# Patient Record
Sex: MALE | Race: BLACK OR AFRICAN AMERICAN | NOT HISPANIC OR LATINO | Employment: FULL TIME | URBAN - METROPOLITAN AREA
[De-identification: names, ages, dates, MRNs, and addresses within clinical notes are randomized per-mention and may not be internally consistent; named-entity substitution may affect disease eponyms.]

---

## 2017-01-23 ENCOUNTER — PRE VISIT (OUTPATIENT)
Dept: UROLOGY | Facility: CLINIC | Age: 67
End: 2017-01-23

## 2017-01-23 NOTE — TELEPHONE ENCOUNTER
Patient is coming in to see  for 6 month PSA, PSA was done on 11/9/2016 very thing is in [facility] ready for appointment no need for a call

## 2017-01-31 ENCOUNTER — OFFICE VISIT (OUTPATIENT)
Dept: UROLOGY | Facility: CLINIC | Age: 67
End: 2017-01-31

## 2017-01-31 VITALS
HEIGHT: 64 IN | HEART RATE: 69 BPM | SYSTOLIC BLOOD PRESSURE: 150 MMHG | DIASTOLIC BLOOD PRESSURE: 78 MMHG | BODY MASS INDEX: 32.68 KG/M2 | WEIGHT: 191.4 LBS

## 2017-01-31 DIAGNOSIS — R97.20 ELEVATED PROSTATE SPECIFIC ANTIGEN (PSA): ICD-10-CM

## 2017-01-31 DIAGNOSIS — N52.9 ERECTILE DYSFUNCTION, UNSPECIFIED ERECTILE DYSFUNCTION TYPE: Primary | ICD-10-CM

## 2017-01-31 DIAGNOSIS — N40.0 BENIGN NON-NODULAR PROSTATIC HYPERPLASIA, PRESENCE OF LOWER URINARY TRACT SYMPTOMS UNSPECIFIED: ICD-10-CM

## 2017-01-31 RX ORDER — BISACODYL 5 MG/1
TABLET, DELAYED RELEASE ORAL
COMMUNITY
Start: 2015-01-08 | End: 2017-07-31

## 2017-01-31 RX ORDER — AZITHROMYCIN 250 MG/1
TABLET, FILM COATED ORAL
Refills: 0 | COMMUNITY
Start: 2016-11-11 | End: 2017-07-31

## 2017-01-31 RX ORDER — ALBUTEROL SULFATE 90 UG/1
AEROSOL, METERED RESPIRATORY (INHALATION)
Refills: 2 | COMMUNITY
Start: 2016-11-07 | End: 2017-07-31

## 2017-01-31 RX ORDER — SILDENAFIL CITRATE 20 MG/1
TABLET ORAL
Qty: 90 TABLET | Refills: 0 | Status: SHIPPED | OUTPATIENT
Start: 2017-01-31 | End: 2017-07-31

## 2017-01-31 RX ORDER — TAMSULOSIN HYDROCHLORIDE 0.4 MG/1
0.4 CAPSULE ORAL DAILY
Qty: 90 CAPSULE | Refills: 3 | Status: SHIPPED | OUTPATIENT
Start: 2017-01-31 | End: 2020-06-09

## 2017-01-31 RX ORDER — PREDNISOLONE ACETATE 10 MG/ML
1 SUSPENSION/ DROPS OPHTHALMIC
COMMUNITY
Start: 2015-02-12 | End: 2017-07-31

## 2017-01-31 RX ORDER — CODEINE PHOSPHATE AND GUAIFENESIN 10; 100 MG/5ML; MG/5ML
LIQUID ORAL
Refills: 0 | COMMUNITY
Start: 2016-11-11 | End: 2017-07-31

## 2017-01-31 RX ORDER — MOMETASONE FUROATE AND FORMOTEROL FUMARATE DIHYDRATE 100; 5 UG/1; UG/1
AEROSOL RESPIRATORY (INHALATION)
Refills: 3 | COMMUNITY
Start: 2016-11-07 | End: 2017-07-31

## 2017-01-31 RX ORDER — OFLOXACIN 3 MG/ML
1 SOLUTION/ DROPS OPHTHALMIC
COMMUNITY
Start: 2015-02-12 | End: 2017-07-31

## 2017-01-31 ASSESSMENT — PAIN SCALES - GENERAL: PAINLEVEL: NO PAIN (0)

## 2017-01-31 NOTE — PROGRESS NOTES
REASON FOR VISIT: Urinary symptoms, erectile dysfunction, elevated PSA.    HISTORY OF PRESENT ILLNESS: Mr. Viveros is a 66-year-old gentleman who we have been following for multiple urinary symptoms, erectile dysfunction, and elevated PSAs of 4.18 in 11/2015 and 4.13 in 03/2016. He is experiencing increased urinary frequency of once every 1-2 hours, nocturia 1-2 times/night, increased urgency during the daytime, and urinary retention. These symptoms are largely unchanged since we last saw him in 03/16. His erectile dysfunction is also unchanged since his last visit. Although he was prescribed sildenafil 20 mg, he took it only once after experiencing headaches. Additionally, he found the drug to be too expensive. The patient received a TRUS-guided biopsy on 02/15/2010 for an abnormal digital rectal exam, which was negative for cancer in all 12 cores.    PHYSICAL EXAM: Today the patient's blood pressure is 150/78.  Pulse is 69. He is in no acute distress.    DATA:  - PSA: 3.08 on 02/28/2006, 2.48 on 02/12/2007, 2.84 on 11/17/2008, 2.63 on 11/06/2009, 3.31 on 01/24/2011, 3.24 on 11/14/2011, 4.18 on 11/19/2015, 4.31 on 03/08/2016, 6.91 on 11/09/2016  - Post-void residual volume (01/31/2016): 25 mL  - AUA urinary symptom score (01/31/2016): 15/35; bothersome index: 4    ASSESSMENT AND PLAN: Over half of today's 25-minute visit was spent counseling Mr. Viveros regarding his urinary symptoms, erectile dysfunction, and elevated PSA. We discussed with the patient that his urinary symptoms as well as his elevated PSA are likely caused by some degree of BPH, which is supported by the PVR volume. However, the rate of PSA elevation (2.6 in the past year) is concerning enough to warrant further work-up for prostate cancer. We suggested to the patient that he begin Flomax 0.4 mg PO daily for his urinary symptoms, and that he receive imaging in the form of a prostate MRI for initial work-up of prostate cancer. Regarding his  erectile dysfunction, we suggested that Mr. Viveros try the sildenafil again. We advised him that 1-5 may be taken at a time before sexual activity, and that he should experiment with the dosage to achieve erection without the headaches. We discussed with him that he should shop around, as prices for sildenafil vary dramatically among different pharmacies. We also cautioned him to take Flomax for at least two weeks before starting sildenafil to avoid potentially dangerous hypotension. Mr. Viveros was in agreement with the plan. He will be scheduled for a prostate MRI.        Scribe Disclosure:  I, Ulises Jacbos, am serving as a scribe; to document services personally performed by Dr. Montaño based on data collection and the provider's statements to me.    Attending addendum: I have seen and examined the patient and agree with the note above.  The medical student served as scribe for my history, physical exam and medical decision making.  In addition: Over half of today's 25-minute visit was spent counseling the patient regarding his urinary symptoms, ED and elevated PSA.  The patient will get an MRI of the prostate as a next step and make further decisions based on what that shows.

## 2017-01-31 NOTE — MR AVS SNAPSHOT
After Visit Summary   1/31/2017    Vishnu Viveros    MRN: [id number]           Patient Information     Date Of Birth          1950        Visit Information        Provider Department      1/31/2017 9:30 AM Leandro Montaño MD [facility] for Prostate and Urologic Cancers        Today's Diagnoses     Erectile dysfunction, unspecified erectile dysfunction type    -  1     Benign non-nodular prostatic hyperplasia, presence of lower urinary tract symptoms unspecified         Elevated prostate specific antigen (PSA)           Care Instructions    Please follow up with  on 2/28 MRI before          Follow-ups after your visit        Your next 10 appointments already scheduled     Feb 09, 2017 11:00 AM   Colonoscopy with Mari Winslow MD   [facility] ([facility])    [address]  [facility] [id number] [phone]            Feb 25, 2017 11:30 AM   (Arrive by 11:15 AM)   MR PROSTATE with [id number]   [facility] MRI ([facility])    [address] [address] [phone]           Take your medicines as usual, unless your doctor tells you not to. Bring a list of your current medicines to your exam (including vitamins, minerals and over-the-counter drugs). Also bring the results of similar scans you may have had.  Please remove any body piercings and hair extensions before you arrive.  Follow your doctor s orders. If you do not, we may have to postpone your exam.  You will not have contrast for this exam. You do not need to do anything special to prepare.  The MRI machine uses a strong magnet. Please wear clothes without metal (snaps, zippers). A sweatsuit works well, or we may give you a hospital gown.   **IMPORTANT** THE INSTRUCTIONS BELOW ARE ONLY FOR THOSE PATIENTS WHO HAVE BEEN TOLD THEY WILL RECEIVE SEDATION OR GENERAL  ANESTHESIA DURING THEIR MRI PROCEDURE:  IF YOU WILL RECEIVE SEDATION (take medicine to help you relax during your exam):   You must get the medicine from your doctor before you arrive. Bring the medicine to the exam. Do not take it at home.   Arrive one hour early. Bring someone who can take you home after the test. Your medicine will make you sleepy. After the exam, you may not drive, take a bus or take a taxi by yourself.   No eating 8 hours before your exam. You may have clear liquids up until 4 hours before your exam. (Clear liquids include water, clear tea, black coffee and fruit juice without pulp.)  IF YOU WILL RECEIVE ANESTHESIA (be asleep for your exam):   Arrive 1 1/2 hours early. Bring someone who can take you home after the test. You may not drive, take a bus or take a taxi by yourself.   No eating 8 hours before your exam. You may have clear liquids up until 4 hours before your exam. (Clear liquids include water, clear tea, black coffee and fruit juice without pulp.)   You will spend four to five hours in the recovery room.  Please call the Imaging Department at your exam site with any questions.            Feb 28, 2017 11:30 AM   (Arrive by 11:15 AM)   Return Visit with Leandro Montaño MD   [facility] for Prostate and Urologic Cancers ([email or web address])    [address] [place] [address] [phone]              Future tests that were ordered for you today     Open Future Orders        Priority Expected Expires Ordered    MR Prostate Routine  3/17/2017 1/31/2017            Who to contact     Please call your clinic at [phone] to:    Ask questions about your health    Make or cancel appointments    Discuss your medicines    Learn about your test results    Speak to your doctor   If you have compliments or concerns about an experience at your clinic, or if you wish to file a complaint, please contact [facility]  "Physicians Patient Relations at [phone] or email us at [email or web address]         Additional Information About Your Visit        Bottlenose[employer] Information     CHOOMOGO is an electronic gateway that provides easy, online access to your medical records. With CHOOMOGO, you can request a clinic appointment, read your test results, renew a prescription or communicate with your care team.     To sign up for CHOOMOGO visit the website at [email or web address]   You will be asked to enter the access code listed below, as well as some personal information. Please follow the directions to create your username and password.     Your access code is: [email or web address]  Expires: 2017  6:30 AM     Your access code will  in 90 days. If you need help or a new code, please contact your [facility] or call [phone] for assistance.        Care EveryWhere ID     This is your Care EveryWhere ID. This could be used by other organizations to access your [place] medical records  [id number]        Your Vitals Were     Pulse Height BMI (Body Mass Index)             69 1.626 m (5' 4\") 32.84 kg/m2          Blood Pressure from Last 3 Encounters:   17 150/78   16 162/83   16 142/82    Weight from Last 3 Encounters:   17 86.818 kg (191 lb 6.4 oz)   16 88.724 kg (195 lb 9.6 oz)   16 89.994 kg (198 lb 6.4 oz)              We Performed the Following     POST-VOID RESIDUAL BLADDER SCAN          Today's Medication Changes          These changes are accurate as of: 17 11:02 AM.  If you have any questions, ask your nurse or doctor.               Start taking these medicines.        Dose/Directions    sildenafil 20 MG tablet   Commonly known as:  REVATIO/VIAGRA   Used for:  Erectile dysfunction, unspecified erectile dysfunction type   Started by:  Leandro Montaño MD        Take 1-5 tabs as needed for sexual activity.  Never use with " nitroglycerin, terazosin or doxazosin.   Quantity:  90 tablet   Refills:  0       tamsulosin 0.4 MG capsule   Commonly known as:  FLOMAX   Used for:  Benign non-nodular prostatic hyperplasia, presence of lower urinary tract symptoms unspecified   Started by:  Laendro Montaño MD        Dose:  0.4 mg   Take 1 capsule (0.4 mg) by mouth daily   Quantity:  90 capsule   Refills:  3            Where to get your medicines      These medications were sent to [id number] - [place], [place] - [address]  [address], [place] [address]     Phone:  [phone]    - tamsulosin 0.4 MG capsule      Some of these will need a paper prescription and others can be bought over the counter.  Ask your nurse if you have questions.     Bring a paper prescription for each of these medications    - sildenafil 20 MG tablet             Primary Care Provider Office Phone # Fax #    Murali Logan [phone]        [facility] [address] [place] [address] [address]  [facility] [address]        Thank you!     Thank you for choosing [facility]OLOGY AND [facility] FOR PROSTATE AND UROLOGIC CANCERS  for your care. Our goal is always to provide you with excellent care. Hearing back from our patients is one way we can continue to improve our services. Please take a few minutes to complete the written survey that you may receive in the mail after your visit with us. Thank you!             Your Updated Medication List - Protect others around you: Learn how to safely use, store and throw away your medicines at www.disposemymeds.org.          This list is accurate as of: 1/31/17 11:02 AM.  Always use your most recent med list.                   Brand Name Dispense Instructions for use    ALEVE PO      Take 1 tablet by mouth as needed for moderate pain       aspirin 81 MG tablet      Take 1 tablet by mouth daily.       atorvastatin 20 MG tablet    LIPITOR    90 tablet    Take 1  tablet (20 mg) by mouth daily       azithromycin 250 MG tablet    ZITHROMAX     TK 2 TS PO ON DAY 1 THEN 1 T D FOR 4 DAYS       BENADRYL PO      Take 1 tablet by mouth daily as needed for allergies       DHA-EPA-VITAMIN E PO      Take 1 capsule by mouth       DULERA 100-5 MCG/ACT oral inhaler   Generic drug:  mometasone-formoterol      INHALE 2 PFS INTO THE LUNGS BID       FISH OIL PO      Take 1 capsule by mouth as needed       gabapentin 300 MG capsule    NEURONTIN    180 capsule    One capsule twice daily       GAVILYTE-G 236 G suspension   Generic drug:  polyethylene glycol          Glucosamine Sulfate 750 MG Tabs     60 tablet    Take 1 tablet by mouth 2 times daily       lisinopril 5 MG tablet    PRINIVIL/ZESTRIL    90 tablet    Take 1 tablet (5 mg) by mouth daily       loratadine 10 MG capsule    CLARITIN    90 capsule    Take 10 mg by mouth as needed       metFORMIN 500 MG 24 hr tablet    GLUCOPHAGE-XR    90 tablet    Take 1 tablet (500 mg) by mouth daily (with dinner)       montelukast 10 MG tablet    SINGULAIR    30 tablet    Take 1 tablet (10 mg) by mouth At Bedtime       MUCINEX 600 MG 12 hr tablet   Generic drug:  guaiFENesin          MULTIVITAMINS PO      Take 1 tablet by mouth daily.       ofloxacin 0.3 % ophthalmic solution    OCUFLOX     1 drop       order for DME     1 Device    Equipment being ordered: back brace support       prednisoLONE acetate 1 % ophthalmic susp    PRED FORTE     1 drop       sildenafil 20 MG tablet    REVATIO/VIAGRA    90 tablet    Take 1-5 tabs as needed for sexual activity.  Never use with nitroglycerin, terazosin or doxazosin.       STIMULANT LAXATIVE 5 MG EC tablet   Generic drug:  bisacodyl          tamsulosin 0.4 MG capsule    FLOMAX    90 capsule    Take 1 capsule (0.4 mg) by mouth daily       TYLENOL PO      Take 500 mg by mouth once       vardenafil 10 MG tablet    LEVITRA    4 tablet    Take 1 tablet 60 min prior to planned activity.       VENTOLIN  (90  BASE) MCG/ACT Inhaler   Generic drug:  albuterol      INHALE 2 PFS INTO THE LUNGS QID       VIRTUSSIN A/C 100-10 MG/5ML Soln solution   Generic drug:  guaiFENesin-codeine      TK 5-10 MLS PO Q 4 H PRF COUGH       VITAMIN B12 PO      Take 1 tablet by mouth. Pt states he takes this 2 or 3 times a week..       VITAMIN C PO      Take 500 mg by mouth once       vitamin D 1000 UNITS capsule      Take 1 capsule by mouth daily.

## 2017-01-31 NOTE — LETTER
1/31/2017       RE: Vishnu Viveros  [address]  [place] [address]     Dear Colleague,    Thank you for referring your patient, Vishnu Viveros, to the [facility] UROLOGY AND INST FOR PROSTATE AND UROLOGIC CANCERS at [facility]. Please see a copy of my visit note below.    REASON FOR VISIT: Urinary symptoms, erectile dysfunction, elevated PSA.    HISTORY OF PRESENT ILLNESS: Mr. Viveros is a 66-year-old gentleman who we have been following for multiple urinary symptoms, erectile dysfunction, and elevated PSAs of 4.18 in 11/2015 and 4.13 in 03/2016. He is experiencing increased urinary frequency of once every 1-2 hours, nocturia 1-2 times/night, increased urgency during the daytime, and urinary retention. These symptoms are largely unchanged since we last saw him in 03/16. His erectile dysfunction is also unchanged since his last visit. Although he was prescribed sildenafil 20 mg, he took it only once after experiencing headaches. Additionally, he found the drug to be too expensive. The patient received a TRUS-guided biopsy on 02/15/2010 for an abnormal digital rectal exam, which was negative for cancer in all 12 cores.    PHYSICAL EXAM: Today the patient's blood pressure is 150/78.  Pulse is 69. He is in no acute distress.    DATA:  - PSA: 3.08 on 02/28/2006, 2.48 on 02/12/2007, 2.84 on 11/17/2008, 2.63 on 11/06/2009, 3.31 on 01/24/2011, 3.24 on 11/14/2011, 4.18 on 11/19/2015, 4.31 on 03/08/2016, 6.91 on 11/09/2016  - Post-void residual volume (01/31/2016): 25 mL  - AUA urinary symptom score (01/31/2016): 15/35; bothersome index: 4    ASSESSMENT AND PLAN: Over half of today's 25-minute visit was spent counseling Mr. Viveros regarding his urinary symptoms, erectile dysfunction, and elevated PSA. We discussed with the patient that his urinary symptoms as well as his elevated PSA are likely caused by some degree of BPH, which is supported by the PVR volume. However,  the rate of PSA elevation (2.6 in the past year) is concerning enough to warrant further work-up for prostate cancer. We suggested to the patient that he begin Flomax 0.4 mg PO daily for his urinary symptoms, and that he receive imaging in the form of a prostate MRI for initial work-up of prostate cancer. Regarding his erectile dysfunction, we suggested that Mr. Viveros try the sildenafil again. We advised him that 1-5 may be taken at a time before sexual activity, and that he should experiment with the dosage to achieve erection without the headaches. We discussed with him that he should shop around, as prices for sildenafil vary dramatically among different pharmacies. We also cautioned him to take Flomax for at least two weeks before starting sildenafil to avoid potentially dangerous hypotension. Mr. Viveros was in agreement with the plan. He will be scheduled for a prostate MRI.      Scribe Disclosure:  I, Ulises Jacobs, am serving as a scribe; to document services personally performed by Dr. Montaño based on data collection and the provider's statements to me.    Attending addendum: I have seen and examined the patient and agree with the note above.  The medical student served as scribe for my history, physical exam and medical decision making.  In addition: Over half of today's 25-minute visit was spent counseling the patient regarding his urinary symptoms, ED and elevated PSA.  The patient will get an MRI of the prostate as a next step and make further decisions based on what that shows.    Again, thank you for allowing me to participate in the care of your patient.      Sincerely,    Leandro Montaño MD

## 2017-02-02 ENCOUNTER — TELEPHONE (OUTPATIENT)
Dept: GASTROENTEROLOGY | Facility: OUTPATIENT CENTER | Age: 67
End: 2017-02-02

## 2017-02-06 ENCOUNTER — TELEPHONE (OUTPATIENT)
Dept: GASTROENTEROLOGY | Facility: OUTPATIENT CENTER | Age: 67
End: 2017-02-06

## 2017-02-06 DIAGNOSIS — Z12.11 ENCOUNTER FOR SCREENING COLONOSCOPY: Primary | ICD-10-CM

## 2017-02-06 NOTE — TELEPHONE ENCOUNTER
Patient taking any blood thinners ? 81 mg aspirin    Heart disease ? denies    Lung disease ? denies      Sleep apnea ? denies    Diabetic ? Type 2    Kidney disease ? denies    Dialysis ? n/a  Electronic implanted medical devices ? denies    Are you taking any narcotic pain medication ? No What is your daily dosage ?    PTSD ? n/a    Prep instructions reviewed with patient ? Instructions,  policy, conscious sedation plan reviewed. Advised pt. To have someone stay with him post exam    Pharmacy : [facility]    Indication for procedure : screening colonoscopy    Referring provider : Dr. Murali Logan

## 2017-02-09 ENCOUNTER — DOCUMENTATION ONLY (OUTPATIENT)
Dept: GASTROENTEROLOGY | Facility: OUTPATIENT CENTER | Age: 67
End: 2017-02-09

## 2017-02-09 ENCOUNTER — TRANSFERRED RECORDS (OUTPATIENT)
Dept: HEALTH INFORMATION MANAGEMENT | Facility: CLINIC | Age: 67
End: 2017-02-09

## 2017-02-13 LAB — COPATH REPORT: NORMAL

## 2017-02-21 ENCOUNTER — PRE VISIT (OUTPATIENT)
Dept: UROLOGY | Facility: CLINIC | Age: 67
End: 2017-02-21

## 2017-03-16 ENCOUNTER — TELEPHONE (OUTPATIENT)
Dept: UROLOGY | Facility: CLINIC | Age: 67
End: 2017-03-16

## 2017-03-16 NOTE — TELEPHONE ENCOUNTER
----- Message from Mayela Ambrocio sent at 3/16/2017  9:18 AM CDT -----  Regarding: RE: Appt for Prostate MRI and follow up with Sabra to review  Attempted to contact 3 times, leaving multiple voicemails with our office phone number, and were unable to speak to the pt. Please let us know if you need anything else. - Mayela, Clinic Coordinator  ----- Message -----     From: Abigail Pichardo LPN     Sent: 2/28/2017  12:15 PM       To: Clinic Coordinators-[facility]  Subject: Appt for Prostate MRI and follow up with Arya Mazariegos!    This patient needs to get an appointment for a prostate MRI and a follow up appointment with Dr. Montaño to review.  (patient left without being scheduled).  Thank you!!    -Abigail Pichardo LPN

## 2017-03-20 ENCOUNTER — TELEPHONE (OUTPATIENT)
Dept: UROLOGY | Facility: CLINIC | Age: 67
End: 2017-03-20

## 2017-03-20 DIAGNOSIS — R97.20 ELEVATED PSA: Primary | ICD-10-CM

## 2017-03-20 NOTE — TELEPHONE ENCOUNTER
----- Message from Rema Ponce sent at 3/20/2017  1:32 PM CDT -----  Regarding: Scheduled  Patient called back and I got him scheduled for his MRI and results a week later.

## 2017-03-27 ENCOUNTER — PRE VISIT (OUTPATIENT)
Dept: UROLOGY | Facility: CLINIC | Age: 67
End: 2017-03-27

## 2017-03-27 DIAGNOSIS — J30.2 SEASONAL ALLERGIES: ICD-10-CM

## 2017-03-27 NOTE — TELEPHONE ENCOUNTER
Patient is coming in to see  for a MRI result follow up, no need for a call very thing is in epic ready for appointment.

## 2017-03-28 RX ORDER — LORATADINE 10 MG/1
10 TABLET ORAL DAILY PRN
Qty: 90 TABLET | Refills: 0 | Status: SHIPPED | OUTPATIENT
Start: 2017-03-28 | End: 2017-12-28

## 2017-03-28 NOTE — TELEPHONE ENCOUNTER
loratadine (CLARITIN) 10 MG       Last Written Prescription Date:  11/19/15  Last Fill Quantity: 90,   # refills: 3  Last Office Visit : 11/28/16  Future Office visit:  NONE

## 2017-04-04 ENCOUNTER — OFFICE VISIT (OUTPATIENT)
Dept: UROLOGY | Facility: CLINIC | Age: 67
End: 2017-04-04

## 2017-04-04 VITALS
DIASTOLIC BLOOD PRESSURE: 87 MMHG | HEART RATE: 62 BPM | BODY MASS INDEX: 33.58 KG/M2 | HEIGHT: 64 IN | SYSTOLIC BLOOD PRESSURE: 154 MMHG | WEIGHT: 196.7 LBS

## 2017-04-04 DIAGNOSIS — N52.8 OTHER MALE ERECTILE DYSFUNCTION: Primary | ICD-10-CM

## 2017-04-04 DIAGNOSIS — R97.20 ELEVATED PROSTATE SPECIFIC ANTIGEN (PSA): ICD-10-CM

## 2017-04-04 RX ORDER — SILDENAFIL CITRATE 20 MG/1
TABLET ORAL
Qty: 90 TABLET | Refills: 11 | Status: SHIPPED | OUTPATIENT
Start: 2017-04-04 | End: 2017-11-27

## 2017-04-04 ASSESSMENT — PAIN SCALES - GENERAL: PAINLEVEL: NO PAIN (0)

## 2017-04-04 NOTE — MR AVS SNAPSHOT
After Visit Summary   2017    Vishnu Viveros    MRN: [id number]           Patient Information     Date Of Birth          1950        Visit Information        Provider Department      2017 12:30 PM Leandro Montaño MD [facility] and [facility] for Prostate and Urologic Cancers        Today's Diagnoses     Other male erectile dysfunction    -  1    Elevated prostate specific antigen (PSA)          Care Instructions    Please follow up in one year with  with a PSA before.          Follow-ups after your visit        Follow-up notes from your care team     Return in about 1 year (around 2018).      Who to contact     Please call your clinic at [phone] to:    Ask questions about your health    Make or cancel appointments    Discuss your medicines    Learn about your test results    Speak to your doctor   If you have compliments or concerns about an experience at your clinic, or if you wish to file a complaint, please contact [facility] Patient Relations at [phone] or email us at [email or web address]         Additional Information About Your Visit        MyChart Information     Handipoints is an electronic gateway that provides easy, online access to your medical records. With Handipoints, you can request a clinic appointment, read your test results, renew a prescription or communicate with your care team.     To sign up for Handipoints visit the website at [email or web address]   You will be asked to enter the access code listed below, as well as some personal information. Please follow the directions to create your username and password.     Your access code is: [email or web address]  Expires: 2017  7:12 AM     Your access code will  in 90 days. If you need help or a new code, please contact your [facility] or call [phone] for assistance.        Care EveryWhere ID     This is your Care  "EveryWhere ID. This could be used by other organizations to access your [place] medical records  [id number]        Your Vitals Were     Pulse Height BMI (Body Mass Index)             62 1.626 m (5' 4\") 33.76 kg/m2          Blood Pressure from Last 3 Encounters:   04/04/17 154/87   02/28/17 153/85   01/31/17 150/78    Weight from Last 3 Encounters:   04/04/17 89.2 kg (196 lb 11.2 oz)   02/28/17 88.5 kg (195 lb)   01/31/17 86.8 kg (191 lb 6.4 oz)                 Today's Medication Changes          These changes are accurate as of: 4/4/17 11:59 PM.  If you have any questions, ask your nurse or doctor.               These medicines have changed or have updated prescriptions.        Dose/Directions    * sildenafil 20 MG tablet   Commonly known as:  REVATIO/VIAGRA   This may have changed:  Another medication with the same name was added. Make sure you understand how and when to take each.   Used for:  Erectile dysfunction, unspecified erectile dysfunction type   Changed by:  Leandro Montaño MD        Take 1-5 tabs as needed for sexual activity.  Never use with nitroglycerin, terazosin or doxazosin.   Quantity:  90 tablet   Refills:  0       * sildenafil 20 MG tablet   Commonly known as:  REVATIO/VIAGRA   This may have changed:  You were already taking a medication with the same name, and this prescription was added. Make sure you understand how and when to take each.   Used for:  Other male erectile dysfunction   Changed by:  Leandro Montaño MD        Take 1-5 tablet (20 mg) by mouth as needed for sexual activity.  Never use with nitroglycerin, terazosin or doxazosin.   Quantity:  90 tablet   Refills:  11       * Notice:  This list has 2 medication(s) that are the same as other medications prescribed for you. Read the directions carefully, and ask your doctor or other care provider to review them with you.         Where to get your medicines      Some of these will need a paper prescription and " others can be bought over the counter.  Ask your nurse if you have questions.     Bring a paper prescription for each of these medications     sildenafil 20 MG tablet                Primary Care Provider Office Phone # Fax #    Murali Logan [phone]        [facility] [address]  [facility] [address]        Thank you!     Thank you for choosing [facility] UROLOGY AND [facility] FOR PROSTATE AND UROLOGIC CANCERS  for your care. Our goal is always to provide you with excellent care. Hearing back from our patients is one way we can continue to improve our services. Please take a few minutes to complete the written survey that you may receive in the mail after your visit with us. Thank you!             Your Updated Medication List - Protect others around you: Learn how to safely use, store and throw away your medicines at www.disposemymeds.org.          This list is accurate as of: 4/4/17 11:59 PM.  Always use your most recent med list.                   Brand Name Dispense Instructions for use    ALEVE PO      Take 1 tablet by mouth as needed for moderate pain       aspirin 81 MG tablet      Take 1 tablet by mouth daily.       atorvastatin 20 MG tablet    LIPITOR    90 tablet    Take 1 tablet (20 mg) by mouth daily       azithromycin 250 MG tablet    ZITHROMAX     TK 2 TS PO ON DAY 1 THEN 1 T D FOR 4 DAYS       BENADRYL PO      Take 1 tablet by mouth daily as needed for allergies       DHA-EPA-VITAMIN E PO      Take 1 capsule by mouth       DULERA 100-5 MCG/ACT oral inhaler   Generic drug:  mometasone-formoterol      INHALE 2 PFS INTO THE LUNGS BID       FISH OIL PO      Take 1 capsule by mouth as needed       gabapentin 300 MG capsule    NEURONTIN    180 capsule    One capsule twice daily       GAVILYTE-G 236 G suspension   Generic drug:  polyethylene glycol          Glucosamine Sulfate 750 MG Tabs     60 tablet    Take 1 tablet by mouth 2 times daily       lisinopril 5 MG tablet     PRINIVIL/ZESTRIL    90 tablet    Take 1 tablet (5 mg) by mouth daily       loratadine 10 MG tablet    CLARITIN    90 tablet    Take 1 tablet (10 mg) by mouth daily as needed LAST MD APPT. 11/28/16       metFORMIN 500 MG 24 hr tablet    GLUCOPHAGE-XR    90 tablet    Take 1 tablet (500 mg) by mouth daily (with dinner)       montelukast 10 MG tablet    SINGULAIR    30 tablet    Take 1 tablet (10 mg) by mouth At Bedtime       MUCINEX 600 MG 12 hr tablet   Generic drug:  guaiFENesin          MULTIVITAMINS PO      Take 1 tablet by mouth daily.       ofloxacin 0.3 % ophthalmic solution    OCUFLOX     1 drop       OMEGA-3 FATTY ACIDS-VITAMIN E PO      Take 1 capsule by mouth       order for DME     1 Device    Equipment being ordered: back brace support       prednisoLONE acetate 1 % ophthalmic susp    PRED FORTE     1 drop       * sildenafil 20 MG tablet    REVATIO/VIAGRA    90 tablet    Take 1-5 tabs as needed for sexual activity.  Never use with nitroglycerin, terazosin or doxazosin.       * sildenafil 20 MG tablet    REVATIO/VIAGRA    90 tablet    Take 1-5 tablet (20 mg) by mouth as needed for sexual activity.  Never use with nitroglycerin, terazosin or doxazosin.       STIMULANT LAXATIVE 5 MG EC tablet   Generic drug:  bisacodyl          tamsulosin 0.4 MG capsule    FLOMAX    90 capsule    Take 1 capsule (0.4 mg) by mouth daily       TYLENOL PO      Take 500 mg by mouth once       vardenafil 10 MG tablet    LEVITRA    4 tablet    Take 1 tablet 60 min prior to planned activity.       VENTOLIN  (90 BASE) MCG/ACT Inhaler   Generic drug:  albuterol      INHALE 2 PFS INTO THE LUNGS QID       VIRTUSSIN A/C 100-10 MG/5ML Soln solution   Generic drug:  guaiFENesin-codeine      TK 5-10 MLS PO Q 4 H PRF COUGH       VITAMIN B12 PO      Take 1 tablet by mouth. Pt states he takes this 2 or 3 times a week..       VITAMIN C PO      Take 500 mg by mouth once       vitamin D 1000 UNITS capsule      Take 1 capsule by mouth daily.        * Notice:  This list has 2 medication(s) that are the same as other medications prescribed for you. Read the directions carefully, and ask your doctor or other care provider to review them with you.

## 2017-04-04 NOTE — PROGRESS NOTES
REASON FOR VISIT TODAY:  Elevated PSA and erectile dysfunction and lower urinary tract symptoms.        HISTORY OF PRESENT ILLNESS:  Mr. Viveros is a 66-year-old gentleman followed in our clinic for history of some lower urinary tract symptoms.  The patient is currently managing his urination with Flomax.  He also has some erectile dysfunction and was given a prescription for sildenafil in January but has yet to fill the prescription and inquires today again about using Cialis.  The patient also has a history of elevated PSAs including values in the low to mid 4's.  He recently underwent an MRI of the prostate and comes in today to discuss these further.      PHYSICAL EXAMINATION:  On exam today, the patient's blood pressure is 154/87.  Pulse is 62.  He is in no acute distress.      IMAGING:  The patient has an MRI from 03/27/2017 showing a prostate volume of 51 cc and only PI-RADS 2 lesions.      ASSESSMENT AND PLAN:  Over half of today's 15-minute visit was spent counseling the patient regarding his elevated PSA, his lower urinary tract symptoms and his erectile dysfunction.  I suggested to Mr. Viveros that we are pleased to see there are no highly suspicious lesions on his MRI, and while this is no guarantee, it greatly diminishes the likelihood that we are missing clinically significant disease.  We will simply plan on repeating a PSA in 1 year's time with regard to this.  In terms of his erectile dysfunction, the patient again is requesting medications for this.  The patient notes he thinks he has lost his sildenafil prescription from January and so we have rewritten this for him today.  I have encouraged him to take 1-5 tablets daily as needed for sexual activity.  We did discuss that sildenafil if it works is likely to be much, much cheaper than the Cialis and we would recommend him trying this first.  Mr. Viveros is in agreement with the plan and we will otherwise see him back in a year with a PSA.

## 2017-04-04 NOTE — LETTER
4/4/2017       RE: Vishnu Viveros  [address]  [place] [address]     Dear Colleague,    Thank you for referring your patient, Vishnu Viveros, to the [facility] UROLOGY AND INST FOR PROSTATE AND UROLOGIC CANCERS at [facility]. Please see a copy of my visit note below.    REASON FOR VISIT TODAY:  Elevated PSA and erectile dysfunction and lower urinary tract symptoms.        HISTORY OF PRESENT ILLNESS:  Mr. Viveros is a 66-year-old gentleman followed in our clinic for history of some lower urinary tract symptoms.  The patient is currently managing his urination with Flomax.  He also has some erectile dysfunction and was given a prescription for sildenafil in January but has yet to fill the prescription and inquires today again about using Cialis.  The patient also has a history of elevated PSAs including values in the low to mid 4's.  He recently underwent an MRI of the prostate and comes in today to discuss these further.      PHYSICAL EXAMINATION:  On exam today, the patient's blood pressure is 154/87.  Pulse is 62.  He is in no acute distress.      IMAGING:  The patient has an MRI from 03/27/2017 showing a prostate volume of 51 cc and only PI-RADS 2 lesions.      ASSESSMENT AND PLAN:  Over half of today's 15-minute visit was spent counseling the patient regarding his elevated PSA, his lower urinary tract symptoms and his erectile dysfunction.  I suggested to Mr. Viveros that we are pleased to see there are no highly suspicious lesions on his MRI, and while this is no guarantee, it greatly diminishes the likelihood that we are missing clinically significant disease.  We will simply plan on repeating a PSA in 1 year's time with regard to this.  In terms of his erectile dysfunction, the patient again is requesting medications for this.  The patient notes he thinks he has lost his sildenafil prescription from January and so we have rewritten this for him today.  I  have encouraged him to take 1-5 tablets daily as needed for sexual activity.  We did discuss that sildenafil if it works is likely to be much, much cheaper than the Cialis and we would recommend him trying this first.  Mr. Viveros is in agreement with the plan and we will otherwise see him back in a year with a PSA.     Leandro Montaño MD

## 2017-07-31 ENCOUNTER — PRE VISIT (OUTPATIENT)
Dept: ANESTHESIOLOGY | Facility: CLINIC | Age: 67
End: 2017-07-31

## 2017-07-31 ENCOUNTER — DOCUMENTATION ONLY (OUTPATIENT)
Dept: VASCULAR SURGERY | Facility: CLINIC | Age: 67
End: 2017-07-31

## 2017-07-31 ENCOUNTER — OFFICE VISIT (OUTPATIENT)
Dept: FAMILY MEDICINE | Facility: CLINIC | Age: 67
End: 2017-07-31

## 2017-07-31 VITALS
WEIGHT: 189.3 LBS | DIASTOLIC BLOOD PRESSURE: 79 MMHG | BODY MASS INDEX: 32.49 KG/M2 | HEART RATE: 57 BPM | RESPIRATION RATE: 16 BRPM | SYSTOLIC BLOOD PRESSURE: 138 MMHG

## 2017-07-31 DIAGNOSIS — E11.9 TYPE 2 DIABETES MELLITUS TREATED WITHOUT INSULIN (H): ICD-10-CM

## 2017-07-31 DIAGNOSIS — R97.20 ELEVATED PROSTATE SPECIFIC ANTIGEN (PSA): ICD-10-CM

## 2017-07-31 DIAGNOSIS — M48.061 LUMBAR STENOSIS: ICD-10-CM

## 2017-07-31 DIAGNOSIS — R29.898 LEFT LEG WEAKNESS: Primary | ICD-10-CM

## 2017-07-31 DIAGNOSIS — Z13.6 SCREENING FOR AAA (ABDOMINAL AORTIC ANEURYSM): ICD-10-CM

## 2017-07-31 DIAGNOSIS — I10 BENIGN ESSENTIAL HYPERTENSION: ICD-10-CM

## 2017-07-31 DIAGNOSIS — Z23 ENCOUNTER FOR IMMUNIZATION: ICD-10-CM

## 2017-07-31 LAB
ALBUMIN SERPL-MCNC: 3.9 G/DL (ref 3.4–5)
ALP SERPL-CCNC: 55 U/L (ref 40–150)
ALT SERPL W P-5'-P-CCNC: 29 U/L (ref 0–70)
ANION GAP SERPL CALCULATED.3IONS-SCNC: 7 MMOL/L (ref 3–14)
AST SERPL W P-5'-P-CCNC: 22 U/L (ref 0–45)
BILIRUB SERPL-MCNC: 0.6 MG/DL (ref 0.2–1.3)
BUN SERPL-MCNC: 16 MG/DL (ref 7–30)
CALCIUM SERPL-MCNC: 8.8 MG/DL (ref 8.5–10.1)
CHLORIDE SERPL-SCNC: 106 MMOL/L (ref 94–109)
CHOLEST SERPL-MCNC: 123 MG/DL
CO2 SERPL-SCNC: 28 MMOL/L (ref 20–32)
CREAT SERPL-MCNC: 0.79 MG/DL (ref 0.66–1.25)
DEPRECATED CALCIDIOL+CALCIFEROL SERPL-MC: 34 UG/L (ref 20–75)
GFR SERPL CREATININE-BSD FRML MDRD: NORMAL ML/MIN/1.7M2
GLUCOSE SERPL-MCNC: 98 MG/DL (ref 70–99)
HBA1C MFR BLD: 6.3 % (ref 4.3–6)
HDLC SERPL-MCNC: 60 MG/DL
LDLC SERPL CALC-MCNC: 52 MG/DL
NONHDLC SERPL-MCNC: 63 MG/DL
POTASSIUM SERPL-SCNC: 4.2 MMOL/L (ref 3.4–5.3)
PROT SERPL-MCNC: 7.1 G/DL (ref 6.8–8.8)
PSA SERPL-MCNC: 4.1 UG/L (ref 0–4)
SODIUM SERPL-SCNC: 140 MMOL/L (ref 133–144)
TRIGL SERPL-MCNC: 54 MG/DL
TSH SERPL DL<=0.005 MIU/L-ACNC: 1.67 MU/L (ref 0.4–4)

## 2017-07-31 ASSESSMENT — PAIN SCALES - GENERAL: PAINLEVEL: SEVERE PAIN (6)

## 2017-07-31 NOTE — PATIENT INSTRUCTIONS
Primary Care Center Phone Number [phone]  Primary Care Center Medication Refill Request Information:  * Please contact your pharmacy regarding ANY request for medication refills.  ** [facility] Prescription Fax = [phone]  * Please allow 3 business days for routine medication refills.  * Please allow 5 business days for controlled substance medication refills.     Primary [facility] Test Result notification information:  *You will be notified with in 7-10 days of your appointment day regarding the results of your test.  If you are on MyChart you will be notified as soon as the provider has reviewed the results and signed off on them.        MRI Screening Tool    Does the patient have any metals in their body? no  Is the patient claustrophobic? no  Does the patient need sedation? no  Is the patient able to transport themself to a table? yes

## 2017-07-31 NOTE — PROGRESS NOTES
Vishnu Landeros is a 67-year-old gentleman  here for follow up of chronic health conditions.  Left hip occasionally will give out on him occasionally and notes his balance may be off.  He had past history of significant low back pain with radicular pain down the back of his left leg. He has noted loss of muscle strength to the point he has challenges walking up the stairs. He also has knee pain on the  Left, leg discrepancy with left leg shorter than right over his life. He feels the left leg is numb and worries about neuropathy.  He has spinal stenosis and benefited from intervention last year in pain clinic. He works on exercise at home and fitness club three times per week and does not want to do Physical therapy.    Short of breath in going up stairs occasionally as he feels his legs are weak especially the left leg associated with numbness he attributes to neuropathy. He does have a prescription for gabapentin 300 mg but is only taking it once daily.  He has urinary frequency and sometimes urinary dribbling.  He has noted Erectile dysfunction. He saw urology and will be having follow-up PSA.   He completed colonoscopy and we're reviewing the results today which indicated 3 polyps were removed but no signs of dysplasia or malignancy. I provided him a copy of his report including pathology and discuss next colonoscopy will be due in February 2020.    Review of systems; Occasionally he will notice a little bit of dyspnea on exertion, but he thinks it is probably due to aging as it has not worsened from baseline. He has seasonal allergies. He has  been taking Claritin. His chronic back pain and is requesting back brace. He is neuropathy and back pain limits his walking by about 200 feet without resting and he would like a handicap parking tag. He had previous ultrasound which reviewed no abdominal aortic aneurysm.   Lipids have been excellent.   HCM:  He would like to have an influenza vaccine and Pneumococcal  vaccine     SH: .He works as a  and likes his job. Mostly sedentary job. He feels it is very hard for him to take time off work to attend to his health.   6 point ROS of systems including Constitutional,  Respiratory, Cardiovascular, Gastroenterology, Genitourinary, Integumentary, Muscularskeletal, Psychiatric were all negative except for pertinent positives noted in my HPI.      Patient Active Problem List   Diagnosis     Erectile dysfunction     Seasonal allergies     Abdominal bloating     Vitamin D deficiency     Pulmonary nodule     Red eyes     Prostate nodule     Right sided abdominal pain     Low back pain radiating to right leg     Chronic low back pain     Degenerative disc disease, lumbar     Lumbar stenosis     Back pain with radiation     Blood in stool     Anemia     Positive FIT (fecal immunochemical test)     DJD (degenerative joint disease) of knee     HL (hearing loss)     Vision impairment     Tear film insufficiency     Recurrent pterygium     Senile nuclear sclerosis     Type 2 diabetes mellitus treated without insulin (H)     Diabetic polyneuropathy associated with diabetes mellitus due to underlying condition (H)       Past Medical History:   Diagnosis Date     Pulmonary nodule        Past Surgical History:   Procedure Laterality Date     APPENDECTOMY OPEN CHILD       BIOPSY PROSTATE TRANSRECTAL       EXCISE PTERYGIUM Right 1989     EXCISE PTERYGIUM Right 2/12/2015     INJECT EPIDURAL LUMBAR / SACRAL SINGLE N/A 7/21/2016    Procedure: INJECT EPIDURAL LUMBAR / SACRAL SINGLE;  Surgeon: Judah Henriquez MD;  Location: [facility] OR     INJECT PARAVERTEBRAL FACET JOINT LUMBAR / SACRAL FIRST Right 8/18/2016    Procedure: INJECT PARAVERTEBRAL FACET JOINT LUMBAR / SACRAL FIRST;  Surgeon: Judah Henriquez MD;  Location: [facility]     INJECT PARAVERTEBRAL FACET JOINT LUMBAR / SACRAL FIRST Right 9/8/2016    Procedure: INJECT PARAVERTEBRAL FACET JOINT LUMBAR / SACRAL FIRST;  Surgeon: Florence  "Judah CURRAN MD;  Location: [facility] OR     left knee surgery         Current Outpatient Prescriptions   Medication     sildenafil (REVATIO/VIAGRA) 20 MG tablet     loratadine (CLARITIN) 10 MG tablet     tamsulosin (FLOMAX) 0.4 MG capsule     metFORMIN (GLUCOPHAGE-XR) 500 MG 24 hr tablet     atorvastatin (LIPITOR) 20 MG tablet     gabapentin (NEURONTIN) 300 MG capsule     lisinopril (PRINIVIL/ZESTRIL) 5 MG tablet     Ascorbic Acid (VITAMIN C PO)     vardenafil (LEVITRA) 10 MG tablet     Naproxen Sodium (ALEVE PO)     aspirin 81 MG tablet     Cyanocobalamin (VITAMIN B12 PO)     Multiple Vitamin (MULTIVITAMINS PO)     Cholecalciferol (VITAMIN D) 1000 UNIT capsule     Omega-3 Fatty Acids (FISH OIL PO)     No current facility-administered medications for this visit.      Facility-Administered Medications Ordered in Other Visits   Medication     iohexol (OMNIPAQUE) 300 mg/mL injection 10 mL       Allergies   Allergen Reactions     Seasonal Allergies      Sinus congestion, sinus \"drainage\", sneezing     Nkda [No Known Drug Allergies]          Social History     Social History     Marital status:      Spouse name: N/A     Number of children: N/A     Years of education: N/A     Occupational History     Not on file.     Social History Main Topics     Smoking status: Former Smoker     Types: Cigarettes     Quit date: 1981     Smokeless tobacco: Never Used     Alcohol use 1.5 oz/week     3 drink(s) per week      Comment: wine socially once per week or shots of wiskey on the weekend     Drug use: No     Sexual activity: Yes     Partners: Female     Other Topics Concern     Not on file     Social History Narrative     works as [employer].        His immediate family; his wife has MS and is an RN at [employer], his two children are in good health.               Family History   Problem Relation Age of Onset     CEREBROVASCULAR DISEASE Brother           Neurologic Disorder Brother      Brain " hemorrhage     Breast Cancer Sister           Glaucoma No family hx of      Macular Degeneration No family hx of        /79  Pulse 57  Resp 16  Wt 85.9 kg (189 lb 4.8 oz)  BMI 32.49 kg/m2  PHYSICAL EXAMINATION:  Appears well    Constitutional: Oriented to person, place, and time. Vital signs are noted.  Appears well-developed and well-nourished, obese. Non-toxic appearance.  No distress. Ad  HEENT:  Pterygium of right eye. His pupils are equal.   Head: Normocephalic and atraumatic.   Mouth/Throat: Oropharynx is clear and moist. No oropharyngeal exudate.   Neck: Normal range of motion. Neck supple. No JVD present. No tracheal deviation present. No thyromegaly present.   Cardiovascular: Regular rhythm, normal heart sounds and intact distal pulses. No murmur present. Exam reveals no gallop and no friction rub.   Pulmonary/Chest: Effort normal and breath sounds normal. No respiratory distress.   Abdominal:Obese Soft. Bowel sounds are normal. No distension and no mass. No tenderness.   Lymphadenopathy:   No cervical adenopathy.   Neurological: Alert and oriented to person, place, and time. Normal strength. No cranial nerve deficit or sensory deficit. DTR s absent patellar bilaterally, absent Achilles.  MUSCULOSKELETAL:  Left knee signs of degenerative joint disease with slight swelling.  He has full extension and flexion but pain throughout the knee joint.  There are no signs of laxity on Lachman, but he does have a positive Shayla. Back decreased range of motion with limitation with lateral bending  causing pain down back of bilateral legs. He has less muscle mass on the right. Gait no foot drop but antalgic gait. He has loss of sensation on left lateral leg and bilateral feet.  Skin: Skin is warm and dry. No rash noted. No erythema. No pallor.   Psychiatric: Normal mood, affect and behavior is normal.     Results for orders placed or performed in visit on 17   MRI Prostate    Narrative    MRI  PROSTATE:  3/27/2017 7:31 PM    CLINICAL HISTORY: Elevated prostate specific antigen (PSA) rising  prostate-specific antigen measuring 6.91 on 11/9/2016    Comparison: None.    TECHNIQUE:     The following sequences were obtained: High-resolution axial  T2-weighted, coronal T2-weighted, 3D volumetric T2-weighted, axial  pre-contrast T1, axial diffusion-weighted, axial apparent diffusion  coefficient and axial dynamic contrast-enhanced T1. Postcontrast  images were evaluated on a separate workstation to evaluate dynamic  contrast enhancement.  Contrast dose: 10mL Gadavist    The images are interpreted according to PI-RADS v.2    FINDINGS:  Prostate gland size: 4.3 x 4.3 x 5.3 cm  Volume: 51 cc     Peripheral zone: Diffusely heterogeneous without a focal abnormality.       PI-RADS:  Peripheral zone T2: 2  Diffusion-weighted image: 1    Contrast-enhanced images: Negative      Overall score: 2    Transitional zone: There are BPH type changes without suspicious  lesion.    PI-RADS:  Transition zone T2: 2  Diffusion-weighted image: 1  Contrast-enhanced images: Negative      Overall score: 2    Remainder of the pelvis:  Urinary bladder wall appears mildly  thickened, likely secondary to decompressed state. Seminal vesicles  are symmetric. No lymphadenopathy. Visualized bowel loops appear  normal. No suspicious bone or soft tissue lesion. Degenerative changes  in the lumbosacral spine, better depicted on 6/16/2016 MRI of the  spine.      Impression    IMPRESSION:   1. Based on the most suspicious abnormality, this exam is  characterized as PIRADS 2 - Low probability.  Clinically significant  cancer is unlikely to be present.   2. No evidence of extraprostatic malignancy. No suspicious adenopathy  or evidence of pelvic metastases.    The images are interpreted according to PI-RADS v2.  [email or web address]    I have personally reviewed the examination  and initial interpretation  and I agree with the findings.    NELIDA Mares was seen today for  several health concerns.   He has spinal stenosis  Currently he has left leg numbness with occasional giving out and antalgic gait. He would be interested in pain evaluation to try to determine if intervention possible and wants conservative managment  He has left leg  Numbness. I discussed EMG Leg. He has  length discrepancy left shorter than right, less muscle mass, degenerative knees all impacting his daily activity. He would like to continue conservative therapy through exercise 3 times weekly. He would like an appointment with the pain clinic to determine if intervention is required. We'll order updated MRI as last was in 2016.   I provided him with prescription for back brace he will  at home health company.  I completed disability parking good for 2 years due to his limitation in walking of less than 200 feet without resting due to spinal stenosis  radicular symptoms and neuropathy.  Colonoscopy reviewed with need for next in February 2020.  Type 2 diabetes assessing A1c and lipids today.  Vishnu was seen today for hypertension.    Diagnoses and all orders for this visit:    Left leg weakness  -     MRI Lumbar spine w/o & w contrast; Future  -     EMG; Future  -     [facility]  -     order for DME; Back brace    Lumbar stenosis  -     MRI Lumbar spine w/o & w contrast; Future  -     EMG; Future  -     Vitamin D Deficiency; Future  -     [facility]  -     order for DME; Back brace    Type 2 diabetes mellitus treated without insulin (H)  -     Hemoglobin A1c; Future  -     Comprehensive metabolic panel; Future  -     Lipid panel reflex to direct LDL; Future    Benign essential hypertension  -     Comprehensive metabolic panel; Future  -     TSH with free T4 reflex; Future    Screening for AAA (abdominal aortic  aneurysm)  -     [facility] NOTIFICATION  EXAM:    Abdominal ultrasound 5/4/2012     HISTORY:    Right upper quadrant pain     COMPARISON:    CT 11/14/2011 of the chest     FINDINGS:         The liver measures 13.3 cm and the parenchyma is unremarkable. This  may be technical but can be seen with hepatic fibrosis. The spleen is  normal in size measuring 8.3 cm.     The gallbladder wall is normal measuring 2.7 mm. The common bile duct  is normal measuring 3.6 mm. No gallstones are seen. Negative  sonographic Coffey's sign.     The visualized aorta and IVC are unremarkable.     The right kidney measures 10.6 x 4.8 x 4.3 cm. There is a simple cyst  in the interpolar region of the lateral right kidney measuring 3.5 cm  in greatest dimension. The left kidney measures 11.2 cm and has a  normal appearance. No hydronephrosis.     IMPRESSION:     Unremarkable abdominal ultrasound.  I have personally reviewed the image and initial interpretation, and I  agree with findings.       Already completed but need to turn off reminder    Elevated prostate specific antigen (PSA)  -     PSA tumor marker    Encounter for immunization  -     TDAP VACCINE (BOOSTRIX)      All questions were addressed and voiced understanding and agreement with the above.    Murali Logan MD

## 2017-07-31 NOTE — MR AVS SNAPSHOT
After Visit Summary   7/31/2017    Vishnu Viveros    MRN: [id number]           Patient Information     Date Of Birth          1950        Visit Information        Provider Department      7/31/2017 8:55 AM Murali Logan MD [facility]        Today's Diagnoses     Left leg weakness    -  1    Lumbar stenosis        Type 2 diabetes mellitus treated without insulin (H)        Benign essential hypertension        Screening for AAA (abdominal aortic aneurysm)        Elevated prostate specific antigen (PSA)        Encounter for immunization          Care Instructions    Primary Care Center Phone Number [phone]  Primary Care Center Medication Refill Request Information:  * Please contact your pharmacy regarding ANY request for medication refills.  ** [facility] Prescription Fax = [phone]  * Please allow 3 business days for routine medication refills.  * Please allow 5 business days for controlled substance medication refills.     Primary Care Center Test Result notification information:  *You will be notified with in 7-10 days of your appointment day regarding the results of your test.  If you are on MyChart you will be notified as soon as the provider has reviewed the results and signed off on them.        MRI Screening Tool    Does the patient have any metals in their body? no  Is the patient claustrophobic? no  Does the patient need sedation? no  Is the patient able to transport themself to a table? yes              Follow-ups after your visit        Additional Services     [facility]       Your provider has referred you to: [facility]. Please call [phone] to make an appointment.     Clinic is located: [facility] [address] [place] [address] [id number] 4th [place] [address]      Please complete the following questions:    Procedure/Referral: Referral Only -   Comprehensive Evaluation and Management    What is your diagnosis for the patient's pain? Left leg radicular pain from spinal stenosis    What are your specific questions for the pain specialist? Intervention?    Are there any red flags that may impact the assessment or management of the patient? None    REGARDING OPIOID MEDICATIONS:  We will always address appropriateness of opioid pain medications. We do not prescribe on the patient's first visit and generally will not take on a prescribing role for stable chronic pain. When we do take on prescribing of opioids for chronic pain, it is in collaboration with the referring physician for an determined period of time (usually weeks to months), with an expectation that the primary physician or provider will assume the prescribing role if medications are effective at stable doses with demonstrated compliance.  Therefore, please do not assume that your prescribing responsibilities end on the day of pain clinic consultation.  Is this agreeable to you? YES      Please be aware that coverage of these services is subject to the terms and limitations of your health insurance plan.  Call member services at your health plan with any benefit or coverage questions.      Please bring the following with you to your appointment or have sent to the [facility]:    (1) Any X-Rays, CTs or MRIs which have been performed that are not in Epic.  Contact the facility where they were done to arrange for  prior to your scheduled appointment.  Any new CT, MRI or other procedures ordered by your specialist must be performed at a [facility] facility or coordinated by your clinic's referral office.    (2) List of current medications   (3) This referral request   (4) Any documents/labs given to you for this referral                  Follow-up notes from your care team     Write patient with results Return in about 4 months (around 11/30/2017).      Your next 10 appointments already scheduled      Aug 08, 2017 12:10 PM CDT   (Arrive by 11:55 AM)   New Patient Visit with Tiffany Dahl MD   [facility] ([facility])    [address]  4th [address]  [facility] [address] [phone]            Aug 08, 2017  6:15 PM CDT   (Arrive by 6:00 PM)   MR LUMBAR SPINE W/O & W CONTRAST with [id number]   [facility] ([facility])    [address]  1st [address]  [facility] [address] [phone]           Take your medicines as usual, unless your doctor tells you not to. Bring a list of your current medicines to your exam (including vitamins, minerals and over-the-counter drugs).  You will be given intravenous contrast for this exam. To prepare:   The day before your exam, drink extra fluids at least six 8-ounce glasses (unless your doctor tells you to restrict your fluids).   Have a blood test (creatinine test) within 30 days of your exam. Go to your clinic or Diagnostic Imaging Department for this test.  The MRI machine uses a strong magnet. Please wear clothes without metal (snaps, zippers). A sweatsuit works well, or we may give you a hospital gown.  Please remove any body piercings and hair extensions before you arrive. You will also remove watches, jewelry, hairpins, wallets, dentures, partial dental plates and hearing aids. You may wear contact lenses, and you may be able to wear your rings. We have a safe place to keep your personal items, but it is safer to leave them at home.   **IMPORTANT** THE INSTRUCTIONS BELOW ARE ONLY FOR THOSE PATIENTS WHO HAVE BEEN TOLD THEY WILL RECEIVE SEDATION OR GENERAL ANESTHESIA DURING THEIR MRI PROCEDURE:  IF YOU WILL RECEIVE SEDATION (take medicine to help you relax during your exam):   You must get the medicine from your doctor before you arrive. Bring the medicine to the exam. Do not take it at home.   Arrive one hour early. Bring someone who can take you home  after the test. Your medicine will make you sleepy. After the exam, you may not drive, take a bus or take a taxi by yourself.   No eating 8 hours before your exam. You may have clear liquids up until 4 hours before your exam. (Clear liquids include water, clear tea, black coffee and fruit juice without pulp.)  IF YOU WILL RECEIVE ANESTHESIA (be asleep for your exam):   Arrive 1 1/2 hours early. Bring someone who can take you home after the test. You may not drive, take a bus or take a taxi by yourself.   No eating 8 hours before your exam. You may have clear liquids up until 4 hours before your exam. (Clear liquids include water, clear tea, black coffee and fruit juice without pulp.)  Please call the Imaging Department at your exam site with any questions.            Aug 31, 2017  3:15 PM CDT   (Arrive by 3:00 PM)   EMG with Bran Sibley MD   [facility] ([facility])    [address] [place] [address]  [address] [facility] [id number] [phone]           Do not use lotions or creams on the area to be tested. If you are on blood thinners (Warfarin, Coumadin, Lovenox, etc), please contact your primary care physician to check if it is safe to stop them 3 days prior to testing. If you have anxiety, please check with your referring physician to obtain anti-anxiety medication for the procedure.            Nov 27, 2017  8:25 AM CST   (Arrive by 8:10 AM)   Return Visit with Murali Logan MD   [facility] ([facility])    [address] [place] [address]  [address] [facility] [address] [phone]              Future tests that were ordered for you today     Open Future Orders        Priority Expected Expires Ordered    TSH with free T4 reflex Routine 7/31/2017 7/31/2018 7/31/2017    Vitamin D Deficiency Routine 7/31/2017 7/31/2018 7/31/2017    MRI Lumbar spine w/o & w contrast Routine  7/31/2018 7/31/2017    EMG Routine  7/31/2018 7/31/2017     Hemoglobin A1c Routine 2017    Comprehensive metabolic panel Routine 2017    Lipid panel reflex to direct LDL Routine 2017            Who to contact     Please call your clinic at [phone] to:    Ask questions about your health    Make or cancel appointments    Discuss your medicines    Learn about your test results    Speak to your doctor   If you have compliments or concerns about an experience at your clinic, or if you wish to file a complaint, please contact [facility] Patient Relations at [phone] or email us at [email or web address]         Additional Information About Your Visit        [employer] Information     PLUQ is an electronic gateway that provides easy, online access to your medical records. With PLUQ, you can request a clinic appointment, read your test results, renew a prescription or communicate with your care team.     To sign up for PLUQ visit the website at [email or web address]   You will be asked to enter the access code listed below, as well as some personal information. Please follow the directions to create your username and password.     Your access code is: [email or web address]  Expires: 2017  7:12 AM     Your access code will  in 90 days. If you need help or a new code, please contact your [facility] or call [phone] for assistance.        Care EveryWhere ID     This is your Care EveryWhere ID. This could be used by other organizations to access your [facility] medical records  [id number]        Your Vitals Were     Pulse Respirations BMI (Body Mass Index)             57 16 32.49 kg/m2          Blood Pressure from Last 3 Encounters:   17 138/79   17 154/87   17 153/85    Weight from Last 3 Encounters:   17 85.9 kg (189 lb 4.8 oz)   17 89.2 kg (196 lb 11.2 oz)   17 88.5 kg (195 lb)               We Performed the Following     [facility] NOTIFICATION     [facility]     PSA tumor marker     TDAP VACCINE (BOOSTRIX)          Today's Medication Changes          These changes are accurate as of: 7/31/17 10:05 AM.  If you have any questions, ask your nurse or doctor.               Start taking these medicines.        Dose/Directions    order for DME   Used for:  Left leg weakness, Lumbar stenosis   Started by:  Murali Logan MD        Back brace   Quantity:  1 Device   Refills:  0         These medicines have changed or have updated prescriptions.        Dose/Directions    sildenafil 20 MG tablet   Commonly known as:  REVATIO/VIAGRA   This may have changed:  Another medication with the same name was removed. Continue taking this medication, and follow the directions you see here.   Used for:  Other male erectile dysfunction   Changed by:  Leandro Montaño MD        Take 1-5 tablet (20 mg) by mouth as needed for sexual activity.  Never use with nitroglycerin, terazosin or doxazosin.   Quantity:  90 tablet   Refills:  11         Stop taking these medicines if you haven't already. Please contact your care team if you have questions.     azithromycin 250 MG tablet   Commonly known as:  ZITHROMAX   Stopped by:  Murali Logan MD           BENADRYL PO   Stopped by:  Murali Logan MD           DHA-EPA-VITAMIN E PO   Stopped by:  Murali Logan MD           DULERA 100-5 MCG/ACT oral inhaler   Generic drug:  mometasone-formoterol   Stopped by:  Murali Logan MD           montelukast 10 MG tablet   Commonly known as:  SINGULAIR   Stopped by:  Murali Logan MD           MUCINEX 600 MG 12 hr tablet   Generic drug:  guaiFENesin   Stopped by:  Murali Logan MD           VIRTUSSIN A/C 100-10 MG/5ML Soln solution   Generic drug:  guaiFENesin-codeine   Stopped by:  Murali Logan MD                Where to get your  medicines      Some of these will need a paper prescription and others can be bought over the counter.  Ask your nurse if you have questions.     Bring a paper prescription for each of these medications     order for DME                Primary Care Provider Office Phone # Fax #    Murali Logan [phone]       [address] [address]        Equal Access to Services     [facility] [place] JOELLEN : Hadii aad ku hadasho Soomaali, waaxda luqadaha, qaybta kaalmada adeegyada, waxay idiin hayaan adeeg kharash la'aan ah. So [facility] [phone].    ATENCIÓN: Si habla español, tiene a little disposición servicios gratuitos de asistencia lingüística. Llame al [phone].    We comply with applicable federal civil rights laws and [place] laws. We do not discriminate on the basis of race, color, national origin, age, disability sex, sexual orientation or gender identity.            Thank you!     Thank you for choosing [facility]  for your care. Our goal is always to provide you with excellent care. Hearing back from our patients is one way we can continue to improve our services. Please take a few minutes to complete the written survey that you may receive in the mail after your visit with us. Thank you!             Your Updated Medication List - Protect others around you: Learn how to safely use, store and throw away your medicines at www.disposemymeds.org.          This list is accurate as of: 7/31/17 10:05 AM.  Always use your most recent med list.                   Brand Name Dispense Instructions for use Diagnosis    ALEVE PO      Take 1 tablet by mouth as needed for moderate pain        aspirin 81 MG tablet      Take 1 tablet by mouth daily.        atorvastatin 20 MG tablet    LIPITOR    90 tablet    Take 1 tablet (20 mg) by mouth daily    Type 2 diabetes mellitus treated without insulin (H)       FISH OIL PO      Take 1 capsule by mouth as needed         gabapentin 300 MG capsule    NEURONTIN    180 capsule    One capsule twice daily    Lumbar radiculitis, Diabetic polyneuropathy associated with diabetes mellitus due to underlying condition (H)       lisinopril 5 MG tablet    PRINIVIL/ZESTRIL    90 tablet    Take 1 tablet (5 mg) by mouth daily    Type 2 diabetes mellitus treated without insulin (H), Benign essential hypertension       loratadine 10 MG tablet    CLARITIN    90 tablet    Take 1 tablet (10 mg) by mouth daily as needed LAST MD APPT. 11/28/16    Seasonal allergies       metFORMIN 500 MG 24 hr tablet    GLUCOPHAGE-XR    90 tablet    Take 1 tablet (500 mg) by mouth daily (with dinner)    Type 2 diabetes mellitus treated without insulin (H)       MULTIVITAMINS PO      Take 1 tablet by mouth daily.        order for DME     1 Device    Back brace    Left leg weakness, Lumbar stenosis       sildenafil 20 MG tablet    REVATIO/VIAGRA    90 tablet    Take 1-5 tablet (20 mg) by mouth as needed for sexual activity.  Never use with nitroglycerin, terazosin or doxazosin.    Other male erectile dysfunction       tamsulosin 0.4 MG capsule    FLOMAX    90 capsule    Take 1 capsule (0.4 mg) by mouth daily    Benign non-nodular prostatic hyperplasia, presence of lower urinary tract symptoms unspecified       vardenafil 10 MG tablet    LEVITRA    4 tablet    Take 1 tablet 60 min prior to planned activity.    Erectile dysfunction due to arterial insufficiency       VITAMIN B12 PO      Take 1 tablet by mouth. Pt states he takes this 2 or 3 times a week..        VITAMIN C PO      Take 500 mg by mouth once        vitamin D 1000 UNITS capsule      Take 1 capsule by mouth daily.

## 2017-07-31 NOTE — TELEPHONE ENCOUNTER
1.  Date/reason for appt: 8/8/17 - Lumbar Stenosis and Leg Pain  2.  Referring provider: Dr. Logan  3.  Call to patient (Yes / No - short description): no, pt is referred  4.  Previous care at / records requested from:   - [facility] -- records are in epic, imaging in pacs   - [facility] -- records are in [facility], previous pt of Dr. Henriquez

## 2017-07-31 NOTE — NURSING NOTE
Chief Complaint   Patient presents with     Hypertension     pt is here to follow up on blood pressure       Abigail Heredia CMA at 8:45 AM on 7/31/2017

## 2017-08-07 ENCOUNTER — TELEPHONE (OUTPATIENT)
Dept: ANESTHESIOLOGY | Facility: CLINIC | Age: 67
End: 2017-08-07

## 2017-08-08 ENCOUNTER — OFFICE VISIT (OUTPATIENT)
Dept: ANESTHESIOLOGY | Facility: CLINIC | Age: 67
End: 2017-08-08

## 2017-08-08 VITALS
HEART RATE: 71 BPM | SYSTOLIC BLOOD PRESSURE: 144 MMHG | BODY MASS INDEX: 32.44 KG/M2 | HEIGHT: 64 IN | OXYGEN SATURATION: 99 % | DIASTOLIC BLOOD PRESSURE: 91 MMHG | WEIGHT: 190 LBS

## 2017-08-08 DIAGNOSIS — E08.42 DIABETIC POLYNEUROPATHY ASSOCIATED WITH DIABETES MELLITUS DUE TO UNDERLYING CONDITION (H): ICD-10-CM

## 2017-08-08 DIAGNOSIS — M54.16 LUMBAR RADICULITIS: ICD-10-CM

## 2017-08-08 DIAGNOSIS — M54.17 LUMBOSACRAL RADICULOPATHY AT L5: ICD-10-CM

## 2017-08-08 DIAGNOSIS — M47.816 FACET ARTHRITIS OF LUMBAR REGION: Primary | ICD-10-CM

## 2017-08-08 RX ORDER — GABAPENTIN 300 MG/1
300 CAPSULE ORAL 3 TIMES DAILY
Qty: 90 CAPSULE | Refills: 3 | Status: SHIPPED | OUTPATIENT
Start: 2017-08-08 | End: 2017-10-20

## 2017-08-08 ASSESSMENT — ENCOUNTER SYMPTOMS
WEIGHT LOSS: 0
SORE THROAT: 1
DIZZINESS: 1
DIAPHORESIS: 0
BLOOD IN STOOL: 0
PALPITATIONS: 0
NERVOUS/ANXIOUS: 1
BLURRED VISION: 0
FEVER: 0
NAUSEA: 0
BACK PAIN: 1
DYSURIA: 0
STRIDOR: 0
COUGH: 0
HEADACHES: 0
PHOTOPHOBIA: 0
BRUISES/BLEEDS EASILY: 0
FREQUENCY: 0
ORTHOPNEA: 0
MYALGIAS: 1
CONSTIPATION: 0
HEARTBURN: 0

## 2017-08-08 ASSESSMENT — PAIN SCALES - GENERAL: PAINLEVEL: SEVERE PAIN (7)

## 2017-08-08 NOTE — LETTER
8/8/2017       RE: Vishnu Viveros  [address] [facility] [address]  [place] [address]     Dear Colleague,    Thank you for referring your patient, Vishnu Viveros, to the [facility] COM[facility]ENS[facility]NAGEMENT at [facility]. Please see a copy of my visit note below.    [facility] for Comprehensive Chronic Pain Management : Consultation Note    Patient: Vishnu Viveros Age: 67 year old   MRN: [id number] Referred by:  Doreen     Date of Visit: August 8, 2017    Reason for consultation:    Vishnu Viveros is a 67 year old male who is seen in consultation today at the request of his provider,Dr. Logan for a comprehensive evaluation and management of pain.  Primary Care Provider is Murali Logan.  Opiate pain medications are being prescribed by - none.    Chief complaints:  Back pain radiating to left lower extremity    History of Present illness:   Vishnu Viveros is a 67 year old male with pain history as described below:     Location: back   Laterality: left   Quality: sharp   Radiation: left lower extremity along the L5 distribution  Duration: one year   Severity: 5/10  Aggravating factors include: movement   Relieving factors include: sit down  Any bowel or bladder incontinence: none  Other associated symptoms:tingline     The patient has a medical history significant for chronic low back pain with recent new developments of radicular pain in the LLE ( L5) and subjective weakness or instability. He has underlying spinal stenosis, degenerative changes and facet arthropathy on MRI. A lumbar TF ARYA helped with partial relief about a third of this pain. This was done by Dr. Henriquez. He had done Pain PT and HEP,  Gabapentin. Most recent MRI show Severe spinal canal stenosis at L4-L5. Moderate spinal canal narrowing at L2-3 and L3-4.The pain has been progressively worsened, limiting the patient s activities, affecting mood and sleep  quality, and causing a decrease in the patient s quality of life.The patient denies any side effects associated with medications, including hypersomnolence, cognitive changes, pruritis, or constipation.       Trials of therapies  including     PT: Yes:   TENs Unit: No  Manual Medicine/Chiropractic: No  Surgeries:No  Acupuncture: No  Other Integrative therapies: No  Behavioral interventions: No  Previous medication treatments included: gabapentin  Previous pain interventions: lumbar MBB and right L5 TF ARYA    [place] Prescription Monitoring Program:   Reviewed. No concerns    Review of Systems:  Review of Systems   Constitutional: Negative for diaphoresis, fever and weight loss.   HENT: Positive for sore throat. Negative for congestion, ear pain and tinnitus.    Eyes: Negative for blurred vision and photophobia.   Respiratory: Negative for cough and stridor.    Cardiovascular: Negative for chest pain, palpitations and orthopnea.   Gastrointestinal: Negative for blood in stool, constipation, heartburn and nausea.   Genitourinary: Negative for dysuria and frequency.   Musculoskeletal: Positive for back pain, joint pain and myalgias.   Skin: Negative for itching and rash.   Neurological: Positive for dizziness. Negative for headaches.   Endo/Heme/Allergies: Negative for environmental allergies. Does not bruise/bleed easily.   Psychiatric/Behavioral: The patient is nervous/anxious.        Past Medical History:  Past Medical History:   Diagnosis Date     Pulmonary nodule        Past Surgical History:  Past Surgical History:   Procedure Laterality Date     APPENDECTOMY OPEN CHILD       BIOPSY PROSTATE TRANSRECTAL       EXCISE PTERYGIUM Right 1989     EXCISE PTERYGIUM Right 2/12/2015     INJECT EPIDURAL LUMBAR / SACRAL SINGLE N/A 7/21/2016    Procedure: INJECT EPIDURAL LUMBAR / SACRAL SINGLE;  Surgeon: Judah Henriquez MD;  Location: [facility] OR     INJECT PARAVERTEBRAL FACET JOINT LUMBAR / SACRAL FIRST Right 8/18/2016     Procedure: INJECT PARAVERTEBRAL FACET JOINT LUMBAR / SACRAL FIRST;  Surgeon: Judah Henriquez MD;  [facility]: [facility] OR     INJECT PARAVERTEBRAL FACET JOINT LUMBAR / SACRAL FIRST Right 9/8/2016    Procedure: INJECT PARAVERTEBRAL FACET JOINT LUMBAR / SACRAL FIRST;  Surgeon: Judah Henriquez MD;  Location: [facility] OR     left knee surgery         Medications:  Current Outpatient Prescriptions   Medication Sig Dispense Refill     order for DME Back brace 1 Device 0     sildenafil (REVATIO/VIAGRA) 20 MG tablet Take 1-5 tablet (20 mg) by mouth as needed for sexual activity.  Never use with nitroglycerin, terazosin or doxazosin. 90 tablet 11     loratadine (CLARITIN) 10 MG tablet Take 1 tablet (10 mg) by mouth daily as needed LAST MD APPT. 11/28/16 90 tablet 0     tamsulosin (FLOMAX) 0.4 MG capsule Take 1 capsule (0.4 mg) by mouth daily 90 capsule 3     metFORMIN (GLUCOPHAGE-XR) 500 MG 24 hr tablet Take 1 tablet (500 mg) by mouth daily (with dinner) 90 tablet 3     atorvastatin (LIPITOR) 20 MG tablet Take 1 tablet (20 mg) by mouth daily 90 tablet 3     gabapentin (NEURONTIN) 300 MG capsule One capsule twice daily 180 capsule 3     lisinopril (PRINIVIL/ZESTRIL) 5 MG tablet Take 1 tablet (5 mg) by mouth daily 90 tablet 3     Ascorbic Acid (VITAMIN C PO) Take 500 mg by mouth once       vardenafil (LEVITRA) 10 MG tablet Take 1 tablet 60 min prior to planned activity. 4 tablet 11     Naproxen Sodium (ALEVE PO) Take 1 tablet by mouth as needed for moderate pain       aspirin 81 MG tablet Take 1 tablet by mouth daily.       Cyanocobalamin (VITAMIN B12 PO) Take 1 tablet by mouth. Pt states he takes this 2 or 3 times a week..        Multiple Vitamin (MULTIVITAMINS PO) Take 1 tablet by mouth daily.       Cholecalciferol (VITAMIN D) 1000 UNIT capsule Take 1 capsule by mouth daily.       Omega-3 Fatty Acids (FISH OIL PO) Take 1 capsule by mouth as needed       Allergies:  Allergies   Allergen Reactions     Seasonal Allergies      Sinus  "congestion, sinus \"drainage\", sneezing     Nkda [No Known Drug Allergies]      Social History:  Social History     Social History     Marital status:      Spouse name: N/A     Number of children: N/A     Years of education: N/A     Occupational History     Not on file.     Social History Main Topics     Smoking status: Former Smoker     Types: Cigarettes     Quit date: 1981     Smokeless tobacco: Never Used     Alcohol use 1.5 oz/week     3 drink(s) per week      Comment: wine socially once per week or shots of wiskey on the weekend     Drug use: No     Sexual activity: Yes     Partners: Female     Other Topics Concern     Not on file     Social History Narrative     works as [employer].        His immediate family; his wife has MS and is an RN at [employer], his two children are in good health.             Social History     Social History Narrative     works as [employer].        His immediate family; his wife has MS and is an RN at [employer], his two children are in good health.             Family history:  Family History   Problem Relation Age of Onset     CEREBROVASCULAR DISEASE Brother           Neurologic Disorder Brother      Brain hemorrhage     Breast Cancer Sister           Glaucoma No family hx of      Macular Degeneration No family hx of      Physical Exam:  Vitals:    17 1226   BP: (!) 144/91   Pulse: 71   SpO2: 99%   Weight: 86.2 kg (190 lb)   Height: 1.626 m (5' 4\")       General: Awake in no apparent distress. This patient is unaccompanied in the office .  Eyes: Sclerae are anicteric. PERRLA, EOMI   Neck: supple, no masses.   Lungs: unlabored.   Heart: regular rate and rhythm   Abdomen: soft non tender.  Extremities: Pulses are well palpable, no peripheral edema.   Musculoskeletal: All muscle groups are normal in bulk and tone. The patient changes position without pain behavior. The patient walks with a normal gait. " Posture is normal. Muscle strength was rated at 5/5 in all groups in the extremities. Examination of the joints reveals preserved range of motion.  The range of motion of the lumbar spine is normal  in all directions. The spinous processes in the cervical, thoracic, and lumbar spine are midline, with marked  tenderness over the paraspinous muscles and facet joints in the right lumbar region. Facet loading did generate pain bilaterally. Straight leg raise in the supine position was  negative. There was no tenderness to palpation noted over the sacroiliac joints on the right side on the left side bilaterally. Maxi s test was negative .   Neurologic exam:Strength 5/5 for bilateral grasp, finger abduction, wrist extension, elbow flexion, elbow extension, shoulder abduction.  Strength 5/5 for bilateral dorsiflexion, plantarflexion, great toe extension, knee extension, hip flexion. Sensation to light touch intact diminished on left L5 dematome distally  Psychiatric; Normal affect.   Skin: Warm and Dry.     LABORATORY VALUES:   Recent Labs   Lab Test  07/31/17   1031  11/28/16   1019   NA  140  140   POTASSIUM  4.2  4.5   CHLORIDE  106  106   CO2  28  28   ANIONGAP  7  5   GLC  98  108*   BUN  16  12   CR  0.79  0.82   FRANCIA  8.8  8.8       CBC RESULTS:   Recent Labs   Lab Test  11/28/16   1019   WBC  4.4   RBC  4.73   HGB  13.7   HCT  42.3   MCV  89   MCH  29.0   MCHC  32.4   RDW  13.2   PLT  253     Most Recent 3 INR's:No lab results found.    ASSESSMENT/PLAN:                           ASSESSMENT:  Facet arthritis of lumbar region (H)  Lumbosacral radiculopathy at left L5    PLAN:  1. Medications.     Increase gabapentin 0-300-600 gradually.     2. Interventional procedures:    None at this time.     We discussed with the patient about Left L4/L5 and L5-S1  transforaminal ARYA injections, the patient agreed to pursue. We will review MRI images prior to these injections.    3. Labs and imaging: Will f/u lumbar MRI ordered  by Dr. Logan.     4. Rehab: None indicated. The patient is encouraged to stay active and to perform exercise as tolerated.      5. Psychology: No current needs.    6. Integrated medicine: We discussed acupuncture therapy, but the patient is not interested.    7. Disposition:  The patient is advised to call clinic for follow up appointment in 3 months or earlier clinically indicated. We will see the patient for above mentioned procedure.      Assessment will be ongoing with changes in treatment as indicated.  Benefits/risks/alternatives to treatment have been reviewed and the patient has been instructed to contact this office if they have any questions or concerns.  This plan of care has been discussed with the patient and the patient is in agreement.     TOTAL TIME: I spent 60 minutes including greater than 50% face-to-face time counseling him about his diagnosis and treatment options.     Again, thank you for allowing me to participate in the care of your patient.      Sincerely,    Tiffany Dahl MD

## 2017-08-08 NOTE — PROGRESS NOTES
[facility] for Comprehensive Chronic Pain Management : Consultation Note    Patient: Vishnu Viveros Age: 67 year old   MRN: [id number] Referred by:  Doreen     Date of Visit: August 8, 2017    Reason for consultation:    Vishnu Viveros is a 67 year old male who is seen in consultation today at the request of his provider,Dr. Logan for a comprehensive evaluation and management of pain.  Primary Care Provider is Murali Logan.  Opiate pain medications are being prescribed by - none.    Chief complaints:  Back pain radiating to left lower extremity    History of Present illness:     Vishnu Viveros is a 67 year old male with pain history as described below:     Location: back   Laterality: left   Quality: sharp   Radiation: left lower extremity along the L5 distribution  Duration: one year   Severity: 5/10  Aggravating factors include: movement   Relieving factors include: sit down  Any bowel or bladder incontinence: none  Other associated symptoms:tingline     The patient has a medical history significant for chronic low back pain with recent new developments of radicular pain in the LLE ( L5) and subjective weakness or instability. He has underlying spinal stenosis, degenerative changes and facet arthropathy on MRI. A lumbar TF ARYA helped with partial relief about a third of this pain. This was done by Dr. Henriquez. He had done Pain PT and HEP,  Gabapentin. Most recent MRI show Severe spinal canal stenosis at L4-L5. Moderate spinal canal narrowing at L2-3 and L3-4.The pain has been progressively worsened, limiting the patient s activities, affecting mood and sleep quality, and causing a decrease in the patient s quality of life.The patient denies any side effects associated with medications, including hypersomnolence, cognitive changes, pruritis, or constipation.       Trials of therapies  including     PT: Yes:   TENs Unit: No  Manual Medicine/Chiropractic:  No  Surgeries:No  Acupuncture: No  Other Integrative therapies: No  Behavioral interventions: No  Previous medication treatments included: gabapentin  Previous pain interventions: lumbar MBB and right L5 TF ARYA    [place] Prescription Monitoring Program:   Reviewed. No concerns    Review of Systems:  Review of Systems   Constitutional: Negative for diaphoresis, fever and weight loss.   HENT: Positive for sore throat. Negative for congestion, ear pain and tinnitus.    Eyes: Negative for blurred vision and photophobia.   Respiratory: Negative for cough and stridor.    Cardiovascular: Negative for chest pain, palpitations and orthopnea.   Gastrointestinal: Negative for blood in stool, constipation, heartburn and nausea.   Genitourinary: Negative for dysuria and frequency.   Musculoskeletal: Positive for back pain, joint pain and myalgias.   Skin: Negative for itching and rash.   Neurological: Positive for dizziness. Negative for headaches.   Endo/Heme/Allergies: Negative for environmental allergies. Does not bruise/bleed easily.   Psychiatric/Behavioral: The patient is nervous/anxious.        Past Medical History:  Past Medical History:   Diagnosis Date     Pulmonary nodule        Past Surgical History:  Past Surgical History:   Procedure Laterality Date     APPENDECTOMY OPEN CHILD       BIOPSY PROSTATE TRANSRECTAL       EXCISE PTERYGIUM Right 1989     EXCISE PTERYGIUM Right 2/12/2015     INJECT EPIDURAL LUMBAR / SACRAL SINGLE N/A 7/21/2016    Procedure: INJECT EPIDURAL LUMBAR / SACRAL SINGLE;  Surgeon: Judah Henriquez MD;  Location:  OR     INJECT PARAVERTEBRAL FACET JOINT LUMBAR / SACRAL FIRST Right 8/18/2016    Procedure: INJECT PARAVERTEBRAL FACET JOINT LUMBAR / SACRAL FIRST;  Surgeon: Judah Henriquez MD;  Location:  OR     INJECT PARAVERTEBRAL FACET JOINT LUMBAR / SACRAL FIRST Right 9/8/2016    Procedure: INJECT PARAVERTEBRAL FACET JOINT LUMBAR / SACRAL FIRST;  Surgeon: Judah Henriquez MD;  Location:   "OR     left knee surgery         Medications:  Current Outpatient Prescriptions   Medication Sig Dispense Refill     order for DME Back brace 1 Device 0     sildenafil (REVATIO/VIAGRA) 20 MG tablet Take 1-5 tablet (20 mg) by mouth as needed for sexual activity.  Never use with nitroglycerin, terazosin or doxazosin. 90 tablet 11     loratadine (CLARITIN) 10 MG tablet Take 1 tablet (10 mg) by mouth daily as needed LAST MD APPT. 11/28/16 90 tablet 0     tamsulosin (FLOMAX) 0.4 MG capsule Take 1 capsule (0.4 mg) by mouth daily 90 capsule 3     metFORMIN (GLUCOPHAGE-XR) 500 MG 24 hr tablet Take 1 tablet (500 mg) by mouth daily (with dinner) 90 tablet 3     atorvastatin (LIPITOR) 20 MG tablet Take 1 tablet (20 mg) by mouth daily 90 tablet 3     gabapentin (NEURONTIN) 300 MG capsule One capsule twice daily 180 capsule 3     lisinopril (PRINIVIL/ZESTRIL) 5 MG tablet Take 1 tablet (5 mg) by mouth daily 90 tablet 3     Ascorbic Acid (VITAMIN C PO) Take 500 mg by mouth once       vardenafil (LEVITRA) 10 MG tablet Take 1 tablet 60 min prior to planned activity. 4 tablet 11     Naproxen Sodium (ALEVE PO) Take 1 tablet by mouth as needed for moderate pain       aspirin 81 MG tablet Take 1 tablet by mouth daily.       Cyanocobalamin (VITAMIN B12 PO) Take 1 tablet by mouth. Pt states he takes this 2 or 3 times a week..        Multiple Vitamin (MULTIVITAMINS PO) Take 1 tablet by mouth daily.       Cholecalciferol (VITAMIN D) 1000 UNIT capsule Take 1 capsule by mouth daily.       Omega-3 Fatty Acids (FISH OIL PO) Take 1 capsule by mouth as needed         Allergies:       Allergies   Allergen Reactions     Seasonal Allergies      Sinus congestion, sinus \"drainage\", sneezing     Nkda [No Known Drug Allergies]        Social History:    Social History     Social History     Marital status:      Spouse name: N/A     Number of children: N/A     Years of education: N/A     Occupational History     Not on file.     Social History Main " "Topics     Smoking status: Former Smoker     Types: Cigarettes     Quit date: 1981     Smokeless tobacco: Never Used     Alcohol use 1.5 oz/week     3 drink(s) per week      Comment: wine socially once per week or shots of wiskey on the weekend     Drug use: No     Sexual activity: Yes     Partners: Female     Other Topics Concern     Not on file     Social History Narrative     works as [employer].        His immediate family; his wife has MS and is an RN at [employer], his two children are in good health.             Social History     Social History Narrative     works as [employer].        His immediate family; his wife has MS and is an RN at [employer], his two children are in good health.                 Family history:  Family History   Problem Relation Age of Onset     CEREBROVASCULAR DISEASE Brother           Neurologic Disorder Brother      Brain hemorrhage     Breast Cancer Sister           Glaucoma No family hx of      Macular Degeneration No family hx of          Physical Exam:  Vitals:    17 1226   BP: (!) 144/91   Pulse: 71   SpO2: 99%   Weight: 86.2 kg (190 lb)   Height: 1.626 m (5' 4\")       General: Awake in no apparent distress. This patient is unaccompanied in the office .  Eyes: Sclerae are anicteric. PERRLA, EOMI   Neck: supple, no masses.   Lungs: unlabored.   Heart: regular rate and rhythm   Abdomen: soft non tender.  Extremities: Pulses are well palpable, no peripheral edema.   Musculoskeletal: All muscle groups are normal in bulk and tone. The patient changes position without pain behavior. The patient walks with a normal gait. Posture is normal. Muscle strength was rated at 5/5 in all groups in the extremities. Examination of the joints reveals preserved range of motion.  The range of motion of the lumbar spine is normal  in all directions. The spinous processes in the cervical, thoracic, and lumbar spine are midline, " with marked  tenderness over the paraspinous muscles and facet joints in the right lumbar region. Facet loading did generate pain bilaterally. Straight leg raise in the supine position was  negative. There was no tenderness to palpation noted over the sacroiliac joints on the right side on the left side bilaterally. Maxi s test was negative .   Neurologic exam:Strength 5/5 for bilateral grasp, finger abduction, wrist extension, elbow flexion, elbow extension, shoulder abduction.  Strength 5/5 for bilateral dorsiflexion, plantarflexion, great toe extension, knee extension, hip flexion. Sensation to light touch intact diminished on left L5 dematome distally  Psychiatric; Normal affect.   Skin: Warm and Dry.       LABORATORY VALUES:   Recent Labs   Lab Test  07/31/17   1031  11/28/16   1019   NA  140  140   POTASSIUM  4.2  4.5   CHLORIDE  106  106   CO2  28  28   ANIONGAP  7  5   GLC  98  108*   BUN  16  12   CR  0.79  0.82   FRANCIA  8.8  8.8       CBC RESULTS:   Recent Labs   Lab Test  11/28/16   1019   WBC  4.4   RBC  4.73   HGB  13.7   HCT  42.3   MCV  89   MCH  29.0   MCHC  32.4   RDW  13.2   PLT  253       Most Recent 3 INR's:No lab results found.          ASSESSMENT/PLAN:                             ASSESSMENT:       Facet arthritis of lumbar region (H)  Lumbosacral radiculopathy at left L5      PLAN:    1. Medications.     Increase gabapentin 0-300-600 gradually.     2. Interventional procedures:    None at this time.     We discussed with the patient about Left L4/L5 and L5-S1  transforaminal ARYA injections, the patient agreed to pursue. We will review MRI images prior to these injections.    3. Labs and imaging: Will f/u lumbar MRI ordered by Dr. Logan.     4. Rehab: None indicated. The patient is encouraged to stay active and to perform exercise as tolerated.      5. Psychology: No current needs.    6. Integrated medicine: We discussed acupuncture therapy, but the patient is not interested.    7.  Disposition:  The patient is advised to call clinic for follow up appointment in 3 months or earlier clinically indicated. We will see the patient for above mentioned procedure.      Assessment will be ongoing with changes in treatment as indicated.  Benefits/risks/alternatives to treatment have been reviewed and the patient has been instructed to contact this office if they have any questions or concerns.  This plan of care has been discussed with the patient and the patient is in agreement.     Tiffany Dahl MD, PHD      TOTAL TIME: I spent 60 minutes including greater than 50% face-to-face time counseling him about his diagnosis and treatment options.

## 2017-08-08 NOTE — PATIENT INSTRUCTIONS
1. Your doctor has ordered a physical therapy referral today for Tens unit. If the physical therapy department does not contact you 2 business days call [phone] to schedule your appointment.     2. Acupuncture Referral.  -Please call your insurance provider to find out about acupuncture coverage, being that not all policies cover acupuncture services.    [facility]: [facility]    [place] [phone]   [email or web address]     N: [facility] -    [place] [phone]   [email or web address]     N: [place] [facility] [phone]: [place] [phone]; [place] [phone]     [facility]- Inez Nudd [phone]    ([facility])- [facility] Britany sol [phone]    Imtiaz Walton   [address] [place] [address] [facility] [phone]    3. Increase gabapentin dose to 600 mg at night. IF tolerate after a week start taking 300 mg in the evening.     4. Dr. wilson will review your MRI and then decide your plan of care.             Follow up:      To speak with a nurse, schedule/reschedule/cancel a clinic appointment, or request a medication refill call: [phone]     You can also reach us by [employer]: [email or web address]    For refills, please call on Monday, 1 week before your medication runs out. The doctors are not always in clinic, so this gives us time to get your prescriptions ready.  Please let us know the name of the medication you are requesting a refill of.

## 2017-08-08 NOTE — MR AVS SNAPSHOT
After Visit Summary   8/8/2017    Vishnu Viveros    MRN: [id number]           Patient Information     Date Of Birth          1950        Visit Information        Provider Department      8/8/2017 12:10 PM Tiffany Dahl MD [facility]        Today's Diagnoses     Facet arthritis of lumbar region (H)    -  1    Lumbosacral radiculopathy at L5        Lumbar radiculitis        Diabetic polyneuropathy associated with diabetes mellitus due to underlying condition (H)          Care Instructions    1. Your doctor has ordered a physical therapy referral today for Tens unit. If the physical therapy department does not contact you 2 business days call [phone] to schedule your appointment.     2. Acupuncture Referral.  -Please call your insurance provider to find out about acupuncture coverage, being that not all policies cover acupuncture services.    N: [facility] [phone]   [email or web address]     FHN: [facility]) -    [place]  [phone]   [email or web address]     FHN: [place] [facility] [phone]: [place] [phone]; [place] [phone]     [place] [facility]- Inez Nudd [phone]    ([facility])- [facility] Britany sol [place] [phone]    Imtiaz Walton   [address] [place] [address] Acupuncture Clinic [phone]    3. Increase gabapentin dose to 600 mg at night. IF tolerate after a week start taking 300 mg in the evening.     4. Dr. dahl will review your MRI and then decide your plan of care.             Follow up:      To speak with a nurse, schedule/reschedule/cancel a clinic appointment, or request a medication refill call: [phone]     You can also reach us by logtrust: [email or web address]    For refills,  please call on Monday, 1 week before your medication runs out. The doctors are not always in clinic, so this gives us time to get your prescriptions ready.  Please let us know the name of the medication you are requesting a refill of.                                   Follow-ups after your visit        Additional Services     ACUPUNCTURE REFERRAL       Your provider has referred you to:   Acupuncture Referral.  -Please call your insurance provider to find out about acupuncture coverage, being that not all policies cover acupuncture services.    N: [facility]    [place] [phone]   [email or web address]     FHN: [facility] -    [place] [phone]   [email or web address]     FHN: [place] [facility] [phone]: [place] [phone]; [place] [phone]     [place] [facility]- Inez Nudd [phone]    ([facility])- [facility]OM Britany sol [phone]    Imtiaz Walton   [address] [facility] [phone]            PHYSICAL THERAPY REFERRAL       *This therapy referral will be filtered to a centralized scheduling office at [facility] and the patient will receive a call to schedule an appointment at a [facility] location most convenient for them. *     [facility] provides Physical Therapy evaluation and treatment and many specialty services across the [facility] system.  If requesting a specialty program, please choose from the list below.    If you have not heard from the scheduling office within 2 business days, please call [phone] for all locations, with the exception of [place], please call [phone].  Treatment: Evaluation & Treatment  Special Instructions/Modalities: Ten Unit  Special Programs: Tens unit evaluation and treatment    Please  "be aware that coverage of these services is subject to the terms and limitations of your health insurance plan.  Call member services at your health plan with any benefit or coverage questions.      **Note to Provider:  If you are referring outside of [place] for the therapy appointment, please list the name of the location in the \"special instructions\" above, print the referral and give to the patient to schedule the appointment.                  Your next 10 appointments already scheduled     Aug 08, 2017  6:15 PM CDT   (Arrive by 6:00 PM)   MR LUMBAR SPINE W/O & W CONTRAST with [id number]   [facility] ([facility] [place])    [address] [facility]  1st Floor  [facility] [address] [phone]           Take your medicines as usual, unless your doctor tells you not to. Bring a list of your current medicines to your exam (including vitamins, minerals and over-the-counter drugs).  You will be given intravenous contrast for this exam. To prepare:   The day before your exam, drink extra fluids at least six 8-ounce glasses (unless your doctor tells you to restrict your fluids).   Have a blood test (creatinine test) within 30 days of your exam. Go to your clinic or Diagnostic Imaging Department for this test.  The MRI machine uses a strong magnet. Please wear clothes without metal (snaps, zippers). A sweatsuit works well, or we may give you a hospital gown.  Please remove any body piercings and hair extensions before you arrive. You will also remove watches, jewelry, hairpins, wallets, dentures, partial dental plates and hearing aids. You may wear contact lenses, and you may be able to wear your rings. We have a safe place to keep your personal items, but it is safer to leave them at home.   **IMPORTANT** THE INSTRUCTIONS BELOW ARE ONLY FOR THOSE PATIENTS WHO HAVE BEEN TOLD THEY WILL RECEIVE SEDATION OR GENERAL ANESTHESIA DURING THEIR MRI PROCEDURE:  IF YOU WILL RECEIVE SEDATION " (take medicine to help you relax during your exam):   You must get the medicine from your doctor before you arrive. Bring the medicine to the exam. Do not take it at home.   Arrive one hour early. Bring someone who can take you home after the test. Your medicine will make you sleepy. After the exam, you may not drive, take a bus or take a taxi by yourself.   No eating 8 hours before your exam. You may have clear liquids up until 4 hours before your exam. (Clear liquids include water, clear tea, black coffee and fruit juice without pulp.)  IF YOU WILL RECEIVE ANESTHESIA (be asleep for your exam):   Arrive 1 1/2 hours early. Bring someone who can take you home after the test. You may not drive, take a bus or take a taxi by yourself.   No eating 8 hours before your exam. You may have clear liquids up until 4 hours before your exam. (Clear liquids include water, clear tea, black coffee and fruit juice without pulp.)  Please call the Imaging Department at your exam site with any questions.            Aug 31, 2017  3:15 PM CDT   (Arrive by 3:00 PM)   EMG with Bran Sibley MD   [facility] EMG ([facility])    [address] [place] [address] [phone]           Do not use lotions or creams on the area to be tested. If you are on blood thinners (Warfarin, Coumadin, Lovenox, etc), please contact your primary care physician to check if it is safe to stop them 3 days prior to testing. If you have anxiety, please check with your referring physician to obtain anti-anxiety medication for the procedure.            Nov 27, 2017  8:25 AM CST   (Arrive by 8:10 AM)   Return Visit with Murali Logan MD   [facility] ([facility])    [address] [place] [address] [phone]              Who to contact     Please call your clinic at [phone] to:    Ask questions about your  "health    Make or cancel appointments    Discuss your medicines    Learn about your test results    Speak to your doctor   If you have compliments or concerns about an experience at your clinic, or if you wish to file a complaint, please contact [facility] Patient Relations at [phone] or email us at [email or web address]         Additional Information About Your Visit        Larkyhart Information     Sclobyt is an electronic gateway that provides easy, online access to your medical records. With Lifeblob, you can request a clinic appointment, read your test results, renew a prescription or communicate with your care team.     To sign up for Lifeblob visit the website at [email or web address]   You will be asked to enter the access code listed below, as well as some personal information. Please follow the directions to create your username and password.     Your access code is: [email or web address]  Expires: 2017  7:12 AM     Your access code will  in 90 days. If you need help or a new code, please contact your [facility] Clinic or call [phone] for assistance.        Care EveryWhere ID     This is your Care EveryWhere ID. This could be used by other organizations to access your [place] medical records  [id number]        Your Vitals Were     Pulse Height Pulse Oximetry BMI (Body Mass Index)          71 1.626 m (5' 4\") 99% 32.61 kg/m2         Blood Pressure from Last 3 Encounters:   17 (!) 144/91   17 138/79   17 154/87    Weight from Last 3 Encounters:   17 86.2 kg (190 lb)   17 85.9 kg (189 lb 4.8 oz)   17 89.2 kg (196 lb 11.2 oz)              We Performed the Following     ACUPUNCTURE REFERRAL     PHYSICAL THERAPY REFERRAL          Today's Medication Changes          These changes are accurate as of: 17  1:15 PM.  If you have any questions, ask your nurse or doctor.               These medicines " have changed or have updated prescriptions.        Dose/Directions    gabapentin 300 MG capsule   Commonly known as:  NEURONTIN   This may have changed:    - how much to take  - how to take this  - when to take this   Used for:  Lumbar radiculitis, Diabetic polyneuropathy associated with diabetes mellitus due to underlying condition (H)   Changed by:  Tiffany Dahl MD        Dose:  300 mg   Take 1 capsule (300 mg) by mouth 3 times daily One capsule twice daily   Quantity:  90 capsule   Refills:  3            Where to get your medicines      These medications were sent to [id number] - [place], [place] - [address] AT [facility] [address]  [address], [place] [address]     Phone:  [phone]     gabapentin 300 MG capsule                Primary Care Provider Office Phone # Fax #    Murali Logan [phone]       [address] [address]        Equal Access to Services     [facility]an[facility] Developmental CenterLAURIE : Hadii dolores ku hadasho Soomaali, waaxda luqadaha, qaybta kaalmada adeegyada, waxay trevinin teri amaya . So [facility] [phone].    ATENCIÓN: Si habla español, tiene a little disposición servicios gratuitos de asistencia lingüística. [facility]uther[facility] [phone].    We comply with applicable federal civil rights laws and [place] laws. We do not discriminate on the basis of race, color, national origin, age, disability sex, sexual orientation or gender identity.            Thank you!     Thank you for choosing [facility]  for your care. Our goal is always to provide you with excellent care. Hearing back from our patients is one way we can continue to improve our services. Please take a few minutes to complete the written survey that you may receive in the mail after your visit with us. Thank you!             Your Updated Medication List - Protect others around you: Learn how to safely use, store and  throw away your medicines at www.disposemymeds.org.          This list is accurate as of: 8/8/17  1:15 PM.  Always use your most recent med list.                   Brand Name Dispense Instructions for use Diagnosis    ALEVE PO      Take 1 tablet by mouth as needed for moderate pain        aspirin 81 MG tablet      Take 1 tablet by mouth daily.        atorvastatin 20 MG tablet    LIPITOR    90 tablet    Take 1 tablet (20 mg) by mouth daily    Type 2 diabetes mellitus treated without insulin (H)       FISH OIL PO      Take 1 capsule by mouth as needed        gabapentin 300 MG capsule    NEURONTIN    90 capsule    Take 1 capsule (300 mg) by mouth 3 times daily One capsule twice daily    Lumbar radiculitis, Diabetic polyneuropathy associated with diabetes mellitus due to underlying condition (H)       lisinopril 5 MG tablet    PRINIVIL/ZESTRIL    90 tablet    Take 1 tablet (5 mg) by mouth daily    Type 2 diabetes mellitus treated without insulin (H), Benign essential hypertension       loratadine 10 MG tablet    CLARITIN    90 tablet    Take 1 tablet (10 mg) by mouth daily as needed LAST MD APPT. 11/28/16    Seasonal allergies       metFORMIN 500 MG 24 hr tablet    GLUCOPHAGE-XR    90 tablet    Take 1 tablet (500 mg) by mouth daily (with dinner)    Type 2 diabetes mellitus treated without insulin (H)       MULTIVITAMINS PO      Take 1 tablet by mouth daily.        order for DME     1 Device    Back brace    Left leg weakness, Lumbar stenosis       sildenafil 20 MG tablet    REVATIO/VIAGRA    90 tablet    Take 1-5 tablet (20 mg) by mouth as needed for sexual activity.  Never use with nitroglycerin, terazosin or doxazosin.    Other male erectile dysfunction       tamsulosin 0.4 MG capsule    FLOMAX    90 capsule    Take 1 capsule (0.4 mg) by mouth daily    Benign non-nodular prostatic hyperplasia, presence of lower urinary tract symptoms unspecified       vardenafil 10 MG tablet    LEVITRA    4 tablet    Take 1 tablet 60  min prior to planned activity.    Erectile dysfunction due to arterial insufficiency       VITAMIN B12 PO      Take 1 tablet by mouth. Pt states he takes this 2 or 3 times a week..        VITAMIN C PO      Take 500 mg by mouth once        vitamin D 1000 UNITS capsule      Take 1 capsule by mouth daily.

## 2017-08-16 ENCOUNTER — TELEPHONE (OUTPATIENT)
Dept: ANESTHESIOLOGY | Facility: CLINIC | Age: 67
End: 2017-08-16

## 2017-08-25 DIAGNOSIS — G89.29 CHRONIC LOW BACK PAIN: Primary | ICD-10-CM

## 2017-08-25 DIAGNOSIS — M54.50 CHRONIC LOW BACK PAIN: Primary | ICD-10-CM

## 2017-08-25 NOTE — TELEPHONE ENCOUNTER
Left a message with patient and wife stating that he can go ahead and schedule an interlaminar ARYA with Dr. Dahl. Dr. Dahl reviewed his MRI and saw that this injection would be beneficial to the patient. Celina's number of [phone] was given to the patient and his wife over the answering machine so they can call to schedule the appointment.

## 2017-08-28 ENCOUNTER — THERAPY VISIT (OUTPATIENT)
Dept: PHYSICAL THERAPY | Facility: CLINIC | Age: 67
End: 2017-08-28
Payer: COMMERCIAL

## 2017-08-28 DIAGNOSIS — G89.29 CHRONIC LOW BACK PAIN WITH LEFT-SIDED SCIATICA, UNSPECIFIED BACK PAIN LATERALITY: Primary | ICD-10-CM

## 2017-08-28 DIAGNOSIS — M54.42 CHRONIC LOW BACK PAIN WITH LEFT-SIDED SCIATICA, UNSPECIFIED BACK PAIN LATERALITY: Primary | ICD-10-CM

## 2017-08-28 PROCEDURE — 97161 PT EVAL LOW COMPLEX 20 MIN: CPT | Mod: GP | Performed by: PHYSICAL THERAPIST

## 2017-08-28 PROCEDURE — 97530 THERAPEUTIC ACTIVITIES: CPT | Mod: GP | Performed by: PHYSICAL THERAPIST

## 2017-08-28 NOTE — MR AVS SNAPSHOT
After Visit Summary   8/28/2017    Vishnu Viveros    MRN: [id number]           Patient Information     Date Of Birth          1950        Visit Information        Provider Department      8/28/2017 5:10 PM Juan Anderson, PT [facility] Physical Therapy TOM        Today's Diagnoses     Chronic low back pain with left-sided sciatica, unspecified back pain laterality    -  1       Follow-ups after your visit        Your next 10 appointments already scheduled     Aug 31, 2017  3:15 PM CDT   (Arrive by 3:00 PM)   EMG with Bran Sibley MD   [facility] EMG ([facility])    [address] [facility]  [address] [facility] [address] [phone]           Do not use lotions or creams on the area to be tested. If you are on blood thinners (Warfarin, Coumadin, Lovenox, etc), please contact your primary care physician to check if it is safe to stop them 3 days prior to testing. If you have anxiety, please check with your referring physician to obtain anti-anxiety medication for the procedure.            Sep 05, 2017  5:20 PM CDT   TOM Spine with Jovany Foster, PT   [facility] ( [facility])    [address] [place] [address] [phone]            Nov 27, 2017  8:30 AM CST   (Arrive by 8:15 AM)   Return Visit with Murali Logan MD   [facility] ([facility])    [address] [place] [address]  [address] [facility] [address] [phone]              Who to contact     If you have questions or need follow up information about today's clinic visit or your schedule please contact [facility] PHYSICAL THERAPY [facility] directly at [phone].  Normal or non-critical lab and imaging results will be communicated to you by MyChart, letter or phone within 4 business days after the clinic has received the results. If you do not hear from us within 7 days,  "please contact the clinic through [employer] or phone. If you have a critical or abnormal lab result, we will notify you by phone as soon as possible.  Submit refill requests through [employer] or call your pharmacy and they will forward the refill request to us. Please allow 3 business days for your refill to be completed.          Additional Information About Your Visit        Newco LS15harDinos Rule Information     [employer] lets you send messages to your doctor, view your test results, renew your prescriptions, schedule appointments and more. To sign up, go to [email or web address] . Click on \"Log in\" on the left side of the screen, which will take you to the Welcome page. Then click on \"Sign up Now\" on the right side of the page.     You will be asked to enter the access code listed below, as well as some personal information. Please follow the directions to create your username and password.     Your access code is: [email or web address]  Expires: 2017  7:12 AM     Your access code will  in 90 days. If you need help or a new code, please call your [place] clinic or [phone].        Care EveryWhere ID     This is your Care EveryWhere ID. This could be used by other organizations to access your [place] medical records  [id number]         Blood Pressure from Last 3 Encounters:   17 (!) 144/91   17 138/79   17 154/87    Weight from Last 3 Encounters:   17 86.2 kg (190 lb)   17 85.9 kg (189 lb 4.8 oz)   17 89.2 kg (196 lb 11.2 oz)              We Performed the Following     HC PT EVAL, LOW COMPLEXITY     TOM INITIAL EVAL REPORT     THERAPEUTIC ACTIVITIES        Primary Care Provider Office Phone # Fax #    Murali Logan [phone]       [address] [facility] [address]        Equal Access to Services     AMBROCIO CUENCA : Sil Finn, waaxda luqadaha, qaybta kaalmada conradyabobby, carl amaya . So wa " [phone].    ATENCIÓN: Si pia camargo, tiene a little disposición servicios gratuitos de asistencia lingüística. Ochoa tomas [phone].    We comply with applicable federal civil rights laws and [place] laws. We do not discriminate on the basis of race, color, national origin, age, disability sex, sexual orientation or gender identity.            Thank you!     Thank you for choosing [facility] PHYSICAL THERAPY [facility]  for your care. Our goal is always to provide you with excellent care. Hearing back from our patients is one way we can continue to improve our services. Please take a few minutes to complete the written survey that you may receive in the mail after your visit with us. Thank you!             Your Updated Medication List - Protect others around you: Learn how to safely use, store and throw away your medicines at www.disposemymeds.org.          This list is accurate as of: 8/28/17  9:51 PM.  Always use your most recent med list.                   Brand Name Dispense Instructions for use Diagnosis    ALEVE PO      Take 1 tablet by mouth as needed for moderate pain        aspirin 81 MG tablet      Take 1 tablet by mouth daily.        atorvastatin 20 MG tablet    LIPITOR    90 tablet    Take 1 tablet (20 mg) by mouth daily    Type 2 diabetes mellitus treated without insulin (H)       FISH OIL PO      Take 1 capsule by mouth as needed        gabapentin 300 MG capsule    NEURONTIN    90 capsule    Take 1 capsule (300 mg) by mouth 3 times daily One capsule twice daily    Lumbar radiculitis, Diabetic polyneuropathy associated with diabetes mellitus due to underlying condition (H)       lisinopril 5 MG tablet    PRINIVIL/ZESTRIL    90 tablet    Take 1 tablet (5 mg) by mouth daily    Type 2 diabetes mellitus treated without insulin (H), Benign essential hypertension       loratadine 10 MG tablet    CLARITIN    90 tablet    Take 1 tablet (10 mg) by mouth daily as needed LAST MD APPT. 11/28/16    Seasonal allergies        metFORMIN 500 MG 24 hr tablet    GLUCOPHAGE-XR    90 tablet    Take 1 tablet (500 mg) by mouth daily (with dinner)    Type 2 diabetes mellitus treated without insulin (H)       MULTIVITAMINS PO      Take 1 tablet by mouth daily.        order for DME     1 Device    Back brace    Left leg weakness, Lumbar stenosis       sildenafil 20 MG tablet    REVATIO/VIAGRA    90 tablet    Take 1-5 tablet (20 mg) by mouth as needed for sexual activity.  Never use with nitroglycerin, terazosin or doxazosin.    Other male erectile dysfunction       tamsulosin 0.4 MG capsule    FLOMAX    90 capsule    Take 1 capsule (0.4 mg) by mouth daily    Benign non-nodular prostatic hyperplasia, presence of lower urinary tract symptoms unspecified       vardenafil 10 MG tablet    LEVITRA    4 tablet    Take 1 tablet 60 min prior to planned activity.    Erectile dysfunction due to arterial insufficiency       VITAMIN B12 PO      Take 1 tablet by mouth. Pt states he takes this 2 or 3 times a week..        VITAMIN C PO      Take 500 mg by mouth once        vitamin D 1000 UNITS capsule      Take 1 capsule by mouth daily.

## 2017-08-28 NOTE — PROGRESS NOTES
[facility] Initial Evaluation    Subjective:    Patient is a 67 year old male presenting with rehab back hpi. The history is provided by the patient.   Vishnu Viveros is a 67 year old male with a lumbar condition.  Condition occurred with:  Insidious onset.    This is a chronic condition  MD referral on 8/8/17.    Patient reports pain:  Lower lumbar spine.  Radiates to:  Foot left.  Pain is described as shooting and sharp and is intermittent and reported as 7/10.  Associated symptoms:  Loss of strength and loss of balance.   Symptoms are exacerbated by bending and sitting and relieved by NSAID's.  Since onset symptoms are gradually worsening.  Special tests:  MRI (From Radiologist impression: stenosis of the spinal canal and foraminal stensos in the lumbar spine).  Previous treatment: Spinal injections.  There was significant improvement following previous treatment.  General health as reported by patient is good.  Pertinent medical history includes:  High blood pressure and diabetes.  Medical allergies: no.  Surgical history: appendectomy.  Current medications:  High blood pressure medication.  Current occupation is /  .  Patient is working in normal job without restrictions.  Primary job tasks include:  Prolonged sitting.    Barriers include:  None as reported by the patient.    Red flags:  Progressive neurological deficits and foot drop (Very rarely feels his toe catch).                        Objective:          Flexibility/Screens:   Negative screens: Hip or SI Joint     Lower Extremity:  Decreased left lower extremity flexibility:Hip IR's; Hip ER's; Piriformis and Hamstrings    Decreased right lower extremity flexibility:  Hip IR's; Hip ER's; Piriformis and Hamstrings               Lumbar/SI Evaluation  ROM:    AROM Lumbar:   Flexion:            WNL  Ext:                    75% limited   Side Bend:        Left:  50% limited    Right:  50% limited  Rotation:            Left:  50% limited    Right:  50% limited  Side Glide:        Left:     Right:           Lumbar Myotomes:    T12-L3 (Hip Flex):  Left: 4+    Right: 4+  L2-4 (Quads):  Left:  5    Right:  5  L4 (Ankle DF):  Left:  4+    Right:  4+  L5 (Great Toe Ext): Left: 4+    Right: 4   S1 (Toe Raise):  Left: 5    Right: 5      Lumbar Dermtomes:      L1 Left:  Normal-light touch     L1 Right:  Normal-light touch  L2 Left:  Normal-light touch     L2 Right:  Normal-light touch  L3 Left:  Normal-light touch     L3 Right:  Normal-light touch  L4 Left:  Hypo-light touch       L4 Right:  Normal-light touch  L5 Left:  Hypo-light touch     L5 Right:  Normal-light touch  S1 Left:  Hypo-light touch     S1 Right:  Normal-light touch  Neural Tension/Mobility:    Left side:  SLR; SLR w/DF and Slump positive.     Right side:   SLR w/DF; Slump or SLR  negative.   Lumbar Palpation:    Tenderness present at Left:    Erector Spinae  Tenderness present at Right: Erector Spinae      Spinal Segmental Conclusions:     Level: Hypo noted at T12, L1, L2, L3, L4, L5 and S1                                                     Carly Lumbar Evaluation        Test Movements:  FIS: During: increases  After: worse  Mechanical Response: no effectPretest Movements: Pain and numbness in the left foot  Repeat FIS: During: increases  After: worse  Mechanical Response: no effect  EIS: During: no effect  After: no effect  Mechanical Response: no effect  Repeat EIS: During: no effect  After: no effect  Mechanical Response: no effect                                                     ROS    Assessment/Plan:      Patient is a 67 year old male with lumbar complaints.    Patient has the following significant findings with corresponding treatment plan.                Diagnosis 1:  Lumbago  with Radiculopathy -  hot/cold therapy, US, electric stimulation, mechanical traction, manual therapy, self management, education, directional preference exercise and home  program  Decreased ROM/flexibility - manual therapy, therapeutic exercise, therapeutic activity and home program  Decreased joint mobility - manual therapy, therapeutic exercise, therapeutic activity and home program  Decreased strength - therapeutic exercise, therapeutic activities and home program  Impaired muscle performance - electric stimulation, neuro re-education and home program  Decreased function - therapeutic activities and home program    Therapy Evaluation Codes:   1) History comprised of:   Personal factors that impact the plan of care:      Age, Language and Profession.    Comorbidity factors that impact the plan of care are:      Diabetes, High blood pressure and Numbness/tingling.     Medications impacting care: High blood pressure.  2) Examination of Body Systems comprised of:   Body structures and functions that impact the plan of care:      Lumbar spine.   Activity limitations that impact the plan of care are:      Bending, Cooking, Squatting/kneeling and Working.  3) Clinical presentation characteristics are:   Stable/Uncomplicated.  4) Decision-Making    Low complexity using standardized patient assessment instrument and/or measureable assessment of functional outcome.  Cumulative Therapy Evaluation is: Low complexity.    Previous and current functional limitations:  (See Goal Flow Sheet for this information)    Short term and Long term goals: (See Goal Flow Sheet for this information)     Communication ability:  Patient appears to be able to clearly communicate and understand verbal and written communication and follow directions correctly.  Treatment Explanation - The following has been discussed with the patient:   RX ordered/plan of care  Anticipated outcomes  Possible risks and side effects  This patient would benefit from PT intervention to resume normal activities.   Rehab potential is fair.    Frequency:  1 X week, once daily  Duration:  for 6 weeks  Discharge Plan:  Achieve all  LTG.  Independent in home treatment program.  Reach maximal therapeutic benefit.    Please refer to the daily flowsheet for treatment today, total treatment time and time spent performing 1:1 timed codes.

## 2017-08-31 ENCOUNTER — OFFICE VISIT (OUTPATIENT)
Dept: NEUROLOGY | Facility: CLINIC | Age: 67
End: 2017-08-31

## 2017-08-31 DIAGNOSIS — R29.898 LEFT LEG WEAKNESS: ICD-10-CM

## 2017-08-31 DIAGNOSIS — M48.061 LUMBAR STENOSIS: ICD-10-CM

## 2017-08-31 DIAGNOSIS — M54.17 LUMBOSACRAL RADICULOPATHY AT L5: Primary | ICD-10-CM

## 2017-08-31 NOTE — PROGRESS NOTES
[facility]  Electrodiagnostic Laboratory    Nerve Conduction & EMG Report          Patient: Vishnu Viveros  YOB: 1950  Patient ID: [id number]  Age: 67 Years 2 Months  Gender: Male      Referring Physician: Murali Logan MD    History & Examination:    67 year old man with paresthesias of the left leg, especially at the left lateral calf, and subjective weakness. Query left lumbosacral radiculopathy.    Techniques: Motor and sensory conduction studies were done with surface recording electrodes. Temperature was monitored and recorded throughout the study. Lower extremities were maintained at a temperature of 31degrees Centigrade or higher. EMG was done with a concentric needle electrode.     Results:    Left sural and superficial peroneal antidromic sensory NCSs were normal. Left deep peroneal and tibial motor NCSs were normal. Left tibial F-wave latencies were normal. EMG of left tibialis posterior showed increased insertional activity, and mildly reduced recruitment of large, long duration, stable MUPs. EMG of left tibialis anterior, extensor hallucis longus, medial gastrocnemius, vastus lateralis, biceps femoris (short head), gluteus medius, and L5 paraspinals was normal.     Interpretation:    Mild denervation changes isolated to the left tibialis posterior muscle. Differential diagnosis includes left L5 radiculopathy vs tibial neuropathy. Clinical correlation is recommended.     EMG Physician:    Bran Sibley MD           Sensory NCS      Nerve / Sites Rec. Site Onset Peak NP Amp Ref. PP Amp Dist Trey Ref. Temp     ms ms  V  V  V cm m/s m/s  C   L SURAL - Lat Mall 60      Calf Ankle 2.14 3.28 9.2 5.0 7.8 14 65.6 38.0 31.5   L SUP PERONEAL      Lat Leg Ortiz 2.08 2.97 10.6  14.3 12.5 60.0 38.0 31       Motor NCS      Nerve / Sites Rec. Site Lat Ref. Amp Ref. Rel Amp Dist Trey Ref. Dur. Area Temp.     ms ms mV mV % cm m/s m/s ms %  C   L DEEP PERONEAL - EDB 60      Ankle  EDB 3.18 6.00 3.0 2.0 100 8   3.70 100 31.1      FibHead EDB 9.32  2.6  85.1 30 48.8 38.0 4.22 94.5 31      Pop Fos EDB 11.25  2.4  79.4 9 46.7 38.0 4.17 85.4 31   L TIBIAL - AH      Ankle AH 3.13 6.00 11.6 4.0 100 8   5.47 100 31      Pop Fos AH 11.82  9.8  84.4 37 42.5 38.0 5.73 83.9 31.1       F  Wave      Nerve Min F Lat Max F Lat Mean FLat Temp.    ms ms ms  C   L TIBIAL 51.09 54.06 52.08 31.2       EMG Summary Table     Spontaneous MUAP Recruitment    IA Fib Fasc H.F. Amp Dur. PPP Pattern   L. TIB ANTERIOR Normal None None None N N None Normal   L. GASTROCN (MED) Normal None None None N N None Normal   L. VAST LATERALIS Normal None None None N N None Normal   L. BIC FEM (S HEAD) Normal None None None N N None Normal   L. GLUTEUS MED Normal None None None N N None Normal   L. L5 PSP Normal None None None N N None Normal   L. TIB POSTERIOR Increased None None None 1+ 1+ None Mild Reduced   L. EXT WILSON LONG Normal None None None N N None Normal

## 2017-08-31 NOTE — LETTER
Patient:  Vishnu Viveros  :   1950  MRN:     [id number]        Mr. Vishnu Viveros  [address]  [place] [address]        2017    Dear Mr. Viveros,    Thank you for choosing the [facility] for your healthcare needs.  We appreciate the opportunity to serve you.    The following are your recent test results.     Dear Vishnu Viveros   Your EMG was abnormal.   Mild denervation changes isolated to the left tibialis posterior muscle. Differential diagnosis includes left L5 radiculopathy vs tibial neuropathy.   I suspect this is related to your back pain Left L5 radicular symptoms ( nerve impingement in the back L5 region causing symptoms in your leg).   You are seeing Dr Dahl in pain clinic for an injection.     Results for orders placed or performed in visit on 17   EMG    Impression    [facility]  Electrodiagnostic Laboratory    Nerve Conduction & EMG Report    Patient: Vishnu Viveros  YOB: 1950  Patient ID: [id number]  Age: 67 Years 2 Months  Gender: Male      Referring Physician: Murali oLgan MD    History & Examination:    67 year old man with paresthesias of the left leg, especially at the left lateral calf, and subjective weakness. Query left lumbosacral radiculopathy.    Techniques: Motor and sensory conduction studies were done with surface recording electrodes. Temperature was monitored and recorded throughout the study. Lower extremities were maintained at a temperature of 31degrees Centigrade or higher. EMG was done with a concentric needle electrode.     Results:    Left sural and superficial peroneal antidromic sensory NCSs were normal. Left deep peroneal and tibial motor NCSs were normal. Left tibial F-wave latencies were normal. EMG of left tibialis posterior showed increased insertional activity, and mildly reduced recruitment of large, long duration, stable MUPs. EMG of left  tibialis anterior, extensor hallucis longus, medial gastrocnemius, vastus lateralis, biceps femoris (short head), gluteus medius, and L5 paraspinals was normal.     Interpretation:    Mild denervation changes isolated to the left tibialis posterior muscle. Differential diagnosis includes left L5 radiculopathy vs tibial neuropathy. Clinical correlation is recommended.     EMG Physician:    Bran Sibley MD        Please contact your provider if you have any questions or concerns.  We look forward to serving your needs in the future.      Best wishes,     Murali Logan MD

## 2017-08-31 NOTE — MR AVS SNAPSHOT
After Visit Summary   8/31/2017    Vishnu Viveros    MRN: [id number]           Patient Information     Date Of Birth          1950        Visit Information        Provider Department      8/31/2017 3:15 PM Bran Sibley MD [facility] EMG        Today's Diagnoses     Lumbosacral radiculopathy at L5    -  1    Left leg weakness        Lumbar stenosis           Follow-ups after your visit        Your next 10 appointments already scheduled     Sep 05, 2017  5:20 PM CDT   TOM Spine with Jovany Foster, PT   [facility] ( [facility])    [address] [place] [address]  [address]  [facility] [id number] [phone]            Sep 07, 2017   Procedure with Tiffany Dahl MD   [facility] ([facility])    [address] [place] [address] Se  5th Floor  [facility] [address] [phone]           Located in the Clinics and Surgery Center at [address] [place] [address].   parking is very convenient and highly recommended.  is a $6 flat rate fee.  Both  and self parkers should enter the main arrival plaza from [facility]; parking attendants will direct you based on your parking preference.            Nov 27, 2017  8:30 AM CST   (Arrive by 8:15 AM)   Return Visit with Murali Logan MD   [facility] ([facility])    [address] [place] [address]  4th Floor  [facility] [address] [phone]              Future tests that were ordered for you today     Open Future Orders        Priority Expected Expires Ordered    Needle EMG Each Extremity w/Paraspinal Area Complete (93306) Routine  8/31/2018 8/31/2017            Who to contact     Please call your clinic at [phone] to:    Ask questions about your health    Make or cancel appointments    Discuss your medicines    Learn about your test results    Speak to your doctor   If you have  compliments or concerns about an experience at your clinic, or if you wish to file a complaint, please contact [facility] Patient Relations at [phone] or email us at [email or web address]         Additional Information About Your Visit        [facility] Information     Our[employer]inyl is an electronic gateway that provides easy, online access to your medical records. With OurVinyl, you can request a clinic appointment, read your test results, renew a prescription or communicate with your care team.     To sign up for Our[employer] visit the website at [email or web address]   You will be asked to enter the access code listed below, as well as some personal information. Please follow the directions to create your username and password.     Your access code is: [email or web address]  Expires: 2017  7:12 AM     Your access code will  in 90 days. If you need help or a new code, please contact your [facility] or call [phone] for assistance.        Care EveryWhere ID     This is your Care EveryWhere ID. This could be used by other organizations to access your [facility] medical records  [id number]         Blood Pressure from Last 3 Encounters:   17 (!) 144/91   17 138/79   17 154/87    Weight from Last 3 Encounters:   17 86.2 kg (190 lb)   17 85.9 kg (189 lb 4.8 oz)   17 89.2 kg (196 lb 11.2 oz)              We Performed the Following     EMG     NCS Motor with or without F-Wave, 3-4 nerves (63490)        Primary Care Provider Office Phone # Fax #    Murali Logan [phone]       [address] [facility] [facility] [address]        Equal Access to Services     AMBROCIO CUENCA : Sil Finn, waraymond andrea, qatimta kaalmacarl galindo. So [facility] [phone].    ATENCIÓN: Si habla español, tiene a little disposición servicios gratuitos de asistencia  lingüística. Ochoa al [phone].    We comply with applicable federal civil rights laws and [place] laws. We do not discriminate on the basis of race, color, national origin, age, disability sex, sexual orientation or gender identity.            Thank you!     Thank you for choosing [facility]  for your care. Our goal is always to provide you with excellent care. Hearing back from our patients is one way we can continue to improve our services. Please take a few minutes to complete the written survey that you may receive in the mail after your visit with us. Thank you!             Your Updated Medication List - Protect others around you: Learn how to safely use, store and throw away your medicines at www.disposemymeds.org.          This list is accurate as of: 8/31/17  3:59 PM.  Always use your most recent med list.                   Brand Name Dispense Instructions for use Diagnosis    ALEVE PO      Take 1 tablet by mouth as needed for moderate pain        aspirin 81 MG tablet      Take 1 tablet by mouth daily.        atorvastatin 20 MG tablet    LIPITOR    90 tablet    Take 1 tablet (20 mg) by mouth daily    Type 2 diabetes mellitus treated without insulin (H)       FISH OIL PO      Take 1 capsule by mouth as needed        gabapentin 300 MG capsule    NEURONTIN    90 capsule    Take 1 capsule (300 mg) by mouth 3 times daily One capsule twice daily    Lumbar radiculitis, Diabetic polyneuropathy associated with diabetes mellitus due to underlying condition (H)       lisinopril 5 MG tablet    PRINIVIL/ZESTRIL    90 tablet    Take 1 tablet (5 mg) by mouth daily    Type 2 diabetes mellitus treated without insulin (H), Benign essential hypertension       loratadine 10 MG tablet    CLARITIN    90 tablet    Take 1 tablet (10 mg) by mouth daily as needed LAST MD APPT. 11/28/16    Seasonal allergies       metFORMIN 500 MG 24 hr tablet    GLUCOPHAGE-XR    90 tablet    Take 1 tablet (500 mg) by mouth daily (with  dinner)    Type 2 diabetes mellitus treated without insulin (H)       MULTIVITAMINS PO      Take 1 tablet by mouth daily.        order for DME     1 Device    Back brace    Left leg weakness, Lumbar stenosis       sildenafil 20 MG tablet    REVATIO/VIAGRA    90 tablet    Take 1-5 tablet (20 mg) by mouth as needed for sexual activity.  Never use with nitroglycerin, terazosin or doxazosin.    Other male erectile dysfunction       tamsulosin 0.4 MG capsule    FLOMAX    90 capsule    Take 1 capsule (0.4 mg) by mouth daily    Benign non-nodular prostatic hyperplasia, presence of lower urinary tract symptoms unspecified       vardenafil 10 MG tablet    LEVITRA    4 tablet    Take 1 tablet 60 min prior to planned activity.    Erectile dysfunction due to arterial insufficiency       VITAMIN B12 PO      Take 1 tablet by mouth. Pt states he takes this 2 or 3 times a week..        VITAMIN C PO      Take 500 mg by mouth once        vitamin D 1000 UNITS capsule      Take 1 capsule by mouth daily.

## 2017-08-31 NOTE — LETTER
8/31/2017     RE: Vishnu Viveros  [address]  [place] [address]     Dear Colleague,    Thank you for referring your patient, Vishnu Viveros, to the [facility] EMG at [facility]. Please see a copy of my visit note below.        [facility]  Electrodiagnostic Laboratory    Nerve Conduction & EMG Report          Patient: Vishnu Viveros  YOB: 1950  Patient ID: [id number]  Age: 67 Years 2 Months  Gender: Male      Referring Physician: Murali Logan MD    History & Examination:    67 year old man with paresthesias of the left leg, especially at the left lateral calf, and subjective weakness. Query left lumbosacral radiculopathy.    Techniques: Motor and sensory conduction studies were done with surface recording electrodes. Temperature was monitored and recorded throughout the study. Lower extremities were maintained at a temperature of 31degrees Centigrade or higher. EMG was done with a concentric needle electrode.     Results:    Left sural and superficial peroneal antidromic sensory NCSs were normal. Left deep peroneal and tibial motor NCSs were normal. Left tibial F-wave latencies were normal. EMG of left tibialis posterior showed increased insertional activity, and mildly reduced recruitment of large, long duration, stable MUPs. EMG of left tibialis anterior, extensor hallucis longus, medial gastrocnemius, vastus lateralis, biceps femoris (short head), gluteus medius, and L5 paraspinals was normal.     Interpretation:    Mild denervation changes isolated to the left tibialis posterior muscle. Differential diagnosis includes left L5 radiculopathy vs tibial neuropathy. Clinical correlation is recommended.     EMG Physician:    Bran Sibley MD           Sensory NCS      Nerve / Sites Rec. Site Onset Peak NP Amp Ref. PP Amp Dist Trey Ref. Temp     ms ms  V  V  V cm m/s m/s  C   L SURAL - Lat Mall 60      Calf Ankle 2.14 3.28 9.2  5.0 7.8 14 65.6 38.0 31.5   L SUP PERONEAL      Lat Leg Ortiz 2.08 2.97 10.6  14.3 12.5 60.0 38.0 31       Motor NCS      Nerve / Sites Rec. Site Lat Ref. Amp Ref. Rel Amp Dist Trey Ref. Dur. Area Temp.     ms ms mV mV % cm m/s m/s ms %  C   L DEEP PERONEAL - EDB 60      Ankle EDB 3.18 6.00 3.0 2.0 100 8   3.70 100 31.1      FibHead EDB 9.32  2.6  85.1 30 48.8 38.0 4.22 94.5 31      Pop Fos EDB 11.25  2.4  79.4 9 46.7 38.0 4.17 85.4 31   L TIBIAL - AH      Ankle AH 3.13 6.00 11.6 4.0 100 8   5.47 100 31      Pop Fos AH 11.82  9.8  84.4 37 42.5 38.0 5.73 83.9 31.1       F  Wave      Nerve Min F Lat Max F Lat Mean FLat Temp.    ms ms ms  C   L TIBIAL 51.09 54.06 52.08 31.2       EMG Summary Table     Spontaneous MUAP Recruitment    IA Fib Fasc H.F. Amp Dur. PPP Pattern   L. TIB ANTERIOR Normal None None None N N None Normal   L. GASTROCN (MED) Normal None None None N N None Normal   L. VAST LATERALIS Normal None None None N N None Normal   L. BIC FEM (S HEAD) Normal None None None N N None Normal   L. GLUTEUS MED Normal None None None N N None Normal   L. L5 PSP Normal None None None N N None Normal   L. TIB POSTERIOR Increased None None None 1+ 1+ None Mild Reduced   L. EXT WILSON LONG Normal None None None N N None Normal                  Again, thank you for allowing me to participate in the care of your patient.      Sincerely,    Bran Sibley MD

## 2017-09-06 ENCOUNTER — THERAPY VISIT (OUTPATIENT)
Dept: PHYSICAL THERAPY | Facility: CLINIC | Age: 67
End: 2017-09-06
Payer: COMMERCIAL

## 2017-09-06 DIAGNOSIS — M54.9 BACK PAIN WITH RADIATION: Primary | ICD-10-CM

## 2017-09-06 DIAGNOSIS — M51.369 DEGENERATIVE DISC DISEASE, LUMBAR: ICD-10-CM

## 2017-09-06 DIAGNOSIS — M48.061 LUMBAR STENOSIS: ICD-10-CM

## 2017-09-06 PROCEDURE — 97530 THERAPEUTIC ACTIVITIES: CPT | Mod: GP | Performed by: PHYSICAL THERAPIST

## 2017-09-06 PROCEDURE — 97110 THERAPEUTIC EXERCISES: CPT | Mod: GP | Performed by: PHYSICAL THERAPIST

## 2017-09-06 PROCEDURE — 97140 MANUAL THERAPY 1/> REGIONS: CPT | Mod: GP | Performed by: PHYSICAL THERAPIST

## 2017-09-07 DIAGNOSIS — G89.29 CHRONIC LOW BACK PAIN: ICD-10-CM

## 2017-09-07 DIAGNOSIS — M54.16 LUMBAR RADICULITIS: ICD-10-CM

## 2017-09-07 DIAGNOSIS — M54.50 CHRONIC LOW BACK PAIN: ICD-10-CM

## 2017-09-07 DIAGNOSIS — M47.816 FACET ARTHRITIS OF LUMBAR REGION: Primary | ICD-10-CM

## 2017-09-13 ENCOUNTER — THERAPY VISIT (OUTPATIENT)
Dept: PHYSICAL THERAPY | Facility: CLINIC | Age: 67
End: 2017-09-13
Payer: COMMERCIAL

## 2017-09-13 DIAGNOSIS — M51.369 DEGENERATIVE DISC DISEASE, LUMBAR: ICD-10-CM

## 2017-09-13 DIAGNOSIS — M54.9 BACK PAIN WITH RADIATION: ICD-10-CM

## 2017-09-13 DIAGNOSIS — M48.061 LUMBAR STENOSIS: ICD-10-CM

## 2017-09-13 PROCEDURE — 97140 MANUAL THERAPY 1/> REGIONS: CPT | Mod: GP | Performed by: PHYSICAL THERAPIST

## 2017-09-13 PROCEDURE — 97110 THERAPEUTIC EXERCISES: CPT | Mod: GP | Performed by: PHYSICAL THERAPIST

## 2017-09-13 PROCEDURE — 97530 THERAPEUTIC ACTIVITIES: CPT | Mod: GP | Performed by: PHYSICAL THERAPIST

## 2017-09-13 NOTE — PROGRESS NOTES
Subjective:    HPI                    Objective:    System    Physical Exam    General     ROS    Assessment/Plan:      PROGRESS  REPORT    Progress reporting period is from 8-29-17 to 9-13-17.  Patient has attended 3 times including today     SUBJECTIVE  Little change in symptoms. Slept wrong way last night, he wonders, as increased pain in back today.    Sometimes exercises hurt.  Same weakness and rare catching of toe, slightly less ER of LE when walks.      Current pain level is no change  .      Initial Pain level: 7/10.   Changes in function:  None  Adverse reaction to treatment or activity: Did not respond well to trial of gentle exercise.  Manual hip mobilization and very gentle manual pelvic traction both feel very good today.  Supine 70/70 (hips and knees) position pain relieving as well.     OBJECTIVE  Changes noted in objective findings:    Demonstrates limitation in L hip joint (more than R) in capsular pattern suggesting OA in hip.  Gait reveals limitation in hip extension and rotation on L increasing motion in L low back.   Symptoms in LE and back precluded most attempts of even very light motion exercise for low back        ASSESSMENT/PLAN  Updated problem list and treatment plan: Diagnosis 1:  Low back pain with radicular symptoms    Pain -  hot/cold therapy, manual therapy, self management, education, home program and suggest trial of pelvic traction  Decreased ROM/flexibility - manual therapy, therapeutic exercise and home program  Decreased joint mobility - manual therapy, therapeutic exercise and home program  Decreased strength - therapeutic exercise and therapeutic activities    Decreased function - therapeutic activities and home program  STG/LTGs have been met or progress has been made towards goals:  None  Assessment of Progress: The patient's condition is unchanged.  Self Management Plans:  Patient has been instructed in a home treatment program.  Poor tolerance at this time to  exercise        Recommendations:  Following up with MD next week to discuss MRI and plans.  May respond to continued treatment for hip mobilization/manual therapy and possibly pelvic traction, with initiation of exercise as tolerated.  At some point may benefit from ortho eval for L hip.    Please refer to the daily flowsheet for treatment today, total treatment time and time spent performing 1:1 timed codes.

## 2017-09-13 NOTE — MR AVS SNAPSHOT
After Visit Summary   9/13/2017    Vishnu Viveros    MRN: [id number]           Patient Information     Date Of Birth          1950        Visit Information        Provider Department      9/13/2017 7:00 AM Mary Ann Champagne PT [facility]        Today's Diagnoses     Back pain with radiation        Degenerative disc disease, lumbar        Lumbar stenosis           Follow-ups after your visit        Your next 10 appointments already scheduled     Sep 18, 2017  7:40 AM CDT   (Arrive by 7:25 AM)   Return Visit with Tiffany Dahl MD   [facility] ([facility])    [address] [id number] [phone]            Sep 20, 2017  7:00 AM CDT   TOM Spine with Mary Ann Champagne PT   [facility] ( [facility])    [address] [place] [id number] [phone]            Nov 27, 2017  8:30 AM CST   (Arrive by 8:15 AM)   Return Visit with Murali Logan MD   [facility] ([facility])    [address] [id number] [phone]              Who to contact     If you have questions or need follow up information about today's clinic visit or your schedule please contact [facility] directly at [phone].  Normal or non-critical lab and imaging results will be communicated to you by MyChart, letter or phone within 4 business days after the clinic has received the results. If you do not hear from us within 7 days, please contact the clinic through MyChart or phone. If you have a critical or abnormal lab result, we will notify you by phone as soon as possible.  Submit refill requests through [employer] or call your pharmacy and they will forward the refill request to us. Please allow 3 business  "days for your refill to be completed.          Additional Information About Your Visit        MyChart Information     [employer] lets you send messages to your doctor, view your test results, renew your prescriptions, schedule appointments and more. To sign up, go to [email or web address] . Click on \"Log in\" on the left side of the screen, which will take you to the Welcome page. Then click on \"Sign up Now\" on the right side of the page.     You will be asked to enter the access code listed below, as well as some personal information. Please follow the directions to create your username and password.     Your access code is: [email or web address]  Expires: 2017  8:23 AM     Your access code will  in 90 days. If you need help or a new code, please call your [place] clinic or [phone].        Care EveryWhere ID     This is your Care EveryWhere ID. This could be used by other organizations to access your [place] medical records  [id number]         Blood Pressure from Last 3 Encounters:   17 (!) 144/91   17 138/79   17 154/87    Weight from Last 3 Encounters:   17 86.2 kg (190 lb)   17 85.9 kg (189 lb 4.8 oz)   17 89.2 kg (196 lb 11.2 oz)              We Performed the Following     Manual Ther Tech, 1+Regions, EA 15 min     Therapeutic Activities     Therapeutic Exercises        Primary Care Provider Office Phone # Fax #    Murali Logan [phone]        [facility] [address]        Equal Access to Services     [facility] : Hadii dolores Finn, moiz andrea, qacarl restrepo. So [facility] [phone].    ATENCIÓN: Si habla español, tiene a little disposición servicios gratuitos de asistencia lingüística. Ochoa al [phone].    We comply with applicable federal civil rights laws and [place] laws. We do not discriminate on the basis of race, color, national origin, age, " disability sex, sexual orientation or gender identity.            Thank you!     Thank you for choosing [facility] [place]  for your care. Our goal is always to provide you with excellent care. Hearing back from our patients is one way we can continue to improve our services. Please take a few minutes to complete the written survey that you may receive in the mail after your visit with us. Thank you!             Your Updated Medication List - Protect others around you: Learn how to safely use, store and throw away your medicines at www.disposemymeds.org.          This list is accurate as of: 9/13/17  8:23 AM.  Always use your most recent med list.                   Brand Name Dispense Instructions for use Diagnosis    ALEVE PO      Take 1 tablet by mouth as needed for moderate pain        aspirin 81 MG tablet      Take 1 tablet by mouth daily.        atorvastatin 20 MG tablet    LIPITOR    90 tablet    Take 1 tablet (20 mg) by mouth daily    Type 2 diabetes mellitus treated without insulin (H)       FISH OIL PO      Take 1 capsule by mouth as needed        gabapentin 300 MG capsule    NEURONTIN    90 capsule    Take 1 capsule (300 mg) by mouth 3 times daily One capsule twice daily    Lumbar radiculitis, Diabetic polyneuropathy associated with diabetes mellitus due to underlying condition (H)       lisinopril 5 MG tablet    PRINIVIL/ZESTRIL    90 tablet    Take 1 tablet (5 mg) by mouth daily    Type 2 diabetes mellitus treated without insulin (H), Benign essential hypertension       loratadine 10 MG tablet    CLARITIN    90 tablet    Take 1 tablet (10 mg) by mouth daily as needed LAST MD APPT. 11/28/16    Seasonal allergies       metFORMIN 500 MG 24 hr tablet    GLUCOPHAGE-XR    90 tablet    Take 1 tablet (500 mg) by mouth daily (with dinner)    Type 2 diabetes mellitus treated without insulin (H)       MULTIVITAMINS PO      Take 1 tablet by mouth daily.        order for DME     1 Device     Back brace    Left leg weakness, Lumbar stenosis       sildenafil 20 MG tablet    REVATIO/VIAGRA    90 tablet    Take 1-5 tablet (20 mg) by mouth as needed for sexual activity.  Never use with nitroglycerin, terazosin or doxazosin.    Other male erectile dysfunction       tamsulosin 0.4 MG capsule    FLOMAX    90 capsule    Take 1 capsule (0.4 mg) by mouth daily    Benign non-nodular prostatic hyperplasia, presence of lower urinary tract symptoms unspecified       vardenafil 10 MG tablet    LEVITRA    4 tablet    Take 1 tablet 60 min prior to planned activity.    Erectile dysfunction due to arterial insufficiency       VITAMIN B12 PO      Take 1 tablet by mouth. Pt states he takes this 2 or 3 times a week..        VITAMIN C PO      Take 500 mg by mouth once        vitamin D 1000 UNITS capsule      Take 1 capsule by mouth daily.

## 2017-09-18 ENCOUNTER — OFFICE VISIT (OUTPATIENT)
Dept: ANESTHESIOLOGY | Facility: CLINIC | Age: 67
End: 2017-09-18

## 2017-09-18 VITALS
WEIGHT: 190 LBS | HEIGHT: 64 IN | DIASTOLIC BLOOD PRESSURE: 80 MMHG | BODY MASS INDEX: 32.44 KG/M2 | HEART RATE: 71 BPM | SYSTOLIC BLOOD PRESSURE: 124 MMHG | OXYGEN SATURATION: 96 %

## 2017-09-18 DIAGNOSIS — M54.16 LUMBAR RADICULOPATHY: Primary | ICD-10-CM

## 2017-09-18 RX ORDER — CYCLOBENZAPRINE HCL 10 MG
5 TABLET ORAL 3 TIMES DAILY PRN
Qty: 42 TABLET | Refills: 1 | Status: SHIPPED | OUTPATIENT
Start: 2017-09-18 | End: 2018-10-31

## 2017-09-18 ASSESSMENT — PAIN SCALES - GENERAL: PAINLEVEL: SEVERE PAIN (7)

## 2017-09-18 NOTE — PROGRESS NOTES
[facility] : Progress Note    Date of visit: 9/18/2017    Interval history:  Vishnu Viveros is a 67 year old male  is known to me for lumbar radiculopathy. The patient has a medical history significant for chronic low back pain with recent new developments of radicular pain in the LLE ( L5) and subjective weakness or instability. He has underlying spinal stenosis, degenerative changes and facet arthropathy on MRI. A lumbar TF ARYA helped with partial relief about a third of this pain. This was done by Dr. Henriquez. He had done Pain PT and HEP,  Gabapentin. Most recent MRI show Severe spinal canal stenosis at L4-L5. Moderate spinal canal narrowing at L2-3 and L3-4. Today she came for routine f/u visit .       Recommendations/plan at the last visit included:     1. Medications.      Increase gabapentin 0-300-600 gradually.      2. Interventional procedures:     None at this time.      We discussed with the patient about Left L4/L5 and L5-S1  transforaminal ARYA injections, the patient agreed to pursue. We will review MRI images prior to these injections.     3. Labs and imaging: Will f/u lumbar MRI ordered by Dr. Logan.      4. Rehab: None indicated. The patient is encouraged to stay active and to perform exercise as tolerated.       5. Psychology: No current needs.     6. Integrated medicine: We discussed acupuncture therapy, but the patient is not interested.     7. Disposition:  The patient is advised to call clinic for follow up appointment in 3 months or earlier clinically indicated. We will see the patient for above mentioned procedure.      Since the last visit, Vishnu Viveros reports:  - Back pain and groin: pain continues but the patient feels it is slightly better.  - medication response: The patient had been taking gabapentin. The patient states that the medications are helpful.         At this point, the patient's participation with our  multidisciplinary pain management includes:   Medication: gabapentin, Robaxin   Intervention- recommended injection.   PT - The patient will start pool therapy soon.   Integrated medicine: discussed about acupuncture therapy.       [place] Prescription Monitoring Program:   Reviewed. No concerns    Review of Systems:  The 14 system ROS was reviewed and was negative except what is documented above and as follows.  Any bowel or bladder problems: none  Mood: okay    Physical Exam:  There were no vitals filed for this visit.    General: Awake in no apparent distress. This patient is unaccompanied in the office .  Eyes: Sclerae are anicteric. PERRLA, EOMI   Neck: supple, no masses.   Lungs: unlabored.   Heart: regular rate and rhythm   Abdomen: soft non tender.  Extremities: Pulses are well palpable, no peripheral edema.   Musculoskeletal: All muscle groups are normal in bulk and tone. The patient changes position without pain behavior. The patient walks with a normal gait. Posture is normal. Muscle strength was rated at 5/5 in all groups in the extremities. Examination of the joints reveals preserved range of motion.  The range of motion of the lumbar spine is normal  in all directions. The spinous processes in the cervical, thoracic, and lumbar spine are midline, with marked  tenderness over the paraspinous muscles and facet joints in the right lumbar region. Facet loading did generate pain bilaterally. Straight leg raise in the supine position was  negative. There was no tenderness to palpation noted over the sacroiliac joints on the right side on the left side bilaterally. Maxi s test was negative .   Neurologic exam:Strength 5/5 for bilateral grasp, finger abduction, wrist extension, elbow flexion, elbow extension, shoulder abduction.  Strength 5/5 for bilateral dorsiflexion, plantarflexion, great toe extension, knee extension, hip flexion. Sensation to light touch diminished on left L5 dematome  distally  Psychiatric; Normal affect.   Skin: Warm and Dry.       Medications:  Current Outpatient Prescriptions   Medication Sig Dispense Refill     gabapentin (NEURONTIN) 300 MG capsule Take 1 capsule (300 mg) by mouth 3 times daily One capsule twice daily 90 capsule 3     order for DME Back brace 1 Device 0     sildenafil (REVATIO/VIAGRA) 20 MG tablet Take 1-5 tablet (20 mg) by mouth as needed for sexual activity.  Never use with nitroglycerin, terazosin or doxazosin. 90 tablet 11     loratadine (CLARITIN) 10 MG tablet Take 1 tablet (10 mg) by mouth daily as needed LAST MD APPT. 11/28/16 90 tablet 0     tamsulosin (FLOMAX) 0.4 MG capsule Take 1 capsule (0.4 mg) by mouth daily 90 capsule 3     metFORMIN (GLUCOPHAGE-XR) 500 MG 24 hr tablet Take 1 tablet (500 mg) by mouth daily (with dinner) 90 tablet 3     atorvastatin (LIPITOR) 20 MG tablet Take 1 tablet (20 mg) by mouth daily 90 tablet 3     lisinopril (PRINIVIL/ZESTRIL) 5 MG tablet Take 1 tablet (5 mg) by mouth daily 90 tablet 3     Ascorbic Acid (VITAMIN C PO) Take 500 mg by mouth once       vardenafil (LEVITRA) 10 MG tablet Take 1 tablet 60 min prior to planned activity. 4 tablet 11     Naproxen Sodium (ALEVE PO) Take 1 tablet by mouth as needed for moderate pain       aspirin 81 MG tablet Take 1 tablet by mouth daily.       Cyanocobalamin (VITAMIN B12 PO) Take 1 tablet by mouth. Pt states he takes this 2 or 3 times a week..        Multiple Vitamin (MULTIVITAMINS PO) Take 1 tablet by mouth daily.       Cholecalciferol (VITAMIN D) 1000 UNIT capsule Take 1 capsule by mouth daily.       Omega-3 Fatty Acids (FISH OIL PO) Take 1 capsule by mouth as needed           LABORATORY VALUES:   Recent Labs   Lab Test  07/31/17   1031  11/28/16   1019   NA  140  140   POTASSIUM  4.2  4.5   CHLORIDE  106  106   CO2  28  28   ANIONGAP  7  5   GLC  98  108*   BUN  16  12   CR  0.79  0.82   FRANCIA  8.8  8.8       CBC RESULTS:   Recent Labs   Lab Test  11/28/16   1019   WBC  4.4    RBC  4.73   HGB  13.7   HCT  42.3   MCV  89   MCH  29.0   MCHC  32.4   RDW  13.2   PLT  253       Most Recent 3 INR's:No lab results found.    Diagnostic tests:    No images are attached to the encounter.      ASSESSMENT:    - Lumbar radiculopathy      PLAN:       1. Medications.      - Increase gabapentin 0-300-600 gradually. If tolerates well, take 600 mg bid. GFR >90  - Flexeril 5 mg tid prn        2. Interventional procedures:     None at this time.      We discussed with the patient to consider Left L4/L5 and L5-S1  transforaminal ARYA injections.      3. Labs and imaging: None needed      4. Rehab: None indicated. The patient is encouraged to stay active and to perform exercise as tolerated.       5. Psychology: No current needs.     6. Integrated medicine: We discussed acupuncture therapy, but the patient is not interested.     7. Disposition:  The patient is advised to call clinic for follow up appointment in 3 months or earlier clinically indicated. We will see the patient for above mentioned procedure.      Assessment will be ongoing with changes in treatment as indicated.  Benefits/risks/alternatives to treatment have been reviewed and the patient has been instructed to contact this office if they have any questions or concerns.  This plan of care has been discussed with the patient and the patient is in agreement.     Tiffany Dahl MD, PHD

## 2017-09-18 NOTE — LETTER
9/18/2017       RE: Vishnu Viveros  [address]  [place] [address]     Dear Colleague,    Thank you for referring your patient, Vishnu Viveros, to the [facility] at [facility]. Please see a copy of my visit note below.    [facility] : Progress Note    Date of visit: 9/18/2017    Interval history:  Vishnu Viveros is a 67 year old male  is known to me for lumbar radiculopathy. The patient has a medical history significant for chronic low back pain with recent new developments of radicular pain in the LLE ( L5) and subjective weakness or instability. He has underlying spinal stenosis, degenerative changes and facet arthropathy on MRI. A lumbar TF ARYA helped with partial relief about a third of this pain. This was done by Dr. Henriquez. He had done Pain PT and HEP,  Gabapentin. Most recent MRI show Severe spinal canal stenosis at L4-L5. Moderate spinal canal narrowing at L2-3 and L3-4. Today she came for routine f/u visit .       Recommendations/plan at the last visit included:     1. Medications.      Increase gabapentin 0-300-600 gradually.      2. Interventional procedures:     None at this time.      We discussed with the patient about Left L4/L5 and L5-S1  transforaminal ARYA injections, the patient agreed to pursue. We will review MRI images prior to these injections.     3. Labs and imaging: Will f/u lumbar MRI ordered by Dr. Logan.      4. Rehab: None indicated. The patient is encouraged to stay active and to perform exercise as tolerated.       5. Psychology: No current needs.     6. Integrated medicine: We discussed acupuncture therapy, but the patient is not interested.     7. Disposition:  The patient is advised to call clinic for follow up appointment in 3 months or earlier clinically indicated. We will see the patient for above mentioned  procedure.      Since the last visit, Vishnu Viveros reports:  - Back pain and groin: pain continues but the patient feels it is slightly better.  - medication response: The patient had been taking gabapentin. The patient states that the medications are helpful.         At this point, the patient's participation with our multidisciplinary pain management includes:   Medication: gabapentin, Robaxin   Intervention- recommended injection.   PT - The patient will start pool therapy soon.   Integrated medicine: discussed about acupuncture therapy.       [place] Prescription Monitoring Program:   Reviewed. No concerns    Review of Systems:  The 14 system ROS was reviewed and was negative except what is documented above and as follows.  Any bowel or bladder problems: none  Mood: okay    Physical Exam:  There were no vitals filed for this visit.    General: Awake in no apparent distress. This patient is unaccompanied in the office .  Eyes: Sclerae are anicteric. PERRLA, EOMI   Neck: supple, no masses.   Lungs: unlabored.   Heart: regular rate and rhythm   Abdomen: soft non tender.  Extremities: Pulses are well palpable, no peripheral edema.   Musculoskeletal: All muscle groups are normal in bulk and tone. The patient changes position without pain behavior. The patient walks with a normal gait. Posture is normal. Muscle strength was rated at 5/5 in all groups in the extremities. Examination of the joints reveals preserved range of motion.  The range of motion of the lumbar spine is normal  in all directions. The spinous processes in the cervical, thoracic, and lumbar spine are midline, with marked  tenderness over the paraspinous muscles and facet joints in the right lumbar region. Facet loading did generate pain bilaterally. Straight leg raise in the supine position was  negative. There was no tenderness to palpation noted over the sacroiliac joints on the right side on the left side bilaterally. Maxi s test was  negative .   Neurologic exam:Strength 5/5 for bilateral grasp, finger abduction, wrist extension, elbow flexion, elbow extension, shoulder abduction.  Strength 5/5 for bilateral dorsiflexion, plantarflexion, great toe extension, knee extension, hip flexion. Sensation to light touch diminished on left L5 dematome distally  Psychiatric; Normal affect.   Skin: Warm and Dry.       Medications:  Current Outpatient Prescriptions   Medication Sig Dispense Refill     gabapentin (NEURONTIN) 300 MG capsule Take 1 capsule (300 mg) by mouth 3 times daily One capsule twice daily 90 capsule 3     order for DME Back brace 1 Device 0     sildenafil (REVATIO/VIAGRA) 20 MG tablet Take 1-5 tablet (20 mg) by mouth as needed for sexual activity.  Never use with nitroglycerin, terazosin or doxazosin. 90 tablet 11     loratadine (CLARITIN) 10 MG tablet Take 1 tablet (10 mg) by mouth daily as needed LAST MD APPT. 11/28/16 90 tablet 0     tamsulosin (FLOMAX) 0.4 MG capsule Take 1 capsule (0.4 mg) by mouth daily 90 capsule 3     metFORMIN (GLUCOPHAGE-XR) 500 MG 24 hr tablet Take 1 tablet (500 mg) by mouth daily (with dinner) 90 tablet 3     atorvastatin (LIPITOR) 20 MG tablet Take 1 tablet (20 mg) by mouth daily 90 tablet 3     lisinopril (PRINIVIL/ZESTRIL) 5 MG tablet Take 1 tablet (5 mg) by mouth daily 90 tablet 3     Ascorbic Acid (VITAMIN C PO) Take 500 mg by mouth once       vardenafil (LEVITRA) 10 MG tablet Take 1 tablet 60 min prior to planned activity. 4 tablet 11     Naproxen Sodium (ALEVE PO) Take 1 tablet by mouth as needed for moderate pain       aspirin 81 MG tablet Take 1 tablet by mouth daily.       Cyanocobalamin (VITAMIN B12 PO) Take 1 tablet by mouth. Pt states he takes this 2 or 3 times a week..        Multiple Vitamin (MULTIVITAMINS PO) Take 1 tablet by mouth daily.       Cholecalciferol (VITAMIN D) 1000 UNIT capsule Take 1 capsule by mouth daily.       Omega-3 Fatty Acids (FISH OIL PO) Take 1 capsule by mouth as needed            LABORATORY VALUES:   Recent Labs   Lab Test  07/31/17   1031  11/28/16   1019   NA  140  140   POTASSIUM  4.2  4.5   CHLORIDE  106  106   CO2  28  28   ANIONGAP  7  5   GLC  98  108*   BUN  16  12   CR  0.79  0.82   FRANCIA  8.8  8.8       CBC RESULTS:   Recent Labs   Lab Test  11/28/16   1019   WBC  4.4   RBC  4.73   HGB  13.7   HCT  42.3   MCV  89   MCH  29.0   MCHC  32.4   RDW  13.2   PLT  253       Most Recent 3 INR's:No lab results found.    Diagnostic tests:    No images are attached to the encounter.      ASSESSMENT:    - Lumbar radiculopathy      PLAN:       1. Medications.      - Increase gabapentin 0-300-600 gradually. If tolerates well, take 600 mg bid. GFR >90  - Flexeril 5 mg tid prn        2. Interventional procedures:     None at this time.      We discussed with the patient to consider Left L4/L5 and L5-S1  transforaminal ARYA injections .      3. Labs and imaging: None needed      4. Rehab: None indicated. The patient is encouraged to stay active and to perform exercise as tolerated.       5. Psychology: No current needs.     6. Integrated medicine: We discussed acupuncture therapy, but the patient is not interested.     7. Disposition:  The patient is advised to call clinic for follow up appointment in 3 months or earlier clinically indicated. We will see the patient for above mentioned procedure.      Assessment will be ongoing with changes in treatment as indicated.  Benefits/risks/alternatives to treatment have been reviewed and the patient has been instructed to contact this office if they have any questions or concerns.  This plan of care has been discussed with the patient and the patient is in agreement.     Again, thank you for allowing me to participate in the care of your patient.      Sincerely,    Tiffany Dahl MD

## 2017-09-18 NOTE — PATIENT INSTRUCTIONS
1. Start taking flexeril 5mg three times daily as needed.     2. Increase gabapentin to 600mg twice daily.  Please follow the schedule below:    0-300-600 for 1 week; then increase,  0-600-600; This is goal.    3. Please call  clinic if you are interested in injections in the future.    Follow up: As needed      To speak with a nurse, schedule/reschedule/cancel a clinic appointment, or request a medication refill call: [phone]     You can also reach us by Winchannel: [email or web address]    For refills, please call on Monday, 1 week before your medication runs out. The doctors are not always in clinic, so this gives us time to get your prescriptions ready.  Please let us know the name of the medication you are requesting a refill of.

## 2017-09-18 NOTE — MR AVS SNAPSHOT
After Visit Summary   9/18/2017    Vishnu Viveros    MRN: [id number]           Patient Information     Date Of Birth          1950        Visit Information        Provider Department      9/18/2017 7:40 AM Tiffany Dahl MD [facility]        Today's Diagnoses     Lumbar radiculopathy    -  1      Care Instructions    1. Start taking flexeril 5mg three times daily as needed.     2. Increase gabapentin to 600mg twice daily.  Please follow the schedule below:    0-300-600 for 1 week; then increase,  0-600-600; This is goal.    3. Please call  clinic if you are interested in injections in the future.    Follow up: As needed      To speak with a nurse, schedule/reschedule/cancel a clinic appointment, or request a medication refill call: [phone]     You can also reach us by Linchpin: [email or web address]    For refills, please call on Monday, 1 week before your medication runs out. The doctors are not always in clinic, so this gives us time to get your prescriptions ready.  Please let us know the name of the medication you are requesting a refill of.                                                              Follow-ups after your visit        Your next 10 appointments already scheduled     Sep 20, 2017  7:00 AM CDT   TOM Spine with Mary Ann Champagne PT   [facility]    [address] [place] [id number] [phone]            Nov 27, 2017  8:30 AM CST   (Arrive by 8:15 AM)   Return Visit with Murali Logan MD   [facility] ([facility])    [address]  4th Floor  [facility] [address] [phone]              Who to contact     Please call your clinic at [phone] to:    Ask questions about your health    Make or cancel appointments    Discuss your medicines    Learn about your test  "results    Speak to your doctor   If you have compliments or concerns about an experience at your clinic, or if you wish to file a complaint, please contact [facility] Patient Relations at [phone] or email us at [email or web address]         Additional Information About Your Visit        Flare Codehart Information     Trooval is an electronic gateway that provides easy, online access to your medical records. With Trooval, you can request a clinic appointment, read your test results, renew a prescription or communicate with your care team.     To sign up for Trooval visit the website at [email or web address]   You will be asked to enter the access code listed below, as well as some personal information. Please follow the directions to create your username and password.     Your access code is: [email or web address]  Expires: 2017  8:23 AM     Your access code will  in 90 days. If you need help or a new code, please contact your [facility] Clinic or call [phone] for assistance.        Care EveryWhere ID     This is your Care EveryWhere ID. This could be used by other organizations to access your [place] medical records  [id number]        Your Vitals Were     Pulse Height Pulse Oximetry BMI (Body Mass Index)          71 1.626 m (5' 4\") 96% 32.61 kg/m2         Blood Pressure from Last 3 Encounters:   17 124/80   17 (!) 144/91   17 138/79    Weight from Last 3 Encounters:   17 86.2 kg (190 lb)   17 86.2 kg (190 lb)   17 85.9 kg (189 lb 4.8 oz)              Today, you had the following     No orders found for display         Today's Medication Changes          These changes are accurate as of: 17  8:37 AM.  If you have any questions, ask your nurse or doctor.               Start taking these medicines.        Dose/Directions    cyclobenzaprine 10 MG tablet   Commonly known as:  FLEXERIL   Used for:  Lumbar " radiculopathy   Started by:  Tiffany Dahl MD        Dose:  5 mg   Take 0.5 tablets (5 mg) by mouth 3 times daily as needed for muscle spasms   Quantity:  42 tablet   Refills:  1            Where to get your medicines      These medications were sent to [id number] - [place], [place] - [address] AT [facility] [address]  [address], [place] [address]     Phone:  [phone]     cyclobenzaprine 10 MG tablet                Primary Care Provider Office Phone # Fax #    Murali Logan [phone]       [address] [facility] [address]        Equal Access to Services     [facility] : Hadii dolores lizama hadasho Sonadineali, waaxda luqadaha, qaybta kaalmada adeegyada, carl amaya . So [facility] [phone].    ATENCIÓN: Si habla español, tiene a little disposición servicios gratuitos de asistencia lingüística. [facility] [phone].    We comply with applicable federal civil rights laws and [place] laws. We do not discriminate on the basis of race, color, national origin, age, disability sex, sexual orientation or gender identity.            Thank you!     Thank you for choosing [facility]  for your care. Our goal is always to provide you with excellent care. Hearing back from our patients is one way we can continue to improve our services. Please take a few minutes to complete the written survey that you may receive in the mail after your visit with us. Thank you!             Your Updated Medication List - Protect others around you: Learn how to safely use, store and throw away your medicines at www.disposemymeds.org.          This list is accurate as of: 9/18/17  8:37 AM.  Always use your most recent med list.                   Brand Name Dispense Instructions for use Diagnosis    ALEVE PO      Take 1 tablet by mouth as needed for moderate pain        aspirin 81 MG tablet      Take 1 tablet by mouth  daily.        atorvastatin 20 MG tablet    LIPITOR    90 tablet    Take 1 tablet (20 mg) by mouth daily    Type 2 diabetes mellitus treated without insulin (H)       cyclobenzaprine 10 MG tablet    FLEXERIL    42 tablet    Take 0.5 tablets (5 mg) by mouth 3 times daily as needed for muscle spasms    Lumbar radiculopathy       FISH OIL PO      Take 1 capsule by mouth as needed        gabapentin 300 MG capsule    NEURONTIN    90 capsule    Take 1 capsule (300 mg) by mouth 3 times daily One capsule twice daily    Lumbar radiculitis, Diabetic polyneuropathy associated with diabetes mellitus due to underlying condition (H)       lisinopril 5 MG tablet    PRINIVIL/ZESTRIL    90 tablet    Take 1 tablet (5 mg) by mouth daily    Type 2 diabetes mellitus treated without insulin (H), Benign essential hypertension       loratadine 10 MG tablet    CLARITIN    90 tablet    Take 1 tablet (10 mg) by mouth daily as needed LAST MD APPT. 11/28/16    Seasonal allergies       metFORMIN 500 MG 24 hr tablet    GLUCOPHAGE-XR    90 tablet    Take 1 tablet (500 mg) by mouth daily (with dinner)    Type 2 diabetes mellitus treated without insulin (H)       MULTIVITAMINS PO      Take 1 tablet by mouth daily.        order for DME     1 Device    Back brace    Left leg weakness, Lumbar stenosis       sildenafil 20 MG tablet    REVATIO    90 tablet    Take 1-5 tablet (20 mg) by mouth as needed for sexual activity.  Never use with nitroglycerin, terazosin or doxazosin.    Other male erectile dysfunction       tamsulosin 0.4 MG capsule    FLOMAX    90 capsule    Take 1 capsule (0.4 mg) by mouth daily    Benign non-nodular prostatic hyperplasia, presence of lower urinary tract symptoms unspecified       vardenafil 10 MG tablet    LEVITRA    4 tablet    Take 1 tablet 60 min prior to planned activity.    Erectile dysfunction due to arterial insufficiency       VITAMIN B12 PO      Take 1 tablet by mouth. Pt states he takes this 2 or 3 times a week..         VITAMIN C PO      Take 500 mg by mouth once        vitamin D 1000 UNITS capsule      Take 1 capsule by mouth daily.

## 2017-09-20 ENCOUNTER — THERAPY VISIT (OUTPATIENT)
Dept: PHYSICAL THERAPY | Facility: CLINIC | Age: 67
End: 2017-09-20
Payer: COMMERCIAL

## 2017-09-20 DIAGNOSIS — M51.369 DEGENERATIVE DISC DISEASE, LUMBAR: ICD-10-CM

## 2017-09-20 DIAGNOSIS — M48.061 LUMBAR STENOSIS: ICD-10-CM

## 2017-09-20 DIAGNOSIS — M54.9 BACK PAIN WITH RADIATION: ICD-10-CM

## 2017-09-20 PROCEDURE — 97035 APP MDLTY 1+ULTRASOUND EA 15: CPT | Mod: GP | Performed by: PHYSICAL THERAPIST

## 2017-09-20 PROCEDURE — 97110 THERAPEUTIC EXERCISES: CPT | Mod: GP | Performed by: PHYSICAL THERAPIST

## 2017-09-20 PROCEDURE — 97012 MECHANICAL TRACTION THERAPY: CPT | Mod: GP | Performed by: PHYSICAL THERAPIST

## 2017-09-27 ENCOUNTER — THERAPY VISIT (OUTPATIENT)
Dept: PHYSICAL THERAPY | Facility: CLINIC | Age: 67
End: 2017-09-27
Payer: COMMERCIAL

## 2017-09-27 DIAGNOSIS — M51.369 DEGENERATIVE DISC DISEASE, LUMBAR: ICD-10-CM

## 2017-09-27 DIAGNOSIS — M54.9 BACK PAIN WITH RADIATION: ICD-10-CM

## 2017-09-27 DIAGNOSIS — M48.061 LUMBAR STENOSIS: ICD-10-CM

## 2017-09-27 PROCEDURE — 97140 MANUAL THERAPY 1/> REGIONS: CPT | Mod: GP | Performed by: PHYSICAL THERAPIST

## 2017-09-27 PROCEDURE — 97110 THERAPEUTIC EXERCISES: CPT | Mod: GP | Performed by: PHYSICAL THERAPIST

## 2017-09-27 PROCEDURE — 97035 APP MDLTY 1+ULTRASOUND EA 15: CPT | Mod: GP | Performed by: PHYSICAL THERAPIST

## 2017-10-11 ENCOUNTER — THERAPY VISIT (OUTPATIENT)
Dept: PHYSICAL THERAPY | Facility: CLINIC | Age: 67
End: 2017-10-11
Payer: COMMERCIAL

## 2017-10-11 DIAGNOSIS — M48.061 LUMBAR STENOSIS: ICD-10-CM

## 2017-10-11 DIAGNOSIS — M54.9 BACK PAIN WITH RADIATION: ICD-10-CM

## 2017-10-11 DIAGNOSIS — M51.369 DEGENERATIVE DISC DISEASE, LUMBAR: ICD-10-CM

## 2017-10-11 PROCEDURE — 97110 THERAPEUTIC EXERCISES: CPT | Mod: GP | Performed by: PHYSICAL THERAPIST

## 2017-10-11 PROCEDURE — 97140 MANUAL THERAPY 1/> REGIONS: CPT | Mod: GP | Performed by: PHYSICAL THERAPIST

## 2017-10-14 DIAGNOSIS — E08.42 DIABETIC POLYNEUROPATHY ASSOCIATED WITH DIABETES MELLITUS DUE TO UNDERLYING CONDITION (H): ICD-10-CM

## 2017-10-14 DIAGNOSIS — M54.16 LUMBAR RADICULITIS: ICD-10-CM

## 2017-10-18 ENCOUNTER — THERAPY VISIT (OUTPATIENT)
Dept: PHYSICAL THERAPY | Facility: CLINIC | Age: 67
End: 2017-10-18
Payer: COMMERCIAL

## 2017-10-18 DIAGNOSIS — M54.9 BACK PAIN WITH RADIATION: ICD-10-CM

## 2017-10-18 DIAGNOSIS — M51.369 DEGENERATIVE DISC DISEASE, LUMBAR: ICD-10-CM

## 2017-10-18 DIAGNOSIS — M48.061 LUMBAR STENOSIS: ICD-10-CM

## 2017-10-18 PROCEDURE — 97140 MANUAL THERAPY 1/> REGIONS: CPT | Mod: GP | Performed by: PHYSICAL THERAPIST

## 2017-10-18 PROCEDURE — 97012 MECHANICAL TRACTION THERAPY: CPT | Mod: GP | Performed by: PHYSICAL THERAPIST

## 2017-10-18 PROCEDURE — 97110 THERAPEUTIC EXERCISES: CPT | Mod: GP | Performed by: PHYSICAL THERAPIST

## 2017-10-19 ENCOUNTER — CARE COORDINATION (OUTPATIENT)
Dept: ANESTHESIOLOGY | Facility: CLINIC | Age: 67
End: 2017-10-19

## 2017-10-20 DIAGNOSIS — M54.16 LUMBAR RADICULITIS: ICD-10-CM

## 2017-10-20 DIAGNOSIS — E08.42 DIABETIC POLYNEUROPATHY ASSOCIATED WITH DIABETES MELLITUS DUE TO UNDERLYING CONDITION (H): ICD-10-CM

## 2017-10-20 RX ORDER — GABAPENTIN 300 MG/1
CAPSULE ORAL
Qty: 270 CAPSULE | Refills: 3 | Status: CANCELLED | OUTPATIENT
Start: 2017-10-20

## 2017-10-20 RX ORDER — GABAPENTIN 300 MG/1
300 CAPSULE ORAL 3 TIMES DAILY
Qty: 270 CAPSULE | Refills: 0 | Status: SHIPPED | OUTPATIENT
Start: 2017-10-20 | End: 2017-12-22

## 2017-10-24 NOTE — PROGRESS NOTES
Call back to pt regarding questions.  Pt asking to schedule procedure previously recommended by Dr. Dahl.  [facility] verified procedure orders are in pt's chart.  Pt given phone number to call to schedule procedure.  Pt advised to call back clinic if needed.     Anitha Singh RN, BSN

## 2017-11-08 ENCOUNTER — THERAPY VISIT (OUTPATIENT)
Dept: PHYSICAL THERAPY | Facility: CLINIC | Age: 67
End: 2017-11-08
Payer: COMMERCIAL

## 2017-11-08 DIAGNOSIS — M51.369 DEGENERATIVE DISC DISEASE, LUMBAR: ICD-10-CM

## 2017-11-08 DIAGNOSIS — M48.061 LUMBAR STENOSIS: ICD-10-CM

## 2017-11-08 DIAGNOSIS — M54.9 BACK PAIN WITH RADIATION: ICD-10-CM

## 2017-11-08 PROCEDURE — 97035 APP MDLTY 1+ULTRASOUND EA 15: CPT | Mod: GP | Performed by: PHYSICAL THERAPIST

## 2017-11-22 ENCOUNTER — THERAPY VISIT (OUTPATIENT)
Dept: PHYSICAL THERAPY | Facility: CLINIC | Age: 67
End: 2017-11-22
Payer: COMMERCIAL

## 2017-11-22 DIAGNOSIS — M48.061 LUMBAR STENOSIS: ICD-10-CM

## 2017-11-22 DIAGNOSIS — M54.9 BACK PAIN WITH RADIATION: ICD-10-CM

## 2017-11-22 DIAGNOSIS — M51.369 DEGENERATIVE DISC DISEASE, LUMBAR: ICD-10-CM

## 2017-11-22 PROCEDURE — 97140 MANUAL THERAPY 1/> REGIONS: CPT | Mod: GP | Performed by: PHYSICAL THERAPIST

## 2017-11-22 PROCEDURE — 97110 THERAPEUTIC EXERCISES: CPT | Mod: GP | Performed by: PHYSICAL THERAPIST

## 2017-11-22 PROCEDURE — 97035 APP MDLTY 1+ULTRASOUND EA 15: CPT | Mod: GP | Performed by: PHYSICAL THERAPIST

## 2017-11-22 NOTE — MR AVS SNAPSHOT
"              After Visit Summary   11/22/2017    Vishnu Viveros    MRN: [id number]           Patient Information     Date Of Birth          1950        Visit Information        Provider Department      11/22/2017 7:00 AM Mary Ann Champagne PT [facility] [place]        Today's Diagnoses     Back pain with radiation        Degenerative disc disease, lumbar        Lumbar stenosis           Follow-ups after your visit        Your next 10 appointments already scheduled     Nov 27, 2017  8:30 AM CST   (Arrive by 8:15 AM)   Return Visit with Murali Logan MD   [facility] ([facility])    [address] [place] [address]  4th [facility] [address] [phone]              Who to contact     If you have questions or need follow up information about today's clinic visit or your schedule please contact [facility] directly at [phone].  Normal or non-critical lab and imaging results will be communicated to you by MyChart, letter or phone within 4 business days after the clinic has received the results. If you do not hear from us within 7 days, please contact the clinic through [employer] or phone. If you have a critical or abnormal lab result, we will notify you by phone as soon as possible.  Submit refill requests through Soompi or call your pharmacy and they will forward the refill request to us. Please allow 3 business days for your refill to be completed.          Additional Information About Your Visit        MyChart Information     Soompi lets you send messages to your doctor, view your test results, renew your prescriptions, schedule appointments and more. To sign up, go to [email or web address] . Click on \"Log in\" on the left side of the screen, which will take you to the Welcome page. Then click on \"Sign up Now\" on the right side of the page.     You will be asked to enter the access code " listed below, as well as some personal information. Please follow the directions to create your username and password.     Your access code is: [email or web address]  Expires: 2017  7:23 AM     Your access code will  in 90 days. If you need help or a new code, please call your [place] clinic or [phone].        Care EveryWhere ID     This is your Care EveryWhere ID. This could be used by other organizations to access your [place] medical records  [id number]         Blood Pressure from Last 3 Encounters:   17 124/80   17 (!) 144/91   17 138/79    Weight from Last 3 Encounters:   17 86.2 kg (190 lb)   17 86.2 kg (190 lb)   17 85.9 kg (189 lb 4.8 oz)              We Performed the Following     Manual Ther Tech, 1+Regions, EA 15 min     Therapeutic Exercises     Ultrasound Therapy        Primary Care Provider Office Phone # Fax #    Murali Logan [phone]       [address] [facility] [address]        Equal Access to Services     [facility]: Hadii aad ku paulo Sosola, waaxda lujonasadaha, qaybta kaalmada adeearlyada, carl amaya . So [facility] [phone].    ATENCIÓN: Si habla español, tiene a little disposición servicios gratuitos de asistencia lingüística. Llame al [phone].    We comply with applicable federal civil rights laws and [place] laws. We do not discriminate on the basis of race, color, national origin, age, disability, sex, sexual orientation, or gender identity.            Thank you!     Thank you for choosing [facility]  for your care. Our goal is always to provide you with excellent care. Hearing back from our patients is one way we can continue to improve our services. Please take a few minutes to complete the written survey that you may receive in the mail after your visit with us. Thank you!             Your Updated Medication List - Protect others around you:  Learn how to safely use, store and throw away your medicines at www.disposemymeds.org.          This list is accurate as of: 11/22/17 12:42 PM.  Always use your most recent med list.                   Brand Name Dispense Instructions for use Diagnosis    ALEVE PO      Take 1 tablet by mouth as needed for moderate pain        aspirin 81 MG tablet      Take 1 tablet by mouth daily.        atorvastatin 20 MG tablet    LIPITOR    90 tablet    Take 1 tablet (20 mg) by mouth daily    Type 2 diabetes mellitus treated without insulin (H)       cyclobenzaprine 10 MG tablet    FLEXERIL    42 tablet    Take 0.5 tablets (5 mg) by mouth 3 times daily as needed for muscle spasms    Lumbar radiculopathy       FISH OIL PO      Take 1 capsule by mouth as needed        gabapentin 300 MG capsule    NEURONTIN    270 capsule    Take 1 capsule (300 mg) by mouth 3 times daily One capsule twice daily    Lumbar radiculitis, Diabetic polyneuropathy associated with diabetes mellitus due to underlying condition (H)       lisinopril 5 MG tablet    PRINIVIL/ZESTRIL    90 tablet    Take 1 tablet (5 mg) by mouth daily    Type 2 diabetes mellitus treated without insulin (H), Benign essential hypertension       loratadine 10 MG tablet    CLARITIN    90 tablet    Take 1 tablet (10 mg) by mouth daily as needed LAST MD APPT. 11/28/16    Seasonal allergies       metFORMIN 500 MG 24 hr tablet    GLUCOPHAGE-XR    90 tablet    Take 1 tablet (500 mg) by mouth daily (with dinner)    Type 2 diabetes mellitus treated without insulin (H)       MULTIVITAMINS PO      Take 1 tablet by mouth daily.        order for DME     1 Device    Back brace    Left leg weakness, Lumbar stenosis       sildenafil 20 MG tablet    REVATIO    90 tablet    Take 1-5 tablet (20 mg) by mouth as needed for sexual activity.  Never use with nitroglycerin, terazosin or doxazosin.    Other male erectile dysfunction       tamsulosin 0.4 MG capsule    FLOMAX    90 capsule    Take 1 capsule  (0.4 mg) by mouth daily    Benign non-nodular prostatic hyperplasia, presence of lower urinary tract symptoms unspecified       vardenafil 10 MG tablet    LEVITRA    4 tablet    Take 1 tablet 60 min prior to planned activity.    Erectile dysfunction due to arterial insufficiency       VITAMIN B12 PO      Take 1 tablet by mouth. Pt states he takes this 2 or 3 times a week..        VITAMIN C PO      Take 500 mg by mouth once        vitamin D 1000 UNITS capsule      Take 1 capsule by mouth daily.

## 2017-11-22 NOTE — PROGRESS NOTES
Subjective:    HPI                    Objective:    System    Physical Exam    General 8-29-17    ROS    Assessment/Plan:      PROGRESS  REPORT    Progress reporting period is from 8-29-17 to 11-22-17.   Patient has attended 8 times.  SUBJECTIVE  Subjective changes noted by patient:  Reports that he has a little less back pain, is sleeping an extra hour because of this.  Continues to report difficulty first few steps after getting up, needs to shake his L LE/hip around before he gets walking.  Has pain and tightness anterior/lateral/posterior pelvis at level of hip.  Back starts hurting after a few minutes of walking, still some L LE symptoms with walking (numbness, pain)  Reports that he finds himself having difficulty walking straight at times, and feels something in his head until he sits down  Changes in function:  None  Adverse reaction to treatment or activity: Standing, walking    OBJECTIVE  Changes noted in objective findings:    Continues to demonstrate painful marked limitation in L hip joint in capsular pattern, R less limited and less painful, firm end feel bilaterally.  Both knees lack extension with pain at end range, also firm end feel.  Gait:  L LE remains ER, lacks hip extension bilaterally with normal hip/pelvis mechanics affected causing excessive lumbar motion.  Lumbar spine:  Motion limited especially extension, side bend.  Some relief with supine low back flexion.  Neuro exam not performed today        ASSESSMENT/PLAN    Continues to demonstrate signs/symptoms of back pain, stenosis, DJD.  Feel that his hip OA may be a large factor aggravating this.  Significant hip OA and limitation in motion affects gait with excessive lumbar motion/stress.  He has some symptom relief with hip treatment (manual joint mobilizing).  He may benefit from continuing with this, but he may require other intervention given the extent of his joint limitation, perhaps IRMA.  His reported difficulty walking in a straight  "line may possibly be related to limited hip motion affecting gait.  His report of feeling strange in the head when this is occurring may point to another issue.  Updated problem list and treatment plan: Diagnosis 1:  Chronic low back pain with sciatica    Pain -  hot/cold therapy, US, manual therapy, self management, education and home program  Decreased ROM/flexibility - manual therapy, therapeutic exercise and home program  Decreased joint mobility - manual therapy, therapeutic exercise and home program  Decreased strength - therapeutic exercise and therapeutic activities  Decreased function - therapeutic activities  STG/LTGs have been met or progress has been made towards goals:  Not significant progress functionally although pain is somewhat less, sleeping is better.  Assessment of Progress: Not significant  Self Management Plans:  Patient has been instructed in a home treatment program.  Patient  has been instructed in self management of symptoms.    Recommendations:  Patient is to follow up with primary provider.  Recommendations at this time:  Suggest referral to orthopedist for evaluation of hips (particularly) and low back.  Suggest consideration of any other reason that he would feel symptoms of \"feeling strange in the head\" when walking and also the feeling of not being able to walk straight.  As noted, this may be related to limited hip motion, but unsure about the sensation he has in his head.      Please refer to the daily flowsheet for treatment today, total treatment time and time spent performing 1:1 timed codes.                "

## 2017-11-27 ENCOUNTER — OFFICE VISIT (OUTPATIENT)
Dept: FAMILY MEDICINE | Facility: CLINIC | Age: 67
End: 2017-11-27

## 2017-11-27 VITALS
OXYGEN SATURATION: 97 % | DIASTOLIC BLOOD PRESSURE: 76 MMHG | HEART RATE: 71 BPM | RESPIRATION RATE: 20 BRPM | WEIGHT: 190 LBS | BODY MASS INDEX: 32.61 KG/M2 | SYSTOLIC BLOOD PRESSURE: 133 MMHG

## 2017-11-27 DIAGNOSIS — Z00.00 ROUTINE GENERAL MEDICAL EXAMINATION AT A HEALTH CARE FACILITY: Primary | ICD-10-CM

## 2017-11-27 DIAGNOSIS — Z23 NEED FOR PROPHYLACTIC VACCINATION AND INOCULATION AGAINST INFLUENZA: ICD-10-CM

## 2017-11-27 DIAGNOSIS — M25.562 CHRONIC PAIN OF BOTH KNEES: ICD-10-CM

## 2017-11-27 DIAGNOSIS — E08.42 DIABETIC POLYNEUROPATHY ASSOCIATED WITH DIABETES MELLITUS DUE TO UNDERLYING CONDITION (H): ICD-10-CM

## 2017-11-27 DIAGNOSIS — H54.7 VISION IMPAIRMENT: ICD-10-CM

## 2017-11-27 DIAGNOSIS — G89.29 CHRONIC PAIN OF BOTH KNEES: ICD-10-CM

## 2017-11-27 DIAGNOSIS — M25.552 HIP PAIN, LEFT: ICD-10-CM

## 2017-11-27 DIAGNOSIS — D12.6 TUBULOVILLOUS ADENOMA OF COLON: ICD-10-CM

## 2017-11-27 DIAGNOSIS — Z13.5 SCREENING FOR DIABETIC RETINOPATHY: ICD-10-CM

## 2017-11-27 DIAGNOSIS — E11.9 TYPE 2 DIABETES MELLITUS TREATED WITHOUT INSULIN (H): ICD-10-CM

## 2017-11-27 DIAGNOSIS — M48.062 SPINAL STENOSIS OF LUMBAR REGION WITH NEUROGENIC CLAUDICATION: ICD-10-CM

## 2017-11-27 DIAGNOSIS — M25.572 PAIN IN JOINT INVOLVING ANKLE AND FOOT, LEFT: ICD-10-CM

## 2017-11-27 DIAGNOSIS — R19.5 POSITIVE FIT (FECAL IMMUNOCHEMICAL TEST): ICD-10-CM

## 2017-11-27 DIAGNOSIS — F43.23 ADJUSTMENT DISORDER WITH MIXED ANXIETY AND DEPRESSED MOOD: ICD-10-CM

## 2017-11-27 DIAGNOSIS — M25.561 CHRONIC PAIN OF BOTH KNEES: ICD-10-CM

## 2017-11-27 LAB
ALBUMIN SERPL-MCNC: 3.7 G/DL (ref 3.4–5)
ALP SERPL-CCNC: 51 U/L (ref 40–150)
ALT SERPL W P-5'-P-CCNC: 25 U/L (ref 0–70)
ANION GAP SERPL CALCULATED.3IONS-SCNC: 5 MMOL/L (ref 3–14)
AST SERPL W P-5'-P-CCNC: 16 U/L (ref 0–45)
BILIRUB SERPL-MCNC: 0.3 MG/DL (ref 0.2–1.3)
BUN SERPL-MCNC: 15 MG/DL (ref 7–30)
CALCIUM SERPL-MCNC: 9 MG/DL (ref 8.5–10.1)
CHLORIDE SERPL-SCNC: 107 MMOL/L (ref 94–109)
CHOLEST SERPL-MCNC: 156 MG/DL
CO2 SERPL-SCNC: 28 MMOL/L (ref 20–32)
CREAT SERPL-MCNC: 0.76 MG/DL (ref 0.66–1.25)
CREAT UR-MCNC: 79 MG/DL
GFR SERPL CREATININE-BSD FRML MDRD: >90 ML/MIN/1.7M2
GLUCOSE SERPL-MCNC: 102 MG/DL (ref 70–99)
HBA1C MFR BLD: 6.2 % (ref 4.3–6)
HDLC SERPL-MCNC: 70 MG/DL
LDLC SERPL CALC-MCNC: 72 MG/DL
MICROALBUMIN UR-MCNC: 9 MG/L
MICROALBUMIN/CREAT UR: 11.85 MG/G CR (ref 0–17)
NONHDLC SERPL-MCNC: 86 MG/DL
POTASSIUM SERPL-SCNC: 4.3 MMOL/L (ref 3.4–5.3)
PROT SERPL-MCNC: 7.2 G/DL (ref 6.8–8.8)
SODIUM SERPL-SCNC: 139 MMOL/L (ref 133–144)
TRIGL SERPL-MCNC: 69 MG/DL

## 2017-11-27 RX ORDER — DULOXETIN HYDROCHLORIDE 20 MG/1
20 CAPSULE, DELAYED RELEASE ORAL DAILY
Qty: 30 CAPSULE | Refills: 1 | Status: SHIPPED | OUTPATIENT
Start: 2017-11-27 | End: 2018-01-04

## 2017-11-27 RX ORDER — DULOXETIN HYDROCHLORIDE 20 MG/1
20 CAPSULE, DELAYED RELEASE ORAL 2 TIMES DAILY
Qty: 30 CAPSULE | Refills: 1 | Status: SHIPPED | OUTPATIENT
Start: 2017-11-27 | End: 2017-11-27

## 2017-11-27 ASSESSMENT — ANXIETY QUESTIONNAIRES
5. BEING SO RESTLESS THAT IT IS HARD TO SIT STILL: NOT AT ALL
GAD7 TOTAL SCORE: 0
IF YOU CHECKED OFF ANY PROBLEMS ON THIS QUESTIONNAIRE, HOW DIFFICULT HAVE THESE PROBLEMS MADE IT FOR YOU TO DO YOUR WORK, TAKE CARE OF THINGS AT HOME, OR GET ALONG WITH OTHER PEOPLE: NOT DIFFICULT AT ALL
7. FEELING AFRAID AS IF SOMETHING AWFUL MIGHT HAPPEN: NOT AT ALL
6. BECOMING EASILY ANNOYED OR IRRITABLE: NOT AT ALL
3. WORRYING TOO MUCH ABOUT DIFFERENT THINGS: NOT AT ALL
1. FEELING NERVOUS, ANXIOUS, OR ON EDGE: NOT AT ALL
2. NOT BEING ABLE TO STOP OR CONTROL WORRYING: NOT AT ALL

## 2017-11-27 ASSESSMENT — PATIENT HEALTH QUESTIONNAIRE - PHQ9
5. POOR APPETITE OR OVEREATING: NOT AT ALL
SUM OF ALL RESPONSES TO PHQ QUESTIONS 1-9: 5

## 2017-11-27 NOTE — PATIENT INSTRUCTIONS
[facility]: [phone]     [facility] Medication Refill Request Information:  * Please contact your pharmacy regarding ANY request for medication refills.  ** [facility] Prescription Fax = [phone]  * Please allow 3 business days for routine medication refills.  * Please allow 5 business days for controlled substance medication refills.     [facility] Test Result notification information:  *You will be notified with in 7-10 days of your appointment day regarding the results of your test.  If you are on MyChart you will be notified as soon as the provider has reviewed the results and signed off on them.

## 2017-11-27 NOTE — PROGRESS NOTES
Vishnu Landeros is a 67-year-old gentleman  here for general physical and  follow up of chronic health conditions, annual health review and assessment of new issues.   Left leg occasionally will feel like his hip goes out of place. He also has pain bilateral knee pain and significant pain in his left ankle. He had an MRI of his back in August which shows multilevel spinal stenosis and impingement and has been following with the pain clinic for recommendations.He has been following with therapy for his back  He had past history of significant low back pain with radicular pain down the back of his right leg and now the left leg feels numb on lower lateral aspect for several months. He has noted loss of muscle strength to the point he has challenges walking up the stairs. He also has knee pain on the  Left, leg discrepancy with left leg shorter than right over his life. He feels the left leg is numb and worries about neuropathy.  He has spinal stenosis  And has been trying to avoid injection. He wants to work on exercise at home and has been following with PT three times per week.  He is also concerned about memory loss and feels he has some anxiety at times related to trying to remember details in the fast pace of  work.  He notes chronic hearing loss worse in crowds.  Occasionally he will notice a little bit of dyspnea on exertion and going up stairs also a challenge with his leg and joint issues, but he thinks it is probably due to aging as it has not worsened from baseline.    Lipids have been excellent.    He would like me to complete DMV forms for handicapped parking I completed in July for 3 years but he did not turn in as he is hoping I will reconsider for 6 year tag, he brought in forms and I am updating with today's date and authorizing 6 years due to his lower leg symptoms, osteoarthritis, neuropathy, spinal stenosis contributing to ataxia.  HCM:  He would like to have an influenza vaccine. He completed  his colonoscopy 2/17 with polyps resected ( see path below)needs another in 3 years due 2/2020.     SH: .He works as a  and likes his job. Mostly sedentary job. He feels it is very hard for him to take time off work to attend to his health. I reviewed FMLA I could sign if he wishes. He also is working an additional job.     10 point ROS of systems including Constitutional, Eyes, Respiratory, Cardiovascular, Gastroenterology, Genitourinary, Integumentary, Muscularskeletal, Psychiatric were all negative except for pertinent positives noted in my HPI.      Patient Active Problem List   Diagnosis     Erectile dysfunction     Seasonal allergies     Abdominal bloating     Vitamin D deficiency     Pulmonary nodule     Red eyes     Prostate nodule     Right sided abdominal pain     Low back pain radiating to right leg     Chronic low back pain     Degenerative disc disease, lumbar     Lumbar stenosis     Back pain with radiation     Blood in stool     Anemia     Positive FIT (fecal immunochemical test)     DJD (degenerative joint disease) of knee     HL (hearing loss)     Vision impairment     Tear film insufficiency     Recurrent pterygium     Senile nuclear sclerosis     Type 2 diabetes mellitus treated without insulin (H)     Diabetic polyneuropathy associated with diabetes mellitus due to underlying condition (H)       Past Medical History:   Diagnosis Date     Pulmonary nodule        Past Surgical History:   Procedure Laterality Date     APPENDECTOMY OPEN CHILD       BIOPSY PROSTATE TRANSRECTAL       EXCISE PTERYGIUM Right 1989     EXCISE PTERYGIUM Right 2/12/2015     INJECT EPIDURAL LUMBAR / SACRAL SINGLE N/A 7/21/2016    Procedure: INJECT EPIDURAL LUMBAR / SACRAL SINGLE;  Surgeon: Judah Henriquez MD;  Location: [facility] OR     INJECT PARAVERTEBRAL FACET JOINT LUMBAR / SACRAL FIRST Right 8/18/2016    Procedure: INJECT PARAVERTEBRAL FACET JOINT LUMBAR / SACRAL FIRST;  Surgeon: Judah Henriquez MD;   "Location: [facility] OR     INJECT PARAVERTEBRAL FACET JOINT LUMBAR / SACRAL FIRST Right 9/8/2016    Procedure: INJECT PARAVERTEBRAL FACET JOINT LUMBAR / SACRAL FIRST;  Surgeon: Judah Henriquez MD;  Location: [facility] OR     left knee surgery         Current Outpatient Prescriptions   Medication     gabapentin (NEURONTIN) 300 MG capsule     cyclobenzaprine (FLEXERIL) 10 MG tablet     order for DME     loratadine (CLARITIN) 10 MG tablet     tamsulosin (FLOMAX) 0.4 MG capsule     metFORMIN (GLUCOPHAGE-XR) 500 MG 24 hr tablet     atorvastatin (LIPITOR) 20 MG tablet     lisinopril (PRINIVIL/ZESTRIL) 5 MG tablet     Ascorbic Acid (VITAMIN C PO)     Naproxen Sodium (ALEVE PO)     aspirin 81 MG tablet     Multiple Vitamin (MULTIVITAMINS PO)     Cholecalciferol (VITAMIN D) 1000 UNIT capsule     Omega-3 Fatty Acids (FISH OIL PO)     Cyanocobalamin (VITAMIN B12 PO)     No current facility-administered medications for this visit.      Facility-Administered Medications Ordered in Other Visits   Medication     iohexol (OMNIPAQUE) 300 mg/mL injection 10 mL       Allergies   Allergen Reactions     Seasonal Allergies      Sinus congestion, sinus \"drainage\", sneezing     Nkda [No Known Drug Allergies]          Social History     Social History     Marital status:      Spouse name: N/A     Number of children: N/A     Years of education: N/A     Occupational History     Not on file.     Social History Main Topics     Smoking status: Former Smoker     Types: Cigarettes     Quit date: 11/14/1981     Smokeless tobacco: Never Used     Alcohol use 1.5 oz/week     3 drink(s) per week      Comment: wine socially once per week or shots of wiskey on the weekend     Drug use: No     Sexual activity: Yes     Partners: Female     Other Topics Concern     Not on file     Social History Narrative     works as [employer].        His immediate family; his wife has MS and is an RN at [employer], his two children are in good health. " "              Family History   Problem Relation Age of Onset     CEREBROVASCULAR DISEASE Brother           Neurologic Disorder Brother      Brain hemorrhage     Breast Cancer Sister           Glaucoma No family hx of      Macular Degeneration No family hx of        /76 (BP Location: Right arm, Patient Position: Sitting, Cuff Size: Adult Large)  Pulse 71  Resp 20  Wt 86.2 kg (190 lb)  SpO2 97%  BMI 32.61 kg/m2  PHYSICAL EXAMINATION:  Appears well cooperative memory intact able to remember 3 words, date, President of [place]  Thought for 10 seconds\"Trump\".  Constitutional: Oriented to person, place, and time. Vital signs are noted.  Appears well-developed and well-nourished, obese. Non-toxic appearance.  No distress.  HEENT:  Pterygium of right eye. His pupils are equal.  Left TM normal, Right previous procedure of TM  Head: Normocephalic and atraumatic.   Mouth/Throat: Oropharynx is clear and moist. No oropharyngeal exudate.   Neck: Normal range of motion. Neck supple. No JVD present. No tracheal deviation present. No thyromegaly present.   Cardiovascular: Regular rhythm, normal heart sounds and intact distal pulses. No murmur present. Exam reveals no gallop and no friction rub.   Pulmonary/Chest: Effort normal and breath sounds normal. No respiratory distress.   Abdominal:Obese Soft. Bowel sounds are normal. No distension and no mass. No tenderness.   Lymphadenopathy:   No cervical adenopathy.   Neurological: Alert and oriented to person, place, and time. Normal strength. No cranial nerve deficit or sensory deficit. DTR s absent patellar bilaterally, slight left achilles present today  MUSCULOSKELETAL:  Left knee signs of degenerative joint disease with slight swelling.  He has full extension and flexion but pain throughout the knee joint.  There are no signs of laxity on Lachman, but he does have a positive Shayla. Back decreased range of motion with limitation with lateral bending  " "causing pain down back of bilateral legs. He has less muscle mass on the right thigh and calf. Gait no foot drop. He has loss of sensation on left lateral leg he has slight decreased range of motion of left hip compared with the right hip. He has slight swelling of the left ankle all osteoarthritis changes.   Skin: Skin is warm and dry. No rash noted. No erythema. No pallor. He has very thick toenails that required trimming. Left foot decreased sensation right foot able to feel monofilament testing.   Psychiatric: Slightly anxious mood, affect and behavior is normal. Cooperative smiling.  PHQ-9 SCORE 11/27/2017   Total Score 5     NIELS-7 SCORE 11/27/2017   Total Score 0     Copath Report 02/09/2017  9:45 AM 88   Patient Name: BONI MUJICA   MR#: [id number]   Specimen #: [id number]   Collected: 2/9/2017   Received: 2/10/2017   Reported: 2/13/2017 11:40   Ordering Phy(s): MICHAEL PATEL   Additional Phy(s): [facility]: [facility]     For improved result formatting, select 'View Enhanced Report Format'   under Linked Documents section.     SPECIMEN(S):   A: Colon polyp, descending   B: Sigmoid colon polyp   C: Rectal polyp     FINAL DIAGNOSIS:   A. COLON POLYP, DESCENDING, POLYPECTOMY:   - Tubular adenoma   - Negative for high-grade dysplasia and malignancy     B. SIGMOID COLON POLYP, POLYPECTOMY:   - Tubulovillous adenoma   - Negative for high-grade dysplasia and malignancy     C. RECTAL POLYP, POLYPECTOMY:   - Tubular adenoma   - Negative for high-grade dysplasia and malignancy     I have personally reviewed all specimens and or slides, including the   listed special stains, and used them with my medical judgement to   determine the final diagnosis.     Electronically signed out by:     Imtiaz Sampson M.D     CLINICAL HISTORY:     Screening colonoscopy, occult blood in stool     GROSS:   A: The specimen is received in formalin with proper patient's   identification, labeled \"ascending colon polyp\" and " "consists of one tan   soft tissue fragment measuring 0.3 x 0.3 x 0.2 cm. Entirely submitted in   one cassette.     B: The specimen is received in formalin with proper patient's   identification, labeled \"sigmoid colon polyp\" and consists of one brown   tan pedunculated polyp measuring 1.0 x 0.8 x 0.6 cm.  The tissue   fragment is inked black and bisected. Entirely submitted in one   cassette.     C. The specimen is received in formalin with proper patient's   identification, labeled \"rectal polyp\" and consists of two tan soft   tissue fragments measuring 0.3 cm and 0.3 cm in maximum dimension.   Entirely submitted in one cassette. (Dictated by: Gemma Hicks 2/10/2017   09:29 AM)     MICROSCOPIC:     Microscopic exam was performed     CPT Codes:   A: 87533-AX6   B: 22040-QB8   C: 72964-EM3     TESTING LAB LOCATION:   [facility], [address]   [id number] [phone]     COLLECTION SITE:   Client: [facility]   Location: [facility] (B)            Results for orders placed or performed in visit on 08/31/17   EMG    Impression    [facility]  Electrodiagnostic Laboratory    Nerve Conduction & EMG Report    Patient: Vishnu Viveros  YOB: 1950  Patient ID: [id number]  Age: 67 Years 2 Months  Gender: Male      Referring Physician: Murali Logan MD    History & Examination:    67 year old man with paresthesias of the left leg, especially at the left lateral calf, and subjective weakness. Query left lumbosacral radiculopathy.    Techniques: Motor and sensory conduction studies were done with surface recording electrodes. Temperature was monitored and recorded throughout the study. Lower extremities were maintained at a temperature of 31degrees Centigrade or higher. EMG was done with a concentric needle electrode.     Results:    Left sural and superficial peroneal antidromic " sensory NCSs were normal. Left deep peroneal and tibial motor NCSs were normal. Left tibial F-wave latencies were normal. EMG of left tibialis posterior showed increased insertional activity, and mildly reduced recruitment of large, long duration, stable MUPs. EMG of left tibialis anterior, extensor hallucis longus, medial gastrocnemius, vastus lateralis, biceps femoris (short head), gluteus medius, and L5 paraspinals was normal.     Interpretation:    Mild denervation changes isolated to the left tibialis posterior muscle. Differential diagnosis includes left L5 radiculopathy vs tibial neuropathy. Clinical correlation is recommended.     EMG Physician:    Bran Sibley MD      Results for BONI MUJICA (MRN [id number]) as of 11/28/2017 09:33   Ref. Range 11/27/2017 11:07 11/27/2017 11:17   Sodium Latest Ref Range: 133 - 144 mmol/L 139    Potassium Latest Ref Range: 3.4 - 5.3 mmol/L 4.3    Chloride Latest Ref Range: 94 - 109 mmol/L 107    Carbon Dioxide Latest Ref Range: 20 - 32 mmol/L 28    Urea Nitrogen Latest Ref Range: 7 - 30 mg/dL 15    Creatinine Latest Ref Range: 0.66 - 1.25 mg/dL 0.76    GFR Estimate Latest Ref Range: >60 mL/min/1.7m2 >90    GFR Estimate If Black Latest Ref Range: >60 mL/min/1.7m2 >90    Calcium Latest Ref Range: 8.5 - 10.1 mg/dL 9.0    Anion Gap Latest Ref Range: 3 - 14 mmol/L 5    Albumin Latest Ref Range: 3.4 - 5.0 g/dL 3.7    Protein Total Latest Ref Range: 6.8 - 8.8 g/dL 7.2    Bilirubin Total Latest Ref Range: 0.2 - 1.3 mg/dL 0.3    Alkaline Phosphatase Latest Ref Range: 40 - 150 U/L 51    ALT Latest Ref Range: 0 - 70 U/L 25    AST Latest Ref Range: 0 - 45 U/L 16    Hemoglobin A1C Latest Ref Range: 4.3 - 6.0 % 6.2 (H)    Albumin Urine mg/g Cr Latest Ref Range: 0 - 17 mg/g Cr  11.85   Albumin Urine mg/L Latest Units: mg/L  9   Cholesterol Latest Ref Range: <200 mg/dL 156    Creatinine Urine Latest Units: mg/dL  79   HDL Cholesterol Latest Ref Range: >39 mg/dL 70    LDL  Cholesterol Calculated Latest Ref Range: <100 mg/dL 72    Non HDL Cholesterol Latest Ref Range: <130 mg/dL 86    Triglycerides Latest Ref Range: <150 mg/dL 69    Glucose Latest Ref Range: 70 - 99 mg/dL 102 (H)      Vishnu was seen today for physical and several health concerns.   He has spinal stenosis  Currently stable and wants conservative managment  He has left leg  Numbness and EMG reviewed indicating this may be from his back spinal stenosis.  He has Leg length discrepancy left shorter than right, less muscle mass on the right, degenerative knees all impacting his daily activity. He would like to continue conservative therapy through exercise 3 times weekly and PT.   He has ataxia and difficulty with walking at times. I added Cymbalta 20 mg as it may assist with his mood and neuropathy.  I completed DMV forms for handicapped parking for 6 year tag, he brought in forms and I am updating with today's date and authorizing 6 years due to his lower leg symptoms, osteoarthritis, neuropathy, spinal stenosis contributing to ataxia.  Memory slight short term memory challenges and difficulty remembering names in particular may be related to anxiety and attention, distraction with multitasking. We reviewed strategies for memory connecting with visual cues, encouraged word games, memory games when not working.  Diabetes and lipids under great control, labs reviewed after he left.  Vishnu was seen today for physical and pain.    Diagnoses and all orders for this visit:    Type 2 diabetes mellitus treated without insulin (H)  -     Albumin Random Urine Quantitative with Creat Ratio; Future  -     Comprehensive metabolic panel; Future  -     Hemoglobin A1c; Future  -     Lipid panel reflex to direct LDL Fasting; Future    Screening for diabetic retinopathy  -     OPHTHALMOLOGY ADULT REFERRAL    Need for prophylactic vaccination and inoculation against influenza  -     FLU VACCINE, INCREASED ANTIGEN, PRESV FREE, AGE 65+  [71148]    Vision impairment  -     OPHTHALMOLOGY ADULT REFERRAL    Diabetic polyneuropathy associated with diabetes mellitus due to underlying condition (H)  -     Albumin Random Urine Quantitative with Creat Ratio; Future  -     OPHTHALMOLOGY ADULT REFERRAL  -     PODIATRY/FOOT & ANKLE SURGERY REFERRAL  -     DULoxetine (CYMBALTA) 20 MG EC capsule; Take 1 capsule (20 mg) by mouth daily    Hip pain, left  -      XR Hip Left 2-3 Views; Future    Chronic pain of both knees  -     XR Knee Left 3 Views; Future  -      XR Knee Right 3 Views; Future    Spinal stenosis of lumbar region with neurogenic claudication    Adjustment disorder with mixed anxiety and depressed mood  -     Discontinue: DULoxetine (CYMBALTA) 20 MG EC capsule; Take 1 capsule (20 mg) by mouth 2 times daily  -     DULoxetine (CYMBALTA) 20 MG EC capsule; Take 1 capsule (20 mg) by mouth daily    Pain in joint involving ankle and foot, left  -     XR Ankle Left G/E 3 Views; Future    Follow-up early January to assess effectiveness of Cymbalta.  Over 60 minutes spent of which 45 counseling and coordination of cares, review of records.  All questions were addressed and voiced understanding and agreement with the above.    Murali Logan MD

## 2017-11-27 NOTE — NURSING NOTE
High dose flu shot given without problems, patient tolerated procedure well.Giuliana Lackey LPN 10:44 AM on 11/27/2017

## 2017-11-27 NOTE — MR AVS SNAPSHOT
After Visit Summary   11/27/2017    Vishnu Viveros    MRN: [id number]           Patient Information     Date Of Birth          1950        Visit Information        Provider Department      11/27/2017 8:30 AM Murali Logan MD [facility]        Today's Diagnoses     Type 2 diabetes mellitus treated without insulin (H)    -  1    Screening for diabetic retinopathy        Need for prophylactic vaccination and inoculation against influenza        Vision impairment        Diabetic polyneuropathy associated with diabetes mellitus due to underlying condition (H)        Hip pain, left        Chronic pain of both knees        Spinal stenosis of lumbar region with neurogenic claudication        Adjustment disorder with mixed anxiety and depressed mood        Pain in joint involving ankle and foot, left          Care Instructions    Primary Care Center: [phone]     Primary Care Center Medication Refill Request Information:  * Please contact your pharmacy regarding ANY request for medication refills.  ** [facility] Prescription Fax = [phone]  * Please allow 3 business days for routine medication refills.  * Please allow 5 business days for controlled substance medication refills.     Primary Care Center Test Result notification information:  *You will be notified with in 7-10 days of your appointment day regarding the results of your test.  If you are on MyChart you will be notified as soon as the provider has reviewed the results and signed off on them.          Follow-ups after your visit        Additional Services     OPHTHALMOLOGY ADULT REFERRAL       Your provider has referred you to: [facility]: [facility] [phone]   [email or web address]    Please be aware that coverage of these services is subject to the terms and limitations of your health insurance plan.  Call member services at your health plan with any benefit or coverage  questions.      Please bring the following with you to your appointment:    (1) Any X-Rays, CTs or MRIs which have been performed.  Contact the facility where they were done to arrange for  prior to your scheduled appointment.    (2) List of current medications  (3) This referral request   (4) Any documents/labs given to you for this referral            PODIATRY/FOOT & ANKLE SURGERY REFERRAL       Your provider has referred you to: [facility]: [facility]: [address] [place] [address] Patient Scheduling Line: [phone] option 1 (scheduling and directions)    Please be aware that coverage of these services is subject to the terms and limitations of your health insurance plan.  Call member services at your health plan with any benefit or coverage questions.      Please bring the following to your appointment:  >>   Any x-rays, CTs or MRIs which have been performed.  Contact the facility where they were done to arrange for  prior to your scheduled appointment.    >>   List of current medications   >>   This referral request   >>   Any documents/labs given to you for this referral                  Follow-up notes from your care team     Write patient with results Return in about 5 weeks (around 1/1/2018).      Your next 10 appointments already scheduled     Nov 27, 2017 10:30 AM CST   LAB with  LAB   [facility] ([facility])    [address] [place] [address] [phone]           Please do not eat 10-12 hours before your appointment if you are coming in fasting for labs on lipids, cholesterol, or glucose (sugar). This does not apply to pregnant women. Water, hot tea and black coffee (with nothing added) are okay. Do not drink other fluids, diet soda or chew gum.            Nov 27, 2017 10:45 AM CST   (Arrive by 10:30 AM)   XR HIP LEFT G/E 2 VIEWS with UCXR1   [facility] ([facility])    [address] [address] [phone]           Please bring a list of your current medicines to your exam. (Include vitamins, minerals and over-thecounter medicines.) Leave your valuables at home.  Tell your doctor if there is a chance you may be pregnant.  You do not need to do anything special for this exam.            Nov 27, 2017 10:50 AM CST   XR KNEE LEFT 3 VIEWS with UCXR1   [facility] ([facility])    [address] [place] [address]   [phone]           Please bring a list of your current medicines to your exam. (Include vitamins, minerals and over-thecounter medicines.) Leave your valuables at home.  Tell your doctor if there is a chance you may be pregnant.  You do not need to do anything special for this exam.            Nov 27, 2017 10:55 AM CST   (Arrive by 10:40 AM)   XR KNEE RIGHT 3 VIEWS with UCXR1   [facility] ([facility])    [address] [place] [address]   [phone]           Please bring a list of your current medicines to your exam. (Include vitamins, minerals and over-thecounter medicines.) Leave your valuables at home.  Tell your doctor if there is a chance you may be pregnant.  You do not need to do anything special for this exam.            Jan 04, 2018  8:30 AM CST   (Arrive by 8:15 AM)   Return Visit with Murali Logan MD   [facility] ([facility])    [address] [place] [address]   [phone]              Future tests that were ordered for you today     Open Future Orders        Priority Expected Expires Ordered    XR Knee Left 3 Views Routine 11/27/2017 11/27/2018 11/27/2017    XR Knee Right 3 Views Routine 11/27/2017 11/27/2018 11/27/2017    XR Hip Left 2-3 Views Routine 11/27/2017 11/27/2018 11/27/2017    Lipid panel reflex to  direct LDL Fasting Routine 2017    Comprehensive metabolic panel Routine 2017    Hemoglobin A1c Routine 2017    Albumin Random Urine Quantitative with Creat Ratio Routine 2017            Who to contact     Please call your clinic at [phone] to:    Ask questions about your health    Make or cancel appointments    Discuss your medicines    Learn about your test results    Speak to your doctor   If you have compliments or concerns about an experience at your clinic, or if you wish to file a complaint, please contact [facility] Patient Relations at [phone] or email us at [email or web address]         Additional Information About Your Visit        JoustharJibe Information     [employer] is an electronic gateway that provides easy, online access to your medical records. With [employer] you can request a clinic appointment, read your test results, renew a prescription or communicate with your care team.     To sign up for [employer] visit the website at [email or web address]   You will be asked to enter the access code listed below, as well as some personal information. Please follow the directions to create your username and password.     Your access code is: [email or web address]  Expires: 2017  7:23 AM     Your access code will  in 90 days. If you need help or a new code, please contact your [facility] or call [phone] for assistance.        Care EveryWhere ID     This is your Care EveryWhere ID. This could be used by other organizations to access your [place] medical records  [id number]        Your Vitals Were     Pulse Respirations Pulse Oximetry BMI (Body Mass Index)          71 20 97% 32.61 kg/m2         Blood Pressure from Last 3 Encounters:   17 133/76   17 124/80   17 (!) 144/91    Weight from Last 3  Encounters:   11/27/17 86.2 kg (190 lb)   09/18/17 86.2 kg (190 lb)   08/08/17 86.2 kg (190 lb)              We Performed the Following     FLU VACCINE, INCREASED ANTIGEN, PRESV FREE, AGE 65+ [65013]     OPHTHALMOLOGY ADULT REFERRAL     PODIATRY/FOOT & ANKLE SURGERY REFERRAL          Today's Medication Changes          These changes are accurate as of: 11/27/17 10:25 AM.  If you have any questions, ask your nurse or doctor.               Start taking these medicines.        Dose/Directions    DULoxetine 20 MG EC capsule   Commonly known as:  CYMBALTA   Used for:  Diabetic polyneuropathy associated with diabetes mellitus due to underlying condition (H), Adjustment disorder with mixed anxiety and depressed mood   Started by:  Murali Logan MD        Dose:  20 mg   Take 1 capsule (20 mg) by mouth daily   Quantity:  30 capsule   Refills:  1         These medicines have changed or have updated prescriptions.        Dose/Directions    gabapentin 300 MG capsule   Commonly known as:  NEURONTIN   This may have changed:    - when to take this  - additional instructions   Used for:  Lumbar radiculitis, Diabetic polyneuropathy associated with diabetes mellitus due to underlying condition (H)        Dose:  300 mg   Take 1 capsule (300 mg) by mouth 3 times daily One capsule twice daily   Quantity:  270 capsule   Refills:  0         Stop taking these medicines if you haven't already. Please contact your care team if you have questions.     sildenafil 20 MG tablet   Commonly known as:  REVATIO   Stopped by:  Murali Logan MD           vardenafil 10 MG tablet   Commonly known as:  LEVITRA   Stopped by:  Murali Logan MD                Where to get your medicines      These medications were sent to [id number], [address], [facility] [address]     Phone:  [phone]     DULoxetine 20 MG EC capsule                Primary  Care Provider Office Phone # Fax #    Murali Logan [phone] [address] [facility] [address]        Equal Access to Services     NETTIEHUGO JOELLEN : Sil dolores lizama brady Finn, wapauda luqadaha, qaybta kaalmada cecil, carl martinezmodesta shayy. So [facility] [phone].    ATENCIÓN: Si habla español, tiene a little disposición servicios gratuitos de asistencia lingüística. Llame al [phone].    We comply with applicable federal civil rights laws and [place] laws. We do not discriminate on the basis of race, color, national origin, age, disability, sex, sexual orientation, or gender identity.            Thank you!     Thank you for choosing [facility]  for your care. Our goal is always to provide you with excellent care. Hearing back from our patients is one way we can continue to improve our services. Please take a few minutes to complete the written survey that you may receive in the mail after your visit with us. Thank you!             Your Updated Medication List - Protect others around you: Learn how to safely use, store and throw away your medicines at www.disposemymeds.org.          This list is accurate as of: 11/27/17 10:25 AM.  Always use your most recent med list.                   Brand Name Dispense Instructions for use Diagnosis    ALEVE PO      Take 1 tablet by mouth as needed for moderate pain        aspirin 81 MG tablet      Take 1 tablet by mouth daily.        atorvastatin 20 MG tablet    LIPITOR    90 tablet    Take 1 tablet (20 mg) by mouth daily    Type 2 diabetes mellitus treated without insulin (H)       cyclobenzaprine 10 MG tablet    FLEXERIL    42 tablet    Take 0.5 tablets (5 mg) by mouth 3 times daily as needed for muscle spasms    Lumbar radiculopathy       DULoxetine 20 MG EC capsule    CYMBALTA    30 capsule    Take 1 capsule (20 mg) by mouth daily    Diabetic polyneuropathy associated with diabetes mellitus due to  underlying condition (H), Adjustment disorder with mixed anxiety and depressed mood       FISH OIL PO      Take 1 capsule by mouth as needed        gabapentin 300 MG capsule    NEURONTIN    270 capsule    Take 1 capsule (300 mg) by mouth 3 times daily One capsule twice daily    Lumbar radiculitis, Diabetic polyneuropathy associated with diabetes mellitus due to underlying condition (H)       lisinopril 5 MG tablet    PRINIVIL/ZESTRIL    90 tablet    Take 1 tablet (5 mg) by mouth daily    Type 2 diabetes mellitus treated without insulin (H), Benign essential hypertension       loratadine 10 MG tablet    CLARITIN    90 tablet    Take 1 tablet (10 mg) by mouth daily as needed LAST MD APPT. 11/28/16    Seasonal allergies       metFORMIN 500 MG 24 hr tablet    GLUCOPHAGE-XR    90 tablet    Take 1 tablet (500 mg) by mouth daily (with dinner)    Type 2 diabetes mellitus treated without insulin (H)       MULTIVITAMINS PO      Take 1 tablet by mouth daily.        order for DME     1 Device    Back brace    Left leg weakness, Lumbar stenosis       tamsulosin 0.4 MG capsule    FLOMAX    90 capsule    Take 1 capsule (0.4 mg) by mouth daily    Benign non-nodular prostatic hyperplasia, presence of lower urinary tract symptoms unspecified       VITAMIN B12 PO      Take 1 tablet by mouth. Pt states he takes this 2 or 3 times a week..        VITAMIN C PO      Take 500 mg by mouth once        vitamin D 1000 UNITS capsule      Take 1 capsule by mouth daily.

## 2017-11-27 NOTE — NURSING NOTE
"Injectable Influenza Immunization Documentation    1.  Has the patient received the information for the injectable influenza vaccine? YES     2. Is the patient 6 months of age or older? YES     3. Does the patient have any of the following contraindications?         Severe allergy to eggs?  No     Severe allergic reaction to previous influenza vaccines?  No   Severe allergy to latex?  No       History of Guillain-Francisco syndrome?  No     Currently have a temperature greater than 100.4F?  No        4.  Severely egg allergic patients should have flu vaccine eligibility assessed by an MD, RN, or pharmacist, and those who received flu vaccine should be observed for 15 min by an MD, RN, Pharmacist, Medical Technician, or member of clinic staff.\": NO    5. Latex-allergic patients should be given latex-free influenza vaccine No. Please reference the Vaccine latex table to determine if your clinic s product is latex-containing.       Vaccination given by Giuliana Lackey LPN 9:53 AM on 11/27/2017       "

## 2017-11-28 PROBLEM — M25.552 HIP PAIN, LEFT: Status: ACTIVE | Noted: 2017-11-28

## 2017-11-28 PROBLEM — D12.6 TUBULOVILLOUS ADENOMA OF COLON: Status: ACTIVE | Noted: 2017-02-10

## 2017-11-28 PROBLEM — D12.6 TUBULOVILLOUS ADENOMA OF COLON: Status: ACTIVE | Noted: 2017-11-28

## 2017-11-28 PROBLEM — R19.5 POSITIVE FIT (FECAL IMMUNOCHEMICAL TEST): Status: ACTIVE | Noted: 2017-02-10

## 2017-11-28 ASSESSMENT — ANXIETY QUESTIONNAIRES: GAD7 TOTAL SCORE: 0

## 2017-12-08 DIAGNOSIS — I10 BENIGN ESSENTIAL HYPERTENSION: ICD-10-CM

## 2017-12-08 DIAGNOSIS — E11.9 TYPE 2 DIABETES MELLITUS TREATED WITHOUT INSULIN (H): ICD-10-CM

## 2017-12-11 RX ORDER — ATORVASTATIN CALCIUM 20 MG/1
20 TABLET, FILM COATED ORAL DAILY
Qty: 90 TABLET | Refills: 3 | Status: SHIPPED | OUTPATIENT
Start: 2017-12-11 | End: 2018-10-31

## 2017-12-11 RX ORDER — LISINOPRIL 5 MG/1
5 TABLET ORAL DAILY
Qty: 90 TABLET | Refills: 3 | Status: SHIPPED | OUTPATIENT
Start: 2017-12-11 | End: 2018-10-31 | Stop reason: DRUGHIGH

## 2017-12-11 RX ORDER — METFORMIN HCL 500 MG
500 TABLET, EXTENDED RELEASE 24 HR ORAL
Qty: 90 TABLET | Refills: 3 | Status: SHIPPED | OUTPATIENT
Start: 2017-12-11 | End: 2018-10-31

## 2017-12-11 NOTE — TELEPHONE ENCOUNTER
Last appointment in [facility] was 11/27/17.     Lab Results   Component Value Date    CHOL 156 11/27/2017     Lab Results   Component Value Date    HDL 70 11/27/2017     Lab Results   Component Value Date    LDL 72 11/27/2017     Lab Results   Component Value Date    TRIG 69 11/27/2017     Lab Results   Component Value Date    CHOLHDLRATIO 3.0 10/30/2014     Lab Results   Component Value Date    CR 0.76 11/27/2017     Urine albumin done 11/27/17 and was WNL.    Lab Results   Component Value Date    A1C 6.2 11/27/2017    A1C 6.3 07/31/2017    A1C 6.6 11/28/2016    A1C 6.2 11/19/2015    A1C 6.1 10/30/2014     Potassium   Date Value Ref Range Status   11/27/2017 4.3 3.4 - 5.3 mmol/L Final     BP Readings from Last 3 Encounters:   11/27/17 133/76   09/18/17 124/80   08/08/17 (!) 144/91

## 2017-12-17 DIAGNOSIS — E08.42 DIABETIC POLYNEUROPATHY ASSOCIATED WITH DIABETES MELLITUS DUE TO UNDERLYING CONDITION (H): ICD-10-CM

## 2017-12-17 DIAGNOSIS — M54.16 LUMBAR RADICULITIS: ICD-10-CM

## 2017-12-22 ENCOUNTER — RADIANT APPOINTMENT (OUTPATIENT)
Dept: GENERAL RADIOLOGY | Facility: CLINIC | Age: 67
End: 2017-12-22
Attending: FAMILY MEDICINE
Payer: COMMERCIAL

## 2017-12-22 ENCOUNTER — PRE VISIT (OUTPATIENT)
Dept: ORTHOPEDICS | Facility: CLINIC | Age: 67
End: 2017-12-22

## 2017-12-22 ENCOUNTER — OFFICE VISIT (OUTPATIENT)
Dept: ORTHOPEDICS | Facility: CLINIC | Age: 67
End: 2017-12-22
Payer: COMMERCIAL

## 2017-12-22 VITALS — HEIGHT: 64 IN | BODY MASS INDEX: 32.32 KG/M2 | WEIGHT: 189.3 LBS

## 2017-12-22 DIAGNOSIS — M54.42 CHRONIC BILATERAL LOW BACK PAIN WITH BILATERAL SCIATICA: Primary | ICD-10-CM

## 2017-12-22 DIAGNOSIS — E11.9 TYPE 2 DIABETES MELLITUS TREATED WITHOUT INSULIN (H): ICD-10-CM

## 2017-12-22 DIAGNOSIS — M21.41 ACQUIRED PES PLANUS OF BOTH FEET: ICD-10-CM

## 2017-12-22 DIAGNOSIS — M54.41 CHRONIC BILATERAL LOW BACK PAIN WITH BILATERAL SCIATICA: Primary | ICD-10-CM

## 2017-12-22 DIAGNOSIS — M54.16 LUMBAR RADICULITIS: ICD-10-CM

## 2017-12-22 DIAGNOSIS — M25.572 PAIN IN JOINT INVOLVING ANKLE AND FOOT, LEFT: ICD-10-CM

## 2017-12-22 DIAGNOSIS — M21.42 ACQUIRED PES PLANUS OF BOTH FEET: ICD-10-CM

## 2017-12-22 DIAGNOSIS — E08.42 DIABETIC POLYNEUROPATHY ASSOCIATED WITH DIABETES MELLITUS DUE TO UNDERLYING CONDITION (H): ICD-10-CM

## 2017-12-22 DIAGNOSIS — M89.9 OSTEOCHONDRAL LESION OF TALAR DOME: ICD-10-CM

## 2017-12-22 DIAGNOSIS — M94.9 OSTEOCHONDRAL LESION OF TALAR DOME: ICD-10-CM

## 2017-12-22 DIAGNOSIS — G89.29 CHRONIC BILATERAL LOW BACK PAIN WITH BILATERAL SCIATICA: Primary | ICD-10-CM

## 2017-12-22 RX ORDER — GABAPENTIN 300 MG/1
CAPSULE ORAL
Qty: 270 CAPSULE | Refills: 3 | OUTPATIENT
Start: 2017-12-22

## 2017-12-22 RX ORDER — GABAPENTIN 300 MG/1
300 CAPSULE ORAL 3 TIMES DAILY
Qty: 270 CAPSULE | Refills: 0 | Status: SHIPPED | OUTPATIENT
Start: 2017-12-22 | End: 2018-01-04

## 2017-12-22 NOTE — MR AVS SNAPSHOT
After Visit Summary   12/22/2017    Vishnu Viveros    MRN: [id number]           Patient Information     Date Of Birth          1950        Visit Information        Provider Department      12/22/2017 2:40 PM Shaan Baltazar DPM [facility]        Today's Diagnoses     Chronic bilateral low back pain with bilateral sciatica    -  1    Diabetic polyneuropathy associated with diabetes mellitus due to underlying condition (H)        Type 2 diabetes mellitus treated without insulin (H)        Acquired pes planus of both feet        Osteochondral lesion of talar dome           Follow-ups after your visit        Additional Services     ORTHOTICS REFERRAL (Foot and Ankle)       Please be aware that coverage of these services is subject to the terms and limitations of your health insurance plan.  Call member services at your health plan with any benefit or coverage questions.      Please bring the following to your appointment:    >>   Any x-rays, CTs or MRIs which have been performed.  Contact the facility where they were done to arrange for  prior to your scheduled appointment.  Any new CT, MRI or other procedures ordered by your specialist must be performed at a [place] facility or coordinated by your clinic's referral office.    >>   List of current medications   >>   This referral request   >>   Any documents/labs given to you for this referral    ==This Referral PRINTS in the [place] [facility] (ORTHOTICS & PROSTHETICS) Central scheduling office ==     The [place] [facility] Central Scheduling staff will contact patient to arrange appointments. Central Scheduling Phone #:  [place]  [phone]     Diabetic shoes with multidensity inserts x 3    FOOT AND ANKLE ORTHOTIC PRESCRIPTION:  Full Length Foot Orthotic:  Foam: Bilateral                  Your next 10 appointments already scheduled     Jan 04, 2018  8:30 AM CST   (Arrive by 8:15 AM)   Return Visit  "with Murali Logan MD   [facility] ([facility])    [address] [facility]  [address] [facility] [address] [phone]              Who to contact     Please call your clinic at [phone] to:    Ask questions about your health    Make or cancel appointments    Discuss your medicines    Learn about your test results    Speak to your doctor   If you have compliments or concerns about an experience at your clinic, or if you wish to file a complaint, please contact [facility] Patient Relations at [phone] or email us at [email or web address]         Additional Information About Your Visit        [employer] Information     [employer] is an electronic gateway that provides easy, online access to your medical records. With [employer], you can request a clinic appointment, read your test results, renew a prescription or communicate with your care team.     To sign up for [employer] visit the website at [email or web address]   You will be asked to enter the access code listed below, as well as some personal information. Please follow the directions to create your username and password.     Your access code is: [email or web address]  Expires: 3/21/2018  6:30 AM     Your access code will  in 90 days. If you need help or a new code, please contact your [facility] or call [phone] for assistance.        Care EveryWhere ID     This is your Care EveryWhere ID. This could be used by other organizations to access your [place] medical records  [id number]        Your Vitals Were     Height BMI (Body Mass Index)                1.626 m (5' 4\") 32.49 kg/m2           Blood Pressure from Last 3 Encounters:   17 133/76   17 124/80   17 (!) 144/91    Weight from Last 3 Encounters:   17 85.9 kg (189 lb 4.8 oz)   17 86.2 kg (190 lb)   17 86.2 kg (190 lb)              We Performed " the Following     ORTHOTICS REFERRAL (Foot and Ankle)          Today's Medication Changes          These changes are accurate as of: 12/22/17  3:43 PM.  If you have any questions, ask your nurse or doctor.               These medicines have changed or have updated prescriptions.        Dose/Directions    gabapentin 300 MG capsule   Commonly known as:  NEURONTIN   This may have changed:  additional instructions   Used for:  Lumbar radiculitis, Diabetic polyneuropathy associated with diabetes mellitus due to underlying condition (H)   Changed by:  Tiffany Dahl MD        Dose:  300 mg   Take 1 capsule (300 mg) by mouth 3 times daily One capsule in afternoon, two capsules at bedtime.   Quantity:  270 capsule   Refills:  0            Where to get your medicines      These medications were sent to [facility] "[facility] [address] - [facility], [place] [address] AT [address], [facility] [address]     Phone:  [phone]     gabapentin 300 MG capsule                Primary Care Provider Office Phone # Fax #    Murali Logan [phone]       [address] [facility] [address]        Equal Access to Services     [facility]: Hadii dolores lizama hadasho Soomaali, waaxda luqadaha, qaybta kaalmada adeegyabobby, carl amaya . So [facility] [phone].    ATENCIÓN: Si habla español, tiene a little disposición servicios gratmoisésos de asistencia lingüística. LlFort [facility] [phone].    We comply with applicable federal civil rights laws and [place] laws. We do not discriminate on the basis of race, color, national origin, age, disability, sex, sexual orientation, or gender identity.            Thank you!     Thank you for choosing [facility]  for your care. Our goal is always to provide you with excellent care. Hearing back from our patients is one way we can continue to improve our services. Please take a few minutes to complete the  written survey that you may receive in the mail after your visit with us. Thank you!             Your Updated Medication List - Protect others around you: Learn how to safely use, store and throw away your medicines at www.disposemymeds.org.          This list is accurate as of: 12/22/17  3:43 PM.  Always use your most recent med list.                   Brand Name Dispense Instructions for use Diagnosis    ALEVE PO      Take 1 tablet by mouth as needed for moderate pain        aspirin 81 MG tablet      Take 1 tablet by mouth daily.        atorvastatin 20 MG tablet    LIPITOR    90 tablet    Take 1 tablet (20 mg) by mouth daily    Type 2 diabetes mellitus treated without insulin (H)       cyclobenzaprine 10 MG tablet    FLEXERIL    42 tablet    Take 0.5 tablets (5 mg) by mouth 3 times daily as needed for muscle spasms    Lumbar radiculopathy       DULoxetine 20 MG EC capsule    CYMBALTA    30 capsule    Take 1 capsule (20 mg) by mouth daily    Diabetic polyneuropathy associated with diabetes mellitus due to underlying condition (H), Adjustment disorder with mixed anxiety and depressed mood       FISH OIL PO      Take 1 capsule by mouth as needed        gabapentin 300 MG capsule    NEURONTIN    270 capsule    Take 1 capsule (300 mg) by mouth 3 times daily One capsule in afternoon, two capsules at bedtime.    Lumbar radiculitis, Diabetic polyneuropathy associated with diabetes mellitus due to underlying condition (H)       lisinopril 5 MG tablet    PRINIVIL/ZESTRIL    90 tablet    Take 1 tablet (5 mg) by mouth daily    Type 2 diabetes mellitus treated without insulin (H), Benign essential hypertension       loratadine 10 MG tablet    CLARITIN    90 tablet    Take 1 tablet (10 mg) by mouth daily as needed LAST MD APPT. 11/28/16    Seasonal allergies       metFORMIN 500 MG 24 hr tablet    GLUCOPHAGE-XR    90 tablet    Take 1 tablet (500 mg) by mouth daily (with dinner)    Type 2 diabetes mellitus treated without insulin  (H)       MULTIVITAMINS PO      Take 1 tablet by mouth daily.        order for DME     1 Device    Back brace    Left leg weakness, Lumbar stenosis       tamsulosin 0.4 MG capsule    FLOMAX    90 capsule    Take 1 capsule (0.4 mg) by mouth daily    Benign non-nodular prostatic hyperplasia, presence of lower urinary tract symptoms unspecified       VITAMIN B12 PO      Take 1 tablet by mouth. Pt states he takes this 2 or 3 times a week..        VITAMIN C PO      Take 500 mg by mouth once        vitamin D 1000 UNITS capsule      Take 1 capsule by mouth daily.

## 2017-12-22 NOTE — NURSING NOTE
"Chief Complaint   Patient presents with     Eval/Assessment     Left Leg Weakness, Numbness and Pain       Pain Assessment  Patient Currently in Pain: Yes  0-10 Pain Scale: 5  Primary Pain Location: Leg  Pain Orientation: Left  Pain Descriptors: Numbness               HEIGHT: 5' 4\", WEIGHT: 189 lbs 4.8 oz, BMI: Body mass index is 32.49 kg/(m^2).      Allergies   Allergen Reactions     Seasonal Allergies      Sinus congestion, sinus \"drainage\", sneezing     Nkda [No Known Drug Allergies]        Current Outpatient Prescriptions   Medication Sig Dispense Refill     atorvastatin (LIPITOR) 20 MG tablet Take 1 tablet (20 mg) by mouth daily 90 tablet 3     lisinopril (PRINIVIL/ZESTRIL) 5 MG tablet Take 1 tablet (5 mg) by mouth daily 90 tablet 3     metFORMIN (GLUCOPHAGE-XR) 500 MG 24 hr tablet Take 1 tablet (500 mg) by mouth daily (with dinner) 90 tablet 3     cyclobenzaprine (FLEXERIL) 10 MG tablet Take 0.5 tablets (5 mg) by mouth 3 times daily as needed for muscle spasms 42 tablet 1     loratadine (CLARITIN) 10 MG tablet Take 1 tablet (10 mg) by mouth daily as needed LAST MD APPT. 11/28/16 90 tablet 0     tamsulosin (FLOMAX) 0.4 MG capsule Take 1 capsule (0.4 mg) by mouth daily 90 capsule 3     Ascorbic Acid (VITAMIN C PO) Take 500 mg by mouth once       aspirin 81 MG tablet Take 1 tablet by mouth daily.       Cyanocobalamin (VITAMIN B12 PO) Take 1 tablet by mouth. Pt states he takes this 2 or 3 times a week..        Multiple Vitamin (MULTIVITAMINS PO) Take 1 tablet by mouth daily.       Cholecalciferol (VITAMIN D) 1000 UNIT capsule Take 1 capsule by mouth daily.       Omega-3 Fatty Acids (FISH OIL PO) Take 1 capsule by mouth as needed       gabapentin (NEURONTIN) 300 MG capsule Take 1 capsule (300 mg) by mouth 3 times daily One capsule in afternoon, two capsules at bedtime. (Patient not taking: Reported on 12/22/2017) 270 capsule 0     DULoxetine (CYMBALTA) 20 MG EC capsule Take 1 capsule (20 mg) by mouth daily (Patient " not taking: Reported on 12/22/2017) 30 capsule 1     [DISCONTINUED] gabapentin (NEURONTIN) 300 MG capsule Take 1 capsule (300 mg) by mouth 3 times daily One capsule twice daily (Patient taking differently: Take 300 mg by mouth At Bedtime One capsule daily.Giuliana Lackey LPN 8:52 AM on 11/27/2017) 270 capsule 0     order for DME Back brace (Patient not taking: Reported on 12/22/2017) 1 Device 0     Naproxen Sodium (ALEVE PO) Take 1 tablet by mouth as needed for moderate pain         Arielle Rodrigues  12/22/2017

## 2017-12-22 NOTE — TELEPHONE ENCOUNTER
Voice message left for pt.  Calling to verify dosage of gabapentin.  Pt notified that refill for gabapentin sent to pharmacy reflective of 300mg in afternoon, 600mg at bedtime.  Pt advised to call with questions if needed.     Anitha Singh RN, BSN

## 2017-12-22 NOTE — LETTER
12/22/2017       RE: Vishnu Viveros  [address]  [place] [address]     Dear Colleague,    Thank you for referring your patient, Vishnu Viveros, to the [facility] at [facility]. Please see a copy of my visit note below.    Date of Service: 12/22/2017    Chief Complaint:   Chief Complaint   Patient presents with     Eval/Assessment     Left Leg and Foot Weakness, Numbness and Pain        HPI: Vishnu is a 67 year old male who presents today for further evaluation of bilateral neuropathy in his legs and feet. He relates to me that he has been taking Cymbalta for about a year now with no relief to his neuropathy. He also has very significant lumbar stenosis. He is currently seeing Dr. Dahl in pain management for this. He has recommended that Vishnu undergo injections into his back to Relieve this.  He relates to me today that the pain starts in his lower back and extends down into his toes. He relates that his feet go numb globally. He also has cramping in his legs BL.  He relates that he also can sometimes have pain that shoots from his feet and goes all the way up to his lower back. He relates that this happens often. He relates that he tries to control his blood sugar, and his last A1c's in the low 6s. He relates that he sometimes does get pain in the medial ankle, however this is not enough to stop her from performing his activities of daily living. He relates that this is very intermittent.    Review of Systems: No n/v/d/f/c/ns/sob/cp    PMH:   Past Medical History:   Diagnosis Date     Pulmonary nodule        PSxH:   Past Surgical History:   Procedure Laterality Date     APPENDECTOMY OPEN CHILD       BIOPSY PROSTATE TRANSRECTAL       EXCISE PTERYGIUM Right 1989     EXCISE PTERYGIUM Right 2/12/2015     INJECT EPIDURAL LUMBAR / SACRAL SINGLE N/A 7/21/2016    Procedure: INJECT EPIDURAL LUMBAR / SACRAL SINGLE;  Surgeon: Judah Henriquez MD;   "Location: [facility] OR     INJECT PARAVERTEBRAL FACET JOINT LUMBAR / SACRAL FIRST Right 2016    Procedure: INJECT PARAVERTEBRAL FACET JOINT LUMBAR / SACRAL FIRST;  Surgeon: Judah Henriquez MD;  Location: [facility] OR     INJECT PARAVERTEBRAL FACET JOINT LUMBAR / SACRAL FIRST Right 2016    Procedure: INJECT PARAVERTEBRAL FACET JOINT LUMBAR / SACRAL FIRST;  Surgeon: Judah Henriquez MD;  Location: [facility] OR     left knee surgery         Allergies: Seasonal allergies and Nkda [no known drug allergies]    SH:   Social History     Social History     Marital status:      Spouse name: N/A     Number of children: N/A     Years of education: N/A     Occupational History     Not on file.     Social History Main Topics     Smoking status: Former Smoker     Types: Cigarettes     Quit date: 1981     Smokeless tobacco: Never Used     Alcohol use 1.5 oz/week     3 drink(s) per week      Comment: wine socially once per week or shots of wiskey on the weekend     Drug use: No     Sexual activity: Yes     Partners: Female     Other Topics Concern     Not on file     Social History Narrative     works as [employer].        His immediate family; his wife has MS and is an RN at [employer], his two children are in good health.               FH:   Family History   Problem Relation Age of Onset     CEREBROVASCULAR DISEASE Brother           Neurologic Disorder Brother      Brain hemorrhage     Breast Cancer Sister           Glaucoma No family hx of      Macular Degeneration No family hx of        Objective:   5' 4\" 189 lbs 4.8 oz    PT and DP pulses are 2/4 bilaterally. CRT is 3 seconds. Positive pedal hair.   Gross sensation is severely diminished bilaterally. Tinel's sign positive with percussion of the left tibial nerve, that radiates to the knee. +SLE BL. Pain feels like electric and burning in both feet and legs, all the way to his back.   Equinus is noted bilaterally. Pes planus BL.    Nails normal " bilaterally. No open lesions are noted.     left ankle xrays indicated in 3 weightbearing views.    No fractures. normal alignment. no soft tissue abnormality. Possible osteochondral defect in the medial talar dome.       Assessment: Severe lumbar stenosis likely causing neurologic claudication and neuro symptoms in BL legs.  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 Not inhibiting.   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     Plan:  - Pt seen and evaluated  - I think his symptoms are originating from the lower back. He is seeing Dr. Dahl, who recommended injections. I spoke to him about this and he would like to speak to Dr. Dahl more about this.   - Rx for diabetic shoes.   - No need for treatment now for the OCD.   - See again PRN.            Again, thank you for allowing me to participate in the care of your patient.      Sincerely,    Shaan Baltazar DPM

## 2017-12-22 NOTE — PROGRESS NOTES
Date of Service: 12/22/2017    Chief Complaint:   Chief Complaint   Patient presents with     Eval/Assessment     Left Leg and Foot Weakness, Numbness and Pain        HPI: Vishnu is a 67 year old male who presents today for further evaluation of bilateral neuropathy in his legs and feet. He relates to me that he has been taking Cymbalta for about a year now with no relief to his neuropathy. He also has very significant lumbar stenosis. He is currently seeing Dr. Dahl in pain management for this. He has recommended that Vishnu undergo injections into his back to Relieve this.  He relates to me today that the pain starts in his lower back and extends down into his toes. He relates that his feet go numb globally. He also has cramping in his legs BL.  He relates that he also can sometimes have pain that shoots from his feet and goes all the way up to his lower back. He relates that this happens often. He relates that he tries to control his blood sugar, and his last A1c's in the low 6s. He relates that he sometimes does get pain in the medial ankle, however this is not enough to stop her from performing his activities of daily living. He relates that this is very intermittent.    Review of Systems: No n/v/d/f/c/ns/sob/cp    PMH:   Past Medical History:   Diagnosis Date     Pulmonary nodule        PSxH:   Past Surgical History:   Procedure Laterality Date     APPENDECTOMY OPEN CHILD       BIOPSY PROSTATE TRANSRECTAL       EXCISE PTERYGIUM Right 1989     EXCISE PTERYGIUM Right 2/12/2015     INJECT EPIDURAL LUMBAR / SACRAL SINGLE N/A 7/21/2016    Procedure: INJECT EPIDURAL LUMBAR / SACRAL SINGLE;  Surgeon: Judah Henriquez MD;  [facility]     INJECT PARAVERTEBRAL FACET JOINT LUMBAR / SACRAL FIRST Right 8/18/2016    Procedure: INJECT PARAVERTEBRAL FACET JOINT LUMBAR / SACRAL FIRST;  Surgeon: Judah Henriquez MD;  [facility]: [facility]     INJECT PARAVERTEBRAL FACET JOINT LUMBAR / SACRAL FIRST Right 9/8/2016     "Procedure: INJECT PARAVERTEBRAL FACET JOINT LUMBAR / SACRAL FIRST;  Surgeon: Judah Henriquez MD;  Location: [facility] OR     left knee surgery         Allergies: Seasonal allergies and Nkda [no known drug allergies]    SH:   Social History     Social History     Marital status:      Spouse name: N/A     Number of children: N/A     Years of education: N/A     Occupational History     Not on file.     Social History Main Topics     Smoking status: Former Smoker     Types: Cigarettes     Quit date: 1981     Smokeless tobacco: Never Used     Alcohol use 1.5 oz/week     3 drink(s) per week      Comment: wine socially once per week or shots of wiskey on the weekend     Drug use: No     Sexual activity: Yes     Partners: Female     Other Topics Concern     Not on file     Social History Narrative     works as [employer].        His immediate family; his wife has MS and is an RN at [employer], his two children are in good health.               FH:   Family History   Problem Relation Age of Onset     CEREBROVASCULAR DISEASE Brother           Neurologic Disorder Brother      Brain hemorrhage     Breast Cancer Sister           Glaucoma No family hx of      Macular Degeneration No family hx of        Objective:   5' 4\" 189 lbs 4.8 oz    PT and DP pulses are 2/4 bilaterally. CRT is 3 seconds. Positive pedal hair.   Gross sensation is severely diminished bilaterally. Tinel's sign positive with percussion of the left tibial nerve, that radiates to the knee. +SLE BL. Pain feels like electric and burning in both feet and legs, all the way to his back.   Equinus is noted bilaterally. Pes planus BL.    Nails normal bilaterally. No open lesions are noted.     left ankle xrays indicated in 3 weightbearing views.    No fractures. normal alignment. no soft tissue abnormality. Possible osteochondral defect in the medial talar dome.       Assessment: Severe lumbar stenosis likely causing neurologic " claudication and neuro symptoms in BL legs.  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 Not inhibiting.   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     Plan:  - Pt seen and evaluated  - I think his symptoms are originating from the lower back. He is seeing Dr. Dahl, who recommended injections. I spoke to him about this and he would like to speak to Dr. Dahl more about this.   - Rx for diabetic shoes.   - No need for treatment now for the OCD.   - See again PRN.

## 2017-12-27 ENCOUNTER — TELEPHONE (OUTPATIENT)
Dept: FAMILY MEDICINE | Facility: CLINIC | Age: 67
End: 2017-12-27

## 2017-12-27 DIAGNOSIS — M94.9 OSTEOCHONDRAL LESION OF TALAR DOME: Primary | ICD-10-CM

## 2017-12-27 DIAGNOSIS — M89.9 OSTEOCHONDRAL LESION OF TALAR DOME: Primary | ICD-10-CM

## 2017-12-27 NOTE — TELEPHONE ENCOUNTER
EXAM: XR ANKLE LT G/E 3 VW  12/22/2017 3:10 PM       HISTORY: left ankle pain; Pain in joint involving ankle and foot, left     COMPARISON: None     FINDINGS: Standing AP and oblique views of the left ankle.     No acute osseous abnormality. No substantial soft tissue swelling.     Lucency in the medial talar dome compatible with osteochondral lesion.  Ankle mortise maintained.          IMPRESSION:   1. No acute osseous abnormality.  2. Lucency in the medial talar dome compatible with osteochondral  Lesion.    Please call him to discuss pain may be from tear of the cartilage of the talus, referral to orthopedics placed for further evaluation and recommendations. No fracture of the bones.  Best wishes,  Murali Logan MD

## 2017-12-27 NOTE — TELEPHONE ENCOUNTER
LM for patient to return call to discuss.     December 29, 2017 3:42 PM  Left additional message. Provided results of x-ray, advised of referral to Ortho, and provided phone number to schedule an appointment. Advised to call back with questions.   Melissa Jean RN, Care Coordinator

## 2017-12-28 DIAGNOSIS — J30.2 SEASONAL ALLERGIES: ICD-10-CM

## 2018-01-02 RX ORDER — LORATADINE 10 MG/1
10 TABLET ORAL DAILY PRN
Qty: 90 TABLET | Refills: 1 | Status: SHIPPED | OUTPATIENT
Start: 2018-01-02 | End: 2018-10-31

## 2018-01-04 ENCOUNTER — OFFICE VISIT (OUTPATIENT)
Dept: FAMILY MEDICINE | Facility: CLINIC | Age: 68
End: 2018-01-04
Payer: COMMERCIAL

## 2018-01-04 VITALS
BODY MASS INDEX: 32.48 KG/M2 | SYSTOLIC BLOOD PRESSURE: 155 MMHG | WEIGHT: 189.2 LBS | DIASTOLIC BLOOD PRESSURE: 74 MMHG | HEART RATE: 68 BPM | TEMPERATURE: 97.9 F

## 2018-01-04 DIAGNOSIS — R05.9 COUGH: Primary | ICD-10-CM

## 2018-01-04 DIAGNOSIS — E08.42 DIABETIC POLYNEUROPATHY ASSOCIATED WITH DIABETES MELLITUS DUE TO UNDERLYING CONDITION (H): ICD-10-CM

## 2018-01-04 DIAGNOSIS — F43.23 ADJUSTMENT DISORDER WITH MIXED ANXIETY AND DEPRESSED MOOD: ICD-10-CM

## 2018-01-04 DIAGNOSIS — R59.9 ENLARGED LYMPH NODES: ICD-10-CM

## 2018-01-04 DIAGNOSIS — K13.70 ORAL MUCOSAL LESION: ICD-10-CM

## 2018-01-04 DIAGNOSIS — R07.0 THROAT PAIN: ICD-10-CM

## 2018-01-04 DIAGNOSIS — M54.16 LUMBAR RADICULITIS: ICD-10-CM

## 2018-01-04 DIAGNOSIS — R59.0 ENLARGED LYMPH NODE IN NECK: ICD-10-CM

## 2018-01-04 LAB
INTERNAL QC OK POCT: YES
S PYO AG THROAT QL IA.RAPID: NORMAL

## 2018-01-04 RX ORDER — CODEINE PHOSPHATE AND GUAIFENESIN 10; 100 MG/5ML; MG/5ML
LIQUID ORAL
Qty: 473 ML | Refills: 0 | Status: SHIPPED | OUTPATIENT
Start: 2018-01-04 | End: 2019-01-02

## 2018-01-04 RX ORDER — GABAPENTIN 300 MG/1
CAPSULE ORAL
Qty: 270 CAPSULE | Refills: 3 | Status: SHIPPED | OUTPATIENT
Start: 2018-01-04 | End: 2018-10-31

## 2018-01-04 NOTE — LETTER
Vishnu Viveros  [address]  [place] [address]  : 1950  MRN:  [id number]      2018      Please excuse from work  through  due to illness.    Best wishes,  Murali Logan MD

## 2018-01-04 NOTE — NURSING NOTE
Chief Complaint   Patient presents with     Cough     Patient here for productive cough x1.     Pharyngitis     Patient here for sore throat with body aches and fever.       Jenaro Recinos CMA at 8:47 AM on 1/4/2018.

## 2018-01-04 NOTE — PATIENT INSTRUCTIONS
[facility]: [phone]     [facility] Medication Refill Request Information:  * Please contact your pharmacy regarding ANY request for medication refills.  ** [facility] Prescription Fax = [phone]  * Please allow 3 business days for routine medication refills.  * Please allow 5 business days for controlled substance medication refills.     [facility] Test Result notification information:  *You will be notified with in 7-10 days of your appointment day regarding the results of your test.  If you are on MyChart you will be notified as soon as the provider has reviewed the results and signed off on them.    [phone]

## 2018-01-04 NOTE — LETTER
Patient:  Vishnu Viveros  :   1950  MRN:     [id number]        Mr. Vishnu Viveros  [address]  [place] [address]        2018    Dear Mr. Viveros,    Thank you for choosing the [facility] for your healthcare needs.  We appreciate the opportunity to serve you.    The following are your recent test results.     Dear Vishnu Viveros    Your strep rapid test and culture were negative. Please contact me if there is no improvement of the swelling in your neck with antibiotics.       Results for orders placed or performed in visit on 18   Rapid strep screen POCT   Result Value Ref Range    Rapid Strep A Screen neg neg    Internal QC OK Yes    Beta strep group A culture   Result Value Ref Range    Specimen Description Throat     Special Requests Specimen collected in eSwab transport (white cap)     Culture Micro No Beta Streptococcus isolated                Please contact your provider if you have any questions or concerns.  We look forward to serving your needs in the future.      Best wishes,   Murali Logan MD/CE

## 2018-01-04 NOTE — PROGRESS NOTES
Vishnu Viveros is a 67-year-old gentleman  here for illness symptoms onset Wednesday 12/27 with high fever and cough through the weekend started feeling better on Monday 1/1/18. Still has cough and sweating sent home from work on Monday. Has nasal congestion and mouth breathing and dry mouth. Throat pain. Itching in ears. He has noted a lesion in his mouth left buccal mucosa not previously present and  Lymph node under left side of his jaw which is very painful that enlarged quickly with his illness. He has tenderness when moving his neck because of the large lump under the left side of his jaw. He feels his mouth is very dry as he said to mouth breathe due to nasal congestion. Coughing in chest productive of mucous used left over cough medication which was very helpful and started Mucinex Monday pm.   Co-workers are sick.  Drinks glass of wine red every night at dinner, no smoking.  He had a recent x-ray of foot but followed up with podiatry with out any further treatment required.    Lipids  and diabetes have been excellent we reviewed today as his wife was present and wanted to know his test results.   He previously asked me  to complete DMV forms for handicapped parking I completed in July for 3 years but he did not turn and last visit I updated the forms reconsider for 6 year tag , I discussed I completed  the date  he dropped off forms for me to complete with the new date but this was not sent to the Department of motor vehicle so he is requesting that I redo the DMV forms today. I will authorize 6 years due to his lower leg symptoms, osteoarthritis, neuropathy, spinal stenosis contributing to ataxia.  HCM:  He completed influenza vaccine.   SH: .He works as a  and likes his job. Mostly sedentary job. He feels it is very hard for him to take time off work to attend to his health. I reviewed FMLA I could sign if he wishes. He also is working an additional job. I provided him with a  letter for work absences with current illness.       ROS of systems  Constitutional fatigue significant weight change recent night sweats and fever with illness, Eyes no eye drainage, Respiratory see history of present illness, Cardiovascular no current chest pain, Gastroenterology no diarrhea, Musculoskeletal chronic back pain and knee pain ankle pain following with pain clinic and orthopedics, Psychiatric  feels depressed but did not start Cymbalta as he was afraid of the side effects however gabapentin is assisting with neuropathy  pertinent positives.      Patient Active Problem List   Diagnosis     Erectile dysfunction     Seasonal allergies     Abdominal bloating     Vitamin D deficiency     Pulmonary nodule     Red eyes     Prostate nodule     Right sided abdominal pain     Low back pain radiating to right leg     Chronic low back pain     Degenerative disc disease, lumbar     Lumbar stenosis     Back pain with radiation     Blood in stool     Anemia     Positive FIT (fecal immunochemical test)     DJD (degenerative joint disease) of knee     HL (hearing loss)     Vision impairment     Tear film insufficiency     Recurrent pterygium     Senile nuclear sclerosis     Type 2 diabetes mellitus treated without insulin (H)     Diabetic polyneuropathy associated with diabetes mellitus due to underlying condition (H)     Tubulovillous adenoma of colon     Hip pain, left       Past Medical History:   Diagnosis Date     Pulmonary nodule        Past Surgical History:   Procedure Laterality Date     APPENDECTOMY OPEN CHILD       BIOPSY PROSTATE TRANSRECTAL       EXCISE PTERYGIUM Right 1989     EXCISE PTERYGIUM Right 2/12/2015     INJECT EPIDURAL LUMBAR / SACRAL SINGLE N/A 7/21/2016    Procedure: INJECT EPIDURAL LUMBAR / SACRAL SINGLE;  Surgeon: Judah Henriquez MD;  Location: [facility]     INJECT PARAVERTEBRAL FACET JOINT LUMBAR / SACRAL FIRST Right 8/18/2016    Procedure: INJECT PARAVERTEBRAL FACET JOINT LUMBAR / SACRAL  "FIRST;  Surgeon: Judah Henriquez MD;  Location: [facility]     INJECT PARAVERTEBRAL FACET JOINT LUMBAR / SACRAL FIRST Right 9/8/2016    Procedure: INJECT PARAVERTEBRAL FACET JOINT LUMBAR / SACRAL FIRST;  Surgeon: Judah Henriquez MD;  Location: [facility] OR     left knee surgery         Current Outpatient Prescriptions   Medication     loratadine (CLARITIN) 10 MG tablet     gabapentin (NEURONTIN) 300 MG capsule     atorvastatin (LIPITOR) 20 MG tablet     lisinopril (PRINIVIL/ZESTRIL) 5 MG tablet     metFORMIN (GLUCOPHAGE-XR) 500 MG 24 hr tablet     cyclobenzaprine (FLEXERIL) 10 MG tablet     order for DME     tamsulosin (FLOMAX) 0.4 MG capsule     Ascorbic Acid (VITAMIN C PO)     Naproxen Sodium (ALEVE PO)     aspirin 81 MG tablet     Cyanocobalamin (VITAMIN B12 PO)     Multiple Vitamin (MULTIVITAMINS PO)     Cholecalciferol (VITAMIN D) 1000 UNIT capsule     Omega-3 Fatty Acids (FISH OIL PO)     DULoxetine (CYMBALTA) 20 MG EC capsule     No current facility-administered medications for this visit.      Facility-Administered Medications Ordered in Other Visits   Medication     iohexol (OMNIPAQUE) 300 mg/mL injection 10 mL       Allergies   Allergen Reactions     Seasonal Allergies      Sinus congestion, sinus \"drainage\", sneezing     Nkda [No Known Drug Allergies]          Social History     Social History     Marital status:      Spouse name: N/A     Number of children: N/A     Years of education: N/A     Occupational History     Not on file.     Social History Main Topics     Smoking status: Former Smoker     Types: Cigarettes     Quit date: 11/14/1981     Smokeless tobacco: Never Used     Alcohol use 1.5 oz/week     3 drink(s) per week      Comment: wine socially once per week or shots of wiskey on the weekend     Drug use: No     Sexual activity: Yes     Partners: Female     Other Topics Concern     Not on file     Social History Narrative     works as [employer].        His immediate " family; his wife has MS and is an RN at [employer], his two children are in good health.               Family History   Problem Relation Age of Onset     CEREBROVASCULAR DISEASE Brother           Neurologic Disorder Brother      Brain hemorrhage     Breast Cancer Sister           Glaucoma No family hx of      Macular Degeneration No family hx of        /74  Pulse 68  Temp 97.9  F (36.6  C) (Oral)  Wt 85.8 kg (189 lb 3.2 oz)  BMI 32.48 kg/m2  PHYSICAL EXAMINATION:    Constitutional: Oriented to person, place, and time slightly forgetful related to short term memory instructions with the visit today. Vital signs are noted.  Appears ill but not toxic, with productive sounding cough,  obese. No significant  distress.  HEENT:  Pterygium of right eye. His pupils are equal.  Left TM   slightly erythematous, Right previous procedure of TM  Oral mucosa is moist he has a slightly raised buccal mucosal lesion on the left same color as the buccal mucosa. Posterior pharynx is erythematous without exudate. He has a large approximately 6 cm tender mass under the left jaw in region of the submandibular glands slightly enlarged left tonsillar lymph node and slightly enlarged submental lymph nodes on the left. Parotid gland appears normal nontender.  Neck: Normal range of motion but tender on the left due to mass. Neck supple. No tracheal deviation present. No thyromegaly present.   Cardiovascular: Regular rhythm, normal heart sounds. No murmur present. Exam reveals no gallop and no friction rub.   Pulmonary/Chest: Effort normal and breath sounds normal. No respiratory distress.  Productive sounding cough   Abdominal:Obese Soft. Bowel sounds are normal. No distension and no mass. No tenderness.   Lymphadenopathy:    cervical adenopathy as noted under ENT exam this is new for him.   Neurological: Alert and oriented to person, place, and time. Normal strength.Slightly forgetful related to short term memory instructions  with the visit today as he does not feel well.   Skin: Skin is warm and dry. No rash noted. No erythema. No pallor. Psychiatric: Slightly anxious mood, affect and behavior is normal. Cooperative  Component Value Flag Ref Range Units Status Collected Lab   Rapid Strep A Screen neg  neg  Final 01/04/2018  9:56 AM Unknown   Internal QC OK Yes    Final 01/04/2018         Vishnu was seen today an acute illness with  left-sided submandibular adenopathy, left otitis pharyngitis and cough productive of mucus. He had influenza vaccine this year his symptoms are not consistent with influenza at this time. He has a new buccal mucosal lesion but does not have smoking is a risk factor however he drinks red wine nightly because a previous doctor told her it was told him it was good for his health. We do not recall this buccal mucosal lesion and lymphadenopathy to be present at last visit which was in November. I will treat this as an acute illness with Augmentin 875 twice daily for 14 days with a follow-up with ear nose and throat for further evaluation. I'm holding off on doing a CT scan of his neck at this time as I feel that this is an acute illness but discussed if he is swelling increases he should contact me or the the ear nose and throat specialist for further recommendations, most likely would require CT scan.   I completed DMV forms for handicapped parking for 6 year tag, he brought in forms and I am updating with today's date and authorizing 6 years due to his lower leg symptoms, osteoarthritis, neuropathy, spinal stenosis contributing to ataxia.  Memory slight short term memory challenges and difficulty remembering names in particular may be related to anxiety and attention, distraction with multitasking. We reviewed strategies for memory connecting with visual cues, encouraged word games, memory games when not working.  Diabetes and lipids under great control, labs reviewed with with patient and his wife today. He  declines my chart access.    Diagnoses and all orders for this visit:    Cough  -     VIRTUSSIN A/C 100-10 MG/5ML SOLN solution; 5-10 MLS PO Q 4 H PRN COUGH    Throat pain  -     Rapid strep screen POCT    Enlarged lymph node in neck  -     amoxicillin-clavulanate (AUGMENTIN) 875-125 MG per tablet; Take 1 tablet by mouth 2 times daily for 14 days  -     OTOLARYNGOLOGY REFERRAL  -     Beta strep group A culture; Future  -     Beta strep group A culture    Oral mucosal lesion  -     OTOLARYNGOLOGY REFERRAL    Enlarged lymph nodes  -     amoxicillin-clavulanate (AUGMENTIN) 875-125 MG per tablet; Take 1 tablet by mouth 2 times daily for 14 days  -     OTOLARYNGOLOGY REFERRAL  -     Beta strep group A culture; Future  -     Beta strep group A culture    Lumbar radiculitis  -     gabapentin (NEURONTIN) 300 MG capsule; One capsule in afternoon, two capsules at bedtime.    Diabetic polyneuropathy associated with diabetes mellitus due to underlying condition (H)  -     gabapentin (NEURONTIN) 300 MG capsule; One capsule in afternoon, two capsules at bedtime.    Adjustment disorder with mixed anxiety and depressed mood  He did not start Cymbalta, I removed from his medication list    Follow-up in one month.    All questions were addressed and voiced understanding and agreement with the above.    Murali Logan MD

## 2018-01-06 LAB
BACTERIA SPEC CULT: NORMAL
Lab: NORMAL
SPECIMEN SOURCE: NORMAL

## 2018-02-12 DIAGNOSIS — H53.10 SUBJECTIVE VISUAL DISTURBANCE: Primary | ICD-10-CM

## 2018-02-14 ENCOUNTER — OFFICE VISIT (OUTPATIENT)
Dept: OPHTHALMOLOGY | Facility: CLINIC | Age: 68
End: 2018-02-14
Attending: OPHTHALMOLOGY
Payer: COMMERCIAL

## 2018-02-14 DIAGNOSIS — H53.10 SUBJECTIVE VISUAL DISTURBANCE: ICD-10-CM

## 2018-02-14 DIAGNOSIS — E11.9 TYPE 2 DIABETES MELLITUS WITHOUT COMPLICATION, WITHOUT LONG-TERM CURRENT USE OF INSULIN (H): Primary | ICD-10-CM

## 2018-02-14 PROCEDURE — G0463 HOSPITAL OUTPT CLINIC VISIT: HCPCS | Mod: 25,ZF | Performed by: TECHNICIAN/TECHNOLOGIST

## 2018-02-14 PROCEDURE — 92015 DETERMINE REFRACTIVE STATE: CPT | Mod: GY,ZF | Performed by: TECHNICIAN/TECHNOLOGIST

## 2018-02-14 PROCEDURE — 92134 CPTRZ OPH DX IMG PST SGM RTA: CPT | Mod: ZF | Performed by: OPHTHALMOLOGY

## 2018-02-14 PROCEDURE — 92250 FUNDUS PHOTOGRAPHY W/I&R: CPT | Mod: 59,ZF | Performed by: OPHTHALMOLOGY

## 2018-02-14 PROCEDURE — 40000269 ZZH STATISTIC NO CHARGE FACILITY FEE: Mod: ZF | Performed by: TECHNICIAN/TECHNOLOGIST

## 2018-02-14 ASSESSMENT — REFRACTION_MANIFEST
OD_SPHERE: +1.00
OS_ADD: +2.00
OD_ADD: +2.00
OD_ADD: +2.00
OD_CYLINDER: +0.50
OS_ADD: +2.00
OS_SPHERE: +0.75
OS_AXIS: 060
OS_AXIS: 060
OD_AXIS: 145
OS_CYLINDER: +0.50
OD_CYLINDER: +0.50
OD_AXIS: 145
OD_SPHERE: +1.00
OS_SPHERE: +0.75
OS_CYLINDER: +0.50

## 2018-02-14 ASSESSMENT — EXTERNAL EXAM - RIGHT EYE
OD_EXAM: NORMAL
OD_EXAM: NORMAL

## 2018-02-14 ASSESSMENT — CUP TO DISC RATIO
OS_RATIO: 0.4
OS_RATIO: 0.4
OD_RATIO: 0.4
OD_RATIO: 0.4

## 2018-02-14 ASSESSMENT — CONF VISUAL FIELD
OD_NORMAL: 1
OD_NORMAL: 1
OS_NORMAL: 1
METHOD: COUNTING FINGERS
METHOD: COUNTING FINGERS
OS_NORMAL: 1

## 2018-02-14 ASSESSMENT — TONOMETRY
OD_IOP_MMHG: 14
OS_IOP_MMHG: 14
IOP_METHOD: ICARE
OS_IOP_MMHG: 14
IOP_METHOD: ICARE
OD_IOP_MMHG: 14

## 2018-02-14 ASSESSMENT — VISUAL ACUITY
OD_PH_SC: 20/25
OD_PH_SC: 20/25
OS_SC: 20/40
OS_PH_SC: 20/25+2
METHOD: SNELLEN - LINEAR
METHOD: SNELLEN - LINEAR
OS_SC: 20/40
OD_SC: 20/50
OS_PH_SC: 20/25+2
OD_SC: 20/50

## 2018-02-14 ASSESSMENT — EXTERNAL EXAM - LEFT EYE
OS_EXAM: NORMAL
OS_EXAM: NORMAL

## 2018-02-14 ASSESSMENT — SLIT LAMP EXAM - LIDS
COMMENTS: NORMAL

## 2018-02-14 NOTE — PROGRESS NOTES
Vishnu Viveros is a 67 year old male who presents to clinic with the following diagnoses:    1.  Pigmented lesion RLL puncta - likely seborrheic keratosis versus nevus   - recommend plastics followup for possible biopsy- recommended by Dr. Whaley in past   - previously noted in 2015   - appears stable compared to photos from 2/8/2006      2. Diabetes mellitus   - mild diabetic retinopathy on exam   - last A1c 6.2 on 11/27/17   - recommended good blood glucose and blood pressure control   - to see Dr. Mckeon today      3 Pigment mottling both eyes   - to see Dr. Mckeon today      4. Recurrent pterygium OD - s/p excision with conjunctival autograft OD   -last seen by Dr. Whaley in 4/2015             - s/p excision with conjunctival autograft                  5. Pterygium OS    - last seen by Dr. Whaley in 5/2015   - not encroaching on visual axis   - observe       6. Cataract OU - defer for now   - best corrected visual acuity 20/20 with new MRx   - consider cataract extraction/IOL    - new MRx dispensed today       7. Dry eyes - decreased tear production   - previously seen by Dr. Whaley in 4/2015    - continue aggressive lubricatin            Complete documentation of historical and exam elements from today's encounter can be found in the full encounter summary report (not reduplicated in this progress note).  I personally obtained the chief complaint(s) and history of present illness.  I confirmed and edited as necessary the review of systems, past medical/surgical history, family history, social history, and examination findings as documented by others; and I examined the patient myself.  I personally reviewed the relevant tests, images, and reports as documented above.  I formulated and edited as necessary the assessment and plan and discussed the findings and management plan with the patient and family   Charles Parr MD

## 2018-02-14 NOTE — PROGRESS NOTES
I have confirmed the patient's and reviewed Past Medical History, Past Surgical History, Social History, Family History, Problem List, Medication List and agree with Tech note.    CC: Diabetes mellitus exam     HPI: type II for several years, taking oral meds no insulin     Assessment/plan:   1.  Diabetes mellitus no nonproliferative diabetic retinopathy     Blood glucose control   Last HbA1C is 6.2   OCT and FAF show     2.  Maculopathy both eyes    Optical Coherence Tomography Scan and fundus autofluorescence show rep irregularity that can be old Central serous retinopathy or even a pattern dystrophy.  patient denies episodes of blurry vision () so will monitor this in time.    RTC one year     Joan Butts MD PhD.  Professor & Chair

## 2018-02-14 NOTE — NURSING NOTE
Vishnu Viveros is a 67 year old, referred for DM screening.     Diagnosed with DM 2 years ago.   -Taking metformin per patient.   Patient states he does not have glasses for distance but do wear cheaters and especially in the evening.   Denies flashes and floaters.  Patient states he was told that he had cataracts and should probably consider cataract surgery.     Lab Results       Component                Value               Date                       A1C                      6.2                 11/27/2017                 A1C                      6.3                 07/31/2017                 A1C                      6.6                 11/28/2016                 A1C                      6.2                 11/19/2015                 A1C                      6.1                 10/30/2014            PAOLA Garcia 2/14/2018 8:19 AM

## 2018-02-14 NOTE — MR AVS SNAPSHOT
After Visit Summary   2018    Vishnu Viveros    MRN: [id number]           Patient Information     Date Of Birth          1950        Visit Information        Provider Department      2018 9:00 AM [facility]keon [facility]        Today's Diagnoses     Type 2 diabetes mellitus without complication, without long-term current use of insulin (H)    -  1       Follow-ups after your visit        Follow-up notes from your care team     Return in about 6 months (around 2018) for Diabetic exam, maculopathy , Exam & OCT OU, FAF OU.      Your next 10 appointments already scheduled     2018 12:30 PM CDT   NEW PLASTICS with Levy Blue MD   [facility] ([facility])    [address]  [address] [address] [phone]              Who to contact     Please call your clinic at [phone] to:    Ask questions about your health    Make or cancel appointments    Discuss your medicines    Learn about your test results    Speak to your doctor            Additional Information About Your Visit        My[employer] Information     [employer] is an electronic gateway that provides easy, online access to your medical records. With [employer], you can request a clinic appointment, read your test results, renew a prescription or communicate with your care team.     To sign up for [employer] visit the website at [email or web address]   You will be asked to enter the access code listed below, as well as some personal information. Please follow the directions to create your username and password.     Your access code is: [email or web address]  Expires: 3/21/2018  6:30 AM     Your access code will  in 90 days. If you need help or a new code, please contact your [facility] or call [phone] for assistance.        Care EveryWhere ID     This is your Care EveryWhere ID. This could be used by other organizations  to access your [place] medical records  [id number]         Blood Pressure from Last 3 Encounters:   01/04/18 155/74   11/27/17 133/76   09/18/17 124/80    Weight from Last 3 Encounters:   01/04/18 85.8 kg (189 lb 3.2 oz)   12/22/17 85.9 kg (189 lb 4.8 oz)   11/27/17 86.2 kg (190 lb)              We Performed the Following     Fundus Autofluorescence Image (FAF) OU (both eyes)     OCT Retina Spectralis OU (both eyes)        Primary Care Provider Office Phone # Fax #    Murali Logan [phone] [address] [facility] [address]        Equal Access to Services     AMBROCIO CUENCA : Hadii dolores millero Sosola, waaxda luqadaha, qaybta kaalmada adeegyada, carl lucas. So [facility] [phone].    ATENCIÓN: Si habla español, tiene a little disposición servicios gratuitos de asistencia lingüística. [facility] [place] [phone].    We comply with applicable federal civil rights laws and [place] laws. We do not discriminate on the basis of race, color, national origin, age, disability, sex, sexual orientation, or gender identity.            Thank you!     Thank you for choosing [facility]  for your care. Our goal is always to provide you with excellent care. Hearing back from our patients is one way we can continue to improve our services. Please take a few minutes to complete the written survey that you may receive in the mail after your visit with us. Thank you!             Your Updated Medication List - Protect others around you: Learn how to safely use, store and throw away your medicines at www.disposemymeds.org.          This list is accurate as of 2/14/18 10:02 AM.  Always use your most recent med list.                   Brand Name Dispense Instructions for use Diagnosis    ALEVE PO      Take 1 tablet by mouth as needed for moderate pain        aspirin 81 MG tablet      Take 1 tablet by mouth daily.        atorvastatin 20 MG tablet    LIPITOR    90 tablet    Take 1  tablet (20 mg) by mouth daily    Type 2 diabetes mellitus treated without insulin (H)       cyclobenzaprine 10 MG tablet    FLEXERIL    42 tablet    Take 0.5 tablets (5 mg) by mouth 3 times daily as needed for muscle spasms    Lumbar radiculopathy       FISH OIL PO      Take 1 capsule by mouth as needed        gabapentin 300 MG capsule    NEURONTIN    270 capsule    One capsule in afternoon, two capsules at bedtime.    Lumbar radiculitis, Diabetic polyneuropathy associated with diabetes mellitus due to underlying condition (H)       lisinopril 5 MG tablet    PRINIVIL/ZESTRIL    90 tablet    Take 1 tablet (5 mg) by mouth daily    Type 2 diabetes mellitus treated without insulin (H), Benign essential hypertension       loratadine 10 MG tablet    CLARITIN    90 tablet    Take 1 tablet (10 mg) by mouth daily as needed    Seasonal allergies       metFORMIN 500 MG 24 hr tablet    GLUCOPHAGE-XR    90 tablet    Take 1 tablet (500 mg) by mouth daily (with dinner)    Type 2 diabetes mellitus treated without insulin (H)       MULTIVITAMINS PO      Take 1 tablet by mouth daily.        order for DME     1 Device    Back brace    Left leg weakness, Lumbar stenosis       tamsulosin 0.4 MG capsule    FLOMAX    90 capsule    Take 1 capsule (0.4 mg) by mouth daily    Benign non-nodular prostatic hyperplasia, presence of lower urinary tract symptoms unspecified       VIRTUSSIN A/C 100-10 MG/5ML Soln solution   Generic drug:  guaiFENesin-codeine     473 mL    5-10 MLS PO Q 4 H PRN COUGH    Cough       VITAMIN B12 PO      Take 1 tablet by mouth. Pt states he takes this 2 or 3 times a week..        VITAMIN C PO      Take 500 mg by mouth once        vitamin D 1000 UNITS capsule      Take 1 capsule by mouth daily.

## 2018-02-14 NOTE — NURSING NOTE
Chief Complaints and History of Present Illnesses   Patient presents with     Follow Up For     initial visit with Dr. Parr for subjective visual disturbance     HPI    Symptoms:              Comments:  Vishnu Viveros is a 67 year old, referred for DM screening.     Diagnosed with DM 2 years ago.   -Taking metformin per patient.   Patient states he does not have glasses for distance but do wear cheaters and especially in the evening.   Denies flashes and floaters.  Patient states he was told that he had cataracts and should probably consider cataract surgery.     Lab Results       Component                Value               Date                       A1C                      6.2                 11/27/2017                 A1C                      6.3                 07/31/2017                 A1C                      6.6                 11/28/2016                 A1C                      6.2                 11/19/2015                 A1C                      6.1                 10/30/2014            Yumi Fraga CO 2/14/2018 8:19 AM

## 2018-02-14 NOTE — MR AVS SNAPSHOT
After Visit Summary   2018    Vishnu Viveros    MRN: [id number]           Patient Information     Date Of Birth          1950        Visit Information        Provider Department      2018 8:00 AM Charles Parr MD [facility]        Today's Diagnoses     Subjective visual disturbance - Both Eyes           Follow-ups after your visit        Your next 10 appointments already scheduled     2018 12:30 PM CDT   NEW PLASTICS with Levy Blue MD   [facility] ([facility])    [address]  [address] [id number] [phone]              Who to contact     Please call your clinic at [phone] to:    Ask questions about your health    Make or cancel appointments    Discuss your medicines    Learn about your test results    Speak to your doctor            Additional Information About Your Visit        MyChart Information     [employer] is an electronic gateway that provides easy, online access to your medical records. With [email or web address], you can request a clinic appointment, read your test results, renew a prescription or communicate with your care team.     To sign up for [employer] visit the website at [email or web address]   You will be asked to enter the access code listed below, as well as some personal information. Please follow the directions to create your username and password.     Your access code is: [email or web address]  Expires: 3/21/2018  6:30 AM     Your access code will  in 90 days. If you need help or a new code, please contact your [facility] or call [phone] for assistance.        Care EveryWhere ID     This is your Care EveryWhere ID. This could be used by other organizations to access your [place] medical records  [id number]         Blood Pressure from Last 3 Encounters:   18 155/74   17 133/76   17 124/80    Weight from Last 3 Encounters:   18  85.8 kg (189 lb 3.2 oz)   12/22/17 85.9 kg (189 lb 4.8 oz)   11/27/17 86.2 kg (190 lb)              We Performed the Following     IOP Measurement     Refraction        Primary Care Provider Office Phone # Fax #    Murali Logan [phone]       [address] [facility] [address]        Equal Access to Services     HUGO CUENCA : Hadii aad ku hadasho Soomaali, waaxda luqadaha, qaybta kaalmada adeegyada, waxay idiin hayaan adeeg khmarcio lapazn ah. So [facility] [phone].    ATENCIÓN: Si pia camargo, tiene a little disposición servicios gratuitos de asistencia lingüística. Llame al [phone].    We comply with applicable federal civil rights laws and [place] laws. We do not discriminate on the basis of race, color, national origin, age, disability, sex, sexual orientation, or gender identity.            Thank you!     Thank you for choosing EYE CLINIC  for your care. Our goal is always to provide you with excellent care. Hearing back from our patients is one way we can continue to improve our services. Please take a few minutes to complete the written survey that you may receive in the mail after your visit with us. Thank you!             Your Updated Medication List - Protect others around you: Learn how to safely use, store and throw away your medicines at www.disposemymeds.org.          This list is accurate as of 2/14/18  5:10 PM.  Always use your most recent med list.                   Brand Name Dispense Instructions for use Diagnosis    ALEVE PO      Take 1 tablet by mouth as needed for moderate pain        aspirin 81 MG tablet      Take 1 tablet by mouth daily.        atorvastatin 20 MG tablet    LIPITOR    90 tablet    Take 1 tablet (20 mg) by mouth daily    Type 2 diabetes mellitus treated without insulin (H)       cyclobenzaprine 10 MG tablet    FLEXERIL    42 tablet    Take 0.5 tablets (5 mg) by mouth 3 times daily as needed for muscle spasms    Lumbar radiculopathy       FISH  OIL PO      Take 1 capsule by mouth as needed        gabapentin 300 MG capsule    NEURONTIN    270 capsule    One capsule in afternoon, two capsules at bedtime.    Lumbar radiculitis, Diabetic polyneuropathy associated with diabetes mellitus due to underlying condition (H)       lisinopril 5 MG tablet    PRINIVIL/ZESTRIL    90 tablet    Take 1 tablet (5 mg) by mouth daily    Type 2 diabetes mellitus treated without insulin (H), Benign essential hypertension       loratadine 10 MG tablet    CLARITIN    90 tablet    Take 1 tablet (10 mg) by mouth daily as needed    Seasonal allergies       metFORMIN 500 MG 24 hr tablet    GLUCOPHAGE-XR    90 tablet    Take 1 tablet (500 mg) by mouth daily (with dinner)    Type 2 diabetes mellitus treated without insulin (H)       MULTIVITAMINS PO      Take 1 tablet by mouth daily.        order for DME     1 Device    Back brace    Left leg weakness, Lumbar stenosis       tamsulosin 0.4 MG capsule    FLOMAX    90 capsule    Take 1 capsule (0.4 mg) by mouth daily    Benign non-nodular prostatic hyperplasia, presence of lower urinary tract symptoms unspecified       VIRTUSSIN A/C 100-10 MG/5ML Soln solution   Generic drug:  guaiFENesin-codeine     473 mL    5-10 MLS PO Q 4 H PRN COUGH    Cough       VITAMIN B12 PO      Take 1 tablet by mouth. Pt states he takes this 2 or 3 times a week..        VITAMIN C PO      Take 500 mg by mouth once        vitamin D 1000 UNITS capsule      Take 1 capsule by mouth daily.

## 2018-05-02 ENCOUNTER — TELEPHONE (OUTPATIENT)
Dept: INTERNAL MEDICINE | Facility: CLINIC | Age: 68
End: 2018-05-02

## 2018-05-02 ENCOUNTER — OFFICE VISIT (OUTPATIENT)
Dept: FAMILY MEDICINE | Facility: CLINIC | Age: 68
End: 2018-05-02
Payer: COMMERCIAL

## 2018-05-02 VITALS — DIASTOLIC BLOOD PRESSURE: 80 MMHG | RESPIRATION RATE: 16 BRPM | SYSTOLIC BLOOD PRESSURE: 167 MMHG | HEART RATE: 69 BPM

## 2018-05-02 DIAGNOSIS — R07.89 ATYPICAL CHEST PAIN: Primary | ICD-10-CM

## 2018-05-02 DIAGNOSIS — R07.89 CHEST WALL PAIN: ICD-10-CM

## 2018-05-02 RX ORDER — NAPROXEN 500 MG/1
500 TABLET ORAL 2 TIMES DAILY PRN
Qty: 30 TABLET | Refills: 1 | Status: SHIPPED | OUTPATIENT
Start: 2018-05-02 | End: 2018-10-31

## 2018-05-02 ASSESSMENT — PAIN SCALES - GENERAL: PAINLEVEL: SEVERE PAIN (7)

## 2018-05-02 NOTE — TELEPHONE ENCOUNTER
[facility] Call Center    Phone Message    May a detailed message be left on voicemail: yes    Reason for Call: Symptoms or Concerns     If patient has red-flag symptoms, warm transfer to triage line    Current symptom or concern: Pain on back or right shoulder, and started migrating to chest    Symptoms have been present for:  2 day(s)    Has patient previously been seen for this? No    By n/a: n/a    Date: n/a    Are there any new or worsening symptoms? Yes: *      Action Taken: Message routed to:  [facility] ([facility] SHARONA

## 2018-05-02 NOTE — PROGRESS NOTES
[facility]  Primary Care Center   Santiago Brantley MD  05/02/2018      Chief Complaint:   Shoulder Pain       History of Present Illness:   Vishnu Viveros is a 67 year old male with a history of type 2 diabetes,  who presents for evaluation of shoulder pain. 4/29 he began having right shoulder pain. Normally shoulder pain is resolved by ibuprofen, however this current pain is not relieved by ibuprofen. The pain has radiated to the front of his shoulder. Movement of his shoulder causes pain and now remains while at rest. Breathing is not painful and he denies shortness of breath. He usually has seasonal allergies that cause him to cough. There was no trauma preceding this. When he was working out including arm exercises the pain remained but did not get worse until later in the day. He has some dizziness associated with shoulder pain. He does not know of anything that makes pain worse nor can he identify anything that improves it. Patient denies nausea or vomiting. He is most concerned about the pain being present when he sits down.     Movement increases pain in the subscapular region of his back.     Pain in right chest wall is made worse by bending down and standing up.     Review of Systems:   Pertinent items are noted in HPI, remainder of complete ROS is negative.      Active Medications:     Current Outpatient Prescriptions:      Ascorbic Acid (VITAMIN C PO), Take 500 mg by mouth once, Disp: , Rfl:      aspirin 81 MG tablet, Take 1 tablet by mouth daily., Disp: , Rfl:      atorvastatin (LIPITOR) 20 MG tablet, Take 1 tablet (20 mg) by mouth daily, Disp: 90 tablet, Rfl: 3     Cholecalciferol (VITAMIN D) 1000 UNIT capsule, Take 1 capsule by mouth daily., Disp: , Rfl:      Cyanocobalamin (VITAMIN B12 PO), Take 1 tablet by mouth. Pt states he takes this 2 or 3 times a week.. , Disp: , Rfl:      cyclobenzaprine (FLEXERIL) 10 MG tablet, Take 0.5 tablets (5 mg) by mouth 3 times daily as needed for muscle spasms,  Disp: 42 tablet, Rfl: 1     gabapentin (NEURONTIN) 300 MG capsule, One capsule in afternoon, two capsules at bedtime., Disp: 270 capsule, Rfl: 3     lisinopril (PRINIVIL/ZESTRIL) 5 MG tablet, Take 1 tablet (5 mg) by mouth daily, Disp: 90 tablet, Rfl: 3     loratadine (CLARITIN) 10 MG tablet, Take 1 tablet (10 mg) by mouth daily as needed, Disp: 90 tablet, Rfl: 1     metFORMIN (GLUCOPHAGE-XR) 500 MG 24 hr tablet, Take 1 tablet (500 mg) by mouth daily (with dinner), Disp: 90 tablet, Rfl: 3     Multiple Vitamin (MULTIVITAMINS PO), Take 1 tablet by mouth daily., Disp: , Rfl:      Naproxen Sodium (ALEVE PO), Take 1 tablet by mouth as needed for moderate pain, Disp: , Rfl:      Omega-3 Fatty Acids (FISH OIL PO), Take 1 capsule by mouth as needed, Disp: , Rfl:      tamsulosin (FLOMAX) 0.4 MG capsule, Take 1 capsule (0.4 mg) by mouth daily, Disp: 90 capsule, Rfl: 3     VIRTUSSIN A/C 100-10 MG/5ML SOLN solution, 5-10 MLS PO Q 4 H PRN COUGH, Disp: 473 mL, Rfl: 0  No current facility-administered medications for this visit.     Facility-Administered Medications Ordered in Other Visits:      iohexol (OMNIPAQUE) 300 mg/mL injection 10 mL, 10 mL, EPIDURAL, Once, Jamil Rawls,       Allergies:   Seasonal allergies and Nkda [no known drug allergies]      Past Medical History:  Diagnosis     Erectile dysfunction     Seasonal allergies     Abdominal bloating     Vitamin D deficiency     Pulmonary nodule     Red eyes     Prostate nodule     Right sided abdominal pain     Low back pain radiating to right leg     Chronic low back pain     Degenerative disc disease, lumbar     Lumbar stenosis     Back pain with radiation     Blood in stool     Anemia     Positive FIT (fecal immunochemical test)     DJD (degenerative joint disease) of knee     HL (hearing loss)     Vision impairment     Tear film insufficiency     Recurrent pterygium     Senile nuclear sclerosis     Type 2 diabetes mellitus treated without insulin (H)     Diabetic  polyneuropathy associated with diabetes mellitus due to underlying condition (H)     Tubulovillous adenoma of colon     Hip pain, left      Past Surgical History:  Past Surgical History:   Procedure Laterality Date     APPENDECTOMY OPEN CHILD       BIOPSY PROSTATE TRANSRECTAL       EXCISE PTERYGIUM Right 1989     EXCISE PTERYGIUM Right 2/12/2015     INJECT EPIDURAL LUMBAR / SACRAL SINGLE N/A 7/21/2016    Procedure: INJECT EPIDURAL LUMBAR / SACRAL SINGLE;  Surgeon: Judah Henriquez MD;  Location: [facility] OR     INJECT PARAVERTEBRAL FACET JOINT LUMBAR / SACRAL FIRST Right 8/18/2016    Procedure: INJECT PARAVERTEBRAL FACET JOINT LUMBAR / SACRAL FIRST;  Surgeon: Judah Henriquez MD;  Location: [facility] OR     INJECT PARAVERTEBRAL FACET JOINT LUMBAR / SACRAL FIRST Right 9/8/2016    Procedure: INJECT PARAVERTEBRAL FACET JOINT LUMBAR / SACRAL FIRST;  Surgeon: Judah Henriquez MD;  Location: [facility] OR     left knee surgery       Family History:   Brother: brain hemorrhage  Sister: breast cancer    Social History:     Former smoker, quit 1981    Physical Exam:   /80  Pulse 69  Resp 16   Constitutional: Alert. In no distress.  Head: Normocephalic. No masses, lesions, tenderness or abnormalities.  ENT: No neck nodes or sinus tenderness.  Cardiovascular: RRR. No murmurs, clicks, gallops, or rub.  Respiratory: Clear to auscultation bilaterally, no wheezes or crackles.  Gastrointestinal: Abdomen soft. Non-tender. BS normal. No masses or organomegaly.  Musculoskeletal: Extremities normal. No gross deformities noted. Normal muscle tone.  Right shoulder: Pain with  Neck full ROM no pain  Shoulder ROM exacerbates right upper back pain. Rotation of trunk exacerbates right interior chest pain. No rashes noted.     Spine flexion causes increased pain in his shoulder and right chest  Skin: No suspicious lesions. No rashes.  Neurologic: Gait normal. Reflexes normal and symmetric. Sensation grossly WNL.  Psychiatric: Mentation appears  normal. Normal affect.   Hematologic/Lymphatic/Immunologic: Normal cervical lymph nodes.      Assessment and Plan:  Atypical chest pain  - EKG Performed in Clinic w/ Provider Reading Fee    Chest wall pain/Right shoulder area pain  Having pain in clinic. EKG normal during clinic. Did several exercises at they gym without worsening pain or other symptoms such as nausea or shortness of breath. He takes the stairs with no anginal symptoms. Diabetes puts him at risk for cardiac risk. Will try naprosyn and call in if pain is worse. Likely that his chest wall pain has muscular etiology. If pain worsens or does not improve follow up with sports medicine. Discussed symptoms of angina and if those present he can call me.  - naproxen (NAPROSYN) 500 MG tablet  Dispense: 30 tablet; Refill: 1    Follow-up: Follow up if symptoms worsen        Scribe Disclosure:   I, Florin Kahn, am serving as a scribe to document services personally performed by Santiago Brantley MD at this visit, based upon the provider's statements to me. All documentation has been reviewed by the aforementioned provider prior to being entered into the official medical record.     Portions of this medical record were completed by a scribe. UPON MY REVIEW AND AUTHENTICATION BY ELECTRONIC SIGNATURE, this confirms (a) I performed the applicable clinical services, and (b) the record is accurate.   Santiago Brantley MD

## 2018-05-02 NOTE — MR AVS SNAPSHOT
After Visit Summary   5/2/2018    Vishnu Viveros    MRN: [id number]           Patient Information     Date Of Birth          1950        Visit Information        Provider Department      5/2/2018 3:35 PM Santiago Brantley MD [facility]        Today's Diagnoses     Atypical chest pain    -  1    Chest wall pain          Care Instructions    Primary Care Center Medication Refill Request Information:  * Please contact your pharmacy regarding ANY request for medication refills.  ** [facility] Prescription Fax = [phone]  * Please allow 3 business days for routine medication refills.  * Please allow 5 business days for controlled substance medication refills.     Primary Care Center Test Result notification information:  *You will be notified with in 7-10 days of your appointment day regarding the results of your test.  If you are on MyChart you will be notified as soon as the provider has reviewed the results and signed off on them.    Primary Care Center [phone]       Sports Medicine Walk-In Clinic  Monday - Sunday   7:00 AM to 7:00 PM  Check-In on 4th Floor          Follow-ups after your visit        Your next 10 appointments already scheduled     Jun 27, 2018 12:30 PM CDT   NEW PLASTICS with Levy Blue MD   [facility] General ([facility])    [address] [address] [phone]              Who to contact     Please call your clinic at [phone] to:    Ask questions about your health    Make or cancel appointments    Discuss your medicines    Learn about your test results    Speak to your doctor            Additional Information About Your Visit        MyChart Information     [employer] is an electronic gateway that provides easy, online access to your medical records. With [employer], you can request a clinic appointment, read your test results, renew a prescription or communicate with your care team.     To sign up  for MyChart visit the website at [email or web address]   You will be asked to enter the access code listed below, as well as some personal information. Please follow the directions to create your username and password.     Your access code is: [email or web address]  Expires: 2018  4:43 PM     Your access code will  in 90 days. If you need help or a new code, please contact your [facility] or call [phone] for assistance.        Care EveryWhere ID     This is your Care EveryWhere ID. This could be used by other organizations to access your [place] medical records  [id number]        Your Vitals Were     Pulse Respirations                69 16           Blood Pressure from Last 3 Encounters:   18 167/80   18 155/74   17 133/76    Weight from Last 3 Encounters:   18 85.8 kg (189 lb 3.2 oz)   17 85.9 kg (189 lb 4.8 oz)   17 86.2 kg (190 lb)              We Performed the Following     EKG Performed in Clinic w/ Provider Reading Fee          Today's Medication Changes          These changes are accurate as of 18  4:43 PM.  If you have any questions, ask your nurse or doctor.               Start taking these medicines.        Dose/Directions    naproxen 500 MG tablet   Commonly known as:  NAPROSYN   Used for:  Chest wall pain   Started by:  Santiago Brantley MD        Dose:  500 mg   Take 1 tablet (500 mg) by mouth 2 times daily as needed for moderate pain Take with food   Quantity:  30 tablet   Refills:  1            Where to get your medicines      These medications were sent to [id number], [address] AT [facility] [address]  [address] [facility] [address], [address] [facility] [address]     Phone:  [phone]     naproxen 500 MG tablet                Primary Care Provider Office Phone # Fax #    Murali Logan [phone]       [address] [facility] [address]        Equal Access  to Services     AMBROCIO CUENCA : Sil Fnin, wapaubobby naikashlynha, qavicky karubenscarl galindo. So [facility] [phone].    ATENCIÓN: Si habla mary jo, tiene a little disposición servicios gratuitos de asistencia lingüística. Llame al [phone].    We comply with applicable federal civil rights laws and [place] laws. We do not discriminate on the basis of race, color, national origin, age, disability, sex, sexual orientation, or gender identity.            Thank you!     Thank you for choosing [facility] PRIMARY CARE CLINIC  for your care. Our goal is always to provide you with excellent care. Hearing back from our patients is one way we can continue to improve our services. Please take a few minutes to complete the written survey that you may receive in the mail after your visit with us. Thank you!             Your Updated Medication List - Protect others around you: Learn how to safely use, store and throw away your medicines at www.disposemymeds.org.          This list is accurate as of 5/2/18  4:43 PM.  Always use your most recent med list.                   Brand Name Dispense Instructions for use Diagnosis    ALEVE PO      Take 1 tablet by mouth as needed for moderate pain        aspirin 81 MG tablet      Take 1 tablet by mouth daily.        atorvastatin 20 MG tablet    LIPITOR    90 tablet    Take 1 tablet (20 mg) by mouth daily    Type 2 diabetes mellitus treated without insulin (H)       cyclobenzaprine 10 MG tablet    FLEXERIL    42 tablet    Take 0.5 tablets (5 mg) by mouth 3 times daily as needed for muscle spasms    Lumbar radiculopathy       FISH OIL PO      Take 1 capsule by mouth as needed        gabapentin 300 MG capsule    NEURONTIN    270 capsule    One capsule in afternoon, two capsules at bedtime.    Lumbar radiculitis, Diabetic polyneuropathy associated with diabetes mellitus due to underlying condition (H)       lisinopril 5 MG tablet     PRINIVIL/ZESTRIL    90 tablet    Take 1 tablet (5 mg) by mouth daily    Type 2 diabetes mellitus treated without insulin (H), Benign essential hypertension       loratadine 10 MG tablet    CLARITIN    90 tablet    Take 1 tablet (10 mg) by mouth daily as needed    Seasonal allergies       metFORMIN 500 MG 24 hr tablet    GLUCOPHAGE-XR    90 tablet    Take 1 tablet (500 mg) by mouth daily (with dinner)    Type 2 diabetes mellitus treated without insulin (H)       MULTIVITAMINS PO      Take 1 tablet by mouth daily.        naproxen 500 MG tablet    NAPROSYN    30 tablet    Take 1 tablet (500 mg) by mouth 2 times daily as needed for moderate pain Take with food    Chest wall pain       order for DME     1 Device    Back brace    Left leg weakness, Lumbar stenosis       tamsulosin 0.4 MG capsule    FLOMAX    90 capsule    Take 1 capsule (0.4 mg) by mouth daily    Benign non-nodular prostatic hyperplasia, presence of lower urinary tract symptoms unspecified       VIRTUSSIN A/C 100-10 MG/5ML Soln solution   Generic drug:  guaiFENesin-codeine     473 mL    5-10 MLS PO Q 4 H PRN COUGH    Cough       VITAMIN B12 PO      Take 1 tablet by mouth. Pt states he takes this 2 or 3 times a week..        VITAMIN C PO      Take 500 mg by mouth once        vitamin D 1000 units capsule      Take 1 capsule by mouth daily.

## 2018-05-02 NOTE — NURSING NOTE
Chief Complaint   Patient presents with     Shoulder Pain     pt is here to discuss shoulder pain       Abigail Heredia CMA at 3:26 PM on 5/2/2018

## 2018-05-02 NOTE — PATIENT INSTRUCTIONS
Primary Care Center Medication Refill Request Information:  * Please contact your pharmacy regarding ANY request for medication refills.  ** [facility] Prescription Fax = [phone]  * Please allow 3 business days for routine medication refills.  * Please allow 5 business days for controlled substance medication refills.     [facility] Test Result notification information:  *You will be notified with in 7-10 days of your appointment day regarding the results of your test.  If you are on MyChart you will be notified as soon as the provider has reviewed the results and signed off on them.    [facility] [phone]       Sports Medicine Walk-In Clinic  Monday - Sunday   7:00 AM to 7:00 PM  Check-In on 4th Floor

## 2018-05-03 LAB — INTERPRETATION ECG - MUSE: NORMAL

## 2018-05-31 ENCOUNTER — TELEPHONE (OUTPATIENT)
Dept: FAMILY MEDICINE | Facility: CLINIC | Age: 68
End: 2018-05-31

## 2018-05-31 NOTE — TELEPHONE ENCOUNTER
Spoke to patient, scheduled him for 6/1/18 @ 7:00 am with Aura Rubio and will will look for cancellations to try and get him into clinic today.    Alyce Garnett    Primary Care

## 2018-05-31 NOTE — TELEPHONE ENCOUNTER
"[facility]: Nurse Triage Note  SITUATION/BACKGROUND                                                      Vishnu Viveros is a 67 year old male with diabetes, who calls with 3 days of bad cough, somewhat helped by Nyquil and Dayquil and Mucinex.   Left chest pain and sore throat, sweating. He feels like his cough is causing the left sided chest pain.The pain goes front to back.  No leg swelling or pain, chest pain does not awaken him at night.   Does not feel heart race nor palpitations.  Skin is warm, not clammy.   It worsens with cough, deep inspiration does not worsen his chest pain. He feels ill, and feverish. He'd like to be seen today if possible. We discussed going to Urgent Care if unable to find an appt, or if symptoms worsen, ER.( shortness of breath, clamminess, persisting pain, dizziness, continous CP)  He would prefer to see Dr Brantley( saw patient 5/2 for atypical right shoulder pain and did EKG in office, which was wnl)         MEDICATIONS:   Taking medication(s) as prescribed? Yes  Taking over the counter medication(s?) No  Any barriers to taking medication(s) as prescribed?  No  Medication(s) improving/managing symptoms?  No    Allergies:   Allergies   Allergen Reactions     Seasonal Allergies      Sinus congestion, sinus \"drainage\", sneezing     Nkda [No Known Drug Allergies]        ASSESSMENT      Patient with cough, fever  X 3 days and secondary c/o of left chest soreness he attributes to cough.He would like to avoid Urgent care, can drive to appt. If available    RECOMMENDATION/PLAN                                                      RECOMMENDED DISPOSITION:  See in 4 hours, another person to drive -   Will comply with recommendation: Yes    If further questions/concerns or if symptoms do not improve, worsen or new symptoms develop, call your PCP or [phone] to talk with the Resident on call, as soon as possible.    Guideline used: pp 118 chest pain  Telephone Triage " Protocols for Nurses, Fifth Edition, Shaina Baptiste RN

## 2018-06-01 ENCOUNTER — OFFICE VISIT (OUTPATIENT)
Dept: FAMILY MEDICINE | Facility: CLINIC | Age: 68
End: 2018-06-01
Payer: COMMERCIAL

## 2018-06-01 VITALS
SYSTOLIC BLOOD PRESSURE: 137 MMHG | BODY MASS INDEX: 31.69 KG/M2 | OXYGEN SATURATION: 97 % | HEART RATE: 67 BPM | WEIGHT: 184.6 LBS | RESPIRATION RATE: 20 BRPM | DIASTOLIC BLOOD PRESSURE: 75 MMHG

## 2018-06-01 DIAGNOSIS — R05.9 COUGH: Primary | ICD-10-CM

## 2018-06-01 DIAGNOSIS — E11.9 DM TYPE 2, GOAL HBA1C 7%-8% (H): ICD-10-CM

## 2018-06-01 LAB — HBA1C MFR BLD: 6.4 % (ref 0–5.6)

## 2018-06-01 RX ORDER — AZITHROMYCIN 250 MG/1
TABLET, FILM COATED ORAL
Qty: 6 TABLET | Refills: 0 | Status: SHIPPED | OUTPATIENT
Start: 2018-06-01 | End: 2018-10-31

## 2018-06-01 ASSESSMENT — PAIN SCALES - GENERAL: PAINLEVEL: NO PAIN (0)

## 2018-06-01 NOTE — PATIENT INSTRUCTIONS
[facility]: [phone]     [facility] Medication Refill Request Information:  * Please contact your pharmacy regarding ANY request for medication refills.  ** [facility] Prescription Fax = [phone]  * Please allow 3 business days for routine medication refills.  * Please allow 5 business days for controlled substance medication refills.     [facility] Test Result notification information:  *You will be notified with in 7-10 days of your appointment day regarding the results of your test.  If you are on MyChart you will be notified as soon as the provider has reviewed the results and signed off on them.

## 2018-06-01 NOTE — NURSING NOTE
Chief Complaint   Patient presents with     Cough     Has cough with yellow mucous production,eye are watering (for about one week)   Giuliana Lackey LPN 12:35 PM on 6/1/2018    Health Maintenance:  A1c Giuliana Lackey LPN 12:36 PM on 6/1/2018

## 2018-06-01 NOTE — LETTER
Vishnu Viveros  [address]  [place] [address]  : 1950  MRN:  [id number]      2018          Please excuse him from work Tuesday May 29- for medical reasons.    Santiago Brantley MD

## 2018-06-01 NOTE — MR AVS SNAPSHOT
After Visit Summary   6/1/2018    Vishnu Viveros    MRN: [id number]           Patient Information     Date Of Birth          1950        Visit Information        Provider Department      6/1/2018 12:35 PM Santiago Brantley MD [facility]        Today's Diagnoses     Cough    -  1    DM type 2, goal HbA1c 7%-8% (H)          Care Instructions    [facility] Center: [phone]     [facility] Medication Refill Request Information:  * Please contact your pharmacy regarding ANY request for medication refills.  ** [facility] Prescription Fax = [phone]  * Please allow 3 business days for routine medication refills.  * Please allow 5 business days for controlled substance medication refills.     [facility] Test Result notification information:  *You will be notified with in 7-10 days of your appointment day regarding the results of your test.  If you are on MyChart you will be notified as soon as the provider has reviewed the results and signed off on them.          Follow-ups after your visit        Your next 10 appointments already scheduled     Jun 01, 2018  1:15 PM CDT   LAB with  LAB    Health Lab ([facility])    [address] [address] [phone]           Please do not eat 10-12 hours before your appointment if you are coming in fasting for labs on lipids, cholesterol, or glucose (sugar). This does not apply to pregnant women. Water, hot tea and black coffee (with nothing added) are okay. Do not drink other fluids, diet soda or chew gum.            Jun 27, 2018 12:30 PM CDT   NEW PLASTICS with Levy Blue MD   [facility] ([facility])    [address] [address] [phone]              Future tests that were ordered for you today     Open Future Orders        Priority Expected Expires Ordered    HEMOGLOBIN A1C -(Today) Routine  2018            Who to contact     Please call your clinic at [phone] to:    Ask questions about your health    Make or cancel appointments    Discuss your medicines    Learn about your test results    Speak to your doctor            Additional Information About Your Visit        MyChart Information     [employer] is an electronic gateway that provides easy, online access to your medical records. With [employer], you can request a clinic appointment, read your test results, renew a prescription or communicate with your care team.     To sign up for [employer] visit the website at [email or web address]   You will be asked to enter the access code listed below, as well as some personal information. Please follow the directions to create your username and password.     Your access code is: [email or web address]  Expires: 2018  4:43 PM     Your access code will  in 90 days. If you need help or a new code, please contact your [facility] or call [phone] for assistance.        Care EveryWhere ID     This is your Care EveryWhere ID. This could be used by other organizations to access your [facility] medical records  [id number]        Your Vitals Were     Pulse Respirations Pulse Oximetry BMI (Body Mass Index)          67 20 97% 31.69 kg/m2         Blood Pressure from Last 3 Encounters:   18 137/75   18 167/80   18 155/74    Weight from Last 3 Encounters:   18 83.7 kg (184 lb 9.6 oz)   18 85.8 kg (189 lb 3.2 oz)   17 85.9 kg (189 lb 4.8 oz)                 Today's Medication Changes          These changes are accurate as of 18 12:55 PM.  If you have any questions, ask your nurse or doctor.               Start taking these medicines.        Dose/Directions    azithromycin 250 MG tablet   Commonly known as:  ZITHROMAX   Used for:  Cough   Started by:  Santiago Brantley MD        Two tablets first day, then one tablet daily  for four days.   Quantity:  6 tablet   Refills:  0            Where to get your medicines      These medications were sent to [id number] - [place], [place] - [address] AT [facility] [address]  [address], [place] [address]     Phone:  [phone]     azithromycin 250 MG tablet                Primary Care Provider Office Phone # Fax #    Murali Logan [phone]       [address] [facility] [address]        Equal Access to Services     [facility]: Hadii aad ku hadasho Soomaali, waaxda luqadaha, qaybta kaalmada adeegyada, waxdaphne amaya . So [facility] [phone].    ATENCIÓN: Si habla español, tiene a little disposición servicios gratuitos de asistencia lingüística. [facility] [phone].    We comply with applicable federal civil rights laws and [place] laws. We do not discriminate on the basis of race, color, national origin, age, disability, sex, sexual orientation, or gender identity.            Thank you!     Thank you for choosing [facility]  for your care. Our goal is always to provide you with excellent care. Hearing back from our patients is one way we can continue to improve our services. Please take a few minutes to complete the written survey that you may receive in the mail after your visit with us. Thank you!             Your Updated Medication List - Protect others around you: Learn how to safely use, store and throw away your medicines at www.disposemymeds.org.          This list is accurate as of 6/1/18 12:55 PM.  Always use your most recent med list.                   Brand Name Dispense Instructions for use Diagnosis    ALEVE PO      Take 1 tablet by mouth as needed for moderate pain        aspirin 81 MG tablet      Take 1 tablet by mouth daily.        atorvastatin 20 MG tablet    LIPITOR    90 tablet    Take 1 tablet (20 mg) by mouth daily    Type 2 diabetes mellitus treated without  insulin (H)       azithromycin 250 MG tablet    ZITHROMAX    6 tablet    Two tablets first day, then one tablet daily for four days.    Cough       cyclobenzaprine 10 MG tablet    FLEXERIL    42 tablet    Take 0.5 tablets (5 mg) by mouth 3 times daily as needed for muscle spasms    Lumbar radiculopathy       FISH OIL PO      Take 1 capsule by mouth as needed        gabapentin 300 MG capsule    NEURONTIN    270 capsule    One capsule in afternoon, two capsules at bedtime.    Lumbar radiculitis, Diabetic polyneuropathy associated with diabetes mellitus due to underlying condition (H)       lisinopril 5 MG tablet    PRINIVIL/ZESTRIL    90 tablet    Take 1 tablet (5 mg) by mouth daily    Type 2 diabetes mellitus treated without insulin (H), Benign essential hypertension       loratadine 10 MG tablet    CLARITIN    90 tablet    Take 1 tablet (10 mg) by mouth daily as needed    Seasonal allergies       metFORMIN 500 MG 24 hr tablet    GLUCOPHAGE-XR    90 tablet    Take 1 tablet (500 mg) by mouth daily (with dinner)    Type 2 diabetes mellitus treated without insulin (H)       MULTIVITAMINS PO      Take 1 tablet by mouth daily.        naproxen 500 MG tablet    NAPROSYN    30 tablet    Take 1 tablet (500 mg) by mouth 2 times daily as needed for moderate pain Take with food    Chest wall pain       order for DME     1 Device    Back brace    Left leg weakness, Lumbar stenosis       tamsulosin 0.4 MG capsule    FLOMAX    90 capsule    Take 1 capsule (0.4 mg) by mouth daily    Benign non-nodular prostatic hyperplasia, presence of lower urinary tract symptoms unspecified       VIRTUSSIN A/C 100-10 MG/5ML Soln solution   Generic drug:  guaiFENesin-codeine     473 mL    5-10 MLS PO Q 4 H PRN COUGH    Cough       VITAMIN B12 PO      Take 1 tablet by mouth. Pt states he takes this 2 or 3 times a week..        VITAMIN C PO      Take 500 mg by mouth once        vitamin D 1000 units capsule      Take 1 capsule by mouth daily.

## 2018-06-01 NOTE — PROGRESS NOTES
"[facility] [place]   Santiago Brantley MD  06/01/2018      Chief Complaint:   Cough       History of Present Illness:   Vishnu Viveros is a 67 year old male with a history of type II diabetes with associated polyneuropathy, chronic low back pain, and a pulmonary nodule who presents for evaluation of a cough.    Cough:  Vishnu reports that this past Monday he began noticing symptoms including coughing and wheezing. He feels a pressure on his chest as though a \"rock is sitting on it\". He notices that he has significant mucus build up and is often yellow when coughed up. He notes that he does have a sore throat, runny nose, and sometimes a dry nose. He states that when the air conditioning is on that his symptoms worsen. He also notices a rumbling sensation when he tries to sleep, specifically on his left side. Vishnu reports no signs of a fever, ear pain, shortness of breath, vomiting or diarrhea. He states that nobody else around him has gotten sick recently and confirms that he has no history of emphysema or asthma. He reports that he had pneumonia a long time ago and, in addition, quit smoking around 39 years ago. Notably, Vishnu was sent home from work for 4 days due to his symptoms.    DM: parameters to monitor reviewed, due for hgba1c     Review of Systems:   Pertinent items are noted in HPI, remainder of complete ROS is negative.      Active Medications:      Ascorbic Acid (VITAMIN C PO), Take 500 mg by mouth once, Disp: , Rfl:      aspirin 81 MG tablet, Take 1 tablet by mouth daily., Disp: , Rfl:      atorvastatin (LIPITOR) 20 MG tablet, Take 1 tablet (20 mg) by mouth daily, Disp: 90 tablet, Rfl: 3     Cholecalciferol (VITAMIN D) 1000 UNIT capsule, Take 1 capsule by mouth daily., Disp: , Rfl:      Cyanocobalamin (VITAMIN B12 PO), Take 1 tablet by mouth. Pt states he takes this 2 or 3 times a week.. , Disp: , Rfl:      cyclobenzaprine (FLEXERIL) 10 MG tablet, Take 0.5 tablets (5 mg) by " mouth 3 times daily as needed for muscle spasms, Disp: 42 tablet, Rfl: 1     gabapentin (NEURONTIN) 300 MG capsule, One capsule in afternoon, two capsules at bedtime., Disp: 270 capsule, Rfl: 3     lisinopril (PRINIVIL/ZESTRIL) 5 MG tablet, Take 1 tablet (5 mg) by mouth daily, Disp: 90 tablet, Rfl: 3     loratadine (CLARITIN) 10 MG tablet, Take 1 tablet (10 mg) by mouth daily as needed, Disp: 90 tablet, Rfl: 1     metFORMIN (GLUCOPHAGE-XR) 500 MG 24 hr tablet, Take 1 tablet (500 mg) by mouth daily (with dinner), Disp: 90 tablet, Rfl: 3     Multiple Vitamin (MULTIVITAMINS PO), Take 1 tablet by mouth daily., Disp: , Rfl:      naproxen (NAPROSYN) 500 MG tablet, Take 1 tablet (500 mg) by mouth 2 times daily as needed for moderate pain Take with food, Disp: 30 tablet, Rfl: 1     Naproxen Sodium (ALEVE PO), Take 1 tablet by mouth as needed for moderate pain, Disp: , Rfl:      Omega-3 Fatty Acids (FISH OIL PO), Take 1 capsule by mouth as needed, Disp: , Rfl:      tamsulosin (FLOMAX) 0.4 MG capsule, Take 1 capsule (0.4 mg) by mouth daily, Disp: 90 capsule, Rfl: 3     VIRTUSSIN A/C 100-10 MG/5ML SOLN solution, 5-10 MLS PO Q 4 H PRN COUGH, Disp: 473 mL, Rfl: 0     Allergies:   Seasonal allergies     Past Medical History:  Diabetes  Pulmonary nodule  Erectile dysfunction  Abdominal bloating  Vitamin D deficiency   Chronic low back pain  Prostate nodule  Degenerative disc disease, lumbar  Anemia  Positive FIT  Senile nuclear sclerosis  Hearing loss  Vision impairment  Recurrent pterygium   Tubulovillous adenoma of colon  Diabetic polyneuropathy      Past Surgical History:  Appendectomy open child  Biopsy prostate transrectal   Excise pterygium x2  Inject epidural lumbar/scaral single  Inject paravertebral facet joint lumbar/sacral first x2  Left knee surgery     Family History:   Cerebrovascular disease - Brother  Neurologic disorder - Brother  Breast cancer,  - Sister       Social History:   The patient was alone.  Smoking  Status: Former smoker; cigarettes   Smokeless Tobacco: Never   Alcohol Use: 1.5 oz/week       Physical Exam:   /75 (BP Location: Right arm, Patient Position: Sitting, Cuff Size: Adult Regular)  Pulse 67  Resp 20  Wt 83.7 kg (184 lb 9.6 oz)  SpO2 97%  BMI 31.69 kg/m2     Constitutional: Alert. In no distress.  Head: Normocephalic. No masses, lesions, tenderness or abnormalities.  ENT: No neck nodes or sinus tenderness.  Respiratory: Diffusely coarse breath sounds, no wheezes or crackles     Assessment and Plan:  Cough  Note given for missed work. Likely a viral based illness but at his age with diabetes diagnosis we will cover with antibiotics as a precaution. Of note, his shots are up to date for pneumonia vaccine, pertussis and flu.   - azithromycin (ZITHROMAX) 250 MG tablet  Dispense: 6 tablet; Refill: 0    DM type 2, goal HbA1c 7%-8% (H)  His Hemoglobin A1c is due and we will check this today.   - HEMOGLOBIN A1C -(Today)    Follow-up: PRN         Scribe Disclosure:   I, Usha Jacobson, am serving as a scribe to document services personally performed by Santiago Brantley MD at this visit, based upon the provider's statements to me. All documentation has been reviewed by the aforementioned provider prior to being entered into the official medical record.     Portions of this medical record were completed by a scribe. UPON MY REVIEW AND AUTHENTICATION BY ELECTRONIC SIGNATURE, this confirms (a) I performed the applicable clinical services, and (b) the record is accurate.   Santiago Brantley MD

## 2018-10-12 ENCOUNTER — TELEPHONE (OUTPATIENT)
Dept: UROLOGY | Facility: CLINIC | Age: 68
End: 2018-10-12

## 2018-10-12 NOTE — TELEPHONE ENCOUNTER
Health Call Center    Phone Message    May a detailed message be left on voicemail: yes    Reason for Call: Other: Pt said he's had some changes in his condition, but would not tell me what his symtoms are. Pt did not want to schedule appt or be added to the waitlist beucase Dr. Montaño is out until the end of January. He is requesting to speak to a nurse.      Action Taken: Message routed to:  [facility] Urology

## 2018-10-12 NOTE — TELEPHONE ENCOUNTER
[facility] Call Center    Phone Message    May a detailed message be left on voicemail: yes    Reason for Call: Other: Pt returned a call at 6:56PM. He said he was OK for a call back on Monday. Please call him back.     Action Taken: Message routed to:  [facility] Urology

## 2018-10-15 NOTE — TELEPHONE ENCOUNTER
Called patient back several times this morning and left message to discuss making an appointment Little Pearce LPN Staff Nurse

## 2018-10-17 NOTE — TELEPHONE ENCOUNTER
Health Call Center    Phone Message    May a detailed message be left on voicemail: no    Reason for Call: Other: PT recieved a call back from a nurse, but was unavailable at the time. He is returning the call. Please give him a call back to speak with him about the changes in his condition.     Action Taken: Message routed to:  [facility] Urology

## 2018-10-17 NOTE — TELEPHONE ENCOUNTER
Contacted patient who stated he is having increasing LUTS and thinks his prostate is enlarging due to these symptoms. Made appointment with Dr. Montaño for next month.

## 2018-10-24 ENCOUNTER — TELEPHONE (OUTPATIENT)
Dept: INTERNAL MEDICINE | Facility: CLINIC | Age: 68
End: 2018-10-24

## 2018-10-24 NOTE — TELEPHONE ENCOUNTER
Message left with patients home voice mail to call clinic back.  John Payne LPN at 2:15 PM on 10/24/2018

## 2018-10-24 NOTE — TELEPHONE ENCOUNTER
Patient called clinic regarding appointment needed with Dr. Logan sooner than 1 month from now.  Patient was referred to pain clinic last year regarding back pain.  He requesting to be seen there again.  He says the physicians he wants to see there are booked through the end of the year.  He is on the wait list.  Advised patient to discuss with Dr. Logan at his next visit on 10/31/18. John Payne LPN at 2:36 PM on 10/24/2018

## 2018-10-24 NOTE — TELEPHONE ENCOUNTER
[facility] Call Center    Phone Message    May a detailed message be left on voicemail: yes    Reason for Call: Other: Vishnu is unable to get an appointment with a MD in the [facility].  Currently, Dr. Dahl and Dr. Lopez are booked through the end of the year.  Vishnu is on the wait list.  He has declined to see NP Audelia Castillo.  He is requesting that Dr. Logan write a referral to an outside Pain Clinic.     Action Taken: Message routed to:  [facility] nilam minor

## 2018-10-31 ENCOUNTER — OFFICE VISIT (OUTPATIENT)
Dept: FAMILY MEDICINE | Facility: CLINIC | Age: 68
End: 2018-10-31
Payer: COMMERCIAL

## 2018-10-31 VITALS
BODY MASS INDEX: 32.18 KG/M2 | HEART RATE: 73 BPM | HEIGHT: 64 IN | RESPIRATION RATE: 16 BRPM | OXYGEN SATURATION: 97 % | SYSTOLIC BLOOD PRESSURE: 175 MMHG | TEMPERATURE: 98.8 F | WEIGHT: 188.5 LBS | DIASTOLIC BLOOD PRESSURE: 93 MMHG

## 2018-10-31 DIAGNOSIS — E78.9 DISEASE OF LIPID METABOLISM: ICD-10-CM

## 2018-10-31 DIAGNOSIS — M54.16 LUMBAR RADICULITIS: ICD-10-CM

## 2018-10-31 DIAGNOSIS — Z23 NEED FOR PROPHYLACTIC VACCINATION AND INOCULATION AGAINST INFLUENZA: ICD-10-CM

## 2018-10-31 DIAGNOSIS — M51.369 DEGENERATIVE DISC DISEASE, LUMBAR: ICD-10-CM

## 2018-10-31 DIAGNOSIS — N40.1 BENIGN PROSTATIC HYPERPLASIA WITH LOWER URINARY TRACT SYMPTOMS, SYMPTOM DETAILS UNSPECIFIED: ICD-10-CM

## 2018-10-31 DIAGNOSIS — M25.551 HIP PAIN, RIGHT: ICD-10-CM

## 2018-10-31 DIAGNOSIS — H54.7 VISION IMPAIRMENT: ICD-10-CM

## 2018-10-31 DIAGNOSIS — J30.2 SEASONAL ALLERGIES: ICD-10-CM

## 2018-10-31 DIAGNOSIS — E08.42 DIABETIC POLYNEUROPATHY ASSOCIATED WITH DIABETES MELLITUS DUE TO UNDERLYING CONDITION (H): ICD-10-CM

## 2018-10-31 DIAGNOSIS — E11.9 TYPE 2 DIABETES MELLITUS TREATED WITHOUT INSULIN (H): Primary | ICD-10-CM

## 2018-10-31 DIAGNOSIS — M17.0 PRIMARY OSTEOARTHRITIS OF BOTH KNEES: ICD-10-CM

## 2018-10-31 DIAGNOSIS — I10 ESSENTIAL HYPERTENSION: ICD-10-CM

## 2018-10-31 RX ORDER — LOSARTAN POTASSIUM 50 MG/1
50 TABLET ORAL DAILY
Qty: 90 TABLET | Refills: 3 | Status: SHIPPED | OUTPATIENT
Start: 2018-10-31 | End: 2018-11-26 | Stop reason: DRUGHIGH

## 2018-10-31 RX ORDER — ACETAMINOPHEN 500 MG
500-1000 TABLET ORAL EVERY 6 HOURS PRN
Qty: 120 TABLET | Refills: 1 | Status: SHIPPED | OUTPATIENT
Start: 2018-10-31 | End: 2021-06-07

## 2018-10-31 RX ORDER — GABAPENTIN 300 MG/1
CAPSULE ORAL
Qty: 180 CAPSULE | Refills: 3 | Status: SHIPPED | OUTPATIENT
Start: 2018-10-31 | End: 2019-12-11

## 2018-10-31 RX ORDER — ATORVASTATIN CALCIUM 20 MG/1
20 TABLET, FILM COATED ORAL DAILY
Qty: 90 TABLET | Refills: 3 | Status: SHIPPED | OUTPATIENT
Start: 2018-10-31 | End: 2019-11-07

## 2018-10-31 RX ORDER — LORATADINE 10 MG/1
10 TABLET ORAL DAILY PRN
Qty: 90 TABLET | Refills: 3 | Status: SHIPPED | OUTPATIENT
Start: 2018-10-31 | End: 2020-04-22

## 2018-10-31 RX ORDER — LISINOPRIL 10 MG/1
10 TABLET ORAL DAILY
Qty: 90 TABLET | Refills: 3 | Status: SHIPPED | OUTPATIENT
Start: 2018-10-31 | End: 2018-10-31

## 2018-10-31 RX ORDER — METFORMIN HCL 500 MG
500 TABLET, EXTENDED RELEASE 24 HR ORAL
Qty: 90 TABLET | Refills: 3 | Status: SHIPPED | OUTPATIENT
Start: 2018-10-31 | End: 2019-11-29

## 2018-10-31 ASSESSMENT — PAIN SCALES - GENERAL: PAINLEVEL: SEVERE PAIN (7)

## 2018-10-31 NOTE — NURSING NOTE
Vishnu Viveros      1.  Has the patient received the information for the influenza vaccine? YES    2.  Does the patient have any of the following contraindications?     Allergy to eggs? No     Allergic reaction to previous influenza vaccines? No     Any other problems to previous influenza vaccines? No     Paralyzed by Guillain-[place] syndrome? No     Currently pregnant? NO     Current moderate or severe illness? No     Allergy to contact lens solution? No    3.  The vaccine has been administered in the usual fashion and the patient was instructed to wait 20 minutes before leaving the building in the event of an allergic reaction: YES    Recorded by Jayy Cates

## 2018-10-31 NOTE — NURSING NOTE
Chief Complaint   Patient presents with     Physical     here for yearly physical, also wants to talk about pain     Refill Request     needs med refilled       Jayy Cates, EMT 3:55 PM on 10/31/2018.

## 2018-10-31 NOTE — PATIENT INSTRUCTIONS
[facility] Medication Refill Request Information:  * Please contact your pharmacy regarding ANY request for medication refills.  ** [facility] Prescription Fax = [phone]  * Please allow 3 business days for routine medication refills.  * Please allow 5 business days for controlled substance medication refills.     [facility] Test Result notification information:  *You will be notified with in 7-10 days of your appointment day regarding the results of your test.  If you are on MyChart you will be notified as soon as the provider has reviewed the results and signed off on them.    Primary Care Center: [phone]   Stop Naprosyn  Stop lisinopril   Start medications as listed on your medication list   Ask Urologist about flomax

## 2018-10-31 NOTE — PROGRESS NOTES
[facility]  Primary Care Center   Murali Logan MD  10/31/2018      Chief Complaint:    Refill Request   Diabetes  Hypertension has been off medications for 2 months     History of Present Illness:   Vishnu Viveros is a 68 year old male with a history of type II diabetes, polyneuropathy, chronic low back pain, and a pulmonary nodule who presents for evaluation of diabetes and hypertension.     He has right hip and bilateral knee pain. He has an appointment next month for pain management. 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 He is sometimes taking naproxen for pain after work. The pain clinic prescribed him gabapentin in the past, he has not taken this recently.     He ran out of some medications, when he returned to the pharmacy for refills the pharmacist told him to call the clinic. The clinic was not contacted regarding his medications. He has not been on lisinopril nor atorvastatin  for two months. He has an cough over the past few years caused by irritation/tickling of his throat. He thinks it's possible the cough started when he began taking lisinopril therefore will switch to ARB.    He takes metformin for diabetes management, running low on supply but  is still taking this, sometimes forgets. He does occasionally skip metformin because he thought it was making him get up in the night. He is not as active as he used to be, he does not know why. He does not think he is depressed unless he is in the beginning stage and thinks it is possible the lack of energy is due to old age. He has not been checking his blood sugar because he is afraid of seeing the result, he knows people with consistently high fasting blood glucose and does not want to experience this himself. His wife does a good job of reminding him to manage his health with medications and to be active.  He has not had mychart but his wide is encouraging to activate.    He has noticed decreased urine stream, urinary  frequency.. He was taking Flomax which he found helpful. This is another medication he ran out of but he will be seeing Dr. Montaño in urology 11/27 and wants to discuss with him.    Healthcare/Maintenance topics discussed:  1.has upcoming appointment with opthalmology. He is on biotene.   2. Discussed Shingrix  3. He has hearing impairment and may need audiology appt but does not want to schedule at this time.    Review of Systems:   Pertinent items are noted in HPI, remainder of complete ROS is negative.      Active Medications:     Current Outpatient Prescriptions:      acetaminophen (TYLENOL) 500 MG tablet, Take 1-2 tablets (500-1,000 mg) by mouth every 6 hours as needed for pain (arthritis), Disp: 120 tablet, Rfl: 1     Ascorbic Acid (VITAMIN C PO), Take 500 mg by mouth once, Disp: , Rfl:      aspirin 81 MG tablet, Take 1 tablet by mouth daily., Disp: , Rfl:      atorvastatin (LIPITOR) 20 MG tablet, Take 1 tablet (20 mg) by mouth daily, Disp: 90 tablet, Rfl: 3     Cholecalciferol (VITAMIN D) 1000 UNIT capsule, Take 1 capsule by mouth daily., Disp: , Rfl:      Cyanocobalamin (VITAMIN B12 PO), Take 1 tablet by mouth. Pt states he takes this 2 or 3 times a week.. , Disp: , Rfl:      gabapentin (NEURONTIN) 300 MG capsule, One capsule at bedtime for one week the increase to twice daily, Disp: 180 capsule, Rfl: 3     loratadine (CLARITIN) 10 MG tablet, Take 1 tablet (10 mg) by mouth daily as needed for allergies, Disp: 90 tablet, Rfl: 3     losartan (COZAAR) 50 MG tablet, Take 1 tablet (50 mg) by mouth daily, Disp: 90 tablet, Rfl: 3     metFORMIN (GLUCOPHAGE-XR) 500 MG 24 hr tablet, Take 1 tablet (500 mg) by mouth daily (with dinner), Disp: 90 tablet, Rfl: 3     Multiple Vitamin (MULTIVITAMINS PO), Take 1 tablet by mouth daily., Disp: , Rfl:      Omega-3 Fatty Acids (FISH OIL PO), Take 1 capsule by mouth as needed, Disp: , Rfl:      order for DME, Back brace, Disp: 1 Device, Rfl: 0     tamsulosin (FLOMAX) 0.4 MG  "capsule, Take 1 capsule (0.4 mg) by mouth daily, Disp: 90 capsule, Rfl: 3     VIRTUSSIN A/C 100-10 MG/5ML SOLN solution, 5-10 MLS PO Q 4 H PRN COUGH, Disp: 473 mL, Rfl: 0     [DISCONTINUED] atorvastatin (LIPITOR) 20 MG tablet, Take 1 tablet (20 mg) by mouth daily, Disp: 90 tablet, Rfl: 3     [DISCONTINUED] gabapentin (NEURONTIN) 300 MG capsule, One capsule in afternoon, two capsules at bedtime., Disp: 270 capsule, Rfl: 3     [DISCONTINUED] metFORMIN (GLUCOPHAGE-XR) 500 MG 24 hr tablet, Take 1 tablet (500 mg) by mouth daily (with dinner), Disp: 90 tablet, Rfl: 3  No current facility-administered medications for this visit.     Facility-Administered Medications Ordered in Other Visits:      iohexol (OMNIPAQUE) 300 mg/mL injection 10 mL, 10 mL, EPIDURAL, Once, Jamil Rawls,       Allergies:   Seasonal allergies and Nkda [no known drug allergies]      Past Medical History:  Type 2 diabetes mellitus   Pulmonary nodule  Erectile dysfunction  Vitamin D deficiency  Prostate nodule  Degenerative disk disease lumbar  Low back pain with sciatica  Positive FIT  Degenerative joint disease of knee  Hearing loss  Vision impairment  Recurrent pterygium  Senile nuclear sclerosis  Diabetic polyneuropathy     Past Surgical History:  Appendectomy open child  Biopsy prostate transrectal   Excise pterygium x2  Inject epidural lumbar/scaral single  Inject paravertebral facet joint lumbar/sacral first x2  Left knee surgery     Family History:   Brother: cerebrovascular disease, brain hemorrhage  Sister: breast cancer      Social History:     Former smoker quit 1981    Physical Exam:   BP (!) 175/93 (BP Location: Right arm, Patient Position: Sitting, Cuff Size: Adult Regular)  Pulse 73  Temp 98.8  F (37.1  C) (Oral)  Resp 16  Ht 1.63 m (5' 4.17\")  Wt 85.5 kg (188 lb 8 oz)  SpO2 97%  BMI 32.18 kg/m2   BMI= Body mass index is 32.18 kg/(m^2).    Constitutional: Oriented to person, place, and time slightly forgetful related " to short term memory instructions with the visit today and taking medications. Vital signs are noted. Obese. No significant  distress.  HEENT:  Pterygium of right eye. His pupils are equal.  Left TM non erythematous, Right previous procedure of TM. Posterior pharynx non erythematous.  Neck: Normal range of motion.  Neck supple. No tracheal deviation present. No thyromegaly present.   Cardiovascular: Regular rhythm, normal heart sounds. No murmur present. Exam reveals no gallop and no friction rub. Dorsalis pedis and posterior tibialis pulses pulses intact.   Pulmonary/Chest: Effort normal and breath sounds normal. No respiratory distress.    Abdominal:Obese Soft. Bowel sounds are normal. No distension and no mass. No tenderness.    Neurological: Alert and oriented to person, place, and time. Normal strength. Slightly forgetful related to his medications.  Skin: Skin is warm and dry. No rash noted. No erythema. No pallor. Psychiatric: Slightly anxious mood, affect and behavior is normal. Cooperative  Foot exam:  Left foot three toes with onychomycosis. Right foot onychomycosis of great and 4th toes. Bilaterally, Monofilament intact, no skin rashes, no ulcers, no signs of paronychia.    Assessment and Plan:  Vishnu Viveros is a 68 year old male with a history of type II diabetes, polyneuropathy, chronic low back pain, and a pulmonary nodule who presents for evaluation of diabetes and hypertension.     Type 2 diabetes mellitus treated without insulin (H)  Diabetes has been well controlled historically with previous A1c of 6.4%. We will recheck A1c today and continue his medications. He has not been consistently taking his metformin, we discussed the importance of being consistent for good diabetes control.   - metFORMIN (GLUCOPHAGE-XR) 500 MG 24 hr tablet  Dispense: 90 tablet; Refill: 3  - Comprehensive metabolic panel  - Hemoglobin A1c  - Albumin Random Urine Quantitative with Creat Ratio    Essential  hypertension  We discussed that not taking his antihypertensives and statin is bad for his health. Since he had difficulty with the pharmacy I told him to make sure they call the clinic or do so himself if he is out of medications. His previous blood pressures in clinic were high when he was still taking the 5 mg dose of lisinopril. He also developed a dry cough while on lisinopril and we will try changing to losartan. Since his blood pressure was high on 5mg lisinopril we will try a higher equivalent dose of losartan.   - losartan (COZAAR) 50 MG tablet  Dispense: 90 tablet; Refill: 3    Need for prophylactic vaccination and inoculation against influenza  Vaccination given in clinic, we also discussed the shingrix vaccine and it was recommended he check with insurance for coverage.   - FLU VACCINE, INCREASED ANTIGEN, PRESV FREE, AGE 65+ [79064]    Degenerative disc disease, lumbar  He is still taking a D3 supplement, we will check his vitamin D levels with labs.  - Vitamin D Deficiency  - acetaminophen (TYLENOL) 500 MG tablet  Dispense: 120 tablet; Refill: 1    Primary osteoarthritis of both knees  Recommended tylenol for arthritic pains instead of naproxen and ibuprofen.  - acetaminophen (TYLENOL) 500 MG tablet  Dispense: 120 tablet; Refill: 1    Vision impairment  Patient follows with opthalmology, 2/14 exam found no diabetic retinopathy. Some irregularity that was suspect for old Central serous retinopathy or even a pattern dystrophy per chart review which opthalmology will continue to monitor at his annual follow ups.    Disease of lipid metabolism  Patient has been off statin for two months. We will recheck his fasting labs in one month back on statin and renew his statin prescription.  - atorvastatin (LIPITOR) 20 MG tablet  Dispense: 90 tablet; Refill: 3  - Lipid panel reflex to direct LDL Fasting    Benign prostatic hyperplasia with lower urinary tract symptoms, symptom details unspecified  He does not have a  recent PSA screening done. We will have him do this with labs which urology can use at his 11/27 appointment. Flomax was helpful for urinary symptoms, he will discuss this further with urology.   - PSA tumor marker    Lumbar radiculitis, hip pain   We will also restart his gabapentin at bed time for his back and hip pain, he has spinal stenosis at L4/L5. He was planning on getting injections with the pain clinic and has seen Dr. Dahl in the past.   - gabapentin (NEURONTIN) 300 MG capsule  Dispense: 180 capsule; Refill: 3    Seasonal allergies  - loratadine (CLARITIN) 10 MG tablet  Dispense: 90 tablet; Refill: 3    Hearing loss  We will discuss his hearing impairment and audiology referral at his routine physical 11/26.      Follow-up: Return in about 4 weeks (around 11/26/2018).for his complete physical.       All questions were addressed and voiced understanding and agreement with the above.     Scribe Disclosure:   I, Florin Kahn, am serving as a scribe to document services personally performed by Murali Logan MD at this visit, based upon the provider's statements to me. All documentation has been reviewed by the aforementioned provider prior to being entered into the official medical record.     Portions of this medical record were completed by a scribe. UPON MY REVIEW AND AUTHENTICATION BY ELECTRONIC SIGNATURE, this confirms (a) I performed the applicable clinical services, and (b) the record is accurate.   Murali Logan MD

## 2018-10-31 NOTE — MR AVS SNAPSHOT
After Visit Summary   10/31/2018    Vishnu Viveros    MRN: [id number]           Patient Information     Date Of Birth          1950        Visit Information        Provider Department      10/31/2018 4:00 PM Murali Logan MD [facility]        Today's Diagnoses     Type 2 diabetes mellitus treated without insulin (H)    -  1    Essential hypertension        Need for prophylactic vaccination and inoculation against influenza        Degenerative disc disease, lumbar        Primary osteoarthritis of both knees        Hip pain, right        Vision impairment        Disease of lipid metabolism        Benign prostatic hyperplasia with lower urinary tract symptoms, symptom details unspecified        Lumbar radiculitis        Diabetic polyneuropathy associated with diabetes mellitus due to underlying condition (H)        Seasonal allergies          Care Instructions    Primary Care Center Medication Refill Request Information:  * Please contact your pharmacy regarding ANY request for medication refills.  ** [facility] Prescription Fax = [phone]  * Please allow 3 business days for routine medication refills.  * Please allow 5 business days for controlled substance medication refills.     Primary Care Center Test Result notification information:  *You will be notified with in 7-10 days of your appointment day regarding the results of your test.  If you are on MyChart you will be notified as soon as the provider has reviewed the results and signed off on them.    Primary Care Center: [phone]   Stop Naprosyn  Stop lisinopril   Start medications as listed on your medication list   Ask Urologist about flomax          Follow-ups after your visit        Follow-up notes from your care team     Write patient with results Return in about 4 weeks (around 11/26/2018).      Your next 10 appointments already scheduled     Nov 26, 2018  2:00 PM CST   LAB with [facility]  [facility] [place])    [address] [place] [address]  [address] [facility] [id number] [phone]           Please do not eat 10-12 hours before your appointment if you are coming in fasting for labs on lipids, cholesterol, or glucose (sugar). This does not apply to pregnant women. Water, hot tea and black coffee (with nothing added) are okay. Do not drink other fluids, diet soda or chew gum.            Nov 26, 2018  2:45 PM CST   (Arrive by 2:30 PM)   PHYSICAL with Murali Logan MD   [facility] ([facility])    [address] [place] [address]  [address] [facility] [id number] [phone]            Nov 27, 2018  9:00 AM CST   (Arrive by 8:45 AM)   Return Visit with Leandro Montaño MD   [facility] ([facility])    [address] [place] [id number] [phone]            Nov 28, 2018  2:00 PM CST   NEW PLASTICS with Levy Blue MD   [facility] ([facility])    [address]  [address] [address] [phone]              Future tests that were ordered for you today     Open Future Orders        Priority Expected Expires Ordered    Lipid panel reflex to direct LDL Fasting Routine 11/1/2018 10/31/2019 10/31/2018    Comprehensive metabolic panel Routine 11/1/2018 10/31/2019 10/31/2018    PSA tumor marker Routine 11/1/2018 10/31/2019 10/31/2018    Hemoglobin A1c Routine 11/1/2018 10/31/2019 10/31/2018    Albumin Random Urine Quantitative with Creat Ratio Routine 11/1/2018 10/31/2019 10/31/2018    Vitamin D Deficiency Routine 11/1/2018 10/31/2019 10/31/2018            Who to contact     Please call your clinic at [phone] to:    Ask questions about your health    Make or cancel appointments    Discuss your medicines    Learn about your test results    Speak to your doctor            Additional  "Information About Your Visit        [employer] Information     [employer] is an electronic gateway that provides easy, online access to your medical records. With [employer], you can request a clinic appointment, read your test results, renew a prescription or communicate with your care team.     To sign up for [employer] visit the website at [email or web address]   You will be asked to enter the access code listed below, as well as some personal information. Please follow the directions to create your username and password.     Your access code is: [email or web address]  Expires: 2019  6:30 AM     Your access code will  in 90 days. If you need help or a new code, please contact your [facility] or call [phone] for assistance.        Care EveryWhere ID     This is your Care EveryWhere ID. This could be used by other organizations to access your [facility] medical records  [id number]        Your Vitals Were     Pulse Temperature Respirations Height Pulse Oximetry BMI (Body Mass Index)    73 98.8  F (37.1  C) (Oral) 16 1.63 m (5' 4.17\") 97% 32.18 kg/m2       Blood Pressure from Last 3 Encounters:   10/31/18 (!) 175/93   18 137/75   18 167/80    Weight from Last 3 Encounters:   10/31/18 85.5 kg (188 lb 8 oz)   18 83.7 kg (184 lb 9.6 oz)   18 85.8 kg (189 lb 3.2 oz)              We Performed the Following     FLU VACCINE, INCREASED ANTIGEN, PRESV FREE, AGE 65+ [80554]          Today's Medication Changes          These changes are accurate as of 10/31/18  4:50 PM.  If you have any questions, ask your nurse or doctor.               Start taking these medicines.        Dose/Directions    acetaminophen 500 MG tablet   Commonly known as:  TYLENOL   Used for:  Degenerative disc disease, lumbar, Primary osteoarthritis of both knees   Started by:  Murali Logan MD        Dose:  500-1000 mg   Take 1-2 tablets (500-1,000 mg) by mouth every 6 hours as needed for pain " (arthritis)   Quantity:  120 tablet   Refills:  1       losartan 50 MG tablet   Commonly known as:  COZAAR   Used for:  Essential hypertension   Started by:  Murali Logan MD        Dose:  50 mg   Take 1 tablet (50 mg) by mouth daily   Quantity:  90 tablet   Refills:  3         These medicines have changed or have updated prescriptions.        Dose/Directions    gabapentin 300 MG capsule   Commonly known as:  NEURONTIN   This may have changed:  additional instructions   Used for:  Lumbar radiculitis, Diabetic polyneuropathy associated with diabetes mellitus due to underlying condition (H)   Changed by:  Murali Logan MD        One capsule at bedtime for one week the increase to twice daily   Quantity:  180 capsule   Refills:  3       loratadine 10 MG tablet   Commonly known as:  CLARITIN   This may have changed:  reasons to take this   Used for:  Seasonal allergies   Changed by:  Murali Logan MD        Dose:  10 mg   Take 1 tablet (10 mg) by mouth daily as needed for allergies   Quantity:  90 tablet   Refills:  3         Stop taking these medicines if you haven't already. Please contact your care team if you have questions.     azithromycin 250 MG tablet   Commonly known as:  ZITHROMAX   Stopped by:  Murali Logan MD           lisinopril 5 MG tablet   Commonly known as:  PRINIVIL/ZESTRIL   Stopped by:  Murali Logan MD                Where to get your medicines      These medications were sent to [facility] [address] [place] [address] AT [place] [address], [facility] [address]     Phone:  [phone]     acetaminophen 500 MG tablet    atorvastatin 20 MG tablet    gabapentin 300 MG capsule    loratadine 10 MG tablet    losartan 50 MG tablet    metFORMIN 500 MG 24 hr tablet                Primary Care Provider Office Phone # Fax #    Murali Logan [phone]       [address] [place] [address]  [address]        Equal Access to Services     [facility]: Hadii dolores lizama brady Finn, wapauda luqcathi, qatimta yonirubenscarl galindo. So [facility] [phone].    ATENCIÓN: Si habla español, tiene a little disposición servicios gratuitos de asistencia lingüística. Llame al [phone].    We comply with applicable federal civil rights laws and [place] laws. We do not discriminate on the basis of race, color, national origin, age, disability, sex, sexual orientation, or gender identity.            Thank you!     Thank you for choosing [facility]  for your care. Our goal is always to provide you with excellent care. Hearing back from our patients is one way we can continue to improve our services. Please take a few minutes to complete the written survey that you may receive in the mail after your visit with us. Thank you!             Your Updated Medication List - Protect others around you: Learn how to safely use, store and throw away your medicines at www.disposemymeds.org.          This list is accurate as of 10/31/18  4:50 PM.  Always use your most recent med list.                   Brand Name Dispense Instructions for use Diagnosis    acetaminophen 500 MG tablet    TYLENOL    120 tablet    Take 1-2 tablets (500-1,000 mg) by mouth every 6 hours as needed for pain (arthritis)    Degenerative disc disease, lumbar, Primary osteoarthritis of both knees       aspirin 81 MG tablet      Take 1 tablet by mouth daily.        atorvastatin 20 MG tablet    LIPITOR    90 tablet    Take 1 tablet (20 mg) by mouth daily    Type 2 diabetes mellitus treated without insulin (H)       FISH OIL PO      Take 1 capsule by mouth as needed        gabapentin 300 MG capsule    NEURONTIN    180 capsule    One capsule at bedtime for one week the increase to twice daily    Lumbar radiculitis, Diabetic polyneuropathy associated with diabetes mellitus due to underlying condition (H)        loratadine 10 MG tablet    CLARITIN    90 tablet    Take 1 tablet (10 mg) by mouth daily as needed for allergies    Seasonal allergies       losartan 50 MG tablet    COZAAR    90 tablet    Take 1 tablet (50 mg) by mouth daily    Essential hypertension       metFORMIN 500 MG 24 hr tablet    GLUCOPHAGE-XR    90 tablet    Take 1 tablet (500 mg) by mouth daily (with dinner)    Type 2 diabetes mellitus treated without insulin (H)       MULTIVITAMINS PO      Take 1 tablet by mouth daily.        order for DME     1 Device    Back brace    Left leg weakness, Lumbar stenosis       tamsulosin 0.4 MG capsule    FLOMAX    90 capsule    Take 1 capsule (0.4 mg) by mouth daily    Benign non-nodular prostatic hyperplasia, presence of lower urinary tract symptoms unspecified       VIRTUSSIN A/C 100-10 MG/5ML Soln solution   Generic drug:  guaiFENesin-codeine     473 mL    5-10 MLS PO Q 4 H PRN COUGH    Cough       VITAMIN B12 PO      Take 1 tablet by mouth. Pt states he takes this 2 or 3 times a week..        VITAMIN C PO      Take 500 mg by mouth once        vitamin D 1000 units capsule      Take 1 capsule by mouth daily.

## 2018-11-19 DIAGNOSIS — E78.9 DISEASE OF LIPID METABOLISM: ICD-10-CM

## 2018-11-19 DIAGNOSIS — N40.1 BENIGN PROSTATIC HYPERPLASIA WITH LOWER URINARY TRACT SYMPTOMS, SYMPTOM DETAILS UNSPECIFIED: ICD-10-CM

## 2018-11-19 DIAGNOSIS — E11.9 TYPE 2 DIABETES MELLITUS TREATED WITHOUT INSULIN (H): ICD-10-CM

## 2018-11-19 DIAGNOSIS — M51.369 DEGENERATIVE DISC DISEASE, LUMBAR: ICD-10-CM

## 2018-11-19 LAB
ALBUMIN SERPL-MCNC: 3.9 G/DL (ref 3.4–5)
ALP SERPL-CCNC: 60 U/L (ref 40–150)
ALT SERPL W P-5'-P-CCNC: 32 U/L (ref 0–70)
ANION GAP SERPL CALCULATED.3IONS-SCNC: 4 MMOL/L (ref 3–14)
AST SERPL W P-5'-P-CCNC: 23 U/L (ref 0–45)
BILIRUB SERPL-MCNC: 0.4 MG/DL (ref 0.2–1.3)
BUN SERPL-MCNC: 14 MG/DL (ref 7–30)
CALCIUM SERPL-MCNC: 9.1 MG/DL (ref 8.5–10.1)
CHLORIDE SERPL-SCNC: 107 MMOL/L (ref 94–109)
CHOLEST SERPL-MCNC: 112 MG/DL
CO2 SERPL-SCNC: 29 MMOL/L (ref 20–32)
CREAT SERPL-MCNC: 0.89 MG/DL (ref 0.66–1.25)
CREAT UR-MCNC: 129 MG/DL
GFR SERPL CREATININE-BSD FRML MDRD: 84 ML/MIN/1.7M2
GLUCOSE SERPL-MCNC: 107 MG/DL (ref 70–99)
HBA1C MFR BLD: 6.5 % (ref 0–5.6)
HDLC SERPL-MCNC: 55 MG/DL
LDLC SERPL CALC-MCNC: 43 MG/DL
MICROALBUMIN UR-MCNC: 24 MG/L
MICROALBUMIN/CREAT UR: 18.22 MG/G CR (ref 0–17)
NONHDLC SERPL-MCNC: 57 MG/DL
POTASSIUM SERPL-SCNC: 4.3 MMOL/L (ref 3.4–5.3)
PROT SERPL-MCNC: 7.6 G/DL (ref 6.8–8.8)
PSA SERPL-MCNC: 4.67 UG/L (ref 0–4)
SODIUM SERPL-SCNC: 139 MMOL/L (ref 133–144)
TRIGL SERPL-MCNC: 68 MG/DL

## 2018-11-20 LAB — DEPRECATED CALCIDIOL+CALCIFEROL SERPL-MC: 46 UG/L (ref 20–75)

## 2018-11-23 ENCOUNTER — PRE VISIT (OUTPATIENT)
Dept: UROLOGY | Facility: CLINIC | Age: 68
End: 2018-11-23

## 2018-11-26 ENCOUNTER — OFFICE VISIT (OUTPATIENT)
Dept: FAMILY MEDICINE | Facility: CLINIC | Age: 68
End: 2018-11-26
Payer: COMMERCIAL

## 2018-11-26 VITALS
DIASTOLIC BLOOD PRESSURE: 87 MMHG | TEMPERATURE: 97.5 F | WEIGHT: 192.6 LBS | SYSTOLIC BLOOD PRESSURE: 156 MMHG | HEART RATE: 65 BPM | BODY MASS INDEX: 32.88 KG/M2 | OXYGEN SATURATION: 100 % | HEIGHT: 64 IN

## 2018-11-26 DIAGNOSIS — Z13.89 SCREENING FOR DIABETIC PERIPHERAL NEUROPATHY: ICD-10-CM

## 2018-11-26 DIAGNOSIS — E08.42 DIABETIC POLYNEUROPATHY ASSOCIATED WITH DIABETES MELLITUS DUE TO UNDERLYING CONDITION (H): ICD-10-CM

## 2018-11-26 DIAGNOSIS — E11.9 TYPE 2 DIABETES MELLITUS TREATED WITHOUT INSULIN (H): Primary | ICD-10-CM

## 2018-11-26 DIAGNOSIS — R35.0 URINARY FREQUENCY: ICD-10-CM

## 2018-11-26 DIAGNOSIS — R97.20 ELEVATED PROSTATE SPECIFIC ANTIGEN (PSA): ICD-10-CM

## 2018-11-26 DIAGNOSIS — I10 BENIGN ESSENTIAL HYPERTENSION: ICD-10-CM

## 2018-11-26 DIAGNOSIS — M54.16 LUMBAR RADICULOPATHY: ICD-10-CM

## 2018-11-26 RX ORDER — LOSARTAN POTASSIUM 100 MG/1
100 TABLET ORAL DAILY
Qty: 90 TABLET | Refills: 3 | Status: SHIPPED | OUTPATIENT
Start: 2018-11-26 | End: 2019-12-11

## 2018-11-26 ASSESSMENT — PAIN SCALES - GENERAL: PAINLEVEL: SEVERE PAIN (7)

## 2018-11-26 NOTE — MR AVS SNAPSHOT
After Visit Summary   11/26/2018    Vishnu Viveros    MRN: [id number]           Patient Information     Date Of Birth          1950        Visit Information        Provider Department      11/26/2018 2:45 PM Murali Logan MD [facility]        Today's Diagnoses     Type 2 diabetes mellitus treated without insulin (H)    -  1    Screening for diabetic peripheral neuropathy        Lumbar radiculopathy        Diabetic polyneuropathy associated with diabetes mellitus due to underlying condition (H)        Benign essential hypertension        Elevated prostate specific antigen (PSA)        Urinary frequency          Care Instructions    Primary Care Center Medication Refill Request Information:  * Please contact your pharmacy regarding ANY request for medication refills.  ** [facility] Prescription Fax = [phone]  * Please allow 3 business days for routine medication refills.  * Please allow 5 business days for controlled substance medication refills.     Primary Care Center Test Result notification information:  *You will be notified with in 7-10 days of your appointment day regarding the results of your test.  If you are on MyChart you will be notified as soon as the provider has reviewed the results and signed off on them.    Primary Care Center: [phone]       Pain Management, 4th Floor [facility] [phone]          Follow-ups after your visit        Additional Services     [facility]FERR[facility]       Your provider has referred you to: [facility], located on the 4th Floor of the [facility] Please call [phone] to make an appointment.     Clinic is located at:   [facility]   [address] [place], [place] [id number]      Please complete the following questions:    Procedure/Referral: Referral Only -  Comprehensive Evaluation and Management    What is your diagnosis for the  patient's pain? Radicular pain left L5    What are your specific questions for the pain specialist? Non surgical intervention    Are there any red flags that may impact the assessment or management of the patient? None    REGARDING OPIOID MEDICATIONS:  The discussion of opioids management, appropriateness of therapy, and dosing will be discussed in patients being seen for evaluation.  The pain management clinics are not long-term prescribing clinics, with transition of prescribing of medications ultimately going back to the referring provider/PCP.  If prescribing is taken over at the pain clinic, it is in actively involved patients whom are appropriate for opioids, urine drug screening is completed, and long-term prescribing plan has been determined.  Therefore, we will not be automatically taking over prescribing at the patient's first visit.  Is this agreeable to you? agrees.    Please be aware that coverage of these services is subject to the terms and limitations of your health insurance plan.  Call member services at your health plan with any benefit or coverage questions.      Please bring the following with you to your appointment or have sent to the [facility]:    (1) Any X-Rays, CTs or MRIs which have been performed that are not in Epic.  Contact the facility where they were done to arrange for  prior to your scheduled appointment.  Any new CT, MRI or other procedures ordered by your specialist must be performed at a [facility] facility or coordinated by your clinic's referral office.    (2) List of current medications   (3) This referral request   (4) Any documents/labs given to you for this referral                  Follow-up notes from your care team     Write patient with results Return in about 3 months (around 2/26/2019).      Your next 10 appointments already scheduled     Nov 27, 2018  9:00 AM CST   (Arrive by 8:45 AM)   Return Visit with Leandro Montaño MD   [facility] Urology and  Inst for Prostate and Urologic Cancers ([facility])    [address]  [address] [facility] [address] [phone]            Nov 28, 2018  2:00 PM CST   NEW PLASTICS with Levy Blue MD   [facility] Peds Eye General ([facility])    [address] [address]   [phone]            Jan 03, 2019  2:30 PM CST   (Arrive by 2:15 PM)   Return Visit with Tiffany Dahl MD   [facility] for Comprehensive Pain Management ([facility])    [address] [place] [id number] [phone]            Mar 04, 2019  2:45 PM CST   (Arrive by 2:30 PM)   Return Visit with Murali Logan MD   [facility] ([facility])    [address] [place] [id number] [phone]              Who to contact     Please call your clinic at [phone] to:    Ask questions about your health    Make or cancel appointments    Discuss your medicines    Learn about your test results    Speak to your doctor            Additional Information About Your Visit        MyVR Information     MyVR gives you secure access to your electronic health record. If you see a primary care provider, you can also send messages to your care team and make appointments. If you have questions, please call your primary care clinic.  If you do not have a primary care provider, please call [phone] and they will assist you.      MyVR is an electronic gateway that provides easy, online access to your medical records. With MyVR, you can request a clinic appointment, read your test results, renew a prescription or communicate with your care team.     To access your existing account, please contact your [facility] or call [phone] for assistance.        Care EveryWhere ID     This is your Care EveryWhere ID. This  "could be used by other organizations to access your [facility] medical records  [id number]        Your Vitals Were     Pulse Temperature Height Pulse Oximetry BMI (Body Mass Index)       65 97.5  F (36.4  C) (Oral) 1.626 m (5' 4\") 100% 33.06 kg/m2        Blood Pressure from Last 3 Encounters:   11/26/18 156/87   10/31/18 (!) 175/93   06/01/18 137/75    Weight from Last 3 Encounters:   11/26/18 87.4 kg (192 lb 9.6 oz)   10/31/18 85.5 kg (188 lb 8 oz)   06/01/18 83.7 kg (184 lb 9.6 oz)              We Performed the Following     FOOT EXAM - NO CHARGE     [facility] REF[facility]          Today's Medication Changes          These changes are accurate as of 11/26/18  4:08 PM.  If you have any questions, ask your nurse or doctor.               These medicines have changed or have updated prescriptions.        Dose/Directions    losartan 100 MG tablet   Commonly known as:  COZAAR   This may have changed:    - medication strength  - how much to take   Used for:  Benign essential hypertension   Changed by:  Murali Logan MD        Dose:  100 mg   Take 1 tablet (100 mg) by mouth daily   Quantity:  90 tablet   Refills:  3            Where to get your medicines      These medications were sent to [facility], [address] AT [facility] 10  [address] [facility] [address], [facility] [address]     Phone:  [phone]     losartan 100 MG tablet                Primary Care Provider Office Phone # Fax #    Murali Logan [phone]       [address] [address]        Equal Access to Services     [facility] JOELLEN AH: Sil Finn, wapauda luqadaha, qaybta kaalmada cecil, carl lucas. So [facility] [phone].    ATENCIÓN: Si habla español, tiene a little disposición servicios gratuitos de asistencia lingüística. Llame al [phone].    We comply with applicable federal civil rights " laws and [place] laws. We do not discriminate on the basis of race, color, national origin, age, disability, sex, sexual orientation, or gender identity.            Thank you!     Thank you for choosing [facility] PRIMARY CARE CLINIC  for your care. Our goal is always to provide you with excellent care. Hearing back from our patients is one way we can continue to improve our services. Please take a few minutes to complete the written survey that you may receive in the mail after your visit with us. Thank you!             Your Updated Medication List - Protect others around you: Learn how to safely use, store and throw away your medicines at www.disposemymeds.org.          This list is accurate as of 11/26/18  4:08 PM.  Always use your most recent med list.                   Brand Name Dispense Instructions for use Diagnosis    acetaminophen 500 MG tablet    TYLENOL    120 tablet    Take 1-2 tablets (500-1,000 mg) by mouth every 6 hours as needed for pain (arthritis)    Degenerative disc disease, lumbar, Primary osteoarthritis of both knees       aspirin 81 MG tablet    ASA     Take 1 tablet by mouth daily.        atorvastatin 20 MG tablet    LIPITOR    90 tablet    Take 1 tablet (20 mg) by mouth daily    Type 2 diabetes mellitus treated without insulin (H)       FISH OIL PO      Take 1 capsule by mouth as needed        gabapentin 300 MG capsule    NEURONTIN    180 capsule    One capsule at bedtime for one week the increase to twice daily    Lumbar radiculitis, Diabetic polyneuropathy associated with diabetes mellitus due to underlying condition (H)       loratadine 10 MG tablet    CLARITIN    90 tablet    Take 1 tablet (10 mg) by mouth daily as needed for allergies    Seasonal allergies       losartan 100 MG tablet    COZAAR    90 tablet    Take 1 tablet (100 mg) by mouth daily    Benign essential hypertension       metFORMIN 500 MG 24 hr tablet    GLUCOPHAGE-XR    90 tablet    Take 1 tablet (500 mg) by mouth daily  (with dinner)    Type 2 diabetes mellitus treated without insulin (H)       MULTIVITAMINS PO      Take 1 tablet by mouth daily.        order for DME     1 Device    Back brace    Left leg weakness, Lumbar stenosis       tamsulosin 0.4 MG capsule    FLOMAX    90 capsule    Take 1 capsule (0.4 mg) by mouth daily    Benign non-nodular prostatic hyperplasia, presence of lower urinary tract symptoms unspecified       VIRTUSSIN A/C 100-10 MG/5ML Soln solution   Generic drug:  guaiFENesin-codeine     473 mL    5-10 MLS PO Q 4 H PRN COUGH    Cough       VITAMIN B12 PO      Take 1 tablet by mouth. Pt states he takes this 2 or 3 times a week..        VITAMIN C PO      Take 500 mg by mouth once        vitamin D 1000 units capsule      Take 1 capsule by mouth daily.

## 2018-11-26 NOTE — PATIENT INSTRUCTIONS
Primary Care Center Medication Refill Request Information:  * Please contact your pharmacy regarding ANY request for medication refills.  ** [facility] Prescription Fax = [phone]  * Please allow 3 business days for routine medication refills.  * Please allow 5 business days for controlled substance medication refills.     Primary Care Center Test Result notification information:  *You will be notified with in 7-10 days of your appointment day regarding the results of your test.  If you are on MyChart you will be notified as soon as the provider has reviewed the results and signed off on them.    Primary [facility]: [phone]       Pain Management, 4th Floor [facility] [phone]

## 2018-11-26 NOTE — NURSING NOTE
Chief Complaint   Patient presents with     Physical     here for physical     Back Pain     tingling down left, pain in back, feels like pinching       Jayy Cates, EMT 2:58 PM on 11/26/2018.

## 2018-11-26 NOTE — PROGRESS NOTES
[facility]  Primary Care Center   Murali Logan MD  11/26/2018      Chief Complaint:   Physical and Back Pain       History of Present Illness:   Vishnu Viveros is a 68 year old male with a history of type II diabetes,essential hypertension, polyneuropathy, chronic low back pain, and a pulmonary nodule who presents for a follow up on chronic health conditions. His main issue is back pain with left L5 radicular symptoms into left foot with weakness confirmed with EMG. He saw the pain clinic a year ago. Pain travels from the lumbar down through the left leg. EMG showed nerve impingement at L5. He had a steroid injection three years ago which helped him. He is interested in non surgical intervention for pain. He is taking Gabapentin recently incresed which is helping him get about one additional hour of sleep. He stopped going to the gym and is in the habit of watching TV when he gets home, he recognizes he needs to be more active. He has shortness of breath when he first gets up but it improves once he starts moving as he is decomditioned.     He continues to take Metformin for diabetes management. A1c was up slightly from 6.4 to 6.5%. His urine albumin was slightly high at 18.22. He is tolerating Losartan 50 mg well after discontinuing lisinopril ( cough), there has been no cough with losartan. His blood pressure is still high today, 156/87. He takes  a glass of wine with dinner but is open to stopping. He does eat a lot of meat in his diet. He was concerned about stomach problems since he is taking so many new medications but is not having issues so far. His daughter has many stomach concerns and always is talking about the gut biome. He also eats foods native to [place], including a flat bread, sauces, and a lot of yogurt.    PSA was elevated in 11/19 labs. He will be seeing urology 11/27.    Review of Systems:   Pertinent items are noted in HPI, remainder of complete ROS is negative.      Active Medications:      Current Outpatient Prescriptions:      acetaminophen (TYLENOL) 500 MG tablet, Take 1-2 tablets (500-1,000 mg) by mouth every 6 hours as needed for pain (arthritis), Disp: 120 tablet, Rfl: 1     Ascorbic Acid (VITAMIN C PO), Take 500 mg by mouth once, Disp: , Rfl:      aspirin 81 MG tablet, Take 1 tablet by mouth daily., Disp: , Rfl:      atorvastatin (LIPITOR) 20 MG tablet, Take 1 tablet (20 mg) by mouth daily, Disp: 90 tablet, Rfl: 3     Cholecalciferol (VITAMIN D) 1000 UNIT capsule, Take 1 capsule by mouth daily., Disp: , Rfl:      Cyanocobalamin (VITAMIN B12 PO), Take 1 tablet by mouth. Pt states he takes this 2 or 3 times a week.. , Disp: , Rfl:      gabapentin (NEURONTIN) 300 MG capsule, One capsule at bedtime for one week the increase to twice daily, Disp: 180 capsule, Rfl: 3     loratadine (CLARITIN) 10 MG tablet, Take 1 tablet (10 mg) by mouth daily as needed for allergies, Disp: 90 tablet, Rfl: 3     losartan (COZAAR) 100 MG tablet, Take 1 tablet (100 mg) by mouth daily, Disp: 90 tablet, Rfl: 3     metFORMIN (GLUCOPHAGE-XR) 500 MG 24 hr tablet, Take 1 tablet (500 mg) by mouth daily (with dinner), Disp: 90 tablet, Rfl: 3     Multiple Vitamin (MULTIVITAMINS PO), Take 1 tablet by mouth daily., Disp: , Rfl:      Omega-3 Fatty Acids (FISH OIL PO), Take 1 capsule by mouth as needed, Disp: , Rfl:      order for DME, Back brace, Disp: 1 Device, Rfl: 0     tamsulosin (FLOMAX) 0.4 MG capsule, Take 1 capsule (0.4 mg) by mouth daily, Disp: 90 capsule, Rfl: 3     VIRTUSSIN A/C 100-10 MG/5ML SOLN solution, 5-10 MLS PO Q 4 H PRN COUGH, Disp: 473 mL, Rfl: 0     [DISCONTINUED] losartan (COZAAR) 50 MG tablet, Take 1 tablet (50 mg) by mouth daily, Disp: 90 tablet, Rfl: 3  No current facility-administered medications for this visit.     Facility-Administered Medications Ordered in Other Visits:      iohexol (OMNIPAQUE) 300 mg/mL injection 10 mL, 10 mL, EPIDURAL, Once, Jamil Rawls, DO      Allergies:   Seasonal  "allergies and Nkda [no known drug allergies]      Past Medical History:  Type 2 diabetes mellitus   Pulmonary nodule  Erectile dysfunction  Vitamin D deficiency  Pulmonary nodule  Prostate nodule  Degenerative disk disease of lumbar with radiculopathy   Anemia  Positive FIT  Degenerative joint disease of knee  Hearing loss  Hearing loss  Vision impairment  Recurrent pterygium  Polyneuropathy  Tubulovillous adenoma of colon     Past Surgical History:  Open appendectomy  Prostate biopsy  Excise pterygium x2  Inject lumbar/sacral x3  Left knee surgery    Family History:   Half brother: cerebrovascular disease,    Brother: brain hemorrhage, alive and doing well now  Sister: breast cancer, 40s  Mother: GI issue,  from unknown cause in 70s  Father:  in [place] in his 80s, he did not recover from an eye surgery, went blind, was bed ridden with a tremor  Son: healthy    Social History:     Former smoker, quit in   Glass of wine in the evening before bed    Physical Exam:   /87 (BP Location: Right arm, Patient Position: Sitting, Cuff Size: Adult Regular)  Pulse 65  Temp 97.5  F (36.4  C) (Oral)  Ht 1.626 m (5' 4\")  Wt 87.4 kg (192 lb 9.6 oz)  SpO2 100%  BMI 33.06 kg/m2   BMI= Body mass index is 33.06 kg/(m^2).    Constitutional: Oriented to person, place, and time slightly forgetful related to short term memory instructions with the visit today and taking medications. Vital signs are noted. Obese. No significant  distress.  HEENT:  Pterygium of right eye. His pupils are equal.  Left TM non erythematous, Right previous procedure of TM. Posterior pharynx non erythematous.  Neck: Normal range of motion.  Neck supple. No tracheal deviation present. No thyromegaly present.   Cardiovascular: Regular rhythm, normal heart sounds. No murmur present. Exam reveals no gallop and no friction rub. Dorsalis pedis and posterior tibialis pulses pulses intact.   Pulmonary/Chest: Effort normal and breath " sounds normal. No respiratory distress.    Abdominal:Obese Soft. Bowel sounds are normal. No distension and no mass. No tenderness.    Neurological: Alert and oriented to person, place, and time. Normal strength. Slightly forgetful related to his medications. Weakness in left leg   Skin: Skin is warm and dry. No rash noted. No erythema. No pallor. Psychiatric: Slightly anxious mood, affect and behavior is normal. Cooperative  Psychological : Mood stable interactive motivated for change   PHQ-9 SCORE 11/27/2017   PHQ-9 Total Score 5     Foot exam:  Left foot three toes with onychomycosis. Right foot onychomycosis of great and 4th toes. Bilaterally, Monofilament intact, no skin rashes, no ulcers, no signs of paronychia. Dorsalis pedis and posterior tibialis pulses intact. Sensation to vibration intact    BP Readings from Last 5 Encounters:   11/26/18 156/87   10/31/18 (!) 175/93   06/01/18 137/75   05/02/18 167/80   01/04/18 155/74       Labs 11/19  Component      Latest Ref Rng & Units 11/19/2018   Sodium      133 - 144 mmol/L 139   Potassium      3.4 - 5.3 mmol/L 4.3   Chloride      94 - 109 mmol/L 107   Carbon Dioxide      20 - 32 mmol/L 29   Anion Gap      3 - 14 mmol/L 4   Glucose      70 - 99 mg/dL 107 (H)   Urea Nitrogen      7 - 30 mg/dL 14   Creatinine      0.66 - 1.25 mg/dL 0.89   GFR Estimate      >60 mL/min/1.7m2 84   GFR Estimate If Black      >60 mL/min/1.7m2 >90   Calcium      8.5 - 10.1 mg/dL 9.1   Bilirubin Total      0.2 - 1.3 mg/dL 0.4   Albumin      3.4 - 5.0 g/dL 3.9   Protein Total      6.8 - 8.8 g/dL 7.6   Alkaline Phosphatase      40 - 150 U/L 60   ALT      0 - 70 U/L 32   AST      0 - 45 U/L 23   Cholesterol      <200 mg/dL 112   Triglycerides      <150 mg/dL 68   HDL Cholesterol      >39 mg/dL 55   LDL Cholesterol Calculated      <100 mg/dL 43   Non HDL Cholesterol      <130 mg/dL 57   Creatinine Urine      mg/dL 129   Albumin Urine mg/L      mg/L 24   Albumin Urine mg/g Cr      0 - 17 mg/g  Cr 18.22 (H)   PSA      0 - 4 ug/L 4.67 (H)   Hemoglobin A1C      0 - 5.6 % 6.5 (H)   Vitamin D Deficiency screening      20 - 75 ug/L 46     Assessment and Plan:  Vishnu Viveros is a 68 year old male with a history of type II diabetes, polyneuropathy, chronic low back pain, and a pulmonary nodule who presents for annual primary care visit and follow-up of additional health concerns    Type 2 diabetes mellitus treated without insulin (H)  He is on Metformin for treatment and reports taking it consistently. A1c only slightly increased from previous, from 6.4% to 6.5%. We will continue current treatment. Foot exam today, findings are above.  Encouraged decreased portion size, increased activity  - FOOT EXAM - NO CHARGE, declines podiatry    Lumbar radiculopathy Left L5  We will have him see the pain clinic again. Spine injections have been helpful in the past, he can discuss this with the pain clinic. He does not want to do surgery because it would take him out of work.   - [facility] REFERRAL    Diabetic polyneuropathy associated with diabetes mellitus due to underlying condition (H)  We will have him continue Gabapentin, it is getting him enough relief to add an hour of sleep at night.    Benign essential hypertension  He is tolerating Losartan better than Lisinopril. His urine screen showed protein in urine, this is likely caused by his poorly controlled blood pressure. We will double his dose of Losartan to 100 mg with a follow up in three months.   - losartan (COZAAR) 100 MG tablet  Dispense: 90 tablet; Refill: 3    Elevated prostate specific antigen (PSA) and urinary frequency  He has an appointment tomorrow (11/27) with Urology and will discuss these symptoms and lab findings with them at that time.     Follow-up: Return in about 3 months (around 2/26/2019).       All questions were addressed and voiced understanding and agreement with the above.     Scribe Disclosure:   Florin CABRERA  Femi, am serving as a scribe to document services personally performed by Murali Logan MD at this visit, based upon the provider's statements to me. All documentation has been reviewed by the aforementioned provider prior to being entered into the official medical record.     Portions of this medical record were completed by a scribe. UPON MY REVIEW AND AUTHENTICATION BY ELECTRONIC SIGNATURE, this confirms (a) I performed the applicable clinical services, and (b) the record is accurate.   Murali Logan MD

## 2018-11-27 ENCOUNTER — OFFICE VISIT (OUTPATIENT)
Dept: UROLOGY | Facility: CLINIC | Age: 68
End: 2018-11-27
Payer: COMMERCIAL

## 2018-11-27 VITALS
DIASTOLIC BLOOD PRESSURE: 75 MMHG | HEIGHT: 64 IN | WEIGHT: 192 LBS | SYSTOLIC BLOOD PRESSURE: 166 MMHG | HEART RATE: 65 BPM | BODY MASS INDEX: 32.78 KG/M2

## 2018-11-27 DIAGNOSIS — N40.0 BENIGN PROSTATIC HYPERPLASIA, UNSPECIFIED WHETHER LOWER URINARY TRACT SYMPTOMS PRESENT: Primary | ICD-10-CM

## 2018-11-27 DIAGNOSIS — R97.20 ELEVATED PROSTATE SPECIFIC ANTIGEN (PSA): ICD-10-CM

## 2018-11-27 DIAGNOSIS — N52.8 OTHER MALE ERECTILE DYSFUNCTION: ICD-10-CM

## 2018-11-27 RX ORDER — TAMSULOSIN HYDROCHLORIDE 0.4 MG/1
0.4 CAPSULE ORAL DAILY
Qty: 90 CAPSULE | Refills: 3 | Status: SHIPPED | OUTPATIENT
Start: 2018-11-27 | End: 2018-12-17

## 2018-11-27 RX ORDER — SILDENAFIL CITRATE 20 MG/1
TABLET ORAL
Qty: 90 TABLET | Refills: 11 | Status: SHIPPED | OUTPATIENT
Start: 2018-11-27 | End: 2022-05-03

## 2018-11-27 RX ORDER — LISINOPRIL 5 MG/1
TABLET ORAL EVERY EVENING
Refills: 3 | COMMUNITY
Start: 2018-08-31 | End: 2018-12-17

## 2018-11-27 ASSESSMENT — PAIN SCALES - GENERAL: PAINLEVEL: NO PAIN (0)

## 2018-11-27 NOTE — LETTER
11/27/2018       RE: Vishnu Viveros  [address] [facility]  [place] [address]     Dear Colleague,    Thank you for referring your patient, Vishnu Viveros, to the [facility] UROLOGY AND INST FOR PROSTATE AND UROLOGIC CANCERS at [facility]. Please see a copy of my visit note below.    Service Date: 11/27/2018      CLINIC NOTE      REASON FOR VISIT:  Elevated PSA and lower urinary tract symptoms and erectile dysfunction.      HISTORY OF PRESENT ILLNESS:  Mr. Viveros is a 68-year-old gentleman followed in our clinic for history of elevated PSAs and some erectile dysfunction in the past.  The patient had been managing his urination with Flomax but ran out about a year ago and has not refilled the prescriptions to date.  The patient has erectile dysfunction and had tried sildenafil once or twice in the distant past but felt that it gave him a headache, though it was unclear about how effective the medication had been.  The patient comes in today reporting he continues to have significant erectile dysfunction and wants to know if there is something else that can be done.  The patient relates some urinary symptoms at this time including some difficulty getting the urination started      With regard to the patient's elevated PSA, he has had PSAs that have been as high as 6.9 in the past, but in the last couple of years, they have ranged in the 4-5 range more consistently.  He did have an MRI of the prostate on 03/27/2017 that showed a prostate volume of 51 cc and PI-RADS scores of 2 only.  In addition, the patient does have a history of a previous negative prostate biopsy back in 2010, which was negative for malignancy.      PHYSICAL EXAMINATION:     VITAL SIGNS:  On exam today, the patient's blood pressure is 166/75, pulse is 65.   GENERAL:  He is in no acute distress.   RECTAL:  Digital rectal exam reveals a prostate which is smooth with some mild asymmetry with the right side  more prominent than the left, but no discrete nodularity noted.      The patient's AUA symptom score today is 9/35, bothersome index of 3.      The patient's PSA from 2018 is 4.67 which compares to a value of 4.1 on 2017 and 6.91 on 2016.      ASSESSMENT AND PLAN:  Over half of today's 25-minute visit was spent counseling the patient regarding his lower urinary tract symptoms, elevated PSA and erectile dysfunction.  I suggested to Mr. Viveros that we are pleased to see his PSA remains stable, and given his history of negative biopsy, his MRI showing only PI-RADS 2 lesions and largely stable PSA, we will continue to observe things.  In terms of his urination, I do suggest the patient go back on his Flomax 0.4 mg p.o. daily which has helped him in the past.  In addition, we discussed options for his erectile dysfunction including oral agents such as sildenafil and Cialis versus a vacuum erection device or penile injection therapy.  The patient is willing to try sildenafil again, though he does note it gave him headaches in the past.  We will give him 20 mg tablets and I have suggested he start by taking 3 of them at a time to make sure he tolerates them okay and then titrate up to taking up to five 20 mg tablets for a total dose of 100 mg maximum as needed for sexual activity.  He was counseled that he should be on a steady dose of the Flomax before adding sildenafil and that he should not take the 2 medications within 4 hours of each other.  The patient was counseled that for the sildenafil he should take it 30-60 minutes before intended sexual activity, to take it on an empty stomach and require stimulation to work.  Otherwise, we will see the patient back in 1 year's time with another PSA.         JAI PAUL MD             D: 2018   T: 2018   MT: everette      Name:     BONI VIVEROS   MRN:      [id number]        Account:      [id number]   :      1950            Service Date: 11/27/2018      Document: [id number]

## 2018-11-27 NOTE — PROGRESS NOTES
Service Date: 11/27/2018      CLINIC NOTE      REASON FOR VISIT:  Elevated PSA and lower urinary tract symptoms and erectile dysfunction.      HISTORY OF PRESENT ILLNESS:  Mr. Viveros is a 68-year-old gentleman followed in our clinic for history of elevated PSAs and some erectile dysfunction in the past.  The patient had been managing his urination with Flomax but ran out about a year ago and has not refilled the prescriptions to date.  The patient has erectile dysfunction and had tried sildenafil once or twice in the distant past but felt that it gave him a headache, though it was unclear about how effective the medication had been.  The patient comes in today reporting he continues to have significant erectile dysfunction and wants to know if there is something else that can be done.  The patient relates some urinary symptoms at this time including some difficulty getting the urination started      With regard to the patient's elevated PSA, he has had PSAs that have been as high as 6.9 in the past, but in the last couple of years, they have ranged in the 4-5 range more consistently.  He did have an MRI of the prostate on 03/27/2017 that showed a prostate volume of 51 cc and PI-RADS scores of 2 only.  In addition, the patient does have a history of a previous negative prostate biopsy back in 2010, which was negative for malignancy.      PHYSICAL EXAMINATION:     VITAL SIGNS:  On exam today, the patient's blood pressure is 166/75, pulse is 65.   GENERAL:  He is in no acute distress.   RECTAL:  Digital rectal exam reveals a prostate which is smooth with some mild asymmetry with the right side more prominent than the left, but no discrete nodularity noted.      The patient's AUA symptom score today is 9/35, bothersome index of 3.      The patient's PSA from 11/19/2018 is 4.67 which compares to a value of 4.1 on 07/31/2017 and 6.91 on 11/09/2016.      ASSESSMENT AND PLAN:  Over half of today's 25-minute visit was spent  counseling the patient regarding his lower urinary tract symptoms, elevated PSA and erectile dysfunction.  I suggested to Mr. Viveros that we are pleased to see his PSA remains stable, and given his history of negative biopsy, his MRI showing only PI-RADS 2 lesions and largely stable PSA, we will continue to observe things.  In terms of his urination, I do suggest the patient go back on his Flomax 0.4 mg p.o. daily which has helped him in the past.  In addition, we discussed options for his erectile dysfunction including oral agents such as sildenafil and Cialis versus a vacuum erection device or penile injection therapy.  The patient is willing to try sildenafil again, though he does note it gave him headaches in the past.  We will give him 20 mg tablets and I have suggested he start by taking 3 of them at a time to make sure he tolerates them okay and then titrate up to taking up to five 20 mg tablets for a total dose of 100 mg maximum as needed for sexual activity.  He was counseled that he should be on a steady dose of the Flomax before adding sildenafil and that he should not take the 2 medications within 4 hours of each other.  The patient was counseled that for the sildenafil he should take it 30-60 minutes before intended sexual activity, to take it on an empty stomach and require stimulation to work.  Otherwise, we will see the patient back in 1 year's time with another PSA.         JAI PAUL MD             D: 2018   T: 2018   MT: everette      Name:     BONI VIVEROS   MRN:      [id number]        Account:      [id number]   :      1950           Service Date: 2018      Document: [id number]

## 2018-11-27 NOTE — PATIENT INSTRUCTIONS
Restart taking Flomax (Tamsulosin).    Prescription for Sildenafil given.    Follow up with Dr. Montaño in one year with PSA prior to appointment.    It was a pleasure meeting with you today.  Thank you for allowing me and my team the privilege of caring for you today.  YOU are the reason we are here, and I truly hope we provided you with the excellent service you deserve.  Please let us know if there is anything else we can do for you so that we can be sure you are leaving completely satisfied with your care experience.      JOHN Tijerina

## 2018-11-27 NOTE — MR AVS SNAPSHOT
After Visit Summary   11/27/2018    Vishnu Viveros    MRN: [id number]           Patient Information     Date Of Birth          1950        Visit Information        Provider Department      11/27/2018 9:00 AM Leandro Montaño MD [facility] and [facility] for Prostate and Urologic Cancers        Today's Diagnoses     Benign prostatic hyperplasia, unspecified whether lower urinary tract symptoms present    -  1    Other male erectile dysfunction        Elevated prostate specific antigen (PSA)          Care Instructions    Restart taking Flomax (Tamsulosin).    Prescription for Sildenafil given.    Follow up with Dr. Montaño in one year with PSA prior to appointment.    It was a pleasure meeting with you today.  Thank you for allowing me and my team the privilege of caring for you today.  YOU are the reason we are here, and I truly hope we provided you with the excellent service you deserve.  Please let us know if there is anything else we can do for you so that we can be sure you are leaving completely satisfied with your care experience.      Sarah Wilson HANNAH          Follow-ups after your visit        Your next 10 appointments already scheduled     Nov 28, 2018  2:00 PM CST   NEW PLASTICS with Levy Blue MD   [facility] ([facility])    [address]  [address] [id number] [phone]            Jan 03, 2019  2:30 PM CST   (Arrive by 2:15 PM)   Return Visit with Tiffany Dahl MD   [facility] ([facility] [place] [facility])    [address] [facility] [id number] [phone]            Mar 04, 2019  2:45 PM CST   (Arrive by 2:30 PM)   Return Visit with Murali Logan MD   [facility] ([facility])    [address]  [address] [facility] [id number] [phone]              Future tests that  "were ordered for you today     Open Future Orders        Priority Expected Expires Ordered    PSA tumor marker Routine 11/27/2019 11/27/2019 11/27/2018            Who to contact     Please call your clinic at [phone] to:    Ask questions about your health    Make or cancel appointments    Discuss your medicines    Learn about your test results    Speak to your doctor            Additional Information About Your Visit        Boujuhar[employer]thena Information     [employer] gives you secure access to your electronic health record. If you see a primary care provider, you can also send messages to your care team and make appointments. If you have questions, please call your primary care clinic.  If you do not have a primary care provider, please call [phone] and they will assist you.      [employer] is an electronic gateway that provides easy, online access to your medical records. With [employer], you can request a clinic appointment, read your test results, renew a prescription or communicate with your care team.     To access your existing account, please contact your [facility] or call [phone] for assistance.        Care EveryWhere ID     This is your Care EveryWhere ID. This could be used by other organizations to access your [facility] medical records  [id number]        Your Vitals Were     Pulse Height BMI (Body Mass Index)             65 1.626 m (5' 4\") 32.96 kg/m2          Blood Pressure from Last 3 Encounters:   11/27/18 166/75   11/26/18 156/87   10/31/18 (!) 175/93    Weight from Last 3 Encounters:   11/27/18 87.1 kg (192 lb)   11/26/18 87.4 kg (192 lb 9.6 oz)   10/31/18 85.5 kg (188 lb 8 oz)                 Today's Medication Changes          These changes are accurate as of 11/27/18  9:40 AM.  If you have any questions, ask your nurse or doctor.               Start taking these medicines.        Dose/Directions    sildenafil 20 MG tablet   Commonly known as:  REVATIO   Used for:  " Other male erectile dysfunction   Started by:  Leandro Montaño MD        Take 1-5 tablets (20 mg) by mouth as needed for sexual activity.  Never use with nitroglycerin, terazosin or doxazosin.   Quantity:  90 tablet   Refills:  11         These medicines have changed or have updated prescriptions.        Dose/Directions    * tamsulosin 0.4 MG capsule   Commonly known as:  FLOMAX   This may have changed:  Another medication with the same name was added. Make sure you understand how and when to take each.   Used for:  Benign non-nodular prostatic hyperplasia, presence of lower urinary tract symptoms unspecified   Changed by:  Leandro Montaño MD        Dose:  0.4 mg   Take 1 capsule (0.4 mg) by mouth daily   Quantity:  90 capsule   Refills:  3       * tamsulosin 0.4 MG capsule   Commonly known as:  FLOMAX   This may have changed:  You were already taking a medication with the same name, and this prescription was added. Make sure you understand how and when to take each.   Used for:  Benign prostatic hyperplasia, unspecified whether lower urinary tract symptoms present   Changed by:  Leandro Montaño MD        Dose:  0.4 mg   Take 1 capsule (0.4 mg) by mouth daily   Quantity:  90 capsule   Refills:  3       * Notice:  This list has 2 medication(s) that are the same as other medications prescribed for you. Read the directions carefully, and ask your doctor or other care provider to review them with you.         Where to get your medicines      These medications were sent to [facility] [id number] [place] [address] AT [place] [address], [facility] [address]     Phone:  [phone]     tamsulosin 0.4 MG capsule         Some of these will need a paper prescription and others can be bought over the counter.  Ask your nurse if you have questions.     Bring a paper prescription for each of these medications     sildenafil 20 MG tablet                 Primary Care Provider Office Phone # Fax #    Murali Logan [phone] [address] [address]        Equal Access to Services     AMBROCIO CUENCA : Sil dolores lizama paulo Sonadineali, waaxda luqadaha, qaybta kaalmada aderoxana, carl santillanion lucas. So [facility] [phone].    ATENCIÓN: Si habla español, tiene a little disposición servicios gratuitos de asistencia lingüística. Llame al [phone].    We comply with applicable federal civil rights laws and [place] laws. We do not discriminate on the basis of race, color, national origin, age, disability, sex, sexual orientation, or gender identity.            Thank you!     Thank you for choosing [facility] UROLOGY AND [facility] FOR PROSTATE AND UROLOGIC CANCERS  for your care. Our goal is always to provide you with excellent care. Hearing back from our patients is one way we can continue to improve our services. Please take a few minutes to complete the written survey that you may receive in the mail after your visit with us. Thank you!             Your Updated Medication List - Protect others around you: Learn how to safely use, store and throw away your medicines at www.disposemymeds.org.          This list is accurate as of 11/27/18  9:40 AM.  Always use your most recent med list.                   Brand Name Dispense Instructions for use Diagnosis    acetaminophen 500 MG tablet    TYLENOL    120 tablet    Take 1-2 tablets (500-1,000 mg) by mouth every 6 hours as needed for pain (arthritis)    Degenerative disc disease, lumbar, Primary osteoarthritis of both knees       aspirin 81 MG tablet    ASA     Take 1 tablet by mouth daily.        atorvastatin 20 MG tablet    LIPITOR    90 tablet    Take 1 tablet (20 mg) by mouth daily    Type 2 diabetes mellitus treated without insulin (H)       FISH OIL PO      Take 1 capsule by mouth as needed        gabapentin 300 MG capsule    NEURONTIN    180 capsule     One capsule at bedtime for one week the increase to twice daily    Lumbar radiculitis, Diabetic polyneuropathy associated with diabetes mellitus due to underlying condition (H)       lisinopril 5 MG tablet    PRINIVIL/ZESTRIL          loratadine 10 MG tablet    CLARITIN    90 tablet    Take 1 tablet (10 mg) by mouth daily as needed for allergies    Seasonal allergies       losartan 100 MG tablet    COZAAR    90 tablet    Take 1 tablet (100 mg) by mouth daily    Benign essential hypertension       metFORMIN 500 MG 24 hr tablet    GLUCOPHAGE-XR    90 tablet    Take 1 tablet (500 mg) by mouth daily (with dinner)    Type 2 diabetes mellitus treated without insulin (H)       MULTIVITAMINS PO      Take 1 tablet by mouth daily.        order for DME     1 Device    Back brace    Left leg weakness, Lumbar stenosis       sildenafil 20 MG tablet    REVATIO    90 tablet    Take 1-5 tablets (20 mg) by mouth as needed for sexual activity.  Never use with nitroglycerin, terazosin or doxazosin.    Other male erectile dysfunction       * tamsulosin 0.4 MG capsule    FLOMAX    90 capsule    Take 1 capsule (0.4 mg) by mouth daily    Benign non-nodular prostatic hyperplasia, presence of lower urinary tract symptoms unspecified       * tamsulosin 0.4 MG capsule    FLOMAX    90 capsule    Take 1 capsule (0.4 mg) by mouth daily    Benign prostatic hyperplasia, unspecified whether lower urinary tract symptoms present       VIRTUSSIN A/C 100-10 MG/5ML Soln solution   Generic drug:  guaiFENesin-codeine     473 mL    5-10 MLS PO Q 4 H PRN COUGH    Cough       VITAMIN B12 PO      Take 1 tablet by mouth. Pt states he takes this 2 or 3 times a week..        VITAMIN C PO      Take 500 mg by mouth once        vitamin D 1000 units capsule      Take 1 capsule by mouth daily.        * Notice:  This list has 2 medication(s) that are the same as other medications prescribed for you. Read the directions carefully, and ask your doctor or other care  provider to review them with you.

## 2018-11-27 NOTE — NURSING NOTE
Chief Complaint   Patient presents with     RECHECK     Follow up- elevated PSA     Sarah Wilson, A

## 2018-11-28 ENCOUNTER — OFFICE VISIT (OUTPATIENT)
Dept: OPHTHALMOLOGY | Facility: CLINIC | Age: 68
End: 2018-11-28
Attending: OPHTHALMOLOGY
Payer: COMMERCIAL

## 2018-11-28 DIAGNOSIS — H02.109 ECTROPION DUE TO LAXITY OF EYELID, UNSPECIFIED LATERALITY: Primary | ICD-10-CM

## 2018-11-28 DIAGNOSIS — H02.9 LESION OF LOWER EYELID: ICD-10-CM

## 2018-11-28 DIAGNOSIS — H04.563 ACQUIRED STENOSIS OF LACRIMAL PUNCTUM OF BOTH SIDES: ICD-10-CM

## 2018-11-28 ASSESSMENT — REFRACTION_WEARINGRX
OS_SPHERE: +0.75
OD_SPHERE: +1.00
OD_ADD: +2.00
OS_CYLINDER: +0.50
OD_AXIS: 145
OS_ADD: +2.00
OS_AXIS: 060
OD_CYLINDER: +0.50

## 2018-11-28 ASSESSMENT — REFRACTION_MANIFEST
OD_ADD: +2.50
OS_CYLINDER: +0.50
OS_ADD: +2.50
OD_SPHERE: +1.00
OS_AXIS: 060
OD_CYLINDER: +0.50
OD_AXIS: 145
OS_SPHERE: +0.75

## 2018-11-28 ASSESSMENT — VISUAL ACUITY
OD_CC: 20/30
OS_CC: 20/20

## 2018-11-28 ASSESSMENT — EXTERNAL EXAM - LEFT EYE: OS_EXAM: NORMAL

## 2018-11-28 ASSESSMENT — CONF VISUAL FIELD
OS_NORMAL: 1
METHOD: COUNTING FINGERS
OD_NORMAL: 1

## 2018-11-28 ASSESSMENT — EXTERNAL EXAM - RIGHT EYE: OD_EXAM: NORMAL

## 2018-11-28 NOTE — LETTER
2018         RE:  :  MRN: Vishnu Viveros  1950  [id number]     Dear Dr. Charles Maurer*,    Thank you for asking me to see your patient, Vishnu Viveros, for an oculoplastic   consultation.  My assessment and plan are below.  For further details, please see my attached clinic note.           Again, thank you for allowing me to participate in the care of your patient.      Sincerely,    Levy Blue MD  Department of Ophthalmology and Visual Neurosciences  [facility]    CC: Murali Logan MD  [address]  [facility] [address]  VIA In Basket     Charles Parr MD  [address]  [facility] [address]  VIA In Basket     Rick Whaley MD  [address]  [facility] [address]  VIA In Basket     Derrek Sinclair MD  [address]  [facility] [address]  VIA In Basket     Leandro Montaño MD  [address]  [facility] [address]  VIA In Basket     Bran Sibley MD  [id number]  [facility] [address]  VIA In Basket     Joan Mckeon MD  [address]  [facility] [address]  VIA In Basket     Vishnu Viveros  VIA MyChart

## 2018-11-28 NOTE — LETTER
2018         RE:  :  MRN: Vishnu Viveros  1950  [id number]     Dear Dr. Parr,    It was a pleasure to see your patient, Vishnu Viveros, for an oculoplastic  Consultation this afternoon.  My assessment and plan are below.  For further details, please see my attached clinic note.          Assessment & Plan     Vishnu Viveros is a 68 year old male with the following diagnoses:   1. 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 Lesion of lower eyelid - Right Eye         PLAN:  Bilateral lower lid lateral tarsal strip and punctoplasty with MiniMonoka stent  Right lower lid punctal lesion excision            Again, thank you for allowing me to participate in the care of your patient.      Sincerely,    Levy Blue MD  Department of Ophthalmology and Visual Neurosciences  [facility]    CC: Murali Logan MD  [address]  [place] [address]  VIA In Basket     Charles Parr MD  [address]  [place] [address]  VIA In Basket     Rick Whaley MD  [address]  [place] [address]  VIA In Basket     Derrek Sinclair MD  [address]  [place] [address]  VIA In Basket     Leandro Montaño MD  [address]  [place] [address]  VIA In Basket     Bran Sibley MD  [id number]  [facility] [address]  VIA In Basket     Joan Mckeon MD  [address]  [place] [address]  VIA In Basket     Vishnu Viveros  VIA MyChart

## 2018-11-28 NOTE — MR AVS SNAPSHOT
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 The sagging lower eyelid leaves the eye exposed and dry. If ectropion is not treated, the condition can lead to chronic tearing, eye irritation, redness, pain, a gritty feeling, crusting of the eyelid, mucous discharge, and breakdown of the cornea due to excessive exposure.     What Causes Ectropion?     Generally the condition is the result of tissue relaxation associated with aging, although it may also occur as a result of facial nerve paralysis (due to Bell s palsy,stroke or other nurologic conditions), trauma, scarring, previous surgeries or skin cancer.     What Are The Symptoms?     The wet, inner, conjunctival surface is exposed and visible. Normally, the upper and lower eyelids close tightly, protecting the eye from damage and preventing tear evaporation. If the edge of one eyelid turns outward, the two eyelids cannot meet properly and tears are not spread evenly over the eye. Symptoms may include excessive tearing, chronic irritation, redness, pain, a gritty feeling, crusting of the eyelid and mucous discharge.     Can Ectropion Be Repaired?     Yes, ectropion can be repaired surgically. Most patients experience immediate resolution of the problem once surgery is completed with little, if any, post-operative discomfort. After your eyelid heals, your eye will feel comfortable and be protected from corneal  scarring, infection, and loss of vision.     Who Should Perform The Surgery?     When choosing a surgeon to perform ectropion repair, look for a cosmetic and reconstructive surgeon who specializes in the eyelids, orbit, and tear drain system. Dr. Blue s  membership in the American Society of Ophthalmic Plastic and Reconstructive Surgery (ASOPRS) indicates that he is not only a board certified ophthalmologist who knows the anatomy and structure of the eyelids and orbit, but also has had extensive training in ophthalmic plastic reconstructive and cosmetic surgery.              Follow-ups after your visit        Your next 10 appointments already scheduled     Jan 03, 2019  2:30 PM CST   (Arrive by 2:15 PM)   Return Visit with Tiffany Dahl MD   [facility] ([facility])    [address] [place] [id number] [phone]            Mar 04, 2019  2:45 PM CST   (Arrive by 2:30 PM)   Return Visit with Murali Logan MD   [facility] ([facility])    [address] [place] [id number] [phone]              Future tests that were ordered for you today     Open Future Orders        Priority Expected Expires Ordered    PSA tumor marker Routine 11/27/2019 11/27/2019 11/27/2018            Who to contact     Please call your clinic at [phone] to:    Ask questions about your health    Make or cancel appointments    Discuss your medicines    Learn about your test results    Speak to your doctor            Additional Information About Your Visit        [employer] Information     [employer] gives you secure access to your electronic health record. If you see a primary care provider, you can also send messages to your care team and make appointments. If you have questions, please call your primary care clinic.  If you do not have a primary care provider, please call  [phone] and they will assist you.      [employer] is an electronic gateway that provides easy, online access to your medical records. With [employer], you can request a clinic appointment, read your test results, renew a prescription or communicate with your care team.     To access your existing account, please contact your [facility] or call [phone] for assistance.        Care EveryWhere ID     This is your Care EveryWhere ID. This could be used by other organizations to access your [facility] medical records  [id number]         Blood Pressure from Last 3 Encounters:   11/27/18 166/75   11/26/18 156/87   10/31/18 (!) 175/93    Weight from Last 3 Encounters:   11/27/18 87.1 kg (192 lb)   11/26/18 87.4 kg (192 lb 9.6 oz)   10/31/18 85.5 kg (188 lb 8 oz)              We Performed the Following     Em-Operative Worksheet (Plastics)        Primary Care Provider Office Phone # Fax #    Murali Logan [phone]       [address] [address]        Equal Access to Services     [facility]: Hadii aad ku hadasho Soomaali, waaxda luqadaha, qaybta kaalmada adeegyada, carl cordova hayscarlettn conrad amaya . So [facility] [phone].    ATENCIÓN: Si habla español, tiene a little disposición servicios gratuitos de asistencia lingüística. Llame al [phone].    We comply with applicable federal civil rights laws and [place] laws. We do not discriminate on the basis of race, color, national origin, age, disability, sex, sexual orientation, or gender identity.            Thank you!     Thank you for choosing [facility] EYE GENERAL  for your care. Our goal is always to provide you with excellent care. Hearing back from our patients is one way we can continue to improve our services. Please take a few minutes to complete the written survey that you may receive in the mail after your visit with us. Thank you!             Your Updated Medication List -  Protect others around you: Learn how to safely use, store and throw away your medicines at www.disposemymeds.org.          This list is accurate as of 11/28/18  2:13 PM.  Always use your most recent med list.                   Brand Name Dispense Instructions for use Diagnosis    acetaminophen 500 MG tablet    TYLENOL    120 tablet    Take 1-2 tablets (500-1,000 mg) by mouth every 6 hours as needed for pain (arthritis)    Degenerative disc disease, lumbar, Primary osteoarthritis of both knees       aspirin 81 MG tablet    ASA     Take 1 tablet by mouth daily.        atorvastatin 20 MG tablet    LIPITOR    90 tablet    Take 1 tablet (20 mg) by mouth daily    Type 2 diabetes mellitus treated without insulin (H)       FISH OIL PO      Take 1 capsule by mouth as needed        gabapentin 300 MG capsule    NEURONTIN    180 capsule    One capsule at bedtime for one week the increase to twice daily    Lumbar radiculitis, Diabetic polyneuropathy associated with diabetes mellitus due to underlying condition (H)       lisinopril 5 MG tablet    PRINIVIL/ZESTRIL          loratadine 10 MG tablet    CLARITIN    90 tablet    Take 1 tablet (10 mg) by mouth daily as needed for allergies    Seasonal allergies       losartan 100 MG tablet    COZAAR    90 tablet    Take 1 tablet (100 mg) by mouth daily    Benign essential hypertension       metFORMIN 500 MG 24 hr tablet    GLUCOPHAGE-XR    90 tablet    Take 1 tablet (500 mg) by mouth daily (with dinner)    Type 2 diabetes mellitus treated without insulin (H)       MULTIVITAMINS PO      Take 1 tablet by mouth daily.        order for DME     1 Device    Back brace    Left leg weakness, Lumbar stenosis       sildenafil 20 MG tablet    REVATIO    90 tablet    Take 1-5 tablets (20 mg) by mouth as needed for sexual activity.  Never use with nitroglycerin, terazosin or doxazosin.    Other male erectile dysfunction       * tamsulosin 0.4 MG capsule    FLOMAX    90 capsule    Take 1 capsule (0.4  mg) by mouth daily    Benign non-nodular prostatic hyperplasia, presence of lower urinary tract symptoms unspecified       * tamsulosin 0.4 MG capsule    FLOMAX    90 capsule    Take 1 capsule (0.4 mg) by mouth daily    Benign prostatic hyperplasia, unspecified whether lower urinary tract symptoms present       VIRTUSSIN A/C 100-10 MG/5ML Soln solution   Generic drug:  guaiFENesin-codeine     473 mL    5-10 MLS PO Q 4 H PRN COUGH    Cough       VITAMIN B12 PO      Take 1 tablet by mouth. Pt states he takes this 2 or 3 times a week..        VITAMIN C PO      Take 500 mg by mouth once        vitamin D 1000 units capsule      Take 1 capsule by mouth daily.        * Notice:  This list has 2 medication(s) that are the same as other medications prescribed for you. Read the directions carefully, and ask your doctor or other care provider to review them with you.

## 2018-11-28 NOTE — PROGRESS NOTES
Chief Complaints and History of Present Illnesses   Patient presents with     Lesion on right lower punctum     Patient continues to complain about excessive watering of his right eye. He feels this makes the vision in his right eye blurry.     DM2     Last A1c 11-26-18 was 6.5. Still taking metformin.              Assessment & Plan     Vishnu Viveros is a 68 year old male with the following diagnoses:   1. 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 Lesion of lower eyelid - Right Eye         PLAN:  Bilateral lower lid lateral tarsal strip and punctoplasty with MiniMonoka stent  Right lower lid punctal lesion excision             Attending Physician Attestation:  I have seen and examined this patient.  I have confirmed and edited as necessary the chief complaint(s), history of present illness, review of systems, relevant history, and examination findings as documented by others.  I have personally reviewed the relevant tests, images, and reports as documented above.  I have confirmed and edited as necessary the assessment and plan and agree with this note.    - Levy Blue MD 2:07 PM 11/28/2018    Today with Vishnu Viveros, I reviewed the indications, risks, benefits, and alternatives of the proposed surgical procedure including, but not limited to, failure obtain the desired result  and need for additional surgery, bleeding, infection, loss of vision, loss of the eye, and the remote possibility of permanent damage to any organ system or death with the use of anesthesia.  I provided multiple opportunities for the questions, answered all questions to the best of my ability, and confirmed that my answers and my discussion were understood.     - Levy Blue MD 2:08 PM 11/28/2018

## 2018-11-28 NOTE — PATIENT INSTRUCTIONS
ECTROPION     Ectropion means that the lower eyelid is  rolled out  away from the eye, or is sagging away from the eye. The sagging lower eyelid leaves the eye exposed and dry. If ectropion is not treated, the condition can lead to chronic tearing, eye irritation, redness, pain, a gritty feeling, crusting of the eyelid, mucous discharge, and breakdown of the cornea due to excessive exposure.     What Causes Ectropion?     Generally the condition is the result of tissue relaxation associated with aging, although it may also occur as a result of facial nerve paralysis (due to Bell s palsy,stroke or other nurologic conditions), trauma, scarring, previous surgeries or skin cancer.     What Are The Symptoms?     The wet, inner, conjunctival surface is exposed and visible. Normally, the upper and lower eyelids close tightly, protecting the eye from damage and preventing tear evaporation. If the edge of one eyelid turns outward, the two eyelids cannot meet properly and tears are not spread evenly over the eye. Symptoms may include excessive tearing, chronic irritation, redness, pain, a gritty feeling, crusting of the eyelid and mucous discharge.     Can Ectropion Be Repaired?     Yes, ectropion can be repaired surgically. Most patients experience immediate resolution of the problem once surgery is completed with little, if any, post-operative discomfort. After your eyelid heals, your eye will feel comfortable and be protected from corneal scarring, infection, and loss of vision.     Who Should Perform The Surgery?     When choosing a surgeon to perform ectropion repair, look for a cosmetic and reconstructive surgeon who specializes in the eyelids, orbit, and tear drain system. Dr. Blue s  membership in the American Society of Ophthalmic Plastic and Reconstructive Surgery (ASOPRS) indicates that he is not only a board certified ophthalmologist who knows the anatomy and structure of the eyelids and orbit, but also has  had extensive training in ophthalmic plastic reconstructive and cosmetic surgery.

## 2018-12-04 ENCOUNTER — TELEPHONE (OUTPATIENT)
Dept: OPHTHALMOLOGY | Facility: CLINIC | Age: 68
End: 2018-12-04

## 2018-12-04 NOTE — TELEPHONE ENCOUNTER
Spoke with patient to schedule surgery with Dr. Levy Blue.    Surgery was scheduled on 12/19 at [facility]  Patient will have H&P at [facility], PAC   Post-Op visit was scheduled on 01/07  Patient is aware a / is needed day of surgery.   Surgery packet was mailed, patient has my direct contact information for any further questions.

## 2018-12-14 ENCOUNTER — PRE VISIT (OUTPATIENT)
Dept: SURGERY | Facility: CLINIC | Age: 68
End: 2018-12-14

## 2018-12-14 NOTE — TELEPHONE ENCOUNTER
RECORDS RECEIVED FROM:   DATE RECEIVED:    NOTES STATUS DETAILS   OFFICE NOTE from Cardiologist Internal    OFFICE NOTE from Pulmonoligist N/A    MEDICATION LIST Internal    DIAGNOSTIC PROCEDURES     ECHO N/A    STRESS TEST Internal    PULMONARY FUNCTION TEST N/A    CORONARY ANGIOGRAM Internal    EKG Internal    IMAGING (DISC & REPORT)      CHEST XRAY Internal

## 2018-12-17 ENCOUNTER — ANESTHESIA EVENT (OUTPATIENT)
Dept: SURGERY | Facility: AMBULATORY SURGERY CENTER | Age: 68
End: 2018-12-17

## 2018-12-17 ENCOUNTER — OFFICE VISIT (OUTPATIENT)
Dept: SURGERY | Facility: CLINIC | Age: 68
End: 2018-12-17
Payer: COMMERCIAL

## 2018-12-17 VITALS
TEMPERATURE: 97.6 F | HEART RATE: 66 BPM | BODY MASS INDEX: 32.47 KG/M2 | SYSTOLIC BLOOD PRESSURE: 141 MMHG | DIASTOLIC BLOOD PRESSURE: 70 MMHG | OXYGEN SATURATION: 99 % | HEIGHT: 64 IN | WEIGHT: 190.2 LBS

## 2018-12-17 DIAGNOSIS — Z01.818 PREOP EXAMINATION: Primary | ICD-10-CM

## 2018-12-17 PROBLEM — R19.5 POSITIVE FIT (FECAL IMMUNOCHEMICAL TEST): Status: RESOLVED | Noted: 2017-02-10 | Resolved: 2018-12-17

## 2018-12-17 PROBLEM — M25.552 HIP PAIN, LEFT: Status: RESOLVED | Noted: 2017-11-28 | Resolved: 2018-12-17

## 2018-12-17 ASSESSMENT — LIFESTYLE VARIABLES: TOBACCO_USE: 1

## 2018-12-17 ASSESSMENT — ENCOUNTER SYMPTOMS: ORTHOPNEA: 0

## 2018-12-17 ASSESSMENT — MIFFLIN-ST. JEOR: SCORE: 1538.74

## 2018-12-17 NOTE — H&P
Pre-Operative H & P     Date of Encounter: 12/17/2018  Primary Care Physician:  Murali Logan    CC: Ectropion due to laxity of eyelid, acquired stenosis of lacrimal punctum, bilaterally.      HPI:  Vishnu Viveros is a 68 year old male who presents for pre-operative H & P in preparation for Bilateral Lower Lid Ectropion Repair (Bilateral Eye); Bilateral Punctoplasty with Mini Monoka Stent (Bilateral Eye) on 12/19/18 with Dr. Levy Blue at [facility].     The patient was evaluated by Dr. Blue on 11/28/18 in regards to excessive tearing of his right eye.  He was diagnosed with ectropion due to laxity of eyelid, acquired stenosis of lacrimal punctum of both sides, and a right lower eyelid lesion.  Arrangements are now being made for the above procedures.    History is obtained from the patient and the electronic medical record.    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, unspecified laterality      History of tobacco use      HLD (hyperlipidemia)      HTN (hypertension)      Obesity (BMI 30.0-34.9)      Pulmonary nodule      Past Surgical History:  Past Surgical History:   Procedure Laterality Date     APPENDECTOMY OPEN CHILD       ARTHROSCOPY KNEE Left      BIOPSY PROSTATE TRANSRECTAL       EXCISE PTERYGIUM Right 1989     EXCISE PTERYGIUM Right 2/12/2015     INJECT EPIDURAL LUMBAR / SACRAL SINGLE N/A 7/21/2016    Procedure: INJECT EPIDURAL LUMBAR / SACRAL SINGLE;  Surgeon: Judah Henriquez MD;  Location: [facility] OR     INJECT PARAVERTEBRAL FACET JOINT LUMBAR / SACRAL FIRST Right 8/18/2016    Procedure: INJECT PARAVERTEBRAL FACET JOINT LUMBAR / SACRAL FIRST;  Surgeon: Judah Henriquez MD;  Location: [facility] OR     INJECT PARAVERTEBRAL FACET JOINT LUMBAR / SACRAL FIRST Right 9/8/2016    Procedure: INJECT PARAVERTEBRAL FACET  "JOINT LUMBAR / SACRAL FIRST;  Surgeon: Judah Henriquez MD;  Location: [facility] OR     Prior to admission medications  Current Outpatient Medications   Medication Sig Dispense Refill     acetaminophen (TYLENOL) 500 MG tablet Take 1-2 tablets (500-1,000 mg) by mouth every 6 hours as needed for pain (arthritis) 120 tablet 1     Ascorbic Acid (VITAMIN C PO) Take 500 mg by mouth once       aspirin 81 MG tablet Take 1 tablet by mouth every morning        atorvastatin (LIPITOR) 20 MG tablet Take 1 tablet (20 mg) by mouth daily (Patient taking differently: Take 20 mg by mouth every evening ) 90 tablet 3     Cholecalciferol (VITAMIN D) 1000 UNIT capsule Take 1 capsule by mouth daily.       Cyanocobalamin (VITAMIN B12 PO) Take 1 tablet by mouth. Pt states he takes this 2 or 3 times a week..        gabapentin (NEURONTIN) 300 MG capsule One capsule at bedtime for one week the increase to twice daily 180 capsule 3     loratadine (CLARITIN) 10 MG tablet Take 1 tablet (10 mg) by mouth daily as needed for allergies 90 tablet 3     losartan (COZAAR) 100 MG tablet Take 1 tablet (100 mg) by mouth daily (Patient taking differently: Take 100 mg by mouth every evening ) 90 tablet 3     metFORMIN (GLUCOPHAGE-XR) 500 MG 24 hr tablet Take 1 tablet (500 mg) by mouth daily (with dinner) 90 tablet 3     Multiple Vitamin (MULTIVITAMINS PO) Take 1 tablet by mouth daily.       Omega-3 Fatty Acids (FISH OIL PO) Take 1 capsule by mouth as needed       sildenafil (REVATIO) 20 MG tablet Take 1-5 tablets (20 mg) by mouth as needed for sexual activity.  Never use with nitroglycerin, terazosin or doxazosin. 90 tablet 11     tamsulosin (FLOMAX) 0.4 MG capsule Take 1 capsule (0.4 mg) by mouth daily 90 capsule 3     VIRTUSSIN A/C 100-10 MG/5ML SOLN solution 5-10 MLS PO Q 4 H PRN COUGH 473 mL 0     order for DME Back brace 1 Device 0     Allergies  Allergies   Allergen Reactions     Seasonal Allergies      Sinus congestion, sinus \"drainage\", sneezing     Nkda " [No Known Drug Allergies]      Social History  Social History     Socioeconomic History     Marital status:      Spouse name: Not on file     Number of children: Not on file     Years of education: Not on file     Highest education level: Not on file   Social Needs     Financial resource strain: Not on file     Food insecurity - worry: Not on file     Food insecurity - inability: Not on file     Transportation needs - medical: Not on file     Transportation needs - non-medical: Not on file   Occupational History     Not on file   Tobacco Use     Smoking status: Former Smoker     Years: 5.00     Types: Cigarettes     Last attempt to quit: 1981     Years since quittin.1     Smokeless tobacco: Never Used   Substance and Sexual Activity     Alcohol use: Yes     Alcohol/week: 1.5 oz     Types: 3 Standard drinks or equivalent per week     Frequency: 4 or more times a week     Drinks per session: 1 or 2     Binge frequency: Never     Drug use: No     Sexual activity: Not on file   Other Topics Concern     Not on file   Social History Narrative     works as [employer].        His immediate family; his wife has MS and is an RN at [employer], his two children are in good health.         Family History  Family History   Problem Relation Age of Onset     Cerebrovascular Disease Brother          stroke      Breast Cancer Sister      Kidney Disease Mother         Kidney stones     Other - See Comments Father          in [place] parkinsons or post surgical eye surgery     Cerebral aneurysm Brother      Hypothyroidism Sister      Glaucoma No family hx of      Macular Degeneration No family hx of      Hx of Blood transfusions/reactions: Denies.    Hx of abnormal bleeding or anti-platelet use: ASA.      Menstrual history: No LMP for male patient.    Steroid use in the last year: Denies.    Personal or FH of difficulty with anesthesia: Denies.    Review of Systems  Functional status:  "Independent in ADL's.  4 METS.     The complete review of systems is negative other than noted in the HPI or here.   Constitutional: Denies recent changes in weight, sleeping patterns, or fevers/chills.  Eyes: No recent vision changes.  EENT: Denies recent changes in hearing, mouth pain, or difficulty swallowing.  Cardiovascular: Denies chest pain, GUNN or orthopnea, or palpitations.  Respiratory: Denies shortness of breath or significant cough.    GI: Denies nausea/vomiting or diarrhea.  Reports a tendency for constipation.    : Denies dysuria.    Musculoskeletal: Denies joint pain or swelling, with the exception of chronic low back pain.    Skin: Denies rashes or wounds.    Hematologic: Denies easy bruising or bleeding.    Neurologic: Denies migraines, seizures, dizziness.  Reports numbness/tingling in the lower extremities related to chronic back issues.    Psychiatric: Denies changes in mood or affect.      /70   Pulse 66   Temp 97.6  F (36.4  C) (Oral)   Ht 1.626 m (5' 4\")   Wt 86.3 kg (190 lb 3.2 oz)   SpO2 99%   BMI 32.65 kg/m      190 lbs 3.2 oz  5' 4\"   Body mass index is 32.65 kg/m .    Physical Exam  Constitutional: Patient awake, seated upright in a chair, in no apparent distress.  Appears stated age.  Eyes: Pupils equal, round and reactive to light.  Extra ocular muscles intact. Sclera clear.  Conjunctiva normal.  HENT: Head normocephalic.  Oral pharynx intact with moist mucous membranes.  Dentition intact.  No thyromegaly appreciated.   Respiratory: Lung sounds clear to auscultation bilaterally.  No rales, rhonchi, or wheezing noted.    Cardiovascular: S1, S2, regular rate and rhythm.  No murmurs, rubs, or gallops noted. Radial and pedal pulses palpable, bilaterally.  No edema noted.   GI: Bowel sounds present.  Abdomen rounded, soft, non-tender to light palpation.  No hepatosplenomegaly or masses palpated.   Genitourinary: Exam deferred.  Lymph/Hematologic: No cervical or supraclavicular " lymphadenopathy noted.  No excessive bruising noted.    Skin: Color appropriate for race, warm, dry.  No rashes or wounds at anticipated surgical site.   Musculoskeletal: Full extension of the neck.  No redness, warmth, or swelling of the joints noted. Gross motor strength is normal.    Neurologic: Alert, oriented to name, place and time. Cranial nerves II-XII are grossly intact. Gait is normal.      Neuropsychiatric: Calm, cooperative. Normal affect.       Labs:  18: Na 139; K 4.3; Cl 107; Glu 107; BUN 14. Cr 0.89; Ca 9.1; Bili total 0.4; Albumin 3.9; Alk phos 60; ALT 32; AST 23; HgbA1c 6.5      Imagin13 NM Stress:  IMPRESSION  Impression:  1. Normal myocardial SPECT study with a summed stress score of one.  2. No evidence of ischemia or scar.  3. Normal left ventricular cavity size, wall motion, and ejection fraction.      18 EKG: Sinus rhythm      Lab results and EKG were personally reviewed by this provider.        Assessment and Plan  Vishnu Viveros is a 68 year old male scheduled to undergo Bilateral Lower Lid Ectropion Repair (Bilateral Eye); Bilateral Punctoplasty with Mini Monoka Stent (Bilateral Eye) on 18 with Dr. Levy Blue.    He has the following specific operative considerations:   1.  HTN: Patient instructed to continue Cozaar as prescribed (he takes it in the evening.)  2.  Diabetes: Patient instructed to continue Glucophage as prescribed (he takes it in the evening.).  Recommend close monitoring of blood glucose levels throughout the perioperative period.  3.  Obesity: Recommend careful positioning to prevent airway/ventilatory compromise, or tissue injury.    4.  HIGH risk of obstructive sleep apnea: Recommend careful positioning to prevent airway compromise and close monitoring of the patient's respiratory status.      Revised Cardiac Risk Index: 0.4% risk of major adverse cardiac event.  CAROLE risk: HIGH.  PONV risk score= 2.  (If > 2, anti-emetic intervention is  recommended.)  Anesthesia considerations: Refer to PAC assessment in the anesthesia records.    Irene Kuhn NP  Preoperative Assessment Center  [facility]  Phone: [phone]  Fax: [phone]

## 2018-12-17 NOTE — ANESTHESIA PREPROCEDURE EVALUATION
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 eyelid, unspecified laterality      History of tobacco use      HLD (hyperlipidemia)      HTN (hypertension)      Pulmonary nodule       Past Surgical History:   Procedure Laterality Date     APPENDECTOMY OPEN CHILD       BIOPSY PROSTATE TRANSRECTAL       EXCISE PTERYGIUM Right      EXCISE PTERYGIUM Right 2015     INJECT EPIDURAL LUMBAR / SACRAL SINGLE N/A 2016    Procedure: INJECT EPIDURAL LUMBAR / SACRAL SINGLE;  Surgeon: Judah Henriquez MD;  Location: [facility]     INJECT PARAVERTEBRAL FACET JOINT LUMBAR / SACRAL FIRST Right 2016    Procedure: INJECT PARAVERTEBRAL FACET JOINT LUMBAR / SACRAL FIRST;  Surgeon: Judah Henriquez MD;  Location: [facility]     INJECT PARAVERTEBRAL FACET JOINT LUMBAR / SACRAL FIRST Right 2016    Procedure: INJECT PARAVERTEBRAL FACET JOINT LUMBAR / SACRAL FIRST;  Surgeon: Judah Henriquez MD;  Location: [facility] OR     left knee surgery            Anesthesia Evaluation     . Pt has had prior anesthetic. Type: General and MAC    No history of anesthetic complications          ROS/MED HX    ENT/Pulmonary:     (+)CAROLE risk factors snores loudly, hypertension, allergic rhinitis, tobacco use, Past use , . .   (-) recent URI   Neurologic:     (+)neuropathy - Numbness/tingling in both lower extremities related to back issues.  ,     Cardiovascular:     (+) Dyslipidemia, hypertension----. Taking blood thinners Pt has received instructions: Instructions Given to patient: Patient instructed to hold ASA in preparation for surgery.  . . . :. .      (-) GUNN, orthopnea/PND, syncope and irregular heartbeat/palpitations   METS/Exercise Tolerance: Comment: Activity is limited by chronic low back pain.   4 - Raking leaves, gardening   Hematologic:  - neg hematologic  ROS       Musculoskeletal:   (+) , , other musculoskeletal- Chronic low back pain.        GI/Hepatic:  - neg GI/hepatic ROS       Renal/Genitourinary:  - ROS Renal section negative       Endo:     (+) type II DM Last HgA1c: 6.5 date: 11/19/18 Not using insulin - not using insulin pump not previously admitted for DM/DKA Obesity, .   (-) Type I DM   Psychiatric:  - neg psychiatric ROS       Infectious Disease:  - neg infectious disease ROS       Malignancy:      - no malignancy   Other:    - neg other ROS                     PHYSICAL EXAM:   Mental Status/Neuro: A/A/O   Airway: Facies: Feasible  Mallampati: II  Mouth/Opening: Full  TM distance: > 6 cm  Neck ROM: Full   Respiratory: Auscultation: CTAB     Resp. Rate: Normal     Resp. Effort: Normal      CV: Rhythm: Regular  Rate: Age appropriate  Heart: Normal Sounds   Comments:                    Lab Results   Component Value Date    WBC 4.4 11/28/2016    HGB 13.7 11/28/2016    HCT 42.3 11/28/2016     11/28/2016    CRP <5.0 11/06/2009    SED 16 08/19/2010     11/19/2018    POTASSIUM 4.3 11/19/2018    CHLORIDE 107 11/19/2018    CO2 29 11/19/2018    BUN 14 11/19/2018    CR 0.89 11/19/2018     (H) 11/19/2018    FRANCIA 9.1 11/19/2018    PHOS 3.8 08/19/2010    ALBUMIN 3.9 11/19/2018    PROTTOTAL 7.6 11/19/2018    ALT 32 11/19/2018    AST 23 11/19/2018    ALKPHOS 60 11/19/2018    BILITOTAL 0.4 11/19/2018    TSH 1.67 07/31/2017       Preop Vitals  BP Readings from Last 3 Encounters:   11/27/18 166/75   11/26/18 156/87   10/31/18 (!) 175/93    Pulse Readings from Last 3 Encounters:   11/27/18 65   11/26/18 65   10/31/18 73      Resp Readings from Last 3 Encounters:   10/31/18 16   06/01/18 20   05/02/18 16    SpO2 Readings from  "Last 3 Encounters:   11/26/18 100%   10/31/18 97%   06/01/18 97%      Temp Readings from Last 1 Encounters:   11/26/18 97.5  F (36.4  C) (Oral)    Ht Readings from Last 1 Encounters:   11/27/18 1.626 m (5' 4\")      Wt Readings from Last 1 Encounters:   11/27/18 87.1 kg (192 lb)    Estimated body mass index is 32.96 kg/m  as calculated from the following:    Height as of 11/27/18: 1.626 m (5' 4\").    Weight as of 11/27/18: 87.1 kg (192 lb).     LDA:            Assessment:   ASA SCORE: 2    NPO Status: > 6 hours since completed Solid Foods   Documentation: H&P complete; Preop Testing complete; Consents complete   Proceeding: Proceed without further delay  Tobacco Use:  NO Active use of Tobacco/UNKNOWN Tobacco use status     Plan:   Anes. Type:  MAC   Pre-Induction: Opioid IV   Induction:  IV (Standard)   Airway: Native Airway   Access/Monitoring: PIV   Maintenance: Propofol; IV   Emergence: Procedure Site   Logistics: Same Day Surgery     Postop Pain/Sedation Strategy:  Standard-Options: Opioids PRN     PONV Management:  Adult Risk Factors:, Non-Smoker, Postop Opioids  Prevention: Propofol Infusion     CONSENT: Direct conversation   Plan and risks discussed with: Patient          Comments for Plan/Consent:  As per patient last dosed of ASA was 2 months ago                  PAC Discussion and Assessment    ASA Classification: 2  Case is suitable for: ASC  Anesthetic techniques and relevant risks discussed: MAC with GA as backup  Invasive monitoring and risk discussed:   Types:   Possibility and Risk of blood transfusion discussed:   NPO instructions given:   Additional anesthetic preparation and risks discussed:   Needs early admission to pre-op area:   Other:     PAC Resident/NP Anesthesia Assessment:  Vishnu Viveros is a 68 year old male scheduled to undergo Bilateral Lower Lid Ectropion Repair (Bilateral Eye); Bilateral Punctoplasty with Mini Monoka Stent (Bilateral Eye) on 12/19/18 with Dr. Levy Blue.    He " has the following specific operative considerations:   1.  HTN: Patient instructed to continue Cozaar as prescribed (he takes it in the evening.)  2.  Diabetes: Patient instructed to continue Glucophage as prescribed (he takes it in the evening.).  Recommend close monitoring of blood glucose levels throughout the perioperative period.  3.  Obesity: Recommend careful positioning to prevent airway/ventilatory compromise, or tissue injury.    4.  HIGH risk of obstructive sleep apnea: Recommend careful positioning to prevent airway compromise and close monitoring of the patient's respiratory status.      Revised Cardiac Risk Index: 0.4% risk of major adverse cardiac event.  CAROLE risk: HIGH.  PONV risk score= 2.  (If > 2, anti-emetic intervention is recommended.)  Anesthesia considerations: Refer to PAC assessment in the anesthesia records.      Reviewed and Signed by PAC Mid-Level Provider/Resident  Mid-Level Provider/Resident: Irene Kuhn CNP  Date: 12/17/18  Time:     Attending Anesthesiologist Anesthesia Assessment:        Anesthesiologist:   Date:   Time:   Pass/Fail:   Disposition:     PAC Pharmacist Assessment:        Pharmacist:   Date:   Time:        Irene Kuhn NP

## 2018-12-18 NOTE — PATIENT INSTRUCTIONS
Preparing for Your Surgery      Name:  Vishnu Viveros   MRN:  [id number]   :  1950   Today's Date:  2018     Arriving for surgery:  Surgery date:  18  Arrival time:  09:50 am  Please come to:     [facility]  [address] [place] [id number]     Parking is available in front of the [facility] building from 5:30AM to 8:00PM.  -  Proceed to the 5th floor to check into the Ambulatory Surgery Center.              >> There will be patient concierges on the 1st and 5th floor, for assistance or an escort, if you would like.              >> Please call [phone] with any questions.    What can I eat or drink?  -  You may have solid food or milk products until 8 hours prior to your surgery.  -  You may have water, apple juice or 7up/Sprite until 2 hours prior to your surgery.    Which medicines can I take?  Stop Aspirin, vitamins and supplements one week prior to surgery.  Hold Ibuprofen and Naproxen for 24 hours prior to surgery.       -  Please take these medications the day of surgery:  Tylenol if needed, take scheduled medications normally taken in the morning    How do I prepare myself?  -  Take two showers: one the night before surgery; and one the morning of surgery.         Use Scrubcare or Hibiclens to wash from neck down.  You may use your own     shampoo and conditioner. No other hair products.   -  Do NOT use lotion, powder, deodorant, or antiperspirant the day of your surgery.  -  Do NOT wear any jewelry.    - Do not bring your own medications to the hospital, except for inhalers and eye   drops.  -  Bring your ID and insurance card.    Questions or Concerns:  -If you have questions or concerns regarding the day of surgery, please call [phone].     -If you are scheduled at the Ambulatory Surgery Center please call [phone].    -For questions after surgery please call your surgeons office.

## 2018-12-19 ENCOUNTER — ANESTHESIA (OUTPATIENT)
Dept: SURGERY | Facility: AMBULATORY SURGERY CENTER | Age: 68
End: 2018-12-19

## 2018-12-19 ENCOUNTER — HOSPITAL ENCOUNTER (OUTPATIENT)
Facility: AMBULATORY SURGERY CENTER | Age: 68
End: 2018-12-19
Attending: OPHTHALMOLOGY
Payer: COMMERCIAL

## 2018-12-19 VITALS
RESPIRATION RATE: 16 BRPM | OXYGEN SATURATION: 97 % | HEIGHT: 64 IN | BODY MASS INDEX: 32.44 KG/M2 | HEART RATE: 68 BPM | TEMPERATURE: 97.6 F | DIASTOLIC BLOOD PRESSURE: 99 MMHG | WEIGHT: 190 LBS | SYSTOLIC BLOOD PRESSURE: 169 MMHG

## 2018-12-19 DIAGNOSIS — Z98.890 POSTOPERATIVE EYE STATE: Primary | ICD-10-CM

## 2018-12-19 LAB — GLUCOSE BLDC GLUCOMTR-MCNC: 93 MG/DL (ref 70–99)

## 2018-12-19 DEVICE — EYE IMP KIT LACRIMAL INTUBATION MINI MONOKA S1.1500: Type: IMPLANTABLE DEVICE | Site: EYE | Status: FUNCTIONAL

## 2018-12-19 RX ORDER — SODIUM CHLORIDE, SODIUM LACTATE, POTASSIUM CHLORIDE, CALCIUM CHLORIDE 600; 310; 30; 20 MG/100ML; MG/100ML; MG/100ML; MG/100ML
INJECTION, SOLUTION INTRAVENOUS CONTINUOUS
Status: DISCONTINUED | OUTPATIENT
Start: 2018-12-19 | End: 2018-12-20 | Stop reason: HOSPADM

## 2018-12-19 RX ORDER — OXYCODONE HYDROCHLORIDE 5 MG/1
5 TABLET ORAL EVERY 4 HOURS PRN
Status: DISCONTINUED | OUTPATIENT
Start: 2018-12-19 | End: 2018-12-20 | Stop reason: HOSPADM

## 2018-12-19 RX ORDER — PROPOFOL 10 MG/ML
INJECTION, EMULSION INTRAVENOUS PRN
Status: DISCONTINUED | OUTPATIENT
Start: 2018-12-19 | End: 2018-12-19

## 2018-12-19 RX ORDER — ERYTHROMYCIN 5 MG/G
OINTMENT OPHTHALMIC
Qty: 3.5 G | Refills: 0 | Status: SHIPPED | OUTPATIENT
Start: 2018-12-19 | End: 2019-06-12

## 2018-12-19 RX ORDER — LIDOCAINE 40 MG/G
CREAM TOPICAL
Status: DISCONTINUED | OUTPATIENT
Start: 2018-12-19 | End: 2018-12-19 | Stop reason: HOSPADM

## 2018-12-19 RX ORDER — LIDOCAINE HYDROCHLORIDE 20 MG/ML
INJECTION, SOLUTION INFILTRATION; PERINEURAL PRN
Status: DISCONTINUED | OUTPATIENT
Start: 2018-12-19 | End: 2018-12-19

## 2018-12-19 RX ORDER — LIDOCAINE HYDROCHLORIDE AND EPINEPHRINE 10; 10 MG/ML; UG/ML
INJECTION, SOLUTION INFILTRATION; PERINEURAL PRN
Status: DISCONTINUED | OUTPATIENT
Start: 2018-12-19 | End: 2018-12-19 | Stop reason: HOSPADM

## 2018-12-19 RX ORDER — ONDANSETRON 4 MG/1
4 TABLET, ORALLY DISINTEGRATING ORAL EVERY 30 MIN PRN
Status: DISCONTINUED | OUTPATIENT
Start: 2018-12-19 | End: 2018-12-20 | Stop reason: HOSPADM

## 2018-12-19 RX ORDER — SODIUM CHLORIDE, SODIUM LACTATE, POTASSIUM CHLORIDE, CALCIUM CHLORIDE 600; 310; 30; 20 MG/100ML; MG/100ML; MG/100ML; MG/100ML
INJECTION, SOLUTION INTRAVENOUS CONTINUOUS
Status: DISCONTINUED | OUTPATIENT
Start: 2018-12-19 | End: 2018-12-19 | Stop reason: HOSPADM

## 2018-12-19 RX ORDER — NEOMYCIN SULFATE, POLYMYXIN B SULFATE AND DEXAMETHASONE 3.5; 10000; 1 MG/ML; [USP'U]/ML; MG/ML
1 SUSPENSION/ DROPS OPHTHALMIC 4 TIMES DAILY
Qty: 1 BOTTLE | Refills: 0 | Status: SHIPPED | OUTPATIENT
Start: 2018-12-19 | End: 2019-06-12

## 2018-12-19 RX ORDER — HYDROCODONE BITARTRATE AND ACETAMINOPHEN 5; 325 MG/1; MG/1
1 TABLET ORAL
Status: DISCONTINUED | OUTPATIENT
Start: 2018-12-19 | End: 2018-12-20 | Stop reason: HOSPADM

## 2018-12-19 RX ORDER — FENTANYL CITRATE 50 UG/ML
25-50 INJECTION, SOLUTION INTRAMUSCULAR; INTRAVENOUS
Status: DISCONTINUED | OUTPATIENT
Start: 2018-12-19 | End: 2018-12-20 | Stop reason: HOSPADM

## 2018-12-19 RX ORDER — ONDANSETRON 2 MG/ML
4 INJECTION INTRAMUSCULAR; INTRAVENOUS EVERY 30 MIN PRN
Status: DISCONTINUED | OUTPATIENT
Start: 2018-12-19 | End: 2018-12-20 | Stop reason: HOSPADM

## 2018-12-19 RX ORDER — MEPERIDINE HYDROCHLORIDE 25 MG/ML
12.5 INJECTION INTRAMUSCULAR; INTRAVENOUS; SUBCUTANEOUS
Status: DISCONTINUED | OUTPATIENT
Start: 2018-12-19 | End: 2018-12-20 | Stop reason: HOSPADM

## 2018-12-19 RX ORDER — ACETAMINOPHEN 325 MG/1
975 TABLET ORAL ONCE
Status: COMPLETED | OUTPATIENT
Start: 2018-12-19 | End: 2018-12-19

## 2018-12-19 RX ORDER — PROPOFOL 10 MG/ML
INJECTION, EMULSION INTRAVENOUS CONTINUOUS PRN
Status: DISCONTINUED | OUTPATIENT
Start: 2018-12-19 | End: 2018-12-19

## 2018-12-19 RX ORDER — HYDROMORPHONE HYDROCHLORIDE 1 MG/ML
.3-.5 INJECTION, SOLUTION INTRAMUSCULAR; INTRAVENOUS; SUBCUTANEOUS EVERY 10 MIN PRN
Status: DISCONTINUED | OUTPATIENT
Start: 2018-12-19 | End: 2018-12-20 | Stop reason: HOSPADM

## 2018-12-19 RX ORDER — HYDROCODONE BITARTRATE AND ACETAMINOPHEN 5; 325 MG/1; MG/1
1-2 TABLET ORAL EVERY 4 HOURS PRN
Qty: 10 TABLET | Refills: 0 | Status: SHIPPED | OUTPATIENT
Start: 2018-12-19 | End: 2019-06-12

## 2018-12-19 RX ORDER — NALOXONE HYDROCHLORIDE 0.4 MG/ML
.1-.4 INJECTION, SOLUTION INTRAMUSCULAR; INTRAVENOUS; SUBCUTANEOUS
Status: DISCONTINUED | OUTPATIENT
Start: 2018-12-19 | End: 2018-12-20 | Stop reason: HOSPADM

## 2018-12-19 RX ADMIN — OXYCODONE HYDROCHLORIDE 5 MG: 5 TABLET ORAL at 11:36

## 2018-12-19 RX ADMIN — PROPOFOL 40 MG: 10 INJECTION, EMULSION INTRAVENOUS at 10:52

## 2018-12-19 RX ADMIN — ACETAMINOPHEN 975 MG: 325 TABLET ORAL at 10:31

## 2018-12-19 RX ADMIN — LIDOCAINE HYDROCHLORIDE 60 MG: 20 INJECTION, SOLUTION INFILTRATION; PERINEURAL at 10:51

## 2018-12-19 RX ADMIN — SODIUM CHLORIDE, SODIUM LACTATE, POTASSIUM CHLORIDE, CALCIUM CHLORIDE: 600; 310; 30; 20 INJECTION, SOLUTION INTRAVENOUS at 10:35

## 2018-12-19 RX ADMIN — PROPOFOL 30 MG: 10 INJECTION, EMULSION INTRAVENOUS at 11:14

## 2018-12-19 RX ADMIN — PROPOFOL 20 MG: 10 INJECTION, EMULSION INTRAVENOUS at 10:54

## 2018-12-19 RX ADMIN — PROPOFOL 30 MG: 10 INJECTION, EMULSION INTRAVENOUS at 10:53

## 2018-12-19 RX ADMIN — PROPOFOL 100 MCG/KG/MIN: 10 INJECTION, EMULSION INTRAVENOUS at 10:54

## 2018-12-19 ASSESSMENT — MIFFLIN-ST. JEOR: SCORE: 1537.83

## 2018-12-19 NOTE — LETTER
"December 26, 2018      Boni Viveros  [address]  [place] [address]        Dear Boni,    Please see below for your test results. The biopsy showed a benign mole.     Resulted Orders   Surgical pathology exam   Result Value Ref Range    Copath Report       Patient Name: BONI VIVEROS  MR#: [id number]  Specimen #: [id number]  Collected: 12/19/2018  Received: 12/19/2018  Reported: 12/21/2018 14:41  Ordering Phy(s): JULIANNA REID    For improved result formatting, select 'View Enhanced Report Format' under   Linked Documents section.    SPECIMEN(S):  Right lower lid lesion    FINAL DIAGNOSIS:  Right lower lid lesion:  - Intradermal melanocytic nevus - (see description)    I have personally reviewed all specimens and/or slides, including the   listed special stains, and used them  with my medical judgement to determine or confirm the final diagnosis.    Electronically signed out by:    Juan Madden M.D., [facility]    CLINICAL HISTORY:   The patient is a 68-year-old male.  GROSS:  The specimen is received in formalin with proper patient identification,   labeled \"right lower lid lesion\" and  the specimen consists of a single, irregular skin shave measuring 0.4 x   0.2 cm.  The skin surface displays a  0.4 x 0.2 x 0.2 cm brown les ion.  The resection margin is inked black.  It   is bisected and entirely submitted  in cassette A1. (Dictated by: Carolina MASON [facility] 12/19/2018 12:02 PM)    MICROSCOPIC:  The specimen exhibits an intradermal proliferation of nests and cords of   melanocytes which mature with  descent, without a significant junctional component.  The lesion extends   to the deep margin.    CPT Codes:  A: 13923-MC8    TESTING LAB LOCATION:  [facility], [address]   [address] [phone]    COLLECTION SITE:  Client: [facility]  Location: [facility] (B)     "       If you have any questions, please call the clinic to make an appointment.    Sincerely,    Levy Blue MD    Oculoplastic Surgeon  Department of Ophthalmology & Visual Neurosciences  [facility]

## 2018-12-19 NOTE — DISCHARGE INSTRUCTIONS
Post-operative Instructions    Ophthalmic Plastic and Reconstructive Surgery  Levy Blue M.D.  Ernst Brandt M.D.    All instructions apply to the operated eye(s) or eyelid(s)      What to expect after surgery:    There will be some swelling, bruising, and likely a black eye (even into the lower eyelids and cheeks). Also expect crusting and discharge from the eye and/or incisions.     A small amount of surface bleeding is normal for the first 48 hours after surgery.    You may notice some bloody tears for the first few days after surgery. This is normal.    Your eye(s) and eyelid(s) may be painful and tender. This is normal after surgery. Use the pain medication as prescribed. If your pain does not improve despite the medication, contact the office.    Wound care and personal care:    If a patch or bandage has been placed, please leave this in place until seen in clinic. Prevent the bandage from getting wet.     Apply ice compresses 15 minutes on 15 minutes off while awake for the first 2 days after surgery, then switch to warm compresses 4 times a day until seen by your physician.     For warm packs you can place a cup of dry uncooked rice in a clean cotton sock. Place sock in microwave 30 seconds to one minute. Next place the warm sock into a plastic bag and wrap the bag with clean warm wet washcloth and place over operated eye.      You may shower or wash your hair the day after surgery. Do not bathe or go swimming for 1 week to prevent contamination of your wounds.    Do not apply make-up to the eyes or eyelids for 2 weeks after surgery.      Activity restrictions and driving:    Avoid heavy lifting, bending, exercise or strenuous activity for 1 week after surgery.    You may resume other activities and return to work as tolerated.    You may not resume driving until have you stopped using narcotic pain medications(such as Norco, Percocet, Tylenol #3).    Medications:    Restart all your regular home  medications and eye drops today. If you take Plavix or Aspirin on a regular basis, wait for 3 days after your surgery before restarting these in order to decrease the risk of bleeding complications.    Avoid aspirin and aspirin-like medications (Motrin, Aleve, Ibuprofen, Simi-Dillon etc) for 5 days to reduce the risk of bleeding. You may take Tylenol (acetaminophen) for pain.    In addition to your home medications, take the following post-operative medications as prescribed by your physician:    Apply antibiotic ointment (erythromycin) to all sutures three times a day, and into the operated eye(s) at night.     Instill eye drops (Maxitrol) four times a day until the bottle finished.     Take 1 to 2 pain pills (norco or tylenol 3 as prescribed) as needed for pain up to every 4 hours.    The pain pills may make you drowsy. You must not drive a car, operate heavy machinery or drink alcohol while taking them.    The pain pills may cause constipation and nausea. Take them with some food to prevent a stomach upset. If you continue to experience nausea, call your physician.      WARNING: All the prescription pain medications listed above contain Tylenol (acetaminophen). You must not take more than 4,000 mg of acetaminophen per 24-hour period. This is equivalent to 6 tablets of Darvocet, 8 tablets of Vicodin, or 12 tablets of Norco, Percocet or Tylenol #3. If you take other over-the-counter medications containing acetaminophen, you must take the amount of acetaminophen into account and reduce the number of prescribed pain pills accordingly.    Contact information and follow-up:    Return to the Eye Clinic for a follow-up appointment with your physician as  scheduled. If no appointment has been scheduled, call [phone] for an  appointment with Dr. Blue within 1 to 2 weeks from your date of surgery.      For severe pain, bleeding, or loss of vision, call the Eye Clinic at [phone].    After hours or on weekends  "and holidays, call [phone] and ask to speak with the ophthalmologist on call.    [facility]  Home Care Following Anesthesia  For 24 hours after surgery:  1. Get plenty of rest.  A responsible adult must stay with you for at least 24 hours after you leave the surgery center.  2. Do not drive or use heavy equipment.  If you have weakness or tingling, don't drive or use heavy equipment until this feeling goes away.   3. Do not drink alcohol.   4. Avoid strenuous or risky activities.  Ask for help when climbing stairs.  5. You may feel lightheaded.  IF so, sit for a few minutes before standing.  Have someone help you get up.   6. If you have nausea (feel sick to your stomach): Drink only clear liquids such as apple juice, ginger ale, broth or 7-Up.  Rest may also help.  Be sure to drink enough fluids.  Move to a regular diet as you feel able.   7. You may have a slight fever.  Call the doctor if your fever is over 100 F (37.7 C) (taken under the tongue) or lasts longer than 24 hours.  8. You may have a dry mouth, a sore throat, muscle aches or trouble sleeping. These should go away after 24 hours.  9. Do not make important or legal decisions.        Today you received a Marcaine or bupivacaine block to numb the nerves near your surgery site.  This is a block using local anesthetic or \"numbing\" medication injected around the nerves to anesthetize or \"numb\" the area supplied by those nerves.  This block is injected into the muscle layer near your surgical site.  The medication may numb the location where you had surgery for 6-18 hours, but may last up to 24 hours.  If your surgical site is an arm or leg you should be careful with your affected limb, since it is possible to injure your limb without being aware of it due to the numbing.  Until full feeling returns, you should guard against bumping or hitting your limb, and avoid extreme hot or cold temperatures on the skin.  As the " block wears off, the feeling will return as a tingling or prickly sensation near your surgical site.  You will experience more discomfort from your incision as the feeling returns.  You may want to take a pain pill (a narcotic or Tylenol if this was prescribed by your surgeon) when you start to experience mild pain before the pain beccomes more severe.  If your pain medications do not control your pain you should notifiy your surgeon.    Tips for taking pain medications  To get the best pain relief possible, remember these points:    Take pain medications as directed, before pain becomes severe.    Pain medication can upset your stomach: taking it with food may help.    Constipation is a common side effect of pain medication. Drink plenty of  fluids.    Eat foods high in fiber. Take a stool softener if recommended by your doctor or pharmacist.    Do not drink alcohol, drive or operate machinery while taking pain medications.    Ask about other ways to control pain, such as with heat, ice or relaxation.    Tylenol/Acetaminophen Consumption  To help encourage the safe use of acetaminophen, the makers of TYLENOL  have lowered the maximum daily dose for single-ingredient Extra Strength TYLENOL  (acetaminophen) products sold in the [place] from 8 pills per day (4,000 mg) to 6 pills per day (3,000 mg). The dosing interval has also changed from 2 pills every 4-6 hours to 2 pills every 6 hours.    If you feel your pain relief is insufficient, you may take Tylenol/Acetaminophen in addition to your narcotic pain medication.     Be careful not to exceed 3,000 mg of Tylenol/Acetaminophen in a 24 hour period from all sources.    If you are taking extra strength Tylenol/acetaminophen (500 mg), the maximum dose is 6 tablets in 24 hours.    If you are taking regular strength acetaminophen (325 mg), the maximum dose is 9 tablets in 24 hours.    Call a doctor for any of the followin. Signs of infection (fever, growing tenderness at  the surgery site, a large amount of drainage or bleeding, severe pain, foul-smelling drainage, redness, swelling).  2. It has been over 8 to 10 hours since surgery and you are still not able to urinate (pass water).  3. Headache for over 24 hours.  4. Numbness, tingling or weakness the day after surgery (if you had spinal anesthesia).  Your doctor is:  Dr. Levy Blue, Ophthalmology: [phone]                    Or dial [phone] and ask for the resident on call for:  Ophthalmology  For emergency care, call the:  [facility]:  [phone] (TTY for hearing impaired: [phone])

## 2018-12-19 NOTE — OP NOTE
PREOPERATIVE DIAGNOSIS: Bilateral lower eyelid ectropion and punctal stenosis  POSTOPERATIVE DIAGNOSIS: Bilateral lower eyelid ectropion and punctal stenosis  PROCEDURE:  Bilateral lower eyelid ectropion repair by tarsal strip procedure, snip punctoplasty and insertion of Mini Monoka lacrimal stent.  ANESTHESIA: Monitored with local infiltration of 1% lidocaine with epinephrine.   SURGEON: Levy Blue MD.  ASSISTANT: Johnathon Almanza MD  COMPLICATIONS: None.   ESTIMATED BLOOD LOSS: Less than 5 mL.   HISTORY: Vishnu Viveros  presented with tearing  due to lower lid ectropion and punctal stenosis. After the risks, benefits and alternatives to the proposed procedure were explained, informed consent was obtained.   DESCRIPTION OF PROCEDURE: Vishnu Viveros was brought to the operating room and placed supine on the operating table. IV sedation was given. The lower lids and lateral canthal areas were infiltrated with local anesthetic. The area was prepped and draped in the typical fashion. Attention was directed to the left side. An 8 mm lateral canthal incision was made with a 15 blade and dissection carried down to the orbicularis with high temperature cautery. Lateral canthotomy and inferior cantholysis was performed with the cautery. A lateral tarsal strip was fashioned with the high temperature cautery and the sandy scissors. The tarsal strip was secured to the lateral orbital rim periosteum with a double-armed 5-0 PDS suture in a horizontal mattress fashion. Lateral canthal angle was closed with a gray line to gray line suture of 6-0 Vicryl tied internally. Skin was closed with interrupted 6-0 plain gut sutures. The lower punctum was dilated with the punctal dilator.  Using the Pam punch the posterior wall of the punctum was enlarged.   A mini Monoka stent was trimmed and inserted into the canaliculus and seated in the punctum.  Attention was directed to the right side. The right lower lid lesion was elevated  with a 0.5 mm Castroviejo forceps. The lesion was excised with a Susan scissors.  Hemostasis was obtained with the high temperature loop wire cautery.  The lesion was placed in formalin and sent for pathologic evaluation.  The same ectropion and punctoplasty procedure was then performed. Antibiotic ointment was applied to the incisions. Vishnu Viveros left the operating room in stable condition.     JULIANNA REID MD

## 2018-12-19 NOTE — ANESTHESIA POSTPROCEDURE EVALUATION
Anesthesia POST Procedure Evaluation    Patient: Vishnu Viveros   MRN:     [id number] Gender:   male   Age:    68 year old :      1950        Preoperative Diagnosis: Tearing, Lesion   Procedure(s):  Bilateral Lower Lid Ectropion Repair with Right Lower Lid Lesion Excision  Bilateral Punctoplasty with Mini Monoka Stent   Postop Comments: No value filed.       Anesthesia Type:  MAC    Reportable Event: NO     PAIN: Uncomplicated   Sign Out status: Comfortable, Well controlled pain     PONV: No PONV   Sign Out status:  No Nausea or Vomiting     Neuro/Psych: Uneventful perioperative course   Sign Out Status: Preoperative baseline; Age appropriate mentation     Airway/Resp.: Uneventful perioperative course   Sign Out Status: Non labored breathing, age appropriate RR; Resp. Status within EXPECTED Parameters     CV: Uneventful perioperative course   Sign Out status: Appropriate BP and perfusion indices; Appropriate HR/Rhythm     Disposition:   Sign Out in:  PACU  Disposition:  Phase II; Home  Recovery Course: Uneventful  Follow-Up: Not required           Last Anesthesia Record Vitals:  CRNA VITALS  2018 1056 - 2018 1156      2018             Resp Rate (observed):  1  (Abnormal)     Resp Rate (set):  10          Last PACU/Preop Vitals:  Vitals:    18 1145 18 1206 18 1208   BP: 154/88 (!) 171/93 (!) 169/99   Pulse:      Resp: 14 16    Temp:  36.4  C (97.6  F)    SpO2: 98% 97%          Electronically Signed By: Seferino Guidry MD, MD, 2018, 12:49 PM

## 2018-12-19 NOTE — BRIEF OP NOTE
[facility] Brief Operative Note    Pre-operative diagnosis: Tearing, Lesion   Post-operative diagnosis Same   Procedure: Procedure(s):  Bilateral Lower Lid Ectropion Repair  Bilateral Punctoplasty with Mini [place]oka Stent   Surgeon: Levy Blue M.D.    Assistants(s): Johnathon Almanza M.D.; Devyn Madden M.D.   Estimated blood loss: Less than 10 mL   Specimens: Right lower eyelid lesion sent to pathology   Findings: As expected

## 2018-12-19 NOTE — BRIEF OP NOTE
[facility]    Brief Operative Note    Pre-operative diagnosis: Tearing, Lesion  Post-operative diagnosis * No post-op diagnosis entered *  Procedure: Procedure(s):  Bilateral Lower Lid Ectropion Repair with Right Lower Lid Lesion Excision  Bilateral Punctoplasty with Mini [place] Stent  Surgeon: Surgeon(s) and Role:     * Levy Blue MD - Primary  Anesthesia: Combined MAC with Local   Estimated blood loss: Minimal  Drains: None  Specimens:   ID Type Source Tests Collected by Time Destination   A : Right lower lid lesion Tissue Lower Eyelid SURGICAL PATHOLOGY EXAM Levy Blue MD 12/19/2018 11:00 AM      Findings:   None.  Complications: None.  Implants: None.    Levy Blue MD

## 2018-12-19 NOTE — ANESTHESIA CARE TRANSFER NOTE
Patient: Vishnu Viveros    Procedure(s):  Bilateral Lower Lid Ectropion Repair with Right Lower Lid Lesion Excision  Bilateral Punctoplasty with Mini Monoka Stent    Diagnosis: Tearing, Lesion  Diagnosis Additional Information: No value filed.    Anesthesia Type:   No value filed.     Note:  Airway :Room Air  Patient transferred to:Phase II  Comments: Report to RN    134/65, 16, 76, 98%Handoff Report: Identifed the Patient, Identified the Reponsible Provider, Reviewed the pertinent medical history, Discussed the surgical course, Reviewed Intra-OP anesthesia mangement and issues during anesthesia, Set expectations for post-procedure period and Allowed opportunity for questions and acknowledgement of understanding      Vitals: (Last set prior to Anesthesia Care Transfer)    CRNA VITALS  12/19/2018 1056 - 12/19/2018 1131      12/19/2018             Resp Rate (observed):  1  (Abnormal)     Resp Rate (set):  10                Electronically Signed By: DILAN Curry CRNA  December 19, 2018  11:31 AM
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
## 2018-12-20 ENCOUNTER — TELEPHONE (OUTPATIENT)
Dept: OPHTHALMOLOGY | Facility: CLINIC | Age: 68
End: 2018-12-20

## 2018-12-20 NOTE — TELEPHONE ENCOUNTER
POD1  A telephone call was made to Vishnu Viveros to make sure the post operative course has been uneventful and that all questions were answered. There was no answer and a telephone message was left.    Levy Blue

## 2018-12-21 LAB — COPATH REPORT: NORMAL

## 2019-01-02 ENCOUNTER — ANCILLARY PROCEDURE (OUTPATIENT)
Dept: GENERAL RADIOLOGY | Facility: CLINIC | Age: 69
End: 2019-01-02
Payer: COMMERCIAL

## 2019-01-02 ENCOUNTER — OFFICE VISIT (OUTPATIENT)
Dept: INTERNAL MEDICINE | Facility: CLINIC | Age: 69
End: 2019-01-02
Payer: COMMERCIAL

## 2019-01-02 VITALS
RESPIRATION RATE: 20 BRPM | OXYGEN SATURATION: 96 % | SYSTOLIC BLOOD PRESSURE: 164 MMHG | BODY MASS INDEX: 33.49 KG/M2 | WEIGHT: 195.1 LBS | DIASTOLIC BLOOD PRESSURE: 78 MMHG | TEMPERATURE: 99.4 F | HEART RATE: 86 BPM

## 2019-01-02 DIAGNOSIS — R05.9 COUGH: ICD-10-CM

## 2019-01-02 DIAGNOSIS — J98.01 ACUTE BRONCHOSPASM: ICD-10-CM

## 2019-01-02 DIAGNOSIS — R05.9 COUGH: Primary | ICD-10-CM

## 2019-01-02 DIAGNOSIS — R07.89 ATYPICAL CHEST PAIN: ICD-10-CM

## 2019-01-02 DIAGNOSIS — R91.8 PULMONARY NODULES: ICD-10-CM

## 2019-01-02 LAB
ALBUMIN SERPL-MCNC: 3.8 G/DL (ref 3.4–5)
ALP SERPL-CCNC: 70 U/L (ref 40–150)
ALT SERPL W P-5'-P-CCNC: 31 U/L (ref 0–70)
ANION GAP SERPL CALCULATED.3IONS-SCNC: 6 MMOL/L (ref 3–14)
AST SERPL W P-5'-P-CCNC: 16 U/L (ref 0–45)
BASOPHILS # BLD AUTO: 0 10E9/L (ref 0–0.2)
BASOPHILS NFR BLD AUTO: 0.7 %
BILIRUB SERPL-MCNC: 0.4 MG/DL (ref 0.2–1.3)
BUN SERPL-MCNC: 13 MG/DL (ref 7–30)
CALCIUM SERPL-MCNC: 8.6 MG/DL (ref 8.5–10.1)
CHLORIDE SERPL-SCNC: 105 MMOL/L (ref 94–109)
CO2 SERPL-SCNC: 27 MMOL/L (ref 20–32)
CREAT SERPL-MCNC: 0.73 MG/DL (ref 0.66–1.25)
DIFFERENTIAL METHOD BLD: ABNORMAL
EOSINOPHIL # BLD AUTO: 0.2 10E9/L (ref 0–0.7)
EOSINOPHIL NFR BLD AUTO: 4.2 %
ERYTHROCYTE [DISTWIDTH] IN BLOOD BY AUTOMATED COUNT: 12.6 % (ref 10–15)
GFR SERPL CREATININE-BSD FRML MDRD: >90 ML/MIN/{1.73_M2}
GLUCOSE SERPL-MCNC: 104 MG/DL (ref 70–99)
HCT VFR BLD AUTO: 40.5 % (ref 40–53)
HGB BLD-MCNC: 12.9 G/DL (ref 13.3–17.7)
IMM GRANULOCYTES # BLD: 0 10E9/L (ref 0–0.4)
IMM GRANULOCYTES NFR BLD: 0.3 %
INTERPRETATION ECG - MUSE: NORMAL
LYMPHOCYTES # BLD AUTO: 1.2 10E9/L (ref 0.8–5.3)
LYMPHOCYTES NFR BLD AUTO: 21.4 %
MCH RBC QN AUTO: 28.7 PG (ref 26.5–33)
MCHC RBC AUTO-ENTMCNC: 31.9 G/DL (ref 31.5–36.5)
MCV RBC AUTO: 90 FL (ref 78–100)
MONOCYTES # BLD AUTO: 0.7 10E9/L (ref 0–1.3)
MONOCYTES NFR BLD AUTO: 12.9 %
NEUTROPHILS # BLD AUTO: 3.5 10E9/L (ref 1.6–8.3)
NEUTROPHILS NFR BLD AUTO: 60.5 %
NRBC # BLD AUTO: 0 10*3/UL
NRBC BLD AUTO-RTO: 0 /100
PLATELET # BLD AUTO: 183 10E9/L (ref 150–450)
POTASSIUM SERPL-SCNC: 4.5 MMOL/L (ref 3.4–5.3)
PROT SERPL-MCNC: 7.8 G/DL (ref 6.8–8.8)
RADIOLOGIST FLAGS: NORMAL
RBC # BLD AUTO: 4.49 10E12/L (ref 4.4–5.9)
SODIUM SERPL-SCNC: 138 MMOL/L (ref 133–144)
WBC # BLD AUTO: 5.8 10E9/L (ref 4–11)

## 2019-01-02 RX ORDER — CODEINE PHOSPHATE AND GUAIFENESIN 10; 100 MG/5ML; MG/5ML
LIQUID ORAL
Qty: 473 ML | Refills: 0 | Status: SHIPPED | OUTPATIENT
Start: 2019-01-02 | End: 2019-06-12

## 2019-01-02 RX ORDER — PREDNISONE 20 MG/1
40 TABLET ORAL DAILY
Qty: 10 TABLET | Refills: 0 | Status: SHIPPED | OUTPATIENT
Start: 2019-01-02 | End: 2019-06-12

## 2019-01-02 RX ORDER — ALBUTEROL SULFATE 90 UG/1
2 AEROSOL, METERED RESPIRATORY (INHALATION) EVERY 6 HOURS
Qty: 1 INHALER | Refills: 1 | Status: SHIPPED | OUTPATIENT
Start: 2019-01-02 | End: 2020-06-01

## 2019-01-02 ASSESSMENT — PAIN SCALES - GENERAL: PAINLEVEL: SEVERE PAIN (7)

## 2019-01-02 NOTE — NURSING NOTE
Chief Complaint   Patient presents with     URI     Patient is here for URI; cough, fatigue, and wheezing       Joe Ramírez CMA ([facility]) at 12:05 PM on 1/2/2019

## 2019-01-02 NOTE — LETTER
[facility] PRIMARY CARE [facility]INIC  [address]  [address] [place] [address]          January 2, 2019    RE:  Vishnu Viveros                                                                                                                                                       [address]  [place] [address]            To whom it may concern:    Vishnu Viveros was seen in the clinic on 1/2/19 for acute illness. Please excuse him from work through the end of the week (until after 1/4/19).      Sincerely,        Zuleika Rubio, APRN, CNP

## 2019-01-02 NOTE — PROGRESS NOTES
[facility]  Primary Care Center   Zuleika Rubio, DILAN FALLON  01/03/2019      Chief Complaint:   URI    History of Present Illness:   Vishnu Viveros is a 68 year old male with a history of type 2 diabetes, hypertension, hyperlipidemia, and seasonal allergies who presents for evaluation of cough. Since Sunday (3 days ago), the patient has had worsening dry cough and wheezing with associated chest soreness during cough, nasal congestion, sore throat, left ear pain, chills, recorded fever of 99, and left ear pressure which is worse with cough. The patient has some difficultly breathing with cough though denies shortness of breath. The cough is nonproductive and denies hemoptysis. He has difficulty sleeping at night secondary to the coughing. He has tried benadryl as well as Mucinex for his congestion which have helped him in the past though are not effective with this episode. The patient denies asthma or COPD. He received the flu shot this season. Of note, he has had a recurrence of this for the past 3 years and has last year was treated successfully with antibiotics and virtussin AC per patient. He wonders why he gets this every year even though he has gotten his flu shot. His wife is a nurse at [employer] and has many sick contacts.     Review of Systems:   Pertinent items are noted in HPI, remainder of complete ROS is negative.      Active Medications:     Current Outpatient Medications:      acetaminophen (TYLENOL) 500 MG tablet, Take 1-2 tablets (500-1,000 mg) by mouth every 6 hours as needed for pain (arthritis), Disp: 120 tablet, Rfl: 1     albuterol (PROAIR HFA/PROVENTIL HFA/VENTOLIN HFA) 108 (90 Base) MCG/ACT inhaler, Inhale 2 puffs into the lungs every 6 hours, Disp: 1 Inhaler, Rfl: 1     Ascorbic Acid (VITAMIN C PO), Take 500 mg by mouth once, Disp: , Rfl:      aspirin 81 MG tablet, Take 1 tablet by mouth every morning , Disp: , Rfl:      atorvastatin (LIPITOR) 20 MG tablet, Take 1 tablet (20 mg) by mouth  daily (Patient taking differently: Take 20 mg by mouth every evening ), Disp: 90 tablet, Rfl: 3     Cholecalciferol (VITAMIN D) 1000 UNIT capsule, Take 1 capsule by mouth daily., Disp: , Rfl:      Cyanocobalamin (VITAMIN B12 PO), Take 1 tablet by mouth. Pt states he takes this 2 or 3 times a week.. , Disp: , Rfl:      gabapentin (NEURONTIN) 300 MG capsule, One capsule at bedtime for one week the increase to twice daily, Disp: 180 capsule, Rfl: 3     HYDROcodone-acetaminophen (NORCO) 5-325 MG tablet, Take 1-2 tablets by mouth every 4 hours as needed for moderate to severe pain, Disp: 10 tablet, Rfl: 0     loratadine (CLARITIN) 10 MG tablet, Take 1 tablet (10 mg) by mouth daily as needed for allergies, Disp: 90 tablet, Rfl: 3     losartan (COZAAR) 100 MG tablet, Take 1 tablet (100 mg) by mouth daily (Patient taking differently: Take 100 mg by mouth every evening ), Disp: 90 tablet, Rfl: 3     metFORMIN (GLUCOPHAGE-XR) 500 MG 24 hr tablet, Take 1 tablet (500 mg) by mouth daily (with dinner), Disp: 90 tablet, Rfl: 3     Multiple Vitamin (MULTIVITAMINS PO), Take 1 tablet by mouth daily., Disp: , Rfl:      Omega-3 Fatty Acids (FISH OIL PO), Take 1 capsule by mouth as needed, Disp: , Rfl:      order for DME, Back brace, Disp: 1 Device, Rfl: 0     predniSONE (DELTASONE) 20 MG tablet, Take 40 mg by mouth daily for 5 days., Disp: 10 tablet, Rfl: 0     sildenafil (REVATIO) 20 MG tablet, Take 1-5 tablets (20 mg) by mouth as needed for sexual activity.  Never use with nitroglycerin, terazosin or doxazosin., Disp: 90 tablet, Rfl: 11     tamsulosin (FLOMAX) 0.4 MG capsule, Take 1 capsule (0.4 mg) by mouth daily, Disp: 90 capsule, Rfl: 3     VIRTUSSIN A/C 100-10 MG/5ML solution, 5-10 MLS PO Q 4 H PRN COUGH, Disp: 473 mL, Rfl: 0  No current facility-administered medications for this visit.     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  Chronic low back pain  Diabetes  Ectropion due to laxity of eyelid, unspecified laterality  History of tobacco use  Hyperlipidemia  Hypertension  Obesity   Pulmonary nodule   Erectile dysfunction  Seasonal allergies  Vitamin D deficiency  Prostate nodule  Degenerative disc disease lumbar  Degenerative joint disease of knee  Hearing loss  Vision impairment  Tear film insufficiency  Recurrent pterygium  Senile nuclear sclerosis  Type 2 diabetes mellitus treated with insulin  Diabetic polyneuropathy associated with diabetes mellitus due to underlying condition  Tubulovillous adenoma of colon  Lumbar radiculopathy     Past Surgical History:  Appendectomy open child  Arthroscopy knee   Biopsy prostate transrectal  Excise pterygium x2  Inject epidural lumbar / sacral single  Inject paravertebral facet joint lumbar / sacral x2  Punctoplasty   Repair ectropion bilateral     Family History:    Cerebrovascular disease - brother  Breast cancer - sister  Kidney disease - mother  Cerebral aneurysm - brother  Hypothyroidism - sister  Father passed in [place] from parkinson or post eye surgery    Social History:   Smoking status: former smoker of 5 years, quit 1981  Smokeless tobacco: never used  Alcohol use: 3 standard drinks per week, drinks four or more times per week (1-2 per session), no binge  Drug use: no      Physical Exam:   /78   Pulse 86   Temp 99.4  F (37.4  C) (Oral)   Resp 20   Wt 88.5 kg (195 lb 1.6 oz)   SpO2 96%   BMI 33.49 kg/m     Constitutional: Alert, oriented, pleasant, no acute distress  Head: Normocephalic, atraumatic  Eyes: Extra-ocular movements intact, pupils equally round and reactive bilaterally, no scleral icterus  Ears: tympanic membranes mildly erythematous with positive light reflex  ENT: Oropharynx clear, moist mucus  membranes, good dentition  Neck: Supple, no lymphadenopathy, no thyromegaly  Cardiovascular: Regular rate and rhythm, no murmurs, rubs or gallops  Respiratory: +Inspiratory/expiratory wheezes. Good air movement bilaterally, no rales/rhonchi.  Psychiatric: normal mentation, affect and mood    ECG obtained in clinic:  Indication: chest soreness  Completed at 1227.  Read at 1234.   Rate 87 bpm. MS interval 160. QRS duration 86. QT/QTc 340/409. P-R-T axes 68 30 60.    Normal sinus rhythm. Normal ECG.    Assessment and Plan:  Vishnu Viveros is a 68 year old male with a history of type 2 diabetes, hypertension, hyperlipidemia, and seasonal allergies who presents for evaluation of cough and URI symptoms for 3 days.      Cough  Acute bronchospasm  Discussed symptoms consistent with viral presentation with sore throat, nasal congestion, ear pressure, and The patient has had difficulty sleeping at night secondary to his persistent cough and I provided him with Virtussin AC. He will do a prednisone burst and we will obtain a chest X-ray to r/o pneumonia. The patient understand that he is to contact us if his symptoms persist or worsen.   CXR is negative for PNA but does show pulm nodule in RUL--this has been seen on CT scan previously (last done in 2012). He does have a remote history of smoking (quit in 1981), but given the new small nodules found in 2012, will obtain repeat CT to assess for growth.  - VIRTUSSIN A/C 100-10 MG/5ML solution  Dispense: 473 mL; Refill: 0  - predniSONE (DELTASONE) 20 MG tablet  Dispense: 10 tablet; Refill: 0  - XR Chest 2 Views  - CBC with platelets differential  - Comprehensive metabolic panel    Atypical chest pain  The patient complained of chest discomfort and an EKG was obtained in the clinic to rule out cardiac etiology--this showed NSR with no acute changes. I suspect that his chest discomfort is secondary to his persistent cough/wheezing.  - EKG 12-LEAD CLINIC READ    Hypertension  The  patient recorded elevated pressure today in clinic both initially and on repeat. Per patient interview, he has been taking half of his Losartan dosage (50 mg, not 100 mg). We discussed his medication and taking the appropriate dose. He will monitor BPs at home; his wife is a nurse.     Follow-up: As needed if symptoms worsen or fail to improve     Scribe Disclosure:   I, Garrett Castanon, am serving as a scribe to document services personally performed by DILAN Villanueva CNP at this visit, based upon the provider's statements to me. All documentation has been reviewed by the aforementioned provider prior to being entered into the official medical record.     Portions of this medical record were completed by a scribe. UPON MY REVIEW AND AUTHENTICATION BY ELECTRONIC SIGNATURE, this confirms (a) I performed the applicable clinical services, and (b) the record is accurate.     DILAN Villanueva CNP

## 2019-01-03 ENCOUNTER — DOCUMENTATION ONLY (OUTPATIENT)
Dept: ANESTHESIOLOGY | Facility: CLINIC | Age: 69
End: 2019-01-03

## 2019-01-03 ENCOUNTER — TELEPHONE (OUTPATIENT)
Dept: INTERNAL MEDICINE | Facility: CLINIC | Age: 69
End: 2019-01-03

## 2019-01-03 ENCOUNTER — OFFICE VISIT (OUTPATIENT)
Dept: ANESTHESIOLOGY | Facility: CLINIC | Age: 69
End: 2019-01-03
Payer: COMMERCIAL

## 2019-01-03 VITALS
SYSTOLIC BLOOD PRESSURE: 161 MMHG | DIASTOLIC BLOOD PRESSURE: 84 MMHG | HEIGHT: 64 IN | BODY MASS INDEX: 33.29 KG/M2 | RESPIRATION RATE: 16 BRPM | WEIGHT: 195 LBS | HEART RATE: 73 BPM

## 2019-01-03 DIAGNOSIS — M47.816 LUMBAR FACET ARTHROPATHY: Primary | ICD-10-CM

## 2019-01-03 DIAGNOSIS — M54.16 LUMBAR RADICULOPATHY: Primary | ICD-10-CM

## 2019-01-03 ASSESSMENT — ANXIETY QUESTIONNAIRES
3. WORRYING TOO MUCH ABOUT DIFFERENT THINGS: SEVERAL DAYS
2. NOT BEING ABLE TO STOP OR CONTROL WORRYING: NEARLY EVERY DAY
GAD7 TOTAL SCORE: 13
GAD7 TOTAL SCORE: 13
7. FEELING AFRAID AS IF SOMETHING AWFUL MIGHT HAPPEN: NOT AT ALL
4. TROUBLE RELAXING: NEARLY EVERY DAY
GAD7 TOTAL SCORE: 13
6. BECOMING EASILY ANNOYED OR IRRITABLE: NOT AT ALL
5. BEING SO RESTLESS THAT IT IS HARD TO SIT STILL: NEARLY EVERY DAY
1. FEELING NERVOUS, ANXIOUS, OR ON EDGE: NEARLY EVERY DAY

## 2019-01-03 ASSESSMENT — PAIN SCALES - GENERAL: PAINLEVEL: SEVERE PAIN (7)

## 2019-01-03 ASSESSMENT — MIFFLIN-ST. JEOR: SCORE: 1560.51

## 2019-01-03 NOTE — PATIENT INSTRUCTIONS
1. Schedule procedure.     2. Continue taking gabapentin as prescribed.        Follow up: 6 months or sooner if needed         When calling to schedule your procedure appointment, also make your follow up clinic appointment 6 weeks after the procedure.    Please call [phone] to schedule, reschedule, or cancel your procedure appointment.   Phones are answered Monday - Friday from 7:30 - 4:00pm.  Leave a voicemail with your name, birth date, and phone number if no one is available to take your call.     Your procedure: Left lumbar transforaminal epidural steroid injections     On the day of the procedure  1. Arrive 1 hour earlier than your scheduled time, to the [facility]  Address: [address] [place] [id number]  2. Check in on the 5th floor for your procedure    If you must reschedule your procedure more than two times, you must follow up in clinic before rescheduling again.        Preparing for your procedure    CAUTION - FAILURE TO FOLLOW THESE PRE-PROCEDURE INSTRUCTIONS WILL RESULT IN YOUR PROCEDURE BEING RESCHEDULED.        You must have a  take you home after your procedure. Transportation by taxi or para-transit is okay as long as you have a responsible adult accompany you. You must provide your 's full name and contact number at time of check in.     Fasting Protocol You may have NOTHING SOLID TO EAT 8 HOURS prior to arrival at the procedure area.     You may have CLEAR LIQUIDS UP TO 2 HOURS prior to arrival.    Broth and candy are considered solid food and require an eight hour fast.     Clear liquids include water, clear fruit juice (no pulp), carbonated beverages, ice, black coffee, black tea, clear jello. No alcohol containing beverages.   Medications If you take any medications, DO NOT STOP. Take your medications as usual the day of your procedure with a sip of water AT LEAST 2 HOURS PRIOR TO ARRIVAL.    Antibiotics If you are currently taking antibiotics,  you must complete the entire dose 7 days prior to your scheduled procedure. You must be clear of any signs or symptoms of infection. If you begin antibiotics, please contact our clinic for instructions.     Fever, Chills, or Rash If you experience a fever of higher than 100 degrees, chills, rash, or open wounds during the one week before your procedure, please call the clinic to see if you may proceed with your procedure.      Medication Hold List  **Patients under Cardiology/Neurology care should consult their provider prior to the pain procedure to verify pre-procedure medication instructions. The information below contains general guidelines.**    Blood Thinners If you are taking daily ASPIRIN, PLAVIX, OR OTHER BLOOD THINNERS SUCH AS COUMADIN/WARFARIN, we will need your prescribing doctor to sign a release permitting you to stop these medications. Once approved by your prescribing doctor - STOP ALL BLOOD THINNERS BASED ON THE TIME TABLE BELOW PRIOR TO YOUR PROCEDURE. If you have been instructed to stop WARFARIN(COUMADIN), you must have an INR lab drawn the day before your procedure. . Your INR must be within normal limits before we can perform your injection. MEDICATIONS CAN BE RESTARTED AFTER YOUR PROCEDURE.    14 DAY HOLD  Ticlid (ticlopidine)    10 DAY HOLD  Effient (Prasugel)    3 DAY HOLD  Xarelto (rivaroxaban) 7 DAY HOLD  Anacin, Bufferin, Ecotrin, Excedrin, Aggrenox (Aspirin)  Brilinta (ticagrelor)  Coumadin (Warfarin)  Pradexa (Dabigatran)  Elmiron (Pentosan)  Plavix (Clopidogrel Bisulfate)  Pletal (Cilostazol)    24 HOUR HOLD  Lovenox (enoxaparin)  Agrylin (Anagrelide)        Non-steroidal Anti-inflammatories (NSAIDs) DO NOT TAKE any non-steroidal anti-inflammatory medications (NSAIDs) listed on the table below. MEDICATIONS CAN BE RESTARTED AFTER YOUR PROCEDURE. Celebrex is OK to take and does not need to be discontinued.     Medications to stop:  3 DAY HOLD  Advil, Motrin (Ibuprofen)  Arthrotec  (diciofenac sodium/misoprostol)  Clinoril (Sulindac)  Indocin (Indomethacin)  Lodine (Etodolac)  Toradol (Ketorolac)  Vicoprofen (Hydrocodone and Ibuprofen)  Voltaren (Diclotenac)    14 DAY HOLD  Daypro (Oxaprozin)  Feldene (Piroxicam)   7 DAY HOLD  Aleve (Naproxen sodium)  Darvon compound (contains aspirin)  Naprosyn (Naproxen)  Norgesic Forte (contains aspirin)  Mobic (Meloxicam)  Oruvall (Ketoprofen)  Percodan (contains aspirin)  Relafen (Nabumetone)  Salsalate  Trilisate  Vitamin E (more than 400 mg per day)  Any medication containing aspirin                To speak with a nurse, schedule/reschedule/cancel a clinic appointment, or request a medication refill call: [phone]     You can also reach us by [employer]: [email or web address]

## 2019-01-03 NOTE — NURSING NOTE
AVS reviewed with pt and given copy.  Pre-procedure instructions reviewed, including NPO orders,  needed, and medications to hold.  Pt verbalized understanding and declined having questions at this time.     Anitha Singh, RN, BSN

## 2019-01-03 NOTE — PROGRESS NOTES
"[facility] : Progress Note    Date of visit: 1/3/2019    Interval history:  Vishnu Viveros is a 68 year old male  is known to me for lumbar radiculopathy.  Patient has a medical history significant for chronic back pain, diabetic polyneuropathy, type 2 diabetes, erectile dysfunction, and hearing loss.  Patient reports radicular lower back pain along the left L5 nerve root distribution.  In the past he had done pain PT, home physical therapy and gabapentin.  His most recent MRI shows severe spinal canal stenosis at L4-L5, moderate spinal canal narrowing at L2-L3 and L3-L4.  In addition he has multilevel moderate neuroforaminal stenosis with associated nerve root impingement.  This changes are stable as of 8/8/2017.  He does have facet hypertrophy at the L3-L4, L4-L5 and L5-S1.  In the past he had done lumbar medial branch block by Dr. Henriquez with good pain relief.  However he did not proceed for radiofrequency ablation.  He states that he was afraid of the procedure.Patient has been taking gabapentin 200 mg 3 times daily.  He is essentially opioid naïve: Occasionally he takes Percocet 1-2 tablets twice daily as needed.    [place] Prescription Monitoring Program:   Reviewed. No concerns     Review of Systems:  The 14 system ROS was reviewed and was negative except what is documented above and as follows.  Any bowel or bladder problems: none  Mood: okay    Physical Exam:  Vitals:    01/03/19 1454   BP: 161/84   Pulse: 73   Resp: 16   Weight: 88.5 kg (195 lb)   Height: 1.626 m (5' 4\")       General: Awake in no apparent distress.   Eyes: Sclerae are anicteric. PERRLA, EOMI   Neck: supple, no masses.   Lungs: unlabored.   Heart: regular rate and rhythm   Abdomen: soft non tender.  Extremities: Pulses are well palpable, no peripheral edema.   Musculoskeletal: 5/5 muscle strength in all extremities.  Wrist is limited to 30 degrees.  Neurologic " exam:Sensation intact throughout all dermatomes bilateral upper extremities and lower extremities  Psychiatric; Normal affect.   Skin: Warm and Dry.    Medications:  Current Outpatient Medications   Medication Sig Dispense Refill     acetaminophen (TYLENOL) 500 MG tablet Take 1-2 tablets (500-1,000 mg) by mouth every 6 hours as needed for pain (arthritis) 120 tablet 1     albuterol (PROAIR HFA/PROVENTIL HFA/VENTOLIN HFA) 108 (90 Base) MCG/ACT inhaler Inhale 2 puffs into the lungs every 6 hours 1 Inhaler 1     Ascorbic Acid (VITAMIN C PO) Take 500 mg by mouth once       aspirin 81 MG tablet Take 1 tablet by mouth every morning        atorvastatin (LIPITOR) 20 MG tablet Take 1 tablet (20 mg) by mouth daily (Patient taking differently: Take 20 mg by mouth every evening ) 90 tablet 3     Cholecalciferol (VITAMIN D) 1000 UNIT capsule Take 1 capsule by mouth daily.       Cyanocobalamin (VITAMIN B12 PO) Take 1 tablet by mouth. Pt states he takes this 2 or 3 times a week..        gabapentin (NEURONTIN) 300 MG capsule One capsule at bedtime for one week the increase to twice daily 180 capsule 3     HYDROcodone-acetaminophen (NORCO) 5-325 MG tablet Take 1-2 tablets by mouth every 4 hours as needed for moderate to severe pain 10 tablet 0     loratadine (CLARITIN) 10 MG tablet Take 1 tablet (10 mg) by mouth daily as needed for allergies 90 tablet 3     losartan (COZAAR) 100 MG tablet Take 1 tablet (100 mg) by mouth daily (Patient taking differently: Take 100 mg by mouth every evening ) 90 tablet 3     metFORMIN (GLUCOPHAGE-XR) 500 MG 24 hr tablet Take 1 tablet (500 mg) by mouth daily (with dinner) 90 tablet 3     Multiple Vitamin (MULTIVITAMINS PO) Take 1 tablet by mouth daily.       Omega-3 Fatty Acids (FISH OIL PO) Take 1 capsule by mouth as needed       order for DME Back brace 1 Device 0     predniSONE (DELTASONE) 20 MG tablet Take 40 mg by mouth daily for 5 days. 10 tablet 0     sildenafil (REVATIO) 20 MG tablet Take 1-5  tablets (20 mg) by mouth as needed for sexual activity.  Never use with nitroglycerin, terazosin or doxazosin. 90 tablet 11     tamsulosin (FLOMAX) 0.4 MG capsule Take 1 capsule (0.4 mg) by mouth daily 90 capsule 3     VIRTUSSIN A/C 100-10 MG/5ML solution 5-10 MLS PO Q 4 H PRN COUGH 473 mL 0       Analgesic Medications:   Medications related to Pain Management (From now, onward)    None             LABORATORY VALUES:   Recent Labs   Lab Test 01/02/19  1318 11/19/18  0801    139   POTASSIUM 4.5 4.3   CHLORIDE 105 107   CO2 27 29   ANIONGAP 6 4   * 107*   BUN 13 14   CR 0.73 0.89   FRANCIA 8.6 9.1       CBC RESULTS:   Recent Labs   Lab Test 01/02/19  1318   WBC 5.8   RBC 4.49   HGB 12.9*   HCT 40.5   MCV 90   MCH 28.7   MCHC 31.9   RDW 12.6          Most Recent 3 INR's:No lab results found.        ASSESSMENT:     - Lumbar radiculopathy        PLAN:        1. Medications.      - Increase gabapentin 0-300-600 gradually. If tolerates well, take 600 mg bid. GFR >90  - Flexeril 5 mg tid prn  - Tylenol 1 gm tid daily as needed.         2. Interventional procedures:     We discussed with the patient to consider Left L4/L5 and L5-S1  transforaminal epidural steroid injections.      3. Labs and imaging: None needed      4. Rehab: None indicated. The patient is encouraged to stay active and to perform exercise as tolerated.      5. Psychology: No current needs.     6. Integrated medicine: We discussed acupuncture therapy, but the patient is not interested.     7. Disposition:  The patient is advised to call clinic for follow up appointment in 3 months or earlier clinically indicated. We will see the patient for above mentioned procedure.          Assessment will be ongoing with changes in treatment as indicated.  Benefits/risks/alternatives to treatment have been reviewed and the patient has been instructed to contact this office if they have any questions or concerns.  This plan of care has been discussed with  the patient and the patient is in agreement.     Tiffany Dahl MD, PHD

## 2019-01-03 NOTE — TELEPHONE ENCOUNTER
Noted critical value for x-ray from 1.2.19  No acute airspace disease. Nodule in the right upper lobe , consider CT for further evaluation. Zuleika Rubio has already ordered a CT scan.

## 2019-01-03 NOTE — LETTER
"1/3/2019       RE: Vishnu Viveros  [address]  [place] [address]     Dear Colleague,    Thank you for referring your patient, Vishnu Viveros, to the [facility] at [facility]. Please see a copy of my visit note below.    [facility] Comprehensive Chronic Pain Management : Progress Note    Date of visit: 1/3/2019    Interval history:  Vishnu Viveros is a 68 year old male  is known to me for lumbar radiculopathy.  Patient has a medical history significant for chronic back pain, diabetic polyneuropathy, type 2 diabetes, erectile dysfunction, and hearing loss.  Patient reports radicular lower back pain along the left L5 nerve root distribution.  In the past he had done pain PT, home physical therapy and gabapentin.  His most recent MRI shows severe spinal canal stenosis at L4-L5, moderate spinal canal narrowing at L2-L3 and L3-L4.  In addition he has multilevel moderate neuroforaminal stenosis with associated nerve root impingement.  This changes are stable as of 8/8/2017.  He does have facet hypertrophy at the L3-L4, L4-L5 and L5-S1.  In the past he had done lumbar medial branch block by Dr. Henriquez with good pain relief.  However he did not proceed for radiofrequency ablation.  He states that he was afraid of the procedure.Patient has been taking gabapentin 200 mg 3 times daily.  He is essentially opioid naïve: Occasionally he takes Percocet 1-2 tablets twice daily as needed.    [place] Prescription Monitoring Program:   Reviewed. No concerns     Physical Exam:  Vitals:    01/03/19 1454   BP: 161/84   Pulse: 73   Resp: 16   Weight: 88.5 kg (195 lb)   Height: 1.626 m (5' 4\")       General: Awake in no apparent distress.   Eyes: Sclerae are anicteric. PERRLA, EOMI   Neck: supple, no masses.   Lungs: unlabored.   Heart: regular rate and rhythm   Abdomen: soft non tender.  Extremities: Pulses are " well palpable, no peripheral edema.   Musculoskeletal: 5/5 muscle strength in all extremities.  Wrist is limited to 30 degrees.  Neurologic exam:Sensation intact throughout all dermatomes bilateral upper extremities and lower extremities  Psychiatric; Normal affect.   Skin: Warm and Dry.    Medications:  Current Outpatient Medications   Medication Sig Dispense Refill     acetaminophen (TYLENOL) 500 MG tablet Take 1-2 tablets (500-1,000 mg) by mouth every 6 hours as needed for pain (arthritis) 120 tablet 1     albuterol (PROAIR HFA/PROVENTIL HFA/VENTOLIN HFA) 108 (90 Base) MCG/ACT inhaler Inhale 2 puffs into the lungs every 6 hours 1 Inhaler 1     Ascorbic Acid (VITAMIN C PO) Take 500 mg by mouth once       aspirin 81 MG tablet Take 1 tablet by mouth every morning        atorvastatin (LIPITOR) 20 MG tablet Take 1 tablet (20 mg) by mouth daily (Patient taking differently: Take 20 mg by mouth every evening ) 90 tablet 3     Cholecalciferol (VITAMIN D) 1000 UNIT capsule Take 1 capsule by mouth daily.       Cyanocobalamin (VITAMIN B12 PO) Take 1 tablet by mouth. Pt states he takes this 2 or 3 times a week..        gabapentin (NEURONTIN) 300 MG capsule One capsule at bedtime for one week the increase to twice daily 180 capsule 3     HYDROcodone-acetaminophen (NORCO) 5-325 MG tablet Take 1-2 tablets by mouth every 4 hours as needed for moderate to severe pain 10 tablet 0     loratadine (CLARITIN) 10 MG tablet Take 1 tablet (10 mg) by mouth daily as needed for allergies 90 tablet 3     losartan (COZAAR) 100 MG tablet Take 1 tablet (100 mg) by mouth daily (Patient taking differently: Take 100 mg by mouth every evening ) 90 tablet 3     metFORMIN (GLUCOPHAGE-XR) 500 MG 24 hr tablet Take 1 tablet (500 mg) by mouth daily (with dinner) 90 tablet 3     Multiple Vitamin (MULTIVITAMINS PO) Take 1 tablet by mouth daily.       Omega-3 Fatty Acids (FISH OIL PO) Take 1 capsule by mouth as needed       order for DME Back brace 1 Device  0     predniSONE (DELTASONE) 20 MG tablet Take 40 mg by mouth daily for 5 days. 10 tablet 0     sildenafil (REVATIO) 20 MG tablet Take 1-5 tablets (20 mg) by mouth as needed for sexual activity.  Never use with nitroglycerin, terazosin or doxazosin. 90 tablet 11     tamsulosin (FLOMAX) 0.4 MG capsule Take 1 capsule (0.4 mg) by mouth daily 90 capsule 3     VIRTUSSIN A/C 100-10 MG/5ML solution 5-10 MLS PO Q 4 H PRN COUGH 473 mL 0       Analgesic Medications:   Medications related to Pain Management (From now, onward)    None             LABORATORY VALUES:   Recent Labs   Lab Test 01/02/19  1318 11/19/18  0801    139   POTASSIUM 4.5 4.3   CHLORIDE 105 107   CO2 27 29   ANIONGAP 6 4   * 107*   BUN 13 14   CR 0.73 0.89   FRANCIA 8.6 9.1       CBC RESULTS:   Recent Labs   Lab Test 01/02/19  1318   WBC 5.8   RBC 4.49   HGB 12.9*   HCT 40.5   MCV 90   MCH 28.7   MCHC 31.9   RDW 12.6        Most Recent 3 INR's:No lab results found.    ASSESSMENT:     - Lumbar radiculopathy    PLAN:    1. Medications.      - Increase gabapentin 0-300-600 gradually. If tolerates well, take 600 mg bid. GFR >90  - Flexeril 5 mg tid prn  - Tylenol 1 gm tid daily as needed.     2. Interventional procedures:     We discussed with the patient to consider Left L4/L5 and L5-S1  transforaminal epidural steroid injections.      3. Labs and imaging: None needed      4. Rehab: None indicated. The patient is encouraged to stay active and to perform exercise as tolerated.      5. Psychology: No current needs.     6. Integrated medicine: We discussed acupuncture therapy, but the patient is not interested.     7. Disposition:  The patient is advised to call clinic for follow up appointment in 3 months or earlier clinically indicated. We will see the patient for above mentioned procedure.    Assessment will be ongoing with changes in treatment as indicated.  Benefits/risks/alternatives to treatment have been reviewed and the patient has been  instructed to contact this office if they have any questions or concerns.  This plan of care has been discussed with the patient and the patient is in agreement.     Tiffany Dahl MD, PHD

## 2019-01-03 NOTE — PROGRESS NOTES
Spoke with: Vishnu     Date Scheduled:  3/15/19     Provider: Dr. Lopez     : Yes     F/U Appointment: N/A     Patient was educated on pre-op nurse call: Yes

## 2019-01-04 ENCOUNTER — ANCILLARY PROCEDURE (OUTPATIENT)
Dept: CT IMAGING | Facility: CLINIC | Age: 69
End: 2019-01-04
Payer: COMMERCIAL

## 2019-01-04 ENCOUNTER — OFFICE VISIT (OUTPATIENT)
Dept: INTERNAL MEDICINE | Facility: CLINIC | Age: 69
End: 2019-01-04
Payer: COMMERCIAL

## 2019-01-04 VITALS
TEMPERATURE: 98.3 F | DIASTOLIC BLOOD PRESSURE: 94 MMHG | HEART RATE: 80 BPM | OXYGEN SATURATION: 95 % | SYSTOLIC BLOOD PRESSURE: 173 MMHG

## 2019-01-04 DIAGNOSIS — R91.8 PULMONARY NODULES: ICD-10-CM

## 2019-01-04 DIAGNOSIS — J18.9 PNEUMONIA OF RIGHT LOWER LOBE DUE TO INFECTIOUS ORGANISM: Primary | ICD-10-CM

## 2019-01-04 LAB — RADIOLOGIST FLAGS: NORMAL

## 2019-01-04 RX ORDER — DOXYCYCLINE 100 MG/1
100 CAPSULE ORAL 2 TIMES DAILY
Qty: 14 CAPSULE | Refills: 0 | Status: SHIPPED | OUTPATIENT
Start: 2019-01-04 | End: 2019-06-12

## 2019-01-04 ASSESSMENT — PAIN SCALES - GENERAL: PAINLEVEL: SEVERE PAIN (7)

## 2019-01-04 ASSESSMENT — ANXIETY QUESTIONNAIRES: GAD7 TOTAL SCORE: 13

## 2019-01-04 NOTE — PATIENT INSTRUCTIONS
[facility] Medication Refill Request Information:  * Please contact your pharmacy regarding ANY request for medication refills.  ** [facility] Prescription Fax = [phone]  * Please allow 3 business days for routine medication refills.  * Please allow 5 business days for controlled substance medication refills.     [facility] Test Result notification information:  *You will be notified with in 7-10 days of your appointment day regarding the results of your test.  If you are on MyChart you will be notified as soon as the provider has reviewed the results and signed off on them.    [facility]: [phone]      Doxycycline from the pharmacy.     the Virtussin AC cough syrup. A paper copy was provided at your last visit--you have to drop this off at the pharmacy so they will fill the prescription.    If your cough continues after the antibiotics, we will need to do lung function tests.    Take your Losartan, 100 mg daily. If your blood pressure continues to be elevated above goal range (higher than 130/80, we will want to add on another blood pressure medicine.

## 2019-01-04 NOTE — PROGRESS NOTES
[facility]   Zuleika Rubio, DILAN CNP  01/04/2019      Chief Complaint:   Cough     History of Present Illness:   Vishnu Viveros is a 68 year old male with a history of pulmonary nodule, hypertension, and diabetes who presents for evaluation of a cough.    Cough  The patient was seen on January 2nd for cough and sore throat with nasal congestion and ear pressure. I prescribed him Prednisone burst and Virtussin AC, but he has not been taking the Virtussin AC because he did not know he had to bring the paper prescription in. CXR was ordered to r/o pneumonia. It was negative, but did show a pulmonary nodule, found previously in 2012. The patient presents today because his cough and symptoms persist, despite using his albuterol and Prednisone. In fact, he states that his cough is worse, bringing up yellow-brown mucous, and aggravated with laying flat. He also feels like he is wheezing more. Continues to measure low grade fever at home at 99 degrees. He is wondering what could be causing his symptoms, as he has gotten them like this at the same time the last three years. He is thinking, perhaps, bronchitis, or an underlying autoimmune disease; he is wondering if we can screen for markers for this. Notably, the patient has a remote history of smoking, and does not have any diagnosis of asthma or COPD.     Review of Systems:   Pertinent items are noted in HPI, remainder of complete ROS is negative.      Active Medications:       acetaminophen (TYLENOL) 500 MG tablet, Take 1-2 tablets (500-1,000 mg) by mouth every 6 hours as needed for pain (arthritis), Disp: 120 tablet, Rfl: 1     albuterol (PROAIR HFA/PROVENTIL HFA/VENTOLIN HFA) 108 (90 Base) MCG/ACT inhaler, Inhale 2 puffs into the lungs every 6 hours, Disp: 1 Inhaler, Rfl: 1     Ascorbic Acid (VITAMIN C PO), Take 500 mg by mouth once, Disp: , Rfl:      aspirin 81 MG tablet, Take 1 tablet by mouth every morning , Disp: , Rfl:      atorvastatin  (LIPITOR) 20 MG tablet, Take 1 tablet (20 mg) by mouth daily (Patient taking differently: Take 20 mg by mouth every evening ), Disp: 90 tablet, Rfl: 3     Cholecalciferol (VITAMIN D) 1000 UNIT capsule, Take 1 capsule by mouth daily., Disp: , Rfl:      Cyanocobalamin (VITAMIN B12 PO), Take 1 tablet by mouth. Pt states he takes this 2 or 3 times a week.. , Disp: , Rfl:      gabapentin (NEURONTIN) 300 MG capsule, One capsule at bedtime for one week the increase to twice daily, Disp: 180 capsule, Rfl: 3     HYDROcodone-acetaminophen (NORCO) 5-325 MG tablet, Take 1-2 tablets by mouth every 4 hours as needed for moderate to severe pain, Disp: 10 tablet, Rfl: 0     loratadine (CLARITIN) 10 MG tablet, Take 1 tablet (10 mg) by mouth daily as needed for allergies, Disp: 90 tablet, Rfl: 3     losartan (COZAAR) 100 MG tablet, Take 1 tablet (100 mg) by mouth daily (Patient taking differently: Take 100 mg by mouth every evening ), Disp: 90 tablet, Rfl: 3     metFORMIN (GLUCOPHAGE-XR) 500 MG 24 hr tablet, Take 1 tablet (500 mg) by mouth daily (with dinner), Disp: 90 tablet, Rfl: 3     Multiple Vitamin (MULTIVITAMINS PO), Take 1 tablet by mouth daily., Disp: , Rfl:      Omega-3 Fatty Acids (FISH OIL PO), Take 1 capsule by mouth as needed, Disp: , Rfl:      order for DME, Back brace, Disp: 1 Device, Rfl: 0     predniSONE (DELTASONE) 20 MG tablet, Take 40 mg by mouth daily for 5 days., Disp: 10 tablet, Rfl: 0     sildenafil (REVATIO) 20 MG tablet, Take 1-5 tablets (20 mg) by mouth as needed for sexual activity.  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  Chronic low back pain  Diabetes  Ectropion due to laxity of eyelid  History of tobacco use  Hyperlipidemia   Hypertension   Obesity  Pulmonary nodule  Erectile dysfunction   Vitamin D deficiency   Prostate nodule  Pulmonary nodule   Degenerative disc disease   Degenerative joint disease   Vision impairment   Tear film insufficiency   Recurrent pterygium   Tubulovillous adenoma of colon  Diabetic polyneuropathy associated with DM due to underlying condition  Lumbar radiculopathy      Past Surgical History:  Appendectomy    Arthroscopy knee  Biopsy prostate transrectal   Excise pterygium x2   Inject epidural lumbar/sacral single  Inject paravertebral facet joint lumbar/sacral first x2  Puntalplasty  Repair ectropion bilateral    Family History:   Brother - cerebrovascular disease (), cerebral aneurysm  Sister - Breast cancer, hypothyroidism   Mother - Kidney stones   Father - Parkinsonism       Social History:   The patient was alone.  Smoking Status: Former smoker 5 years   Smokeless Tobacco: Never   Alcohol Use: Yes        Physical Exam:   BP (!) 173/94   Pulse 80   Temp 98.3  F (36.8  C) (Oral)   SpO2 95%      Constitutional: Alert, oriented, pleasant, no acute distress  Head: Normocephalic, atraumatic  Eyes: Extra-ocular movements intact, pupils equally round and reactive bilaterally, no scleral icterus  Ears: tympanic membranes pearly gray with positive light reflex  ENT: Oropharynx clear, moist mucus membranes, good dentition  Neck: Supple, no lymphadenopathy, no thyromegaly  Cardiovascular: Regular rate and rhythm, no murmurs, rubs or gallops  Respiratory: Good air movement bilaterally, expiratory wheezes throughout, coarse rales in right lower lobe   Psychiatric: normal mentation, affect and mood       Assessment and Plan:  Pneumonia of right lower lobe due to infectious organism (H)  With the patient's continued symptoms, in the setting of his history of smoking and his pulmonary nodule, we  agreed to order a CT scan to screen his lungs again. He has taken the albuterol and Prednisone burst without significant improvement in his symptoms--in fact, he states that his cough has gotten worse, and his exam reveals continued wheezing. I encouraged him to continue with his Prednisone burst and albuterol, and I will prescribe him a short course of doxycycline as well given his worsening symptoms and coarse crackles. The patient did not fill his prescription of Virtussin AC, which I encouraged him to bring to the pharmacy to help with his cough. He is comfortable with this plan. We did discuss that he likely has any underlying asthma or COPD, given the annual timing of his symptoms and history of smoking. If symptoms persist, discussed would want to obtain PFTs.  - doxycycline hyclate (VIBRAMYCIN) 100 MG capsule  Dispense: 14 capsule; Refill: 0    Follow-up: if symptoms worsen or fail to improve        Scribe Disclosure:   I, Usha Jacobson, am serving as a scribe to document services personally performed by DILAN Villanueva CNP at this visit, based upon the provider's statements to me. All documentation has been reviewed by the aforementioned provider prior to being entered into the official medical record.     Portions of this medical record were completed by a scribe. UPON MY REVIEW AND AUTHENTICATION BY ELECTRONIC SIGNATURE, this confirms (a) I performed the applicable clinical services, and (b) the record is accurate.    DILAN Villanueva CNP

## 2019-01-04 NOTE — NURSING NOTE
Chief Complaint   Patient presents with     Cough     Pt is here to discuss a cough that hasnt gotten better.      Chyna Chavira LPN at 11:56 AM on 1/4/2019.

## 2019-01-07 ENCOUNTER — TELEPHONE (OUTPATIENT)
Dept: OPHTHALMOLOGY | Facility: CLINIC | Age: 69
End: 2019-01-07

## 2019-01-07 ENCOUNTER — OFFICE VISIT (OUTPATIENT)
Dept: OPHTHALMOLOGY | Facility: CLINIC | Age: 69
End: 2019-01-07
Payer: COMMERCIAL

## 2019-01-07 DIAGNOSIS — Z98.890 POSTOPERATIVE EYE STATE: ICD-10-CM

## 2019-01-07 DIAGNOSIS — H02.109 ECTROPION DUE TO LAXITY OF EYELID, UNSPECIFIED LATERALITY: Primary | ICD-10-CM

## 2019-01-07 ASSESSMENT — SLIT LAMP EXAM - LIDS
COMMENTS: INCISIONS CLEAN/DRY/INTACT
COMMENTS: INCISIONS CLEAN/DRY/INTACT

## 2019-01-07 ASSESSMENT — VISUAL ACUITY
METHOD: SNELLEN - LINEAR
OS_SC: 20/60
CORRECTION_TYPE: GLASSES
OS_SC+: +2
OD_SC: 20/60

## 2019-01-07 ASSESSMENT — TONOMETRY
IOP_METHOD: ICARE
OS_IOP_MMHG: 14
OD_IOP_MMHG: 14

## 2019-01-07 ASSESSMENT — EXTERNAL EXAM - LEFT EYE: OS_EXAM: PERIORBITAL EDEMA

## 2019-01-07 ASSESSMENT — EXTERNAL EXAM - RIGHT EYE: OD_EXAM: PERIORBITAL EDEMA

## 2019-01-07 NOTE — PROGRESS NOTES
Chief Complaints and History of Present Illnesses   Patient presents with     Post Op (Ophthalmology) Both Eyes     Bilateral Lower Lid Ectropion Repair with Right Lower Lid Lesion Excision, Bilateral Punctoplasty with Mini Monoka Stent                    Assessment & Plan     Vishnu Viveros is a 68 year old male with the following diagnoses:   1. 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 Postoperative eye state         Return to clinic in 6 weeks for Mini Monoka removal.            Attending Physician Attestation:  I have seen and examined this patient.  I have confirmed and edited as necessary the chief complaint(s), history of present illness, review of systems, relevant history, and examination findings as documented by others.  I have personally reviewed the relevant tests, images, and reports as documented above.  I have confirmed and edited as necessary the assessment and plan and agree with this note.    - Levy Blue MD 1:53 PM 1/7/2019

## 2019-01-07 NOTE — NURSING NOTE
Chief Complaints and History of Present Illnesses   Patient presents with     Post Op (Ophthalmology) Both Eyes     Bilateral Lower Lid Ectropion Repair with Right Lower Lid Lesion Excision, Bilateral Punctoplasty with Mini Monoka Stent           Chief Complaint(s) and History of Present Illness(es)     Post Op (Ophthalmology) Both Eyes     Laterality: both eyes    Onset: months ago    Frequency: constantly    Timing: throughout the day    Course: stable    Associated symptoms: Negative for eye pain    Treatments tried: eye drops    Pain scale: 0/10    Comments: Bilateral Lower Lid Ectropion Repair with Right Lower Lid Lesion Excision, Bilateral Punctoplasty with Mini Monoka Stent                    Comments     S/p Bilateral Lower Lid Ectropion Repair with Right Lower Lid Lesion Excision, Bilateral Punctoplasty with Mini Monoka Stent 12/19/18. A little pain when touching the lids. Occasionally tearing a little bit. Some irritation upper right lid, like a stitch still remains. Left eye is a little red. Finished with drops, still has some austen left    Nahomy Bonny COT 1:33 PM January 7, 2019

## 2019-01-09 ENCOUNTER — TELEPHONE (OUTPATIENT)
Dept: INTERNAL MEDICINE | Facility: CLINIC | Age: 69
End: 2019-01-09

## 2019-01-09 ENCOUNTER — TELEPHONE (OUTPATIENT)
Dept: PULMONOLOGY | Facility: CLINIC | Age: 69
End: 2019-01-09

## 2019-01-09 DIAGNOSIS — R91.8 PULMONARY NODULES: Primary | ICD-10-CM

## 2019-01-09 NOTE — TELEPHONE ENCOUNTER
ONCOLOGY INTAKE: Records Information      APPT INFORMATION:  Referring provider:  Zuleika Rubio APRN  Referring provider s clinic:  NA  Reason for visit/diagnosis:  Pulmonary nodules in right upper lobe [R91.8]    Were the records received with the referral (via [employer])? Complete    Has patient been seen for any external appt for this diagnosis (enter clinic/location)? Dx:Pulmonary nodules in right upper lobe [R91.8]: caller Ref by: Zuleika Rubio APRN CNP: Records in [facility] , armen Trejo in [facility]

## 2019-01-09 NOTE — TELEPHONE ENCOUNTER
Left message for patient, recommend scheduling appt in Pulmonary Nodule Clinic for possible PET/CT/biopsy of nodule in right upper lobe. Will f/u to ensure pt schedules appt asap.  DILAN Villanueva CNP

## 2019-01-09 NOTE — TELEPHONE ENCOUNTER
Appt was made for first available appt with pulmonary nodule clinic.  Appt is for 1/30/19 @ 5:45PM with Snow Luna.  Patient was also added to cancellation list if another appt opens up sooner.  Patient was reached by phone and was agreeable to time and date of appt., was notified that clinic may call back with a sooner appt if there is a cancellation.    Maya Oliva RN on 1/9/2019 at 1:18 PM

## 2019-01-10 ENCOUNTER — TELEPHONE (OUTPATIENT)
Dept: INTERNAL MEDICINE | Facility: CLINIC | Age: 69
End: 2019-01-10

## 2019-01-10 NOTE — TELEPHONE ENCOUNTER
I spoke to Vishnu barry regarding lab and imaging results. He has scheduled with the pulmonary nodule clinic. He reported he had difficulty accessing MyChart, so we reviewed lab results as well as imaging that shows an enlarged nodule, with recommendation of further imaging and possible biopsy.    Vishnu then stated that he is concerned about losartan and medication recalls. He has not received notice that his particular medication has been affected. He stated he does not believe this medication is working, however. His losartan dose was increased from 50mg to 100mg in November. He reports that his blood pressure remains elevated, however, and he does not believe the medication is working. He wonders if another medication could be prescribed. Most recent BP reading in clinic 173/94 on 1/04/19. Reading on 1/02/19 of 163/78. Patient did have cough, pain during those clinic visits.    I called Vishnu's pharmacy. Per staff, it is unlikely his losartan was recalled. The patient will receive a letter if he was affected by the recall, though the staff member stated the losartan manufacture brand that they carry has not been impacted by the recalls.     I will update PCP regarding patient's BP concerns. Patient understands that follow up in clinic for evaluation and further discussion is likely needed.    Michelle Walton RN

## 2019-01-11 NOTE — TELEPHONE ENCOUNTER
January 11, 2019  1:30 pm  Please contact him to schedule an appointment with any provider in [facility] to address hypertension and review options such as diuretic ( chlorthalidone) or calcium channel blocker and review side effects, discuss options after recheck of BP. Encourage lower sodium less than 3 grams  Daily/ Dash diet and provide resources ( Dash diet is in  after visit summary search dot phrases.)     See below:       Murali Logan                             Dietary Approaches to Stop Hypertension (The DASH Diet)   What is hypertension?   Hypertension is blood pressure that keeps being higher than normal. Blood pressure is the force of blood against artery walls as the heart pumps blood through the body. Blood pressure can be unhealthy if it is above 120/80. The higher your blood pressure, the greater the health risk.   High blood pressure can be controlled if you take these steps:   Maintain a healthy weight.   Are physically active.   Follow a healthy eating plan, which includes foods that do not have a lot of salt and sodium.   Do not drink a lot of alcohol.   Diet affects high blood pressure. Following the DASH diet and reducing the amount of sodium in your diet will help lower your blood pressure. It will also help prevent high blood pressure.   What is the DASH diet?   Dietary Approaches to Stop Hypertension (DASH) is a diet that is low in saturated fat, cholesterol, and total fat. It emphasizes fruits, vegetables, and low-fat dairy foods. The DASH diet also includes whole-grain products, fish, poultry, and nuts. It encourages fewer servings of red meat, sweets, and sugar-containing beverages. It is rich in magnesium, potassium, and calcium, as well as protein and fiber.   How do I get started on the DASH diet?   The DASH diet requires no special foods and has no hard-to-follow recipes. Start by seeing how DASH compares with your current eating habits.   The DASH eating plan illustrated below is  based on a diet of 2,000 calories a day. Your healthcare provider or a dietitian can help you determine how many calories a day you need. Most adults need somewhere between 1600 and 2800 calories a day. Serving sizes for different foods vary from 1/2 cup to 1 and 1/4 cups. Check product nutrition labels for serving sizes and the number of calories per serving.                      Number of        Examples of  Food Group      servings         serving size  ----------------------------------------------------------------    Grains and      7 to 8 per day   1 slice of bread  grain products                   1 cup ready-to-eat cold cereal                                   1/2 cup cooked rice, pasta,                                   or cereal    Vegetables      4 to 5 per day   1 cup raw leafy vegetable                                   1/2 cup cooked vegetable                                   6 ounces (oz) vegetable juice      Fruits          4 to 5 per day   1 medium fruit                                   1/4 cup dried fruit                                   1/2 cup fresh, frozen, or                                   canned fruit                                   6 oz fruit juice      Low-fat or      2 to 3 per day   8 oz milk  fat-free                         1 cup yogurt  dairy foods                      1 and 1/2 oz cheese    Lean meats,  poultry,        2 or fewer per   3 oz cooked lean meat,  or fish         day              skinless poultry, or fish    Nuts, seeds,    4 to 5 per week  1/3 cup or 1 and 1/2 oz nuts  and dry beans                    1 tablespoon or 1/2 oz seeds                                   1/2 cup cooked dry beans    Fats and oils   2 to 3 per day   1 teaspoon soft margarine                                   1 tablespoon low-fat mayonnaise                                   2 tablespoons light salad                                   dressing                                   1 teaspoon  vegetable oil    Sweets          5 per week       1 tablespoon sugar                                   1 tablespoon jelly or jam                                   1/2 oz jelly beans                                   8 oz lemonade  ----------------------------------------------------------------  Make changes gradually. Here are some suggestions that might help:   If you now eat 1 or 2 servings of vegetables a day, add a serving at lunch and another at dinner.   If you have not been eating fruit regularly, or have only juice at breakfast, add a serving to your meals or have it as a snack.   Drink milk or water with lunch or dinner instead of soda, sugar-sweetened tea, or alcohol. Choose low-fat (1%) or fat-free (nonfat) dairy products so that you are eating fewer calories and less saturated fat, total fat, and cholesterol.   Read food labels on margarines and salad dressings to choose products lowest in fat.   If you now eat large portions of meat, slowly cut back--by a half or a third at each meal. Limit meat to 6 ounces a day (two 3-ounce servings). Three to 4 ounces is about the size of a deck of cards.   Have 2 or more meatless meals each week. Increase servings of vegetables, rice, pasta, and beans in all meals. Try casseroles, pasta, and stir-klein dishes, which have less meat and more vegetables, grains, and beans.   Use fruits canned in their own juice. Fresh fruits require little or no preparation. Dried fruits are a good choice to carry with you or to have ready in the car.   Try these snacks ideas: unsalted pretzels or nuts mixed with raisins, melani crackers, low-fat and fat-free yogurt or frozen yogurt, popcorn with no salt or butter added, and raw vegetables.   Choose whole-grain foods to get more nutrients, including minerals and fiber. For example, choose whole-wheat bread, whole-grain cereals, or brown rice.   Use fresh, frozen, or no-salt-added canned vegetables.   Remember to also reduce the salt and  sodium in your diet. Try to have no more than 2000 milligrams (mg) of sodium per day, with a goal of further reducing the sodium to 1500 mg per day. Three important ways to reduce sodium are:   Eat food products with reduced-sodium or no salt added.   Use less salt when you prepare foods and do not add salt to your food at the table.   Read food labels. Aim for foods that contain less than 5% of the daily value of sodium.   The DASH eating plan is not designed for weight loss. But it contains many lower-calorie foods, such as fruits and vegetables. You can make it lower in calories by replacing high-calorie foods with more fruits and vegetables. Some ideas to increase fruits and vegetables and decrease calories include:   Eat a medium apple instead of 4 shortbread cookies. You'll save 80 calories.   Eat 1/4 cup of dried apricots instead of a 2-ounce bag of pork rinds. You'll save 230 calories.   Have a hamburger that's 3 ounces instead of 6 ounces. Add a 1/2 cup serving of carrots and a 1/2 cup serving of spinach. You'll save more than 200 calories.   Instead of 5 ounces of chicken, have a stir klein with 2 ounces of chicken and 1 and 1/2 cups of raw vegetables. Use just a small amount of vegetable oil. You'll save 50 calories.   Have a 1/2 cup serving of low-fat frozen yogurt instead of a 1-and-1/2-ounce chocolate bar. You'll save about 110 calories.   Use low-fat or fat-free condiments, such as fat-free salad dressings.   Eat smaller portions. Cut back gradually.   Use food labels to compare fat and calorie content in packaged foods. Items marked low fat or fat free may be lower in fat but not lower in calories than their regular versions.   Limit foods with lots of added sugar, such as pies, flavored yogurts, candy bars, ice cream, sherbet, regular soft drinks, and fruit drinks.   Drink water or club soda instead of cola or other soda drinks.     For more information, see the National Heart, Lung, and Blood  [place] Web site at: http://www.nhlbi.nih.gov/health/public/heart/hbp/dash/.

## 2019-01-11 NOTE — TELEPHONE ENCOUNTER
Patient was reached by phone and patient relayed he prefers to see Dr. Logan for follow up.  An appt was made for him to see Dr. Logan on 1/17/19 @ 10:45AM.    Maya Oliva RN on 1/11/2019 at 3:36 PM

## 2019-01-16 ENCOUNTER — DOCUMENTATION ONLY (OUTPATIENT)
Dept: CARE COORDINATION | Facility: CLINIC | Age: 69
End: 2019-01-16

## 2019-01-22 ENCOUNTER — TELEPHONE (OUTPATIENT)
Dept: OPHTHALMOLOGY | Facility: CLINIC | Age: 69
End: 2019-01-22

## 2019-01-22 ENCOUNTER — NURSE TRIAGE (OUTPATIENT)
Dept: INTERNAL MEDICINE | Facility: CLINIC | Age: 69
End: 2019-01-22

## 2019-01-22 NOTE — TELEPHONE ENCOUNTER
[facility] Call Center    Phone Message    May a detailed message be left on voicemail: yes    Reason for Call: Symptoms or Concerns     If patient has red-flag symptoms, warm transfer to triage line    Current symptom or concern: Dr Blue performed surgery on 1/7  BLL ectropion repair with punctoplasty with MiniMonoka Stent.  Pt says he is now suffering from red, itchy eyes (both) with oily tears and pain.  Pt requests a phone call to discuss symptoms and possible Dr visit.      Symptoms have been present for:  1 week(s)    Has patient previously been seen for this? No        Action Taken: Message routed to:  [facility] eye clinic

## 2019-01-22 NOTE — TELEPHONE ENCOUNTER
Able to speak to pt at 1135    Offered AM (1130 AM) with dr. horton and pt unable.    Reviewed only oculoplastics clinic day this week    Offered appt tomorrow in continuity and pt accepts    Jamil Foley RN 10:40 AM 01/22/19

## 2019-01-22 NOTE — TELEPHONE ENCOUNTER
Pt scheduled tomorrow with dr. Johnathon garcia  Unable to see Dr. horton this AM    See other encounter from today  Jamil Foley RN 10:47 AM 01/22/19

## 2019-01-22 NOTE — TELEPHONE ENCOUNTER
"[facility]: Nurse Triage Note  SITUATION/BACKGROUND                                                      Vishnu Viveros is a 68 year old diabetic male who calls post-operatively 12/19/2019 Dr Blue:  Bilateral Lower Lid Ectropion Repair with Right Lower Lid Lesion Excision, Bilateral Punctoplasty with Mini Monoka Stent              Symptoms: with bilateral redness, itchiness and \"oily\" drainage bilaterally LEFT> RIGHT but present bilaterally.  The symptoms have been going on x 1 week.  He is on no eye drops currently.  He is using warm compressess and says he is rubbing eyes  a lot. States \"feels like a stitch\"  Denies visual changes beyond tearing interfering with usual vision, wears glasses.   LVD 1/7/2019  Next appt 2/25/2019 with Dr Blue.  Last A1C 6.6 on 11/19/2018       MEDICATIONS:   Taking medication(s) as prescribed? N/A  Taking over the counter medication(s?) No  Any barriers to taking medication(s) as prescribed?  No  Medication(s) improving/managing symptoms?  No    Allergies:   Allergies   Allergen Reactions     Seasonal Allergies      Sinus congestion, sinus \"drainage\", sneezing     Nkda [No Known Drug Allergies]        ASSESSMENT       Patient with c/o 7 day bilateral redness, tearing and irritation bilaterally L>R, post op bilateral ectropion repair and mini Monaka stent  on 12/19//19 Dr Blue    RECOMMENDATION/PLAN                                                      RECOMMENDED DISPOSITION:  Phone Visit review for earlier appt.  Will comply with recommendation: Yes    If further questions/concerns or if symptoms do not improve, worsen or new symptoms develop, call your PCP or [phone] to talk with the Resident on call, as soon as possible.    Guideline used: vision pp 637  Telephone Triage Protocols for Nurses, Fifth Edition, Shaina Baptiste RN  "

## 2019-01-22 NOTE — TELEPHONE ENCOUNTER
Called 3 times-- seemed like pt on speaker phone and away from phone-- only could hear faint sounds    Dr. Brandt in clinic this AM and would be able to see    Jamil Foley RN 10:22 AM 01/22/19

## 2019-01-28 ENCOUNTER — OFFICE VISIT (OUTPATIENT)
Dept: OPHTHALMOLOGY | Facility: CLINIC | Age: 69
End: 2019-01-28
Payer: COMMERCIAL

## 2019-01-28 DIAGNOSIS — Z98.890 POSTOPERATIVE EYE STATE: ICD-10-CM

## 2019-01-28 DIAGNOSIS — H02.109 ECTROPION DUE TO LAXITY OF EYELID, UNSPECIFIED LATERALITY: Primary | ICD-10-CM

## 2019-01-28 ASSESSMENT — VISUAL ACUITY
OS_SC: 20/40
METHOD: SNELLEN - LINEAR
OD_SC: 20/50
OD_SC+: -2
OS_SC+: -2

## 2019-01-28 ASSESSMENT — REFRACTION_WEARINGRX
OS_CYLINDER: +0.50
OD_CYLINDER: +0.50
OD_ADD: +2.00
OS_AXIS: 060
OD_SPHERE: +1.00
OD_AXIS: 145
OS_SPHERE: +0.75
OS_ADD: +2.00

## 2019-01-28 ASSESSMENT — TONOMETRY
OS_IOP_MMHG: 13
IOP_METHOD: ICARE
OD_IOP_MMHG: 14

## 2019-01-28 NOTE — NURSING NOTE
Chief Complaints and History of Present Illnesses   Patient presents with     Post Op (Ophthalmology) Left Eye     Chief Complaint(s) and History of Present Illness(es)     Post Op (Ophthalmology) Left Eye     Laterality: both eyes              Comments     Bilateral Lower Lid Ectropion Repair with Right Lower Lid Lesion Excision, Bilateral Punctoplasty with Mini Monoka Stent   Pt. States that he has been having a lot of burning and tearing BE.  Redness BE.  Burning has improved somewhat today BE.  Kerry Calle COT 1:06 PM January 28, 2019

## 2019-01-28 NOTE — PROGRESS NOTES
Chief Complaints and History of Present Illnesses   Patient presents with     Post Op (Ophthalmology) Left Eye     HPI     Post Op (Ophthalmology) Left Eye     In both eyes.              Comments     Bilateral Lower Lid Ectropion Repair with Right Lower Lid Lesion Excision, Bilateral Punctoplasty with Mini Monoka Stent   Pt. States that he has been having a lot of burning and tearing BE.  Redness BE.  Burning has improved somewhat today BE.  Krery Calle COT 1:06 PM January 28, 2019             Last edited by Kerry Calle on 1/28/2019  1:06 PM. (History)        Notes some burning, itching and tearing for the past week but states better in the last day or two. Has not tried artificial tears.     Assessment & Plan     Vishnu Viveros is a 68 year old male with the following diagnoses:   1. 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 Campbell for cataract evaluation    Devyn Madden MD  Ophthalmology Resident, PGY-2    Attending Physician Attestation:  Complete documentation of historical and exam elements from today's encounter can be found in the full encounter summary report (not reduplicated in this progress note).  I personally obtained the chief complaint(s) and history of present illness.  I confirmed and edited as necessary the review of systems, past medical/surgical history, family history, social history, and examination findings as documented by others; and I examined the patient myself.  I personally reviewed the relevant tests, images, and reports as documented above.  I formulated and edited as necessary the assessment and plan and discussed the findings and  management plan with the patient and family. I personally reviewed the ophthalmic test(s) associated with this encounter, agree with the interpretation(s) as documented by the resident/fellow, and have edited the corresponding report(s) as necessary.   -Levy lBue MD

## 2019-02-25 ENCOUNTER — OFFICE VISIT (OUTPATIENT)
Dept: OPHTHALMOLOGY | Facility: CLINIC | Age: 69
End: 2019-02-25
Payer: COMMERCIAL

## 2019-02-25 DIAGNOSIS — Z98.890 POSTOPERATIVE EYE STATE: ICD-10-CM

## 2019-02-25 DIAGNOSIS — H02.052 TRICHIASIS OF RIGHT LOWER EYELID: Primary | ICD-10-CM

## 2019-02-25 RX ORDER — NEOMYCIN SULFATE, POLYMYXIN B SULFATE AND DEXAMETHASONE 3.5; 10000; 1 MG/ML; [USP'U]/ML; MG/ML
1 SUSPENSION/ DROPS OPHTHALMIC 2 TIMES DAILY
Qty: 4 ML | Refills: 0 | Status: SHIPPED | OUTPATIENT
Start: 2019-02-25 | End: 2019-06-12

## 2019-02-25 ASSESSMENT — SLIT LAMP EXAM - LIDS: COMMENTS: NORMAL

## 2019-02-25 ASSESSMENT — TONOMETRY
OD_IOP_MMHG: 14
IOP_METHOD: ICARE
OS_IOP_MMHG: 14

## 2019-02-25 ASSESSMENT — VISUAL ACUITY
METHOD: SNELLEN - LINEAR
OS_SC: 20/50
OD_SC: 20/50
OD_SC+: -2

## 2019-02-25 NOTE — PROGRESS NOTES
Chief Complaints and History of Present Illnesses   Patient presents with     Post Op (Ophthalmology) Left Eye     Chief Complaint(s) and History of Present Illness(es)     Post Op (Ophthalmology) Left Eye     In left eye.  Treatments tried include artificial tears.  Pain was noted   as 0/10.              Comments     S/p Bilateral lower eyelid ectropion repair by tarsal strip procedure,   snip punctoplasty and insertion of Mini Monoka lacrimal stent on   12/19/2018.    2 month postop. Pt states continues to feel like there is something in the   right eye more than the left, It is causing a lot of tearing. Pt wants to   rub his eyes but does not want to irritate it more. AT PRN each eye.       Assessment & Plan     Vishnu Viveros is a 68 year old male with the following diagnoses:   1. Trichiasis of right lower eyelid    2. Postoperative eye state         Try Maxitrol twice a day x 2 weeks  Return to clinic 1 month            Attending Physician Attestation:  I have seen and examined this patient.  I have confirmed and edited as necessary the chief complaint(s), history of present illness, review of systems, relevant history, and examination findings as documented by others.  I have personally reviewed the relevant tests, images, and reports as documented above.  I have confirmed and edited as necessary the assessment and plan and agree with this note.    - Levy Blue MD 1:42 PM 2/25/2019

## 2019-02-25 NOTE — NURSING NOTE
Chief Complaints and History of Present Illnesses   Patient presents with     Post Op (Ophthalmology) Left Eye     Chief Complaint(s) and History of Present Illness(es)     Post Op (Ophthalmology) Left Eye     Laterality: left eye    Treatments tried: artificial tears    Pain scale: 0/10              Comments     S/p Bilateral lower eyelid ectropion repair by tarsal strip procedure, snip punctoplasty and insertion of Mini Monoka lacrimal stent on 12/19/2018.    3 month postop. Pt states continues to feel like there is something in the right eye more than the left, It is causing a lot of tearing. Pt wants to rub his eyes but does not want to irritate it more. AT PRN each eye.    Donnie Wilcox COT 1:16 PM February 25, 2019

## 2019-03-05 ENCOUNTER — OFFICE VISIT (OUTPATIENT)
Dept: PULMONOLOGY | Facility: CLINIC | Age: 69
End: 2019-03-05
Attending: NURSE PRACTITIONER
Payer: COMMERCIAL

## 2019-03-05 VITALS
OXYGEN SATURATION: 98 % | BODY MASS INDEX: 32.49 KG/M2 | WEIGHT: 190.3 LBS | HEART RATE: 64 BPM | SYSTOLIC BLOOD PRESSURE: 143 MMHG | DIASTOLIC BLOOD PRESSURE: 81 MMHG | TEMPERATURE: 97.8 F | HEIGHT: 64 IN

## 2019-03-05 DIAGNOSIS — R91.8 PULMONARY NODULES: Primary | ICD-10-CM

## 2019-03-05 PROCEDURE — G0463 HOSPITAL OUTPT CLINIC VISIT: HCPCS | Mod: ZF

## 2019-03-05 ASSESSMENT — MIFFLIN-ST. JEOR: SCORE: 1539.2

## 2019-03-05 ASSESSMENT — PAIN SCALES - GENERAL: PAINLEVEL: NO PAIN (0)

## 2019-03-05 NOTE — NURSING NOTE
"Oncology Rooming Note    March 5, 2019 3:40 PM   Vishnu Viveros is a 68 year old male who presents for:    Chief Complaint   Patient presents with     Oncology Clinic Visit     New; Lung Nodules     Initial Vitals: /84   Pulse 64   Temp 97.8  F (36.6  C) (Oral)   Ht 1.626 m (5' 4\")   Wt 86.3 kg (190 lb 4.8 oz)   SpO2 98%   BMI 32.66 kg/m   Estimated body mass index is 32.66 kg/m  as calculated from the following:    Height as of this encounter: 1.626 m (5' 4\").    Weight as of this encounter: 86.3 kg (190 lb 4.8 oz). Body surface area is 1.97 meters squared.  No Pain (0) Comment: Data Unavailable   No LMP for male patient.  Allergies reviewed: Yes  Medications reviewed: Yes    Medications: Medication refills not needed today.  Pharm[facility] entered into [facility]: [facility] [address] [place] [address] [address] AT [facility]          Maya Mariee CMA              "

## 2019-03-05 NOTE — PROGRESS NOTES
LUNG NODULE & INTERVENTIONAL PULMONARY CLINIC  [facility]     Vishnu Viveros MRN# [id number]   Age: 68 year old YOB: 1950     Reason for Consultation: lung nodule(s)    Requesting Physician: Zuleika Rubio, APRN CNP  [address] [place] [address]       Assessment and Plan:    1. Established solitary pulmonary lung nodule(s). Given the characteristics on current/previous imaging and risk factors; I would classify this to be Intermediate (6-65%) risk for cancer. Although risk factors are low for primary lung cancer it has slowly grown from 8.5mm to 13.5mm in the past 9years. Plan PET-CT to risk stratify and PFT.         Billing: The patient was seen and examined by me and the findings, assessment, and plan as documented was explained to the patient/family who expressed understand.     Marco Lopez MD   of Medicine  Interventional Pulmonology  Department of Pulmonary, Allergy, Critical Care and Sleep Medicine   [facility]  Pager: [phone]          History:     Vishnu Viveros is a 68 year old male with sig h/o for DM, HTN, pulmonary nodule and hyperlipidemia who is here for evaluation/followup of lung nodule(s).    - No new resp sx or complaints. Denies dyspnea or cough.   - Had RUL nodule that has been followed since 2010.   - Personal hx of cancer: No. Up-to-date on c-scope.   - Family hx of cancer: No lung cancer.   - Exposure hx: Denies asbestos or radon exposure   - Tobacco hx: Past Smoker: 0.5ppd for 5years. Quit 40yrs ago.   - My interpretation of the images relevant for this visit includes: RUL nodule   - My interpretation of the PFT's relevant for this visit includes: Normal     Culprit Nodule(s):   1. Spiculated right upper lobe nodule on series 4 image 103  measures 13.4 x 10.2 mm, showing slight interval growth since 2010  when it measured 8.5 x 6.4,  measuring 9.7 x 7.7 in , measuring  11.7 x 9.6 in .    Other active medical problems include:   - Has DM, HTN and hyperlipidemia. Stable.             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 eyelid, unspecified laterality      History of tobacco use      HLD (hyperlipidemia)      HTN (hypertension)      Obesity (BMI 30.0-34.9)      Pulmonary nodule            Past Surgical History:      Past Surgical History:   Procedure Laterality Date     APPENDECTOMY OPEN CHILD       ARTHROSCOPY KNEE Left      BIOPSY PROSTATE TRANSRECTAL       EXCISE PTERYGIUM Right      EXCISE PTERYGIUM Right 2015     INJECT EPIDURAL LUMBAR / SACRAL SINGLE N/A 2016    Procedure: INJECT EPIDURAL LUMBAR / SACRAL SINGLE;  Surgeon: Judah Henriquez MD;  [facility]: [facility]     INJECT PARAVERTEBRAL FACET JOINT LUMBAR / SACRAL FIRST Right 2016    Procedure: INJECT PARAVERTEBRAL FACET JOINT LUMBAR / SACRAL FIRST;  Surgeon: Judah Henriquez MD;  Location: [facility]     INJECT PARAVERTEBRAL FACET JOINT LUMBAR / SACRAL FIRST Right 2016    Procedure: INJECT PARAVERTEBRAL FACET JOINT LUMBAR / SACRAL FIRST;  Surgeon: Judah Henriquez MD;  Location: [facility] OR     PUNCTALPLASTY Bilateral 2018    Procedure: Bilateral Punctoplasty with Mini Monoka Stent;  Surgeon: Levy Blue MD;  Location: [facility] OR     REPAIR ECTROPION BILATERAL Bilateral 2018    Procedure: Bilateral Lower Lid Ectropion Repair with Right Lower Lid Lesion Excision;  Surgeon: Levy Blue MD;  Location: [facility] OR          Social History:     Social History     Tobacco Use     Smoking status: Former Smoker     Years: 5.00     Types: Cigarettes     Last attempt to quit: 1981     Years since quittin.3     Smokeless tobacco: Never Used   Substance Use Topics     Alcohol use: Yes     Alcohol/week: 1.5  "oz     Types: 3 Standard drinks or equivalent per week     Frequency: 4 or more times a week     Drinks per session: 1 or 2     Binge frequency: Never          Family History:     Family History   Problem Relation Age of Onset     Cerebrovascular Disease Brother          stroke      Breast Cancer Sister      Kidney Disease Mother         Kidney stones     Other - See Comments Father          in carmita parkinsons or post surgical eye surgery     Cerebral aneurysm Brother      Hypothyroidism Sister      Glaucoma No family hx of      Macular Degeneration No family hx of            Allergies:      Allergies   Allergen Reactions     Seasonal Allergies      Sinus congestion, sinus \"drainage\", sneezing     Nkda [No Known Drug Allergies]           Medications:     Current Outpatient Medications   Medication Sig     acetaminophen (TYLENOL) 500 MG tablet Take 1-2 tablets (500-1,000 mg) by mouth every 6 hours as needed for pain (arthritis)     Ascorbic Acid (VITAMIN C PO) Take 500 mg by mouth once     atorvastatin (LIPITOR) 20 MG tablet Take 1 tablet (20 mg) by mouth daily (Patient taking differently: Take 20 mg by mouth every evening )     Cholecalciferol (VITAMIN D) 1000 UNIT capsule Take 1 capsule by mouth daily.     Cyanocobalamin (VITAMIN B12 PO) Take 1 tablet by mouth. Pt states he takes this 2 or 3 times a week..      gabapentin (NEURONTIN) 300 MG capsule One capsule at bedtime for one week the increase to twice daily     HYDROcodone-acetaminophen (NORCO) 5-325 MG tablet Take 1-2 tablets by mouth every 4 hours as needed for moderate to severe pain     losartan (COZAAR) 100 MG tablet Take 1 tablet (100 mg) by mouth daily (Patient taking differently: Take 100 mg by mouth every evening )     metFORMIN (GLUCOPHAGE-XR) 500 MG 24 hr tablet Take 1 tablet (500 mg) by mouth daily (with dinner)     Multiple Vitamin (MULTIVITAMINS PO) Take 1 tablet by mouth daily.     Omega-3 Fatty Acids (FISH OIL PO) Take 1 capsule by " mouth as needed     sildenafil (REVATIO) 20 MG tablet Take 1-5 tablets (20 mg) by mouth as needed for sexual activity.  Never use with nitroglycerin, terazosin or doxazosin.     tamsulosin (FLOMAX) 0.4 MG capsule Take 1 capsule (0.4 mg) by mouth daily     albuterol (PROAIR HFA/PROVENTIL HFA/VENTOLIN HFA) 108 (90 Base) MCG/ACT inhaler Inhale 2 puffs into the lungs every 6 hours (Patient not taking: Reported on 3/5/2019)     aspirin 81 MG tablet Take 1 tablet by mouth every morning      loratadine (CLARITIN) 10 MG tablet Take 1 tablet (10 mg) by mouth daily as needed for allergies (Patient not taking: Reported on 3/5/2019)     neomycin-polymyxin-dexamethasone (MAXITROL) 3.5-91229-0.1 SUSP ophthalmic susp Place 1 drop into both eyes 2 times daily for 10 days (Patient not taking: Reported on 3/5/2019)     order for DME Back brace (Patient not taking: Reported on 3/5/2019)     VIRTUSSIN A/C 100-10 MG/5ML solution 5-10 MLS PO Q 4 H PRN COUGH (Patient not taking: Reported on 3/5/2019)     No current facility-administered medications for this visit.      Facility-Administered Medications Ordered in Other Visits   Medication     iohexol (OMNIPAQUE) 300 mg/mL injection 10 mL          Review of Systems:     CONSTITUTIONAL: negative for fever, chills, change in weight  INTEGUMENTARY/SKIN: no rash or obvious new lesions  ENT/MOUTH: no sore throat, new sinus pain or nasal drainage  RESP: see interval history  CV: negative for chest pain, palpitations or peripheral edema  GI: no nausea, vomiting, change in stools  : no dysuria  MUSCULOSKELETAL: no myalgias, arthralgias  ENDOCRINE: blood sugars with adequate control  PSYCHIATRIC: mood stable  LYMPHATIC: no new lymphadenopathy  HEME: no bleeding or easy bruisability  NEURO: no numbness, weakness, headaches         Physical Exam:     Temp:  [97.8  F (36.6  C)] 97.8  F (36.6  C)  Pulse:  [64] 64  BP: (143-147)/(81-84) 143/81  SpO2:  [98 %] 98 %  Wt Readings from Last 4 Encounters:    03/05/19 86.3 kg (190 lb 4.8 oz)   01/03/19 88.5 kg (195 lb)   01/02/19 88.5 kg (195 lb 1.6 oz)   12/19/18 86.2 kg (190 lb)     Constitutional:   Awake, alert and in no apparent distress     Eyes:   Nonicteric, STEVEN     ENT:    Trachea is midline. No gross neck abnormalities      Neck:   Supple without supraclavicular or cervical lymphadenopathy     Lungs:   Good air flow.  No crackles. No rhonchi.  No wheezes.     Cardiovascular:   Normal S1 and S2.  RRR.  No murmur, gallop or rub.  Radial, DP and PT pulses normal and symmetric     Abdomen:   NABS, soft, nontender, nondistended.  No HSM.     Musculoskeletal:   No edema.      Neurologic:   Alert and conversant. Cranial nerves  intact.       Skin:   Warm, dry.  No rash on limited exam.           Current Laboratory Data:   All laboratory and imaging data reviewed.           Previous Cardiology Imaging   No results found for this or any previous visit (from the past 8760 hour(s)).

## 2019-03-06 DIAGNOSIS — R91.1 PULMONARY NODULE: Primary | ICD-10-CM

## 2019-03-09 ENCOUNTER — HOSPITAL ENCOUNTER (OUTPATIENT)
Dept: PET IMAGING | Facility: CLINIC | Age: 69
End: 2019-03-09
Attending: INTERNAL MEDICINE
Payer: COMMERCIAL

## 2019-03-09 ENCOUNTER — HOSPITAL ENCOUNTER (OUTPATIENT)
Dept: PET IMAGING | Facility: CLINIC | Age: 69
Discharge: HOME | End: 2019-03-09
Attending: INTERNAL MEDICINE | Admitting: INTERNAL MEDICINE
Payer: COMMERCIAL

## 2019-03-09 DIAGNOSIS — R91.8 PULMONARY NODULES: ICD-10-CM

## 2019-03-09 LAB
CREAT BLD-MCNC: 0.7 MG/DL (ref 0.66–1.25)
GFR SERPL CREATININE-BSD FRML MDRD: >90 ML/MIN/{1.73_M2}
GLUCOSE BLDC GLUCOMTR-MCNC: 104 MG/DL (ref 70–99)

## 2019-03-09 PROCEDURE — 25000128 H RX IP 250 OP 636: Performed by: INTERNAL MEDICINE

## 2019-03-09 PROCEDURE — A9552 F18 FDG: HCPCS | Performed by: INTERNAL MEDICINE

## 2019-03-09 PROCEDURE — 82962 GLUCOSE BLOOD TEST: CPT

## 2019-03-09 PROCEDURE — 71260 CT THORAX DX C+: CPT

## 2019-03-09 PROCEDURE — 70491 CT SOFT TISSUE NECK W/DYE: CPT

## 2019-03-09 PROCEDURE — 82565 ASSAY OF CREATININE: CPT

## 2019-03-09 PROCEDURE — 74177 CT ABD & PELVIS W/CONTRAST: CPT

## 2019-03-09 PROCEDURE — 34300033 ZZH RX 343: Performed by: INTERNAL MEDICINE

## 2019-03-09 RX ORDER — IOPAMIDOL 755 MG/ML
20-135 INJECTION, SOLUTION INTRAVASCULAR ONCE
Status: COMPLETED | OUTPATIENT
Start: 2019-03-09 | End: 2019-03-09

## 2019-03-09 RX ADMIN — IOPAMIDOL 116 ML: 755 INJECTION, SOLUTION INTRAVENOUS at 12:43

## 2019-03-09 RX ADMIN — FLUDEOXYGLUCOSE F-18 12.54 MCI.: 500 INJECTION, SOLUTION INTRAVENOUS at 12:43

## 2019-03-10 ENCOUNTER — MYC MEDICAL ADVICE (OUTPATIENT)
Dept: FAMILY MEDICINE | Facility: CLINIC | Age: 69
End: 2019-03-10

## 2019-03-11 LAB
RADIOLOGIST FLAGS: NORMAL
RADIOLOGIST FLAGS: NORMAL

## 2019-03-12 ENCOUNTER — TELEPHONE (OUTPATIENT)
Dept: SURGERY | Facility: CLINIC | Age: 69
End: 2019-03-12

## 2019-03-12 NOTE — TELEPHONE ENCOUNTER
Hi Dr. Logan,     Called patient to move appointment early as there is an opening on 4/8 but patient prefer to keep appointment schedule for 4/29.     Yumi    Routing Comment      Noted  Murali Logan MD

## 2019-03-15 DIAGNOSIS — R91.1 LUNG NODULE: Primary | ICD-10-CM

## 2019-03-18 ENCOUNTER — TELEPHONE (OUTPATIENT)
Dept: SURGERY | Facility: CLINIC | Age: 69
End: 2019-03-18

## 2019-03-18 NOTE — TELEPHONE ENCOUNTER
"I spoke to patients' wife, Annie.   She expressed extreme disappointment and anger over not hearing from Dr. Lopez, not knowing plan or what to tell her family, etc.    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   She is upset that she has only gotten information from me over the phone, \"is this how things operate there??\"           I told her I would send a message directly to Dr. Lopez now and ask him to call her himself to discuss findings and plan/recommendations.   I apologized for her bad experience and frustration over the lack of information.  "

## 2019-03-19 ENCOUNTER — TELEPHONE (OUTPATIENT)
Dept: FAMILY MEDICINE | Facility: CLINIC | Age: 69
End: 2019-03-19

## 2019-03-19 DIAGNOSIS — R91.8 PULMONARY NODULES: Primary | ICD-10-CM

## 2019-03-19 DIAGNOSIS — K22.9 NODULE OF ESOPHAGUS: ICD-10-CM

## 2019-03-19 NOTE — TELEPHONE ENCOUNTER
"Spoke with spouse Annie, who is requesting a referral for Dr Klaus Carrasco at [facility]. Reports Dr Lopez is recommending surgery from a thoracic surgeon, PFT's and ENT. Spouse states that insurance is requiring it to come from PCP, pt insurance is [employer]. Reviewed notes from Pulmonary who is recommending \" resected surgically; patient should be seen in Thoracic Surgery Clinic to discuss further. \" Annie was updated that Dr Logan is out of office until next week and this would have to be reviewed by a covering provider.   "

## 2019-03-19 NOTE — TELEPHONE ENCOUNTER
[facility] Call Center    Phone Message    May a detailed message be left on voicemail: yes    Reason for Call: Other: . Dr.william nichols pulmonologist - P:[phone]  lung nodule   [address] [place] [address]    Action Taken: Message routed to:  [facility]

## 2019-03-20 NOTE — TELEPHONE ENCOUNTER
External referrals to [place] for ENT and Pulmonary Medicine were approved by Dr. Marin and faxed to [facility].  Annie was called and notified that referrals were sent.    Maya Oliva RN on 3/20/2019 at 4:41 PM

## 2019-03-20 NOTE — TELEPHONE ENCOUNTER
[facility] Call Center    Phone Message    May a detailed message be left on voicemail: yes    Reason for Call: Other: Per call from [facility] has more questions to discuss about the referral and is requesting for a call back     Action Taken: Message routed to:  [facility] ([facility]): Primary Care

## 2019-03-21 ENCOUNTER — TELEPHONE (OUTPATIENT)
Dept: SURGERY | Facility: CLINIC | Age: 69
End: 2019-03-21

## 2019-03-21 NOTE — TELEPHONE ENCOUNTER
Per in basket from Leann DYER pt spoke with Dr. Lopez and advised that they are going to [place] for medical oncology treatment. No further follow up needed at this time.

## 2019-03-21 NOTE — TELEPHONE ENCOUNTER
ONCOLOGY INTAKE: Records Information      APPT INFORMATION: Please schedule with Dr. France or Dr. America Sprague in Thoracic Surgery w/ PFT's prior to consult at soonest opening per Leann S  Referring provider:  Dr. Marco Lopez  Referring provider s clinic:  [facility]  Reason for visit/diagnosis:  Enlarging RLL Lung Nodules    Were the records received with the referral (via [employer])? No    Has patient been seen for any external appt for this diagnosis (enter clinic/location)? All records have been received from pt's visit with Dr. Lopez. All recent workup has been done internally.

## 2019-03-22 ENCOUNTER — MEDICAL CORRESPONDENCE (OUTPATIENT)
Dept: HEALTH INFORMATION MANAGEMENT | Facility: CLINIC | Age: 69
End: 2019-03-22

## 2019-03-25 ENCOUNTER — PRE VISIT (OUTPATIENT)
Dept: OTOLARYNGOLOGY | Facility: CLINIC | Age: 69
End: 2019-03-25

## 2019-04-03 ENCOUNTER — MEDICAL CORRESPONDENCE (OUTPATIENT)
Dept: HEALTH INFORMATION MANAGEMENT | Facility: CLINIC | Age: 69
End: 2019-04-03

## 2019-04-05 ENCOUNTER — TELEPHONE (OUTPATIENT)
Dept: INTERNAL MEDICINE | Facility: CLINIC | Age: 69
End: 2019-04-05

## 2019-04-05 DIAGNOSIS — R91.8 PULMONARY NODULES: Primary | ICD-10-CM

## 2019-04-08 ENCOUNTER — TELEPHONE (OUTPATIENT)
Dept: FAMILY MEDICINE | Facility: CLINIC | Age: 69
End: 2019-04-08

## 2019-04-08 ENCOUNTER — MEDICAL CORRESPONDENCE (OUTPATIENT)
Dept: HEALTH INFORMATION MANAGEMENT | Facility: CLINIC | Age: 69
End: 2019-04-08

## 2019-04-08 NOTE — TELEPHONE ENCOUNTER
Voice message was left for Annie to call [employer] @ [phone], as they can answer patient's questions about [facility] referral.    Maya Oliva RN on 4/8/2019 at 10:55 AM

## 2019-04-08 NOTE — TELEPHONE ENCOUNTER
[facility] Call Center    Phone Message    May a detailed message be left on voicemail: yes    Reason for Call: Other: Per call from Annie PT had lung nodule surgery done on 4/3 at [place] and is requesting for a call back to discuss it      Action Taken: Message routed to:  [facility] Primary Care

## 2019-04-11 ENCOUNTER — MYC MEDICAL ADVICE (OUTPATIENT)
Dept: FAMILY MEDICINE | Facility: CLINIC | Age: 69
End: 2019-04-11

## 2019-04-11 ENCOUNTER — TELEPHONE (OUTPATIENT)
Dept: INTERNAL MEDICINE | Facility: CLINIC | Age: 69
End: 2019-04-11

## 2019-04-11 ENCOUNTER — TELEPHONE (OUTPATIENT)
Dept: FAMILY MEDICINE | Facility: CLINIC | Age: 69
End: 2019-04-11

## 2019-04-11 DIAGNOSIS — C34.91 ADENOCARCINOMA, LUNG, RIGHT (H): Primary | ICD-10-CM

## 2019-04-11 NOTE — TELEPHONE ENCOUNTER
Annie is calling Dr. Logan on behalf of her  Vishnu.  She reports she called yesterday but has not gotten a call back yet.    She reports they met with [employer] rep and Dr Logan and Vishnu was referred to [facility].  Dr. Logan spoke with [facility] and referral was approved.  She took Vishnu to [facility], he had lung nodule surgery and now [facility] is telling them Dr. Logan didn't give [employer] enough reason to go to [place] and [employer] denied payment. Patient and his wife are very upset.      Annie states she is off work today and tomorrow to deal with this.  She requests call back ASAP from Dr. Logan or her nurse. [phone]

## 2019-04-11 NOTE — TELEPHONE ENCOUNTER
[facility] Call Center    Phone Message    May a detailed message be left on voicemail: yes    Reason for Call: Other: Pt's wife Annie calling for RICO Trejo. She wanted her to know that she and nurse Jan had called [employer] together on 3/21/2019 and talked to a representative, Albina. She states Alb[employer]'s inquiry number is [phone]. She is requesting a call from Maya to further discuss this. She mentioned she had called [facility] again today after speaking with Dr. Mansfield. Please advise.    Action Taken: Message routed to:  [facility]

## 2019-04-11 NOTE — TELEPHONE ENCOUNTER
This is a late entry for 3/21/19.    Pt's wife Annie walked in to the clinic in very late afternoon around 5 pm, concerned about submitting the referral to the insurance for upcoming [facility] visit. She was frustrated that her messages were not responded in her time frame and the referral was not faxed to [employer] referral department. She called [employer] in front of me and handed the phone to me, so I spoke with a [employer] representative who informed me that we did not need to fax the referral, we need to submit the referral form to them. I explained to Annie that this was not a job for nurses and we had Karoline, the referral staff should do the job, I would absolutely talk to Karoline next morning. Annie verbalized understanding and was very appreciative for the explanation. Next morning I spoke with RICO Trejo and Karoline regarding this matter. Karoline submitted the referral forms for pulmonary and ENT to [employer] on 3/22/19.  
Yes

## 2019-04-11 NOTE — TELEPHONE ENCOUNTER
[phone] (home) I reviewed facts with Carmen and her  Vishnu.  3/19 Carmen (patient's wife) called [employer]. They told her urgent review should be requested by primary MD.   Carmen walked to primary care clinic after work and spoke to nurse Vince Flowers, who was not aware of patient (She is not my nurse but was covering that day) and Soon Mi assisted in explaining  referral process. Vince Flowers spoke to Carmen indicating she needed to call [employer] to ask for referral and clarify process which Carmen did immediately.  Vince Flowers spent time with Carmen while she was speaking to [employer] provider. Carmen indicated she was told by [employer] representative ( she will try to find the name of the representative) all that was required was a referral by primary provider.  Carmen indicates a referral was completed at that time and sent to [facility].  I reviewed I was first made aware of the referral today and reviewed the records. My partner Kristin Marin MD was covering for me during an absence from clinic. I am reviewing the process with clinic staff brought to my attention I as primary have not bee kept updated on patient status, referral to [facility].    I discussed today I first reviewed a letter dated March 28 informing them date of service March 22 through 12/31/2019 that  on 3/27/19 it was determined  they were out of network to [facility] and in network benefits denied because this service was not covered due to care was available within the patient's in network plan however they do have out of network benefits available for continued care at [facility]. I discussed usually surgical care has a global package for 3 months following surgical care but they need to check with [employer] to ensure coverage.  I will try to assist with an appeal based on Carmen was told all that was required was referral from Primary for them to seek care at [facility].   Carmen was instructed by me to write an appeal letter and provide me  with documentation details on who she spoke to through [employer].  The reference number for this letter is [id number] and the contact number is [phone], option 2 and appeal must be made within 180 days and should be completed no later than 30 days from receipt of your request.  Both Vishnu and Carmen were on the phone for the discussion. 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  They understand the appeal process for coverage is an insurance decision but I will try to support them as much as I am able. I encouraged Carmen to continue her personal appeal through written form and share detailed  Information with me so I may assist with the appeal process.    Murali Logan MD

## 2019-04-20 NOTE — TELEPHONE ENCOUNTER
April 20, 2019  On an encounter after this call (subsequent encounter not routed to me) Maya Oliva RN read the message to me indicating I did not need to work on an appeal as the family was working with their insurance company related to the services through [employer]. I read the subsequent note from 4/11 today as I was closing this encounter.  Murali Logan MD

## 2019-04-25 ENCOUNTER — OFFICE VISIT (OUTPATIENT)
Dept: FAMILY MEDICINE | Facility: CLINIC | Age: 69
End: 2019-04-25
Payer: COMMERCIAL

## 2019-04-25 VITALS
SYSTOLIC BLOOD PRESSURE: 171 MMHG | HEART RATE: 68 BPM | WEIGHT: 191.2 LBS | BODY MASS INDEX: 32.82 KG/M2 | DIASTOLIC BLOOD PRESSURE: 90 MMHG

## 2019-04-25 DIAGNOSIS — H11.003 PTERYGIUM EYE, BILATERAL: ICD-10-CM

## 2019-04-25 DIAGNOSIS — E08.42 DIABETIC POLYNEUROPATHY ASSOCIATED WITH DIABETES MELLITUS DUE TO UNDERLYING CONDITION (H): ICD-10-CM

## 2019-04-25 DIAGNOSIS — I10 BENIGN ESSENTIAL HYPERTENSION: ICD-10-CM

## 2019-04-25 DIAGNOSIS — H04.123 DRY EYES: ICD-10-CM

## 2019-04-25 DIAGNOSIS — C34.91 ADENOCARCINOMA OF LUNG, STAGE 1, RIGHT (H): Primary | ICD-10-CM

## 2019-04-25 RX ORDER — CHLORTHALIDONE 25 MG/1
25 TABLET ORAL DAILY
Qty: 90 TABLET | Refills: 3 | Status: SHIPPED | OUTPATIENT
Start: 2019-04-25 | End: 2019-12-11

## 2019-04-25 ASSESSMENT — PAIN SCALES - GENERAL: PAINLEVEL: EXTREME PAIN (8)

## 2019-04-25 NOTE — NURSING NOTE
Chief Complaint   Patient presents with     Pain     pt states he is still having pain from surgery        Daisy Lindsay CMA at 10:07 AM on 4/25/2019.

## 2019-04-25 NOTE — PROGRESS NOTES
"[facility]  Primary Care Center   Murali Logan MD  04/25/2019      Chief Complaint:   Pain and follow-up adenocarcinoma lung resection     History of Present Illness:   Vishnu Viveros is a 68 year old male with a history of type 2 diabetes mellitus, hyperlipidemia, hypertension, DJD, degenerative disc disease, and acquired stenosis of lacrimal punctum who presents for a follow-up of chronic illnesses and discussion after surgical resection at [facility] of adenocarcinoma stage 1 right pT1b pN0 invasive adnemocarcinoma acinar type 1.8 clear margins.    Lung Cancer   Vishnu was treated at the [facility] referral approved by DR Jhon maldonado for me during vacation for lung cancer in March 2019. He was diagnosed with an invasive adenocarcinoma 1.8 cm with negative resection margins and no lymph node involvement. Completely treated by surgical excision. He is now breathing and walking easier. Additionally, he notes that he had a lymph node biopsy for staging. He feels that when he drinks water it goes \"down a different hole\" and causes him to cough, due to the biopsy. He will be following with [facility] providers due to this concern. There were concerns his [employer] insurance would not cover the interventions as  [facility] was deemed an out of network facility, however [facility] and along with Vince Lanza RN at Primary care clinic they spoke with a [employer] representative who indicated all required was a referral from primary MD for coverage as in [facility]. They are appealing [employer] recent decision to not cover as in [facility] the [facility] interventions. I reviewed theses are insurance decisions and they should try to continue their appeal process as they were provided incorrect information by the [employer] representative.    Insurance  He is concerned about his insurance. He was told by his [employer] representative that his care at [facility] would be paid for if he got a PCP referral, but was charged. " "Furthermore, he has only Medicare part A ( hospital) and not part B.   Of note, he questions why when he schedule a physical but needed to have chronic health conditions addressed, we did not do the preventive exam. He is due for a medicare wellness exam and will schedule once his condition stabilizes as this visit was not scheduled as a medicare wellness visit.     Other concerns discussed:  1. Shingrix - He inquires about the shingles vaccination. I encouraged this vaccine.  2. Diabetes - He believes his diabetes is well-controlled on metformin. He checked his blood sugar last week and it was \"under control.\"   3. Eye Exam - He would like to schedule an eye appointment and is seen here at [facility]  4. Blood Pressure - His blood pressure is high today at 175/82. He attributes it to being in pain and upset about insurance.   5. Back Pain - He question if stem cell transplants are a possible treatment for his back pain, as he does not want surgery. I encouraged him to discuss back recommendations with his back provider and disregard TV infomercials.     Review of Systems:   Pertinent items are noted in HPI, remainder of complete ROS is negative.      Active Medications:       acetaminophen (TYLENOL) 500 MG tablet, Take 1-2 tablets (500-1,000 mg) by mouth every 6 hours as needed for pain (arthritis), Disp: 120 tablet, Rfl: 1     albuterol (PROAIR HFA/PROVENTIL HFA/VENTOLIN HFA) 108 (90 Base) MCG/ACT inhaler, Inhale 2 puffs into the lungs every 6 hours, Disp: 1 Inhaler, Rfl: 1     Ascorbic Acid (VITAMIN C PO), Take 500 mg by mouth once, Disp: , Rfl:      aspirin 81 MG tablet, Take 1 tablet by mouth every morning , Disp: , Rfl:      atorvastatin (LIPITOR) 20 MG tablet, Take 1 tablet (20 mg) by mouth daily (Patient taking differently: Take 20 mg by mouth every evening ), Disp: 90 tablet, Rfl: 3     chlorthalidone (HYGROTON) 25 MG tablet, Take 1 tablet (25 mg) by mouth daily, Disp: 90 tablet, Rfl: 3     Cholecalciferol " (VITAMIN D) 1000 UNIT capsule, Take 1 capsule by mouth daily., Disp: , Rfl:      Cyanocobalamin (VITAMIN B12 PO), Take 1 tablet by mouth. Pt states he takes this 2 or 3 times a week.. , Disp: , Rfl:      gabapentin (NEURONTIN) 300 MG capsule, One capsule at bedtime for one week the increase to twice daily, Disp: 180 capsule, Rfl: 3     HYDROcodone-acetaminophen (NORCO) 5-325 MG tablet, Take 1-2 tablets by mouth every 4 hours as needed for moderate to severe pain, Disp: 10 tablet, Rfl: 0     loratadine (CLARITIN) 10 MG tablet, Take 1 tablet (10 mg) by mouth daily as needed for allergies, Disp: 90 tablet, Rfl: 3     losartan (COZAAR) 100 MG tablet, Take 1 tablet (100 mg) by mouth daily (Patient taking differently: Take 100 mg by mouth every evening ), Disp: 90 tablet, Rfl: 3     metFORMIN (GLUCOPHAGE-XR) 500 MG 24 hr tablet, Take 1 tablet (500 mg) by mouth daily (with dinner), Disp: 90 tablet, Rfl: 3     Multiple Vitamin (MULTIVITAMINS PO), Take 1 tablet by mouth daily., Disp: , Rfl:      Omega-3 Fatty Acids (FISH OIL PO), Take 1 capsule by mouth as needed, Disp: , Rfl:      sildenafil (REVATIO) 20 MG tablet, Take 1-5 tablets (20 mg) by mouth as needed for sexual activity.  Never use with nitroglycerin, terazosin or doxazosin., Disp: 90 tablet, Rfl: 11     tamsulosin (FLOMAX) 0.4 MG capsule, Take 1 capsule (0.4 mg) by mouth daily, Disp: 90 capsule, Rfl: 3     VIRTUSSIN A/C 100-10 MG/5ML solution, 5-10 MLS PO Q 4 H PRN COUGH, Disp: 473 mL, Rfl: 0     Allergies:   Seasonal allergies; no known drug allergies     Past Medical History:  Acquired stenosis of lacrimal punctum, bilateral   Type 2 diabetes mellitus  Degenerative disc disease, lumbar  Ectropion due to laxity of eyelid   Hyperlipidemia   Hypertension   Obesity   Pulmonary nodule   Erectile dysfunction   Seasonal allergies  Vitamin D deficiency   Prostate nodule   Degenerative joint disease of knee   Hearing loss  Vision impairment   Senile nuclear sclerosis     Tubulovillous adenoma of colon   Polyneuropathy      Past Surgical History:  Appendectomy, child   Arthroscopy knee, left   Biopsy prostate transrectal  Excise right pterygium x2, , 2/2/15  Inject epidural lumbar/sacral single, 16  Inject paravertebral facet joint lumbar/sacral first x2, right, 16, 16  Mediastinoscopy, right, 19  Punctoplasty, bilateral, 18  Repair ectropion bilateral, 18    Family History:   Brother - cerebrovascular disease ()  Sister - breast cancer   Mother - kidney stones   Father - Parkinson's disease  Sister - hypothyroidism      Social History:   The patient was alone.   Smoking Status: Former smoker, quit   Smokeless Tobacco: Never used   Alcohol Use: Yes  Marital Status: .   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  Home: Lives with his wife.      Physical Exam:   BP (P) 175/82 (BP Location: Right arm, Patient Position: Sitting, Cuff Size: Adult Regular)   Pulse (P) 69   Wt 86.7 kg (191 lb 3.2 oz)   BMI 32.82 kg/m       BMI= Body mass index is 32.82 kg/m .    Constitutional: Oriented to person, place, and time slightly forgetful related to short term memory instructions with the visit today and taking medications. Vital signs are noted. Obese. No significant distress. Mild vocal hoarseness related to recent procedure.  HEENT:  His pupils are equal.  Neck: Normal range of motion.  Neck supple. No tracheal deviation present. No thyromegaly present, no adenopathy, healing linear scar from chest procedure  Cardiovascular: Regular rhythm, normal heart sounds. No murmur present. Exam reveals no gallop and no friction rub.   Chest: well healed surgical scars from laparascopic chest procedure  Pulmonary/Chest: Effort normal and breath sounds normal. No respiratory distress.    Neurological: Alert and oriented to person, place, and time. Normal strength. Slightly forgetful related to his medications.   Skin: Skin is warm and dry.  "No rash noted. No erythema. No pallor.   Psychiatric: Slightly anxious mood, affect and behavior is normal. Cooperative  Psychological : Mood stable interactive motivated for change    Recent Imaging   From [facility] note on 4/4/19  IMPRESSION:  Shallow inspiration with infiltrates and atelectasis  in the lower  lungs. Surgical staples right base. Tortuous aorta. Soft tissue emphysema right  chest wall. Since yesterday, the right chest tube and ETT have been removed. No  pneumothorax.  A.  Received fresh labeled \"station 4 left lymph node\" is a  0.8 x 0.5 x 0.3 cm lymph node.  All tissue submitted for  frozen and permanent sections.  Grossed by RONIT.  B.  Received fresh labeled \"station 7 lymph node\" is a 2 x  1.8 x 0.6 cm aggregate of fragmented lymphatic tissue.  All  tissue submitted for frozen and permanent sections.  Grossed by FLORINA IZAGUIRRE.  Received fresh labeled \"station 4 right lymph node\" is  a 2.5 x 1.7 x 0.6 cm aggregate of fragmented lymphatic  tissue.  All tissue submitted for frozen and permanent  sections.  Grossed by FLORINA GONZALEZ.  Received fresh labeled \"right upper lobe lung wedge\" is  a 7.8 x 5.5 x 2.7 cm lung wedge resection.  There is a 1.8  x 1.5 x 1.3 cm ill-defined, solid mass located 1.2 cm from  the stapled margin.  The mass umbilicates the visceral  pleura which is inked and submitted perpendicularly.  Representative tissue submitted for frozen and permanent  sections.  Grossed by JOHNSON JACKSON.  Received fresh labeled \"station 11 right lymph node\" is  a 1.6 x 1.5 x 0.4 cm lymph node.  All tissue submitted for  frozen and permanent sections.  Grossed by MELVA CARNEY  Received fresh labeled \"completion right upper  lobectomy of lung\" is a 155 gram completion lobectomy.  There is a 0.2 x 0.2 x 0.2 cm well-circumscribed, solid  nodule located centrally, 2.7 cm from the shaved bronchial  margin.  There are multiple hilar lymph nodes and a 0.9 x  0.7 x 0.4 cm pleural lymph node.  No masses are identified.   " Hemorrhage is present at the old staple line.  Representative tissue submitted for frozen and permanent  sections.  Grossed by PALOMA.      Participated in the Interpretation Scott Lara M.D. - Pathology Fellow     Report electronically signed by Yeni Ramírez M.D. [id number]  I verify that I have examined all relevant slides/materials  for the specimen(s) and rendered or confirmed the diagnosis.     Case Number [id number]     Frozen Intraoperative Report A.  Lymph node, left lower paratracheal (station 4L),  biopsy:  A single (1) lymph node is negative for tumor.  B.  Lymph node, subcarinal (station 7), biopsy:  A single  (1) lymph node is negative for tumor.  C.  Lymph node, right lower paratracheal (station 4R),  biopsy:  A single (1) lymph node is negative for tumor.  D.  Lung, right upper lobe, wedge resection:  Adenocarcinoma (1.8 cm).  Pleura not involved.  The  surgical margin is negative for tumor by 1.2 cm.  HOLD OVER  to evaluate on permanent sections.  E.  Lymph node, right interlobar (station 11R), biopsy:  A  single (1) lymph node is negative for tumor.  F.  Lung, right upper lobe, completion lobectomy:  Benign  lung parenchyma, negative for tumor.  The bronchial margin  is negative for tumor.  Multiple (7) lymph nodes are  negative for tumor.  Frozen section histologic interpretation of parts E-F  performed by: Jose Juan Parson M.D. [id number]  Frozen section histologic interpretation of parts A-D  performed by: Yeni Ramírez M.D. [id number]     Block Summary A Station 4 Left lymph node  A1 Station 4 left lymph node (A1)  B Station 7 lymph node  B1 Station 7 lymph node 1of2 (B1)  B2 Station 7 lymph node 2of2 (B1)  C Station 4 Right lymph node  C1 Station 4 right lymph node 1of2(C1)  C2 Station 4 right lymph node 2of2(C1)  D Right upper lobe lung wedge  D1 Right upper lobe mass 1  D2 Right upper lobe mass 1  D3 Right upper lobe mass 2  D4 Non-neoplastic lung  E Station 11 right lymph node  E1 Station 11 right lymph  node 1(E1)  F Completion right upper lobectomy of lung  F1 Peribronchial lymph node 1(F1)  F2 Staple line firm area  F3 Right upper lobe bronchial margin  F4 Right upper lobe lung nodule  F5 Right upper lobe lung pleural nodule  F6 Peribronchial lymph nodes 5(F2)     Disclaimer This test was developed and its performance characteristics  determined by [facility] in a manner consistent with CLIA  requirements. This test has not been cleared or approved by  the U.S. Food and Drug Administration.     Interpretation FINAL DIAGNOSIS  A.  Lymph node, left lower paratracheal (station 4L),  biopsy:  A single (1) lymph node is negative for tumor.  B.  Lymph node, subcarinal (station 7), biopsy:  A single  (1) lymph node is negative for tumor.  C.  Lymph node, right lower paratracheal (station 4R),  biopsy:  A single (1) lymph node is negative for tumor.  D.  Lung, right upper lobe, wedge resection:  Invasive  adenocarcinoma, 1.8 cm in greatest dimension.  The surgical  resection margin is negative for tumor by 1.2 cm.  Intrapulmonary peribronchial lymph nodes are not  identified.  See synoptic report.  Immunohistochemical stains for TTF-1 and p40 are performed  on paraffin section (block D3).  Tumor cells are positive  for TTF 1 and negative for p40, supports above diagnosis of  adenocarcinoma. VVG stain on block D2 does not reveal any  definite pleural invasion.  E.  Lymph node, right interlobar (station 11R), biopsy:  A  single (1) lymph node is negative for tumor.  F.  Lung, right upper lobe, completion lobectomy:  Benign  lung parenchyma with focal fibroelastotic scar. Negative  for residual carcinoma. The bronchial margin is negative  for tumor.  Multiple (7) lymph nodes are negative for  tumor. See synoptic report.   SYNOPTIC REPORT  Procedure: Wedge resection and completion lobectomy.  Specimen Laterality: Right.  Tumor Site: Upper lobe.  Total Tumor Size: Greatest dimension:  1.8 cm.  Additional dimensions:  1.5 x  1.3 cm.  Total Tumor Size Inclusive of Invasive Components: 1.8 x  1.5 x 1.3 cm  Size of Invasive Tumor: Greatest dimension:  1.8 cm.  Tumor Focality: Single tumor.  Histologic Type: Invasive adenocarcinoma, acinar  predominant.  Histologic Grade: G2 (moderately-differentiated).  Visceral Pleural Invasion: Not identified.  Lymphovascular Invasion: Not identified.  Direct Invasion of Adjacent Structures: No adjacent  structures present.  Margins: All margins are uninvolved by carcinoma.  Margins examined:  Resection margin and bronchial margin.  Distance of invasive carcinoma from closest margin:  >3 cm  after completion lobectomy.  Specify closest margin:  Bronchial resection margin.  Treatment Effect: No known presurgical therapy.  Regional Lymph Nodes       Number of Lymph Nodes Involved: 0.       Number of Lymph Nodes Examined: 11.  Pathologic Staging (AJCC, 8th edition)       TNM Descriptors: Not applicable.       Primary Tumor: pT1b.       Regional lymph nodes: pN0.       Distant Metastasis: Not applicable.  Additional Pathologic Findings: None identified.  The synoptic report incorporates information from all  relevant surgical material and includes all required data  elements of the current [facility] Cancer Protocol.     Specimen Collected on   Varies - Lymph Node 4/3/2019 8:27 AM     [facility] and printed on a letter to him as he indicated he was told he did not have lung cancer. I reviewed in detail diagnosis of adenocarcinoma of lung fully resected.  Assessment and Plan:  Vishnu Viveros is a 68 year old male with a history of type 2 diabetes mellitus, hyperlipidemia, hypertension, DJD, degenerative disc disease, and acquired stenosis of lacrimal punctum who presents for a follow-up of chronic illnesses and discussion after surgical resection at [facility] of adenocarcinoma stage 1 right pT1b pN0 invasive adnemocarcinoma acinar type 1.8 clear margins.    Adenocarcinoma of lung, stage 1,  right (H)  Vishnu will continue care regarding his adenocarcinoma with the [facility] as part of the surgical follow-up period.     Diabetic polyneuropathy associated with diabetes mellitus due to underlying condition (H)  He has not had his A1c checked since 11/19/18. It was 6.5 at that time. Recheck today.   - Hemoglobin A1c    Pterygium eye, bilateral and other eye conditions require EYE follow-up  - OPHTHALMOLOGY ADULT REFERRAL    Dry eyes  He would like to follow-up with an ophthalmologist for his chronic eye conditions.  - OPHTHALMOLOGY ADULT REFERRAL    Benign essential hypertension  I prescribed Hygroton, in addition to his Losartan, as his blood pressure is elevated and uncontrolled. Recheck labs today.  - chlorthalidone (HYGROTON) 25 MG tablet  Dispense: 90 tablet; Refill: 3  - Basic metabolic panel    Insurance   I advised him to create a written appeal against [employer] for his insurance concerns. Additionally, I explained that we could fill out short term disability and FMLA forms together and he will contact his human resource department, my card provided to fax forms.    Shingrix  I advised him to receive the vaccination at his local [facility]     Back Pain  I advised him to continue care and discuss treatment options with his doctor in Orthopedics.     Follow-up: Return in about 1 month (around 5/25/2019).      Scribe Disclosure:  I, Glory Stevens, am serving as a scribe to document services personally performed by Murali Logan MD at this visit, based upon the provider's statements to me. All documentation has been reviewed by the aforementioned provider prior to being entered into the official medical record.     Portions of this medical record were completed by a scribe. UPON MY REVIEW AND AUTHENTICATION BY ELECTRONIC SIGNATURE, this confirms (a) I performed the applicable clinical services, and (b) the record is accurate.   Murali Logan MD

## 2019-04-25 NOTE — LETTER
Vishnu Viveros  [address] [place] [address]  : 1950  MRN:  [id number]      2019   Here is your biopsy result from [facility]: This is a lung cancer that was resected and did not show lymph node involvement.      Frozen Intraoperative Report A.  Lymph node, left lower paratracheal (station 4L),  biopsy:  A single (1) lymph node is negative for tumor.  B.  Lymph node, subcarinal (station 7), biopsy:  A single  (1) lymph node is negative for tumor.  C.  Lymph node, right lower paratracheal (station 4R),  biopsy:  A single (1) lymph node is negative for tumor.  D.  Lung, right upper lobe, wedge resection:  Adenocarcinoma (1.8 cm).  Pleura not involved.  The  surgical margin is negative for tumor by 1.2 cm.  HOLD OVER  to evaluate on permanent sections.  E.  Lymph node, right interlobar (station 11R), biopsy:  A  single (1) lymph node is negative for tumor.  F.  Lung, right upper lobe, completion lobectomy:  Benign  lung parenchyma, negative for tumor.  The bronchial margin  is negative for tumor.  Multiple (7) lymph nodes are  negative for tumor.  Frozen section histologic interpretation of parts E-F  performed by: Jose Juan Parson M.D. [id number]  Frozen section histologic interpretation of parts A-D  performed by: Yeni Ramírez M.D. [id number]     Block Summary A Station 4 Left lymph node  A1 Station 4 left lymph node (A1)  B Station 7 lymph node  B1 Station 7 lymph node 1of2 (B1)  B2 Station 7 lymph node 2of2 (B1)  C Station 4 Right lymph node  C1 Station 4 right lymph node 1of2(C1)  C2 Station 4 right lymph node 2of2(C1)  D Right upper lobe lung wedge  D1 Right upper lobe mass 1  D2 Right upper lobe mass 1  D3 Right upper lobe mass 2  D4 Non-neoplastic lung  E Station 11 right lymph node  E1 Station 11 right lymph node 1(E1)  F Completion right upper lobectomy of lung  F1 Peribronchial lymph node 1(F1)  F2 Staple line firm area  F3 Right upper lobe bronchial margin  F4 Right upper  lobe lung nodule  F5 Right upper lobe lung pleural nodule  F6 Peribronchial lymph nodes 5(F2)     Disclaimer This test was developed and its performance characteristics  determined by [facility] in a manner consistent with CLIA  requirements. This test has not been cleared or approved by  the U.S. Food and Drug Administration.     Interpretation FINAL DIAGNOSIS  A.  Lymph node, left lower paratracheal (station 4L),  biopsy:  A single (1) lymph node is negative for tumor.  B.  Lymph node, subcarinal (station 7), biopsy:  A single  (1) lymph node is negative for tumor.  C.  Lymph node, right lower paratracheal (station 4R),  biopsy:  A single (1) lymph node is negative for tumor.  D.  Lung, right upper lobe, wedge resection:  Invasive  adenocarcinoma, 1.8 cm in greatest dimension.  The surgical  resection margin is negative for tumor by 1.2 cm.  Intrapulmonary peribronchial lymph nodes are not  identified.  See synoptic report.  Immunohistochemical stains for TTF-1 and p40 are performed  on paraffin section (block D3).  Tumor cells are positive  for TTF 1 and negative for p40, supports above diagnosis of  adenocarcinoma. VVG stain on block D2 does not reveal any  definite pleural invasion.  E.  Lymph node, right interlobar (station 11R), biopsy:  A  single (1) lymph node is negative for tumor.  F.  Lung, right upper lobe, completion lobectomy:  Benign  lung parenchyma with focal fibroelastotic scar. Negative  for residual carcinoma. The bronchial margin is negative  for tumor.  Multiple (7) lymph nodes are negative for  tumor. See synoptic report.   SYNOPTIC REPORT  Procedure: Wedge resection and completion lobectomy.  Specimen Laterality: Right.  Tumor Site: Upper lobe.  Total Tumor Size: Greatest dimension:  1.8 cm.  Additional dimensions:  1.5 x 1.3 cm.  Total Tumor Size Inclusive of Invasive Components: 1.8 x  1.5 x 1.3 cm  Size of Invasive Tumor: Greatest dimension:  1.8 cm.  Tumor Focality: Single  tumor.  Histologic Type: Invasive adenocarcinoma, acinar  predominant.  Histologic Grade: G2 (moderately-differentiated).  Visceral Pleural Invasion: Not identified.  Lymphovascular Invasion: Not identified.  Direct Invasion of Adjacent Structures: No adjacent  structures present.  Margins: All margins are uninvolved by carcinoma.  Margins examined:  Resection margin and bronchial margin.  Distance of invasive carcinoma from closest margin:  >3 cm  after completion lobectomy.  Specify closest margin:  Bronchial resection margin.  Treatment Effect: No known presurgical therapy.  Regional Lymph Nodes       Number of Lymph Nodes Involved: 0.       Number of Lymph Nodes Examined: 11.  Pathologic Staging (AJCC, 8th edition)       TNM Descriptors: Not applicable.       Primary Tumor: pT1b.       Regional lymph nodes: pN0.       Distant Metastasis: Not applicable.  Additional Pathologic Findings: None identified.  The synoptic report incorporates information from all  relevant surgical material and includes all required data  elements of the current CAP Cancer Protocol.     Specimen Collected on   Varies - Lymph Node 4/3/2019 8:27 AM       Best wishes  Murali Logan MD

## 2019-04-25 NOTE — PATIENT INSTRUCTIONS
[facility] Medication Refill Request Information:  * Please contact your pharmacy regarding ANY request for medication refills.  ** [facility] Prescription Fax = [phone]  * Please allow 3 business days for routine medication refills.  * Please allow 5 business days for controlled substance medication refills.     [facility] Test Result notification information:  *You will be notified with in 7-10 days of your appointment day regarding the results of your test.  If you are on MyChart you will be notified as soon as the provider has reviewed the results and signed off on them.    [facility]: [phone]

## 2019-05-01 ENCOUNTER — MYC MEDICAL ADVICE (OUTPATIENT)
Dept: FAMILY MEDICINE | Facility: CLINIC | Age: 69
End: 2019-05-01

## 2019-05-28 ENCOUNTER — PRE VISIT (OUTPATIENT)
Dept: OPHTHALMOLOGY | Facility: CLINIC | Age: 69
End: 2019-05-28

## 2019-05-28 NOTE — TELEPHONE ENCOUNTER
FUTURE VISIT INFORMATION      FUTURE VISIT INFORMATION:    Date: 6/10/19    Time: 2:30pm    Location: [facility]  REFERRAL INFORMATION:    Referring provider:  Murali Logan    Referring providers clinic:  [facility]    Reason for visit/diagnosis  Pterygium eye, bilateral, dry eyes    RECORDS REQUESTED FROM:       Clinic name Comments Records Status Imaging Status   [facility] OV/referral 4/25/19 EPIC

## 2019-06-05 ENCOUNTER — TELEPHONE (OUTPATIENT)
Dept: OPHTHALMOLOGY | Facility: CLINIC | Age: 69
End: 2019-06-05

## 2019-06-12 ENCOUNTER — OFFICE VISIT (OUTPATIENT)
Dept: FAMILY MEDICINE | Facility: CLINIC | Age: 69
End: 2019-06-12
Payer: COMMERCIAL

## 2019-06-12 VITALS
SYSTOLIC BLOOD PRESSURE: 112 MMHG | DIASTOLIC BLOOD PRESSURE: 70 MMHG | TEMPERATURE: 97.8 F | WEIGHT: 185.3 LBS | BODY MASS INDEX: 31.81 KG/M2 | RESPIRATION RATE: 16 BRPM | OXYGEN SATURATION: 97 % | HEART RATE: 71 BPM

## 2019-06-12 DIAGNOSIS — R97.20 ELEVATED PROSTATE SPECIFIC ANTIGEN (PSA): ICD-10-CM

## 2019-06-12 DIAGNOSIS — C34.91 ADENOCARCINOMA, LUNG, RIGHT (H): ICD-10-CM

## 2019-06-12 DIAGNOSIS — M54.16 LUMBAR RADICULITIS: ICD-10-CM

## 2019-06-12 DIAGNOSIS — I10 BENIGN ESSENTIAL HYPERTENSION: ICD-10-CM

## 2019-06-12 DIAGNOSIS — E08.42 DIABETIC POLYNEUROPATHY ASSOCIATED WITH DIABETES MELLITUS DUE TO UNDERLYING CONDITION (H): ICD-10-CM

## 2019-06-12 DIAGNOSIS — M48.062 SPINAL STENOSIS, LUMBAR REGION, WITH NEUROGENIC CLAUDICATION: ICD-10-CM

## 2019-06-12 DIAGNOSIS — J30.1 SEASONAL ALLERGIC RHINITIS DUE TO POLLEN: ICD-10-CM

## 2019-06-12 DIAGNOSIS — M51.369 DEGENERATIVE DISC DISEASE, LUMBAR: ICD-10-CM

## 2019-06-12 DIAGNOSIS — I10 BENIGN ESSENTIAL HYPERTENSION: Primary | ICD-10-CM

## 2019-06-12 DIAGNOSIS — E87.6 HYPOKALEMIA: ICD-10-CM

## 2019-06-12 LAB
ANION GAP SERPL CALCULATED.3IONS-SCNC: 6 MMOL/L (ref 3–14)
BUN SERPL-MCNC: 15 MG/DL (ref 7–30)
CALCIUM SERPL-MCNC: 9.3 MG/DL (ref 8.5–10.1)
CHLORIDE SERPL-SCNC: 105 MMOL/L (ref 94–109)
CO2 SERPL-SCNC: 29 MMOL/L (ref 20–32)
CREAT SERPL-MCNC: 0.82 MG/DL (ref 0.66–1.25)
GFR SERPL CREATININE-BSD FRML MDRD: 90 ML/MIN/{1.73_M2}
GLUCOSE SERPL-MCNC: 111 MG/DL (ref 70–99)
HBA1C MFR BLD: 6.4 % (ref 0–5.6)
POTASSIUM SERPL-SCNC: 3.2 MMOL/L (ref 3.4–5.3)
PSA SERPL-MCNC: 4.63 UG/L (ref 0–4)
SODIUM SERPL-SCNC: 140 MMOL/L (ref 133–144)

## 2019-06-12 RX ORDER — FLUTICASONE PROPIONATE 50 MCG
SPRAY, SUSPENSION (ML) NASAL
Qty: 16 G | Refills: 1 | Status: SHIPPED | OUTPATIENT
Start: 2019-06-12 | End: 2021-06-07

## 2019-06-12 RX ORDER — POTASSIUM CHLORIDE 1500 MG/1
20 TABLET, EXTENDED RELEASE ORAL DAILY
Qty: 90 TABLET | Refills: 3 | Status: SHIPPED | OUTPATIENT
Start: 2019-06-12 | End: 2019-12-11

## 2019-06-12 ASSESSMENT — PAIN SCALES - GENERAL: PAINLEVEL: NO PAIN (0)

## 2019-06-12 NOTE — NURSING NOTE
Chief Complaint   Patient presents with     Hypertension     Patient is here for blood pressure follow up     Results     Also here to follow up on test results; lab       Joe Ramírez CMA (AAMA) at 5:58 PM on 6/12/2019

## 2019-06-12 NOTE — PROGRESS NOTES
[facility]  Primary Care Center   Murali Logan MD  06/12/2019      Chief Complaint:   Hypertension and Results  Diabetes  Back pain  Lung cancer    History of Present Illness:   Vishnu Viveros is a 69 year old male with a history of type 2 DM well controlled, hyperlipidemia, HTN well controlled, DJD, and stage 1 right adenocarcinoma of the lung s/p resection with clear margins who presents for follow up of hypertension and lab results.    Vishnu is doing well regarding hypertension. Results from labs earlier today were discussed. He experiences fatigue after noon and complains of low energy. His bowel movements are normal. His potassium Is lower after start of  Chlorthalidone. He will try to increase fruit high in potassium has some allergy to apples and other furit.    Vishnu and his wife report that he has significant back pain with radiation into the left leg. His quality of life and sleep are affected secondary to pain. Pain is exacerbated with transition to a standing position that he is able to stabilize the pain with a short period of sitting. He hopes to consult with a surgeon. He had been seen in pain clinic with several injects temporary relief now willing to seek advice from surgeon. He notes some left foot drop and adjust of his back with more hunched over gait.    He indicates he is recovering after surgery right sided for lung cancer and [facility]. Initially his insurance was not going to cover but they received notification  Their appeal was approved. He will require follow-up at [facility] in pulmonary 6 months after procedure for lung cancer as he will not have further approved visits through [facility].   He has significant nasal stuffiness this time of year.  He needs FMLA forms completed but did not bring today.       Review of Systems:   Pertinent items are noted in HPI, remainder of complete ROS is negative.      Active Medications:      acetaminophen (TYLENOL) 500 MG tablet, Take 1-2 tablets  (500-1,000 mg) by mouth every 6 hours as needed for pain (arthritis), Disp: 120 tablet, Rfl: 1     Ascorbic Acid (VITAMIN C PO), Take 500 mg by mouth once, Disp: , Rfl:      atorvastatin (LIPITOR) 20 MG tablet, Take 1 tablet (20 mg) by mouth daily (Patient taking differently: Take 20 mg by mouth every evening ), Disp: 90 tablet, Rfl: 3     chlorthalidone (HYGROTON) 25 MG tablet, Take 1 tablet (25 mg) by mouth daily, Disp: 90 tablet, Rfl: 3      Cholecalciferol (VITAMIN D) 1000 UNIT capsule, Take 1 capsule by mouth daily., Disp: , Rfl:      Cyanocobalamin (VITAMIN B12 PO), Take 1 tablet by mouth. Pt states he takes this 2 or 3 times a week.. , Disp: , Rfl:      gabapentin (NEURONTIN) 300 MG capsule, One capsule at bedtime for one week the increase to twice daily, Disp: 180 capsule, Rfl: 3     loratadine (CLARITIN) 10 MG tablet, Take 1 tablet (10 mg) by mouth daily as needed for allergies, Disp: 90 tablet, Rfl: 3     losartan (COZAAR) 100 MG tablet, Take 1 tablet (100 mg) by mouth daily (Patient taking differently: Take 100 mg by mouth every evening ), Disp: 90 tablet, Rfl: 3     metFORMIN (GLUCOPHAGE-XR) 500 MG 24 hr tablet, Take 1 tablet (500 mg) by mouth daily (with dinner), Disp: 90 tablet, Rfl: 3     Multiple Vitamin (MULTIVITAMINS PO), Take 1 tablet by mouth daily., Disp: , Rfl:      Omega-3 Fatty Acids (FISH OIL PO), Take 1 capsule by mouth as needed, Disp: , Rfl:      sildenafil (REVATIO) 20 MG tablet, Take 1-5 tablets (20 mg) by mouth as needed for sexual activity.  Never use with nitroglycerin, terazosin or doxazosin., Disp: 90 tablet, Rfl: 11     tamsulosin (FLOMAX) 0.4 MG capsule, Take 1 capsule (0.4 mg) by mouth daily, Disp: 90 capsule, Rfl: 3     albuterol (PROAIR HFA/PROVENTIL HFA/VENTOLIN HFA) 108 (90 Base) MCG/ACT inhaler, Inhale 2 puffs into the lungs every 6 hours (Patient not taking: Reported on 6/12/2019), Disp: 1 Inhaler, Rfl: 1     Allergies:   NKDA  Seasonal allergies      Past Medical  History:  Acquired stenosis of lacrimal punctum, bilateral   Type 2 diabetes mellitus  Degenerative disc disease, lumbar  Ectropion due to laxity of eyelid   Hyperlipidemia   Hypertension   Obesity   Pulmonary nodule   Erectile dysfunction   Seasonal allergies  Vitamin D deficiency   Prostate nodule   Degenerative joint disease of knee   Hearing loss  Vision impairment   Senile nuclear sclerosis  Tubulovillous adenoma of colon  Polyneuropathy       Past Surgical History:  Appendectomy, child   Arthroscopy knee, left   Biopsy prostate transrectal  Excise right pterygium x2, , 2/2/15  Inject epidural lumbar/sacral single, 16  Inject paravertebral facet joint lumbar/sacral first x2, right, 16, 16  Mediastinoscopy, right, 19  Punctoplasty, bilateral, 18  Repair ectropion bilateral, 18    Family History:   Cerebrovascular disease,  of a stroke - brother  Breast cancer - sister  Kidney stones - mother  Parkinson's disease - father  Cerebral aneurysm - brother  Hypothyroidism - sister      Social History:   Presents to clinic with spouse  Tobacco Use: former smoker, quiz 37.6 years ago  Alcohol Use: drinks 4 or more times a week  PCP: Murali Logan     Physical Exam:   /70 (BP Location: Right arm, Patient Position: Sitting, Cuff Size: Adult Regular)   Pulse 71   Temp 97.8  F (36.6  C) (Oral)   Resp 16   Wt 84.1 kg (185 lb 4.8 oz)   SpO2 97%   BMI 31.81 kg/m     Constitutional: Oriented to person, place, and time. Vital signs are noted. Obese. No significant distress.   HEENT:  His pupils are equal. Bilateral tympanic membrane normal. Posterior pharynx non-erythematous  Neck: Normal range of motion.  Neck supple. No tracheal deviation present. No thyromegaly present, no adenopathy, healing linear scar from chest procedure  Cardiovascular: Regular rhythm, normal heart sounds. No murmur present. Exam reveals no gallop and no friction rub.   Chest: well healed surgical  scars from laparoscopic chest procedure  Pulmonary/Chest: Effort normal and breath sounds normal. No respiratory distress.    Neurological: Alert and oriented to person, place, and time. Normal strength. Slightly forgetful related to his medications.  Skin: Skin is warm and dry. No rash noted. No erythema. No pallor.   Psychiatric: Slightly anxious mood, affect and behavior is normal. Cooperative  MS: Kyphosis and antalgic gait with left leg weakness compared to right.    Lab Results:  Component    Latest Ref Rng & Units 6/12/2019   Sodium    133 - 144 mmol/L 140   Potassium    3.4 - 5.3 mmol/L 3.2 (L)   Chloride    94 - 109 mmol/L 105   Carbon Dioxide    20 - 32 mmol/L 29   Anion Gap    3 - 14 mmol/L 6   Glucose    70 - 99 mg/dL 111 (H)   Urea Nitrogen    7 - 30 mg/dL 15   Creatinine    0.66 - 1.25 mg/dL 0.82   GFR Estimate    >60 mL/min/1.73:m2 90   GFR Estimate If Black    >60 mL/min/1.73:m2 >90   Calcium    8.5 - 10.1 mg/dL 9.3   Hemoglobin A1C    0 - 5.6 % 6.4 (H)   PSA    0 - 4 ug/L 4.63 (H)       Assessment and Plan:  Vishnu Viveros is a 69 year old male with a history of type 2 DM, hyperlipidemia, HTN, DJD, and stage 1 right adenocarcinoma of the lung s/p resection with clear margins who presents for follow up of hypertension and lab results.    Benign essential hypertension  Results from today's basic metabolic panel were discussed with Vishnu and his wife. Blood pressure is at goal, continue current medications. Los potassium add supplement daily and eat fruit high in potassium.  - Basic metabolic panel    Hypokalemia  Low potassium levels were also discussed with Bing and that the fatigue that he experiences in the afternoon is likely due to the low potassium. Recommended he eat fresh fruits along with a potassium supplements to raise his potassium levels.   - potassium chloride ER (K-DUR/KLOR-CON M) 20 MEQ CR tablet  Dispense: 90 tablet; Refill: 3    Diabetic polyneuropathy associated with  diabetes mellitus due to underlying condition (H) Congratulated him on his levels.  Hemoglobin A1c at goal at 6.4%.    Spinal stenosis, lumbar region, with neurogenic claudication, Degenerative disc disease, lumbar and lumbar radiculitis  Given the chronicity of his back pain, recommended Vishnu visit with neurosurgery for further treatment options and further evaluation.  - NEUROSURGERY REFERRAL  - PODIATRY/FOOT & ANKLE SURGERY REFERRAL    Seasonal allergic rhinitis due to pollen  Provided Vishnu with Fluticasone to treat his seasonal allergies, which he was instructed to take only during the season of his allergy.  - fluticasone (FLONASE) 50 MCG/ACT nasal spray  Dispense: 16 g; Refill: 1    Elevated prostate specific antigen (PSA)  Given his elevated results from today's PSA lab, I recommended Vishnu visit with a urologist to discuss further evaluation and treatment options.  - UROLOGY ADULT REFERRAL    Adenocarcinoma, lung, right (H)  To follow up the procedure performed at [facility], since he is no longer covered at [facility], it was recommended he follow up with a pulmonary specialist to discuss further treatment.  - PULMONARY MEDICINE REFERRAL  FMLA forms require completion. He will have work fax to me.  Follow-up: Return in about 6 months (around 12/12/2019).      Scribe Disclosure:  I, Page Browne, am serving as a scribe to document services personally performed by Murali Logan MD at this visit, based upon the provider's statements to me. All documentation has been reviewed by the aforementioned provider prior to being entered into the official medical record.  Murali Logan MD

## 2019-06-13 ENCOUNTER — TELEPHONE (OUTPATIENT)
Dept: FAMILY MEDICINE | Facility: CLINIC | Age: 69
End: 2019-06-13

## 2019-06-13 DIAGNOSIS — M48.062 SPINAL STENOSIS, LUMBAR REGION, WITH NEUROGENIC CLAUDICATION: Primary | ICD-10-CM

## 2019-06-13 DIAGNOSIS — M54.16 LUMBAR RADICULITIS: ICD-10-CM

## 2019-06-13 NOTE — TELEPHONE ENCOUNTER
I added Dr. Audelia Baldwin name to the neurosurgery referral.  Please assist with coordination of cares.  Best wishes,  Murali Logan MD

## 2019-06-13 NOTE — TELEPHONE ENCOUNTER
[facility] Call Center    Phone Message    May a detailed message be left on voicemail: yes    Reason for Call: Order(s): Other:referral for neurosurgery   Reason for requested: pt has the referral in [facility] they would like Dr Audelia Baldwin name added.   Date needed: ASAP  Provider name: Dr Logan      Action Taken: Message routed to:  [facility] Primary [facility]

## 2019-10-15 ENCOUNTER — PRE VISIT (OUTPATIENT)
Dept: FAMILY MEDICINE | Facility: CLINIC | Age: 69
End: 2019-10-15

## 2019-10-15 DIAGNOSIS — E78.9 DISEASE OF LIPID METABOLISM: ICD-10-CM

## 2019-10-15 DIAGNOSIS — E11.9 DIABETES MELLITUS (H): Primary | ICD-10-CM

## 2019-10-26 DIAGNOSIS — E11.9 DIABETES MELLITUS (H): ICD-10-CM

## 2019-10-26 DIAGNOSIS — I10 BENIGN ESSENTIAL HYPERTENSION: ICD-10-CM

## 2019-10-26 DIAGNOSIS — E78.9 DISEASE OF LIPID METABOLISM: ICD-10-CM

## 2019-10-26 LAB
ALBUMIN SERPL-MCNC: 3.7 G/DL (ref 3.4–5)
ALP SERPL-CCNC: 60 U/L (ref 40–150)
ALT SERPL W P-5'-P-CCNC: 26 U/L (ref 0–70)
ANION GAP SERPL CALCULATED.3IONS-SCNC: 5 MMOL/L (ref 3–14)
AST SERPL W P-5'-P-CCNC: 16 U/L (ref 0–45)
BILIRUB SERPL-MCNC: 0.4 MG/DL (ref 0.2–1.3)
BUN SERPL-MCNC: 16 MG/DL (ref 7–30)
CALCIUM SERPL-MCNC: 8.8 MG/DL (ref 8.5–10.1)
CHLORIDE SERPL-SCNC: 104 MMOL/L (ref 94–109)
CHOLEST SERPL-MCNC: 125 MG/DL
CO2 SERPL-SCNC: 29 MMOL/L (ref 20–32)
CREAT SERPL-MCNC: 0.96 MG/DL (ref 0.66–1.25)
CREAT UR-MCNC: 89 MG/DL
GFR SERPL CREATININE-BSD FRML MDRD: 79 ML/MIN/{1.73_M2}
GLUCOSE SERPL-MCNC: 104 MG/DL (ref 70–99)
HBA1C MFR BLD: 6.2 % (ref 0–5.6)
HDLC SERPL-MCNC: 49 MG/DL
LDLC SERPL CALC-MCNC: 58 MG/DL
MICROALBUMIN UR-MCNC: 12 MG/L
MICROALBUMIN/CREAT UR: 13.24 MG/G CR (ref 0–17)
NONHDLC SERPL-MCNC: 76 MG/DL
POTASSIUM SERPL-SCNC: 3.8 MMOL/L (ref 3.4–5.3)
PROT SERPL-MCNC: 7.2 G/DL (ref 6.8–8.8)
SODIUM SERPL-SCNC: 138 MMOL/L (ref 133–144)
TRIGL SERPL-MCNC: 91 MG/DL
TSH SERPL DL<=0.005 MIU/L-ACNC: 1.56 MU/L (ref 0.4–4)

## 2019-11-07 DIAGNOSIS — E11.9 TYPE 2 DIABETES MELLITUS TREATED WITHOUT INSULIN (H): ICD-10-CM

## 2019-11-07 RX ORDER — ATORVASTATIN CALCIUM 20 MG/1
20 TABLET, FILM COATED ORAL DAILY
Qty: 90 TABLET | Refills: 3 | Status: SHIPPED | OUTPATIENT
Start: 2019-11-07 | End: 2020-06-01

## 2019-11-07 NOTE — TELEPHONE ENCOUNTER
ATORVASTATIN 20MG TABLETS   Last Written Prescription Date:  10/31/79081  Last Fill Quantity: 90,   # refills: 3  Last Office Visit : 6/12/2019  Future Office visit:  None  90 Tabs,  3 Refills sent to pharmacy for Pt care.    Carla Law RN  Central Triage Red Flags/Med Refills

## 2019-11-19 ENCOUNTER — PRE VISIT (OUTPATIENT)
Dept: UROLOGY | Facility: CLINIC | Age: 69
End: 2019-11-19

## 2019-11-19 DIAGNOSIS — R97.20 ELEVATED PROSTATE SPECIFIC ANTIGEN (PSA): Primary | ICD-10-CM

## 2019-11-19 NOTE — TELEPHONE ENCOUNTER
Chief Complaint : PSA check     Records/Orders: PSA     Pt Contacted: called patient to please get PSA done 1 week before appointment     At Rooming: normal

## 2019-11-25 DIAGNOSIS — R97.20 ELEVATED PROSTATE SPECIFIC ANTIGEN (PSA): ICD-10-CM

## 2019-11-25 LAB — PSA SERPL-MCNC: 5.14 UG/L (ref 0–4)

## 2019-11-29 DIAGNOSIS — E11.9 TYPE 2 DIABETES MELLITUS TREATED WITHOUT INSULIN (H): ICD-10-CM

## 2019-11-29 RX ORDER — METFORMIN HCL 500 MG
500 TABLET, EXTENDED RELEASE 24 HR ORAL
Qty: 90 TABLET | Refills: 3 | Status: SHIPPED | OUTPATIENT
Start: 2019-11-29 | End: 2020-06-01

## 2019-11-29 NOTE — TELEPHONE ENCOUNTER
METFORMIN ER 500MG 24HR TABS     Last Written Prescription Date:  10/31/2018  Last Fill Quantity: 90,   # refills: 3  Last Office Visit : 6/112/2019  Future Office visit:  12/11/2019  90 Tabs, 3 Refills sent to pharmacy for Pt care.     11/29/2019    Carla Law RN  Central Triage Red Flags/Med Refills

## 2019-12-11 ENCOUNTER — OFFICE VISIT (OUTPATIENT)
Dept: FAMILY MEDICINE | Facility: CLINIC | Age: 69
End: 2019-12-11
Payer: COMMERCIAL

## 2019-12-11 VITALS
TEMPERATURE: 97.6 F | SYSTOLIC BLOOD PRESSURE: 121 MMHG | BODY MASS INDEX: 32.03 KG/M2 | RESPIRATION RATE: 18 BRPM | HEIGHT: 64 IN | DIASTOLIC BLOOD PRESSURE: 70 MMHG | HEART RATE: 67 BPM | WEIGHT: 187.6 LBS | OXYGEN SATURATION: 98 %

## 2019-12-11 DIAGNOSIS — M48.062 SPINAL STENOSIS, LUMBAR REGION, WITH NEUROGENIC CLAUDICATION: ICD-10-CM

## 2019-12-11 DIAGNOSIS — C34.91 ADENOCARCINOMA OF LUNG, STAGE 1, RIGHT (H): ICD-10-CM

## 2019-12-11 DIAGNOSIS — B35.1 ONYCHOMYCOSIS: ICD-10-CM

## 2019-12-11 DIAGNOSIS — I10 BENIGN ESSENTIAL HYPERTENSION: ICD-10-CM

## 2019-12-11 DIAGNOSIS — E87.6 HYPOKALEMIA: ICD-10-CM

## 2019-12-11 DIAGNOSIS — R97.20 ELEVATED PROSTATE SPECIFIC ANTIGEN (PSA): ICD-10-CM

## 2019-12-11 DIAGNOSIS — Z23 ENCOUNTER FOR IMMUNIZATION: ICD-10-CM

## 2019-12-11 DIAGNOSIS — Z00.00 ROUTINE HISTORY AND PHYSICAL EXAMINATION OF ADULT: Primary | ICD-10-CM

## 2019-12-11 DIAGNOSIS — E11.9 TYPE 2 DIABETES MELLITUS TREATED WITHOUT INSULIN (H): ICD-10-CM

## 2019-12-11 DIAGNOSIS — H90.6 MIXED CONDUCTIVE AND SENSORINEURAL HEARING LOSS OF BOTH EARS: ICD-10-CM

## 2019-12-11 DIAGNOSIS — Z86.0100 HISTORY OF COLONIC POLYPS: ICD-10-CM

## 2019-12-11 DIAGNOSIS — M54.16 LUMBAR RADICULITIS: ICD-10-CM

## 2019-12-11 DIAGNOSIS — E08.42 DIABETIC POLYNEUROPATHY ASSOCIATED WITH DIABETES MELLITUS DUE TO UNDERLYING CONDITION (H): ICD-10-CM

## 2019-12-11 RX ORDER — POTASSIUM CHLORIDE 1500 MG/1
20 TABLET, EXTENDED RELEASE ORAL DAILY
Qty: 90 TABLET | Refills: 3 | Status: SHIPPED | OUTPATIENT
Start: 2019-12-11 | End: 2020-06-01

## 2019-12-11 RX ORDER — LOSARTAN POTASSIUM 100 MG/1
100 TABLET ORAL EVERY EVENING
Qty: 90 TABLET | Refills: 3 | Status: SHIPPED | OUTPATIENT
Start: 2019-12-11 | End: 2020-06-01

## 2019-12-11 RX ORDER — GABAPENTIN 300 MG/1
CAPSULE ORAL
Qty: 90 CAPSULE | Refills: 3 | Status: SHIPPED | OUTPATIENT
Start: 2019-12-11 | End: 2020-06-01

## 2019-12-11 RX ORDER — CHLORTHALIDONE 25 MG/1
25 TABLET ORAL DAILY
Qty: 90 TABLET | Refills: 3 | Status: SHIPPED | OUTPATIENT
Start: 2019-12-11 | End: 2020-06-01

## 2019-12-11 ASSESSMENT — PAIN SCALES - GENERAL: PAINLEVEL: NO PAIN (0)

## 2019-12-11 ASSESSMENT — MIFFLIN-ST. JEOR: SCORE: 1521.95

## 2019-12-11 NOTE — NURSING NOTE
Chief Complaint   Patient presents with     Wellness Visit       RAIN [place]      1.  Has the patient received the information for the influenza vaccine? YES    2.  Does the patient have any of the following contraindications?     Allergy to eggs? No     Allergy to a component of the influenza vaccine? No     Serious reaction to previous influenza vaccines? No     Paralyzed by Guillain-Ardmore syndrome? No     Currently sick today? No         3.  Verified patient name and  prior to injection. Yes  4.  The patient was instructed to wait 15 minutes before leaving the building in the event of an allergic reaction: YES        Vaccination given by Jhonathan Boswell, RAIN.      Recorded by Jhonathan Boswell CMA      Vishnu Viveros comes into clinic today at the request of Doreen, Ordering Provider for an immunization/s.    I gave the Flu and Shingrix immunization/s while the patient was in clinic. They received an informational sheet and I explained the common side effects of the injection. The patient tolerated the procedure well and had no complications while here in clinic.     This service provided today was under the supervising provider of the day Doreen, who was available if needed.    Jhonathan Boswell CMA, EMT at 5:26 PM on 2019.

## 2019-12-11 NOTE — PROGRESS NOTES
[facility]  Primary Care Center   Murali Logan MD  12/11/2019      Chief Complaint:   Physical, preventive exam  Review chronic conditions   Refill medications       History of Present Illness:   Vishnu Viveros is a 69 year old male with a history of type 2 diabetes mellitus controlled, hyperlipidemia, hypertension, colon polyps, DJD with spinal stenosis back, elevated PSA, and stage 1 right adenocarcinoma of the lung s/p resection with clear margins who presents with his wife for an annual physical, review of chronic conditions and refill medications. He needs several referrals.    Adenocarcinoma Lung: He went to [facility] for a checkup in regards to his lung adenocarcinoma. He was told he had two tiny new 2 mm nodules requiring follow-up in 12 months. See results of chest CT below. He denies coughing or choking with eating. He notes some concern for when he has a cough, describing a vibrating sensation when he coughs. His wife is wondering about two different lung specialists at [facility]. She would like to establish care with one of them as she and Vishnu cannot afford to continue with care at [facility]. He will be due for a check up in 6 months.     Back, spinal stenosis DJD: His wife notes some concern for that way Vishnu walks, describing it as being a hunched over posture. His wife would like him to see a specialist for this.     Diabetes: October 2019 A1c was 6.2%. He is on Metformin 500 mg daily. Atorvastatin 20 mg daily has been effective at controlling his cholesterol. October 2019 cholesterol 125, LDL 58, Triglycerides 91, and HDL 49.     Blood pressure: His blood pressure is well-controlled on Losartan 100 mg daily and Chlorthalidone with potassium supplementation.     Other concerns discussed:  1. Flu shot today   2. Shingrix today - check with insurance and it is covered   3. Medications reviewed - gabapentin once daily at night  4. Colonoscopy due in February 2020  5. Needs hearing exam - troubles  hearing in large crowds   6. Needs follow-up with urology - November 2019 PSA elevated at 5.14  7. He has thickened toenails need podiatry     Review of Systems:   Pertinent items are noted in HPI or as in patient entered ROS below, remainder of complete ROS is negative.     Active Medications:      acetaminophen (TYLENOL) 500 MG tablet, Take 1-2 tablets (500-1,000 mg) by mouth every 6 hours as needed for pain (arthritis), Disp: 120 tablet, Rfl: 1     albuterol (PROAIR HFA/PROVENTIL HFA/VENTOLIN HFA) 108 (90 Base) MCG/ACT inhaler, Inhale 2 puffs into the lungs every 6 hours, Disp: 1 Inhaler, Rfl: 1     Ascorbic Acid (VITAMIN C PO), Take 500 mg by mouth once, Disp: , Rfl:      atorvastatin (LIPITOR) 20 MG tablet, Take 1 tablet (20 mg) by mouth daily, Disp: 90 tablet, Rfl: 3     chlorthalidone (HYGROTON) 25 MG tablet, Take 1 tablet (25 mg) by mouth daily, Disp: 90 tablet, Rfl: 3     Cholecalciferol (VITAMIN D) 1000 UNIT capsule, Take 1 capsule by mouth daily., Disp: , Rfl:      Cyanocobalamin (VITAMIN B12 PO), Take 1 tablet by mouth. Pt states he takes this 2 or 3 times a week.. , Disp: , Rfl:      fluticasone (FLONASE) 50 MCG/ACT nasal spray, 1-2 sprays each nostril once daily, Disp: 16 g, Rfl: 1     gabapentin (NEURONTIN) 300 MG capsule, One capsule at bedtime for one week the increase to twice daily, Disp: 180 capsule, Rfl: 3     loratadine (CLARITIN) 10 MG tablet, Take 1 tablet (10 mg) by mouth daily as needed for allergies, Disp: 90 tablet, Rfl: 3     losartan (COZAAR) 100 MG tablet, Take 1 tablet (100 mg) by mouth daily (Patient taking differently: Take 100 mg by mouth every evening ), Disp: 90 tablet, Rfl: 3     metFORMIN (GLUCOPHAGE-XR) 500 MG 24 hr tablet, Take 1 tablet (500 mg) by mouth daily (with dinner), Disp: 90 tablet, Rfl: 3     Multiple Vitamin (MULTIVITAMINS PO), Take 1 tablet by mouth daily., Disp: , Rfl:      Omega-3 Fatty Acids (FISH OIL PO), Take 1 capsule by mouth as needed, Disp: , Rfl:       potassium chloride ER (K-DUR/KLOR-CON M) 20 MEQ CR tablet, Take 1 tablet (20 mEq) by mouth daily, Disp: 90 tablet, Rfl: 3     sildenafil (REVATIO) 20 MG tablet, Take 1-5 tablets (20 mg) by mouth as needed for sexual activity.  Never use with nitroglycerin, terazosin or doxazosin., Disp: 90 tablet, Rfl: 11     tamsulosin (FLOMAX) 0.4 MG capsule, Take 1 capsule (0.4 mg) by mouth daily, Disp: 90 capsule, Rfl: 3     Allergies:   Seasonal allergies   Nkda [no known drug allergies]      Past Medical History:  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  Chronic low back pain  Type 2 diabetes mellitus   Hyperlipidemia    Hypertension   Obesity   Pulmonary nodule  Erectile dysfunction  Vitamin D deficiency   Prostate nodule  Degenerative disc disease, lumbar  Degenerative joint disease, knee  Hearing loss  Vision impairment  Tear film insufficiency   Recurrent pterygium  Senile nuclear sclerosis  Polyneuropathy  Tubulovillous adenoma of colon   Lumbar radiculopathy   Adenocarcinoma of lung, stage 1, right  Benign essential hypertension      Past Surgical History:  Appendectomy open - as a child  Arthroscopy knee, left  Biopsy prostate transrectal  Excise pterygium, right (x2) - 2/12/15  Inject epidural lumbar/sacral single - 15  Inject paravertebral facet joint lumbar/sacral first, right (x2) - 15  Mediastinoscopy, right - 19  Punctalplasty, bilateral - 18  Repair ectropion bilateral - 18      Family History:   Cerebrovascular disease - brother ( of stroke)  Breast cancer - sister  Kidney disease - mother  Cerebral aneurysm - brother  Hypothyroidism - sister    Father  in [facility] of parkinson's or post surgical eye surgery.       Social History:   The patient is  with two children, a former smoker (quit 1981), and does consume alcohol (3 standard drinks per week).  He works in [employer]. His grandson is still in the ICU at 6 months old.     "  Physical Exam:   /70 (BP Location: Right arm, Patient Position: Chair, Cuff Size: Adult Regular)   Pulse 67   Temp 97.6  F (36.4  C) (Oral)   Resp 18   Ht 1.626 m (5' 4\")   Wt 85.1 kg (187 lb 9.6 oz)   SpO2 98%   BMI 32.20 kg/m     BMI= Body mass index is 32.2 kg/m .    BMI= Body mass index is 32.2 kg/m .    GENERAL APPEARANCE: healthy, alert and no distress  EYES: Eyes grossly normal to inspection, PERRL and conjunctivae and sclerae normal  HENT: ear canals and TM's normal, mouth without ulcers or lesions, oropharynx clear and oral mucous membranes moist  NECK: no adenopathy, no asymmetry, masses, or scars and thyroid normal to palpation  RESP: lungs clear to auscultation - no rales, rhonchi or wheezes  CV: regular rate and rhythm, normal S1 S2, no S3 or S4, no murmur, click or rub, no peripheral edema and peripheral pulses strong  Chest: well healed surgical scars from laparoscopic chest procedure  ABDOMEN: soft, nontender, no hepatosplenomegaly, no masses and bowel sounds normal, well-healed abdominal scar  MS: Kyphosis and antalgic gait with left leg weakness compared to right.  SKIN: no suspicious lesions or rashes, congenital nevi in upper back that has been stable  NEURO: Normal strength and tone, sensory exam grossly normal, mentation intact and speech normal  PSYCH: mentation appears normal and affect normal/bright  Diabetic foot exam: thickened toenails, no ulcerations, normal DP and PT pulses, normal sensory exam, normal monofilament exam    CT Chest without IV Contrast (11/6/2019)  EXAM: CT CHEST WITHOUT IV CONTRAST    No 3D post-processing performed.     COMPARISON: PET/CT 03/09/2019, CT chest with intravenous contrast 01/04/2019    FINDINGS: Post right upper lobectomy and mediastinal lymphadenectomy for  invasive adenocarcinoma. No evidence for local recurrence. Mild postsurgical  scarring/linear atelectasis in the right middle and lower lobes. In the anterior  right lower lobe, the 1 mm " nodule (series 3, image 259) and a more ill-defined 2  mm nodule in the posterior right lower lobe (series 3, image 173) are not  clearly present on the prior exam. New mild patchy ground glass opacities in the  posterior medial right base (series 3, image 282, 311, and 431), may be due to  aspiration given the presence of a small sliding esophageal hiatal hernia.    No lymphadenopathy. Minimal coronary artery atherosclerotic calcification.  Stable small low-density left thyroid lesion. Negative adrenal glands. Stable  exophytic right renal cyst. Similar mild bilateral gynecomastia. Minimal  calcifications about the pancreas again identified, chronic pancreatitis could  have this appearance. Mild degenerative changes of the spine.    Result Impression  1.  No evidence of local malignancy recurrence post right upper lobectomy.  2.  Two new tiny right lower lobe pulmonary nodules measuring up to 2 mm, which  are indeterminate and can be followed on subsequent exams.  3.  New mild patchy right lower lobe groundglass opacities are likely  infectious/inflammatory, aspiration may be considered given the small sliding  esophageal hiatal hernia.   We reviewed recent labs in detail.  Assessment and Plan:  Vishnu Viveros is a 69 year old male with a history of type 2 diabetes mellitus controlled, hyperlipidemia, hypertension, colon polyps, DJD with spinal stenosis back, elevated PSA, and stage 1 right adenocarcinoma of the lung s/p resection with clear margins who presents with his wife for an annual physical, review of chronic conditions and refill medications. He needs several referrals.    Encounter for immunization  - ZOSTER VACCINE RECOMBINANT ADJUVANTED IM NJX  - FLU VACCINE HIGH DOSE PRESERVATIVE FREE, AGE =>65 YR    Benign essential hypertension  Refills provided.   - losartan (COZAAR) 100 MG tablet  Dispense: 90 tablet; Refill: 3  - chlorthalidone (HYGROTON) 25 MG tablet  Dispense: 90 tablet; Refill:  3    Hypokalemia  Refills provided.   - potassium chloride ER (K-DUR/KLOR-CON M) 20 MEQ CR tablet  Dispense: 90 tablet; Refill: 3    Diabetic polyneuropathy associated with diabetes mellitus due to underlying condition, Lumbar radiculitis, Onychomycosis  - gabapentin (NEURONTIN) 300 MG capsule  Dispense: 90 capsule; Refill: 3  - PODIATRY/FOOT & ANKLE SURGERY REFERRAL    Adenocarcinoma of lung, stage 1, right   He would like to transfer his pulmonary care from [place] to [facility] due to his insurance. He will schedule to have an appointment in 6 months. Referral made to Dr. Kelley as per Vishnu' wife.   - PULMONARY MEDICINE REFERRAL    History of colonic polyps  Will be due for colonoscopy in February 2020 (3 year track due to colonic polyps). Orders made to have this done through [facility] as per patient.   - GASTROENTEROLOGY ADULT REF PROCEDURE ONLY Other; [facility] [phone]    Type 2 diabetes mellitus treated without insulin   Diabetes well-controlled with October 2019 A1c of 6.2%. He will continue with Metformin 500 mg daily. Advised limited amounts of sugars, including Scotch. Recheck A1c in 5 months. Due for annual eye exam. Referral provided.   - OPHTHALMOLOGY ADULT REFERRAL    Spinal stenosis, lumbar region, with neurogenic claudication  Vishnu has been walking with a hunched posture due to back pain. Referral made to orthopedics for further evaluation.   - ORTHOPEDICS ADULT REFERRAL    Mixed conductive and sensorineural hearing loss of both ears  He has noticed some difficultly hearing in crowds. Referral to audiology for hearing test.   - AUDIOLOGY ADULT REFERRAL    Elevated prostate specific antigen (PSA)  November 2019 PSA elevated at 5.14. He will follow-up with urology.   - UROLOGY ADULT REFERRAL       Follow-up: Return in about 5 months (around 5/11/2020).      Scribe Disclosure:  Shital CABRREA, am serving as a scribe to document services personally performed by Murali Logan MD at this visit,  based upon the provider's statements to me. All documentation has been reviewed by the aforementioned provider prior to being entered into the official medical record.     Portions of this medical record were completed by a scribe. UPON MY REVIEW AND AUTHENTICATION BY ELECTRONIC SIGNATURE, this confirms (a) I performed the applicable clinical services, and (b) the record is accurate.   Murali Logan MD

## 2019-12-11 NOTE — PATIENT INSTRUCTIONS
Primary Care Center Medication Refill Request Information:  * Please contact your pharmacy regarding ANY request for medication refills.  ** [facility] Prescription Fax = [phone]  * Please allow 3 business days for routine medication refills.  * Please allow 5 business days for controlled substance medication refills.     Primary Care Center Test Result notification information:  *You will be notified with in 7-10 days of your appointment day regarding the results of your test.  If you are on MyChart you will be notified as soon as the provider has reviewed the results and signed off on them.

## 2019-12-12 ENCOUNTER — TELEPHONE (OUTPATIENT)
Dept: GASTROENTEROLOGY | Facility: CLINIC | Age: 69
End: 2019-12-12

## 2019-12-12 NOTE — NURSING NOTE
Vishnu Viveros      1.  Has the patient received the information for the influenza vaccine? YES    2.  Does the patient have any of the following contraindications?     Allergy to eggs? No     Allergy to a component of the influenza vaccine? No     Serious reaction to previous influenza vaccines? No     Paralyzed by Guillain-Vienna syndrome? No     Currently sick today? No         3.  Verified patient name and  prior to injection. Yes  4.  The patient was instructed to wait 15 minutes before leaving the building in the event of an allergic reaction: YES        Vaccination given by Jhonathan Boswell, EMT.      Recorded by Jhonathan Boswell CMA      Vishnu Viveros comes into clinic today at the request of Doreen, Ordering Provider for an immunization/s.    I gave the Flu and Shingrix immunization/s while the patient was in clinic. They received an informational sheet and I explained the common side effects of the injection. The patient tolerated the procedure well and had no complications while here in clinic.     This service provided today was under the supervising provider of the day Doreen, who was available if needed.    Jhonathan Boswell CMA, EMT at 8:09 PM on 2019.    Shingrix Adjuvant Suspension Component: NDC:77168-414-03, LOT/EXP:[id number]/10-

## 2019-12-12 NOTE — NURSING NOTE
Medicare Annual Wellness Visit    This 69 year old year old male presents for an subsequent Medicare Wellness Exam.           Medical Care     Have you been to an ER or a hospital in the last year? Unknown (Patient did not answer)  What other specialists or organizations are involved in your medical care?  Unknown (patient did not answer)  Current providers sharing in care for this patient include:  Patient Care Team       Relationship Specialty Notifications Start End    Murali Logan MD PCP - General Family Practice  4/23/12     Phone: [phone] Fax: [phone]         [address] [facility] [address] [facility] [address]    Rick Whaley MD MD Ophthalmology  6/26/15     Phone: [phone] Fax: [phone]         [address] [facility] [address] [facility] [address]    Murali Logan MD PCP [facility]  11/19/15     Phone: [phone] Fax: [phone]         [address] [facility] [address] [facility] [address]    Murali Logan MD Referring Physician [facility]  11/19/15     Referring to [facility]    Phone: [phone] Fax: [phone]         [address] [facility] [address] [facility] [address]    Derrek Sinclair MD MD Internal Medicine  11/19/15     Phone: [phone] Fax: [phone]         [address] [facility] [address] [facility] [address]    Leandro Montaño MD MD Urology  12/22/16     Phone: [phone] Fax: [phone]         [address] [facility] [address] [facility] [address]    Bran Sibley MD MD Neurology  7/31/17     Phone: [phone] Fax: [phone]         [address] [facility] [address]    Joan Mckeon MD MD Ophthalmology  12/26/17     Phone: [phone] Fax: [phone]         [address] [facility] [address] [facility] [address]    Charles Parr MD MD Ophthalmology  12/26/17     Phone: [phone] Fax: [phone]         [address] [facility] [address]    Levy Blue MD MD Ophthalmology  4/25/19  "    Phone: [phone] Fax: [phone]         [address] [address]    Maya Oliva, RN Registered Nurse   10/29/19                  Social History     Marital Status:  Who lives in your household? Unknown (Patient did not answer)  Does your home have any of the following safety concerns? Loose rugs in the hallway, no grab bars in the bathroom, no handrails on the stairs or have poorly lit areas?  No  Do you feel threatened or controlled by a partner, ex-partner or anyone in your life? Unknown (patient did not answer)  Has anyone hurt you physically, for example by pushing, hitting, slapping or kicking you   or forcing you to have sex? No  Do you need help with the phone, transportation, shopping, preparing meals, housework, laundry, medications or managing money? No   Have you noticed any hearing difficulties? Unknown (Patient did not answer)      Risk Behaviors and Healthy Habits     How many servings of fruits and vegetables do you eat a day? Unknown (Patient did not answer)  How often do you exercise and what do you do? Unknown minutes Unknown (Patient did not answer) a week  Do you frequently ride without a seatbelt? No  Do you use tobacco?  No  Do you use any other drugs? No       Do you use alcohol?No      Sexual Health     Are you sexually active?  Yes    If yes, with men, women, or both? Female  If yes, do you more than one current partner?No  Have you had any sexually transmitted infections? Unknown (Patient did not answer)  Any sexual concerns? Yes Patient stated \"Loss of strong erection\"        FUNCTIONAL ABILITY/SAFETY SCREENING   **RS to complete Fall Risk, PHQ2 in Assessments prior**    Fall Risk Assessment Today:   Fall Risk Screening:  FALL RISK ASSESSMENT 12/11/2019   Fallen 2 or more times in the past year? No   Any fall with injury in the past year? No           PHQ-2 Score:    PHQ-2 Score:     PHQ-2 ( 1999 Pfizer) 12/11/2019 1/3/2019   Q1: Little interest or " pleasure in doing things 0 0   Q2: Feeling down, depressed or hopeless 0 0   PHQ-2 Score 0 0   Q1: Little interest or pleasure in doing things - Not at all   Q2: Feeling down, depressed or hopeless - Not at all   PHQ-2 Score - 0           SCREENING FOR PREVENTION and EARLY DETECTION             **RS to STOP HERE**            Reviewed Immunization Record Today    Most Recent Immunizations   Administered Date(s) Administered     Influenza (H1N1) 02/02/2010     Influenza (High Dose) 3 valent vaccine 10/31/2018     Influenza (IIV3) PF 11/14/2013     Pneumo Conj 13-V (2010&after) 10/30/2014     Pneumococcal 23 valent 11/19/2015     TD (ADULT, 7+) 02/12/2007     TDAP Vaccine (Boostrix) 07/31/2017   Pended Date(s) Pended     Influenza (High Dose) 3 valent vaccine 12/11/2019     Zoster vaccine recombinant adjuvanted (SHINGRIX) 12/11/2019       Reviewed Health Maintenance Completed and Due    Health Maintenance   Topic Date Due     MEDICARE ANNUAL WELLNESS VISIT  11/27/2018     EYE EXAM  02/14/2019     INFLUENZA VACCINE (1) 09/01/2019     ZOSTER IMMUNIZATION (1 of 2) 01/01/2020 (Originally 6/10/1999)     COLONOSCOPY  02/09/2020     URINE DRUG SCREEN  04/25/2020     FALL RISK ASSESSMENT  04/25/2020     A1C  04/26/2020     BMP  10/26/2020     LIPID  10/26/2020     MICROALBUMIN  10/26/2020     ADVANCE CARE PLANNING  11/19/2020     DIABETIC FOOT EXAM  12/11/2020     TSH W/FREE T4 REFLEX  10/26/2021     DTAP/TDAP/TD IMMUNIZATION (3 - Td) 07/31/2027     HEPATITIS C SCREENING  Completed     PHQ-2  Completed     PNEUMOCOCCAL IMMUNIZATION 65+ HIGH/HIGHEST RISK  Completed     AORTIC ANEURYSM SCREENING (SYSTEM ASSIGNED)  Completed     IPV IMMUNIZATION  Aged Out     MENINGITIS IMMUNIZATION  Aged Out       Health Maintenance Due   Topic Date Due     MEDICARE ANNUAL WELLNESS VISIT  11/27/2018     EYE EXAM  02/14/2019     INFLUENZA VACCINE (1) 09/01/2019         CV Risk based on Pooled Cohort Risk:  The ASCVD Risk score (Ishan PIPER Jr., et  al., 2013) failed to calculate for the following reasons:    The valid total cholesterol range is 130 to 320 mg/dL

## 2019-12-13 ENCOUNTER — TELEPHONE (OUTPATIENT)
Dept: UROLOGY | Facility: CLINIC | Age: 69
End: 2019-12-13

## 2019-12-13 NOTE — TELEPHONE ENCOUNTER
[facility] Call Center    Phone Message    May a detailed message be left on voicemail: yes    Reason for Call: Other: per spouse, want sooner appointment than april. please call back to discuss appropriate care plan     Action Taken: Message routed to:  [facility] ([facility] martina uro

## 2019-12-16 NOTE — TELEPHONE ENCOUNTER
----- Message from Carla Lobo CMA sent at 12/16/2019  9:08 AM CST -----  Please call patient and have them make a appointment on 1/27/2020 at 4pm      Thanks      Carla

## 2019-12-16 NOTE — TELEPHONE ENCOUNTER
Left message to call back to schedule appt on 1/27/20 at 4:00pm Katherine Babb December 16, 2019 11:38 AM

## 2019-12-19 DIAGNOSIS — E08.42 DIABETIC POLYNEUROPATHY ASSOCIATED WITH DIABETES MELLITUS DUE TO UNDERLYING CONDITION (H): ICD-10-CM

## 2019-12-19 DIAGNOSIS — M54.16 LUMBAR RADICULITIS: ICD-10-CM

## 2019-12-20 RX ORDER — GABAPENTIN 300 MG/1
CAPSULE ORAL
Qty: 180 CAPSULE | OUTPATIENT
Start: 2019-12-20

## 2019-12-20 NOTE — TELEPHONE ENCOUNTER
GABAPENTIN 300MG CAPSULES    Medication sent and received 12/11/2019      gabapentin (NEURONTIN) 300 MG capsule 90 capsule 3 12/11/2019  No   Sig: One capsule at bedtime   Sent to pharmacy as: gabapentin (NEURONTIN) 300 MG capsule   Class: E-Prescribe   Order: [id number]   E-Prescribing Status: Receipt confirmed by pharmacy (12/11/2019  6:25 PM CST)         Carla Law RN  Central Triage Red Flags/Med Refills

## 2020-01-07 ENCOUNTER — OFFICE VISIT (OUTPATIENT)
Dept: OPHTHALMOLOGY | Facility: CLINIC | Age: 70
End: 2020-01-07
Attending: FAMILY MEDICINE
Payer: COMMERCIAL

## 2020-01-07 DIAGNOSIS — H04.123 BILATERAL DRY EYES: ICD-10-CM

## 2020-01-07 DIAGNOSIS — H02.9 LESION OF LOWER EYELID: ICD-10-CM

## 2020-01-07 DIAGNOSIS — H35.54 PATTERN DYSTROPHY OF MACULA: ICD-10-CM

## 2020-01-07 DIAGNOSIS — E11.9 TYPE 2 DIABETES MELLITUS TREATED WITHOUT INSULIN (H): ICD-10-CM

## 2020-01-07 DIAGNOSIS — H25.813 COMBINED FORM OF AGE-RELATED CATARACT, BOTH EYES: Primary | ICD-10-CM

## 2020-01-07 PROCEDURE — 92015 DETERMINE REFRACTIVE STATE: CPT | Mod: ZF

## 2020-01-07 PROCEDURE — 92134 CPTRZ OPH DX IMG PST SGM RTA: CPT | Mod: ZF | Performed by: OPHTHALMOLOGY

## 2020-01-07 PROCEDURE — G0463 HOSPITAL OUTPT CLINIC VISIT: HCPCS | Mod: ZF

## 2020-01-07 PROCEDURE — 92250 FUNDUS PHOTOGRAPHY W/I&R: CPT | Mod: ZF | Performed by: OPHTHALMOLOGY

## 2020-01-07 ASSESSMENT — REFRACTION_MANIFEST
OS_CYLINDER: +0.25
OS_ADD: +2.50
OD_ADD: +2.50
OS_SPHERE: +1.00
OS_AXIS: 025
OD_CYLINDER: +0.75
OD_AXIS: 110
OD_SPHERE: +1.75

## 2020-01-07 ASSESSMENT — REFRACTION_WEARINGRX
OD_AXIS: 145
OS_SPHERE: +0.75
OD_ADD: +2.00
OS_AXIS: 060
OS_CYLINDER: +0.50
OD_SPHERE: +1.00
OS_ADD: +2.00
SPECS_TYPE: PAL
OD_CYLINDER: +0.50

## 2020-01-07 ASSESSMENT — TONOMETRY
OS_IOP_MMHG: 15
OD_IOP_MMHG: 19
IOP_METHOD: TONOPEN

## 2020-01-07 ASSESSMENT — VISUAL ACUITY
METHOD: SNELLEN - LINEAR
OD_CC+: -2
OD_CC: 20/30
OS_CC+: -3
OS_CC: 20/25
CORRECTION_TYPE: GLASSES

## 2020-01-07 ASSESSMENT — SLIT LAMP EXAM - LIDS
COMMENTS: NORMAL
COMMENTS: NORMAL

## 2020-01-07 ASSESSMENT — CUP TO DISC RATIO
OD_RATIO: 0.4
OS_RATIO: 0.4

## 2020-01-07 ASSESSMENT — CONF VISUAL FIELD
OD_NORMAL: 1
OS_NORMAL: 1

## 2020-01-07 NOTE — NURSING NOTE
"Chief Complaints and History of Present Illnesses   Patient presents with     Cataract Evaluation     Chief Complaint(s) and History of Present Illness(es)     Cataract Evaluation     Laterality: both eyes    Associated symptoms: glare and blurred vision.  Negative for haloes    Activities affected: night driving    Treatments tried: glasses    Pain scale: 0/10              Comments     Referred by Dr. Blue for cataract evaluation - also annual diabetic eye exam  Pt has been noting difficulty with vision at night - has to be very close to the signs to see anything and notes difficulty seeing the lines on the road, also notes glare  Pt states he may not be able to have the surgery until the summer, as he doesn't have enough vacation time at work  Pt denies eye pain but reports occasional sensation of \"something in the eyes\" but this goes away    Pt doesn't check BS daily - uses insulin once daily  Lab Results       Component                Value               Date                       A1C                      6.2                 10/26/2019                 A1C                      6.4                 06/12/2019                 A1C                      6.5                 11/19/2018                 A1C                      6.4                 06/01/2018                 A1C                      6.2                 11/27/2017    Marika Naylor COA 8:33 AM January 7, 2020                 "

## 2020-01-07 NOTE — PROGRESS NOTES
"Chief Complaint(s) and History of Present Illness(es)     Cataract Evaluation     Laterality: both eyes    Associated symptoms: glare and blurred vision.  Negative for haloes    Activities affected: night driving    Treatments tried: glasses    Pain scale: 0/10              Comments     Referred by Dr. Blue for cataract evaluation - also annual diabetic   eye exam  Pt has been noting difficulty with vision at night - has to be very close   to the signs to see anything and notes difficulty seeing the lines on the   road, also notes glare  Pt states he may not be able to have the surgery until the summer, as he   doesn't have enough vacation time at work  Pt denies eye pain but reports occasional sensation of \"something in the   eyes\" but this goes away    Pt doesn't check BS daily - uses insulin once daily  Lab Results       Component                Value               Date                       A1C                      6.2                 10/26/2019                 A1C                      6.4                 06/12/2019                 A1C                      6.5                 11/19/2018                 A1C                      6.4                 06/01/2018                 A1C                      6.2                 11/27/2017    ORVILLE Logan 8:33 AM January 7, 2020             Review of systems for the eyes was negative other than the pertinent positives/negatives listed in the HPI.      Assessment & Plan      Vishnu Viveros is a 69 year old male with the following diagnoses:   1. Combined form of age-related cataract, both eyes    2. Type 2 diabetes mellitus treated without insulin (H)    3. 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 Pattern dystrophy of macula    5. Bilateral dry eyes    6. Lesion of lower eyelid - Right Eye         Noting worsening vision and foreign body sensation in both eyes   Progressing cataract in both eyes likely culprit for vision changes  OCT and FAF today shows " stable changes  RBA to Cataract extraction/IOL discussed.  Limited vision outcome due to macular dystrophy  Wishes to wait until summer to proceed    Start artificial tears twice a day for now      Patient disposition:   Return in about 5 months (around 6/7/2020) for VT only, IOL calcs/tomasz.           Attending Physician Attestation:  Complete documentation of historical and exam elements from today's encounter can be found in the full encounter summary report (not reduplicated in this progress note).  I personally obtained the chief complaint(s) and history of present illness.  I confirmed and edited as necessary the review of systems, past medical/surgical history, family history, social history, and examination findings as documented by others; and I examined the patient myself.  I personally reviewed the relevant tests, images, and reports as documented above.  I formulated and edited as necessary the assessment and plan and discussed the findings and management plan with the patient and family. . - Rick Campbell MD

## 2020-01-13 ENCOUNTER — PRE VISIT (OUTPATIENT)
Dept: UROLOGY | Facility: CLINIC | Age: 70
End: 2020-01-13

## 2020-01-15 ENCOUNTER — DOCUMENTATION ONLY (OUTPATIENT)
Dept: CARE COORDINATION | Facility: CLINIC | Age: 70
End: 2020-01-15

## 2020-01-23 DIAGNOSIS — N40.0 BENIGN NON-NODULAR PROSTATIC HYPERPLASIA: ICD-10-CM

## 2020-01-27 ENCOUNTER — OFFICE VISIT (OUTPATIENT)
Dept: UROLOGY | Facility: CLINIC | Age: 70
End: 2020-01-27
Payer: COMMERCIAL

## 2020-01-27 VITALS — HEART RATE: 63 BPM | DIASTOLIC BLOOD PRESSURE: 81 MMHG | SYSTOLIC BLOOD PRESSURE: 145 MMHG

## 2020-01-27 DIAGNOSIS — R97.20 ELEVATED PROSTATE SPECIFIC ANTIGEN (PSA): ICD-10-CM

## 2020-01-27 DIAGNOSIS — N40.0 BENIGN NON-NODULAR PROSTATIC HYPERPLASIA: Primary | ICD-10-CM

## 2020-01-27 PROBLEM — E66.9 OBESITY WITH BODY MASS INDEX 30 OR GREATER: Status: ACTIVE | Noted: 2019-04-05

## 2020-01-27 PROBLEM — R91.1 SOLITARY PULMONARY NODULE: Status: ACTIVE | Noted: 2019-03-27

## 2020-01-27 RX ORDER — TAMSULOSIN HYDROCHLORIDE 0.4 MG/1
0.4 CAPSULE ORAL DAILY
Qty: 90 CAPSULE | Refills: 3 | Status: SHIPPED | OUTPATIENT
Start: 2020-01-27 | End: 2021-06-07

## 2020-01-27 ASSESSMENT — PAIN SCALES - GENERAL: PAINLEVEL: NO PAIN (0)

## 2020-01-27 NOTE — NURSING NOTE
"Chief Complaint   Patient presents with     Follow Up       Blood pressure (!) 145/81, pulse 63. There is no height or weight on file to calculate BMI.    Patient Active Problem List   Diagnosis     Erectile dysfunction     Seasonal allergies     Vitamin D deficiency     Pulmonary nodule     Prostate nodule     Chronic low back pain     Degenerative disc disease, lumbar     Back pain with radiation     DJD (degenerative joint disease) of knee     HL (hearing loss)     Vision impairment     Tear film insufficiency     Recurrent pterygium     Senile nuclear sclerosis     Type 2 diabetes mellitus treated without insulin (H)     Diabetic polyneuropathy associated with diabetes mellitus due to underlying condition (H)     Tubulovillous adenoma of colon     Lumbar radiculopathy     Adenocarcinoma of lung, stage 1, right (H)     Benign essential hypertension     History of colonic polyps     Spinal stenosis, lumbar region, with neurogenic claudication     Elevated prostate specific antigen (PSA)     Obesity with body mass index 30 or greater     Solitary pulmonary nodule       Allergies   Allergen Reactions     Pollen Extract      Other reaction(s): Headache  Sinus congestion, sinus \"drainage\", sneezing     Seasonal Allergies      Sinus congestion, sinus \"drainage\", sneezing     Nkda [No Known Drug Allergies]        Current Outpatient Medications   Medication Sig Dispense Refill     acetaminophen (TYLENOL) 500 MG tablet Take 1-2 tablets (500-1,000 mg) by mouth every 6 hours as needed for pain (arthritis) 120 tablet 1     Ascorbic Acid (VITAMIN C PO) Take 500 mg by mouth once       atorvastatin (LIPITOR) 20 MG tablet Take 1 tablet (20 mg) by mouth daily 90 tablet 3     chlorthalidone (HYGROTON) 25 MG tablet Take 1 tablet (25 mg) by mouth daily 90 tablet 3     Cholecalciferol (VITAMIN D) 1000 UNIT capsule Take 1 capsule by mouth daily.       Cyanocobalamin (VITAMIN B12 PO) Take 1 tablet by mouth. Pt states he takes this 2 or " 3 times a week..        fluticasone (FLONASE) 50 MCG/ACT nasal spray 1-2 sprays each nostril once daily 16 g 1     gabapentin (NEURONTIN) 300 MG capsule One capsule at bedtime 90 capsule 3     loratadine (CLARITIN) 10 MG tablet Take 1 tablet (10 mg) by mouth daily as needed for allergies 90 tablet 3     losartan (COZAAR) 100 MG tablet Take 1 tablet (100 mg) by mouth every evening 90 tablet 3     metFORMIN (GLUCOPHAGE-XR) 500 MG 24 hr tablet Take 1 tablet (500 mg) by mouth daily (with dinner) 90 tablet 3     Multiple Vitamin (MULTIVITAMINS PO) Take 1 tablet by mouth daily.       Omega-3 Fatty Acids (FISH OIL PO) Take 1 capsule by mouth as needed       order for DME Back brace 1 Device 0     potassium chloride ER (K-DUR/KLOR-CON M) 20 MEQ CR tablet Take 1 tablet (20 mEq) by mouth daily 90 tablet 3     sildenafil (REVATIO) 20 MG tablet Take 1-5 tablets (20 mg) by mouth as needed for sexual activity.  Never use with nitroglycerin, terazosin or doxazosin. 90 tablet 11     tamsulosin (FLOMAX) 0.4 MG capsule Take 1 capsule (0.4 mg) by mouth daily 90 capsule 3     albuterol (PROAIR HFA/PROVENTIL HFA/VENTOLIN HFA) 108 (90 Base) MCG/ACT inhaler Inhale 2 puffs into the lungs every 6 hours 1 Inhaler 1       Social History     Tobacco Use     Smoking status: Former Smoker     Years: 5.00     Types: Cigarettes     Last attempt to quit: 1981     Years since quittin.2     Smokeless tobacco: Never Used   Substance Use Topics     Alcohol use: Yes     Alcohol/week: 2.5 standard drinks     Types: 3 Standard drinks or equivalent per week     Frequency: 4 or more times a week     Drinks per session: 1 or 2     Binge frequency: Never     Drug use: No       RAIN Dee  2020  5:09 PM

## 2020-01-27 NOTE — PROGRESS NOTES
Urology Clinic Note    Date: 01/27/2020  Time: 1730  Name: Vishnu Viveros  MRN: [id number]    Reason for Visit: Follow up PSA, medication refill    HPI/Subjective:  Vishnu Odom is a 68 yo male that presents to clinic for follow up on an elevated PSA test. 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 His urologic history includes benign prostate biopsy in 2010 done secondary to a palpated prostate nodule. He has had a fluctuating PSA over the last few years leading to an MRI in 03/27/2017 (PIRADS 2 - Low probability) after a PSA of 6.91. His PSA subsequently trended down into the 4's before creeping back above 5 leading to his presentation today.    He has had no trouble with urination. He feels that he can empty his bladder completely, has no hesitancy, is up at most once time per night, no blood in his urine, no burning with urination.     Mr. Viveros has had difficulty maintaining erections for the last few years. He has tried sildenafil in the past but found that it gave him headaches and stopped taking it. He has not tried other treatments for erectile dysfunction.     Objective:  BP (!) 145/81 (BP Location: Right arm, Patient Position: Sitting, Cuff Size: Adult Regular)   Pulse 63     GEN: Awake and alert, in no acute distress, sitting comfortably in a chair  PULM: Breathing easily on room air  CV: extremities warm and well profuse  GI: Abdomen obese, non-tender  Digital prostate exam: Mildly diffuse firmness with no discrete nodules on the prostate     Labs:  PSA  11/25/2019 5.14  06/12/2019 4.63  11/19/2018 4.67  0731/2017 4.10  11/09/2016 6.91  03/08/2016 4.31  11/19/2015 4.18  11/14/2011 3.24  01/24/2011 3.31  11/06/2009 2.63  11/17/2008 2.84  02/28/2006 3.08    Imaging:  PET CT 03/09/2019- Done as part of lung adenocarcinoma work up   IMPRESSION:   1. Hypermetabolic right upper lobe lung nodule with  spiculated  margins, highly suspicious for malignancy. No evidence of hilar,  mediastinal or systemic metastasis.  2. Prostatomegaly and associated bladder wall thickening.  3. Suspected mild narrowing in the origin of the celiac trunk.  4. Lumbosacral transitional vertebra with sacralized L5. Severe spinal  canal stenosis at L1-2 and L3-4.  5. Bilateral degenerative arthritis of the glenohumeral joints.  6. See dedicated neuroradiology report for the results of the high  resolution PET CT of the neck.     MRI Prostate 2017  IMPRESSION:   1. Based on the most suspicious abnormality, this exam is  characterized as PIRADS 2 - Low probability.  Clinically significant  cancer is unlikely to be present.   2. No evidence of extraprostatic malignancy. No suspicious adenopathy  or evidence of pelvic metastases.      Assessment and plan:   Vishnu Viveros is a 70 yo male with a rising PSA level. Given that his PSA level has been rising gradually it is reasonable to continue servalence with a PSA recheck in 6 months. He was counseled on continued serveillance for prostate cancer and the use of PSA as a marker. His tamulosin refills  at the pharmacy last week so this was refilled for him as he feels it is helpful. Different treatment options for erectile dysfunction were discussed including a vacuum tube, sildenafil, and injections. Mr. Viveros wanted time to consider the different ED treatment options.     # Tamulosin refilled   # PSA re-check in 6 months  # Provided education on ED treatment options     Medical student attestation   IVipul am acting as scribe for Dr. Leandro Montaño MD.    Attending addendum: I have seen and examined the patient and agree with the note above.  The medical student served as scribe for my history, physical exam and medical decision making.  Over half of today's 15-minute visit was spent counseling the patient regarding his PSA.  In addition: will recheck  PSA in 6 months.

## 2020-01-27 NOTE — PATIENT INSTRUCTIONS
Please schedule a phone appointment with Dr. Montaño in 6 months with your PSA drawn prior to the appointment.      Urology Clinic Televisit Information for Patients    Thank you for choosing [facility] for your health care needs. Below is some information for patients who are interested in having their follow-up visit with a physician by telephone. In some cases, a telephone visit can be an effective and convenient way to manage your follow-up care. Choosing a telephone visit rather than a face to face visit for your follow-up care is a decision that you and your physician can make together to ensure it meets all of your needs.  A face to face visit is always an available option, if you choose to do so.     We want to make sure you have all of the information you need about the telephone visit option and answer all of your questions before you decide to schedule a telephone follow-up visit. If you have any questions, you may talk to a staff member or our financial counselor at [phone].      1.  General overview  Our clinic sees patients for a variety of conditions and concerns. A face to face visit with your doctor is required for any new concerns or for your initial visit. If you and your doctor decide that a follow up visit by telephone is appropriate, you may decide to opt for a telephone visit.     2.  Billing and insurance coverage  There is a charge for telephone visits, similar to the charge for an in-person visit. Your bill is based on the amount of time you and your physician are on the phone. We will bill each visit to your insurance company (just like your other medical visits), and you will be responsible for any costs not paid by your insurance company. The charge may be denied by your insurance company (Medicare, Medicaid, and BCBS will not cover), in which case you will be responsible for the entire amount billed. The decision to cover the cost is determined by your insurance company. If  you want to know what your insurance company will cover, we encourage you to contact them to determine your coverage. The codes below are the codes we use when billing for telephone visits and the associated charges. This may help you work with your insurance company to determine your benefits.     Billing CPT codes for Telephone visits    74407 ($30), 30523 ($35), 86589 ($40)         It was a pleasure meeting with you today.  Thank you for allowing me and my team the privilege of caring for you today.  YOU are the reason we are here, and I truly hope we provided you with the excellent service you deserve.  Please let us know if there is anything else we can do for you so that we can be sure you are leaving completely satisfied with your care experience.

## 2020-01-27 NOTE — LETTER
1/27/2020       RE: Vishnu Viveros  [address]  [place] [address]     Dear Colleague,    Thank you for referring your patient, Vishnu Viveros, to the [facility] UROLOGY AND INST FOR PROSTATE AND UROLOGIC CANCERS at [facility]. Please see a copy of my visit note below.    Urology Clinic Note    Date: 01/27/2020  Time: 1730  Name: Vishnu Viveros  MRN: [id number]    Reason for Visit: Follow up PSA, medication refill    HPI/Subjective:  Vishnu Odom is a 68 yo male that presents to clinic for follow up on an elevated PSA test. 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 His urologic history includes benign prostate biopsy in 2010 done secondary to a palpated prostate nodule. He has had a fluctuating PSA over the last few years leading to an MRI in 03/27/2017 (PIRADS 2 - Low probability) after a PSA of 6.91. His PSA subsequently trended down into the 4's before creeping back above 5 leading to his presentation today.    He has had no trouble with urination. He feels that he can empty his bladder completely, has no hesitancy, is up at most once time per night, no blood in his urine, no burning with urination.     Mr. Viveros has had difficulty maintaining erections for the last few years. He has tried sildenafil in the past but found that it gave him headaches and stopped taking it. He has not tried other treatments for erectile dysfunction.     Objective:  BP (!) 145/81 (BP Location: Right arm, Patient Position: Sitting, Cuff Size: Adult Regular)   Pulse 63     GEN: Awake and alert, in no acute distress, sitting comfortably in a chair  PULM: Breathing easily on room air  CV: extremities warm and well profuse  GI: Abdomen obese, non-tender  Digital prostate exam: Mildly diffuse firmness with no discrete nodules on the prostate      Labs:  PSA  2019 5.14  2019 4.63  2018 4.67   4.10  2016 6.91  2016 4.31  2015 4.18  2011 3.24  2011 3.31  2009 2.63  2008 2.84  2006 3.08    Imaging:  PET CT 2019- Done as part of lung adenocarcinoma work up   IMPRESSION:   1. Hypermetabolic right upper lobe lung nodule with spiculated  margins, highly suspicious for malignancy. No evidence of hilar,  mediastinal or systemic metastasis.  2. Prostatomegaly and associated bladder wall thickening.  3. Suspected mild narrowing in the origin of the celiac trunk.  4. Lumbosacral transitional vertebra with sacralized L5. Severe spinal  canal stenosis at L1-2 and L3-4.  5. Bilateral degenerative arthritis of the glenohumeral joints.  6. See dedicated neuroradiology report for the results of the high  resolution PET CT of the neck.     MRI Prostate 2017  IMPRESSION:   1. Based on the most suspicious abnormality, this exam is  characterized as PIRADS 2 - Low probability.  Clinically significant  cancer is unlikely to be present.   2. No evidence of extraprostatic malignancy. No suspicious adenopathy  or evidence of pelvic metastases.      Assessment and plan:   Vishnu Viveros is a 70 yo male with a rising PSA level. Given that his PSA level has been rising gradually it is reasonable to continue servalence with a PSA recheck in 6 months. He was counseled on continued serveillance for prostate cancer and the use of PSA as a marker. His tamulosin refills  at the pharmacy last week so this was refilled for him as he feels it is helpful. Different treatment options for erectile dysfunction were discussed including a vacuum tube, sildenafil, and injections. Mr. Viveros wanted time to consider the different ED treatment options.     # Tamulosin refilled   # PSA re-check in 6 months  # Provided education on ED treatment options     Medical student attestation   Vipul CABRERA  am acting as scribe for Dr. Leandro Montaño MD.    Attending addendum: I have seen and examined the patient and agree with the note above.  The medical student served as scribe for my history, physical exam and medical decision making.  Over half of today's 15-minute visit was spent counseling the patient regarding his PSA.  In addition: will recheck PSA in 6 months.           Again, thank you for allowing me to participate in the care of your patient.      Sincerely,    Leandro Montaño MD

## 2020-01-30 ENCOUNTER — OFFICE VISIT (OUTPATIENT)
Dept: AUDIOLOGY | Facility: CLINIC | Age: 70
End: 2020-01-30
Attending: FAMILY MEDICINE
Payer: COMMERCIAL

## 2020-01-30 DIAGNOSIS — H90.71 MIXED HEARING LOSS OF RIGHT EAR: Primary | ICD-10-CM

## 2020-01-30 DIAGNOSIS — H90.5 SENSORINEURAL HEARING LOSS OF LEFT EAR: ICD-10-CM

## 2020-01-30 DIAGNOSIS — H90.6 MIXED CONDUCTIVE AND SENSORINEURAL HEARING LOSS OF BOTH EARS: ICD-10-CM

## 2020-01-30 RX ORDER — TAMSULOSIN HYDROCHLORIDE 0.4 MG/1
CAPSULE ORAL
Qty: 90 CAPSULE | Refills: 3 | OUTPATIENT
Start: 2020-01-30

## 2020-01-30 NOTE — PROGRESS NOTES
AUDIOLOGY REPORT    SUBJECTIVE: Vishnu Viveros is a 69 year old male who was seen in the Audiology Clinic at [facility] for audiologic evaluation, referred by Murali Logan M.D. The patient reports bilateral hearing loss with the right ear being worse. He notes right ear surgery several years ago but does not remember what type. He reports dizziness/imbalance, which worsens with sneezing and blowing his nose. He denies bilateral pain, bilateral drainage, bilateral tinnitus, or other otologic symptoms. The patient notes difficulty with communication in a variety of listening situations.     OBJECTIVE:  Abuse Screening:  Do you feel unsafe at home or work/school? No  Do you feel threatened by someone? No  Does anyone try to keep you from having contact with others, or doing things outside of your home? No  Physical signs of abuse present? No     Fall Risk Screening:  Have you fallen two or more times in the past year? No  Have you fallen and had an injury in the past year? No    Otoscopic exam indicated ears are clear of cerumen bilaterally     Pure Tone Thresholds assessed using conventional audiometry with good reliability from 250-8000 Hz bilaterally using insert earphones and circumaural headphones     RIGHT:  Normal hearing sloping to severe mixed hearing loss    LEFT:    Normal hearing sloping to moderately severe sensorineural hearing loss    Tympanogram:    RIGHT: Did not test, as unsure of surgical history    LEFT:   Normal eardrum mobility    Reflexes (reported by stimulus ear):    RIGHT: Did not test ipsilateral, as unsure of surgical history    RIGHT: Contralateral is present at normal levels    LEFT:   Ipsilateral is present at normal levels    LEFT:   Did not test contralateral, as unsure of surgical history    Speech Reception Threshold:    RIGHT: 25 dB HL    LEFT:   15 dB HL    Word Recognition Score:     RIGHT: 96% at 70 dB HL using NU-6 recorded  word list    LEFT:   88% at 60 dB HL using NU-6 recorded word list    ASSESSMENT: 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 Today s results were discussed with the patient in detail.     PLAN: It is recommended that the patient follow-up with ENT regarding the right mixed hearing loss and dizziness/imbalance. He may consider a trial of hearing aids pending medical clearance. Please call this clinic with questions regarding these results or recommendations.      Mony Estrada, [facility]  Licensed Audiologist  [place] #[id number]      CC: Murali Logan M.D.

## 2020-01-30 NOTE — TELEPHONE ENCOUNTER
FUTURE VISIT INFORMATION      FUTURE VISIT INFORMATION:    Date: 2/14/2020    Time: 8:30AM    [place] [facility] [place]  REFERRAL INFORMATION:    Referring provider:  Selina Louie    Referring providers clinic:  [facility]    Reason for visit/diagnosis  hearing loss and dizzy    RECORDS REQUESTED FROM:       Clinic name Comments Records Status Imaging Status   [facility]  1/30/2020 Audiogram with Selina Griffin    [facility] 12/11/19 notes with Dr Doreen Griffin

## 2020-01-31 ENCOUNTER — TELEPHONE (OUTPATIENT)
Dept: OTOLARYNGOLOGY | Facility: CLINIC | Age: 70
End: 2020-01-31

## 2020-02-06 ENCOUNTER — TELEPHONE (OUTPATIENT)
Dept: OTOLARYNGOLOGY | Facility: CLINIC | Age: 70
End: 2020-02-06

## 2020-02-06 DIAGNOSIS — R42 DIZZINESS: Primary | ICD-10-CM

## 2020-02-06 NOTE — TELEPHONE ENCOUNTER
1. Have you noticed any changes in hearing? Yes  2. Do you have ringing, buzzing, or other sounds in your ears or head, this is also referred to as Tinnitus? Yes  3. When and where was your last hearing test? [facility] 1/30/2020  4. Do you feel lightheaded or foggy? No  5. Do you have a spinning sensation? No  6. Is there any specific position that can bring on dizziness? When he sneezes/blow nose sometimes  7. Does looking up cause dizziness? No  8. Does getting in and our of bed cause dizziness? No  9. Does turning over in bed increase or cause dizziness? No  10. Does bending over cause dizziness? No  11. Is there anything that you can do to prevent the dizziness? Run its course  12. Has the dizziness gotten better with time? Yes  13. Have you seen Physical Therapy for dizziness? (Please indicate clinic and as much of the location as possible): No  14. Are you being referred to a specific physician? No  15. Have you been evaluated/treated for your dizziness at any other location?  (If yes,obtian as much clinic/provider/locaiton as possible) No. (If yes answer the following questions:)   Have you seen any ENT, Neurology, or other providers for these symptoms?             No   Have you had any balance or Audiology testing? No Have you had an MRI or CT scan of your head or neck? No    Would you like to receive your Release of Information by mail or e-mail?  mail

## 2020-02-06 NOTE — TELEPHONE ENCOUNTER
"Patient is referred for: Dizziness/imbalance    Most recent Imaging: MRA Head and Neck 9/8/2010 were unremarkable.    CT neck soft tissue from?3/9/2019 revealed \"Asymmetric hypermetabolic activity and corresponding subcentimeter nodular density along the mucosal surface of the right lateral oropharyngeal wall. Recommend consultation to ENT for direct visualization. No cervical lymphadenopathy. Please refer to the whole body PET CT performed as a separate report, for the findings of the remainder of the body. [Consider Follow Up: Right hypermetabolic/asymmetric lateral oropharyngeal wall. Recommend  consultation to ENT for direct visualization. ]\"    Most recent Audiogram: 1/30/2020 revealed normal hearing sloping to severe mixed hearing loss in the right ear and normal hearing sloping to moderately severe sensorineural hearing loss in the left. Patient reported hx of an unspecified ear procedure/surgery in the right ear (cannot find any records mentioning this). Patient reported dizziness worsening with sneezing and blowing nose.    Other providers seen: ENT in March 27, 2019 for benign asymmetric lymphatic tissue in tonsillar fossa. Oncology at [facility] for pulmonary nodule (stage 1). Ophthalmology-surgeries include removal of pterygium on right eye on 12/19/2018 and bilateral lower lid ectropion repair also on 12/19/2018.     Additional medical history: Per 8/19/2010 primary care doctor's note: Patient fell June 2010 down five stairs hitting his head on a concrete floor which resulted in LOC. Dizziness symptoms and headache onset with this event, notably upon rolling over in bed. Patient underwent repositioning maneuvers with benefit.     I will request orders for videonystagmography (VNG) with Fistula, rotary chair testing and VEMPs prior to their appt with Dr. Rick Nissen on 4/7/2020. *may be subject to change pending physician review.    Justen Freeman. [facility]  Clinical Audiologist   [place] #[id number]        "

## 2020-02-06 NOTE — TELEPHONE ENCOUNTER
[facility] Call Center    Phone Message    May a detailed message be left on voicemail: yes     Reason for Call: Other: Patient is calling in returning Main's call please follow up with the patient on the cell phone. Thank you.     Action Taken: Message routed to:  [facility] ent    Travel Screening: Not Applicable

## 2020-02-07 NOTE — TELEPHONE ENCOUNTER
FUTURE VISIT INFORMATION        FUTURE VISIT INFORMATION:    Date: 4/7/2020    Time: 9AM    Location: [facility] [place]  REFERRAL INFORMATION:    Referring provider:  Selina Louie    Referring providers clinic:  [facility]    Reason for visit/diagnosis  hearing loss and dizzy     RECORDS REQUESTED FROM:         Clinic name Comments Records Status Imaging Status   [facility]  1/30/2020 Audiogram with Selina Griffin     [facility] 12/11/19 notes with Dr Doreen Griffin

## 2020-02-12 ENCOUNTER — TELEPHONE (OUTPATIENT)
Dept: AUDIOLOGY | Facility: CLINIC | Age: 70
End: 2020-02-12

## 2020-02-14 ENCOUNTER — PRE VISIT (OUTPATIENT)
Dept: OTOLARYNGOLOGY | Facility: CLINIC | Age: 70
End: 2020-02-14

## 2020-02-25 ENCOUNTER — OFFICE VISIT (OUTPATIENT)
Dept: ORTHOPEDICS | Facility: CLINIC | Age: 70
End: 2020-02-25
Attending: FAMILY MEDICINE
Payer: COMMERCIAL

## 2020-02-25 DIAGNOSIS — E11.49 TYPE II OR UNSPECIFIED TYPE DIABETES MELLITUS WITH NEUROLOGICAL MANIFESTATIONS, NOT STATED AS UNCONTROLLED(250.60) (H): ICD-10-CM

## 2020-02-25 DIAGNOSIS — B35.1 ONYCHOMYCOSIS: ICD-10-CM

## 2020-02-25 DIAGNOSIS — E11.9 TYPE 2 DIABETES MELLITUS TREATED WITHOUT INSULIN (H): Primary | ICD-10-CM

## 2020-02-25 DIAGNOSIS — E08.42 DIABETIC POLYNEUROPATHY ASSOCIATED WITH DIABETES MELLITUS DUE TO UNDERLYING CONDITION (H): ICD-10-CM

## 2020-02-25 ASSESSMENT — ENCOUNTER SYMPTOMS
ARTHRALGIAS: 1
STIFFNESS: 0
JOINT SWELLING: 0
MUSCLE WEAKNESS: 1
MUSCLE CRAMPS: 0

## 2020-02-25 NOTE — LETTER
RE: Vishnu Viveros  [address]  [place] [address]     Dear Colleague,    Thank you for referring your patient, Vishnu Viveros, to the [facility] at [facility]. Please see a copy of my visit note below.    Date of Service: 2/25/2020    Chief Complaint:   Chief Complaint   Patient presents with     Consult     Onchomysosis. Patient stated that he is borderline diabetic.         HPI: Vishun is a 69 year old male who presents today for a diabetic foot exam and management. He is with his wife today. He relates that his two big toenails are thickened. He does not have pain in the toes, however he does have radiculopathy in both feet. He relates that he would like to know treatment options for the mycosis.       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 eyelid, unspecified laterality      History of tobacco use      HLD (hyperlipidemia)      HTN (hypertension)      Obesity (BMI 30.0-34.9)      Pulmonary nodule        PSxH:   Past Surgical History:   Procedure Laterality Date     APPENDECTOMY OPEN CHILD       ARTHROSCOPY KNEE Left      BIOPSY PROSTATE TRANSRECTAL       EXCISE PTERYGIUM Right 1989     EXCISE PTERYGIUM Right 2/12/2015     INJECT EPIDURAL LUMBAR / SACRAL SINGLE N/A 7/21/2016    Procedure: INJECT EPIDURAL LUMBAR / SACRAL SINGLE;  Surgeon: Judah Henriquez MD;  [facility]     INJECT PARAVERTEBRAL FACET JOINT LUMBAR / SACRAL FIRST Right 8/18/2016    Procedure: INJECT PARAVERTEBRAL FACET JOINT LUMBAR / SACRAL FIRST;  Surgeon: Judah Henriquez MD;  [facility]     INJECT PARAVERTEBRAL FACET JOINT LUMBAR / SACRAL FIRST Right 9/8/2016    Procedure: INJECT PARAVERTEBRAL FACET JOINT LUMBAR / SACRAL FIRST;  Surgeon: Judah Henriquez MD;  [facility]     MEDIASTINOSCOPY Right 04/08/2019     [place]   Dr Hemanth Covington 19 bronchoscopy, cervical mediastinoscopy for staging and minimally invasive right thoracoscopic wedge resection 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     PUNCTALPLASTY Bilateral 2018    Procedure: Bilateral Punctoplasty with Mini Monoka Stent;  Surgeon: Levy Bule MD;  [facility]     REPAIR ECTROPION BILATERAL Bilateral 2018    Procedure: Bilateral Lower Lid Ectropion Repair with Right Lower Lid Lesion Excision;  Surgeon: Levy Blue MD;  [facility]       Allergies: Pollen extract; Seasonal allergies; and Nkda [no known drug allergies]    SH:   Social History     Socioeconomic History     Marital status:      Spouse name: Not on file     Number of children: Not on file     Years of education: Not on file     Highest education level: Not on file   Occupational History     Not on file   Social Needs     Financial resource strain: Not on file     Food insecurity:     Worry: Not on file     Inability: Not on file     Transportation needs:     Medical: Not on file     Non-medical: Not on file   Tobacco Use     Smoking status: Former Smoker     Years: 5.00     Types: Cigarettes     Last attempt to quit: 1981     Years since quittin.3     Smokeless tobacco: Never Used   Substance and Sexual Activity     Alcohol use: Yes     Alcohol/week: 2.5 standard drinks     Types: 3 Standard drinks or equivalent per week     Frequency: 4 or more times a week     Drinks per session: 1 or 2     Binge frequency: Never     Drug use: No     Sexual activity: Not on file   Lifestyle     Physical activity:     Days per week: Not on file     Minutes per session: Not on file     Stress: Not on file   Relationships     Social connections:     Talks on phone: Not on file     Gets together: Not on file     Attends [employer]ish service: Not on file     Active member of club or  organization: Not on file     Attends meetings of clubs or organizations: Not on file     Relationship status: Not on file     Intimate partner violence:     Fear of current or ex partner: Not on file     Emotionally abused: Not on file     Physically abused: Not on file     Forced sexual activity: Not on file   Other Topics Concern     Not on file   Social History Narrative     works as [employer].        His immediate family; his wife has MS and is an RN at [employer], his two children are in good health.           FH:   Family History   Problem Relation Age of Onset     Cerebrovascular Disease Brother          stroke      Breast Cancer Sister      Kidney Disease Mother         Kidney stones     Other - See Comments Father          in carmita parkinsons or post surgical eye surgery     Cerebral aneurysm Brother      Hypothyroidism Sister      Glaucoma No family hx of      Macular Degeneration No family hx of        Objective:    Hemoglobin A1C   Date Value Ref Range Status   10/26/2019 6.2 (H) 0 - 5.6 % Final     Comment:     Normal <5.7% Prediabetes 5.7-6.4%  Diabetes 6.5% or higher - adopted from ADA   consensus guidelines.     2019 6.4 (H) 0 - 5.6 % Final     Comment:     Normal <5.7% Prediabetes 5.7-6.4%  Diabetes 6.5% or higher - adopted from ADA   consensus guidelines.     2018 6.5 (H) 0 - 5.6 % Final     Comment:     Normal <5.7% Prediabetes 5.7-6.4%  Diabetes 6.5% or higher - adopted from ADA   consensus guidelines.         PT and DP pulses are 1/4 bilaterally. CRT is 1 second. Diminished pedal hair.   Gross sensation is intact bilaterally. Protective sensation is slightly diminished as demonstrated with a 5.07G monofilament.   Equinus is noted bilaterally. No pain with active or passive ROM of the ankle, MTJ, 1st ray, or halluces bilaterally,.   Nails of the BL halluces are thickened, brittle, discolored, with subungual debris bilaterally. No open lesions are  noted.     Assessment: Vishnu is a 69 type 2 diabetic with peripheral neuropathy.  Onychomycosis.  Radiculopathy.     Plan:  - Pt seen and evaluated.  - Discussed treatment options with him. He would like to forego PO Lamisil and just have the nails trimmed.   - Nails debrided x 2.  - See again in 4 months.     Again, thank you for allowing me to participate in the care of your patient.      Sincerely,    Shaan Baltazar DPM

## 2020-02-25 NOTE — PROGRESS NOTES
Date of Service: 2/25/2020    Chief Complaint:   Chief Complaint   Patient presents with     Consult     Onchomysosis. Patient stated that he is borderline diabetic.         HPI: Vishnu is a 69 year old male who presents today for a diabetic foot exam and management. He is with his wife today. He relates that his two big toenails are thickened. He does not have pain in the toes, however he does have radiculopathy in both feet. He relates that he would like to know treatment options for the mycosis.     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 eyelid, unspecified laterality      History of tobacco use      HLD (hyperlipidemia)      HTN (hypertension)      Obesity (BMI 30.0-34.9)      Pulmonary nodule        PSxH:   Past Surgical History:   Procedure Laterality Date     APPENDECTOMY OPEN CHILD       ARTHROSCOPY KNEE Left      BIOPSY PROSTATE TRANSRECTAL       EXCISE PTERYGIUM Right 1989     EXCISE PTERYGIUM Right 2/12/2015     INJECT EPIDURAL LUMBAR / SACRAL SINGLE N/A 7/21/2016    Procedure: INJECT EPIDURAL LUMBAR / SACRAL SINGLE;  Surgeon: Judah Henriquez MD;  [facility]     INJECT PARAVERTEBRAL FACET JOINT LUMBAR / SACRAL FIRST Right 8/18/2016    Procedure: INJECT PARAVERTEBRAL FACET JOINT LUMBAR / SACRAL FIRST;  Surgeon: Judah Henriquez MD;  [facility]     INJECT  PARAVERTEBRAL FACET JOINT LUMBAR / SACRAL FIRST Right 2016    Procedure: INJECT PARAVERTEBRAL FACET JOINT LUMBAR / SACRAL FIRST;  Surgeon: Judah Henriquez MD;  [facility]     MEDIASTINOSCOPY Right 2019     [place]  Dr Hemanth Covington 19 bronchoscopy, cervical mediastinoscopy for staging and minimally invasive right thoracoscopic wedge resection 1.8 cm invasive adenocarcinoma [id number] stage 1A2 adenocarcinoma completely resected via minimally invasive right upper lobectomy.     PUNCTALPLASTY Bilateral 2018    Procedure: Bilateral Punctoplasty with Mini Monoka Stent;  Surgeon: Levy Blue MD;  [facility]     REPAIR ECTROPION BILATERAL Bilateral 2018    Procedure: Bilateral Lower Lid Ectropion Repair with Right Lower Lid Lesion Excision;  Surgeon: Levy Blue MD;  [facility]       Allergies: Pollen extract; Seasonal allergies; and Nkda [no known drug allergies]    SH:   Social History     Socioeconomic History     Marital status:      Spouse name: Not on file     Number of children: Not on file     Years of education: Not on file     Highest education level: Not on file   Occupational History     Not on file   Social Needs     Financial resource strain: Not on file     Food insecurity:     Worry: Not on file     Inability: Not on file     Transportation needs:     Medical: Not on file     Non-medical: Not on file   Tobacco Use     Smoking status: Former Smoker     Years: 5.00     Types: Cigarettes     Last attempt to quit: 1981     Years since quittin.3     Smokeless tobacco: Never Used   Substance and Sexual Activity     Alcohol use: Yes     Alcohol/week: 2.5 standard drinks     Types: 3 Standard drinks or equivalent per week     Frequency: 4 or more times a week     Drinks per session: 1 or 2     Binge frequency: Never     Drug use: No     Sexual activity: Not on file   Lifestyle     Physical activity:     Days per week: Not on file     Minutes per  session: Not on file     Stress: Not on file   Relationships     Social connections:     Talks on phone: Not on file     Gets together: Not on file     Attends Yarsani service: Not on file     Active member of club or organization: Not on file     Attends meetings of clubs or organizations: Not on file     Relationship status: Not on file     Intimate partner violence:     Fear of current or ex partner: Not on file     Emotionally abused: Not on file     Physically abused: Not on file     Forced sexual activity: Not on file   Other Topics Concern     Not on file   Social History Narrative     works as [employer].        His immediate family; his wife has MS and is an RN at [employer], his two children are in good health.           FH:   Family History   Problem Relation Age of Onset     Cerebrovascular Disease Brother          stroke      Breast Cancer Sister      Kidney Disease Mother         Kidney stones     Other - See Comments Father          in carmita parkinsons or post surgical eye surgery     Cerebral aneurysm Brother      Hypothyroidism Sister      Glaucoma No family hx of      Macular Degeneration No family hx of        Objective:    Hemoglobin A1C   Date Value Ref Range Status   10/26/2019 6.2 (H) 0 - 5.6 % Final     Comment:     Normal <5.7% Prediabetes 5.7-6.4%  Diabetes 6.5% or higher - adopted from ADA   consensus guidelines.     2019 6.4 (H) 0 - 5.6 % Final     Comment:     Normal <5.7% Prediabetes 5.7-6.4%  Diabetes 6.5% or higher - adopted from ADA   consensus guidelines.     2018 6.5 (H) 0 - 5.6 % Final     Comment:     Normal <5.7% Prediabetes 5.7-6.4%  Diabetes 6.5% or higher - adopted from ADA   consensus guidelines.           PT and DP pulses are 1/4 bilaterally. CRT is 1 second. Diminished pedal hair.   Gross sensation is intact bilaterally. Protective sensation is slightly diminished as demonstrated with a 5.07G monofilament.   Equinus is  noted bilaterally. No pain with active or passive ROM of the ankle, MTJ, 1st ray, or halluces bilaterally,.   Nails of the BL halluces are thickened, brittle, discolored, with subungual debris bilaterally. No open lesions are noted.     Assessment: Vishnu is a 69 type 2 diabetic with peripheral neuropathy.  Onychomycosis.  Radiculopathy.     Plan:  - Pt seen and evaluated.  - Discussed treatment options with him. He would like to forego PO Lamisil and just have the nails trimmed.   - Nails debrided x 2.  - See again in 4 months.

## 2020-02-25 NOTE — NURSING NOTE
"Reason For Visit:   Chief Complaint   Patient presents with     Consult     Onchomysosis. Patient stated that he is borderline diabetic.        Pain Assessment  Patient Currently in Pain: Yes(Neuropathy )  Primary Pain Location: Foot  Pain Descriptors: Tingling        Allergies   Allergen Reactions     Pollen Extract      Other reaction(s): Headache  Sinus congestion, sinus \"drainage\", sneezing     Seasonal Allergies      Sinus congestion, sinus \"drainage\", sneezing     Nkda [No Known Drug Allergies]            Tanvi Fermin LPN    "

## 2020-03-17 ENCOUNTER — TELEPHONE (OUTPATIENT)
Dept: AUDIOLOGY | Facility: CLINIC | Age: 70
End: 2020-03-17

## 2020-03-17 NOTE — TELEPHONE ENCOUNTER
Called patient and offered a 10:30 start time for VEMP appointment on 3/26, per inbasket message received from Kimberly Almaraz (this would help to decrease wait time between VEMP and Balance Testing).    Left a VM with my direct line for patient/wife to call back and change start time if interested. Also offered to reschedule d/t COVID 19.

## 2020-03-19 NOTE — TELEPHONE ENCOUNTER
[facility] Call Center    Phone Message    May a detailed message be left on voicemail: yes     Reason for Call: Other: please call patient to reschedule his Balance testing asap due to Covid-19 patient is able to take calls today thank you!     Action Taken: Message routed to:  [facility] Aud    Travel Screening: Not Applicable

## 2020-04-01 ENCOUNTER — TELEPHONE (OUTPATIENT)
Dept: OTOLARYNGOLOGY | Facility: CLINIC | Age: 70
End: 2020-04-01

## 2020-04-01 NOTE — TELEPHONE ENCOUNTER
Left message for patients wife regarding patient's upcoming appointment with Dr. Nissen on April 7, 2020. Informed them that this patient's visit would be changed to a telephone visit. If they would like to reschedule in 8 to 12 weeks for an in person visit, call back number was given.

## 2020-04-07 ENCOUNTER — VIRTUAL VISIT (OUTPATIENT)
Dept: OTOLARYNGOLOGY | Facility: CLINIC | Age: 70
End: 2020-04-07
Payer: COMMERCIAL

## 2020-04-07 ENCOUNTER — PRE VISIT (OUTPATIENT)
Dept: OTOLARYNGOLOGY | Facility: CLINIC | Age: 70
End: 2020-04-07

## 2020-04-07 DIAGNOSIS — R42 DIZZINESS: ICD-10-CM

## 2020-04-07 DIAGNOSIS — H90.3 BILATERAL SENSORINEURAL HEARING LOSS: Primary | ICD-10-CM

## 2020-04-07 NOTE — PROGRESS NOTES
"Vishnu Viveros is a 69 year old male who is being evaluated via a billable telephone visit.      The patient has been notified of following:     \"This telephone visit will be conducted via a call between you and your physician/provider. We have found that certain health care needs can be provided without the need for a physical exam.  This service lets us provide the care you need with a short phone conversation.  If a prescription is necessary we can send it directly to your pharmacy.  If lab work is needed we can place an order for that and you can then stop by our lab to have the test done at a later time.    If during the course of the call the physician/provider feels a telephone visit is not appropriate, you will not be charged for this service.\"     Patient has given verbal consent for Telephone visit?  Yes    Vishnu Viveros complains of hearing loss, dizziness    I have reviewed and updated the patient's Past Medical History, Social History, Family History and Medication List.    ALLERGIES  Pollen extract; Seasonal allergies; and Nkda [no known drug allergies]    Additional provider notes: Pt says has had some imbalance for some time, had stones adjusted in his ears in the past and that has helped. Dizziness has been stable and not an issue now. Noted he has diabetes and peripheral neuropathy, DJD both also probably contributing to imbalance. Discussed and will monitor for now as stable.  Hearing loss has issues in crowds and around noise. Discussed recent audiogram and HFSNHL.  Only thing can do is hearing aids.  He would like to just monitor for now and come in for a clinic visit when pandemic clears.  He will call for clinic visit when we reopen.  Discussed and answered questions.  Will see him in clinic in future.    Phone call duration: 7 minutes    Rick L. Nissen, MD    "

## 2020-04-07 NOTE — PATIENT INSTRUCTIONS
1. You had a telephone visit in the ENT Clinic today by Dr.Nissen.  If you have any questions or concerns after your appointment, please call   - Option 1: ENT Clinic: [phone]  - Option 2: Zina (Dr.Nissen's Nurse): [phone]    2.   Plan to return to clinic after Covid-19.    RICO Izaguirre  [facility]- Otolaryngology  [phone]

## 2020-04-09 ENCOUNTER — TELEPHONE (OUTPATIENT)
Dept: UROLOGY | Facility: CLINIC | Age: 70
End: 2020-04-09

## 2020-04-09 NOTE — TELEPHONE ENCOUNTER
"Left voicemail informing the patient that the clinic is converting appointments to Telephone or Video appointments due to COVID-19. The patient was instructed to call the clinic back at [phone].      When the patient calls back please convert their upcoming appointment with Dr. Montaño to a Video or Telephone appointment. If the patient chooses Video they need to download the application \"AW Touchpoint\". The patient will receive another phone call prior to their appointment with further instructions.      Denae Ribeiro, EMT  "

## 2020-04-13 ENCOUNTER — TELEPHONE (OUTPATIENT)
Dept: UROLOGY | Facility: CLINIC | Age: 70
End: 2020-04-13

## 2020-04-13 NOTE — TELEPHONE ENCOUNTER
"Left voicemail informing the patient that the clinic is converting appointments to Video Visits due to COVID-19. Patient was instructed to call the clinic back at [phone].        When the patient calls the clinic back please convert their appointment with   to a Video or Telephone appointment. they will need to download the application \"[employer] Touchpoint\". The patient will receive another call prior to their appointment with further instructions.    "

## 2020-04-17 ENCOUNTER — TELEPHONE (OUTPATIENT)
Dept: UROLOGY | Facility: CLINIC | Age: 70
End: 2020-04-17

## 2020-04-17 NOTE — TELEPHONE ENCOUNTER
"Left voicemail informing the patient that the clinic is converting appointments to Video Visits due to COVID-19. Patient was instructed to call the clinic back at [phone].        When the patient calls the clinic back please convert their appointment with   to a Video or Telephone appointment. they will need to download the application \"AW Touchpoint\". The patient will receive another call prior to their appointment with further instructions.      PATIENT NEED PSA DONE BEFORE APPOINTMENT, IF HE CANT GET PSA DONE HE NEED TO RESCHEDULE APPOINTMENT    "

## 2020-04-20 ENCOUNTER — TELEPHONE (OUTPATIENT)
Dept: UROLOGY | Facility: CLINIC | Age: 70
End: 2020-04-20

## 2020-04-20 DIAGNOSIS — J30.2 SEASONAL ALLERGIES: ICD-10-CM

## 2020-04-20 NOTE — TELEPHONE ENCOUNTER
Unable to leave message. Pt needs to schedule a 3 month f/u in July with Dr. Montaño with a PSA done at least 3 days prior to the appt.

## 2020-04-20 NOTE — TELEPHONE ENCOUNTER
----- Message from Carla Lobo CMA sent at 4/20/2020  1:21 PM CDT -----  Please call patient and have him follow up in July with a PSA before      Thanks    Carla

## 2020-04-22 RX ORDER — LORATADINE 10 MG/1
10 TABLET ORAL DAILY PRN
Qty: 90 TABLET | Refills: 2 | Status: SHIPPED | OUTPATIENT
Start: 2020-04-22 | End: 2021-05-24

## 2020-04-22 NOTE — TELEPHONE ENCOUNTER
loratadine (CLARITIN) 10 MG tablet       Last Written Prescription Date:  10/31/2018  Last Fill Quantity: 90,   # refills: 3  Last Office Visit : 12/11/2019  Future Office visit:  6/24/2020    Routing refill request to provider for review/approval because:  Failed protocol: age > 64

## 2020-04-23 NOTE — TELEPHONE ENCOUNTER
Second attempt, left message for pt to call and schedule a 3 month f/u in July with Dr. Montaño with a PSA done at least 3 days prior to the appt

## 2020-05-20 ENCOUNTER — TELEPHONE (OUTPATIENT)
Dept: ORTHOPEDICS | Facility: CLINIC | Age: 70
End: 2020-05-20

## 2020-05-21 ENCOUNTER — TELEPHONE (OUTPATIENT)
Dept: FAMILY MEDICINE | Facility: CLINIC | Age: 70
End: 2020-05-21

## 2020-05-22 ENCOUNTER — PATIENT OUTREACH (OUTPATIENT)
Dept: ONCOLOGY | Facility: CLINIC | Age: 70
End: 2020-05-22

## 2020-05-27 ENCOUNTER — TELEPHONE (OUTPATIENT)
Dept: ONCOLOGY | Facility: CLINIC | Age: 70
End: 2020-05-27

## 2020-05-27 DIAGNOSIS — E11.9 TYPE 2 DIABETES MELLITUS TREATED WITHOUT INSULIN (H): ICD-10-CM

## 2020-05-27 DIAGNOSIS — R97.20 ELEVATED PROSTATE SPECIFIC ANTIGEN (PSA): ICD-10-CM

## 2020-05-27 DIAGNOSIS — C34.91 ADENOCARCINOMA, LUNG, RIGHT (H): ICD-10-CM

## 2020-05-27 LAB
ALBUMIN SERPL-MCNC: 3.6 G/DL (ref 3.4–5)
ALP SERPL-CCNC: 49 U/L (ref 40–150)
ALT SERPL W P-5'-P-CCNC: 25 U/L (ref 0–70)
ANION GAP SERPL CALCULATED.3IONS-SCNC: 6 MMOL/L (ref 3–14)
AST SERPL W P-5'-P-CCNC: 21 U/L (ref 0–45)
BASOPHILS # BLD AUTO: 0 10E9/L (ref 0–0.2)
BASOPHILS NFR BLD AUTO: 0.9 %
BILIRUB SERPL-MCNC: 0.5 MG/DL (ref 0.2–1.3)
BUN SERPL-MCNC: 22 MG/DL (ref 7–30)
CALCIUM SERPL-MCNC: 9.2 MG/DL (ref 8.5–10.1)
CHLORIDE SERPL-SCNC: 104 MMOL/L (ref 94–109)
CO2 SERPL-SCNC: 30 MMOL/L (ref 20–32)
CREAT SERPL-MCNC: 0.98 MG/DL (ref 0.66–1.25)
DIFFERENTIAL METHOD BLD: ABNORMAL
EOSINOPHIL # BLD AUTO: 0.3 10E9/L (ref 0–0.7)
EOSINOPHIL NFR BLD AUTO: 9.6 %
ERYTHROCYTE [DISTWIDTH] IN BLOOD BY AUTOMATED COUNT: 12.8 % (ref 10–15)
GFR SERPL CREATININE-BSD FRML MDRD: 77 ML/MIN/{1.73_M2}
GLUCOSE SERPL-MCNC: 98 MG/DL (ref 70–99)
HBA1C MFR BLD: 6 % (ref 0–5.6)
HCT VFR BLD AUTO: 35.4 % (ref 40–53)
HGB BLD-MCNC: 11.8 G/DL (ref 13.3–17.7)
IMM GRANULOCYTES # BLD: 0 10E9/L (ref 0–0.4)
IMM GRANULOCYTES NFR BLD: 0.3 %
LYMPHOCYTES # BLD AUTO: 1.6 10E9/L (ref 0.8–5.3)
LYMPHOCYTES NFR BLD AUTO: 48.1 %
MCH RBC QN AUTO: 29.8 PG (ref 26.5–33)
MCHC RBC AUTO-ENTMCNC: 33.3 G/DL (ref 31.5–36.5)
MCV RBC AUTO: 89 FL (ref 78–100)
MONOCYTES # BLD AUTO: 0.3 10E9/L (ref 0–1.3)
MONOCYTES NFR BLD AUTO: 9.6 %
NEUTROPHILS # BLD AUTO: 1 10E9/L (ref 1.6–8.3)
NEUTROPHILS NFR BLD AUTO: 31.5 %
NRBC # BLD AUTO: 0 10*3/UL
NRBC BLD AUTO-RTO: 0 /100
PLATELET # BLD AUTO: 199 10E9/L (ref 150–450)
POTASSIUM SERPL-SCNC: 3.7 MMOL/L (ref 3.4–5.3)
PROT SERPL-MCNC: 7 G/DL (ref 6.8–8.8)
PSA SERPL-MCNC: 5.18 UG/L (ref 0–4)
RBC # BLD AUTO: 3.96 10E12/L (ref 4.4–5.9)
SODIUM SERPL-SCNC: 140 MMOL/L (ref 133–144)
WBC # BLD AUTO: 3.2 10E9/L (ref 4–11)

## 2020-05-27 NOTE — TELEPHONE ENCOUNTER
Called patient's wife and left voicemail to call back and get patient scheduled for survivorship appointment. 1st attempt.

## 2020-05-28 ENCOUNTER — TELEPHONE (OUTPATIENT)
Dept: ONCOLOGY | Facility: CLINIC | Age: 70
End: 2020-05-28

## 2020-05-28 NOTE — TELEPHONE ENCOUNTER
Called patient wife and left a voicemail message to call and schedule the patient's appointment. Will send letter in Epic. 2nd attempt

## 2020-06-01 ENCOUNTER — VIRTUAL VISIT (OUTPATIENT)
Dept: FAMILY MEDICINE | Facility: CLINIC | Age: 70
End: 2020-06-01
Payer: COMMERCIAL

## 2020-06-01 DIAGNOSIS — I10 BENIGN ESSENTIAL HYPERTENSION: ICD-10-CM

## 2020-06-01 DIAGNOSIS — E08.42 DIABETIC POLYNEUROPATHY ASSOCIATED WITH DIABETES MELLITUS DUE TO UNDERLYING CONDITION (H): ICD-10-CM

## 2020-06-01 DIAGNOSIS — C34.91 ADENOCARCINOMA OF LUNG, STAGE 1, RIGHT (H): Primary | ICD-10-CM

## 2020-06-01 DIAGNOSIS — R91.1 PULMONARY NODULE: ICD-10-CM

## 2020-06-01 DIAGNOSIS — E87.6 HYPOKALEMIA: ICD-10-CM

## 2020-06-01 DIAGNOSIS — M54.16 LUMBAR RADICULITIS: ICD-10-CM

## 2020-06-01 DIAGNOSIS — E11.9 TYPE 2 DIABETES MELLITUS TREATED WITHOUT INSULIN (H): ICD-10-CM

## 2020-06-01 RX ORDER — CHLORTHALIDONE 25 MG/1
25 TABLET ORAL DAILY
Qty: 90 TABLET | Refills: 3 | Status: SHIPPED | OUTPATIENT
Start: 2020-06-01 | End: 2021-06-07

## 2020-06-01 RX ORDER — ATORVASTATIN CALCIUM 20 MG/1
20 TABLET, FILM COATED ORAL DAILY
Qty: 90 TABLET | Refills: 3 | Status: SHIPPED | OUTPATIENT
Start: 2020-06-01 | End: 2021-06-07

## 2020-06-01 RX ORDER — POTASSIUM CHLORIDE 1500 MG/1
20 TABLET, EXTENDED RELEASE ORAL DAILY
Qty: 90 TABLET | Refills: 3 | Status: SHIPPED | OUTPATIENT
Start: 2020-06-01 | End: 2021-06-07

## 2020-06-01 RX ORDER — LOSARTAN POTASSIUM 100 MG/1
100 TABLET ORAL EVERY EVENING
Qty: 90 TABLET | Refills: 3 | Status: SHIPPED | OUTPATIENT
Start: 2020-06-01 | End: 2021-06-07

## 2020-06-01 RX ORDER — GABAPENTIN 300 MG/1
CAPSULE ORAL
Qty: 90 CAPSULE | Refills: 3 | Status: SHIPPED | OUTPATIENT
Start: 2020-06-01 | End: 2020-11-16

## 2020-06-01 RX ORDER — METFORMIN HCL 500 MG
500 TABLET, EXTENDED RELEASE 24 HR ORAL
Qty: 90 TABLET | Refills: 3 | Status: SHIPPED | OUTPATIENT
Start: 2020-06-01 | End: 2021-06-07

## 2020-06-01 NOTE — TELEPHONE ENCOUNTER
Action    Action Taken 6/2/20:     -[facility] Tracking ([facility], [facility]): [id number]    -Spoke w/ Fela @ [facility] [place] - she confirmed request for imaging rec'd. She stated imaging was running behind with a lot of requests, but she would let them know imaging needed STAT.  2:26 PM    -6/3/20: Images from [facility] still not received. Spoke with [facility], they stated they would try to push the images shortly. IB sent to Clinic/Provider.  6:54 AM      -Images from [facility] resolved, and now viewable to PACS.  7:27 AM       RECORDS STATUS - ALL OTHER DIAGNOSIS      RECORDS RECEIVED FROM: [facility]   DATE RECEIVED:    NOTES STATUS DETAILS   OFFICE NOTE from referring provider Elias Logan - 6/1/20   OFFICE NOTE from medical oncologist [facility] 11/6/19   DISCHARGE SUMMARY from hospital [facility] 4/3/19   DISCHARGE REPORT from the ER NA    PFT [facility] 3/27/19   OPERATIVE REPORT [facility] 4/3/19: Thorascopy   MEDICATION LIST [facility] 6/2/20   CLINICAL TRIAL TREATMENTS TO DATE     LABS     PATHOLOGY REPORTS [facility], Report in CE, Slides Requested 6/2 4/3/19: [facility]-   ANYTHING RELATED TO DIAGNOSIS Epic 5/27/20   GENONOMIC TESTING     TYPE:     IMAGING (NEED IMAGES & REPORT)     XR Chest Requested 6/2 5/6/19, 4/4/19, 4/3/19: [facility], Report in CE   CT SCANS Requested 6/2 11/6/19: [facility], Report in [facility]   MRI     MAMMO     ULTRASOUND     PET

## 2020-06-01 NOTE — TELEPHONE ENCOUNTER
ONCOLOGY INTAKE: Records Information      APPT INFORMATION:  Referring provider: Dr. Murali Logan MD  Referring provider s clinic:  [facility]  Reason for visit/diagnosis:  Check lung functions  Has patient been notified of appointment date and time?: Yes    RECORDS INFORMATION:  Were the records received with the referral (via Rightfax)? No,Internal Referral      Has patient been seen for any external appt for this diagnosis? No    If yes, where? NA    ADDITIONAL INFORMATION:  None

## 2020-06-01 NOTE — NURSING NOTE
Chief Complaint   Patient presents with     Recheck Medication     follow up on lab results     Kimberly Nissen, EMT at 10:18 AM on 6/1/2020

## 2020-06-01 NOTE — PROGRESS NOTES
"Vishnu Viveros is a 69 year old male who is being evaluated via a billable telephone visit.      The patient has been notified of following:     \"This telephone visit will be conducted via a call between you and your physician/provider. We have found that certain health care needs can be provided without the need for a physical exam.  This service lets us provide the care you need with a short phone conversation.  If a prescription is necessary we can send it directly to your pharmacy.  If lab work is needed we can place an order for that and you can then stop by our lab to have the test done at a later time.    If during the course of the call the physician/provider feels a telephone visit is not appropriate, you will not be charged for this service.\"     Vishnu Viveros has been contacted via telephone for [phone]    Chief Complaint   Patient presents with     Recheck Medication     follow up on lab results   Vishnu Viveros is a 69 year old male with a history of type 2 diabetes mellitus controlled, hyperlipidemia, hypertension, colon polyps, DJD with spinal stenosis back, elevated PSA, and stage 1 right adenocarcinoma of the lung s/p resection with clear margins at [facility] who presents  For telephone visit for coordination of cares for CT follow-up lung cancer resection and follow-up af small nodules.I have recommended oncology follow-up at [facility] as his insurance does not allow follow-up through [place]. I have also recommended pulmonary consult due to previous resection to monitor pulmonary status.  I have ordered CT scan on previous order encounter and am review labs today for his other health concerns diabetes. He also continues to have back pain with radicular symptoms but has not been able to have intervention due to pandemic restrictions. He has been working from home and feels overall in good health no virus concerns.   Patient Active Problem List   Diagnosis     Erectile dysfunction     " Seasonal allergies     Vitamin D deficiency     Pulmonary nodule     Prostate nodule     Chronic low back pain     Degenerative disc disease, lumbar     Back pain with radiation     DJD (degenerative joint disease) of knee     HL (hearing loss)     Vision impairment     Tear film insufficiency     Recurrent pterygium     Senile nuclear sclerosis     Type 2 diabetes mellitus treated without insulin (H)     Diabetic polyneuropathy associated with diabetes mellitus due to underlying condition (H)     Tubulovillous adenoma of colon     Lumbar radiculopathy     Adenocarcinoma of lung, stage 1, right (H)     Benign essential hypertension     History of colonic polyps     Spinal stenosis, lumbar region, with neurogenic claudication     Elevated prostate specific antigen (PSA)     Obesity with body mass index 30 or greater     Solitary pulmonary nodule        Current Outpatient Medications:      acetaminophen (TYLENOL) 500 MG tablet, Take 1-2 tablets (500-1,000 mg) by mouth every 6 hours as needed for pain (arthritis), Disp: 120 tablet, Rfl: 1     Ascorbic Acid (VITAMIN C PO), Take 500 mg by mouth once, Disp: , Rfl:      atorvastatin (LIPITOR) 20 MG tablet, Take 1 tablet (20 mg) by mouth daily, Disp: 90 tablet, Rfl: 3     chlorthalidone (HYGROTON) 25 MG tablet, Take 1 tablet (25 mg) by mouth daily, Disp: 90 tablet, Rfl: 3     Cholecalciferol (VITAMIN D) 1000 UNIT capsule, Take 1 capsule by mouth daily., Disp: , Rfl:      Cyanocobalamin (VITAMIN B12 PO), Take 1 tablet by mouth. Pt states he takes this 2 or 3 times a week.. , Disp: , Rfl:      fluticasone (FLONASE) 50 MCG/ACT nasal spray, 1-2 sprays each nostril once daily, Disp: 16 g, Rfl: 1     gabapentin (NEURONTIN) 300 MG capsule, One capsule at bedtime, Disp: 90 capsule, Rfl: 3     loratadine (CLARITIN) 10 MG tablet, Take 1 tablet (10 mg) by mouth daily as needed for allergies, Disp: 90 tablet, Rfl: 2     losartan (COZAAR) 100 MG tablet, Take 1 tablet (100 mg) by mouth  every evening, Disp: 90 tablet, Rfl: 3     metFORMIN (GLUCOPHAGE-XR) 500 MG 24 hr tablet, Take 1 tablet (500 mg) by mouth daily (with dinner), Disp: 90 tablet, Rfl: 3     Multiple Vitamin (MULTIVITAMINS PO), Take 1 tablet by mouth daily., Disp: , Rfl:      Omega-3 Fatty Acids (FISH OIL PO), Take 1 capsule by mouth as needed, Disp: , Rfl:      order for DME, Back brace, Disp: 1 Device, Rfl: 0     potassium chloride ER (K-DUR/KLOR-CON M) 20 MEQ CR tablet, Take 1 tablet (20 mEq) by mouth daily, Disp: 90 tablet, Rfl: 3     sildenafil (REVATIO) 20 MG tablet, Take 1-5 tablets (20 mg) by mouth as needed for sexual activity.  Never use with nitroglycerin, terazosin or doxazosin., Disp: 90 tablet, Rfl: 11     tamsulosin (FLOMAX) 0.4 MG capsule, Take 1 capsule (0.4 mg) by mouth daily, Disp: 90 capsule, Rfl: 3     tamsulosin (FLOMAX) 0.4 MG capsule, Take 1 capsule (0.4 mg) by mouth daily, Disp: 90 capsule, Rfl: 3     albuterol (PROAIR HFA/PROVENTIL HFA/VENTOLIN HFA) 108 (90 Base) MCG/ACT inhaler, Inhale 2 puffs into the lungs every 6 hours, Disp: 1 Inhaler, Rfl: 1  No current facility-administered medications for this visit.     Facility-Administered Medications Ordered in Other Visits:      iohexol (OMNIPAQUE) 300 mg/mL injection 10 mL, 10 mL, EPIDURAL, Once, Jamil Rawls, DO   Social History     Social History Narrative     works as [employer].        His immediate family; his wife has MS and is an RN at [employer], his two children are in good health.         A grandchild with congenital heart condition continues to remains hospitalized since birth one year ago and may be transferred to [facility] for heart transplant.    I have reviewed and updated the patient's Past Medical History, Social History, Family History and Medication List.    ALLERGIES  Pollen extract; Seasonal allergies; and Nkda [no known drug allergies]   ROS: 6 point ROS neg other than the symptoms noted above in the HPI. Stable  breathing no chest pain. Chronic back pain.    Physical: Interactive very appreciative. Mood upbeat, stable, no dyspnea.   Wife Carmen (RN) is on the phone today as well.    Additional provider notes: CT from [place] reviewed 11/6/2019  Result Impression   1.  No evidence of local malignancy recurrence post right upper lobectomy.  2.  Two new tiny right lower lobe pulmonary nodules measuring up to 2 mm, which  are indeterminate and can be followed on subsequent exams.  3.  New mild patchy right lower lobe groundglass opacities are likely  infectious/inflammatory, aspiration may be considered given the small sliding  esophageal hiatal hernia.   Other Result Information    This result has an attachment that is not available.    Result Narrative   EXAM: CT CHEST WITHOUT IV CONTRAST    No 3D post-processing performed.     COMPARISON: PET/CT 03/09/2019, CT chest with intravenous contrast 01/04/2019    FINDINGS: Post right upper lobectomy and mediastinal lymphadenectomy for  invasive adenocarcinoma. No evidence for local recurrence. Mild postsurgical  scarring/linear atelectasis in the right middle and lower lobes. In the anterior  right lower lobe, the 1 mm nodule (series 3, image 259) and a more ill-defined 2  mm nodule in the posterior right lower lobe (series 3, image 173) are not  clearly present on the prior exam. New mild patchy ground glass opacities in the  posterior medial right base (series 3, image 282, 311, and 431), may be due to  aspiration given the presence of a small sliding esophageal hiatal hernia.    No lymphadenopathy. Minimal coronary artery atherosclerotic calcification.  Stable small low-density left thyroid lesion. Negative adrenal glands. Stable  exophytic right renal cyst. Similar mild bilateral gynecomastia. Minimal  calcifications about the pancreas again identified, chronic pancreatitis could  have this appearance. Mild degenerative changes of the spine.   Other Result Information   Interface,   In [facility] Generic [id number] - 11/06/2019 10:58 AM CST  EXAM: CT CHEST WITHOUT IV CONTRAST    No 3D post-processing performed.     COMPARISON: PET/CT 03/09/2019, CT chest with intravenous contrast 01/04/2019    FINDINGS: Post right upper lobectomy and mediastinal lymphadenectomy for  invasive adenocarcinoma. No evidence for local recurrence. Mild postsurgical  scarring/linear atelectasis in the right middle and lower lobes. In the anterior  right lower lobe, the 1 mm nodule (series 3, image 259) and a more ill-defined 2  mm nodule in the posterior right lower lobe (series 3, image 173) are not  clearly present on the prior exam. New mild patchy ground glass opacities in the  posterior medial right base (series 3, image 282, 311, and 431), may be due to  aspiration given the presence of a small sliding esophageal hiatal hernia.    No lymphadenopathy. Minimal coronary artery atherosclerotic calcification.  Stable small low-density left thyroid lesion. Negative adrenal glands. Stable  exophytic right renal cyst. Similar mild bilateral gynecomastia. Minimal  calcifications about the pancreas again identified, chronic pancreatitis could  have this appearance. Mild degenerative changes of the spine.    IMPRESSION:  1.  No evidence of local malignancy recurrence post right upper lobectomy.  2.  Two new tiny right lower lobe pulmonary nodules measuring up to 2 mm, which  are indeterminate and can be followed on subsequent exams.  3.  New mild patchy right lower lobe groundglass opacities are likely  infectious/inflammatory, aspiration may be considered given the small sliding  esophageal hiatal hernia         Assessment/Plan:Vishnu Viveros is a 69 year old male with a history of type 2 diabetes mellitus controlled, hyperlipidemia, hypertension, colon polyps, DJD with spinal stenosis back, elevated PSA, and stage 1 right adenocarcinoma of the lung s/p resection with clear margins at [facility] who presents  For  telephone visit for coordination of cares for CT follow-up lung cancer resection and follow-up af small nodules.I have recommended oncology follow-up at [facility] as his insurance does not allow follow-up through [place]. I have also recommended pulmonary consult due to previous resection to monitor pulmonary status.  1. Adenocarcinoma of lung, stage 1, right (H)  Requires CT scan scheduled 6/3. I contacted care coordinators to assist with orders previously.He has appointment scheduled end of July with oncology Dr galvez    2. Pulmonary nodule  I recommended establishing with pulmonary for follow-up considering lung cancer resection and to monitor pulmonary function    3. Type 2 diabetes mellitus treated without insulin (H)  Stable labs reviewed  - atorvastatin (LIPITOR) 20 MG tablet; Take 1 tablet (20 mg) by mouth daily  Dispense: 90 tablet; Refill: 3  - metFORMIN (GLUCOPHAGE-XR) 500 MG 24 hr tablet; Take 1 tablet (500 mg) by mouth daily (with dinner)  Dispense: 90 tablet; Refill: 3    4. Benign essential hypertension  Stable his wife will check his BP at home and contact me if greater than 140/90  - chlorthalidone (HYGROTON) 25 MG tablet; Take 1 tablet (25 mg) by mouth daily  Dispense: 90 tablet; Refill: 3  - losartan (COZAAR) 100 MG tablet; Take 1 tablet (100 mg) by mouth every evening  Dispense: 90 tablet; Refill: 3    5. Lumbar radiculitis  He will schedule with pain clinic when able  - gabapentin (NEURONTIN) 300 MG capsule; One capsule at bedtime  Dispense: 90 capsule; Refill: 3  - diclofenac (VOLTAREN) 1 % topical gel; Apply 2 g topically 4 times daily  Dispense: 100 g; Refill: 3    6. Diabetic polyneuropathy associated with diabetes mellitus due to underlying condition (H)  Renew medication  - gabapentin (NEURONTIN) 300 MG capsule; One capsule at bedtime  Dispense: 90 capsule; Refill: 3    7. Hypokalemia  Recent labs reviewed  - potassium chloride ER (KLOR-CON M) 20 MEQ CR tablet; Take 1 tablet (20 mEq) by mouth  daily  Dispense: 90 tablet; Refill: 3  Phone call 11:30-11:59 AM duration 29 minutes  6 month follow-up earlier if indicated  All questions were addressed and voiced understanding and agreement with the above.   Murali Logan MD

## 2020-06-02 ENCOUNTER — TELEPHONE (OUTPATIENT)
Dept: FAMILY MEDICINE | Facility: CLINIC | Age: 70
End: 2020-06-02

## 2020-06-03 ENCOUNTER — PRE VISIT (OUTPATIENT)
Dept: SURGERY | Facility: CLINIC | Age: 70
End: 2020-06-03

## 2020-06-03 ENCOUNTER — ANCILLARY PROCEDURE (OUTPATIENT)
Dept: CT IMAGING | Facility: CLINIC | Age: 70
End: 2020-06-03
Attending: FAMILY MEDICINE
Payer: COMMERCIAL

## 2020-06-03 ENCOUNTER — TELEPHONE (OUTPATIENT)
Dept: SURGERY | Facility: CLINIC | Age: 70
End: 2020-06-03

## 2020-06-03 DIAGNOSIS — C34.91 ADENOCARCINOMA, LUNG, RIGHT (H): ICD-10-CM

## 2020-06-03 NOTE — TELEPHONE ENCOUNTER
I received an IB from [facility]/Anna Marie/Maddie requesting that I call Maycoefrain (pt's wife) to cancel appts for today as pt needs to be seen in Pulm Clinic instead of by Dr Salazar. I spoke with Carmen & cancelled the appt with Dr Salazar as requested. Carmen also stated that he is not available at 12pm for the CT and wanted to keep the CT appt at 4pm as originally scheduled. I changed the time back to 4pm for the CT appt as she stated Dr Logan is requesting that he still has it done. She requested that I call her back between [phone] to reschedule the clinic appt with a  Pulmonary provider. IB sent to all with status update.

## 2020-06-03 NOTE — TELEPHONE ENCOUNTER
ONCOLOGY INTAKE: Records Information      APPT INFORMATION: 6/8/20 - [facility] - Video  Referring provider: Dr. Murali Logan MD  Referring provider s clinic:  [facility]  Reason for visit/diagnosis:  Check lung functions  Has patient been notified of appointment date and time?: Yes     RECORDS INFORMATION:  Were the records received with the referral (via [employer]fax)? No        Has patient been seen for any external appt for this diagnosis? Yes     If yes, where? [facility]     ADDITIONAL INFORMATION:  Scheduled via IB from Anna Marie  I spoke with Carmen (pt's wife) to confirm appt  Pt scheduled for a CT on 6/3/20 at [facility]

## 2020-06-05 NOTE — TELEPHONE ENCOUNTER
Action 06/05/20 2:06 PM - Karoline   Action Taken  Pathology received from [facility] and sent to be reviewed with filled out pathology form.

## 2020-06-08 ENCOUNTER — VIRTUAL VISIT (OUTPATIENT)
Dept: PULMONOLOGY | Facility: CLINIC | Age: 70
End: 2020-06-08
Attending: INTERNAL MEDICINE
Payer: COMMERCIAL

## 2020-06-08 ENCOUNTER — PRE VISIT (OUTPATIENT)
Dept: PULMONOLOGY | Facility: CLINIC | Age: 70
End: 2020-06-08

## 2020-06-08 VITALS — BODY MASS INDEX: 31.58 KG/M2 | HEIGHT: 64 IN | WEIGHT: 185 LBS

## 2020-06-08 DIAGNOSIS — C34.91 ADENOCARCINOMA, LUNG, RIGHT (H): ICD-10-CM

## 2020-06-08 PROCEDURE — 00000346 ZZHCL STATISTIC REVIEW OUTSIDE SLIDES TC 88321: Performed by: THORACIC SURGERY (CARDIOTHORACIC VASCULAR SURGERY)

## 2020-06-08 ASSESSMENT — MIFFLIN-ST. JEOR: SCORE: 1510.15

## 2020-06-08 ASSESSMENT — PAIN SCALES - GENERAL: PAINLEVEL: NO PAIN (0)

## 2020-06-08 NOTE — PROGRESS NOTES
"Vishnu Viveros is a 69 year old male who is being evaluated via a billable video visit.      The patient has been notified of following:     \"This video visit will be conducted via a call between you and your physician/provider. We have found that certain health care needs can be provided without the need for an in-person physical exam.  This service lets us provide the care you need with a video conversation.  If a prescription is necessary we can send it directly to your pharmacy.  If lab work is needed we can place an order for that and you can then stop by our lab to have the test done at a later time.    Video visits are billed at different rates depending on your insurance coverage.  Please reach out to your insurance provider with any questions.    If during the course of the call the physician/provider feels a video visit is not appropriate, you will not be charged for this service.\"    Patient has given verbal consent for Video visit? Yes    How would you like to obtain your AVS? Mail a copy    Patient would like the video invitation sent by: Send to e-mail at: [email or web address]    Will anyone else be joining your video visit? No       I have reviewed and updated the patient's allergies and medication list.      Secondary Video Option ([employer]), send text message to: [phone]     Aundrea Cooley CMA          Video-Visit Details    Type of service:  Video Visit    Video Start Time: 3:50 PM  Video End Time: 4:05    Originating Location (pt. Location): Home    Distant Location (provider location):  [facility]     Platform used for Video Visit: TSB    Santiago Stuart MD    LUNG NODULE & INTERVENTIONAL PULMONARY CLINIC  [facility], [facility]     Vishnu Viveros MRN# [id number]   Age: 69 year old YOB: 1950     Reason for Consultation: lung cancer follow up    Requesting Physician: Murali Logan, " MD  [address]  [place] [address]       Assessment and Plan:    69-year-old man doing well after lobectomy for stage I lung cancer.    Plan  Every 6-month CT scans for a total of 2 years after his operation.  Continue over-the-counter allergy medications    CT scan of follow-up visit ordered by me.           History:     Vishnu Viveros is a 69 year old male with sig h/o for lung cancer who is here for follow up.    He has no pain, no cough, no provocative, palliative or localizing factors.  No pertinent positives. Pertinent negatives include no hemoptysis, no night sweats.    He does have some seasonal allergies that he treats with OTC meds.    CT interpreted by me: post lobectomy changes but no clear evidence of recurrence.         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 eyelid, unspecified laterality      History of tobacco use      HLD (hyperlipidemia)      HTN (hypertension)      Obesity (BMI 30.0-34.9)      Pulmonary nodule            Past Surgical History:      Past Surgical History:   Procedure Laterality Date     APPENDECTOMY OPEN CHILD       ARTHROSCOPY KNEE Left      BIOPSY PROSTATE TRANSRECTAL       EXCISE PTERYGIUM Right 1989     EXCISE PTERYGIUM Right 2/12/2015     INJECT EPIDURAL LUMBAR / SACRAL SINGLE N/A 7/21/2016    Procedure: INJECT EPIDURAL LUMBAR / SACRAL SINGLE;  Surgeon: Judah Henriquez MD;  Location: [facility]     INJECT PARAVERTEBRAL FACET JOINT LUMBAR / SACRAL FIRST Right 8/18/2016    Procedure: INJECT PARAVERTEBRAL FACET JOINT LUMBAR / SACRAL FIRST;  Surgeon: Judah Henriquez MD;  Location: [facility] OR     INJECT PARAVERTEBRAL FACET JOINT LUMBAR / SACRAL FIRST Right 9/8/2016    Procedure: INJECT PARAVERTEBRAL FACET JOINT LUMBAR / SACRAL FIRST;  Surgeon: Judah Henriquez MD;  Location: [facility] OR     MEDIASTINOSCOPY Right 04/08/2019  "    [facility]  Dr Hemanth Covington 19 bronchoscopy, cervical mediastinoscopy for staging and minimally invasive right thoracoscopic wedge resection 1.8 cm invasive adenocarcinoma [id number] stage 1A2 adenocarcinoma completely resected via minimally invasive right upper lobectomy.     PUNCTALPLASTY Bilateral 2018    Procedure: Bilateral Punctoplasty with Mini [place] Stent;  Surgeon: Levy Blue MD;  Location: [facility] OR     REPAIR ECTROPION BILATERAL Bilateral 2018    Procedure: Bilateral Lower Lid Ectropion Repair with Right Lower Lid Lesion Excision;  Surgeon: Levy Blue MD;  Location: [facility] OR          Social History:     Social History     Tobacco Use     Smoking status: Former Smoker     Years: 5.00     Types: Cigarettes     Last attempt to quit: 1981     Years since quittin.5     Smokeless tobacco: Never Used   Substance Use Topics     Alcohol use: Yes     Alcohol/week: 2.5 standard drinks     Types: 3 Standard drinks or equivalent per week     Frequency: 4 or more times a week     Drinks per session: 1 or 2     Binge frequency: Never          Family History:     Family History   Problem Relation Age of Onset     Cerebrovascular Disease Brother          stroke      Breast Cancer Sister      Kidney Disease Mother         Kidney stones     Other - See Comments Father          in [place] parkinsons or post surgical eye surgery     Cerebral aneurysm Brother      Hypothyroidism Sister      Glaucoma No family hx of      Macular Degeneration No family hx of            Allergies:      Allergies   Allergen Reactions     Pollen Extract      Other reaction(s): Headache  Sinus congestion, sinus \"drainage\", sneezing     Seasonal Allergies      Sinus congestion, sinus \"drainage\", sneezing     Nkda [No Known Drug Allergies]           Medications:     Current Outpatient Medications   Medication Sig     acetaminophen (TYLENOL) 500 MG tablet Take 1-2 tablets (500-1,000 mg) by mouth every 6 hours " as needed for pain (arthritis)     Ascorbic Acid (VITAMIN C PO) Take 500 mg by mouth once     atorvastatin (LIPITOR) 20 MG tablet Take 1 tablet (20 mg) by mouth daily     chlorthalidone (HYGROTON) 25 MG tablet Take 1 tablet (25 mg) by mouth daily     Cholecalciferol (VITAMIN D) 1000 UNIT capsule Take 1 capsule by mouth daily.     Cyanocobalamin (VITAMIN B12 PO) Take 1 tablet by mouth. Pt states he takes this 2 or 3 times a week..      diclofenac (VOLTAREN) 1 % topical gel Apply 2 g topically 4 times daily     fluticasone (FLONASE) 50 MCG/ACT nasal spray 1-2 sprays each nostril once daily     gabapentin (NEURONTIN) 300 MG capsule One capsule at bedtime     loratadine (CLARITIN) 10 MG tablet Take 1 tablet (10 mg) by mouth daily as needed for allergies     losartan (COZAAR) 100 MG tablet Take 1 tablet (100 mg) by mouth every evening     metFORMIN (GLUCOPHAGE-XR) 500 MG 24 hr tablet Take 1 tablet (500 mg) by mouth daily (with dinner)     Multiple Vitamin (MULTIVITAMINS PO) Take 1 tablet by mouth daily.     Omega-3 Fatty Acids (FISH OIL PO) Take 1 capsule by mouth as needed     order for DME Back brace     potassium chloride ER (KLOR-CON M) 20 MEQ CR tablet Take 1 tablet (20 mEq) by mouth daily     sildenafil (REVATIO) 20 MG tablet Take 1-5 tablets (20 mg) by mouth as needed for sexual activity.  Never use with nitroglycerin, terazosin or doxazosin.     tamsulosin (FLOMAX) 0.4 MG capsule Take 1 capsule (0.4 mg) by mouth daily     tamsulosin (FLOMAX) 0.4 MG capsule Take 1 capsule (0.4 mg) by mouth daily     No current facility-administered medications for this visit.      Facility-Administered Medications Ordered in Other Visits   Medication     iohexol (OMNIPAQUE) 300 mg/mL injection 10 mL          Review of Systems:     CONSTITUTIONAL: negative for fever, chills, change in weight  INTEGUMENTARY/SKIN: no rash or obvious new lesions  ENT/MOUTH: no sore throat, new sinus pain or nasal drainage  RESP: see interval  history  CV: negative for chest pain, palpitations or peripheral edema  GI: no nausea, vomiting, change in stools  : no dysuria  MUSCULOSKELETAL: no myalgias, arthralgias  ENDOCRINE: blood sugars with adequate control  PSYCHIATRIC: mood stable  LYMPHATIC: no new lymphadenopathy  HEME: no bleeding or easy bruisability  NEURO: no numbness, weakness, headaches         Physical Exam:        Wt Readings from Last 4 Encounters:   06/08/20 83.9 kg (185 lb)   12/11/19 85.1 kg (187 lb 9.6 oz)   06/12/19 84.1 kg (185 lb 4.8 oz)   04/25/19 86.7 kg (191 lb 3.2 oz)     Constitutional:   Awake, alert and in no apparent distress     Eyes:   Nonicteric,      ENT:    Normal voice           Lungs:   Normal pattern of breathing     Neurologic:   Alert and conversant. Cranial nerves  intact.       Skin:   Warm, dry.  No rash on limited exam.    Psych: bright and friendly affect, no hallucinations       Current Laboratory Data:   Normal chem panel, cbc with borderline low wbc, hgb       Outside records from [place] requested and reviewed by me, summarized here.  He had a lobectomy for lung cancer at [facility] last year.  He had some mild post lobectomy pain but no other issues.

## 2020-06-08 NOTE — LETTER
"6/8/2020       RE: Vishnu Viveros  [address]  [place] [address]     Dear Colleague,    Thank you for referring your patient, Vishnu Viveros, to the [facility] CANCER CLINIC at [facility]. Please see a copy of my visit note below.    Vishnu Viveros is a 69 year old male who is being evaluated via a billable video visit.      The patient has been notified of following:     \"This video visit will be conducted via a call between you and your physician/provider. We have found that certain health care needs can be provided without the need for an in-person physical exam.  This service lets us provide the care you need with a video conversation.  If a prescription is necessary we can send it directly to your pharmacy.  If lab work is needed we can place an order for that and you can then stop by our lab to have the test done at a later time.    Video visits are billed at different rates depending on your insurance coverage.  Please reach out to your insurance provider with any questions.    If during the course of the call the physician/provider feels a video visit is not appropriate, you will not be charged for this service.\"    Patient has given verbal consent for Video visit? Yes    How would you like to obtain your AVS? Mail a copy    Patient would like the video invitation sent by: Send to e-mail at: [email or web address]    Will anyone else be joining your video visit? No       I have reviewed and updated the patient's allergies and medication list.      Secondary Video Option ([employer]), send text message to: [phone]     Aundrea Cooley CMA          Video-Visit Details    Type of service:  Video Visit    Video Start Time: 3:50 PM  Video End Time: 4:05    Originating Location (pt. Location): Home    Distant Location (provider location):  [facility] CANCER [facility]     Platform used for Video Visit: [employer]    Santiago Stuart MD    LUNG NODULE " & INTERVENTIONAL PULMONARY CLINIC  [facility], [facility]     Vishnu Viveros MRN# [id number]   Age: 69 year old YOB: 1950     Reason for Consultation: lung cancer follow up    Requesting Physician: Murali Logan MD  [address] [address]       Assessment and Plan:    69-year-old man doing well after lobectomy for stage I lung cancer.    Plan  Every 6-month CT scans for a total of 2 years after his operation.  Continue over-the-counter allergy medications    CT scan of follow-up visit ordered by me.           History:     Vsihnu Viveros is a 69 year old male with sig h/o for lung cancer who is here for follow up.    He has no pain, no cough, no provocative, palliative or localizing factors.  No pertinent positives. Pertinent negatives include no hemoptysis, no night sweats.    He does have some seasonal allergies that he treats with OTC meds.    CT interpreted by me: post lobectomy changes but no clear evidence of recurrence.         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 eyelid, unspecified laterality      History of tobacco use      HLD (hyperlipidemia)      HTN (hypertension)      Obesity (BMI 30.0-34.9)      Pulmonary nodule            Past Surgical History:      Past Surgical History:   Procedure Laterality Date     APPENDECTOMY OPEN CHILD       ARTHROSCOPY KNEE Left      BIOPSY PROSTATE TRANSRECTAL       EXCISE PTERYGIUM Right 1989     EXCISE PTERYGIUM Right 2/12/2015     INJECT EPIDURAL LUMBAR / SACRAL SINGLE N/A 7/21/2016    Procedure: INJECT EPIDURAL LUMBAR / SACRAL SINGLE;  Surgeon: Judah Henriquez MD;  [facility]: [facility]     INJECT PARAVERTEBRAL FACET JOINT LUMBAR / SACRAL FIRST Right 8/18/2016    Procedure: INJECT PARAVERTEBRAL FACET JOINT LUMBAR  / SACRAL FIRST;  Surgeon: Judah Henriquez MD;  [facility]     INJECT PARAVERTEBRAL FACET JOINT LUMBAR / SACRAL FIRST Right 2016    Procedure: INJECT PARAVERTEBRAL FACET JOINT LUMBAR / SACRAL FIRST;  Surgeon: Judah Henriquez MD;  [facility]     MEDIASTINOSCOPY Right 2019     [place]  Dr Hemanth [place] 19 bronchoscopy, cervical mediastinoscopy for staging and minimally invasive right thoracoscopic wedge resection 1.8 cm invasive adenocarcinoma [id number] stage 1A2 adenocarcinoma completely resected via minimally invasive right upper lobectomy.     PUNCTALPLASTY Bilateral 2018    Procedure: Bilateral Punctoplasty with Mini [place] Stent;  Surgeon: Levy Blue MD;  [facility]: [facility]     REPAIR ECTROPION BILATERAL Bilateral 2018    Procedure: Bilateral Lower Lid Ectropion Repair with Right Lower Lid Lesion Excision;  Surgeon: Levy Blue MD;  Location: [facility] OR          Social History:     Social History     Tobacco Use     Smoking status: Former Smoker     Years: 5.00     Types: Cigarettes     Last attempt to quit: 1981     Years since quittin.5     Smokeless tobacco: Never Used   Substance Use Topics     Alcohol use: Yes     Alcohol/week: 2.5 standard drinks     Types: 3 Standard drinks or equivalent per week     Frequency: 4 or more times a week     Drinks per session: 1 or 2     Binge frequency: Never          Family History:     Family History   Problem Relation Age of Onset     Cerebrovascular Disease Brother          stroke      Breast Cancer Sister      Kidney Disease Mother         Kidney stones     Other - See Comments Father          in [place] parkinsons or post surgical eye surgery     Cerebral aneurysm Brother      Hypothyroidism Sister      Glaucoma No family hx of      Macular Degeneration No family hx of            Allergies:      Allergies   Allergen Reactions     Pollen Extract      Other reaction(s): Headache  Sinus congestion, sinus  "\"drainage\", sneezing     Seasonal Allergies      Sinus congestion, sinus \"drainage\", sneezing     Nkda [No Known Drug Allergies]           Medications:     Current Outpatient Medications   Medication Sig     acetaminophen (TYLENOL) 500 MG tablet Take 1-2 tablets (500-1,000 mg) by mouth every 6 hours as needed for pain (arthritis)     Ascorbic Acid (VITAMIN C PO) Take 500 mg by mouth once     atorvastatin (LIPITOR) 20 MG tablet Take 1 tablet (20 mg) by mouth daily     chlorthalidone (HYGROTON) 25 MG tablet Take 1 tablet (25 mg) by mouth daily     Cholecalciferol (VITAMIN D) 1000 UNIT capsule Take 1 capsule by mouth daily.     Cyanocobalamin (VITAMIN B12 PO) Take 1 tablet by mouth. Pt states he takes this 2 or 3 times a week..      diclofenac (VOLTAREN) 1 % topical gel Apply 2 g topically 4 times daily     fluticasone (FLONASE) 50 MCG/ACT nasal spray 1-2 sprays each nostril once daily     gabapentin (NEURONTIN) 300 MG capsule One capsule at bedtime     loratadine (CLARITIN) 10 MG tablet Take 1 tablet (10 mg) by mouth daily as needed for allergies     losartan (COZAAR) 100 MG tablet Take 1 tablet (100 mg) by mouth every evening     metFORMIN (GLUCOPHAGE-XR) 500 MG 24 hr tablet Take 1 tablet (500 mg) by mouth daily (with dinner)     Multiple Vitamin (MULTIVITAMINS PO) Take 1 tablet by mouth daily.     Omega-3 Fatty Acids (FISH OIL PO) Take 1 capsule by mouth as needed     order for DME Back brace     potassium chloride ER (KLOR-CON M) 20 MEQ CR tablet Take 1 tablet (20 mEq) by mouth daily     sildenafil (REVATIO) 20 MG tablet Take 1-5 tablets (20 mg) by mouth as needed for sexual activity.  Never use with nitroglycerin, terazosin or doxazosin.     tamsulosin (FLOMAX) 0.4 MG capsule Take 1 capsule (0.4 mg) by mouth daily     tamsulosin (FLOMAX) 0.4 MG capsule Take 1 capsule (0.4 mg) by mouth daily     No current facility-administered medications for this visit.      Facility-Administered Medications Ordered in Other " Visits   Medication     iohexol (OMNIPAQUE) 300 mg/mL injection 10 mL          Review of Systems:     CONSTITUTIONAL: negative for fever, chills, change in weight  INTEGUMENTARY/SKIN: no rash or obvious new lesions  ENT/MOUTH: no sore throat, new sinus pain or nasal drainage  RESP: see interval history  CV: negative for chest pain, palpitations or peripheral edema  GI: no nausea, vomiting, change in stools  : no dysuria  MUSCULOSKELETAL: no myalgias, arthralgias  ENDOCRINE: blood sugars with adequate control  PSYCHIATRIC: mood stable  LYMPHATIC: no new lymphadenopathy  HEME: no bleeding or easy bruisability  NEURO: no numbness, weakness, headaches         Physical Exam:        Wt Readings from Last 4 Encounters:   06/08/20 83.9 kg (185 lb)   12/11/19 85.1 kg (187 lb 9.6 oz)   06/12/19 84.1 kg (185 lb 4.8 oz)   04/25/19 86.7 kg (191 lb 3.2 oz)     Constitutional:   Awake, alert and in no apparent distress     Eyes:   Nonicteric,      ENT:    Normal voice           Lungs:   Normal pattern of breathing     Neurologic:   Alert and conversant. Cranial nerves  intact.       Skin:   Warm, dry.  No rash on limited exam.    Psych: bright and friendly affect, no hallucinations       Current Laboratory Data:   Normal chem panel, cbc with borderline low wbc, hgb       Outside records from [place] requested and reviewed by me, summarized here.  He had a lobectomy for lung cancer at [facility] last year.  He had some mild post lobectomy pain but no other issues.        Again, thank you for allowing me to participate in the care of your patient.      Sincerely,    Santiago Stuart MD

## 2020-06-10 LAB — COPATH REPORT: NORMAL

## 2020-06-23 ENCOUNTER — TELEPHONE (OUTPATIENT)
Dept: UROLOGY | Facility: CLINIC | Age: 70
End: 2020-06-23

## 2020-06-23 NOTE — TELEPHONE ENCOUNTER
Left message for pt to call and schedule his 3 month virtual visit follow up for July with a PSA lab done at least 3 days prior.

## 2020-07-09 ENCOUNTER — TELEPHONE (OUTPATIENT)
Dept: FAMILY MEDICINE | Facility: CLINIC | Age: 70
End: 2020-07-09

## 2020-07-09 NOTE — LETTER
Vishnu Viveros  [address]  [place] [address]  : 1950  MRN:  [id number]      2020          Given the current outbreak of coronavirus, we do encourage social distancing and avoiding large crowds. Please excuse Mr. Vishnu Viveros from jury duty, as his age and pre-existing conditions puts him in the high-risk category in parag COVID-19. Please contact me if you have any questions or concerns.          Cody Logan MD

## 2020-07-09 NOTE — TELEPHONE ENCOUNTER
[facility] Call Center    Phone Message    May a detailed message be left on voicemail: yes     Reason for Call: Other: Asking to a nurse about a recent letter from [facility]. Patient might need Dr. Logan to write a letter for him. Thank you.      Action Taken: Message routed to:  [facility]    Travel Screening: Not Applicable

## 2020-07-10 NOTE — TELEPHONE ENCOUNTER
Called patient. Patient would like a letter in order to stay at home instead of attending jury duties due to COVID-19. Will route to provider for approval.    Filiberto Brasher MA on 7/10/2020 at 11:13 AM

## 2020-07-13 ENCOUNTER — MEDICAL CORRESPONDENCE (OUTPATIENT)
Dept: HEALTH INFORMATION MANAGEMENT | Facility: CLINIC | Age: 70
End: 2020-07-13

## 2020-07-28 ENCOUNTER — VIRTUAL VISIT (OUTPATIENT)
Dept: ONCOLOGY | Facility: CLINIC | Age: 70
End: 2020-07-28
Attending: FAMILY MEDICINE
Payer: COMMERCIAL

## 2020-07-28 DIAGNOSIS — C34.91 ADENOCARCINOMA, LUNG, RIGHT (H): ICD-10-CM

## 2020-07-28 PROCEDURE — 99214 OFFICE O/P EST MOD 30 MIN: CPT | Mod: 95 | Performed by: INTERNAL MEDICINE

## 2020-07-28 PROCEDURE — 40001009 ZZH VIDEO/TELEPHONE VISIT; NO CHARGE

## 2020-07-28 NOTE — LETTER
"    7/28/2020         RE: Vishnu Viveros  [address]  [place] [address]        Dear Colleague,    Thank you for referring your patient, Vishnu Viveros, to the [facility] CANCER CL[facility]IC. Please see a copy of my visit note below.    Vishnu Viveros is a 70 year old male who is being evaluated via a billable telephone visit.          Vitals - Patient Reported  Weight (Patient Reported): 83.9 kg (185 lb)  Height (Patient Reported): 162.6 cm (5' 4\")  BMI (Based on Pt Reported Ht/Wt): 31.75  Pain Score: No Pain (0)      I have reviewed and updated the patient's allergies and medication list.          Aundrea Cooley CMA      Phone call duration: 36 minutes    Chava Kelley DO    REASON FOR VISIT:    CANCER STAGE: Cancer Staging  No matching staging information was found for the patient.      HISTORY OF PRESENT ILLNESS:    Mr. Viveros is a 69 yo man who was diagnosed with adenocarcinoma of the lung in 2019. He was having some difficulty breathing and some wheezing.  He had a CXR done that was negative for pneumonia but did show a RUL nodule.  He had a CT chest in 2012 that showed this nodule.  CT was done on 1/4/19 that showed a 13.4mmx10.2 RUL nodule that grew from 8.5x6.4 in 2012.  He saw Dr. LAURIE Lopez from pulmonary who ordered a PET/CT scan - 3/9/19 showed that the RUL nodule was hypermetabolic and now measured 35m19qs.  There was no mediastinal LNs or other lesions on PET. Pt was set up for surgical resection, but ended up going to [facility].  He had a wedge resection and LN dissection by Dr. Covington.  Pathology showed a 1.8cm tumor margins were negative. 11 LNs were negative for involvement with tumor including station 7 and 4R. He tolerated the surgery well.  He has not had any other complications since then.     He is talking to me today to establish care.  He is feeling well. He has no complaints except for some chronic back pain. He has no shortness of breath. He is eating and " drinking well. He continues to work. He has no other symptoms.     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  He used to smoke <5pk years.      All- NKDA, has seasonal allergies.     Review Of Systems  10-point review of systems were negative except as noted in HPI.        EXAM:  There were no vitals taken for this visit.  GEN: alert and oriented x 3, nad  Rest of exam is deferred.     Current Outpatient Medications   Medication Sig Dispense Refill     acetaminophen (TYLENOL) 500 MG tablet Take 1-2 tablets (500-1,000 mg) by mouth every 6 hours as needed for pain (arthritis) 120 tablet 1     Ascorbic Acid (VITAMIN C PO) Take 500 mg by mouth once       atorvastatin (LIPITOR) 20 MG tablet Take 1 tablet (20 mg) by mouth daily 90 tablet 3     chlorthalidone (HYGROTON) 25 MG tablet Take 1 tablet (25 mg) by mouth daily 90 tablet 3     Cholecalciferol (VITAMIN D) 1000 UNIT capsule Take 1 capsule by mouth daily.       Cyanocobalamin (VITAMIN B12 PO) Take 1 tablet by mouth. Pt states he takes this 2 or 3 times a week..        diclofenac (VOLTAREN) 1 % topical gel Apply 2 g topically 4 times daily 100 g 3     fluticasone (FLONASE) 50 MCG/ACT nasal spray 1-2 sprays each nostril once daily 16 g 1     gabapentin (NEURONTIN) 300 MG capsule One capsule at bedtime 90 capsule 3     loratadine (CLARITIN) 10 MG tablet Take 1 tablet (10 mg) by mouth daily as needed for allergies 90 tablet 2     losartan (COZAAR) 100 MG tablet Take 1 tablet (100 mg) by mouth every evening 90 tablet 3      metFORMIN (GLUCOPHAGE-XR) 500 MG 24 hr tablet Take 1 tablet (500 mg) by mouth daily (with dinner) 90 tablet 3     Multiple Vitamin (MULTIVITAMINS PO) Take 1 tablet by mouth daily.       Omega-3 Fatty Acids (FISH OIL PO) Take 1 capsule by mouth as needed       order for DME Back brace 1 Device 0     potassium chloride ER (KLOR-CON M) 20 MEQ CR tablet Take 1 tablet (20 mEq) by mouth daily 90 tablet 3     sildenafil (REVATIO) 20 MG tablet Take 1-5 tablets (20 mg) by mouth as needed for sexual activity.  Never use with nitroglycerin, terazosin or doxazosin. 90 tablet 11     tamsulosin (FLOMAX) 0.4 MG capsule Take 1 capsule (0.4 mg) by mouth daily 90 capsule 3           Recent Labs   Lab Test 05/27/20  0957   WBC 3.2*   HGB 11.8*        @labrcent[na,potassium,chloride,co2,bun,cr@  Recent Labs   Lab Test 05/27/20  0957   PROTTOTAL 7.0   ALBUMIN 3.6   BILITOTAL 0.5   AST 21   ALT 25   ALKPHOS 49         No results found for this or any previous visit (from the past 744 hour(s)).        Assessment/Plan  Stage I NSCLC - G3mQ5G0 adenoca of lung (TTF1+) lepidic type. S/p R wedge resection. Did not get molecular testing at diagnosis.    CHEYENNE more than 1 year from his diagnosis.  He would like for me to follow up with him periodically to check on his scans. I suggested we repeat a scan in 6 months time.  But he is likely to be cured already.     He is not smoking currently and has not smoked for over 50 years.      F/u in 6 monhts with repeat CT Chest    I spent 25 minutes with the patient.  >50% of the time was spent in counseling and coordination of care.    Chava Kelley   of Medicine  Division of Hematology, Oncology, and Transplantation    Again, thank you for allowing me to participate in the care of your patient.        Sincerely,        Chava Kelley, DO

## 2020-07-28 NOTE — PROGRESS NOTES
"Vishnu Viveros is a 70 year old male who is being evaluated via a billable telephone visit.      The patient has been notified of following:     \"This telephone visit will be conducted via a call between you and your physician/provider. We have found that certain health care needs can be provided without the need for a physical exam.  This service lets us provide the care you need with a short phone conversation.  If a prescription is necessary we can send it directly to your pharmacy.  If lab work is needed we can place an order for that and you can then stop by our lab to have the test done at a later time.    Telephone visits are billed at different rates depending on your insurance coverage. During this emergency period, for some insurers they may be billed the same as an in-person visit.  Please reach out to your insurance provider with any questions.    If during the course of the call the physician/provider feels a telephone visit is not appropriate, you will not be charged for this service.\"    Patient has given verbal consent for Telephone visit?  Yes    What phone number would you like to be contacted at? [phone]     How would you like to obtain your AVS? Kalina     Vitals - Patient Reported  Weight (Patient Reported): 83.9 kg (185 lb)  Height (Patient Reported): 162.6 cm (5' 4\")  BMI (Based on Pt Reported Ht/Wt): 31.75  Pain Score: No Pain (0)      I have reviewed and updated the patient's allergies and medication list.          Aundrea Cooley CMA      Phone call duration: 36 minutes    Chava Kelley DO    REASON FOR VISIT:    CANCER STAGE: Cancer Staging  No matching staging information was found for the patient.      HISTORY OF PRESENT ILLNESS:    Mr. Viveros is a 69 yo man who was diagnosed with adenocarcinoma of the lung in 2019. He was having some difficulty breathing and some wheezing.  He had a CXR done that was negative for pneumonia but did show a RUL nodule.  He had a CT chest " in 2012 that showed this nodule.  CT was done on 1/4/19 that showed a 13.4mmx10.2 RUL nodule that grew from 8.5x6.4 in 2012.  He saw Dr. LAURIE Lopez from pulmonary who ordered a PET/CT scan - 3/9/19 showed that the RUL nodule was hypermetabolic and now measured [id number].  There was no mediastinal LNs or other lesions on PET. Pt was set up for surgical resection, but ended up going to [facility].  He had a wedge resection and LN dissection by Dr. Covington.  Pathology showed a 1.8cm tumor margins were negative. 11 LNs were negative for involvement with tumor including station 7 and 4R. He tolerated the surgery well.  He has not had any other complications since then.     He is talking to me today to establish care.  He is feeling well. He has no complaints except for some chronic back pain. He has no shortness of breath. He is eating and drinking well. He continues to work. He has no other symptoms.     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  He used to smoke <5pk years.      All- NKDA, has seasonal allergies.     Review Of Systems  10-point review of systems were negative except as noted in HPI.        EXAM:  There were no vitals taken for this visit.  GEN: alert and oriented x 3, nad  Rest of exam is deferred.     Current Outpatient Medications   Medication Sig Dispense Refill     acetaminophen (TYLENOL) 500 MG tablet Take 1-2 tablets (500-1,000 mg) by mouth every 6 hours as needed for pain (arthritis) 120 tablet 1     Ascorbic Acid (VITAMIN C PO) Take 500 mg by mouth once        atorvastatin (LIPITOR) 20 MG tablet Take 1 tablet (20 mg) by mouth daily 90 tablet 3     chlorthalidone (HYGROTON) 25 MG tablet Take 1 tablet (25 mg) by mouth daily 90 tablet 3     Cholecalciferol (VITAMIN D) 1000 UNIT capsule Take 1 capsule by mouth daily.       Cyanocobalamin (VITAMIN B12 PO) Take 1 tablet by mouth. Pt states he takes this 2 or 3 times a week..        diclofenac (VOLTAREN) 1 % topical gel Apply 2 g topically 4 times daily 100 g 3     fluticasone (FLONASE) 50 MCG/ACT nasal spray 1-2 sprays each nostril once daily 16 g 1     gabapentin (NEURONTIN) 300 MG capsule One capsule at bedtime 90 capsule 3     loratadine (CLARITIN) 10 MG tablet Take 1 tablet (10 mg) by mouth daily as needed for allergies 90 tablet 2     losartan (COZAAR) 100 MG tablet Take 1 tablet (100 mg) by mouth every evening 90 tablet 3     metFORMIN (GLUCOPHAGE-XR) 500 MG 24 hr tablet Take 1 tablet (500 mg) by mouth daily (with dinner) 90 tablet 3     Multiple Vitamin (MULTIVITAMINS PO) Take 1 tablet by mouth daily.       Omega-3 Fatty Acids (FISH OIL PO) Take 1 capsule by mouth as needed       order for DME Back brace 1 Device 0     potassium chloride ER (KLOR-CON M) 20 MEQ CR tablet Take 1 tablet (20 mEq) by mouth daily 90 tablet 3     sildenafil (REVATIO) 20 MG tablet Take 1-5 tablets (20 mg) by mouth as needed for sexual activity.  Never use with nitroglycerin, terazosin or doxazosin. 90 tablet 11     tamsulosin (FLOMAX) 0.4 MG capsule Take 1 capsule (0.4 mg) by mouth daily 90 capsule 3           Recent Labs   Lab Test 05/27/20  0957   WBC 3.2*   HGB 11.8*        @labrcent[na,potassium,chloride,co2,bun,cr@  Recent Labs   Lab Test 05/27/20  0957   PROTTOTAL 7.0   ALBUMIN 3.6   BILITOTAL 0.5   AST 21   ALT 25   ALKPHOS 49         No results found for this or any previous visit (from the past 744 hour(s)).        Assessment/Plan  Stage I NSCLC - [id number] adenoca of lung (TTF1+) lepidic type. S/p R wedge resection. Did not  get molecular testing at diagnosis.    CHEYENNE more than 1 year from his diagnosis.  He would like for me to follow up with him periodically to check on his scans. I suggested we repeat a scan in 6 months time.  But he is likely to be cured already.     He is not smoking currently and has not smoked for over 50 years.      F/u in 6 monhts with repeat CT Chest    I spent 25 minutes with the patient.  >50% of the time was spent in counseling and coordination of care.    Chava Kelley   of Medicine  Division of Hematology, Oncology, and Transplantation

## 2020-07-31 ENCOUNTER — TELEPHONE (OUTPATIENT)
Dept: OTOLARYNGOLOGY | Facility: CLINIC | Age: 70
End: 2020-07-31

## 2020-07-31 NOTE — TELEPHONE ENCOUNTER
[facility]  Dept: [phone]     Patient: Vishnu Viveros   : 1950  MRN: [id number]  Encounter: 10/15/19       Pre Visit Assessment    Health Maintenance Due   Topic Date Due     MEDICARE ANNUAL WELLNESS VISIT  2018     EYE EXAM  2019     TSH W/FREE T4 REFLEX  2019     INFLUENZA VACCINE (1) 2019     Chart Reviewed for Labs needed/Health Maintenance: Yes   If labs needed, orders placed:  Yes TSH with Reflex,   Lab appointment scheduled:  No    Notes:  Patient confirmed they will attend appointment:  No  Appointment rescheduled?  No    Ashley Santiago CMA at 5:07 PM on 10/15/2019      
Called patient's wife (his preferred contact) and let them know of the additional labs to be completed prior to the appointment. Left voicemail.     RAIN Peres at 1:18 PM sign on 10/16/2019    
Chart reviewed for pre-visit planning, additional labs ordered as soon to be due.  Vishnu chart reviewed today for pre visit planning - 2 attempts.    Diagnoses and all orders for this visit:    Diabetes mellitus (H)  -     TSH with free T4 reflex; Future  -     Hemoglobin A1c; Future  -     Albumin Random Urine Quantitative with Creat Ratio; Future  -     Comprehensive metabolic panel; Future    Disease of lipid metabolism  -     Lipid panel reflex to direct LDL Fasting; Future  -     Comprehensive metabolic panel; Future      Best wishes,  Murali Logan MD  
30-Jul-2020 11:30

## 2020-08-26 ENCOUNTER — ALLIED HEALTH/NURSE VISIT (OUTPATIENT)
Dept: INTERNAL MEDICINE | Facility: CLINIC | Age: 70
End: 2020-08-26
Payer: COMMERCIAL

## 2020-08-26 DIAGNOSIS — Z23 NEED FOR VACCINATION: Primary | ICD-10-CM

## 2020-08-26 NOTE — NURSING NOTE
Vishnu Viveros comes into clinic today at the request of Dr Logan Ordering Provider for shingles vaccine.        This service provided today was under the supervising provider of the day Dr Willoughby, who was available if needed.    Daisy Lindsay

## 2020-11-14 ENCOUNTER — HEALTH MAINTENANCE LETTER (OUTPATIENT)
Age: 70
End: 2020-11-14

## 2020-11-16 ENCOUNTER — OFFICE VISIT (OUTPATIENT)
Dept: FAMILY MEDICINE | Facility: CLINIC | Age: 70
End: 2020-11-16
Payer: COMMERCIAL

## 2020-11-16 VITALS
DIASTOLIC BLOOD PRESSURE: 74 MMHG | OXYGEN SATURATION: 100 % | HEART RATE: 66 BPM | WEIGHT: 182 LBS | BODY MASS INDEX: 31.24 KG/M2 | SYSTOLIC BLOOD PRESSURE: 129 MMHG

## 2020-11-16 DIAGNOSIS — Z00.01 ENCOUNTER FOR ROUTINE ADULT PHYSICAL EXAM WITH ABNORMAL FINDINGS: Primary | ICD-10-CM

## 2020-11-16 DIAGNOSIS — E11.9 TYPE 2 DIABETES MELLITUS TREATED WITHOUT INSULIN (H): ICD-10-CM

## 2020-11-16 DIAGNOSIS — Z00.00 MEDICARE ANNUAL WELLNESS VISIT, SUBSEQUENT: ICD-10-CM

## 2020-11-16 DIAGNOSIS — M48.062 SPINAL STENOSIS, LUMBAR REGION, WITH NEUROGENIC CLAUDICATION: ICD-10-CM

## 2020-11-16 DIAGNOSIS — D64.89 ANEMIA DUE TO OTHER CAUSE, NOT CLASSIFIED: ICD-10-CM

## 2020-11-16 DIAGNOSIS — M54.16 LUMBAR RADICULITIS: ICD-10-CM

## 2020-11-16 DIAGNOSIS — Z00.00 ROUTINE HISTORY AND PHYSICAL EXAMINATION OF ADULT: ICD-10-CM

## 2020-11-16 DIAGNOSIS — Z23 NEED FOR VACCINATION: ICD-10-CM

## 2020-11-16 DIAGNOSIS — N40.2 PROSTATE NODULE: ICD-10-CM

## 2020-11-16 DIAGNOSIS — Z86.0100 HISTORY OF COLONIC POLYPS: ICD-10-CM

## 2020-11-16 DIAGNOSIS — E08.42 DIABETIC POLYNEUROPATHY ASSOCIATED WITH DIABETES MELLITUS DUE TO UNDERLYING CONDITION (H): ICD-10-CM

## 2020-11-16 DIAGNOSIS — R97.20 ELEVATED PROSTATE SPECIFIC ANTIGEN (PSA): ICD-10-CM

## 2020-11-16 DIAGNOSIS — Z01.10 EVALUATION OF HEARING IMPAIRMENT: ICD-10-CM

## 2020-11-16 DIAGNOSIS — C34.91 ADENOCARCINOMA OF LUNG, STAGE 1, RIGHT (H): ICD-10-CM

## 2020-11-16 LAB
ALBUMIN SERPL-MCNC: 3.8 G/DL (ref 3.4–5)
ALP SERPL-CCNC: 51 U/L (ref 40–150)
ALT SERPL W P-5'-P-CCNC: 27 U/L (ref 0–70)
ANION GAP SERPL CALCULATED.3IONS-SCNC: 4 MMOL/L (ref 3–14)
AST SERPL W P-5'-P-CCNC: 14 U/L (ref 0–45)
BASOPHILS # BLD AUTO: 0 10E9/L (ref 0–0.2)
BASOPHILS NFR BLD AUTO: 0.8 %
BILIRUB SERPL-MCNC: 0.4 MG/DL (ref 0.2–1.3)
BUN SERPL-MCNC: 17 MG/DL (ref 7–30)
CALCIUM SERPL-MCNC: 9.3 MG/DL (ref 8.5–10.1)
CHLORIDE SERPL-SCNC: 106 MMOL/L (ref 94–109)
CHOLEST SERPL-MCNC: 126 MG/DL
CO2 SERPL-SCNC: 28 MMOL/L (ref 20–32)
CREAT SERPL-MCNC: 0.93 MG/DL (ref 0.66–1.25)
CREAT UR-MCNC: 54 MG/DL
DIFFERENTIAL METHOD BLD: ABNORMAL
EOSINOPHIL # BLD AUTO: 0.2 10E9/L (ref 0–0.7)
EOSINOPHIL NFR BLD AUTO: 6.3 %
ERYTHROCYTE [DISTWIDTH] IN BLOOD BY AUTOMATED COUNT: 13 % (ref 10–15)
FERRITIN SERPL-MCNC: 83 NG/ML (ref 26–388)
GFR SERPL CREATININE-BSD FRML MDRD: 82 ML/MIN/{1.73_M2}
GLUCOSE SERPL-MCNC: 100 MG/DL (ref 70–99)
HBA1C MFR BLD: 5.8 % (ref 0–5.6)
HCT VFR BLD AUTO: 38.4 % (ref 40–53)
HDLC SERPL-MCNC: 64 MG/DL
HGB BLD-MCNC: 12.5 G/DL (ref 13.3–17.7)
IMM GRANULOCYTES # BLD: 0 10E9/L (ref 0–0.4)
IMM GRANULOCYTES NFR BLD: 0 %
LDLC SERPL CALC-MCNC: 39 MG/DL
LYMPHOCYTES # BLD AUTO: 2 10E9/L (ref 0.8–5.3)
LYMPHOCYTES NFR BLD AUTO: 52.6 %
MCH RBC QN AUTO: 29.3 PG (ref 26.5–33)
MCHC RBC AUTO-ENTMCNC: 32.6 G/DL (ref 31.5–36.5)
MCV RBC AUTO: 90 FL (ref 78–100)
MICROALBUMIN UR-MCNC: <5 MG/L
MICROALBUMIN/CREAT UR: NORMAL MG/G CR (ref 0–17)
MONOCYTES # BLD AUTO: 0.3 10E9/L (ref 0–1.3)
MONOCYTES NFR BLD AUTO: 8.7 %
NEUTROPHILS # BLD AUTO: 1.2 10E9/L (ref 1.6–8.3)
NEUTROPHILS NFR BLD AUTO: 31.6 %
NONHDLC SERPL-MCNC: 62 MG/DL
NRBC # BLD AUTO: 0 10*3/UL
NRBC BLD AUTO-RTO: 0 /100
PLATELET # BLD AUTO: 192 10E9/L (ref 150–450)
POTASSIUM SERPL-SCNC: 3.5 MMOL/L (ref 3.4–5.3)
PROT SERPL-MCNC: 7.5 G/DL (ref 6.8–8.8)
PSA SERPL-MCNC: 5.35 UG/L (ref 0–4)
RBC # BLD AUTO: 4.27 10E12/L (ref 4.4–5.9)
SODIUM SERPL-SCNC: 138 MMOL/L (ref 133–144)
TRIGL SERPL-MCNC: 116 MG/DL
WBC # BLD AUTO: 3.8 10E9/L (ref 4–11)

## 2020-11-16 PROCEDURE — 99213 OFFICE O/P EST LOW 20 MIN: CPT | Mod: 25 | Performed by: FAMILY MEDICINE

## 2020-11-16 PROCEDURE — 85025 COMPLETE CBC W/AUTO DIFF WBC: CPT | Performed by: PATHOLOGY

## 2020-11-16 PROCEDURE — 36415 COLL VENOUS BLD VENIPUNCTURE: CPT | Performed by: PATHOLOGY

## 2020-11-16 PROCEDURE — 82043 UR ALBUMIN QUANTITATIVE: CPT | Performed by: PATHOLOGY

## 2020-11-16 PROCEDURE — 83036 HEMOGLOBIN GLYCOSYLATED A1C: CPT | Performed by: PATHOLOGY

## 2020-11-16 PROCEDURE — 99397 PER PM REEVAL EST PAT 65+ YR: CPT | Mod: 25 | Performed by: FAMILY MEDICINE

## 2020-11-16 PROCEDURE — 80061 LIPID PANEL: CPT | Performed by: PATHOLOGY

## 2020-11-16 PROCEDURE — 80053 COMPREHEN METABOLIC PANEL: CPT | Performed by: PATHOLOGY

## 2020-11-16 PROCEDURE — 84153 ASSAY OF PSA TOTAL: CPT | Performed by: PATHOLOGY

## 2020-11-16 PROCEDURE — 82728 ASSAY OF FERRITIN: CPT | Performed by: PATHOLOGY

## 2020-11-16 PROCEDURE — 90471 IMMUNIZATION ADMIN: CPT | Performed by: FAMILY MEDICINE

## 2020-11-16 PROCEDURE — 90662 IIV NO PRSV INCREASED AG IM: CPT | Performed by: FAMILY MEDICINE

## 2020-11-16 RX ORDER — GABAPENTIN 300 MG/1
CAPSULE ORAL
Qty: 180 CAPSULE | Refills: 3 | Status: SHIPPED | OUTPATIENT
Start: 2020-11-16 | End: 2021-06-07

## 2020-11-16 NOTE — NURSING NOTE
Medicare Annual Wellness Visit Previsit note    This 70 year old year old male presents for a  Medicare Wellness Exam.           Medical Care     Have you been to an ER or a hospital in the last year? No  What other specialists or organizations are involved in your medical care?    Current providers sharing in care for this patient include:  Patient Care Team       Relationship Specialty Notifications Start End    Murali Logan MD PCP - General Family Practice  4/23/12     Phone: [phone] Fax: [phone]         [address] [facility] [address] [facility] [address]    Rick Whaley MD MD Ophthalmology  6/26/15     Phone: [phone] Fax: [phone]         [address] [facility] [address] [facility] [address]    Murali Logan MD PCP [facility]  11/19/15     Phone: [phone] Fax: [phone]         [address] [place] [address] [facility] [address]    Murali Logan MD Referring Physician [facility]  11/19/15     Referring to [facility]    Phone: [phone] Fax: [phone]         [address] [place] [address] [facility] [address]    Derrek Sinclair MD MD Internal Medicine  11/19/15     Phone: [phone] Fax: [phone]         [address] [facility] [address] [facility] [address]    Leandro Montaño MD MD Urology  12/22/16     Phone: [phone] Fax: [phone]         [address] [facility] [address] [facility] [address]    Bran Sibley MD MD Neurology  7/31/17     Phone: [phone] Fax: [phone]         [id number] [facility] [address]    Joan Mckeon MD MD Ophthalmology  12/26/17     Phone: [phone] Fax: [phone]         [address] [facility] [address] [facility] [address]    Charles Parr MD MD Ophthalmology  12/26/17     Phone: [phone] Fax: [phone]         [address] [facility] [address]    Levy Blue MD MD Ophthalmology  4/25/19     Phone: [phone] Fax: [phone] [address]  [facility] [address] [facility] [address]    Maya Oliva, [place] Registered Nurse   10/29/19     Murali Logan MD Assigned PCP   6/21/20     Phone: [phone] Fax: [phone]         [address] [facility] 4 [facility] [address]    Nissen, Rick L, MD Assigned Surgical Provider   10/23/20     Phone: [phone] Fax: [phone]         [facility] [address] [address]    Santiago Stuart MD Assigned Pulmonology Provider   10/23/20     Phone: [phone] Fax: [phone]         [address] [facility] [address]    Chava Kelley DO Assigned Cancer Care Provider   10/23/20     Phone: [phone] Fax: [phone]         [address] [facility] [address] [facility] [address]    Shaan Baltazar DPM Assigned Musculoskeletal Provider   10/23/20     Phone: [phone] Fax: [phone]         [address] [facility] [address]                 Social History     Marital Status:  Who lives in your household? With wife  Does your home have any of the following safety concerns? Loose rugs in the hallway, no grab bars in the bathroom, no handrails on the stairs or have poorly lit areas?  Yes, no grab bar in bathroom  Do you feel threatened or controlled by a partner, ex-partner or anyone in your life? No  Has anyone hurt you physically, for example by pushing, hitting, slapping or kicking you   or forcing you to have sex? No  Do you need help with the phone, transportation, shopping, preparing meals, housework, laundry, medications or managing money? No   Have you noticed any hearing difficulties? No      Risk Behaviors and Healthy Habits     How many servings of fruits and vegetables do you eat a day? 2  How often do you exercise and what do you do? Walk 20 min a few times a week.  Do you frequently ride without a seatbelt? No  Do you use tobacco?  No  Do you use any other drugs? No       Do you use alcohol?Yes  Number of drinks per day 1  Number of drinking  days a week 3-4      Sexual Health     Are you sexually active? Yes with wife   If yes, with men, women, or both? Female  If yes, do you more than one current partner?No  If yes, do you use condoms? No  Have you had any sexually transmitted infections? No  Any sexual concerns? Yes ED

## 2020-11-17 PROBLEM — Z01.10 EVALUATION OF HEARING IMPAIRMENT: Status: ACTIVE | Noted: 2020-11-17

## 2020-11-17 SDOH — ECONOMIC STABILITY: FOOD INSECURITY: WITHIN THE PAST 12 MONTHS, YOU WORRIED THAT YOUR FOOD WOULD RUN OUT BEFORE YOU GOT MONEY TO BUY MORE.: NEVER TRUE

## 2020-11-17 SDOH — ECONOMIC STABILITY: INCOME INSECURITY: HOW HARD IS IT FOR YOU TO PAY FOR THE VERY BASICS LIKE FOOD, HOUSING, MEDICAL CARE, AND HEATING?: NOT HARD AT ALL

## 2020-11-17 SDOH — ECONOMIC STABILITY: TRANSPORTATION INSECURITY
IN THE PAST 12 MONTHS, HAS LACK OF TRANSPORTATION KEPT YOU FROM MEETINGS, WORK, OR FROM GETTING THINGS NEEDED FOR DAILY LIVING?: NO

## 2020-11-17 SDOH — ECONOMIC STABILITY: TRANSPORTATION INSECURITY
IN THE PAST 12 MONTHS, HAS THE LACK OF TRANSPORTATION KEPT YOU FROM MEDICAL APPOINTMENTS OR FROM GETTING MEDICATIONS?: NO

## 2020-11-17 SDOH — ECONOMIC STABILITY: FOOD INSECURITY: WITHIN THE PAST 12 MONTHS, THE FOOD YOU BOUGHT JUST DIDN'T LAST AND YOU DIDN'T HAVE MONEY TO GET MORE.: NEVER TRUE

## 2020-11-20 DIAGNOSIS — Z11.59 ENCOUNTER FOR SCREENING FOR OTHER VIRAL DISEASES: Primary | ICD-10-CM

## 2020-11-23 NOTE — TELEPHONE ENCOUNTER
SPINE PATIENTS - NEW PROTOCOL PREVISIT    RECORDS RECEIVED FROM: Internal   Date of Appt: 12/24/20 (appt rescheduled)   NOTES (FOR ALL VISITS) STATUS DETAILS   OFFICE NOTE from referring provider Internal Dr Murali Logan @ [facility]:  11/16/20   OFFICE NOTE from other specialist N/A    DISCHARGE SUMMARY from hospital N/A    DISCHARGE REPORT from ER N/A    EMG REPORT N/A    MEDICATION LIST Internal    IMAGING  (FOR ALL VISITS)     MRI (HEAD, NECK, SPINE) Internal [facility]:  MRI Lumbar Spine 12/19/20 *scheduled*  MRI Lumbar Spine 8/8/17   XRAY (SPINE) *NEUROSURGERY* Internal [facility]:  XR Lumbar Spine 12/19/20 *scheduled*   CT (HEAD, NECK, SPINE) N/A

## 2020-11-24 DIAGNOSIS — M48.062 LUMBAR STENOSIS WITH NEUROGENIC CLAUDICATION: Primary | ICD-10-CM

## 2020-12-01 ENCOUNTER — VIRTUAL VISIT (OUTPATIENT)
Dept: PULMONOLOGY | Facility: CLINIC | Age: 70
End: 2020-12-01
Attending: INTERNAL MEDICINE
Payer: COMMERCIAL

## 2020-12-01 DIAGNOSIS — C34.91 ADENOCARCINOMA OF LUNG, STAGE 1, RIGHT (H): Primary | ICD-10-CM

## 2020-12-01 PROCEDURE — 99215 OFFICE O/P EST HI 40 MIN: CPT | Mod: TEL | Performed by: INTERNAL MEDICINE

## 2020-12-01 NOTE — PROGRESS NOTES
"Vishnu Viveros is a 70 year old male who is being evaluated via a billable video visit.      The patient has been notified of following:     \"This video visit will be conducted via a call between you and your physician/provider. We have found that certain health care needs can be provided without the need for an in-person physical exam.  This service lets us provide the care you need with a video conversation.  If a prescription is necessary we can send it directly to your pharmacy.  If lab work is needed we can place an order for that and you can then stop by our lab to have the test done at a later time.    Video visits are billed at different rates depending on your insurance coverage.  Please reach out to your insurance provider with any questions.    If during the course of the call the physician/provider feels a video visit is not appropriate, you will not be charged for this service.\"    Patient has given verbal consent for Video visit? Yes  How would you like to obtain your AVS? MyChart  If you are dropped from the video visit, the video invite should be resent to: Text to cell phone: [phone]  Will anyone else be joining your video visit? No      Because of technology issues this was changed to a telephone visit.    70-year-old man with lung cancer resection here for follow-up.    His most recent CT scan showed no concern for recurrence.  He initially had a scan scheduled for visit visit but this is been pushed back to January with his oncology visit.    His primary issue is concerns with radiation exposure due to his follow-up imaging.  He is very hesitant to do 6-month CTs for the first 2 years after his surgery.    I explained to the best my ability the balance of risk and benefit.  He will hold off for now.  Most likely get a CT in January.    I will schedule him for follow-up in 7 months to line up with his next follow-up CT scan.    60 minutes spent on this telephone visit.    Santiago Stuart, " MD  Pulmonology

## 2020-12-01 NOTE — LETTER
"12/1/2020       RE: Vishnu Viveros  [address]  [place] [address]     Dear Colleague,    Thank you for referring your patient, Vishnu Viveros, to the [facility] at [facility]. Please see a copy of my visit note below.    Vishnu Viveros is a 70 year old male who is being evaluated via a billable video visit.      The patient has been notified of following:     \"This video visit will be conducted via a call between you and your physician/provider. We have found that certain health care needs can be provided without the need for an in-person physical exam.  This service lets us provide the care you need with a video conversation.  If a prescription is necessary we can send it directly to your pharmacy.  If lab work is needed we can place an order for that and you can then stop by our lab to have the test done at a later time.    Video visits are billed at different rates depending on your insurance coverage.  Please reach out to your insurance provider with any questions.    If during the course of the call the physician/provider feels a video visit is not appropriate, you will not be charged for this service.\"    Patient has given verbal consent for Video visit? Yes  How would you like to obtain your AVS? MyChart  If you are dropped from the video visit, the video invite should be resent to: Text to cell phone: [phone]  Will anyone else be joining your video visit? No      Because of technology issues this was changed to a telephone visit.    70-year-old man with lung cancer resection here for follow-up.    His most recent CT scan showed no concern for recurrence.  He initially had a scan scheduled for visit visit but this is been pushed back to January with his oncology visit.    His primary issue is concerns with radiation exposure due to his follow-up imaging.  He is very hesitant to do 6-month CTs for the first 2 years after " his surgery.    I explained to the best my ability the balance of risk and benefit.  He will hold off for now.  Most likely get a CT in January.    I will schedule him for follow-up in 7 months to line up with his next follow-up CT scan.    60 minutes spent on this telephone visit.    Santiago Stuart MD  Pulmonology      Again, thank you for allowing me to participate in the care of your patient.      Sincerely,    Santiago Stuart MD

## 2020-12-03 ENCOUNTER — PRE VISIT (OUTPATIENT)
Dept: NEUROSURGERY | Facility: CLINIC | Age: 70
End: 2020-12-03

## 2020-12-09 ENCOUNTER — TELEPHONE (OUTPATIENT)
Dept: NEUROSURGERY | Facility: CLINIC | Age: 70
End: 2020-12-09

## 2020-12-19 ENCOUNTER — ANCILLARY PROCEDURE (OUTPATIENT)
Dept: MRI IMAGING | Facility: CLINIC | Age: 70
End: 2020-12-19
Attending: NEUROLOGICAL SURGERY
Payer: COMMERCIAL

## 2020-12-19 ENCOUNTER — ANCILLARY PROCEDURE (OUTPATIENT)
Dept: GENERAL RADIOLOGY | Facility: CLINIC | Age: 70
End: 2020-12-19
Attending: NEUROLOGICAL SURGERY
Payer: COMMERCIAL

## 2020-12-19 DIAGNOSIS — M48.062 LUMBAR STENOSIS WITH NEUROGENIC CLAUDICATION: ICD-10-CM

## 2020-12-19 PROCEDURE — 72148 MRI LUMBAR SPINE W/O DYE: CPT | Performed by: STUDENT IN AN ORGANIZED HEALTH CARE EDUCATION/TRAINING PROGRAM

## 2020-12-19 PROCEDURE — 72110 X-RAY EXAM L-2 SPINE 4/>VWS: CPT | Performed by: RADIOLOGY

## 2020-12-24 ENCOUNTER — VIRTUAL VISIT (OUTPATIENT)
Dept: NEUROSURGERY | Facility: CLINIC | Age: 70
End: 2020-12-24
Payer: COMMERCIAL

## 2020-12-24 DIAGNOSIS — M43.16 SPONDYLOLISTHESIS, LUMBAR REGION: ICD-10-CM

## 2020-12-24 DIAGNOSIS — M40.36 FLATBACK SYNDROME OF LUMBAR REGION: ICD-10-CM

## 2020-12-24 DIAGNOSIS — M48.062 LUMBAR STENOSIS WITH NEUROGENIC CLAUDICATION: Primary | ICD-10-CM

## 2020-12-24 PROCEDURE — 99203 OFFICE O/P NEW LOW 30 MIN: CPT | Mod: 95 | Performed by: NEUROLOGICAL SURGERY

## 2020-12-24 NOTE — LETTER
"12/24/2020       RE: Vishnu Viveros  [address]  [place] [address]     Dear Colleague,    Thank you for referring your patient, Vishnu Viveros, to the [facility] NEUROSURGERY CLINIC [place] at [facility]. Please see a copy of my visit note below.    Vishnu Viveros is a 70 year old male who is being evaluated via a billable video visit.       The patient has been notified of following:      \"This video visit will be conducted via a call between you and your physician/provider. We have found that certain health care needs can be provided without the need for an in-person physical exam.  This service lets us provide the care you need with a video conversation.  If a prescription is necessary we can send it directly to your pharmacy.  If lab work is needed we can place an order for that and you can then stop by our lab to have the test done at a later time.     Video visits are billed at different rates depending on your insurance coverage.  Please reach out to your insurance provider with any questions.     If during the course of the call the physician/provider feels a video visit is not appropriate, you will not be charged for this service.\"     Patient has given verbal consent for Video visit? Yes  How would you like to obtain your AVS? Mail a copy  Patient would like the video invitation sent by: Text to cell phone: on file  Will anyone else be joining your video visit? No      Type of service:  Video Visit    Video Start Time: 9:47 AM  Video End Time:  10:04 AM    Originating Location (pt. Location): Home    Distant Location (provider location):  [facility] NEUROSURGERY     [facility]    Provider Notes:  This is a video visit conducted due to systemwide and statewide restrictions on non-urgent clinic visits due to COVID-19 pandemic concerns. The patient did not identify any urgent or red-flag symptoms that would require " in-person evaluation.          Neurosurgery Clinic Note    Chief Complaint: low back pain    History of Present Illness:  It was a pleasure to evaluate Vishnu Viveros in clinic today at the kind referral of Murali Logan MD  [address] [place] [id number].    Vishnu Viveros is a 70 year old male with history of diabetes, hypertension, hyperlipidemia, former smoking, and lung cancer presenting with low back pain radiating to left leg, right leg is minor; left leg is constant and starts in hip area radiating to knees to toes;  Exacerbated by standing or walking; can walk a couple of blocks then rests and starts again  Minor relief with rest or leaning forward    No recent PT; about 3-4 years ago had PT    Review of Systems   Constitutional: Negative for activity change, appetite change, chills, fatigue, fever and unexpected weight change.   HENT: Negative for trouble swallowing.    Eyes: Negative for visual disturbance.   Respiratory: Negative for shortness of breath.    Cardiovascular: Negative for leg swelling.  Gastrointestinal: Negative for abdominal pain, nausea and vomiting.   Endocrine: Negative for cold intolerance.  Genitourinary: Negative for incontinence, frequency and urgency.   Musculoskeletal: Positive for back pain.   Skin: Negative for color change  Allergic/Immunologic: Negative for immunocompromised state.  Neurological: Negative for tremors, speech difficulty, weakness, numbness and headaches.   Hematological: Does not bruise/bleed easily.   Psychiatric/Behavioral: The patient is not nervous/anxious.     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 eyelid, unspecified laterality      History of tobacco use      HLD (hyperlipidemia)      HTN (hypertension)      Obesity (BMI 30.0-34.9)      Pulmonary nodule        Past Surgical History:   Procedure  Laterality Date     APPENDECTOMY OPEN CHILD       ARTHROSCOPY KNEE Left      BIOPSY PROSTATE TRANSRECTAL       EXCISE PTERYGIUM Right      EXCISE PTERYGIUM Right 2015     INJECT EPIDURAL LUMBAR / SACRAL SINGLE N/A 2016    Procedure: INJECT EPIDURAL LUMBAR / SACRAL SINGLE;  Surgeon: Judah Henriquez MD;  Location: [facility] OR     INJECT PARAVERTEBRAL FACET JOINT LUMBAR / SACRAL FIRST Right 2016    Procedure: INJECT PARAVERTEBRAL FACET JOINT LUMBAR / SACRAL FIRST;  Surgeon: Judah Henriquez MD;  [facility]: [facility]     INJECT PARAVERTEBRAL FACET JOINT LUMBAR / SACRAL FIRST Right 2016    Procedure: INJECT PARAVERTEBRAL FACET JOINT LUMBAR / SACRAL FIRST;  Surgeon: Judah Henriquez MD;  Location: [facility] OR     MEDIASTINOSCOPY Right 2019     [facility]  Dr Hemanth Covington 19 bronchoscopy, cervical mediastinoscopy for staging and minimally invasive right thoracoscopic wedge resection 1.8 cm invasive adenocarcinoma [id number] stage 1A2 adenocarcinoma completely resected via minimally invasive right upper lobectomy.     PUNCTALPLASTY Bilateral 2018    Procedure: Bilateral Punctoplasty with Mini [place] Stent;  Surgeon: Levy Blue MD;  Location: [facility] OR     REPAIR ECTROPION BILATERAL Bilateral 2018    Procedure: Bilateral Lower Lid Ectropion Repair with Right Lower Lid Lesion Excision;  Surgeon: Levy Blue MD;  Location: [facility] OR       Social History     Socioeconomic History     Marital status:      Spouse name: Carmen     Number of children: 2     Years of education: Not on file     Highest education level: Not on file   Occupational History     Not on file   Social Needs     Financial resource strain: Not hard at all     Food insecurity     Worry: Never true     Inability: Never true     Transportation needs     Medical: No     Non-medical: No   Tobacco Use     Smoking status: Former Smoker     Years: 5.00     Types: Cigarettes     Quit date: 1981     Years since quittin.1      Smokeless tobacco: Never Used   Substance and Sexual Activity     Alcohol use: Yes     Alcohol/week: 2.5 standard drinks     Types: 3 Standard drinks or equivalent per week     Frequency: 4 or more times a week     Drinks per session: 1 or 2     Binge frequency: Never     Drug use: No     Sexual activity: Not Currently     Partners: Female   Lifestyle     Physical activity     Days per week: Not on file     Minutes per session: Not on file     Stress: Not on file   Relationships     Social connections     Talks on phone: Not on file     Gets together: Not on file     Attends [employer] service: Not on file     Active member of club or organization: Not on file     Attends meetings of clubs or organizations: Not on file     Relationship status: Not on file     Intimate partner violence     Fear of current or ex partner: Not on file     Emotionally abused: Not on file     Physically abused: Not on file     Forced sexual activity: Not on file   Other Topics Concern     Not on file   Social History Narrative     works as [employer].currently working remotely        His immediate family; his wife has MS and is an RN at [employer], his two children are in good health.           family history includes Breast Cancer in his sister; Cerebral aneurysm in his brother; Cerebrovascular Disease in his brother; Hypothyroidism in his sister; Kidney Disease in his mother; Other - See Comments in his father.        IMAGING per my own measurement and interpretation:  MRI: transitional segment anatomy, the way radiology has labeled, the first mobile segment is L4-5; central stenosis L1-L4  Standing xrays:        Xr Lumbar Spine G/e 4 Views    Result Date: 12/19/2020  4 views lumbar spine radiographs 12/19/2020 2:51 PM History: low back pain, please obtain standing AP/lateral/flex/ext xrays, include femoral heads on standing neutral lateral; Lumbar stenosis with neurogenic claudication Comparison: MR from the  same date and 8/8/2017, radiographs 5/17/2006 Findings: Standing  AP, lateral including flexion extension views of the lumbar spine were obtained. In keeping with numbering convention of the prior study, 5 lumbar type vertebral bodies with hypoplastic T12 ribs, sacralized L5 with rudimentary disc space at L5-S1 are assumed for the purpose of this dictation. There is no acute osseous abnormality.  Intervertebral disc fusion across L4-L5. Multi-level degenerative changes greatest at L3-L4 with severe disc space loss. Additionally, moderate to severe disc space loss especially posteriorly from L1 to L3 are also present. Lower lumbar spine predominant facet arthropathies. Hypertrophic changes of spinous processes. Mild anterior wedging of T12 vertebral body, similar to prior. On the flexion/extension views, 9 mm anterolisthesis of L3 on L4 reduces to 6 mm. Vascular calcifications. The visualized bowel gas pattern is non-obstructive. Degenerative changes of bilateral hips with prominent lateral acetabular osteophytes. Degenerative changes of sacrococcygeal junction.     IMPRESSION: In keeping with numbering convention of the prior study, 5 lumbar type vertebral bodies with hypoplastic T12 ribs, sacralized L5 with rudimentary disc space at L5-S1 are assumed for the purpose of this dictation. 1. Severe degenerative disc changes at L3-L4 with grade 1 anterolisthesis with approximately 3 mm motion between flexion and extension. JORGE LINNAHASHI      Mri Lumbar Spine W/o Contrast    Result Date: 12/19/2020  MR LUMBAR SPINE W/O CONTRAST 12/19/2020 12:59 PM Provided History: L/S-spine stenosis; Lumbar stenosis with neurogenic claudication ICD-10: Lumbar stenosis with neurogenic claudication Comparison: Lumbar spine MRI from 8/8/2017 and PET CT from 3/9/2019. Technique: Sagittal T1-weighted, sagittal STIR, 3D volumetric axial and sagittal reconstructed T2-weighted images of the lumbar spine were obtained without intravenous  contrast. Findings: There are 4 lumbar-type vertebrae. Lumbosacral transitional vertebra with sacralized L5.The tip of the conus medullaris is at T12-L1.  Grade 1 anterolisthesis of L3 over L4, similar to prior study. Fusion of L4-5 and partial fusion of L5-S1. Multilevel disc height narrowing, endplate degenerative changes and disc desiccation. Schmorl's node-like deformity at L1 and L3 inferior endplate. Cauda equina nerve roots are tangled secondary to severe spinal canal stenosis at L3-4. On a level by level basis: T12-L1: No significant spinal canal or neuroforaminal stenosis. L1-2: Disc bulge, bilateral significant facet arthropathy and ligamentum flavum thickening, greater on the right. Moderate left, moderate to severe right neural foraminal stenosis and severe spinal canal stenosis. L2-3: Posterior disc bulge. Bilateral facet arthropathy and ligamentum flavum thickening. Bilateral mild-to-moderate neural foraminal stenosis. Moderate spinal canal stenosis. L3-4: Grade 1 anterolisthesis. Unroofing of the disc. Posterior disc bulge. Bilateral marked facet arthropathy and ligamentum flavum thickening. Severe spinal canal stenosis. Bilateral moderate severe neural foraminal stenosis. L4-5: Fusion. Mild left and moderate right neural foraminal stenosis. Mild spinal canal stenosis. L5-S1: Bilateral facet arthropathy and ligamentum flavum thickening. No significant spinal canal or neuroforaminal stenosis. Visualized retroperitoneal structures are grossly unremarkable.     Impression: Lumbosacral transitional vertebra with sacralized L5. Multilevel lumbar spondylosis as above. Most notably, there is severe spinal canal stenosis at L1-2 and L3-4, worse at L3-4. Advanced neural foraminal stenosis at L1-2 and L3-4 as above. Overall findings are slightly worsened compared to 8/8/2017 MRI.  I have personally reviewed the examination and initial interpretation and I agree with the findings. RAFIA COHN MD    Ct Chest  W/o Contrast    Result Date: 6/3/2020  EXAM:  CT CHEST W/O CONTRAST . 6/3/2020 3:44 PM TECHNIQUE:  Helical CT images from the thoracic inlet through the upper abdomen were obtained without intravenous contrast. Contrast dose: None COMPARISON: 1/4/2018 HISTORY:   Non-small cell lung cancer, post treatment, no evidence of disease; Post right upper lobectomy Jacobson and mediastinal lymphadenectomy for invasive adenocarcinoma; Adenocarcinoma, lung, right (H) FINDINGS: CHEST: LUNGS: The central tracheobronchial tree is clear. Postsurgical changes of right upper lobectomy. Linear scarring/atelectasis along the peripheral right interlobar fissures and the resection site. Subtle groundglass opacities within the right lower lobe adjacent to the major fissure and along the posterior medial aspects of the right lower lobe. No pleural effusion or pneumothorax. Calcified pulmonary nodules in the left lower lobe. No new or enlarging pulmonary nodule. MEDIASTINUM: Subcentimeter hypodensity in the left lobe of the thyroid unchanged from 1/4/2019. The descending aorta and main pulmonary artery are normal in caliber. The heart is not enlarged. No pericardial effusion. No enlarged mediastinal, hilar or axillary lymph nodes. UPPER ABDOMEN: 3.8 cm exophytic right midpole renal cyst. No acute findings in the upper abdomen. BONES/SOFT TISSUES: No suspicious or acute osseous lesions. Bilateral gynecomastia.     IMPRESSION: Postsurgical changes of right upper lobectomy with linear scarring/atelectasis along the resection site and in the right middle lobe. No definite evidence of local recurrence. I have personally reviewed the examination and initial interpretation and I agree with the findings. CHIQUI SWIFT MD    Ct Chest W/o Contrast    Result Date: 1/4/2019  EXAMINATION: CT CHEST W/O CONTRAST, 1/4/2019 1:14 PM CLINICAL HISTORY: Acute resp illness, > 40 years old; pulmonary nodule RUL; Pulmonary nodules COMPARISON: Radiograph 1/2/2019, CT  11/12/2012. TECHNIQUE: CT imaging obtained through the chest without contrast. Coronal and axial MIP reformatted images obtained. CONTRAST:  none. FINDINGS: Lines and tubes: None. Mediastinum: No thyroid nodules. Central tracheobronchial tree is patent. Heart size is normal. No pericardial effusion.  Normal thoracic vasculature. No thoracic lymphadenopathy. Elevated left hemidiaphragm. Lungs: Few lobular areas of mosaic attenuation, which may be secondary to small airways disease. No pleural effusion or pneumothorax. Bibasilar dependent atelectasis. Scattered granulomas in the left lower lobe. Spiculated right upper lobe nodule on series 4 image 103 measures 13.4 x 10.2 mm, showing slight interval growth since 2010 when it measured 8.5 x 6.4, measuring 9.7 x 7.7 in 2011, measuring 11.7 x 9.6 in 2012. 4 mm nodule in the right major fissure on series 4 image 155, stable. Bones and soft tissues: No suspicious bone findings. Upper abdomen: Limited. Right renal interpolar region hypodensity, likely a simple cyst.     IMPRESSION: 1. 12 mm spiculated nodule right upper lobe slow growth since 2010, associated pleural tag. Though this is indeterminate, findings are suspicious for low-grade neoplasm. Recommend PET/CT/biopsy for further evaluation . 2. Other incidental findings as described in the body support. [Consider Follow Up: Lung nodule] This report will be copied to the [facility] to ensure a provider acknowledges the finding. I have personally reviewed the examination and initial interpretation and I agree with the findings. CHIQUI SWIFT MD    Ct Soft Tissue Neck W Contrast    Result Date: 3/11/2019  PET CT fusion examination 3/9/2019 2:04 PM 1. Neck CT with contrast 2. PET study of the neck 3. PET CT fusion study of the neck History:  68-year-old male with enlarging right upper lobe nodule which has been followed since 2010.. Comparison: Chest CT 1/4/2019 and 11/12/2012 Technique: Please refer to the  accompanying whole body PET-CT for report of the dose and whole body PET-CT findings. Regarding the neck, axial images were obtained after nonionic intravenous contrast administration, with sagittal and coronal reconstructions performed. Neck CT images were reviewed in bone, soft tissue, and lung windows, with review of the fused PET-CT images as well in multiple planes. Findings: Regarding the mucosal spaces, there is asymmetric metabolic activity in the right lateral oropharyngeal wall  with max SUV of 8.4. On CT there is very subtle subcentimeter mucosal protuberance (series 5, image 64). There is slight asymmetric uptake of the left submandibular gland compared with the right however on CT no abnormality is identified. Evaluation of the mucosal space demonstrates no abnormality or abnormal metabolic uptake on PET CT in the nasopharynx, hypopharynx or the glottis. The tongue base appears normal. Parotid glands are normal.. Regarding thyroid gland, there are multiple non-FDG avid subcentimeter sized nodules. There is no evident cervical lymphadenopathy, and the cervical lymph nodes are within normal limits by size criteria. No abnormal metabolic uptake on PET CT. Limited evaluation of the cervical vertebra demonstrates degenerative disc disease at C5 C7. Posterior lateral uncovertebral joint hypertrophy resulting in bilateral moderate neural foraminal stenosis and mild spinal canal narrowing C6-7. The visualized paranasal sinuses and mastoid air cells are clear. The major vascular structures in the neck are patent. Limited evaluation of the intracranial structures demonstrates mild generalized cerebral atrophy and periventricular hypoattenuation most likely secondary to underlying chronic small as ischemic disease. Gray-white matter differentiation is preserved. No space-occupying lesion. No abnormal FDG uptake intracranially within limits of this study. Calvarium is unremarkable. Normal orbits. Please refer to  the whole body PET CT performed as a separate report, for the findings of the remainder of the body.      Impression: 1. Asymmetric hypermetabolic activity and corresponding subcentimeter nodular density along the mucosal surface of the right lateral oropharyngeal wall. Recommend consultation to ENT for direct visualization. 2. No cervical lymphadenopathy. 3. Please refer to the whole body PET CT performed as a separate report, for the findings of the remainder of the body. [Consider Follow Up: Right hypermetabolic/asymmetric lateral oropharyngeal wall. Recommend consultation to ENT for direct visualization. ] This report will be copied to the [facility] to ensure a provider acknowledges the finding. I have personally reviewed the examination and initial interpretation and I agree with the findings. TITUS BAEZ MD    Fundus Autofluorescence Image (faf) Ou (both Eyes)    Result Date: 1/7/2020  Performed by: shivani . Right Eye Reliability of the test: Good . Findings: Irregular . Interval: Same . Left Eye Reliability of the test: Good . Findings: Irregular . Interval: Same .       Vitamin D:  Vitamin D Deficiency Screening Results:  Lab Results   Component Value Date    VITDT 46 11/19/2018    VITDT 34 07/31/2017    VITDT 50 10/30/2014    VITDT 29 (L) 05/19/2014    VITDT 28 (L) 11/14/2013     25 OH Vit D2   Date Value Ref Range Status   03/09/2012 12 ug/L Final   11/14/2011 <5 ug/L Final     25 OH Vit D3   Date Value Ref Range Status   03/09/2012 17 ug/L Final   11/14/2011 21 ug/L Final     25 OH Vit D total   Date Value Ref Range Status   03/09/2012 29 (L) 30 - 75 ug/L Final     Comment:     Season, race, dietary intake, and treatment affect the concentration of   25-hydroxy-Vitamin D. Values may decrease during winter months and increase   during summer months. Values less than 30 ug/L may indicate Vitamin D   deficiency.   11/14/2011 (L) 30 - 75 ug/L Final    <26  Season, race, dietary intake, and  "treatment affect the concentration of   25-hydroxy-Vitamin D. Values may decrease during winter months and increase   during summer months. Values less than 30 ug/L may indicate Vitamin D   deficiency.         Nutritional Status:  Estimated body mass index is 31.24 kg/m  as calculated from the following:    Height as of 6/8/20: 1.626 m (5' 4\").    Weight as of 11/16/20: 82.6 kg (182 lb).    Lab Results   Component Value Date    ALBUMIN 3.8 11/16/2020       Diabetes Screening:  Lab Results   Component Value Date    A1C 5.8 11/16/2020    A1C 6.0 05/27/2020    A1C 6.2 10/26/2019    A1C 6.4 06/12/2019    A1C 6.5 11/19/2018       Nicotine Usage:    No      Video Physical Exam   There were no vitals taken for this visit.  Constitutional: Oriented to person, place, and time. Appears well-developed and well-nourished. Cooperative. No distress.     Musculoskeletal:     Lumbar flexion/extension range of motion: normal    Neurological: alert and oriented to person, place, and time.   No cranial nerve deficit   sensory deficit plantar foot  Gait antalgic    ASSESSMENT:  Vishnu Viveros is a 70 year old male with diabetes, hypertension, hyperlipidemia, former lung cancer, with lumbar spondylolisthesis, lumbar flatback deformity and lumbar stenosis with neurogenic claudication    PLAN:  If we use first mobile spine segment (labeled L4-5 by Radiology) above transitional segment to do pelvic parameter measurements, patient has PI in low 40s, global lordosis in low 30s, but only 2 degrees of lordosis in lower 2 lumbar segments, which is significantly low. He is hyperextending rest of lumbar spine to compensate and has degenerative spondylolisthesis at L3-4 and stenosis with disc herniations at L1-2, L2-3. Therefore, I think he would need an interbody fusion to correct his lumbar alignment. A decompression surgery will not treat the spondylolisthesis movement and will not correct the hypolordosis and his stenosis is likely to recur " if decompression alone is performed due to these reasons.      Start PT; patient does not want to consider fusion surgery but I think that this will be needed  Previously had injection with temporary relief, I expect any injection would be temporary but he wants another one so I will order another epidural steroid injection interlaminar at L1-2    Return to clinic if no help with PT/injections; I will wait to order a CT lumbar spine and EOS xrays, he will need these if he wants to consider surgery and would return to my clinic with these studies completed:  We don't have complete imaging of entire spine; and with the alignment abnormalities, he warrants this. The patient will require standing long-cassette xrays with full limb evaluation on EOS machine to evaluate sagittal and coronal spinal balance. CT scan noncontrast lumbar spine to evaluate disc space mobility at L4-5.    Audelia Baldwin MD    [facility] Department of Neurosurgery  Complex Spinal Deformity, Scoliosis, and Minimally Invasive Spine Surgery Specialist  Office: [phone]    12/24/2020    I spent 30 minutes in patient care with greater than 50% (17 minutes) spent in counseling and/or coordination of care.

## 2020-12-24 NOTE — PATIENT INSTRUCTIONS
Dr. Baldwin ordered Physical Therapy and a lumbar epidural steroid injection. You will need to schedule those.    If you want to consider surgery, you will need to call [phone] make an appointment to see Dr. Baldwin in clinic, and she will order a CT scan of your spine and special xrays to help with surgery planning.

## 2020-12-24 NOTE — PROGRESS NOTES
"Vishnu Viveros is a 70 year old male who is being evaluated via a billable video visit.       The patient has been notified of following:      \"This video visit will be conducted via a call between you and your physician/provider. We have found that certain health care needs can be provided without the need for an in-person physical exam.  This service lets us provide the care you need with a video conversation.  If a prescription is necessary we can send it directly to your pharmacy.  If lab work is needed we can place an order for that and you can then stop by our lab to have the test done at a later time.     Video visits are billed at different rates depending on your insurance coverage.  Please reach out to your insurance provider with any questions.     If during the course of the call the physician/provider feels a video visit is not appropriate, you will not be charged for this service.\"     Patient has given verbal consent for Video visit? Yes  How would you like to obtain your AVS? Mail a copy  Patient would like the video invitation sent by: Text to cell phone: on file  Will anyone else be joining your video visit? No      Type of service:  Video Visit    Video Start Time: 9:47 AM  Video End Time:  10:04 AM    Originating Location (pt. Location): Home    Distant Location (provider location):  [facility] NEUROS[facility]     Platform used for Video Visit: [id number]      Provider Notes:  This is a video visit conducted due to systemwide and statewide restrictions on non-urgent clinic visits due to COVID-19 pandemic concerns. The patient did not identify any urgent or red-flag symptoms that would require in-person evaluation.        Neurosurgery Clinic Note    Chief Complaint: low back pain    History of Present Illness:  It was a pleasure to evaluate Vishnu Viveros in clinic today at the kind referral of Murali Logan MD  [address] [place] [id number].    Vishnu Viveros is a 70 year old " male with history of diabetes, hypertension, hyperlipidemia, former smoking, and lung cancer presenting with low back pain radiating to left leg, right leg is minor; left leg is constant and starts in hip area radiating to knees to toes;  Exacerbated by standing or walking; can walk a couple of blocks then rests and starts again  Minor relief with rest or leaning forward    No recent PT; about 3-4 years ago had PT    Review of Systems   Constitutional: Negative for activity change, appetite change, chills, fatigue, fever and unexpected weight change.   HENT: Negative for trouble swallowing.    Eyes: Negative for visual disturbance.   Respiratory: Negative for shortness of breath.    Cardiovascular: Negative for leg swelling.  Gastrointestinal: Negative for abdominal pain, nausea and vomiting.   Endocrine: Negative for cold intolerance.  Genitourinary: Negative for incontinence, frequency and urgency.   Musculoskeletal: Positive for back pain.   Skin: Negative for color change  Allergic/Immunologic: Negative for immunocompromised state.  Neurological: Negative for tremors, speech difficulty, weakness, numbness and headaches.   Hematological: Does not bruise/bleed easily.   Psychiatric/Behavioral: The patient is not nervous/anxious.     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 eyelid, unspecified laterality      History of tobacco use      HLD (hyperlipidemia)      HTN (hypertension)      Obesity (BMI 30.0-34.9)      Pulmonary nodule        Past Surgical History:   Procedure Laterality Date     APPENDECTOMY OPEN CHILD       ARTHROSCOPY KNEE Left      BIOPSY PROSTATE TRANSRECTAL       EXCISE PTERYGIUM Right 1989     EXCISE PTERYGIUM Right 2/12/2015     INJECT EPIDURAL LUMBAR / SACRAL SINGLE N/A 7/21/2016    Procedure: INJECT EPIDURAL LUMBAR / SACRAL SINGLE;  Surgeon: Judah Henriquez MD;   Location: [facility]     INJECT PARAVERTEBRAL FACET JOINT LUMBAR / SACRAL FIRST Right 2016    Procedure: INJECT PARAVERTEBRAL FACET JOINT LUMBAR / SACRAL FIRST;  Surgeon: Judah Henriquez MD;  Location: [facility]     INJECT PARAVERTEBRAL FACET JOINT LUMBAR / SACRAL FIRST Right 2016    Procedure: INJECT PARAVERTEBRAL FACET JOINT LUMBAR / SACRAL FIRST;  Surgeon: Judah Henriquez MD;  Location: [facility] OR     MEDIASTINOSCOPY Right 2019     [place]  Dr Hemanth Covington 19 bronchoscopy, cervical mediastinoscopy for staging and minimally invasive right thoracoscopic wedge resection 1.8 cm invasive adenocarcinoma [id number] stage 1A2 adenocarcinoma completely resected via minimally invasive right upper lobectomy.     PUNCTALPLASTY Bilateral 2018    Procedure: Bilateral Punctoplasty with Mini [place] Stent;  Surgeon: Levy Blue MD;  Location: [facility]     REPAIR ECTROPION BILATERAL Bilateral 2018    Procedure: Bilateral Lower Lid Ectropion Repair with Right Lower Lid Lesion Excision;  Surgeon: Levy Blue MD;  Location: [facility] OR       Social History     Socioeconomic History     Marital status:      Spouse name: Carmen     Number of children: 2     Years of education: Not on file     Highest education level: Not on file   Occupational History     Not on file   Social Needs     Financial resource strain: Not hard at all     Food insecurity     Worry: Never true     Inability: Never true     Transportation needs     Medical: No     Non-medical: No   Tobacco Use     Smoking status: Former Smoker     Years: 5.00     Types: Cigarettes     Quit date: 1981     Years since quittin.1     Smokeless tobacco: Never Used   Substance and Sexual Activity     Alcohol use: Yes     Alcohol/week: 2.5 standard drinks     Types: 3 Standard drinks or equivalent per week     Frequency: 4 or more times a week     Drinks per session: 1 or 2     Binge frequency: Never     Drug use: No     Sexual activity: Not  Currently     Partners: Female   Lifestyle     Physical activity     Days per week: Not on file     Minutes per session: Not on file     Stress: Not on file   Relationships     Social connections     Talks on phone: Not on file     Gets together: Not on file     Attends Mu-ism service: Not on file     Active member of club or organization: Not on file     Attends meetings of clubs or organizations: Not on file     Relationship status: Not on file     Intimate partner violence     Fear of current or ex partner: Not on file     Emotionally abused: Not on file     Physically abused: Not on file     Forced sexual activity: Not on file   Other Topics Concern     Not on file   Social History Narrative     works as [employer].currently working remotely        His immediate family; his wife has MS and is an RN at [employer], his two children are in good health.           family history includes Breast Cancer in his sister; Cerebral aneurysm in his brother; Cerebrovascular Disease in his brother; Hypothyroidism in his sister; Kidney Disease in his mother; Other - See Comments in his father.        IMAGING per my own measurement and interpretation:  MRI: transitional segment anatomy, the way radiology has labeled, the first mobile segment is L4-5; central stenosis L1-L4  Standing xrays:        Xr Lumbar Spine G/e 4 Views    Result Date: 12/19/2020  4 views lumbar spine radiographs 12/19/2020 2:51 PM History: low back pain, please obtain standing AP/lateral/flex/ext xrays, include femoral heads on standing neutral lateral; Lumbar stenosis with neurogenic claudication Comparison: MR from the same date and 8/8/2017, radiographs 5/17/2006 Findings: Standing  AP, lateral including flexion extension views of the lumbar spine were obtained. In keeping with numbering convention of the prior study, 5 lumbar type vertebral bodies with hypoplastic T12 ribs, sacralized L5 with rudimentary disc space at L5-S1  are assumed for the purpose of this dictation. There is no acute osseous abnormality.  Intervertebral disc fusion across L4-L5. Multi-level degenerative changes greatest at L3-L4 with severe disc space loss. Additionally, moderate to severe disc space loss especially posteriorly from L1 to L3 are also present. Lower lumbar spine predominant facet arthropathies. Hypertrophic changes of spinous processes. Mild anterior wedging of T12 vertebral body, similar to prior. On the flexion/extension views, 9 mm anterolisthesis of L3 on L4 reduces to 6 mm. Vascular calcifications. The visualized bowel gas pattern is non-obstructive. Degenerative changes of bilateral hips with prominent lateral acetabular osteophytes. Degenerative changes of sacrococcygeal junction.     IMPRESSION: In keeping with numbering convention of the prior study, 5 lumbar type vertebral bodies with hypoplastic T12 ribs, sacralized L5 with rudimentary disc space at L5-S1 are assumed for the purpose of this dictation. 1. Severe degenerative disc changes at L3-L4 with grade 1 anterolisthesis with approximately 3 mm motion between flexion and extension. JORGE GROSS      Mri Lumbar Spine W/o Contrast    Result Date: 12/19/2020  MR LUMBAR SPINE W/O CONTRAST 12/19/2020 12:59 PM Provided History: L/S-spine stenosis; Lumbar stenosis with neurogenic claudication ICD-10: Lumbar stenosis with neurogenic claudication Comparison: Lumbar spine MRI from 8/8/2017 and PET CT from 3/9/2019. Technique: Sagittal T1-weighted, sagittal STIR, 3D volumetric axial and sagittal reconstructed T2-weighted images of the lumbar spine were obtained without intravenous contrast. Findings: There are 4 lumbar-type vertebrae. Lumbosacral transitional vertebra with sacralized L5.The tip of the conus medullaris is at T12-L1.  Grade 1 anterolisthesis of L3 over L4, similar to prior study. Fusion of L4-5 and partial fusion of L5-S1. Multilevel disc height narrowing, endplate  degenerative changes and disc desiccation. Schmorl's node-like deformity at L1 and L3 inferior endplate. Cauda equina nerve roots are tangled secondary to severe spinal canal stenosis at L3-4. On a level by level basis: T12-L1: No significant spinal canal or neuroforaminal stenosis. L1-2: Disc bulge, bilateral significant facet arthropathy and ligamentum flavum thickening, greater on the right. Moderate left, moderate to severe right neural foraminal stenosis and severe spinal canal stenosis. L2-3: Posterior disc bulge. Bilateral facet arthropathy and ligamentum flavum thickening. Bilateral mild-to-moderate neural foraminal stenosis. Moderate spinal canal stenosis. L3-4: Grade 1 anterolisthesis. Unroofing of the disc. Posterior disc bulge. Bilateral marked facet arthropathy and ligamentum flavum thickening. Severe spinal canal stenosis. Bilateral moderate severe neural foraminal stenosis. L4-5: Fusion. Mild left and moderate right neural foraminal stenosis. Mild spinal canal stenosis. L5-S1: Bilateral facet arthropathy and ligamentum flavum thickening. No significant spinal canal or neuroforaminal stenosis. Visualized retroperitoneal structures are grossly unremarkable.     Impression: Lumbosacral transitional vertebra with sacralized L5. Multilevel lumbar spondylosis as above. Most notably, there is severe spinal canal stenosis at L1-2 and L3-4, worse at L3-4. Advanced neural foraminal stenosis at L1-2 and L3-4 as above. Overall findings are slightly worsened compared to 8/8/2017 MRI.  I have personally reviewed the examination and initial interpretation and I agree with the findings. RAFIA COHN MD    Ct Chest W/o Contrast    Result Date: 6/3/2020  EXAM:  CT CHEST W/O CONTRAST . 6/3/2020 3:44 PM TECHNIQUE:  Helical CT images from the thoracic inlet through the upper abdomen were obtained without intravenous contrast. Contrast dose: None COMPARISON: 1/4/2018 HISTORY:   Non-small cell lung cancer, post treatment,  no evidence of disease; Post right upper lobectomy Jacobson and mediastinal lymphadenectomy for invasive adenocarcinoma; Adenocarcinoma, lung, right (H) FINDINGS: CHEST: LUNGS: The central tracheobronchial tree is clear. Postsurgical changes of right upper lobectomy. Linear scarring/atelectasis along the peripheral right interlobar fissures and the resection site. Subtle groundglass opacities within the right lower lobe adjacent to the major fissure and along the posterior medial aspects of the right lower lobe. No pleural effusion or pneumothorax. Calcified pulmonary nodules in the left lower lobe. No new or enlarging pulmonary nodule. MEDIASTINUM: Subcentimeter hypodensity in the left lobe of the thyroid unchanged from 1/4/2019. The descending aorta and main pulmonary artery are normal in caliber. The heart is not enlarged. No pericardial effusion. No enlarged mediastinal, hilar or axillary lymph nodes. UPPER ABDOMEN: 3.8 cm exophytic right midpole renal cyst. No acute findings in the upper abdomen. BONES/SOFT TISSUES: No suspicious or acute osseous lesions. Bilateral gynecomastia.     IMPRESSION: Postsurgical changes of right upper lobectomy with linear scarring/atelectasis along the resection site and in the right middle lobe. No definite evidence of local recurrence. I have personally reviewed the examination and initial interpretation and I agree with the findings. CHIQUI SWIFT MD    Ct Chest W/o Contrast    Result Date: 1/4/2019  EXAMINATION: CT CHEST W/O CONTRAST, 1/4/2019 1:14 PM CLINICAL HISTORY: Acute resp illness, > 40 years old; pulmonary nodule RUL; Pulmonary nodules COMPARISON: Radiograph 1/2/2019, CT 11/12/2012. TECHNIQUE: CT imaging obtained through the chest without contrast. Coronal and axial MIP reformatted images obtained. CONTRAST:  none. FINDINGS: Lines and tubes: None. Mediastinum: No thyroid nodules. Central tracheobronchial tree is patent. Heart size is normal. No pericardial effusion.   Normal thoracic vasculature. No thoracic lymphadenopathy. Elevated left hemidiaphragm. Lungs: Few lobular areas of mosaic attenuation, which may be secondary to small airways disease. No pleural effusion or pneumothorax. Bibasilar dependent atelectasis. Scattered granulomas in the left lower lobe. Spiculated right upper lobe nodule on series 4 image 103 measures 13.4 x 10.2 mm, showing slight interval growth since 2010 when it measured 8.5 x 6.4, measuring 9.7 x 7.7 in 2011, measuring 11.7 x 9.6 in 2012. 4 mm nodule in the right major fissure on series 4 image 155, stable. Bones and soft tissues: No suspicious bone findings. Upper abdomen: Limited. Right renal interpolar region hypodensity, likely a simple cyst.     IMPRESSION: 1. 12 mm spiculated nodule right upper lobe slow growth since 2010, associated pleural tag. Though this is indeterminate, findings are suspicious for low-grade neoplasm. Recommend PET/CT/biopsy for further evaluation . 2. Other incidental findings as described in the body support. [Consider Follow Up: Lung nodule] This report will be copied to the [facility] to ensure a provider acknowledges the finding. I have personally reviewed the examination and initial interpretation and I agree with the findings. CHIQUI SWIFT MD    Ct Soft Tissue Neck W Contrast    Result Date: 3/11/2019  PET CT fusion examination 3/9/2019 2:04 PM 1. Neck CT with contrast 2. PET study of the neck 3. PET CT fusion study of the neck History:  68-year-old male with enlarging right upper lobe nodule which has been followed since 2010.. Comparison: Chest CT 1/4/2019 and 11/12/2012 Technique: Please refer to the accompanying whole body PET-CT for report of the dose and whole body PET-CT findings. Regarding the neck, axial images were obtained after nonionic intravenous contrast administration, with sagittal and coronal reconstructions performed. Neck CT images were reviewed in bone, soft tissue, and lung  windows, with review of the fused PET-CT images as well in multiple planes. Findings: Regarding the mucosal spaces, there is asymmetric metabolic activity in the right lateral oropharyngeal wall  with max SUV of 8.4. On CT there is very subtle subcentimeter mucosal protuberance (series 5, image 64). There is slight asymmetric uptake of the left submandibular gland compared with the right however on CT no abnormality is identified. Evaluation of the mucosal space demonstrates no abnormality or abnormal metabolic uptake on PET CT in the nasopharynx, hypopharynx or the glottis. The tongue base appears normal. Parotid glands are normal.. Regarding thyroid gland, there are multiple non-FDG avid subcentimeter sized nodules. There is no evident cervical lymphadenopathy, and the cervical lymph nodes are within normal limits by size criteria. No abnormal metabolic uptake on PET CT. Limited evaluation of the cervical vertebra demonstrates degenerative disc disease at C5 C7. Posterior lateral uncovertebral joint hypertrophy resulting in bilateral moderate neural foraminal stenosis and mild spinal canal narrowing C6-7. The visualized paranasal sinuses and mastoid air cells are clear. The major vascular structures in the neck are patent. Limited evaluation of the intracranial structures demonstrates mild generalized cerebral atrophy and periventricular hypoattenuation most likely secondary to underlying chronic small as ischemic disease. Gray-white matter differentiation is preserved. No space-occupying lesion. No abnormal FDG uptake intracranially within limits of this study. Calvarium is unremarkable. Normal orbits. Please refer to the whole body PET CT performed as a separate report, for the findings of the remainder of the body.      Impression: 1. Asymmetric hypermetabolic activity and corresponding subcentimeter nodular density along the mucosal surface of the right lateral oropharyngeal wall. Recommend consultation to ENT  for direct visualization. 2. No cervical lymphadenopathy. 3. Please refer to the whole body PET CT performed as a separate report, for the findings of the remainder of the body. [Consider Follow Up: Right hypermetabolic/asymmetric lateral oropharyngeal wall. Recommend consultation to ENT for direct visualization. ] This report will be copied to the [facility] to ensure a provider acknowledges the finding. I have personally reviewed the examination and initial interpretation and I agree with the findings. TITUS BAEZ MD    Fundus Autofluorescence Image (faf) Ou (both Eyes)    Result Date: 1/7/2020  Performed by: shivani . Right Eye Reliability of the test: Good . Findings: Irregular . Interval: Same . Left Eye Reliability of the test: Good . Findings: Irregular . Interval: Same .       Vitamin D:  Vitamin D Deficiency Screening Results:  Lab Results   Component Value Date    VITDT 46 11/19/2018    VITDT 34 07/31/2017    VITDT 50 10/30/2014    VITDT 29 (L) 05/19/2014    VITDT 28 (L) 11/14/2013     25 OH Vit D2   Date Value Ref Range Status   03/09/2012 12 ug/L Final   11/14/2011 <5 ug/L Final     25 OH Vit D3   Date Value Ref Range Status   03/09/2012 17 ug/L Final   11/14/2011 21 ug/L Final     25 OH Vit D total   Date Value Ref Range Status   03/09/2012 29 (L) 30 - 75 ug/L Final     Comment:     Season, race, dietary intake, and treatment affect the concentration of   25-hydroxy-Vitamin D. Values may decrease during winter months and increase   during summer months. Values less than 30 ug/L may indicate Vitamin D   deficiency.   11/14/2011 (L) 30 - 75 ug/L Final    <26  Season, race, dietary intake, and treatment affect the concentration of   25-hydroxy-Vitamin D. Values may decrease during winter months and increase   during summer months. Values less than 30 ug/L may indicate Vitamin D   deficiency.         Nutritional Status:  Estimated body mass index is 31.24 kg/m  as calculated from the following:     "Height as of 6/8/20: 1.626 m (5' 4\").    Weight as of 11/16/20: 82.6 kg (182 lb).    Lab Results   Component Value Date    ALBUMIN 3.8 11/16/2020       Diabetes Screening:  Lab Results   Component Value Date    A1C 5.8 11/16/2020    A1C 6.0 05/27/2020    A1C 6.2 10/26/2019    A1C 6.4 06/12/2019    A1C 6.5 11/19/2018       Nicotine Usage:    No               Video Physical Exam   There were no vitals taken for this visit.  Constitutional: Oriented to person, place, and time. Appears well-developed and well-nourished. Cooperative. No distress.     Musculoskeletal:     Lumbar flexion/extension range of motion: normal    Neurological: alert and oriented to person, place, and time.   No cranial nerve deficit   sensory deficit plantar foot  Gait antalgic          ASSESSMENT:  Vishnu Viveros is a 70 year old male with diabetes, hypertension, hyperlipidemia, former lung cancer, with lumbar spondylolisthesis, lumbar flatback deformity and lumbar stenosis with neurogenic claudication    PLAN:  If we use first mobile spine segment (labeled L4-5 by Radiology) above transitional segment to do pelvic parameter measurements, patient has PI in low 40s, global lordosis in low 30s, but only 2 degrees of lordosis in lower 2 lumbar segments, which is significantly low. He is hyperextending rest of lumbar spine to compensate and has degenerative spondylolisthesis at L3-4 and stenosis with disc herniations at L1-2, L2-3. Therefore, I think he would need an interbody fusion to correct his lumbar alignment. A decompression surgery will not treat the spondylolisthesis movement and will not correct the hypolordosis and his stenosis is likely to recur if decompression alone is performed due to these reasons.      Start PT; patient does not want to consider fusion surgery but I think that this will be needed  Previously had injection with temporary relief, I expect any injection would be temporary but he wants another one so I will order " another epidural steroid injection interlaminar at L1-2    Return to clinic if no help with PT/injections; I will wait to order a CT lumbar spine and EOS xrays, he will need these if he wants to consider surgery and would return to my clinic with these studies completed:  We don't have complete imaging of entire spine; and with the alignment abnormalities, he warrants this. The patient will require standing long-cassette xrays with full limb evaluation on EOS machine to evaluate sagittal and coronal spinal balance. CT scan noncontrast lumbar spine to evaluate disc space mobility at L4-5.                  Audelia Baldwin MD    [facility] Department of Neurosurgery  Complex Spinal Deformity, Scoliosis, and Minimally Invasive Spine Surgery Specialist  Office: [phone]    12/24/2020        I spent 30 minutes in patient care with greater than 50% (17 minutes) spent in counseling and/or coordination of care.

## 2020-12-24 NOTE — PROGRESS NOTES
"Vishnu Viveros is a 70 year old male who is being evaluated via a billable video visit.      The patient has been notified of following:     \"This video visit will be conducted via a call between you and your physician/provider. We have found that certain health care needs can be provided without the need for an in-person physical exam.  This service lets us provide the care you need with a video conversation.  If a prescription is necessary we can send it directly to your pharmacy.  If lab work is needed we can place an order for that and you can then stop by our lab to have the test done at a later time.    Video visits are billed at different rates depending on your insurance coverage.  Please reach out to your insurance provider with any questions.    If during the course of the call the physician/provider feels a video visit is not appropriate, you will not be charged for this service.\"     Patient has given verbal consent for Video visit? Yes  How would you like to obtain your AVS? MyChart and MAIL  If you are dropped from the video visit, the video invite should be resent to: Text to cell phone: [phone]  Will anyone else be joining your video visit? No  {If patient encounters technical issues they should call [phone] :[id number]}      Video-Visit Details    Type of service:  Video Visit    Video Start Time: {video visit start/end time:152948}  Video End Time: {video visit start/end time:152948}    Originating Location (pt. Location): Home    Distant Location (provider location):  [facility] NEUROSURGERY [facility]     Platform used for Video Visit: Steve Portillo        "

## 2020-12-30 ENCOUNTER — TELEPHONE (OUTPATIENT)
Dept: GASTROENTEROLOGY | Facility: OUTPATIENT CENTER | Age: 70
End: 2020-12-30

## 2020-12-30 DIAGNOSIS — Z12.11 ENCOUNTER FOR SCREENING COLONOSCOPY: Primary | ICD-10-CM

## 2020-12-30 RX ORDER — BISACODYL 5 MG
5 TABLET, DELAYED RELEASE (ENTERIC COATED) ORAL SEE ADMIN INSTRUCTIONS
Qty: 4 TABLET | Refills: 0 | Status: SHIPPED | OUTPATIENT
Start: 2020-12-30 | End: 2022-09-19

## 2020-12-30 NOTE — TELEPHONE ENCOUNTER
Patient taking any blood thinners ? No      Heart disease ? Denies      Lung disease ? Denies      Sleep apnea ? Denies      Diabetic ? Denies      Kidney disease ?Denies      Electronic implanted medical devices ? Denies       PTSD ? N/a      Prep instructions reviewed with patient ? Instructions, policy,MAC sedation plan reviewed. Advised patient to have someone stay with them post exam.     Wife    Ph[facility] : [facility]     Indication for procedure : History of colonic polyps     Referring provider : Murali Logan MD      Arrival Time : 9:30 AM

## 2021-01-04 ENCOUNTER — TELEPHONE (OUTPATIENT)
Dept: GASTROENTEROLOGY | Facility: CLINIC | Age: 71
End: 2021-01-04

## 2021-01-04 DIAGNOSIS — Z11.59 ENCOUNTER FOR SCREENING FOR OTHER VIRAL DISEASES: Primary | ICD-10-CM

## 2021-01-04 NOTE — TELEPHONE ENCOUNTER
Caller BONI    Reason for cancel? DID NOT COMPLETE PREP    Rescheduled? YES    Will patient call back to reschedule?

## 2021-01-05 ENCOUNTER — PRE VISIT (OUTPATIENT)
Dept: UROLOGY | Facility: CLINIC | Age: 71
End: 2021-01-05

## 2021-01-05 NOTE — TELEPHONE ENCOUNTER
Visit Type : Elevated PSA    Records/Orders: in epic     Pt Contacted: no    At Rooming: normal

## 2021-01-11 ENCOUNTER — TELEPHONE (OUTPATIENT)
Dept: GASTROENTEROLOGY | Facility: OUTPATIENT CENTER | Age: 71
End: 2021-01-11

## 2021-01-13 ENCOUNTER — TELEPHONE (OUTPATIENT)
Dept: GASTROENTEROLOGY | Facility: OUTPATIENT CENTER | Age: 71
End: 2021-01-13

## 2021-01-13 NOTE — TELEPHONE ENCOUNTER
Patient taking any blood thinners ? No      Heart disease ? Denies      Lung disease ? Denies      Sleep apnea ? Denies      Diabetic ? Type 2     Kidney disease ? Denies      Electronic implanted medical devices ? Denies      PTSD ? N/a      Prep instructions reviewed with patient ? Instructions, policy,MAC sedation plan reviewed. Advised patient to have someone stay with them post exam.     Yes    Pharmacy : [facility]     Indication for procedure : History of colonic polyps     Referring provider : Murali Logan MD      Arrival Time : 2 PM

## 2021-01-14 DIAGNOSIS — Z11.59 ENCOUNTER FOR SCREENING FOR OTHER VIRAL DISEASES: ICD-10-CM

## 2021-01-14 LAB
LABORATORY COMMENT REPORT: NORMAL
SARS-COV-2 RNA RESP QL NAA+PROBE: NEGATIVE
SARS-COV-2 RNA RESP QL NAA+PROBE: NORMAL
SPECIMEN SOURCE: NORMAL
SPECIMEN SOURCE: NORMAL

## 2021-01-14 PROCEDURE — U0005 INFEC AGEN DETEC AMPLI PROBE: HCPCS | Mod: 90 | Performed by: PATHOLOGY

## 2021-01-14 PROCEDURE — U0003 INFECTIOUS AGENT DETECTION BY NUCLEIC ACID (DNA OR RNA); SEVERE ACUTE RESPIRATORY SYNDROME CORONAVIRUS 2 (SARS-COV-2) (CORONAVIRUS DISEASE [COVID-19]), AMPLIFIED PROBE TECHNIQUE, MAKING USE OF HIGH THROUGHPUT TECHNOLOGIES AS DESCRIBED BY CMS-2020-01-R: HCPCS | Mod: 90 | Performed by: PATHOLOGY

## 2021-01-16 ENCOUNTER — MYC MEDICAL ADVICE (OUTPATIENT)
Dept: FAMILY MEDICINE | Facility: CLINIC | Age: 71
End: 2021-01-16

## 2021-01-16 DIAGNOSIS — E11.9 TYPE 2 DIABETES MELLITUS TREATED WITHOUT INSULIN (H): ICD-10-CM

## 2021-01-16 DIAGNOSIS — M48.062 SPINAL STENOSIS, LUMBAR REGION, WITH NEUROGENIC CLAUDICATION: Primary | ICD-10-CM

## 2021-01-16 DIAGNOSIS — I10 BENIGN ESSENTIAL HYPERTENSION: ICD-10-CM

## 2021-01-18 ENCOUNTER — DOCUMENTATION ONLY (OUTPATIENT)
Dept: CARE COORDINATION | Facility: CLINIC | Age: 71
End: 2021-01-18

## 2021-01-18 ENCOUNTER — DOCUMENTATION ONLY (OUTPATIENT)
Dept: GASTROENTEROLOGY | Facility: OUTPATIENT CENTER | Age: 71
End: 2021-01-18
Attending: FAMILY MEDICINE
Payer: COMMERCIAL

## 2021-01-18 DIAGNOSIS — Z86.0100 HISTORY OF COLONIC POLYPS: ICD-10-CM

## 2021-01-19 ENCOUNTER — THERAPY VISIT (OUTPATIENT)
Dept: PHYSICAL THERAPY | Facility: CLINIC | Age: 71
End: 2021-01-19
Attending: NEUROLOGICAL SURGERY
Payer: COMMERCIAL

## 2021-01-19 DIAGNOSIS — M54.42 CHRONIC BILATERAL LOW BACK PAIN WITH LEFT-SIDED SCIATICA: ICD-10-CM

## 2021-01-19 DIAGNOSIS — G89.29 CHRONIC BILATERAL LOW BACK PAIN WITH LEFT-SIDED SCIATICA: ICD-10-CM

## 2021-01-19 DIAGNOSIS — M48.062 LUMBAR STENOSIS WITH NEUROGENIC CLAUDICATION: ICD-10-CM

## 2021-01-19 PROCEDURE — 97110 THERAPEUTIC EXERCISES: CPT | Mod: GP | Performed by: PHYSICAL THERAPIST

## 2021-01-19 PROCEDURE — 97161 PT EVAL LOW COMPLEX 20 MIN: CPT | Mod: GP | Performed by: PHYSICAL THERAPIST

## 2021-01-19 NOTE — PROGRESS NOTES
[facility] Initial Evaluation  Subjective:  The history is provided by the patient.   Patient Health History  Vishnu Viveros being seen for low back and L>R leg pain, stenosis.     Date of Onset: MD order 12/24/20. chronic.   Problem occurred: unknown, DJD   Pain is reported as 7/10 on pain scale.  General health as reported by patient is good.                                       Therapist Generated HPI Evaluation  Problem details: MD order 12/24/20  Pt reports with LBP which radiates into L>R leg. Left leg is constant and goes from the hip area down to knee and toes. Pain is exacerbated by standing or walking; can walk about 1 mile, then needs to sit and rest, then can walk again.   Sitting and resting feels good. Has bone density issues so does not lean forward a lot.   Neurosurgeon recommending fusion but patient wanting to wait on this.     No recent PT; about 3-4 years ago had PT which did help. .         Type of problem:  Lumbar.    This is a chronic condition.  Condition occurred with:  Insidious onset and degenerative joint disease.  Where condition occurred: for unknown reasons.  Patient reports pain:  Lumbar spine left and lumbar spine right.  Pain is described as sharp and is constant.  Radiates to: mostly left leg hip to toes, mild on right. Pain is the same all the time.  Since onset symptoms are unchanged (for the past year or so).  Associated symptoms:  Loss of motion/stiffness, loss of strength, tingling and numbness (weakness of left leg). Symptoms are exacerbated by walking and other (sleeping)  and relieved by other and heat (sitting/bending forward, massage).  Special tests included:  X-ray and MRI.  Previous treatment includes physical therapy and other (injections). There was moderate improvement following previous treatment.                          Objective:  Standing Alignment:                  General deviations alignment: slightly increase trunk flexion with  gait.                 Lumbar/SI Evaluation  ROM:    AROM Lumbar:   Flexion:          To ankles - mild pain, left leg pulling  Ext:                    30% - LBP   Side Bend:        Left:  30% - L side pain    Right:  70% - NE  Rotation:           Left:     Right:   Side Glide:        Left:     Right:         Strength: fair TrA activation. hip ABD: R 4+/5, L 4-/5  Lumbar Myotomes:    T12-L3 (Hip Flex):  Left: 4+    Right: 5-  L2-4 (Quads):  Left:  4+    Right:  5  L4 (Ankle DF):  Left:  4    Right:  5-    S1 (Toe Raise):  Left: 4    Right: 5-      Lumbar Dermtomes:        L2 Left:  Normal-light touch     L2 Right:  Normal-light touch  L3 Left:  Hypo-light touch     L3 Right:  Normal-light touch  L4 Left:  Normal-light touch       L4 Right:  Hypo-light touch  L5 Left:  Normal-light touch     L5 Right:  Hypo-light touch  S1 Left:  Normal-light touch     S1 Right:  Hypo-light touch                                                         General     ROS    Assessment/Plan:    Patient is a 70 year old male with lumbar complaints.    Patient has the following significant findings with corresponding treatment plan.                Diagnosis 1:  LBP with left radiculopathy, stenosis    Pain -  self management, education and home program  Decreased ROM/flexibility - manual therapy, therapeutic exercise and home program  Decreased strength - therapeutic exercise, therapeutic activities and home program  Impaired gait - home program  Decreased function - home program  Impaired posture - neuro re-education, therapeutic activities and home program    Therapy Evaluation Codes:   1) History comprised of:   Personal factors that impact the plan of care:      Time since onset of symptoms.    Comorbidity factors that impact the plan of care are:      Numbness/tingling, Overweight and Pain at night/rest.     Medications impacting care: Anti-inflammatory and Pain.  2) Examination of Body Systems comprised of:   Body structures and  functions that impact the plan of care:      Lumbar spine.   Activity limitations that impact the plan of care are:      Walking, Sleeping and Laying down.  3) Clinical presentation characteristics are:   Stable/Uncomplicated.  4) Decision-Making    Low complexity using standardized patient assessment instrument and/or measureable assessment of functional outcome.  Cumulative Therapy Evaluation is: Low complexity.    Previous and current functional limitations:  (See Goal Flow Sheet for this information)    Short term and Long term goals: (See Goal Flow Sheet for this information)     Communication ability:  Patient appears to be able to clearly communicate and understand verbal and written communication and follow directions correctly.  Treatment Explanation - The following has been discussed with the patient:   RX ordered/plan of care  Anticipated outcomes  Possible risks and side effects  This patient would benefit from PT intervention to resume normal activities.   Rehab potential is good.    Frequency:  1 X week, once daily  Duration:  for 12 weeks  Discharge Plan:  Achieve all LTG.  Independent in home treatment program.  Reach maximal therapeutic benefit.    Please refer to the daily flowsheet for treatment today, total treatment time and time spent performing 1:1 timed codes.

## 2021-01-20 NOTE — PROGRESS NOTES
[facility] Initial Evaluation  Subjective:    Patient Health History         Pain is reported as 7/10 on pain scale.    Pertinent medical history includes: high blood pressure, overweight and rheumatoid arthritis.            Current medications:  High blood pressure medication, bone density and anti-inflammatory.       Primary job tasks include:  Prolonged sitting and computer work.                  Oswestry Score: 26 %                 Objective:  System    Physical Exam    General     ROS    Assessment/Plan:

## 2021-01-25 ENCOUNTER — OFFICE VISIT (OUTPATIENT)
Dept: UROLOGY | Facility: CLINIC | Age: 71
End: 2021-01-25
Attending: FAMILY MEDICINE
Payer: COMMERCIAL

## 2021-01-25 VITALS
SYSTOLIC BLOOD PRESSURE: 112 MMHG | WEIGHT: 183 LBS | BODY MASS INDEX: 31.41 KG/M2 | HEART RATE: 71 BPM | DIASTOLIC BLOOD PRESSURE: 64 MMHG

## 2021-01-25 DIAGNOSIS — N40.1 BENIGN LOCALIZED PROSTATIC HYPERPLASIA WITH LOWER URINARY TRACT SYMPTOMS (LUTS): Primary | ICD-10-CM

## 2021-01-25 DIAGNOSIS — N40.2 PROSTATE NODULE: ICD-10-CM

## 2021-01-25 DIAGNOSIS — R97.20 ELEVATED PROSTATE SPECIFIC ANTIGEN (PSA): ICD-10-CM

## 2021-01-25 PROCEDURE — 99213 OFFICE O/P EST LOW 20 MIN: CPT | Performed by: UROLOGY

## 2021-01-25 RX ORDER — TAMSULOSIN HYDROCHLORIDE 0.4 MG/1
0.4 CAPSULE ORAL DAILY
Qty: 90 CAPSULE | Refills: 3 | Status: SHIPPED | OUTPATIENT
Start: 2021-01-25 | End: 2021-12-06

## 2021-01-25 ASSESSMENT — PAIN SCALES - GENERAL: PAINLEVEL: MODERATE PAIN (4)

## 2021-01-25 NOTE — PATIENT INSTRUCTIONS
Please follow up in 6 month with Nani with a PSA before     It was a pleasure meeting with you today.  Thank you for allowing me and my team the privilege of caring for you today.  YOU are the reason we are here, and I truly hope we provided you with the excellent service you deserve.  Please let us know if there is anything else we can do for you so that we can be sure you are leaving completely satisfied with your care experience.

## 2021-01-25 NOTE — LETTER
1/25/2021       RE: Vishnu Viveros  [address]  [place] [address]     Dear Colleague,    Thank you for referring your patient, Vishnu Viveros, to the [facility] [place] at [facility]. Please see a copy of my visit note below.    Reason for Visit: Follow up PSA, abnormal YESI, medication refill     HPI/Subjective:  Vishnu Odom is a 71 yo male that presents to clinic for follow up on an elevated PSA test. 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 His urologic history includes benign prostate biopsy in 2010 done secondary to a palpated prostate nodule. He has had a fluctuating PSA over the last few years leading to an MRI in 03/27/2017 (PIRADS 2 - Low probability) after a PSA of 6.91. His PSA subsequently trended down into the 4's before creeping back above 5 where it has been since then.  Last YESI on 1/27/20 showed no nodules.  He recently had another PSA checked.        He has had no trouble with urination. He feels that he can empty his bladder completely, has no hesitancy, is up at most once time per night, no blood in his urine, no burning with urination.  He continues on flomax.     OBJECTIVE:  PSA from 11/16/20 is 5.35 ng/mL  11/25/19: 5.14 ng/mL  6/12/19: 4.63 ng/mL    YESI: diffuse mildly firm prostate without discrete nodule, right >left.    ASSESSMENT AND PLAN:   Over half of today's 20-minute visit was spent counseling the patient regarding his PSA, YESI and LUTS.  PSA overall stable, though maybe slowly rising.  Will recheck PSA in 6 months and have appt with Nani MASON.  Refill was given for flomax.  Leandro Montaño MD

## 2021-01-25 NOTE — PROGRESS NOTES
Reason for Visit: Follow up PSA, abnormal YESI, medication refill     HPI/Subjective:  Vishnu Odom is a 71 yo male that presents to clinic for follow up on an elevated PSA test. 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 His urologic history includes benign prostate biopsy in 2010 done secondary to a palpated prostate nodule. He has had a fluctuating PSA over the last few years leading to an MRI in 03/27/2017 (PIRADS 2 - Low probability) after a PSA of 6.91. His PSA subsequently trended down into the 4's before creeping back above 5 where it has been since then.  Last YESI on 1/27/20 showed no nodules.  He recently had another PSA checked.        He has had no trouble with urination. He feels that he can empty his bladder completely, has no hesitancy, is up at most once time per night, no blood in his urine, no burning with urination.  He continues on flomax.         OBJECTIVE:  PSA from 11/16/20 is 5.35 ng/mL  11/25/19: 5.14 ng/mL  6/12/19: 4.63 ng/mL    YESI: diffuse mildly firm prostate without discrete nodule, right >left.    ASSESSMENT AND PLAN:   Over half of today's 20-minute visit was spent counseling the patient regarding his PSA, YESI and LUTS.  PSA overall stable, though maybe slowly rising.  Will recheck PSA in 6 months and have appt with Nani MASON.  Refill was given for flomax.

## 2021-01-25 NOTE — NURSING NOTE
Chief Complaint   Patient presents with     RECHECK     PSA      - Karen Paige,   EMT Clinic Support

## 2021-01-26 ENCOUNTER — ANCILLARY PROCEDURE (OUTPATIENT)
Dept: CT IMAGING | Facility: CLINIC | Age: 71
End: 2021-01-26
Attending: INTERNAL MEDICINE
Payer: COMMERCIAL

## 2021-01-26 ENCOUNTER — VIRTUAL VISIT (OUTPATIENT)
Dept: ONCOLOGY | Facility: CLINIC | Age: 71
End: 2021-01-26
Attending: INTERNAL MEDICINE
Payer: COMMERCIAL

## 2021-01-26 DIAGNOSIS — C34.91 ADENOCARCINOMA, LUNG, RIGHT (H): Primary | ICD-10-CM

## 2021-01-26 DIAGNOSIS — C34.91 ADENOCARCINOMA, LUNG, RIGHT (H): ICD-10-CM

## 2021-01-26 PROCEDURE — 999N001193 HC VIDEO/TELEPHONE VISIT; NO CHARGE

## 2021-01-26 PROCEDURE — 71250 CT THORAX DX C-: CPT | Mod: GC | Performed by: RADIOLOGY

## 2021-01-26 PROCEDURE — 99213 OFFICE O/P EST LOW 20 MIN: CPT | Mod: 95 | Performed by: INTERNAL MEDICINE

## 2021-01-26 NOTE — PROGRESS NOTES
"The patient has been notified of the following:      \"We have found that certain health care needs can be provided without the need for a face to face visit.  This service lets us provide the care you need with a phone conversation.       I will have full access to your [facility] medical record during this entire phone call.   I will be taking notes for your medical record.      Since this is like an office visit, we will bill your insurance company for this service.       There are potential benefits and risks of telephone visits (e.g. limits to patient confidentiality) that differ from in-person visits.?  Confidentiality still applies for telephone services, and nobody will record the visit.  It is important to be in a quiet, private space that is free of distractions (including cell phone or other devices) during the visit.??      If during the course of the call I believe a telephone visit is not appropriate, you will not be charged for this service\"     Consent has been obtained for this service by care team member: Yes       REASON FOR VISIT: Lung adenocarcinoma  DATE: 1/26/2021    CANCER STAGE: Stage I      HISTORY OF PRESENT ILLNESS:    Mr. Viveros is a 69 yo man who was diagnosed with adenocarcinoma of the lung in 2019. He was having some difficulty breathing and some wheezing.  He had a CXR done that was negative for pneumonia but did show a RUL nodule.  He had a CT chest in 2012 that showed this nodule.  CT was done on 1/4/19 that showed a 13.4mmx10.2 RUL nodule that grew from 8.5x6.4 in 2012.  He saw Dr. LAURIE Lopez from pulmonary who ordered a PET/CT scan - 3/9/19 showed that the RUL nodule was hypermetabolic and now measured [id number].  There was no mediastinal LNs or other lesions on PET. Pt was set up for surgical resection, but ended up going to [facility].  He had a wedge resection and LN dissection by Dr. Covington.  Pathology showed a 1.8cm tumor margins were negative. 11 LNs were negative for involvement " with tumor including station 7 and 4R. He tolerated the surgery well.  He has not had any other complications since then.     INTERVAL HISTORY:   He forgot about the appointment and is assessed over the phone. He is doing well. Denies any acute issues. Has well recovered. No SOB, CP, AP, change in BM/urinary habits.    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  He used to work with refugees coming here.  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  He used to smoke <5pk years.      All- NKDA, has seasonal allergies.     Review Of Systems  10-point review of systems were negative except as noted in HPI.    PHYSICAL EXAM:  No PE performed due to telephone visit in settings of COVID pandemic.    Current Outpatient Medications   Medication Sig Dispense Refill     acetaminophen (TYLENOL) 500 MG tablet Take 1-2 tablets (500-1,000 mg) by mouth every 6 hours as needed for pain (arthritis) 120 tablet 1     Ascorbic Acid (VITAMIN C PO) Take 500 mg by mouth once       atorvastatin (LIPITOR) 20 MG tablet Take 1 tablet (20 mg) by mouth daily 90 tablet 3     bisacodyl (DULCOLAX) 5 MG EC tablet Take 1 tablet (5 mg) by mouth See Admin Instructions Refer to My chart message 4 tablet 0     chlorthalidone (HYGROTON) 25 MG tablet Take 1 tablet (25 mg) by mouth daily 90 tablet 3     Cholecalciferol (VITAMIN D) 1000 UNIT capsule Take 1 capsule by mouth daily.       Cyanocobalamin (VITAMIN B12 PO) Take 1 tablet by mouth. Pt states he takes this 2 or 3 times a week..        diclofenac (VOLTAREN) 1 % topical gel Apply 2 g topically 4 times daily (Patient not taking: Reported on  1/25/2021) 100 g 3     fluticasone (FLONASE) 50 MCG/ACT nasal spray 1-2 sprays each nostril once daily 16 g 1     gabapentin (NEURONTIN) 300 MG capsule One capsule in am and at bedtime 180 capsule 3     loratadine (CLARITIN) 10 MG tablet Take 1 tablet (10 mg) by mouth daily as needed for allergies 90 tablet 2     losartan (COZAAR) 100 MG tablet Take 1 tablet (100 mg) by mouth every evening 90 tablet 3     metFORMIN (GLUCOPHAGE-XR) 500 MG 24 hr tablet Take 1 tablet (500 mg) by mouth daily (with dinner) 90 tablet 3     Multiple Vitamin (MULTIVITAMINS PO) Take 1 tablet by mouth daily.       Omega-3 Fatty Acids (FISH OIL PO) Take 1 capsule by mouth as needed       order for DME Back brace (Patient not taking: Reported on 1/25/2021) 1 Device 0     polyethylene glycol (GOLYTELY) 236 g suspension Take 4,000 mLs by mouth See Admin Instructions Refer to My chart message (Patient not taking: Reported on 1/25/2021) 4000 mL 0     potassium chloride ER (KLOR-CON M) 20 MEQ CR tablet Take 1 tablet (20 mEq) by mouth daily 90 tablet 3     sildenafil (REVATIO) 20 MG tablet Take 1-5 tablets (20 mg) by mouth as needed for sexual activity.  Never use with nitroglycerin, terazosin or doxazosin. (Patient not taking: Reported on 1/25/2021) 90 tablet 11     tamsulosin (FLOMAX) 0.4 MG capsule Take 1 capsule (0.4 mg) by mouth daily 90 capsule 3     tamsulosin (FLOMAX) 0.4 MG capsule Take 1 capsule (0.4 mg) by mouth daily 90 capsule 3     LABS/IMAGING:   Personally reviewed with patient    Assessment/Plan  Stage I NSCLC - [id number] adenoca of lung (TTF1+) lepidic type. S/p R wedge resection. Did not get molecular testing at diagnosis. [place] around 21months from his diagnosis.  He would like for me to follow up with him periodically to check on his scans. I suggested we repeat a scan in 6 months time.  But he is likely to be cured already.   He is not smoking currently and has not smoked for over 50 years.      His CT chest has showed no abnormal  finding on my review, though official reading is pending.     Per NCCN guidelines follow up - annual clinic visit and low dose non contrast enhanced CT of chest      Discussed with Dr. Warren Collazo MD  Hem/Onc fellow

## 2021-01-26 NOTE — PROGRESS NOTES
Vishnu is a 70 year old who is being evaluated via a billable telephone visit.      What phone number would you like to be contacted at? [phone]  How would you like to obtain your AVS? Kalina  Phone call duration: 5 minutes

## 2021-01-26 NOTE — LETTER
"    1/26/2021         RE: Vishnu Viveros  [address]  [place] [address]        Dear Colleague,    Thank you for referring your patient, Vishnu Viveros, to the [facility]. Please see a copy of my visit note below.    The patient has been notified of the following:      \"We have found that certain health care needs can be provided without the need for a face to face visit.  This service lets us provide the care you need with a phone conversation.       I will have full access to your [facility] medical record during this entire phone call.   I will be taking notes for your medical record.      Since this is like an office visit, we will bill your insurance company for this service.       There are potential benefits and risks of telephone visits (e.g. limits to patient confidentiality) that differ from in-person visits.?  Confidentiality still applies for telephone services, and nobody will record the visit.  It is important to be in a quiet, private space that is free of distractions (including cell phone or other devices) during the visit.??      If during the course of the call I believe a telephone visit is not appropriate, you will not be charged for this service\"     Consent has been obtained for this service by care team member: Yes       REASON FOR VISIT: Lung adenocarcinoma  DATE: 1/26/2021    CANCER STAGE: Stage I      HISTORY OF PRESENT ILLNESS:    Mr. Viveros is a 69 yo man who was diagnosed with adenocarcinoma of the lung in 2019. He was having some difficulty breathing and some wheezing.  He had a CXR done that was negative for pneumonia but did show a RUL nodule.  He had a CT chest in 2012 that showed this nodule.  CT was done on 1/4/19 that showed a 13.4mmx10.2 RUL nodule that grew from 8.5x6.4 in 2012.  He saw Dr. LAURIE Lopez from pulmonary who ordered a PET/CT scan - 3/9/19 showed that the RUL nodule was hypermetabolic and now measured [id number].  There was no " mediastinal LNs or other lesions on PET. Pt was set up for surgical resection, but ended up going to [facility].  He had a wedge resection and LN dissection by Dr. Cvoington.  Pathology showed a 1.8cm tumor margins were negative. 11 LNs were negative for involvement with tumor including station 7 and 4R. He tolerated the surgery well.  He has not had any other complications since then.     INTERVAL HISTORY:   He forgot about the appointment and is assessed over the phone. He is doing well. Denies any acute issues. Has well recovered. No SOB, CP, AP, change in BM/urinary habits.    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  He used to work with refugees coming here.  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  He used to smoke <5pk years.      All- NKDA, has seasonal allergies.     Review Of Systems  10-point review of systems were negative except as noted in HPI.    PHYSICAL EXAM:  No PE performed due to telephone visit in settings of COVID pandemic.    Current Outpatient Medications   Medication Sig Dispense Refill     acetaminophen (TYLENOL) 500 MG tablet Take 1-2 tablets (500-1,000 mg) by mouth every 6 hours as needed for pain (arthritis) 120 tablet 1     Ascorbic Acid (VITAMIN C PO) Take 500 mg by mouth once       atorvastatin (LIPITOR) 20 MG tablet Take 1 tablet (20 mg) by mouth daily 90 tablet 3     bisacodyl (DULCOLAX) 5 MG EC tablet Take 1 tablet (5 mg) by mouth See Admin Instructions Refer to My chart message 4 tablet 0     chlorthalidone (HYGROTON) 25 MG tablet Take 1 tablet (25 mg) by mouth daily 90 tablet 3     Cholecalciferol (VITAMIN D) 1000  UNIT capsule Take 1 capsule by mouth daily.       Cyanocobalamin (VITAMIN B12 PO) Take 1 tablet by mouth. Pt states he takes this 2 or 3 times a week..        diclofenac (VOLTAREN) 1 % topical gel Apply 2 g topically 4 times daily (Patient not taking: Reported on 1/25/2021) 100 g 3     fluticasone (FLONASE) 50 MCG/ACT nasal spray 1-2 sprays each nostril once daily 16 g 1     gabapentin (NEURONTIN) 300 MG capsule One capsule in am and at bedtime 180 capsule 3     loratadine (CLARITIN) 10 MG tablet Take 1 tablet (10 mg) by mouth daily as needed for allergies 90 tablet 2     losartan (COZAAR) 100 MG tablet Take 1 tablet (100 mg) by mouth every evening 90 tablet 3     metFORMIN (GLUCOPHAGE-XR) 500 MG 24 hr tablet Take 1 tablet (500 mg) by mouth daily (with dinner) 90 tablet 3     Multiple Vitamin (MULTIVITAMINS PO) Take 1 tablet by mouth daily.       Omega-3 Fatty Acids (FISH OIL PO) Take 1 capsule by mouth as needed       order for DME Back brace (Patient not taking: Reported on 1/25/2021) 1 Device 0     polyethylene glycol (GOLYTELY) 236 g suspension Take 4,000 mLs by mouth See Admin Instructions Refer to My chart message (Patient not taking: Reported on 1/25/2021) 4000 mL 0     potassium chloride ER (KLOR-CON M) 20 MEQ CR tablet Take 1 tablet (20 mEq) by mouth daily 90 tablet 3     sildenafil (REVATIO) 20 MG tablet Take 1-5 tablets (20 mg) by mouth as needed for sexual activity.  Never use with nitroglycerin, terazosin or doxazosin. (Patient not taking: Reported on 1/25/2021) 90 tablet 11     tamsulosin (FLOMAX) 0.4 MG capsule Take 1 capsule (0.4 mg) by mouth daily 90 capsule 3     tamsulosin (FLOMAX) 0.4 MG capsule Take 1 capsule (0.4 mg) by mouth daily 90 capsule 3     LABS/IMAGING:   Personally reviewed with patient    Assessment/Plan  Stage I NSCLC - [id number] adenoca of lung (TTF1+) lepidic type. S/p R wedge resection. Did not get molecular testing at diagnosis. [place] around 21months from his diagnosis.  He would  like for me to follow up with him periodically to check on his scans. I suggested we repeat a scan in 6 months time.  But he is likely to be cured already.   He is not smoking currently and has not smoked for over 50 years.      His CT chest has showed no abnormal finding on my review, though official reading is pending.     Per NCCN guidelines follow up - annual clinic visit and low dose non contrast enhanced CT of chest      Discussed with Dr. Warren Collazo MD  Hem/Onc fellow    Attestation signed by Chava Kelley DO at 1/27/2021 12:22 PM:  I participated in telephone visit with Dr. Collazo and agree with his note.  We will follow up witht he patient in one year with repeat CT scan.     Chava Kelley  Associate Professor of Medicine  Division of Hematology, Oncology, and Transplantation      Vishnu is a 70 year old who is being evaluated via a billable telephone visit.      What phone number would you like to be contacted at? [phone]  How would you like to obtain your AVS? MyChart  Phone call duration: 5 minutes        Again, thank you for allowing me to participate in the care of your patient.        Sincerely,        Chava Kelley DO

## 2021-02-03 ENCOUNTER — MYC MEDICAL ADVICE (OUTPATIENT)
Dept: FAMILY MEDICINE | Facility: CLINIC | Age: 71
End: 2021-02-03

## 2021-02-03 ENCOUNTER — THERAPY VISIT (OUTPATIENT)
Dept: PHYSICAL THERAPY | Facility: CLINIC | Age: 71
End: 2021-02-03
Payer: COMMERCIAL

## 2021-02-03 DIAGNOSIS — G89.29 CHRONIC BILATERAL LOW BACK PAIN WITH LEFT-SIDED SCIATICA: ICD-10-CM

## 2021-02-03 DIAGNOSIS — M54.42 CHRONIC BILATERAL LOW BACK PAIN WITH LEFT-SIDED SCIATICA: ICD-10-CM

## 2021-02-03 PROCEDURE — 97110 THERAPEUTIC EXERCISES: CPT | Mod: GP | Performed by: PHYSICAL THERAPIST

## 2021-02-03 PROCEDURE — 97530 THERAPEUTIC ACTIVITIES: CPT | Mod: GP | Performed by: PHYSICAL THERAPIST

## 2021-02-03 NOTE — PROGRESS NOTES
Therapist Impression:   Significant cueing on exercises needed.  Initiated some HS stretching.  Continue core strengthening.    GOALS: NA    NEXT: Progress supine exercises as able, squats    PTRX: handouts    Subjective:  Still having pain.  Advil can be helpful.  Has done exercises 3-4 x since been here past.    Objective:  Trunk Range of Motion  Movement Loss Major Moderate Minimal Nil Pain   Flexion  X to tibia   x   Extension x       Sidebending R  x      Sidebending L  x      Thoracic rotation R  x   x   Thoracic rotation L  x

## 2021-02-15 ENCOUNTER — TELEPHONE (OUTPATIENT)
Dept: SURGERY | Facility: CLINIC | Age: 71
End: 2021-02-15

## 2021-02-15 NOTE — TELEPHONE ENCOUNTER
Patients wife Annie called and wanted to know why patient is scheduled so soon for a CT Chest scan. Patient saw Dr. Kelley and told patient he didn't need another CT scan for 1 year. Told Annie I would send a message to Dr. Stuart to see what his thoughts were and I would call back.    Dr. Stuart is out this week, I have sent him a message. I will call patients wife Annie once I have an update. Left a voicemail.

## 2021-02-23 ENCOUNTER — TELEPHONE (OUTPATIENT)
Dept: SURGERY | Facility: CLINIC | Age: 71
End: 2021-02-23

## 2021-02-23 NOTE — TELEPHONE ENCOUNTER
Called patients wife to let her know that Vishnu can do his surveillance follow up with Dr. Kelley. Dr. Stuart will see him on an as needed basis. Annie appreciated my call and had no further questions or concerns.

## 2021-02-24 ENCOUNTER — THERAPY VISIT (OUTPATIENT)
Dept: PHYSICAL THERAPY | Facility: CLINIC | Age: 71
End: 2021-02-24
Payer: COMMERCIAL

## 2021-02-24 DIAGNOSIS — G89.29 CHRONIC BILATERAL LOW BACK PAIN WITH LEFT-SIDED SCIATICA: ICD-10-CM

## 2021-02-24 DIAGNOSIS — M54.42 CHRONIC BILATERAL LOW BACK PAIN WITH LEFT-SIDED SCIATICA: ICD-10-CM

## 2021-02-24 PROCEDURE — 97530 THERAPEUTIC ACTIVITIES: CPT | Mod: GP | Performed by: PHYSICAL THERAPIST

## 2021-02-24 PROCEDURE — 97110 THERAPEUTIC EXERCISES: CPT | Mod: GP | Performed by: PHYSICAL THERAPIST

## 2021-03-17 ENCOUNTER — THERAPY VISIT (OUTPATIENT)
Dept: PHYSICAL THERAPY | Facility: CLINIC | Age: 71
End: 2021-03-17
Payer: COMMERCIAL

## 2021-03-17 DIAGNOSIS — M54.42 CHRONIC BILATERAL LOW BACK PAIN WITH LEFT-SIDED SCIATICA: ICD-10-CM

## 2021-03-17 DIAGNOSIS — G89.29 CHRONIC BILATERAL LOW BACK PAIN WITH LEFT-SIDED SCIATICA: ICD-10-CM

## 2021-03-17 PROCEDURE — 97110 THERAPEUTIC EXERCISES: CPT | Mod: GP | Performed by: PHYSICAL THERAPIST

## 2021-03-24 ENCOUNTER — THERAPY VISIT (OUTPATIENT)
Dept: PHYSICAL THERAPY | Facility: CLINIC | Age: 71
End: 2021-03-24
Payer: COMMERCIAL

## 2021-03-24 DIAGNOSIS — G89.29 CHRONIC BILATERAL LOW BACK PAIN WITH LEFT-SIDED SCIATICA: ICD-10-CM

## 2021-03-24 DIAGNOSIS — M54.42 CHRONIC BILATERAL LOW BACK PAIN WITH LEFT-SIDED SCIATICA: ICD-10-CM

## 2021-03-24 PROCEDURE — 97110 THERAPEUTIC EXERCISES: CPT | Mod: GP | Performed by: PHYSICAL THERAPIST

## 2021-05-05 ENCOUNTER — THERAPY VISIT (OUTPATIENT)
Dept: PHYSICAL THERAPY | Facility: CLINIC | Age: 71
End: 2021-05-05
Payer: COMMERCIAL

## 2021-05-05 DIAGNOSIS — G89.29 CHRONIC BILATERAL LOW BACK PAIN WITH LEFT-SIDED SCIATICA: ICD-10-CM

## 2021-05-05 DIAGNOSIS — M54.42 CHRONIC BILATERAL LOW BACK PAIN WITH LEFT-SIDED SCIATICA: ICD-10-CM

## 2021-05-05 PROCEDURE — 97110 THERAPEUTIC EXERCISES: CPT | Mod: GP | Performed by: PHYSICAL THERAPIST

## 2021-05-05 NOTE — PROGRESS NOTES
PROGRESS  REPORT    Progress reporting period is from 1/19/21 to 5/5/21.       SUBJECTIVE  Subjective: pt reports only doing his HEP a little bit, has not done much. activity can still bother the low back and legs. walking is a little better. last year he would have to sit frequently with walking. but he is able to do more, but has not tested far. is considering getting an injection.    Current Pain level: 0/10.      Initial Pain level: 7/10.   Changes in function:  Yes (See Goal flowsheet attached for changes in current functional level)  Adverse reaction to treatment or activity: None    OBJECTIVE  Changes noted in objective findings:  Yes  Objective: no pain with HEP. slightly flexed gait and posture. slight decr pelvic/core control with all 4s leg lift     ASSESSMENT/PLAN  Updated problem list and treatment plan: Diagnosis 1:  LBP with left radiculopathy, stenosis    Pain -  self management, education, directional preference exercise and home program  Decreased ROM/flexibility - manual therapy, therapeutic exercise and home program  Decreased strength - therapeutic exercise, therapeutic activities and home program  Impaired gait - home program  Decreased function - therapeutic activities and home program  Impaired posture - neuro re-education, therapeutic activities and home program  STG/LTGs have been met or progress has been made towards goals:  Yes (See Goal flow sheet completed today.)  Assessment of Progress: The patient's condition is improving.  The patient's progress has slowed.  Self Management Plans:  Patient has been instructed in a home treatment program.  Patient  has been instructed in self management of symptoms.  I have re-evaluated this patient and find that the nature, scope, duration and intensity of the therapy is appropriate for the medical condition of the patient.  Vishnu continues to require the following intervention to meet STG and LTG's:  PT    Recommendations:  This patient would  benefit from continued therapy.     Frequency:  2 X a month, once daily  Duration:  for 2 months        Please refer to the daily flowsheet for treatment today, total treatment time and time spent performing 1:1 timed codes.

## 2021-05-23 ENCOUNTER — HEALTH MAINTENANCE LETTER (OUTPATIENT)
Age: 71
End: 2021-05-23

## 2021-05-24 DIAGNOSIS — J30.2 SEASONAL ALLERGIES: ICD-10-CM

## 2021-05-24 RX ORDER — LORATADINE 10 MG/1
10 TABLET ORAL DAILY PRN
Qty: 90 TABLET | Refills: 1 | Status: SHIPPED | OUTPATIENT
Start: 2021-05-24 | End: 2023-06-06

## 2021-06-07 ENCOUNTER — OFFICE VISIT (OUTPATIENT)
Dept: FAMILY MEDICINE | Facility: CLINIC | Age: 71
End: 2021-06-07
Payer: COMMERCIAL

## 2021-06-07 VITALS
OXYGEN SATURATION: 98 % | HEART RATE: 76 BPM | SYSTOLIC BLOOD PRESSURE: 115 MMHG | BODY MASS INDEX: 30.77 KG/M2 | DIASTOLIC BLOOD PRESSURE: 72 MMHG | HEIGHT: 64 IN | WEIGHT: 180.2 LBS

## 2021-06-07 DIAGNOSIS — I10 BENIGN ESSENTIAL HYPERTENSION: ICD-10-CM

## 2021-06-07 DIAGNOSIS — E11.9 TYPE 2 DIABETES MELLITUS TREATED WITHOUT INSULIN (H): ICD-10-CM

## 2021-06-07 DIAGNOSIS — E87.6 HYPOKALEMIA: ICD-10-CM

## 2021-06-07 DIAGNOSIS — Z86.0100 HISTORY OF COLONIC POLYPS: ICD-10-CM

## 2021-06-07 DIAGNOSIS — E08.42 DIABETIC POLYNEUROPATHY ASSOCIATED WITH DIABETES MELLITUS DUE TO UNDERLYING CONDITION (H): Primary | ICD-10-CM

## 2021-06-07 DIAGNOSIS — M54.16 LUMBAR RADICULITIS: ICD-10-CM

## 2021-06-07 DIAGNOSIS — M17.0 PRIMARY OSTEOARTHRITIS OF BOTH KNEES: ICD-10-CM

## 2021-06-07 DIAGNOSIS — D64.9 ANEMIA, UNSPECIFIED TYPE: ICD-10-CM

## 2021-06-07 DIAGNOSIS — M51.369 DEGENERATIVE DISC DISEASE, LUMBAR: ICD-10-CM

## 2021-06-07 LAB
ANION GAP SERPL CALCULATED.3IONS-SCNC: 6 MMOL/L (ref 3–14)
BUN SERPL-MCNC: 19 MG/DL (ref 7–30)
CALCIUM SERPL-MCNC: 9.4 MG/DL (ref 8.5–10.1)
CHLORIDE SERPL-SCNC: 106 MMOL/L (ref 94–109)
CO2 SERPL-SCNC: 30 MMOL/L (ref 20–32)
CREAT SERPL-MCNC: 0.95 MG/DL (ref 0.66–1.25)
GFR SERPL CREATININE-BSD FRML MDRD: 80 ML/MIN/{1.73_M2}
GLUCOSE SERPL-MCNC: 140 MG/DL (ref 70–99)
HBA1C MFR BLD: 5.8 % (ref 0–5.6)
HGB BLD-MCNC: 12.3 G/DL (ref 13.3–17.7)
POTASSIUM SERPL-SCNC: 3.7 MMOL/L (ref 3.4–5.3)
SODIUM SERPL-SCNC: 141 MMOL/L (ref 133–144)
VIT B12 SERPL-MCNC: 1144 PG/ML (ref 193–986)

## 2021-06-07 PROCEDURE — 83036 HEMOGLOBIN GLYCOSYLATED A1C: CPT | Performed by: PATHOLOGY

## 2021-06-07 PROCEDURE — 80048 BASIC METABOLIC PNL TOTAL CA: CPT | Performed by: PATHOLOGY

## 2021-06-07 PROCEDURE — 82607 VITAMIN B-12: CPT | Performed by: PATHOLOGY

## 2021-06-07 PROCEDURE — 85018 HEMOGLOBIN: CPT | Performed by: PATHOLOGY

## 2021-06-07 PROCEDURE — 36415 COLL VENOUS BLD VENIPUNCTURE: CPT | Performed by: PATHOLOGY

## 2021-06-07 PROCEDURE — 99215 OFFICE O/P EST HI 40 MIN: CPT | Performed by: FAMILY MEDICINE

## 2021-06-07 RX ORDER — METFORMIN HCL 500 MG
500 TABLET, EXTENDED RELEASE 24 HR ORAL
Qty: 90 TABLET | Refills: 3 | Status: SHIPPED | OUTPATIENT
Start: 2021-06-07 | End: 2022-06-13

## 2021-06-07 RX ORDER — ACETAMINOPHEN 500 MG
500-1000 TABLET ORAL EVERY 6 HOURS PRN
Qty: 120 TABLET | Refills: 3 | Status: SHIPPED | OUTPATIENT
Start: 2021-06-07

## 2021-06-07 RX ORDER — GABAPENTIN 300 MG/1
CAPSULE ORAL
Qty: 90 CAPSULE | Refills: 3 | Status: SHIPPED | OUTPATIENT
Start: 2021-06-07 | End: 2022-06-13

## 2021-06-07 RX ORDER — LOSARTAN POTASSIUM 100 MG/1
100 TABLET ORAL EVERY EVENING
Qty: 90 TABLET | Refills: 3 | Status: SHIPPED | OUTPATIENT
Start: 2021-06-07 | End: 2022-06-13

## 2021-06-07 RX ORDER — POTASSIUM CHLORIDE 1500 MG/1
20 TABLET, EXTENDED RELEASE ORAL DAILY
Qty: 90 TABLET | Refills: 3 | Status: SHIPPED | OUTPATIENT
Start: 2021-06-07 | End: 2022-03-26

## 2021-06-07 RX ORDER — CHLORTHALIDONE 25 MG/1
25 TABLET ORAL DAILY
Qty: 90 TABLET | Refills: 3 | Status: SHIPPED | OUTPATIENT
Start: 2021-06-07 | End: 2022-06-13

## 2021-06-07 RX ORDER — ATORVASTATIN CALCIUM 20 MG/1
20 TABLET, FILM COATED ORAL DAILY
Qty: 90 TABLET | Refills: 3 | Status: SHIPPED | OUTPATIENT
Start: 2021-06-07 | End: 2022-06-13

## 2021-06-07 ASSESSMENT — MIFFLIN-ST. JEOR: SCORE: 1479.89

## 2021-06-07 NOTE — PROGRESS NOTES
"    Assessment & Plan   Vishnu Viveros is a 70 year old with a history of type 2 diabetes mellitus controlled, hyperlipidemia, hypertension, colon polyps, DJD with spinal stenosis back, elevated PSA, and stage 1 right adenocarcinoma of the lung s/p resection with clear margins 2019 who presents  for review of chronic conditions and refill medications.     Diabetic polyneuropathy associated with diabetes mellitus due to underlying condition (H)  Type 2 diabetes mellitus treated without insulin (H)   Diabetes well controlled , he did not tolerate higher doses of gabapentin currently taking 300 at bedtime dose adjusted. Due for eye exam for diabetes  - EYE ADULT REFERRAL  - gabapentin (NEURONTIN) 300 MG capsule  Dispense: 90 capsule; Refill: 3  - metFORMIN (GLUCOPHAGE-XR) 500 MG 24 hr tablet  Dispense: 90 tablet; Refill: 3  - atorvastatin (LIPITOR) 20 MG tablet  Dispense: 90 tablet; Refill: 3    Benign essential hypertension  Well controlled refills provided  - losartan (COZAAR) 100 MG tablet  Dispense: 90 tablet; Refill: 3  - chlorthalidone (HYGROTON) 25 MG tablet  Dispense: 90 tablet; Refill: 3    Hypokalemia  Refills provided  - potassium chloride ER (KLOR-CON M) 20 MEQ CR tablet  Dispense: 90 tablet; Refill: 3    Lumbar radiculitisDegenerative disc disease, lumbar   Still having pain and is interested in next steps possible injection   - NEUROSURGERY REFERRAL  - gabapentin (NEURONTIN) 300 MG capsule  Dispense: 90 capsule; Refill: 3  - acetaminophen (TYLENOL) 500 MG tablet  Dispense: 120 tablet; Refill: 3    History of colonic polyps  Due for colonoscopy  - GASTROENTEROLOGY ADULT REF PROCEDURE ONLY    Primary osteoarthritis of both knees  Refill provided  - acetaminophen (TYLENOL) 500 MG tablet  Dispense: 120 tablet; Refill: 3               BMI:   Estimated body mass index is 31.15 kg/m  as calculated from the following:    Height as of this encounter: 1.62 m (5' 3.78\").    Weight as of this encounter: 81.7 kg " (180 lb 3.2 oz).   Weight management plan: Discussed healthy diet and exercise guidelines  43 minutes spent on the date of the encounter doing chart review, history, exam, diagnostics review, documentation, counseling and coordination of cares as noted.        Return in about 26 weeks (around 2021) for Lab Work, follow-up.    Murali Logan MD  [facility] KENA Mares is a 70 year old who presents for the following health issues     HPI   Vishnu Viveros is a 70 year old with a history of type 2 diabetes mellitus controlled, hyperlipidemia, hypertension, colon polyps, DJD with spinal stenosis back, elevated PSA, and stage 1 right adenocarcinoma of the lung s/p resection with clear margins  who presents  for review of chronic conditions and refill medications.      Adenocarcinoma Lun [place] treatment for lung adenocarcinoma had CT in 2021 after follow-up with oncology in December. Stable at this time, annual CT in 2022.     Urology  PSA level was elevated had appointment  with urology in 2021 no nodules found recommended follow-up PSA in 6 months and follow-up with PA. In July with PSA prior.     Back, spinal stenosis DJD: Vishnu walks, describing it as being a hunched over posture and pain on the left side of the back radiating to the left leg and past his left knee very severe requiring him to sit and rest. He is concerned about this pain as it is interfering with walking and sitting is uncomfortable at times. He feels neuropathic like lightening pain radiating down his leg past the knee. He would like to see orthopedics again interested in injection, PT has been helpful. He was not able to tolerate Gabapentin 300 mg twice daily taking it at bedtime only requires refills.     Diabetes/ Hyperlipidemia:  A1c was 5.8 He is on MetforminXR 500 mg daily at supper. Atorvastatin 20 mg daily has been effective at controlling his  cholesterol. Due for EYE exam.     Hypertension His blood pressure is well-controlled on Losartan 100 mg daily and Chlorthalidone 25 mg with potassium supplementation.      Other concerns discussed:  1. Eye exam  2. Shingrix, Covid vaccines completed and PPSV 23 & PCV 13 completed  3. Medications reviewed - gabapentin once daily at night could increase to assist with neuropathic pain if tolerated but he did not tolerate  4. Colonoscopy ( previous 2017) was due in February 2020 will reschedule  5. Needs hearing exam scheduled for 6/8- troubles hearing in large crowds last referral was mis-scheduled   Needs audiology for possible hearing aids  6. Needs follow-up with urology in July 2021 -  PSA elevated              Labs reviewed in EPIC  BP Readings from Last 3 Encounters:   06/07/21 115/72   01/25/21 112/64   11/16/20 129/74    Wt Readings from Last 3 Encounters:   06/07/21 81.7 kg (180 lb 3.2 oz)   01/25/21 83 kg (183 lb)   11/16/20 82.6 kg (182 lb)            Immunization History   Administered Date(s) Administered     COVID-19,PF,Moderna 02/10/2021, 03/10/2021     FLU 6-35 months 10/14/2010     Flu, Unspecified 11/06/2009     Influenza (H1N1) 02/02/2010     Influenza (High Dose) 3 valent vaccine 10/30/2014, 11/19/2015, 11/28/2016, 11/27/2017, 10/31/2018, 12/11/2019     Influenza (IIV3) PF 12/30/2005, 12/05/2006, 12/11/2007, 11/04/2008, 11/06/2009, 10/14/2010, 11/14/2011, 11/12/2012, 11/14/2013     Influenza, Quad, High Dose, Pf, 65yr + 11/16/2020     Pneumo Conj 13-V (2010&after) 10/30/2014     Pneumococcal 23 valent 11/19/2015     TD (ADULT, 7+) 02/12/2007     TDAP Vaccine (Boostrix) 07/31/2017     Td (Adult), Adsorbed 02/12/2007     Tdap (Adacel,Boostrix) 07/31/2017     Zoster vaccine recombinant adjuvanted (SHINGRIX) 12/11/2019, 08/26/2020           Patient Active Problem List   Diagnosis     Erectile dysfunction     Seasonal allergies     Vitamin D deficiency     Pulmonary nodule     Prostate nodule      Chronic low back pain     Degenerative disc disease, lumbar     Back pain with radiation     DJD (degenerative joint disease) of knee     HL (hearing loss)     Vision impairment     Tear film insufficiency     Recurrent pterygium     Senile nuclear sclerosis     Type 2 diabetes mellitus treated without insulin (H)     Diabetic polyneuropathy associated with diabetes mellitus due to underlying condition (H)     Tubulovillous adenoma of colon     Lumbar radiculopathy     Adenocarcinoma of lung, stage 1, right (H)     Benign essential hypertension     History of colonic polyps     Spinal stenosis, lumbar region, with neurogenic claudication     Elevated prostate specific antigen (PSA)     Obesity with body mass index 30 or greater     Solitary pulmonary nodule     Evaluation of hearing impairment     Chronic bilateral low back pain with left-sided sciatica     Past Surgical History:   Procedure Laterality Date     APPENDECTOMY OPEN CHILD       ARTHROSCOPY KNEE Left      BIOPSY PROSTATE TRANSRECTAL       EXCISE PTERYGIUM Right 1989     EXCISE PTERYGIUM Right 2/12/2015     INJECT EPIDURAL LUMBAR / SACRAL SINGLE N/A 7/21/2016    Procedure: INJECT EPIDURAL LUMBAR / SACRAL SINGLE;  Surgeon: Judah Henriquez MD;  Location: [facility]     INJECT PARAVERTEBRAL FACET JOINT LUMBAR / SACRAL FIRST Right 8/18/2016    Procedure: INJECT PARAVERTEBRAL FACET JOINT LUMBAR / SACRAL FIRST;  Surgeon: Judah Henriquez MD;  Location: [facility]     INJECT PARAVERTEBRAL FACET JOINT LUMBAR / SACRAL FIRST Right 9/8/2016    Procedure: INJECT PARAVERTEBRAL FACET JOINT LUMBAR / SACRAL FIRST;  Surgeon: Judah Henriquez MD;  Location: [facility]     MEDIASTINOSCOPY Right 04/08/2019     [place]  Dr Hemanth Covington 4/8/19 bronchoscopy, cervical mediastinoscopy for staging and minimally invasive right thoracoscopic wedge resection 1.8 cm invasive adenocarcinoma [id number] stage 1A2 adenocarcinoma completely resected via minimally invasive right upper lobectomy.      PUNCTALPLASTY Bilateral 2018    Procedure: Bilateral Punctoplasty with Mini [place] Stent;  Surgeon: Levy Blue MD;  Location: [facility]     REPAIR ECTROPION BILATERAL Bilateral 2018    Procedure: Bilateral Lower Lid Ectropion Repair with Right Lower Lid Lesion Excision;  Surgeon: Levy Blue MD;  Location: [facility] OR       Social History     Tobacco Use     Smoking status: Former Smoker     Years: 5.00     Types: Cigarettes     Quit date: 1981     Years since quittin.5     Smokeless tobacco: Never Used   Substance Use Topics     Alcohol use: Yes     Alcohol/week: 2.5 standard drinks     Types: 3 Standard drinks or equivalent per week     Frequency: 4 or more times a week     Drinks per session: 1 or 2     Binge frequency: Never     Family History   Problem Relation Age of Onset     Cerebrovascular Disease Brother          stroke      Breast Cancer Sister      Kidney Disease Mother         Kidney stones     Other - See Comments Father          in [facility] parkinsons or post surgical eye surgery     Cerebral aneurysm Brother      Hypothyroidism Sister      Glaucoma No family hx of      Macular Degeneration No family hx of          Current Outpatient Medications   Medication Sig Dispense Refill     acetaminophen (TYLENOL) 500 MG tablet Take 1-2 tablets (500-1,000 mg) by mouth every 6 hours as needed for pain (arthritis) 120 tablet 3     Ascorbic Acid (VITAMIN C PO) Take 500 mg by mouth once       atorvastatin (LIPITOR) 20 MG tablet Take 1 tablet (20 mg) by mouth daily 90 tablet 3     bisacodyl (DULCOLAX) 5 MG EC tablet Take 1 tablet (5 mg) by mouth See Admin Instructions Refer to My chart message 4 tablet 0     chlorthalidone (HYGROTON) 25 MG tablet Take 1 tablet (25 mg) by mouth daily 90 tablet 3     Cholecalciferol (VITAMIN D) 1000 UNIT capsule Take 1 capsule by mouth daily.       Cyanocobalamin (VITAMIN B12 PO) Take 1 tablet by mouth. Pt states he takes this 2 or 3 times a week..    "     diclofenac (VOLTAREN) 1 % topical gel Apply 2 g topically 4 times daily 100 g 3     gabapentin (NEURONTIN) 300 MG capsule One capsule at bedtime 90 capsule 3     loratadine (CLARITIN) 10 MG tablet Take 1 tablet (10 mg) by mouth daily as needed for allergies 90 tablet 1     losartan (COZAAR) 100 MG tablet Take 1 tablet (100 mg) by mouth every evening 90 tablet 3     metFORMIN (GLUCOPHAGE-XR) 500 MG 24 hr tablet Take 1 tablet (500 mg) by mouth daily (with dinner) 90 tablet 3     Multiple Vitamin (MULTIVITAMINS PO) Take 1 tablet by mouth daily.       Omega-3 Fatty Acids (FISH OIL PO) Take 1 capsule by mouth as needed       order for DME Back brace 1 Device 0     polyethylene glycol (GOLYTELY) 236 g suspension Take 4,000 mLs by mouth See Admin Instructions Refer to My chart message 4000 mL 0     potassium chloride ER (KLOR-CON M) 20 MEQ CR tablet Take 1 tablet (20 mEq) by mouth daily 90 tablet 3     sildenafil (REVATIO) 20 MG tablet Take 1-5 tablets (20 mg) by mouth as needed for sexual activity.  Never use with nitroglycerin, terazosin or doxazosin. 90 tablet 11     tamsulosin (FLOMAX) 0.4 MG capsule Take 1 capsule (0.4 mg) by mouth daily 90 capsule 3     Allergies   Allergen Reactions     Pollen Extract      Other reaction(s): Headache  Sinus congestion, sinus \"drainage\", sneezing     Seasonal Allergies      Sinus congestion, sinus \"drainage\", sneezing     Nkda [No Known Drug Allergies]      Recent Labs   Lab Test 06/07/21  1044 11/16/20  1026 05/27/20  0957 10/26/19  1040 11/19/18  0801 11/19/18  0801 07/31/17  1031 07/31/17  1031   A1C 5.8* 5.8* 6.0* 6.2*   < > 6.5*   < > 6.3*   LDL  --  39  --  58  --  43   < > 52   HDL  --  64  --  49  --  55   < > 60   TRIG  --  116  --  91  --  68   < > 54   ALT  --  27 25 26   < > 32   < > 29   CR 0.95 0.93 0.98 0.96   < > 0.89   < > 0.79   GFRESTIMATED 80 82 77 79   < > 84   < > >90  Non  GFR Calc     GFRESTBLACK >90 >90 89 >90   < > >90   < > >90   " "American GFR Calc     POTASSIUM 3.7 3.5 3.7 3.8   < > 4.3   < > 4.2   TSH  --   --   --  1.56  --   --   --  1.67    < > = values in this interval not displayed.      Review of Systems         Objective    /72   Pulse 76   Ht 1.62 m (5' 3.78\")   Wt 81.7 kg (180 lb 3.2 oz)   SpO2 98%   BMI 31.15 kg/m    Body mass index is 31.15 kg/m .  Physical Exam     GENERAL APPEARANCE: healthy, alert and no distress BMI 31  EYES: Eyes grossly normal to inspection, PERRL and conjunctivae and sclerae normal  HENT: ear canals and TM's normal, mask removed breifly mouth without ulcers or lesions, oropharynx clear and oral mucous membranes moist  NECK: no adenopathy, no asymmetry, masses, or scars and thyroid normal to palpation  RESP: lungs clear to auscultation - no rales, rhonchi or wheezes  CV: regular rate and rhythm, normal S1 S2, no S3 or S4, no murmur, click or rub, no peripheral edema and peripheral pulses strong  Chest: well healed surgical scars from laparoscopic chest procedure  ABDOMEN: soft, nontender, no hepatosplenomegaly, no masses and bowel sounds normal, well-healed abdominal scar  MS: Kyphosis and antalgic gait with left leg weakness compared to right.  SKIN: no suspicious lesions or rashes, congenital nevi in upper back that has been stable  NEURO: Normal strength and tone, sensory exam grossly normal, mentation intact and speech normal  PSYCH: mentation appears normal and affect normal/bright  Diabetic foot exam: thickened toenails, no ulcerations, normal DP and PT pulses, normal sensory exam, normal monofilament exam   Results for orders placed or performed in visit on 06/07/21 (from the past 48 hour(s))   Hemoglobin   Result Value Ref Range    Hemoglobin 12.3 (L) 13.3 - 17.7 g/dL   Basic metabolic panel   Result Value Ref Range    Sodium 141 133 - 144 mmol/L    Potassium 3.7 3.4 - 5.3 mmol/L    Chloride 106 94 - 109 mmol/L    Carbon Dioxide 30 20 - 32 mmol/L    Anion Gap 6 3 - 14 mmol/L    Glucose " 140 (H) 70 - 99 mg/dL    Urea Nitrogen 19 7 - 30 mg/dL    Creatinine 0.95 0.66 - 1.25 mg/dL    GFR Estimate 80 >60 mL/min/[1.73_m2]    GFR Estimate If Black >90 >60 mL/min/[1.73_m2]    Calcium 9.4 8.5 - 10.1 mg/dL   Vitamin B12   Result Value Ref Range    Vitamin B12 1,144 (H) 193 - 986 pg/mL   Hemoglobin A1c   Result Value Ref Range    Hemoglobin A1C 5.8 (H) 0 - 5.6 %

## 2021-06-08 ENCOUNTER — OFFICE VISIT (OUTPATIENT)
Dept: AUDIOLOGY | Facility: CLINIC | Age: 71
End: 2021-06-08
Attending: FAMILY MEDICINE
Payer: COMMERCIAL

## 2021-06-08 DIAGNOSIS — H90.A31 MIXED CONDUCTIVE AND SENSORINEURAL HEARING LOSS OF RIGHT EAR WITH RESTRICTED HEARING OF LEFT EAR: Primary | ICD-10-CM

## 2021-06-08 DIAGNOSIS — H90.A22 SENSORINEURAL HEARING LOSS (SNHL) OF LEFT EAR WITH RESTRICTED HEARING OF RIGHT EAR: ICD-10-CM

## 2021-06-08 DIAGNOSIS — Z01.10 EVALUATION OF HEARING IMPAIRMENT: ICD-10-CM

## 2021-06-08 PROCEDURE — 92550 TYMPANOMETRY & REFLEX THRESH: CPT | Performed by: AUDIOLOGIST

## 2021-06-08 PROCEDURE — 92557 COMPREHENSIVE HEARING TEST: CPT | Performed by: AUDIOLOGIST

## 2021-06-08 NOTE — PROGRESS NOTES
AUDIOLOGY REPORT    SUBJECTIVE:  Vishnu Viveros is a 70 year old male who was seen in Audiology at the [facility] for audiologic evaluation. The patient has been seen in this clinic 1/30/2020 for a hearing assessment and results indicated normal sloping to severe sensorineural hearing loss for the left ear and normal sloping to severe mixed hearing loss for the right ear. The patient denies changes in hearing since last assessment but notes difficulty hearing in the presence of background noise and when others are speaking through microphones. The patient denies any ear related issues or dizziness.     OBJECTIVE:  Fall Risk Screen:  1. Have you fallen two or more times in the past year? No  2. Have you fallen and had an injury in the past year? No    Abuse Screening:  Do you feel unsafe at home or work/school? No  Do you feel threatened by someone? No  Does anyone try to keep you from having contact with others, or doing things outside of your home? No  Physical signs of abuse present? No    Otoscopic exam indicates ears are clear of cerumen bilaterally     Pure Tone Thresholds assessed using conventional audiometry with good  reliability from 250-8000 Hz bilaterally using circumaural headphones and reassessed using insert earphones    RIGHT:  Normal sloping to profound mixed hearing loss; stable    LEFT:    Normal sloping to moderate sensorineural hearing loss; 10 dB decline 6000 Hz, 10 dB improvement 500 Hz    Tympanogram:    RIGHT: hypermobile eardrum mobility    LEFT:   normal eardrum mobility    Reflexes (reported by stimulus ear):  RIGHT: Ipsilateral is absent at frequencies tested  RIGHT: Contralateral is present at normal levels  LEFT:   Ipsilateral is present at normal levels  LEFT:   Contralateral is absent at frequencies tested    Speech Reception Threshold:    RIGHT: 25 dB HL    LEFT:   15 dB HL  Word Recognition Score:     RIGHT: 96% at 65 dB HL using  NU-6 recorded word list; slight improvement (88% 1/2020)    LEFT:   92% at 55 dB HL using NU-6 recorded word list; stable    ASSESSMENT:  Normal sloping to moderate sensorineural hearing loss for the left ear and normal sloping to 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 10 dB improvement 500 Hz left, 10 dB decline 6000 Hz left.     PLAN:    Patient is a good candidate for hearing amplification at this time. It is recommended that the patient schedule for a return visit with Dr Rick Nissen, Otologist due to the presence of a conductive component for the right ear and to obtain hearing aid medical clearance as required by the patient's health insurance in order to help cover the cost of hearing aids.  Please call this clinic with questions regarding these results or recommendations.      Justen Guzman.  Licensed Audiologist  [place] #[id number]

## 2021-06-15 ENCOUNTER — TELEPHONE (OUTPATIENT)
Dept: FAMILY MEDICINE | Facility: CLINIC | Age: 71
End: 2021-06-15

## 2021-06-15 DIAGNOSIS — E08.42 DIABETIC POLYNEUROPATHY ASSOCIATED WITH DIABETES MELLITUS DUE TO UNDERLYING CONDITION (H): Primary | ICD-10-CM

## 2021-06-15 NOTE — TELEPHONE ENCOUNTER
Central Prior Authorization Team   [phone]    PA Initiation    Medication: diclofenac (VOLTAREN) 1 % topical gel  Insurance Company: [employer] - Phone [phone] Fax [phone]  Pharmacy Filling the Rx: [id number] - [address] AT [place] [address]  Filling Pharmacy Phone: [phone]  Filling Pharmacy Fax: [phone]  Start Date: 6/15/2021

## 2021-06-15 NOTE — TELEPHONE ENCOUNTER
diclofenac (VOLTAREN) 1 % topical gel   Apply 2 g topically 4 times daily - Topical  Last Written Prescription Date:  6/1/20  Last Fill Quantity: 100 g ,   # refills: 3  Last Office Visit : 6/7/2021  Future Office visit:  12/6/21     refilled per protocol

## 2021-06-16 NOTE — TELEPHONE ENCOUNTER
Sent Patient Consent Form and Denial Letter to Maya Oliva Rn [facility] to [phone] for provider to have patient complete and sign in order to initiate PA Appeal. Awaiting on sign and completed Patient Consent Form from provider/clinic staff.

## 2021-06-16 NOTE — TELEPHONE ENCOUNTER
PRIOR AUTHORIZATION DENIED    Medication: diclofenac (VOLTAREN) 1 % topical gel-PA DENIED     Denial Date: 6/15/2021    Denial Rational: Medication Exclusion from patients benefit plan.           Appeal Information: **Be Advised: If provider would like to Appeal, Insurance requires a completed Patient Consent Form completed and signed by patient when initiating Appeal as noted below. Please provide clinic fax to PA Team to send via fax Patient Consent Form for provider to have patient complete and sign. **

## 2021-06-18 ENCOUNTER — TELEPHONE (OUTPATIENT)
Dept: GASTROENTEROLOGY | Facility: CLINIC | Age: 71
End: 2021-06-18

## 2021-06-18 NOTE — TELEPHONE ENCOUNTER
2nd schedule attempt    Procedure: Lower Endoscopy    Lower Endoscopy Type: Colonoscopy    Purpose of Colonoscopy Procedure: History of Polyps    Colonoscopy reason for referral: polyps    Colonoscopy Sedation: Conscious/Moderate    Preferred Location: [facility]    Scheduling Instructions: If you have not heard from the scheduling office within 2 business days, please call [phone].           Associated Diagnoses    History of colonic polyps [Z86.010]

## 2021-06-21 NOTE — TELEPHONE ENCOUNTER
Patient consent form was mailed to patient's home address, and RN asked patient to sign and return to [facility], so appeal can be submitted.    Maya Oliva RN on 6/21/2021 at 11:48 AM

## 2021-06-29 ENCOUNTER — PRE VISIT (OUTPATIENT)
Dept: UROLOGY | Facility: CLINIC | Age: 71
End: 2021-06-29

## 2021-06-29 NOTE — TELEPHONE ENCOUNTER
Reason for visit: PSA check     Dx/Hx/Sx: elevated PSA     Records/imaging/labs/orders: PSA not done yet    At Rooming: normal

## 2021-07-07 ENCOUNTER — TELEPHONE (OUTPATIENT)
Dept: FAMILY MEDICINE | Facility: CLINIC | Age: 71
End: 2021-07-07

## 2021-07-07 NOTE — TELEPHONE ENCOUNTER
Maya  Please call him as he is due for colonoscopy, schedulers have not been able to reach him. If unable to reach him need to send a letter.  Procedure: Lower Endoscopy     Lower Endoscopy Type: Colonoscopy     Purpose of Colonoscopy Procedure: History of Polyps     Colonoscopy reason for referral: polyps     Colonoscopy Sedation: Conscious/Moderate     Preferred Location: [facility]     Scheduling Instructions: If you have not heard from the scheduling office within 2 business days, please call [phone].        Best wishes,  Murali Logan MD

## 2021-07-07 NOTE — LETTER
Vishnu Viveros  [address]  [place] [address]  : 1950  MRN:  [id number]      2021          Dr. Doreen Eller wanted you to have the scheduling number so you can make an appointment for a colonoscopy.  The number is [phone].  Thank you for calling to make an appointment for   this procedure.    Kind regards,  RICO Trejo

## 2021-07-07 NOTE — TELEPHONE ENCOUNTER
RN left voice message for patient with scheduling number for colonoscopy.  A letter was also sent to patient with this scheduling number.    Maya Oliva RN on 7/7/2021 at 9:46 AM

## 2021-07-12 NOTE — MR AVS SNAPSHOT
After Visit Summary   1/4/2018    Vishnu Viveros    MRN: [id number]           Patient Information     Date Of Birth          1950        Visit Information        Provider Department      1/4/2018 8:30 AM Murali Logan MD [facility]        Today's Diagnoses     Cough    -  1    Lumbar radiculitis        Diabetic polyneuropathy associated with diabetes mellitus due to underlying condition (H)        Enlarged lymph node in neck        Oral mucosal lesion        Enlarged lymph nodes        Throat pain          Care Instructions    Primary Care Center: [phone]     Primary Care Center Medication Refill Request Information:  * Please contact your pharmacy regarding ANY request for medication refills.  ** Psychiatric Prescription Fax = [phone]  * Please allow 3 business days for routine medication refills.  * Please allow 5 business days for controlled substance medication refills.     Primary Care Center Test Result notification information:  *You will be notified with in 7-10 days of your appointment day regarding the results of your test.  If you are on MyChart you will be notified as soon as the provider has reviewed the results and signed off on them.    [phone]            Follow-ups after your visit        Additional Services     OTOLARYNGOLOGY REFERRAL       Your provider has referred you to: [facility]: Adult Ear, Nose [facility] Throat Clinic (Otolaryngology) - [place] [phone]  [email or web address]    Please be aware that coverage of these services is subject to the terms and limitations of your health insurance plan.  Call member services at your health plan with any benefit or coverage questions.      Please bring the following with you to your appointment:    (1) Any X-Rays, CTs or MRIs which have been performed.  Contact the facility where they were done to arrange for  prior to your scheduled appointment.    (2) List of current medications  (3) This referral request   (4) Any documents/labs given to you for this referral                  Follow-up notes from your care team     Write patient with results Return in about 4 weeks (around 2018).      Your next 10 appointments already scheduled     2018  7:30 AM CST   NEW NEURO with Charles Parr MD   Eye Clinic ([facility])    [address]  [address]  [place] [address]   [phone]            2018  9:45 AM CST   NEW RETINA with Joan Mckeon MD   Eye Clinic ([facility])    [address]  [address]  [place] [address]   [phone]              Who to contact     Please call your clinic at [phone] to:    Ask questions about your health    Make or cancel appointments    Discuss your medicines    Learn about your test results    Speak to your doctor   If you have compliments or concerns about an experience at your clinic, or if you wish to file a complaint, please contact [facility] Patient Relations at [phone] or email us at [email or web address]         Additional Information About Your Visit        [employer] Information     Mapflowt is an electronic gateway that provides easy, online access to your medical records. With PeerIndex, you can request a clinic appointment, read your test results, renew a prescription or communicate with your care team.     To sign up for Mapflowt visit the website at [email or web address]   You will be asked to enter the access code listed below, as well as some personal information. Please follow the directions to create your username and password.     Your access code is: [email or web address]  Expires: 3/21/2018  6:30 AM     Your access code will  in 90 days. If you need help or a new code, please contact your [facility]  or call [phone] for assistance.        Care EveryWhere ID     This is your Care EveryWhere ID. This could be used by other organizations to access your [place] medical records  [id number]        Your Vitals Were     Pulse Temperature BMI (Body Mass Index)             68 97.9  F (36.6  C) (Oral) 32.48 kg/m2          Blood Pressure from Last 3 Encounters:   01/04/18 155/74   11/27/17 133/76   09/18/17 124/80    Weight from Last 3 Encounters:   01/04/18 85.8 kg (189 lb 3.2 oz)   12/22/17 85.9 kg (189 lb 4.8 oz)   11/27/17 86.2 kg (190 lb)              We Performed the Following     OTOLARYNGOLOGY REFERRAL     Rapid strep screen POCT          Today's Medication Changes          These changes are accurate as of: 1/4/18  9:40 AM.  If you have any questions, ask your nurse or doctor.               Start taking these medicines.        Dose/Directions    amoxicillin-clavulanate 875-125 MG per tablet   Commonly known as:  AUGMENTIN   Used for:  Enlarged lymph node in neck, Enlarged lymph nodes   Started by:  Murali Logan MD        Dose:  1 tablet   Take 1 tablet by mouth 2 times daily for 14 days   Quantity:  28 tablet   Refills:  0       VIRTUSSIN A/C 100-10 MG/5ML Soln solution   Used for:  Cough   Generic drug:  guaiFENesin-codeine   Started by:  Murali Logan MD        5-10 MLS PO Q 4 H PRN COUGH   Quantity:  473 mL   Refills:  0         These medicines have changed or have updated prescriptions.        Dose/Directions    gabapentin 300 MG capsule   Commonly known as:  NEURONTIN   This may have changed:    - how much to take  - how to take this  - when to take this   Used for:  Lumbar radiculitis, Diabetic polyneuropathy associated with diabetes mellitus due to underlying condition (H)   Changed by:  Murali Logan MD        One capsule in afternoon, two capsules at bedtime.   Quantity:  270 capsule   Refills:  3            Where to get your medicines      [id number] - [place], [place] - [address] AT [facility] [facility] [address]  [address], [place] [address]     Phone:  [phone]     amoxicillin-clavulanate 875-125 MG per tablet    gabapentin 300 MG capsule         Some of these will need a paper prescription and others can be bought over the counter.  Ask your nurse if you have questions.     Bring a paper prescription for each of these medications     VIRTUSSIN A/C 100-10 MG/5ML Soln solution                Primary Care Provider Office Phone # Fax #    Murali Logan [phone] [address] [address]        Equal Access to Services     AMBROCIO CUENCA : Hadii aad ku hadasho Sosola, waaxda luqadaha, qaybta kaalmada adeegyada, carl lucas. So [facility] [place] [facility] [phone].    ATENCIÓN: Si habla español, tiene a little disposición servicios gratuitos de asistencia lingüística. [facility] [phone].    We comply with applicable federal civil rights laws and [place] laws. We do not discriminate on the basis of race, color, national origin, age, disability, sex, sexual orientation, or gender identity.            Thank you!     Thank you for choosing [facility]  for your care. Our goal is always to provide you with excellent care. Hearing back from our patients is one way we can continue to improve our services. Please take a few minutes to complete the written survey that you may receive in the mail after your visit with us. Thank you!             Your Updated Medication List - Protect others around you: Learn how to safely use, store and throw away your medicines at www.disposemymeds.org.          This list is accurate as of: 1/4/18  9:40 AM.  Always use your most recent med list.                   Brand Name Dispense Instructions for use Diagnosis    ALEVE PO      Take 1 tablet by mouth as needed for moderate pain        amoxicillin-clavulanate 875-125 MG per  tablet    AUGMENTIN    28 tablet    Take 1 tablet by mouth 2 times daily for 14 days    Enlarged lymph node in neck, Enlarged lymph nodes       aspirin 81 MG tablet      Take 1 tablet by mouth daily.        atorvastatin 20 MG tablet    LIPITOR    90 tablet    Take 1 tablet (20 mg) by mouth daily    Type 2 diabetes mellitus treated without insulin (H)       cyclobenzaprine 10 MG tablet    FLEXERIL    42 tablet    Take 0.5 tablets (5 mg) by mouth 3 times daily as needed for muscle spasms    Lumbar radiculopathy       DULoxetine 20 MG EC capsule    CYMBALTA    30 capsule    Take 1 capsule (20 mg) by mouth daily    Diabetic polyneuropathy associated with diabetes mellitus due to underlying condition (H), Adjustment disorder with mixed anxiety and depressed mood       FISH OIL PO      Take 1 capsule by mouth as needed        gabapentin 300 MG capsule    NEURONTIN    270 capsule    One capsule in afternoon, two capsules at bedtime.    Lumbar radiculitis, Diabetic polyneuropathy associated with diabetes mellitus due to underlying condition (H)       lisinopril 5 MG tablet    PRINIVIL/ZESTRIL    90 tablet    Take 1 tablet (5 mg) by mouth daily    Type 2 diabetes mellitus treated without insulin (H), Benign essential hypertension       loratadine 10 MG tablet    CLARITIN    90 tablet    Take 1 tablet (10 mg) by mouth daily as needed    Seasonal allergies       metFORMIN 500 MG 24 hr tablet    GLUCOPHAGE-XR    90 tablet    Take 1 tablet (500 mg) by mouth daily (with dinner)    Type 2 diabetes mellitus treated without insulin (H)       MULTIVITAMINS PO      Take 1 tablet by mouth daily.        order for DME     1 Device    Back brace    Left leg weakness, Lumbar stenosis       tamsulosin 0.4 MG capsule    FLOMAX    90 capsule    Take 1 capsule (0.4 mg) by mouth daily    Benign non-nodular prostatic hyperplasia, presence of lower urinary tract symptoms unspecified       VIRTUSSIN A/C 100-10 MG/5ML Soln solution   Generic drug:   guaiFENesin-codeine     473 mL    5-10 MLS PO Q 4 H PRN COUGH    Cough       VITAMIN B12 PO      Take 1 tablet by mouth. Pt states he takes this 2 or 3 times a week..        VITAMIN C PO      Take 500 mg by mouth once        vitamin D 1000 UNITS capsule      Take 1 capsule by mouth daily.           ,

## 2021-08-16 NOTE — PATIENT INSTRUCTIONS
1. You were seen in the ENT Clinic today by Dr. Nissen.  If you have any questions or concerns after your appointment, please call   - Option 1: ENT Clinic: [phone]   - Option 2: Hilary (Dr. Nissen's Nurse): [phone]                   Ebony(Dr. Nissen's Nurse): [phone]      2.   Plan to return to clinic in 4 months    Hilary Perez LPN  [facility] - Otolaryngology

## 2021-08-17 ENCOUNTER — OFFICE VISIT (OUTPATIENT)
Dept: AUDIOLOGY | Facility: CLINIC | Age: 71
End: 2021-08-17
Payer: COMMERCIAL

## 2021-08-17 ENCOUNTER — OFFICE VISIT (OUTPATIENT)
Dept: OTOLARYNGOLOGY | Facility: CLINIC | Age: 71
End: 2021-08-17
Payer: COMMERCIAL

## 2021-08-17 ENCOUNTER — TELEPHONE (OUTPATIENT)
Dept: OTOLARYNGOLOGY | Facility: CLINIC | Age: 71
End: 2021-08-17

## 2021-08-17 VITALS
WEIGHT: 180.78 LBS | SYSTOLIC BLOOD PRESSURE: 130 MMHG | DIASTOLIC BLOOD PRESSURE: 70 MMHG | HEIGHT: 63 IN | BODY MASS INDEX: 32.03 KG/M2 | TEMPERATURE: 97.9 F

## 2021-08-17 DIAGNOSIS — H90.A21 SENSORINEURAL HEARING LOSS (SNHL) OF RIGHT EAR WITH RESTRICTED HEARING OF LEFT EAR: ICD-10-CM

## 2021-08-17 DIAGNOSIS — H90.A32 MIXED CONDUCTIVE AND SENSORINEURAL HEARING LOSS OF LEFT EAR WITH RESTRICTED HEARING OF RIGHT EAR: Primary | ICD-10-CM

## 2021-08-17 DIAGNOSIS — H93.13 TINNITUS, BILATERAL: ICD-10-CM

## 2021-08-17 DIAGNOSIS — H90.3 BILATERAL SENSORINEURAL HEARING LOSS: Primary | ICD-10-CM

## 2021-08-17 PROCEDURE — 92504 EAR MICROSCOPY EXAMINATION: CPT | Performed by: OTOLARYNGOLOGY

## 2021-08-17 PROCEDURE — 99213 OFFICE O/P EST LOW 20 MIN: CPT | Mod: 25 | Performed by: OTOLARYNGOLOGY

## 2021-08-17 PROCEDURE — 99207 PR ASSESSMENT FOR HEARING AID: CPT | Performed by: AUDIOLOGIST

## 2021-08-17 ASSESSMENT — PAIN SCALES - GENERAL: PAINLEVEL: NO PAIN (0)

## 2021-08-17 ASSESSMENT — MIFFLIN-ST. JEOR: SCORE: 1465.13

## 2021-08-17 NOTE — PROGRESS NOTES
AUDIOLOGY REPORT    SUBJECTIVE: Vishnu Viveros is a 71 year old male was seen in the Audiology Clinic at  [facility] and [facility] on 8/17/21 to discuss concerns with hearing and functional communication difficulties. The patient was not accompanied by anyone today. Vishnu has been seen previously on 6/8/21, and results revealed normal sloping to profound mixed hearing loss in the right ear and normal sloping to moderate sensorineural hearing loss in the left. The patient was medically evaluated and determined to be cleared for binaural hearing aids by Rick Nissen, M.D. directly preceding today's appointment. Vishnu notes difficulty with communication in a variety of listening situations, including during meetings, large groups, large rooms such as an auditoriums, and with background noise.     OBJECTIVE: Reviewed results of patient's hearing evaluation. Patient is a hearing aid candidate. Patient was given an overview of different style of hearing aids. He is most interested in something that is discreet. Patient was also provided with information regarding the 45 day trial period. After discovering that there was a non-refundable $250 fee if patient were to return the devices, he stated he would like to talk with his wife further before proceeding. He reports he is hesitant to try the devices if he can not be refunded the whole amount. He inquired why it is not covered by insurance. He was encouraged to contact insurance for additional information, but was informed that the $250 is likely not covered, even if devices.     Patient was given the option of proceeding with today's consultation and selecting a device. He could consult with is wife and inform the clinic if he wished to place the order. He was advised that the cost of the consultation is $120. Patient declined to continue with the hearing aid consultation today. He instead chose to discuss things over with is wife  before proceeding. He was given a general price range to share with his wife.    The scheduled hearing aid fitting appointment will be changed to a new hearing aid consultation. The follow up appointment will be changed to the new fitting time. After discussion with his wife, if wishes to return to discuss hearing aid options further a consultation can be completed at this appointment. He was instructed to cancel the appointments if he no longer wishes to pursue hearing aid services. Patient expressed understanding and agreement with this plan.     ASSESSMENT: Reviewed purchase information and warranty information with patient. The 45 day trial period was explained to patient. At which point, he wished to discontinue the appointment. There is no charge for today as consultation was not completed. He was given a copy of his audiogram and insurance benefit check per his request.     PLAN: Vishnu' hearing aid fitting appointment was changed to a consultation and the follow up was changed to a fitting. He will return for a hearing aid consultation if he wishes to continue after talking with his wife. He was instructed to cancel these appointments if he no longer wishes to proceed with hearing aid services at this time.    Justen Caballero. CCC-A  Licensed Audiologist   [place] #[id number]

## 2021-08-17 NOTE — TELEPHONE ENCOUNTER
Spoke with patient regarding scheduling a Return in about 4 months (around 12/17/2021) for Follow up.-patient asked for a recall letter for a year out as he does not feel anything warrants a sooner appointment than a year or more out. Made recall letter according to patient wishes. -Per Patient

## 2021-08-17 NOTE — LETTER
"8/17/2021       RE: Vishnu Viveros  [address]  [place] [address]     Dear Colleague,    Thank you for referring your patient, Vishnu Viveros, to the [facility] [place] at [facility]. Please see a copy of my visit note below.    Dear Murali Hoffman:    I had the pleasure of meeting Vishnu Viveros in consultation today at the [facility] at your request.    CHIEF COMPLAINT: Hearing loss    HISTORY OF PRESENT ILLNESS: Patient is a 71-year-old in today for hearing assessment and ear exam.  He has had hearing issues for some time.  He feels the hearing is better in his left ear.  Has been around some noise in the past.  There is no pain or drainage in his ears.  Does have occasional bilateral tinnitus but not problematic.  Often will notice hearing issues in crowds or when more people are talking.  There is no dizziness, occasional off-balance when he gets up quickly or turns quickly.  Only last for seconds.  He denies any dysphagia, hoarseness, facial paresthesias.    ALLERGIES:    Allergies   Allergen Reactions     Pollen Extract      Other reaction(s): Headache  Sinus congestion, sinus \"drainage\", sneezing     Seasonal Allergies      Sinus congestion, sinus \"drainage\", sneezing     Nkda [No Known Drug Allergies]        HABITS: Social History    Substance and Sexual Activity      Alcohol use: Yes        Alcohol/week: 2.5 standard drinks        Types: 3 Standard drinks or equivalent per week     History   Smoking Status     Former Smoker     Years: 5.00     Types: Cigarettes     Quit date: 11/14/1981   Smokeless Tobacco     Never Used         PAST MEDICAL HISTORY: Please see today's intake form (for the remainder of the PMH) which I reviewed and signed.  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      Chronic low back pain "      Diabetes (H)      Ectropion due to laxity of eyelid, unspecified laterality      History of tobacco use      HLD (hyperlipidemia)      HTN (hypertension)      Obesity (BMI 30.0-34.9)      Pulmonary nodule        FAMILY HISTORY/SOCIAL HISTORY:   Family History   Problem Relation Age of Onset     Cerebrovascular Disease Brother          stroke      Breast Cancer Sister      Kidney Disease Mother         Kidney stones     Other - See Comments Father          in carmita parkinsons or post surgical eye surgery     Cerebral aneurysm Brother      Hypothyroidism Sister      Glaucoma No family hx of      Macular Degeneration No family hx of       Social History     Socioeconomic History     Marital status:      Spouse name: Carmen     Number of children: 2     Years of education: Not on file     Highest education level: Not on file   Occupational History     Not on file   Tobacco Use     Smoking status: Former Smoker     Years: 5.00     Types: Cigarettes     Quit date: 1981     Years since quittin.7     Smokeless tobacco: Never Used   Substance and Sexual Activity     Alcohol use: Yes     Alcohol/week: 2.5 standard drinks     Types: 3 Standard drinks or equivalent per week     Drug use: No     Sexual activity: Not Currently     Partners: Female   Other Topics Concern     Not on file   Social History Narrative     works as [employer].currently working remotely        His immediate family; his wife has MS and is an RN at [employer], his two children are in good health.         Social Determinants of Health     Financial Resource Strain: Low Risk      Difficulty of Paying Living Expenses: Not hard at all   Food Insecurity: No Food Insecurity     Worried About Running Out of Food in the Last Year: Never true     Ran Out of Food in the Last Year: Never true   Transportation Needs: No Transportation Needs     Lack of Transportation (Medical): No     Lack of Transportation  (Non-Medical): No   Physical Activity:      Days of Exercise per Week:      Minutes of Exercise per Session:    Stress:      Feeling of Stress :    Social Connections:      Frequency of Communication with Friends and Family:      Frequency of Social Gatherings with Friends and Family:      Attends Denominational Services:      Active Member of Clubs or Organizations:      Attends Club or Organization Meetings:      Marital Status:    Intimate Partner Violence:      Fear of Current or Ex-Partner:      Emotionally Abused:      Physically Abused:      Sexually Abused:        REVIEW OF SYSTEMS: [unfilled]    The remainder of the 10 point ROS is negative    PHYSICIAL EXAMINATION:  Constitutional: The patient was well-groomed and in no acute distress.   Skin: Warm and pink.  Psychiatric: The patient's affect was calm, cooperative, and appropriate.   Respiratory: Breathing comfortably without stridor or exertion of accessory muscles.  Eyes: Pupils were equal and reactive. Extraocular movement intact.   Head: Normocephalic and atraumatic. No lesions or scars.  Ears: Patient placed under the microscope for microscopic evaluation and cleaning of cerumen which was obscuring full visualization and complete assessment of both TMs. Under high power magnification, the right ear was examined and cleaned of cerumen using curet, alligator forceps, and suction.  After cleaning, TM is fully visualized and has normal position with normal middle ear aeration. The left ear was then cleaned and inspected using microscope, instruments and similar techniques. After cleaning of cerumen, the TM has normal position with normal aeration to middle ear.  Nose: Sinuses were nontender. Anterior rhinoscopy revealed midline septum and absence of purulence or polyps.  Oral Cavity: Normal tongue, floor of mouth, buccal mucosa, and palate. No lesions or masses on inspection or palpation. No abnormal lymph tissue in the oropharynx.   Neck: The parotid is soft  without masses. Supple with normal laryngeal and tracheal landmarks.   Lymphatic: There is no palpable lymphadenopathy or other masses in the neck.   Neurologic: Alert and oriented x 3. Cranial nerves III-XI within normal limits. Voice quality normal.  Cerebellar Function Tests:  Grossly normal    Audiogram: Audiogram performed shows a bilateral high-frequency sensorineural hearing loss above 2000 Hz.  Right slightly worse than the left by about 10 to 15 dB in the low frequencies.  Excellent discrimination at 96% on the right and 92% on the left.  He has normal type A tympanograms bilaterally.      IMPRESSION AND PLAN:   1. Bilateral sensorineural hearing loss: Discussed this in detail with him.  Recommend protect ears from noise.  Discussed option for hearing aids which she is interested then and will pursue at his discretion in place of location.  2. Bilateral tinnitus: Secondary sensorineural hearing loss.  Not problematic, no treatment needed at this time, monitor.    Thank you very much for the opportunity to participate in the care of your patient.    Rick L Nissen MD

## 2021-08-17 NOTE — PROGRESS NOTES
"Dear Murali Hoffman:    I had the pleasure of meeting Vishnu Viveros in consultation today at the [facility] at your request.    CHIEF COMPLAINT: Hearing loss    HISTORY OF PRESENT ILLNESS: Patient is a 71-year-old in today for hearing assessment and ear exam.  He has had hearing issues for some time.  He feels the hearing is better in his left ear.  Has been around some noise in the past.  There is no pain or drainage in his ears.  Does have occasional bilateral tinnitus but not problematic.  Often will notice hearing issues in crowds or when more people are talking.  There is no dizziness, occasional off-balance when he gets up quickly or turns quickly.  Only last for seconds.  He denies any dysphagia, hoarseness, facial paresthesias.    ALLERGIES:    Allergies   Allergen Reactions     Pollen Extract      Other reaction(s): Headache  Sinus congestion, sinus \"drainage\", sneezing     Seasonal Allergies      Sinus congestion, sinus \"drainage\", sneezing     Nkda [No Known Drug Allergies]        HABITS: Social History    Substance and Sexual Activity      Alcohol use: Yes        Alcohol/week: 2.5 standard drinks        Types: 3 Standard drinks or equivalent per week     History   Smoking Status     Former Smoker     Years: 5.00     Types: Cigarettes     Quit date: 11/14/1981   Smokeless Tobacco     Never Used         PAST MEDICAL HISTORY: Please see today's intake form (for the remainder of the PMH) which I reviewed and signed.  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 eyelid, unspecified laterality      History of tobacco use      HLD (hyperlipidemia)      HTN (hypertension)      Obesity (BMI 30.0-34.9)      Pulmonary nodule        FAMILY HISTORY/SOCIAL HISTORY:   Family History   Problem Relation Age of Onset     Cerebrovascular Disease " Brother          stroke      Breast Cancer Sister      Kidney Disease Mother         Kidney stones     Other - See Comments Father          in carmita parkinsons or post surgical eye surgery     Cerebral aneurysm Brother      Hypothyroidism Sister      Glaucoma No family hx of      Macular Degeneration No family hx of       Social History     Socioeconomic History     Marital status:      Spouse name: Carmen     Number of children: 2     Years of education: Not on file     Highest education level: Not on file   Occupational History     Not on file   Tobacco Use     Smoking status: Former Smoker     Years: 5.00     Types: Cigarettes     Quit date: 1981     Years since quittin.7     Smokeless tobacco: Never Used   Substance and Sexual Activity     Alcohol use: Yes     Alcohol/week: 2.5 standard drinks     Types: 3 Standard drinks or equivalent per week     Drug use: No     Sexual activity: Not Currently     Partners: Female   Other Topics Concern     Not on file   Social History Narrative     works as [employer].currently working remotely        His immediate family; his wife has MS and is an RN at [employer] [place], his two children are in good health.         Social Determinants of Health     Financial Resource Strain: Low Risk      Difficulty of Paying Living Expenses: Not hard at all   Food Insecurity: No Food Insecurity     Worried About Running Out of Food in the Last Year: Never true     Ran Out of Food in the Last Year: Never true   Transportation Needs: No Transportation Needs     Lack of Transportation (Medical): No     Lack of Transportation (Non-Medical): No   Physical Activity:      Days of Exercise per Week:      Minutes of Exercise per Session:    Stress:      Feeling of Stress :    Social Connections:      Frequency of Communication with Friends and Family:      Frequency of Social Gatherings with Friends and Family:      Attends [employer] Services:       Active Member of Clubs or Organizations:      Attends Club or Organization Meetings:      Marital Status:    Intimate Partner Violence:      Fear of Current or Ex-Partner:      Emotionally Abused:      Physically Abused:      Sexually Abused:        REVIEW OF SYSTEMS: [unfilled]    The remainder of the 10 point ROS is negative    PHYSICIAL EXAMINATION:  Constitutional: The patient was well-groomed and in no acute distress.   Skin: Warm and pink.  Psychiatric: The patient's affect was calm, cooperative, and appropriate.   Respiratory: Breathing comfortably without stridor or exertion of accessory muscles.  Eyes: Pupils were equal and reactive. Extraocular movement intact.   Head: Normocephalic and atraumatic. No lesions or scars.  Ears: Patient placed under the microscope for microscopic evaluation and cleaning of cerumen which was obscuring full visualization and complete assessment of both TMs. Under high power magnification, the right ear was examined and cleaned of cerumen using curet, alligator forceps, and suction.  After cleaning, TM is fully visualized and has normal position with normal middle ear aeration. The left ear was then cleaned and inspected using microscope, instruments and similar techniques. After cleaning of cerumen, the TM has normal position with normal aeration to middle ear.  Nose: Sinuses were nontender. Anterior rhinoscopy revealed midline septum and absence of purulence or polyps.  Oral Cavity: Normal tongue, floor of mouth, buccal mucosa, and palate. No lesions or masses on inspection or palpation. No abnormal lymph tissue in the oropharynx.   Neck: The parotid is soft without masses. Supple with normal laryngeal and tracheal landmarks.   Lymphatic: There is no palpable lymphadenopathy or other masses in the neck.   Neurologic: Alert and oriented x 3. Cranial nerves III-XI within normal limits. Voice quality normal.  Cerebellar Function Tests:  Grossly normal    Audiogram: Audiogram  performed shows a bilateral high-frequency sensorineural hearing loss above 2000 Hz.  Right slightly worse than the left by about 10 to 15 dB in the low frequencies.  Excellent discrimination at 96% on the right and 92% on the left.  He has normal type A tympanograms bilaterally.      IMPRESSION AND PLAN:   1. Bilateral sensorineural hearing loss: Discussed this in detail with him.  Recommend protect ears from noise.  Discussed option for hearing aids which she is interested then and will pursue at his discretion in place of location.  2. Bilateral tinnitus: Secondary sensorineural hearing loss.  Not problematic, no treatment needed at this time, monitor.    Thank you very much for the opportunity to participate in the care of your patient.    Rick L Nissen MD

## 2021-08-17 NOTE — NURSING NOTE
"Chief Complaint   Patient presents with     RECHECK     follow up    .Blood pressure 130/70, temperature 97.9  F (36.6  C), height 1.6 m (5' 3\"), weight 82 kg (180 lb 12.4 oz).      Isaac Singh LPN    "

## 2021-08-27 ENCOUNTER — OFFICE VISIT (OUTPATIENT)
Dept: OPHTHALMOLOGY | Facility: CLINIC | Age: 71
End: 2021-08-27
Attending: FAMILY MEDICINE
Payer: COMMERCIAL

## 2021-08-27 VITALS — BODY MASS INDEX: 31.07 KG/M2 | WEIGHT: 182 LBS | HEIGHT: 64 IN

## 2021-08-27 DIAGNOSIS — H25.813 COMBINED FORMS OF AGE-RELATED CATARACT OF BOTH EYES: Primary | ICD-10-CM

## 2021-08-27 DIAGNOSIS — H40.003 BORDERLINE GLAUCOMA (GLAUCOMA SUSPECT), BILATERAL: ICD-10-CM

## 2021-08-27 DIAGNOSIS — H35.9 MACULOPATHY: ICD-10-CM

## 2021-08-27 DIAGNOSIS — E08.42 DIABETIC POLYNEUROPATHY ASSOCIATED WITH DIABETES MELLITUS DUE TO UNDERLYING CONDITION (H): ICD-10-CM

## 2021-08-27 PROCEDURE — 92134 CPTRZ OPH DX IMG PST SGM RTA: CPT | Performed by: OPHTHALMOLOGY

## 2021-08-27 PROCEDURE — 92015 DETERMINE REFRACTIVE STATE: CPT

## 2021-08-27 PROCEDURE — 99215 OFFICE O/P EST HI 40 MIN: CPT | Mod: GC | Performed by: OPHTHALMOLOGY

## 2021-08-27 PROCEDURE — 92133 CPTRZD OPH DX IMG PST SGM ON: CPT | Performed by: OPHTHALMOLOGY

## 2021-08-27 PROCEDURE — 76519 ECHO EXAM OF EYE: CPT | Performed by: OPHTHALMOLOGY

## 2021-08-27 PROCEDURE — G0463 HOSPITAL OUTPT CLINIC VISIT: HCPCS

## 2021-08-27 PROCEDURE — 92025 CPTRIZED CORNEAL TOPOGRAPHY: CPT | Performed by: OPHTHALMOLOGY

## 2021-08-27 ASSESSMENT — REFRACTION_WEARINGRX
OD_CYLINDER: +0.75
OS_CYLINDER: +0.25
OS_ADD: +2.50
OS_AXIS: 025
OD_ADD: +2.50
OD_SPHERE: +1.00
OS_SPHERE: +1.00
OD_AXIS: 110
SPECS_TYPE: PAL

## 2021-08-27 ASSESSMENT — VISUAL ACUITY
OS_BAT_LOW: 20/40
OS_PH_CC+: +2
OD_PH_CC: 20/40
OS_PH_CC: 20/40
OS_BAT_HIGH: 20/60
OS_CC+: -1
OD_CC: 20/40
OD_CC+: -2
OD_BAT_MED: 20/70
OD_BAT_HIGH: 20/500
OD_PH_CC+: +2
OS_BAT_MED: 20/50
OS_CC: 20/40
OD_BAT_LOW: 20/50
OD_CC: J2
METHOD: SNELLEN - LINEAR
OS_CC: J2

## 2021-08-27 ASSESSMENT — EXTERNAL EXAM - RIGHT EYE: OD_EXAM: NORMAL

## 2021-08-27 ASSESSMENT — REFRACTION_MANIFEST
OS_AXIS: 015
OD_CYLINDER: +0.50
OS_CYLINDER: +0.50
OS_ADD: +2.50
OD_SPHERE: +1.25
OD_AXIS: 110
OS_SPHERE: +1.00
OD_ADD: +2.50

## 2021-08-27 ASSESSMENT — MIFFLIN-ST. JEOR: SCORE: 1486.55

## 2021-08-27 ASSESSMENT — EXTERNAL EXAM - LEFT EYE: OS_EXAM: NORMAL

## 2021-08-27 ASSESSMENT — TONOMETRY
OS_IOP_MMHG: 18
OD_IOP_MMHG: 19
IOP_METHOD: TONOPEN

## 2021-08-27 ASSESSMENT — CONF VISUAL FIELD
METHOD: COUNTING FINGERS
OS_NORMAL: 1
OD_NORMAL: 1

## 2021-08-27 ASSESSMENT — CUP TO DISC RATIO
OD_RATIO: 0.5
OS_RATIO: 0.5

## 2021-08-27 ASSESSMENT — SLIT LAMP EXAM - LIDS
COMMENTS: NORMAL
COMMENTS: NORMAL

## 2021-08-27 NOTE — PROGRESS NOTES
71 year old male presenting for evaluation of dry eyes and pterygium associated with irritation, dryness, especially nasally, occasional watery tearing, c/o mild blurry vision   Now s/p excision with autograft.    Doing well, some tearing right eye, not using any steroids at this time.  Missed last appointment    Interval hx: Pt here for discussion regarding cataracts in both eyes.  He notes     PMHx:  None    POHx:  H/o pterygium s/p excision OD  Recurrent pterygium OD  S/p repeat excision with conjunctival autograft OD 2/12/15  Pterygium OS  KATIA  Cataract    Eye Meds:  artificial tears as needed     A/P:  # Recurrent pterygium OD - s/p excision with conjunctival autograft right eye 2/12/15   - not using any drops at this time   - Doing well with excellent surgical result    # Pterygium OS - monitor    # Cataract OU    - intraocular lens calcs obtained today   - Visually significant each eye   - +Flomax for 3-4 years, no Pseudoex material, no trauma, good dilation each eye    - Risks, benefits and alternatives to cataract extraction/IOL implantation discussed; consent obtained.  Will schedule surgery    -Pt with history of possible  each eye, trace fluid left eye, but pinhole potential acuity is very good OU.     -Discussed R/B/A and PC for CE/IOL and pt elects to proceed.     -Monofocal, non-toric, set for distance, begin with right eye.     # Maculopathy each eye:  Longstanding, appearance is consistent with old .  Possible visual significance.  Monitor.    # Borderline glaucoma: by enlarged c/d each eye.  IOP normal.  RNFL reassuring.  Complete baseline eval after cataract surgery.       # Pigmented lesion RLL puncta - likely seborrheic keratosis   - patient developing progressive epiphoria OD   - ?If related to punctal occlusion    - referral to oculoplastics for possible excisional biopsy     # Dry eyes - decreased tear production   - low schirmer's test   - PF AT QID OU   - start ointment QHS OU   -  start WC BID OU     Stevan Cardenas DO  Fellow, Cornea & External Disease  Department of Ophthalmology  [facility]    Attending Physician Attestation:  Complete documentation of historical and exam elements from today's encounter can be found in the full encounter summary report (not reduplicated in this progress note).  I personally obtained the chief complaint(s) and history of present illness.  I confirmed and edited as necessary the review of systems, past medical/surgical history, family history, social history, and examination findings as documented by others; and I examined the patient myself.  I personally reviewed the relevant tests, images, and reports as documented above.  I formulated and edited as necessary the assessment and plan and discussed the findings and management plan with the patient and family. I personally reviewed the ophthalmic test(s) associated with this encounter, agree with the interpretation(s) as documented by the resident/fellow, and have edited the corresponding report(s) as necessary. - Rick Whaley MD    I personally spent great than 40min with the patient, of which >50% of the time was spent face to face with the patient, counseling and coordinating care with the patient. We discussed the complexity of his diagnosis, the risks, benefits of cataract surgery, including loss of vision, loss of the eye, infection, pain, bleeding, retinal detachment, dropped nucleus, and the unknown prognosis for the patient at this time.    Rick Whaley MD

## 2021-08-27 NOTE — NURSING NOTE
Chief Complaints and History of Present Illnesses   Patient presents with     Annual Eye Exam     Diabetic check/ Cataract Evaluation.      Chief Complaint(s) and History of Present Illness(es)     Annual Eye Exam     Laterality: both eyes    Associated symptoms: dryness and tearing.  Negative for eye pain    Treatments tried: artificial tears    Pain scale: 0/10    Comments: Diabetic check/ Cataract Evaluation.               Comments     Patient reports that he not noticing much of a change from a year ago. Continues to find trouble seeing at a distance consistent with last year. Denies any glare issues noticed with each eye. each eye feel dry at times with each eye.    Lab Results       Component                Value               Date                       A1C                      5.8                 06/07/2021                 A1C                      5.8                 11/16/2020                 A1C                      6.0                 05/27/2020                 A1C                      6.2                 10/26/2019                 A1C                      6.4                 06/12/2019

## 2021-09-12 ENCOUNTER — HEALTH MAINTENANCE LETTER (OUTPATIENT)
Age: 71
End: 2021-09-12

## 2021-09-30 ENCOUNTER — PRE VISIT (OUTPATIENT)
Dept: UROLOGY | Facility: CLINIC | Age: 71
End: 2021-09-30

## 2021-09-30 NOTE — TELEPHONE ENCOUNTER
Reason for visit: 6 month follow up      Dx/Hx/Sx: elevated PSA, abnormal YESI    Records/imaging/labs/orders: PSA in epic     At Rooming: video visit

## 2021-10-05 ENCOUNTER — HOSPITAL ENCOUNTER (OUTPATIENT)
Facility: AMBULATORY SURGERY CENTER | Age: 71
End: 2021-10-05
Attending: OPHTHALMOLOGY
Payer: COMMERCIAL

## 2021-10-09 ENCOUNTER — LAB (OUTPATIENT)
Dept: LAB | Facility: CLINIC | Age: 71
End: 2021-10-09
Attending: UROLOGY
Payer: COMMERCIAL

## 2021-10-09 DIAGNOSIS — R97.20 ELEVATED PROSTATE SPECIFIC ANTIGEN (PSA): ICD-10-CM

## 2021-10-09 LAB — PSA SERPL-MCNC: 5.1 UG/L (ref 0–4)

## 2021-10-09 PROCEDURE — 84153 ASSAY OF PSA TOTAL: CPT | Performed by: PATHOLOGY

## 2021-10-09 PROCEDURE — 36415 COLL VENOUS BLD VENIPUNCTURE: CPT | Performed by: PATHOLOGY

## 2021-10-11 ENCOUNTER — VIRTUAL VISIT (OUTPATIENT)
Dept: UROLOGY | Facility: CLINIC | Age: 71
End: 2021-10-11
Payer: COMMERCIAL

## 2021-10-11 DIAGNOSIS — Z91.199 FAILURE TO ATTEND APPOINTMENT: Primary | ICD-10-CM

## 2021-10-11 NOTE — LETTER
10/11/2021       RE: Vishnu Viveros  [address]  [place] [address]     Dear Colleague,    Thank you for referring your patient, Vishnu Viveros, to the [facility]. Please see a copy of my visit note below.      This encounter was opened in error. Please disregard.      Again, thank you for allowing me to participate in the care of your patient.      Sincerely,    Leandro Montaño MD

## 2021-10-11 NOTE — LETTER
Date:October 20, 2021      Provider requested that no letter be sent. Do not send.       [facility]

## 2021-10-11 NOTE — PROGRESS NOTES
"Vishnu is a 71 year old who is being evaluated via a billable video visit.      How would you like to obtain your AVS? Mail a copy  If the video visit is dropped, the invitation should be resent by: Text to cell phone: [phone]  Will anyone else be joining your video visit? No  {If patient encounters technical issues they should call [phone] :[id number]}    Video Start Time: 2:57pm  Video-Visit Details  Reason for Visit: Follow up PSA, abnormal YESI, medication refill     HPI/Subjective:  Vishnu Odom is a 72 yo male that is followed in our clinic for an elevated PSA test. 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 His urologic history includes benign prostate biopsy in 2010 done secondary to a palpated prostate nodule. He has had a fluctuating PSA over the last few years leading to an MRI in 03/27/2017 (PIRADS 2 - Low probability) after a PSA of 6.91. His PSA subsequently trended down into the 4's before creeping back above 5 where it has been since then.  Last YESI on 1/27/20 showed no nodules.  He recently had another PSA checked.        He has had no trouble with urination. He feels that he can empty his bladder completely, has no hesitancy, is up at most once time per night, no blood in his urine, no burning with urination.  He continues on flomax.           OBJECTIVE:  PSA from 10/9/21 is 5.1 ng/mL  PSA from 11/16/20 is 5.35 ng/mL  11/25/19: 5.14 ng/mL  6/12/19: 4.63 ng/mL          ASSESSMENT AND PLAN:   Over half of today's 20-minute visit was spent counseling the patient regarding his PSA, YESI and LUTS.  PSA overall stable.  Repeat PSA in 1 year Refill was given for flomax.  Type of service:  Video Visit    Video End Time:{video visit start/end time for provider to select:108752}    Originating Location (pt. Location): {video visit patient location:001496::\"Home\"}    Distant Location (provider location):  " "[facility] UROLOG[facility]     Platform used for Video Visit: {Virtual Visit Platforms:015115::\"Thelma\"}    "

## 2021-10-13 ENCOUNTER — TELEPHONE (OUTPATIENT)
Dept: UROLOGY | Facility: CLINIC | Age: 71
End: 2021-10-13

## 2021-10-13 NOTE — TELEPHONE ENCOUNTER
[facility] Call Center    Phone Message    May a detailed message be left on voicemail: yes     Reason for Call: Other: .  pt was supposed to have a virtual visit with  on 10/11/2021- he stated he was waiting for quite some time and never received an update at all, he voiced is concerns and disappointment, I advised to file a complaint with patient relations, however, pt would like to speak to the clinic instead, please call Vishnu elizabeth, thank you     Action Taken: Message routed to:  [facility] ([facility] uro    Travel Screening: Not Applicable

## 2021-10-14 ENCOUNTER — TELEPHONE (OUTPATIENT)
Dept: UROLOGY | Facility: CLINIC | Age: 71
End: 2021-10-14

## 2021-10-14 NOTE — TELEPHONE ENCOUNTER
----- Message from Gloria Bravo RN sent at 10/14/2021 11:13 AM CDT -----  Please see below and call and schedule accordingly. Thank you  ----- Message -----  From: Leandro Montaño MD  Sent: 10/11/2021   6:41 PM CDT  To: Gloria Bravo RN    I tried to reach this patient but got no answer (I was very late).  Please reschedule .  Thanks.  Daniel

## 2021-10-14 NOTE — TELEPHONE ENCOUNTER
Patient is disappointed with the treatment/communication here at [facility] and stated that he has made a complaint about his appt. with . He reports that no one reached out to him. Pt would like to schedule for a clinic visit.  next available is in April. I sent message to Melissa for scheduling

## 2021-10-22 ENCOUNTER — TELEPHONE (OUTPATIENT)
Dept: UROLOGY | Facility: CLINIC | Age: 71
End: 2021-10-22

## 2021-10-27 ENCOUNTER — TELEPHONE (OUTPATIENT)
Dept: OPHTHALMOLOGY | Facility: CLINIC | Age: 71
End: 2021-10-27
Payer: COMMERCIAL

## 2021-10-27 NOTE — TELEPHONE ENCOUNTER
I called patient to schedule surgery with Dr. Rick Whaley, I left a voicemail with callback # [phone]

## 2021-10-28 ENCOUNTER — PRE VISIT (OUTPATIENT)
Dept: UROLOGY | Facility: CLINIC | Age: 71
End: 2021-10-28

## 2021-10-28 NOTE — TELEPHONE ENCOUNTER
Reason for visit: 6 month follow up       Dx/Hx/Sx: elevated PSA, abnormal YESI     Records/imaging/labs/orders: PSA in epic      At Rooming: standard

## 2021-11-08 ENCOUNTER — OFFICE VISIT (OUTPATIENT)
Dept: UROLOGY | Facility: CLINIC | Age: 71
End: 2021-11-08
Payer: COMMERCIAL

## 2021-11-08 VITALS
HEART RATE: 69 BPM | DIASTOLIC BLOOD PRESSURE: 69 MMHG | HEIGHT: 64 IN | SYSTOLIC BLOOD PRESSURE: 125 MMHG | WEIGHT: 183 LBS | BODY MASS INDEX: 31.24 KG/M2

## 2021-11-08 DIAGNOSIS — N52.8 OTHER MALE ERECTILE DYSFUNCTION: ICD-10-CM

## 2021-11-08 DIAGNOSIS — N40.1 BENIGN LOCALIZED PROSTATIC HYPERPLASIA WITH LOWER URINARY TRACT SYMPTOMS (LUTS): Primary | ICD-10-CM

## 2021-11-08 DIAGNOSIS — B37.2 YEAST INFECTION OF THE SKIN: ICD-10-CM

## 2021-11-08 PROCEDURE — 99214 OFFICE O/P EST MOD 30 MIN: CPT | Performed by: UROLOGY

## 2021-11-08 RX ORDER — VARDENAFIL HYDROCHLORIDE 5 MG/1
TABLET ORAL
Qty: 30 TABLET | Refills: 11 | Status: SHIPPED | OUTPATIENT
Start: 2021-11-08 | End: 2022-05-03

## 2021-11-08 RX ORDER — TAMSULOSIN HYDROCHLORIDE 0.4 MG/1
0.4 CAPSULE ORAL DAILY
Qty: 90 CAPSULE | Refills: 3 | Status: SHIPPED | OUTPATIENT
Start: 2021-11-08 | End: 2022-05-03

## 2021-11-08 RX ORDER — NYSTATIN 100000 U/G
CREAM TOPICAL 2 TIMES DAILY
Qty: 15 G | Refills: 0 | Status: SHIPPED | OUTPATIENT
Start: 2021-11-08 | End: 2021-11-26

## 2021-11-08 ASSESSMENT — MIFFLIN-ST. JEOR: SCORE: 1491.08

## 2021-11-08 ASSESSMENT — PAIN SCALES - GENERAL: PAINLEVEL: NO PAIN (0)

## 2021-11-08 NOTE — PATIENT INSTRUCTIONS
Please follow up in 6 months with Nani Henderson    It was a pleasure meeting with you today.  Thank you for allowing me and my team the privilege of caring for you today.  YOU are the reason we are here, and I truly hope we provided you with the excellent service you deserve.  Please let us know if there is anything else we can do for you so that we can be sure you are leaving completely satisfied with your care experience.

## 2021-11-08 NOTE — NURSING NOTE
"Chief Complaint   Patient presents with     RECHECK     Elevated PSA       Blood pressure 125/69, pulse 69, height 1.626 m (5' 4\"), weight 83 kg (183 lb). Body mass index is 31.41 kg/m .    Patient Active Problem List   Diagnosis     Erectile dysfunction     Seasonal allergies     Vitamin D deficiency     Pulmonary nodule     Prostate nodule     Chronic low back pain     Degenerative disc disease, lumbar     Back pain with radiation     DJD (degenerative joint disease) of knee     HL (hearing loss)     Vision impairment     Tear film insufficiency     Recurrent pterygium     Senile nuclear sclerosis     Type 2 diabetes mellitus treated without insulin (H)     Diabetic polyneuropathy associated with diabetes mellitus due to underlying condition (H)     Tubulovillous adenoma of colon     Lumbar radiculopathy     Adenocarcinoma of lung, stage 1, right (H)     Benign essential hypertension     History of colonic polyps     Spinal stenosis, lumbar region, with neurogenic claudication     Elevated prostate specific antigen (PSA)     Obesity with body mass index 30 or greater     Solitary pulmonary nodule     Evaluation of hearing impairment     Chronic bilateral low back pain with left-sided sciatica     Combined forms of age-related cataract of both eyes       Allergies   Allergen Reactions     Pollen Extract      Other reaction(s): Headache  Sinus congestion, sinus \"drainage\", sneezing     Seasonal Allergies      Sinus congestion, sinus \"drainage\", sneezing     Nkda [No Known Drug Allergies]        Current Outpatient Medications   Medication Sig Dispense Refill     acetaminophen (TYLENOL) 500 MG tablet Take 1-2 tablets (500-1,000 mg) by mouth every 6 hours as needed for pain (arthritis) 120 tablet 3     Ascorbic Acid (VITAMIN C PO) Take 500 mg by mouth once       atorvastatin (LIPITOR) 20 MG tablet Take 1 tablet (20 mg) by mouth daily 90 tablet 3     bisacodyl (DULCOLAX) 5 MG EC tablet Take 1 tablet (5 mg) by mouth See " Admin Instructions Refer to My chart message 4 tablet 0     chlorthalidone (HYGROTON) 25 MG tablet Take 1 tablet (25 mg) by mouth daily 90 tablet 3     Cholecalciferol (VITAMIN D) 1000 UNIT capsule Take 1 capsule by mouth daily.       Cyanocobalamin (VITAMIN B12 PO) Take 1 tablet by mouth. Pt states he takes this 2 or 3 times a week..        diclofenac (VOLTAREN) 1 % topical gel Apply 2 g topically 4 times daily 100 g 3     diclofenac (VOLTAREN) 1 % topical gel Apply 2 g topically 4 times daily 100 g 3     gabapentin (NEURONTIN) 300 MG capsule One capsule at bedtime 90 capsule 3     loratadine (CLARITIN) 10 MG tablet Take 1 tablet (10 mg) by mouth daily as needed for allergies 90 tablet 1     losartan (COZAAR) 100 MG tablet Take 1 tablet (100 mg) by mouth every evening 90 tablet 3     metFORMIN (GLUCOPHAGE-XR) 500 MG 24 hr tablet Take 1 tablet (500 mg) by mouth daily (with dinner) 90 tablet 3     Multiple Vitamin (MULTIVITAMINS PO) Take 1 tablet by mouth daily.       Omega-3 Fatty Acids (FISH OIL PO) Take 1 capsule by mouth as needed       order for DME Back brace 1 Device 0     polyethylene glycol (GOLYTELY) 236 g suspension Take 4,000 mLs by mouth See Admin Instructions Refer to My chart message 4000 mL 0     potassium chloride ER (KLOR-CON M) 20 MEQ CR tablet Take 1 tablet (20 mEq) by mouth daily 90 tablet 3     sildenafil (REVATIO) 20 MG tablet Take 1-5 tablets (20 mg) by mouth as needed for sexual activity.  Never use with nitroglycerin, terazosin or doxazosin. 90 tablet 11     tamsulosin (FLOMAX) 0.4 MG capsule Take 1 capsule (0.4 mg) by mouth daily 90 capsule 3       Social History     Tobacco Use     Smoking status: Former Smoker     Years: 5.00     Types: Cigarettes     Quit date: 1981     Years since quittin.0     Smokeless tobacco: Never Used   Substance Use Topics     Alcohol use: Yes     Alcohol/week: 2.5 standard drinks     Types: 3 Standard drinks or equivalent per week     Drug use: No        Vanessa Rao CMA  11/8/2021  11:14 AM

## 2021-11-08 NOTE — LETTER
11/8/2021       RE: Vishnu Viveros  [address]  [place] [address]     Dear Colleague,    Thank you for referring your patient, Vishnu Viveros, to the [facility]OLOG[facility] [place] [facility]. Please see a copy of my visit note below.    Reason for Visit: Follow up PSA, abnormal YESI, medication refill     HPI/Subjective:  Vishnu Odom is a 70 yo male that presents to clinic for follow up on an elevated PSA test. 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 His urologic history includes benign prostate biopsy in 2010 done secondary to a palpated prostate nodule. He has had a fluctuating PSA over the last few years leading to an MRI in 03/27/2017 (PIRADS 2 - Low probability) after a PSA of 6.91. His PSA subsequently trended down into the 4's before creeping back above 5 where it has been since then.  Last YESI on 1/27/20 showed no nodules.  He recently had another PSA checked.        He has had no trouble with urination. He feels that he can empty his bladder completely, has no hesitancy, is up at most once time per night, no blood in his urine, no burning with urination.  He continues on flomax.     Patient notes he has had some redness in groin and penis and was itching.  Tried some OTC jock itch medication.        OBJECTIVE:  PSA from 10/9/21 is 5.10 ng/mL  PSA from 11/16/20 is 5.35 ng/mL  11/25/19: 5.14 ng/mL  6/12/19: 4.63 ng/mL     Red shiny skin in groin and under panus and on penis  YESI: diffuse mildly firm prostate without discrete nodule, right >left.     ASSESSMENT AND PLAN:   Over half of today's 35-minute visit was spent counseling the patient regarding his PSA, YESI and LUTS.  PSA overall stable.  Will recheck PSA in 6 months and have appt with Nani MASON.  Refill was given for flomax, and script for levitra.  Instructions given not to  take levitra ad flomax within 4 hours of each other.  He was also given a script for Nystatin cream for presumed yeast infection of groin.    Again, thank you for allowing me to participate in the care of your patient.      Sincerely,    Leandro Montaño MD

## 2021-11-08 NOTE — PROGRESS NOTES
Reason for Visit: Follow up PSA, abnormal YESI, medication refill     HPI/Subjective:  Vishnu Odom is a 72 yo male that presents to clinic for follow up on an elevated PSA test. 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 His urologic history includes benign prostate biopsy in 2010 done secondary to a palpated prostate nodule. He has had a fluctuating PSA over the last few years leading to an MRI in 03/27/2017 (PIRADS 2 - Low probability) after a PSA of 6.91. His PSA subsequently trended down into the 4's before creeping back above 5 where it has been since then.  Last YESI on 1/27/20 showed no nodules.  He recently had another PSA checked.        He has had no trouble with urination. He feels that he can empty his bladder completely, has no hesitancy, is up at most once time per night, no blood in his urine, no burning with urination.  He continues on flomax.     Patient notes he has had some redness in groin and penis and was itching.  Tried some OTC jock itch medication.        OBJECTIVE:  PSA from 10/9/21 is 5.10 ng/mL  PSA from 11/16/20 is 5.35 ng/mL  11/25/19: 5.14 ng/mL  6/12/19: 4.63 ng/mL     Red shiny skin in groin and under panus and on penis  YESI: diffuse mildly firm prostate without discrete nodule, right >left.     ASSESSMENT AND PLAN:   Over half of today's 35-minute visit was spent counseling the patient regarding his PSA, YESI and LUTS.  PSA overall stable.  Will recheck PSA in 6 months and have appt with Nani MASON.  Refill was given for flomax, and script for levitra.  Instructions given not to take levitra ad flomax within 4 hours of each other.  He was also given a script for Nystatin cream for presumed yeast infection of groin.

## 2021-11-16 DIAGNOSIS — E08.42 DIABETIC POLYNEUROPATHY ASSOCIATED WITH DIABETES MELLITUS DUE TO UNDERLYING CONDITION (H): ICD-10-CM

## 2021-11-16 DIAGNOSIS — M54.16 LUMBAR RADICULITIS: ICD-10-CM

## 2021-11-17 RX ORDER — GABAPENTIN 300 MG/1
CAPSULE ORAL
Qty: 180 CAPSULE | OUTPATIENT
Start: 2021-11-17

## 2021-11-23 DIAGNOSIS — B37.2 YEAST INFECTION OF THE SKIN: ICD-10-CM

## 2021-11-26 DIAGNOSIS — B37.2 YEAST INFECTION OF THE SKIN: ICD-10-CM

## 2021-11-26 NOTE — TELEPHONE ENCOUNTER
nystatin (MYCOSTATIN) 329241 UNIT/GM external cream      Last Written Prescription Date:  11/8/2021  Last Fill Quantity: 15 g,   # refills: 0  Last Office Visit : 11/8/2021  Future Office visit:  5/12/2022    Routing refill request to provider for review/approval because:  Refill needs review- continue to fill.  - only a one-time order?

## 2021-11-29 RX ORDER — NYSTATIN 100000 U/G
CREAM TOPICAL 2 TIMES DAILY
Qty: 15 G | Refills: 4 | Status: SHIPPED | OUTPATIENT
Start: 2021-11-29 | End: 2021-12-06

## 2021-11-29 RX ORDER — NYSTATIN 100000 U/G
CREAM TOPICAL
Qty: 15 G | Refills: 0 | OUTPATIENT
Start: 2021-11-29

## 2021-11-30 DIAGNOSIS — R97.20 ELEVATED PROSTATE SPECIFIC ANTIGEN (PSA): ICD-10-CM

## 2021-11-30 DIAGNOSIS — E78.5 HYPERLIPIDEMIA LDL GOAL <100: ICD-10-CM

## 2021-11-30 DIAGNOSIS — I10 BENIGN ESSENTIAL HYPERTENSION: ICD-10-CM

## 2021-11-30 DIAGNOSIS — D64.89 ANEMIA DUE TO OTHER CAUSE, NOT CLASSIFIED: ICD-10-CM

## 2021-11-30 DIAGNOSIS — E11.9 TYPE 2 DIABETES MELLITUS TREATED WITHOUT INSULIN (H): Primary | ICD-10-CM

## 2021-11-30 NOTE — PROGRESS NOTES
Diagnoses and all orders for this visit:    Type 2 diabetes mellitus treated without insulin (H)  -     Hemoglobin A1c; Future  -     Albumin Random Urine Quantitative with Creat Ratio; Future    Benign essential hypertension  -     Magnesium; Future    Elevated prostate specific antigen (PSA)  -     PSA tumor marker; Future    Anemia due to other cause, not classified  -     CBC with platelets differential; Future  -     Ferritin; Future    Hyperlipidemia LDL goal <100  -     Lipid panel reflex to direct LDL Fasting; Future     Fasting labs placed.  Murali Logan MD

## 2021-12-01 PROBLEM — G89.29 CHRONIC BILATERAL LOW BACK PAIN WITH LEFT-SIDED SCIATICA: Status: RESOLVED | Noted: 2021-01-19 | Resolved: 2021-12-01

## 2021-12-01 PROBLEM — M54.42 CHRONIC BILATERAL LOW BACK PAIN WITH LEFT-SIDED SCIATICA: Status: RESOLVED | Noted: 2021-01-19 | Resolved: 2021-12-01

## 2021-12-01 NOTE — PROGRESS NOTES
Please see Progress Note from the last visit on 5/5/21 for discharge information as patient did not return to Physical therapy to complete his Plan of Care. Patient will be discharged from formal physical therapy at this time.

## 2021-12-06 ENCOUNTER — OFFICE VISIT (OUTPATIENT)
Dept: FAMILY MEDICINE | Facility: CLINIC | Age: 71
End: 2021-12-06
Payer: COMMERCIAL

## 2021-12-06 ENCOUNTER — LAB (OUTPATIENT)
Dept: LAB | Facility: CLINIC | Age: 71
End: 2021-12-06
Payer: COMMERCIAL

## 2021-12-06 VITALS
DIASTOLIC BLOOD PRESSURE: 67 MMHG | OXYGEN SATURATION: 97 % | WEIGHT: 185 LBS | TEMPERATURE: 97.6 F | HEIGHT: 64 IN | RESPIRATION RATE: 16 BRPM | BODY MASS INDEX: 31.58 KG/M2 | HEART RATE: 70 BPM | SYSTOLIC BLOOD PRESSURE: 114 MMHG

## 2021-12-06 DIAGNOSIS — E11.9 TYPE 2 DIABETES MELLITUS TREATED WITHOUT INSULIN (H): Primary | ICD-10-CM

## 2021-12-06 DIAGNOSIS — E11.9 TYPE 2 DIABETES MELLITUS TREATED WITHOUT INSULIN (H): ICD-10-CM

## 2021-12-06 DIAGNOSIS — I10 BENIGN ESSENTIAL HYPERTENSION: ICD-10-CM

## 2021-12-06 DIAGNOSIS — D12.6 TUBULOVILLOUS ADENOMA OF COLON: ICD-10-CM

## 2021-12-06 DIAGNOSIS — R39.9 LOWER URINARY TRACT SYMPTOMS (LUTS): ICD-10-CM

## 2021-12-06 DIAGNOSIS — Z23 ENCOUNTER FOR IMMUNIZATION: ICD-10-CM

## 2021-12-06 DIAGNOSIS — M48.062 SPINAL STENOSIS, LUMBAR REGION, WITH NEUROGENIC CLAUDICATION: ICD-10-CM

## 2021-12-06 DIAGNOSIS — Z86.0100 HISTORY OF COLONIC POLYPS: ICD-10-CM

## 2021-12-06 DIAGNOSIS — E78.5 HYPERLIPIDEMIA LDL GOAL <100: ICD-10-CM

## 2021-12-06 DIAGNOSIS — B37.2 YEAST INFECTION OF THE SKIN: ICD-10-CM

## 2021-12-06 DIAGNOSIS — D64.89 ANEMIA DUE TO OTHER CAUSE, NOT CLASSIFIED: ICD-10-CM

## 2021-12-06 DIAGNOSIS — F51.02 ADJUSTMENT INSOMNIA: ICD-10-CM

## 2021-12-06 DIAGNOSIS — R97.20 ELEVATED PROSTATE SPECIFIC ANTIGEN (PSA): ICD-10-CM

## 2021-12-06 LAB
ALBUMIN SERPL-MCNC: 3.7 G/DL (ref 3.4–5)
ALP SERPL-CCNC: 52 U/L (ref 40–150)
ALT SERPL W P-5'-P-CCNC: 27 U/L (ref 0–70)
ANION GAP SERPL CALCULATED.3IONS-SCNC: 6 MMOL/L (ref 3–14)
AST SERPL W P-5'-P-CCNC: 14 U/L (ref 0–45)
BASOPHILS # BLD AUTO: 0 10E3/UL (ref 0–0.2)
BASOPHILS NFR BLD AUTO: 1 %
BILIRUB SERPL-MCNC: 0.4 MG/DL (ref 0.2–1.3)
BUN SERPL-MCNC: 22 MG/DL (ref 7–30)
CALCIUM SERPL-MCNC: 8.8 MG/DL (ref 8.5–10.1)
CHLORIDE BLD-SCNC: 107 MMOL/L (ref 94–109)
CHOLEST SERPL-MCNC: 125 MG/DL
CO2 SERPL-SCNC: 29 MMOL/L (ref 20–32)
CREAT SERPL-MCNC: 1.01 MG/DL (ref 0.66–1.25)
CREAT UR-MCNC: 93 MG/DL
EOSINOPHIL # BLD AUTO: 0.2 10E3/UL (ref 0–0.7)
EOSINOPHIL NFR BLD AUTO: 7 %
ERYTHROCYTE [DISTWIDTH] IN BLOOD BY AUTOMATED COUNT: 12.8 % (ref 10–15)
FASTING STATUS PATIENT QL REPORTED: NORMAL
FERRITIN SERPL-MCNC: 100 NG/ML (ref 26–388)
GFR SERPL CREATININE-BSD FRML MDRD: 74 ML/MIN/1.73M2
GLUCOSE BLD-MCNC: 112 MG/DL (ref 70–99)
HBA1C MFR BLD: 5.9 % (ref 0–5.6)
HCT VFR BLD AUTO: 38.2 % (ref 40–53)
HDLC SERPL-MCNC: 59 MG/DL
HGB BLD-MCNC: 12.6 G/DL (ref 13.3–17.7)
IMM GRANULOCYTES # BLD: 0 10E3/UL
IMM GRANULOCYTES NFR BLD: 0 %
LDLC SERPL CALC-MCNC: 47 MG/DL
LYMPHOCYTES # BLD AUTO: 1.9 10E3/UL (ref 0.8–5.3)
LYMPHOCYTES NFR BLD AUTO: 51 %
MAGNESIUM SERPL-MCNC: 2.1 MG/DL (ref 1.6–2.3)
MCH RBC QN AUTO: 29.4 PG (ref 26.5–33)
MCHC RBC AUTO-ENTMCNC: 33 G/DL (ref 31.5–36.5)
MCV RBC AUTO: 89 FL (ref 78–100)
MICROALBUMIN UR-MCNC: 7 MG/L
MICROALBUMIN/CREAT UR: 7.53 MG/G CR (ref 0–17)
MONOCYTES # BLD AUTO: 0.3 10E3/UL (ref 0–1.3)
MONOCYTES NFR BLD AUTO: 9 %
NEUTROPHILS # BLD AUTO: 1.2 10E3/UL (ref 1.6–8.3)
NEUTROPHILS NFR BLD AUTO: 32 %
NONHDLC SERPL-MCNC: 66 MG/DL
NRBC # BLD AUTO: 0 10E3/UL
NRBC BLD AUTO-RTO: 0 /100
PLATELET # BLD AUTO: 185 10E3/UL (ref 150–450)
POTASSIUM BLD-SCNC: 4 MMOL/L (ref 3.4–5.3)
PROT SERPL-MCNC: 7.2 G/DL (ref 6.8–8.8)
PSA SERPL-MCNC: 5.6 UG/L (ref 0–4)
RBC # BLD AUTO: 4.28 10E6/UL (ref 4.4–5.9)
SODIUM SERPL-SCNC: 142 MMOL/L (ref 133–144)
TRIGL SERPL-MCNC: 94 MG/DL
WBC # BLD AUTO: 3.7 10E3/UL (ref 4–11)

## 2021-12-06 PROCEDURE — 90662 IIV NO PRSV INCREASED AG IM: CPT | Performed by: FAMILY MEDICINE

## 2021-12-06 PROCEDURE — 80053 COMPREHEN METABOLIC PANEL: CPT | Performed by: PATHOLOGY

## 2021-12-06 PROCEDURE — 84153 ASSAY OF PSA TOTAL: CPT | Performed by: PATHOLOGY

## 2021-12-06 PROCEDURE — 36415 COLL VENOUS BLD VENIPUNCTURE: CPT | Performed by: PATHOLOGY

## 2021-12-06 PROCEDURE — 82728 ASSAY OF FERRITIN: CPT | Performed by: PATHOLOGY

## 2021-12-06 PROCEDURE — 83036 HEMOGLOBIN GLYCOSYLATED A1C: CPT | Performed by: PATHOLOGY

## 2021-12-06 PROCEDURE — 99215 OFFICE O/P EST HI 40 MIN: CPT | Mod: 25 | Performed by: FAMILY MEDICINE

## 2021-12-06 PROCEDURE — 90471 IMMUNIZATION ADMIN: CPT | Performed by: FAMILY MEDICINE

## 2021-12-06 PROCEDURE — 80061 LIPID PANEL: CPT | Performed by: PATHOLOGY

## 2021-12-06 PROCEDURE — 83735 ASSAY OF MAGNESIUM: CPT | Performed by: PATHOLOGY

## 2021-12-06 PROCEDURE — 82043 UR ALBUMIN QUANTITATIVE: CPT | Performed by: PATHOLOGY

## 2021-12-06 PROCEDURE — 85025 COMPLETE CBC W/AUTO DIFF WBC: CPT | Performed by: PATHOLOGY

## 2021-12-06 RX ORDER — NYSTATIN 100000 U/G
CREAM TOPICAL 2 TIMES DAILY
Qty: 15 G | Refills: 4 | Status: SHIPPED | OUTPATIENT
Start: 2021-12-06 | End: 2022-06-13

## 2021-12-06 RX ORDER — LANOLIN ALCOHOL/MO/W.PET/CERES
3 CREAM (GRAM) TOPICAL
Qty: 60 TABLET | Refills: 3 | Status: SHIPPED | OUTPATIENT
Start: 2021-12-06

## 2021-12-06 RX ORDER — ACETAMINOPHEN 500 MG
500-1000 TABLET ORAL EVERY 6 HOURS PRN
Qty: 100 TABLET | Refills: 3 | Status: SHIPPED | OUTPATIENT
Start: 2021-12-06 | End: 2023-04-20

## 2021-12-06 ASSESSMENT — MIFFLIN-ST. JEOR: SCORE: 1500.15

## 2021-12-06 ASSESSMENT — PAIN SCALES - GENERAL: PAINLEVEL: SEVERE PAIN (7)

## 2021-12-06 NOTE — NURSING NOTE
Vishnu Viveros is a 71 year old male patient that presents today in clinic for the following:    Chief Complaint   Patient presents with     RECHECK     Discuss back and possible lung check up, needs refill of nystatin and back support for work. Sleep issues     The patient's allergies and medications were reviewed as noted. A set of vitals were recorded as noted without incident. The patient does not have any other questions for the provider.    Vandana Nina, EMT at 10:56 AM on 12/6/2021

## 2021-12-06 NOTE — PROGRESS NOTES
"  Assessment & Plan   Vishnu Viveros is a 71 year old with a history of type 2 diabetes mellitus controlled, hyperlipidemia, hypertension, colon polyps, DJD with spinal stenosis back, elevated PSA, and stage 1 right adenocarcinoma of the lung s/p resection with clear margins 2019 who presents  for review of chronic conditions and refill medications.     Type 2 diabetes mellitus treated without insulin (H)  Diabetes well controlled continue Metformin  mg daily, has refills available  - Comprehensive metabolic panel    Yeast infection of the skin  He requested a refill, recently addressed by urology but requires medication under primary MD  - nystatin (MYCOSTATIN) 965006 UNIT/GM external cream  Dispense: 15 g; Refill: 4    Encounter for immunization  Provided today  - FLU VACCINE HIGH DOSE PRESERVATIVE FREE, AGE =>65 YR    History of colonic polyps  Tubulovillous adenoma of colon  Due for colonoscopy referral provided  - Adult Gastro Ref - Procedure Only    Spinal stenosis, lumbar region, with neurogenic claudication  He requests a back brace prescription  Previously provided but not filled. Hard copy provided for him to look for home health care supplier  - acetaminophen (TYLENOL) 500 MG tablet  Dispense: 100 tablet; Refill: 3  - Miscellaneous Order for DME - ONLY FOR DME    Adjustment insomnia  New concern, may start 3-6 mg at bedtime or 1 hour prior if needed  - melatonin 3 MG tablet  Dispense: 60 tablet; Refill: 3    Elevated prostate specific antigen (PSA)  Lower urinary tract symptoms (LUTS)  Had recent visit next follow-up in 6 months [facility] location  - Adult Urology Referral               BMI:   Estimated body mass index is 31.76 kg/m  as calculated from the following:    Height as of this encounter: 1.626 m (5' 4\").    Weight as of this encounter: 83.9 kg (185 lb).   Weight management plan: Discussed healthy diet and exercise guidelines    43 minutes spent on the date of the encounter doing " chart review, history, exam, diagnostics review, documentation, counseling and coordination of cares as noted.      Return in about 6 months (around 2022).    Murali Logan MD  [facility] KENA Mares is a 71 year old who presents for the following health issues     HPI   Vishnu Viveros is a 71 year old with a history of type 2 diabetes mellitus controlled, hyperlipidemia, hypertension, colon polyps, DJD with spinal stenosis back, elevated PSA, and stage 1 right adenocarcinoma of the lung s/p resection with clear margins  who presents  for review of chronic conditions and refill medications.      Adenocarcinoma [place] treatment for lung adenocarcinoma had CT in 2021 after follow-up with oncology in December. Stable at this time, annual CT in 2022.     Urology  PSA level was elevated had appointment with urology Dr Montaño in 2021 no nodules found recommended follow-up PSA in 6 months. He prefers follow-up at another urology site as has to wait too long for the provider at times.     Diabetes/ Hyperlipidemia:  A1c was 5.9 He is on MetforminXR 500 mg daily at supper. Atorvastatin 20 mg daily has been effective at controlling his cholesterol. Had EYE exam, cataracts noted     Hypertension His blood pressure is well-controlled on Losartan 100 mg daily and Chlorthalidone 25 mg with potassium supplementation.      Trouble sleeping   Wakes after falling asleep 2-3 hours most night, urination not waking him from sleep.    Back, spinal stenosis DJD: Vishnu walks with some pain on the left side of the back radiating to the left leg and past his left knee at times requiring him to sit and rest.. He feels neuropathic like lightening pain radiating down his leg past the knee. PT has been helpful, wants conservative management if possible. He was not able to tolerate Gabapentin 300 mg twice daily taking it at bedtime only.      Other  concerns discussed:  1. Due for influenza vaccine  2. Shingrix, Covid vaccines completed and PPSV 23 & PCV 13 completed  3. Medications reviewed - gabapentin once daily at night   4. Colonoscopy ( previous 2017) next three years due to tubulovillous adenoma, was due in February 2020 will reschedule again. occassionally hemorrhoid   5.Completed hearing exam  troubles hearing in large crowds, audiology for hearing aids but declined further as had to pay co-pays          Labs reviewed in EPIC  BP Readings from Last 3 Encounters:   12/06/21 114/67   11/08/21 125/69   08/17/21 130/70    Wt Readings from Last 3 Encounters:   12/06/21 83.9 kg (185 lb)   11/08/21 83 kg (183 lb)   08/27/21 82.6 kg (182 lb)            Immunization History   Administered Date(s) Administered     COVID-19,PF,Moderna 02/10/2021, 03/10/2021, 11/04/2021     FLU 6-35 months 10/14/2010     Flu, Unspecified 11/06/2009     Influenza (H1N1) 02/02/2010     Influenza (High Dose) 3 valent vaccine 10/30/2014, 11/19/2015, 11/28/2016, 11/27/2017, 10/31/2018, 12/11/2019     Influenza (IIV3) PF 12/30/2005, 12/05/2006, 12/11/2007, 11/04/2008, 11/06/2009, 10/14/2010, 11/14/2011, 11/12/2012, 11/14/2013     Influenza, Quad, High Dose, Pf, 65yr+ (Fluzone HD) 11/16/2020, 12/06/2021     Pneumo Conj 13-V (2010&after) 10/30/2014     Pneumococcal 23 valent 11/19/2015     TD (ADULT, 7+) 02/12/2007     TDAP Vaccine (Boostrix) 07/31/2017     Td (Adult), Adsorbed 02/12/2007     Tdap (Adacel,Boostrix) 07/31/2017     Zoster vaccine recombinant adjuvanted (SHINGRIX) 12/11/2019, 08/26/2020           Patient Active Problem List   Diagnosis     Erectile dysfunction     Seasonal allergies     Vitamin D deficiency     Pulmonary nodule     Prostate nodule     Chronic low back pain     Degenerative disc disease, lumbar     Back pain with radiation     DJD (degenerative joint disease) of knee     HL (hearing loss)     Vision impairment     Tear film insufficiency     Recurrent  pterygium     Senile nuclear sclerosis     Type 2 diabetes mellitus treated without insulin (H)     Diabetic polyneuropathy associated with diabetes mellitus due to underlying condition (H)     Tubulovillous adenoma of colon     Lumbar radiculopathy     Adenocarcinoma of lung, stage 1, right (H)     Benign essential hypertension     History of colonic polyps     Spinal stenosis, lumbar region, with neurogenic claudication     Elevated prostate specific antigen (PSA)     Obesity with body mass index 30 or greater     Solitary pulmonary nodule     Evaluation of hearing impairment     Combined forms of age-related cataract of both eyes     Past Surgical History:   Procedure Laterality Date     APPENDECTOMY OPEN CHILD       ARTHROSCOPY KNEE Left      BIOPSY PROSTATE TRANSRECTAL       EXCISE PTERYGIUM Right 1989     EXCISE PTERYGIUM Right 2/12/2015     INJECT EPIDURAL LUMBAR / SACRAL SINGLE N/A 7/21/2016    Procedure: INJECT EPIDURAL LUMBAR / SACRAL SINGLE;  Surgeon: Judah Henriquez MD;  Location: [facility] OR     INJECT PARAVERTEBRAL FACET JOINT LUMBAR / SACRAL FIRST Right 8/18/2016    Procedure: INJECT PARAVERTEBRAL FACET JOINT LUMBAR / SACRAL FIRST;  Surgeon: Judah Henriquez MD;  Location: [facility] OR     INJECT PARAVERTEBRAL FACET JOINT LUMBAR / SACRAL FIRST Right 9/8/2016    Procedure: INJECT PARAVERTEBRAL FACET JOINT LUMBAR / SACRAL FIRST;  Surgeon: Judah Henriquez MD;  Location: [facility] OR     MEDIASTINOSCOPY Right 04/08/2019     [place]  Dr Hemanth C[place] 4/8/19 bronchoscopy, cervical mediastinoscopy for staging and minimally invasive right thoracoscopic wedge resection 1.8 cm invasive adenocarcinoma [id number] stage 1A2 adenocarcinoma completely resected via minimally invasive right upper lobectomy.     PUNCTALPLASTY Bilateral 12/19/2018    Procedure: Bilateral Punctoplasty with Mini [place] Stent;  Surgeon: Levy Blue MD;  Location: [facility] OR     REPAIR ECTROPION BILATERAL Bilateral 12/19/2018    Procedure: Bilateral Lower Lid  Ectropion Repair with Right Lower Lid Lesion Excision;  Surgeon: Levy Blue MD;  Location: [facility] OR       Social History     Tobacco Use     Smoking status: Former Smoker     Years: 5.00     Types: Cigarettes     Quit date: 1981     Years since quittin.0     Smokeless tobacco: Never Used   Substance Use Topics     Alcohol use: Yes     Alcohol/week: 2.5 standard drinks     Types: 3 Standard drinks or equivalent per week     Family History   Problem Relation Age of Onset     Cerebrovascular Disease Brother          stroke      Breast Cancer Sister      Kidney Disease Mother         Kidney stones     Other - See Comments Father          in [place] parkinsons or post surgical eye surgery     Cerebral aneurysm Brother      Hypothyroidism Sister      Glaucoma No family hx of      Macular Degeneration No family hx of          Current Outpatient Medications   Medication Sig Dispense Refill     acetaminophen (TYLENOL) 500 MG tablet Take 1-2 tablets (500-1,000 mg) by mouth every 6 hours as needed for mild pain 100 tablet 3     acetaminophen (TYLENOL) 500 MG tablet Take 1-2 tablets (500-1,000 mg) by mouth every 6 hours as needed for pain (arthritis) 120 tablet 3     Ascorbic Acid (VITAMIN C PO) Take 500 mg by mouth once       atorvastatin (LIPITOR) 20 MG tablet Take 1 tablet (20 mg) by mouth daily 90 tablet 3     bisacodyl (DULCOLAX) 5 MG EC tablet Take 1 tablet (5 mg) by mouth See Admin Instructions Refer to My chart message 4 tablet 0     chlorthalidone (HYGROTON) 25 MG tablet Take 1 tablet (25 mg) by mouth daily 90 tablet 3     Cholecalciferol (VITAMIN D) 1000 UNIT capsule Take 1 capsule by mouth daily.       Cyanocobalamin (VITAMIN B12 PO) Take 1 tablet by mouth. Pt states he takes this 2 or 3 times a week..        diclofenac (VOLTAREN) 1 % topical gel Apply 2 g topically 4 times daily 100 g 3     diclofenac (VOLTAREN) 1 % topical gel Apply 2 g topically 4 times daily 100 g 3     gabapentin  "(NEURONTIN) 300 MG capsule One capsule at bedtime 90 capsule 3     loratadine (CLARITIN) 10 MG tablet Take 1 tablet (10 mg) by mouth daily as needed for allergies 90 tablet 1     losartan (COZAAR) 100 MG tablet Take 1 tablet (100 mg) by mouth every evening 90 tablet 3     melatonin 3 MG tablet Take 1 tablet (3 mg) by mouth nightly as needed for sleep (may take 1-2 if needed) 60 tablet 3     metFORMIN (GLUCOPHAGE-XR) 500 MG 24 hr tablet Take 1 tablet (500 mg) by mouth daily (with dinner) 90 tablet 3     Multiple Vitamin (MULTIVITAMINS PO) Take 1 tablet by mouth daily.       nystatin (MYCOSTATIN) 348109 UNIT/GM external cream Apply topically 2 times daily to affected area. 15 g 4     Omega-3 Fatty Acids (FISH OIL PO) Take 1 capsule by mouth as needed       order for DME Back brace 1 Device 0     polyethylene glycol (GOLYTELY) 236 g suspension Take 4,000 mLs by mouth See Admin Instructions Refer to My chart message 4000 mL 0     potassium chloride ER (KLOR-CON M) 20 MEQ CR tablet Take 1 tablet (20 mEq) by mouth daily 90 tablet 3     sildenafil (REVATIO) 20 MG tablet Take 1-5 tablets (20 mg) by mouth as needed for sexual activity.  Never use with nitroglycerin, terazosin or doxazosin. 90 tablet 11     tamsulosin (FLOMAX) 0.4 MG capsule Take 1 capsule (0.4 mg) by mouth daily 90 capsule 3     vardenafil (LEVITRA) 5 MG tablet Take 1-4 tabs by mouth as needed for sexual activity 30 tablet 11     Allergies   Allergen Reactions     Pollen Extract      Other reaction(s): Headache  Sinus congestion, sinus \"drainage\", sneezing     Seasonal Allergies      Sinus congestion, sinus \"drainage\", sneezing     Nkda [No Known Drug Allergies]      Recent Labs   Lab Test 12/06/21  0947 06/07/21  1044 11/16/20  1026 05/27/20  0957 10/26/19  1040 11/27/17  1107 07/31/17  1031   A1C 5.9* 5.8* 5.8* 6.0* 6.2*   < > 6.3*   LDL 47  --  39  --  58   < > 52   HDL 59  --  64  --  49   < > 60   TRIG 94  --  116  --  91   < > 54   ALT 27  --  27 25 26 " "  < > 29   CR 1.01 0.95 0.93 0.98 0.96   < > 0.79   GFRESTIMATED 74 80 82 77 79   < > >90  Non  GFR Calc     GFRESTBLACK  --  >90 >90 89 >90   < > >90  African American GFR Calc     POTASSIUM 4.0 3.7 3.5 3.7 3.8   < > 4.2   TSH  --   --   --   --  1.56  --  1.67    < > = values in this interval not displayed.      Review of Systems         Objective    /67 (BP Location: Right arm, Patient Position: Sitting, Cuff Size: Adult Regular)   Pulse 70   Temp 97.6  F (36.4  C) (Oral)   Resp 16   Ht 1.626 m (5' 4\")   Wt 83.9 kg (185 lb)   SpO2 97%   BMI 31.76 kg/m    Body mass index is 31.76 kg/m .  Physical Exam   GENERAL APPEARANCE: healthy, alert and no distress BMI 31.76  EYES: Eyes grossly normal to inspection, PERRL and conjunctivae and sclerae normal  HENT: ear canals and TM's normal, mask removed breifly mouth without ulcers or lesions, oropharynx clear and oral mucous membranes moist, no thrush  NECK: no adenopathy, no asymmetry, masses, or scars and thyroid normal to palpation  RESP: lungs clear to auscultation - no rales, rhonchi or wheezes  CV: regular rate and rhythm, normal S1 S2, no S3 or S4, no murmur, click or rub, no peripheral edema and peripheral pulses strong  Chest: well healed surgical scars from laparoscopic chest procedure  ABDOMEN: soft,obese, nontender, no hepatosplenomegaly, no masses and bowel sounds normal, well-healed abdominal scar  MS: Kyphosis and antalgic gait with left leg weakness compared to right.  SKIN: no suspicious lesions or rashes, congenital nevi in upper back that has been stable  NEURO: Normal strength and tone, sensory exam grossly normal, mentation intact and speech normal  PSYCH: mentation appears normal and affect normal/bright  Diabetic foot exam: thickened toenails, no ulcerations, normal DP and PT pulses, normal sensory exam, normal monofilament exam   Diabetic Foot Screen:  Any complaints of increased pain or numbness ? No  Is there a foot " ulcer now or a history of foot ulcer? No  Does the foot have an abnormal shape? No  Are the nails thick, too long or ingrown?  YES slightly thick  Are there any redness or open areas? No         Sensation Testing done at all points on the diagram with monofilament     Right Foot: Sensation Normal at all points  Left Foot: Sensation Normal at all points     Risk Category: 0- No loss of protective sensation  Performed by Murali Logan MD    Results for BONI MUJICA (MRN [id number]) as of 12/7/2021 15:10   Ref. Range 12/6/2021 09:47   Sodium Latest Ref Range: 133 - 144 mmol/L 142   Potassium Latest Ref Range: 3.4 - 5.3 mmol/L 4.0   Chloride Latest Ref Range: 94 - 109 mmol/L 107   Carbon Dioxide Latest Ref Range: 20 - 32 mmol/L 29   Urea Nitrogen Latest Ref Range: 7 - 30 mg/dL 22   Creatinine Latest Ref Range: 0.66 - 1.25 mg/dL 1.01   GFR Estimate Latest Ref Range: >60 mL/min/1.73m2 74   Calcium Latest Ref Range: 8.5 - 10.1 mg/dL 8.8   Anion Gap Latest Ref Range: 3 - 14 mmol/L 6   Magnesium Latest Ref Range: 1.6 - 2.3 mg/dL 2.1   Albumin Latest Ref Range: 3.4 - 5.0 g/dL 3.7   Protein Total Latest Ref Range: 6.8 - 8.8 g/dL 7.2   Bilirubin Total Latest Ref Range: 0.2 - 1.3 mg/dL 0.4   Alkaline Phosphatase Latest Ref Range: 40 - 150 U/L 52   ALT Latest Ref Range: 0 - 70 U/L 27   AST Latest Ref Range: 0 - 45 U/L 14   Cholesterol Latest Ref Range: <200 mg/dL 125   Patient Fasting > 8hrs? Unknown Unknown   Ferritin Latest Ref Range: 26 - 388 ng/mL 100   HDL Cholesterol Latest Ref Range: >=40 mg/dL 59   Hemoglobin A1C Latest Ref Range: 0.0 - 5.6 % 5.9 (H)   LDL Cholesterol Calculated Latest Ref Range: <=100 mg/dL 47   Non HDL Cholesterol Latest Ref Range: <130 mg/dL 66   PSA Latest Ref Range: 0.00 - 4.00 ug/L 5.60 (H)   Triglycerides Latest Ref Range: <150 mg/dL 94   Glucose Latest Ref Range: 70 - 99 mg/dL 112 (H)   WBC Latest Ref Range: 4.0 - 11.0 10e3/uL 3.7 (L)   Hemoglobin Latest Ref Range: 13.3 - 17.7 g/dL 12.6  (L)   Hematocrit Latest Ref Range: 40.0 - 53.0 % 38.2 (L)   Platelet Count Latest Ref Range: 150 - 450 10e3/uL 185   RBC Count Latest Ref Range: 4.40 - 5.90 10e6/uL 4.28 (L)   MCV Latest Ref Range: 78 - 100 fL 89   MCH Latest Ref Range: 26.5 - 33.0 pg 29.4   MCHC Latest Ref Range: 31.5 - 36.5 g/dL 33.0   RDW Latest Ref Range: 10.0 - 15.0 % 12.8   % Neutrophils Latest Units: % 32   % Lymphocytes Latest Units: % 51   % Monocytes Latest Units: % 9   % Eosinophils Latest Units: % 7   % Basophils Latest Units: % 1   Absolute Basophils Latest Ref Range: 0.0 - 0.2 10e3/uL 0.0   Absolute Eosinophils Latest Ref Range: 0.0 - 0.7 10e3/uL 0.2   Absolute Immature Granulocytes Latest Ref Range: <=0.4 10e3/uL 0.0   Absolute Lymphocytes Latest Ref Range: 0.8 - 5.3 10e3/uL 1.9   Absolute Monocytes Latest Ref Range: 0.0 - 1.3 10e3/uL 0.3   % Immature Granulocytes Latest Units: % 0   Absolute Neutrophils Latest Ref Range: 1.6 - 8.3 10e3/uL 1.2 (L)   Absolute NRBCs Latest Units: 10e3/uL 0.0   NRBCs per 100 WBC Latest Ref Range: <1 /100 0     Results reviewed  Murali Logan MD

## 2021-12-10 NOTE — RESULT ENCOUNTER NOTE
Dear Vishnu Viveros    We reviewed your test results at visit well controlled glucose and blood pressure now no protein in the urine with good control, good results.  Cholesterol is in great range continue current medications as discussed.  Follow urology recommendations for mildly elevated but stable PSA  prostate test.  Best wishes,  Murali Logan MD

## 2022-01-02 ENCOUNTER — HEALTH MAINTENANCE LETTER (OUTPATIENT)
Age: 72
End: 2022-01-02

## 2022-01-05 ENCOUNTER — VIRTUAL VISIT (OUTPATIENT)
Dept: FAMILY MEDICINE | Facility: OTHER | Age: 72
End: 2022-01-05
Payer: COMMERCIAL

## 2022-01-05 DIAGNOSIS — Z20.822 PERSON UNDER INVESTIGATION FOR COVID-19: Primary | ICD-10-CM

## 2022-01-05 PROCEDURE — 99213 OFFICE O/P EST LOW 20 MIN: CPT | Mod: TEL | Performed by: NURSE PRACTITIONER

## 2022-01-05 NOTE — PROGRESS NOTES
Vishnu is a 71 year old who is being evaluated via a billable video visit.      How would you like to obtain your AVS? MyChart  If the video visit is dropped, the invitation should be resent by: Text to cell phone: [phone]  Will anyone else be joining your video visit? No        Assessment & Plan     Person under investigation for COVID-19  -Given presenting symptoms recommend testing for covid-19   - Discussed symptoms and how to do testing.   - Symptomatic; Yes; 12/31/2021 COVID-19 Virus (Coronavirus) by PCR; Future       The patient indicates understanding of these issues and agrees with the plan.    There are no Patient Instructions on file for this visit.    No follow-ups on file.    DILAN Breaux CNP  [facility]    Scarlett Mares is a 71 year old who presents for the following health issues     HPI     Acute Illness  Acute illness concerns:  Onset/Duration: last Friday   Symptoms:  Fever: no  Chills/Sweats: no  Headache (location?): no  Sinus Pressure: no  Conjunctivitis:  no  Ear Pain: no  Rhinorrhea: no  Congestion: YES  Sore Throat: YES  Cough: YES  Wheeze: no  Decreased Appetite: no  Nausea: no  Vomiting: no  Diarrhea: no  Dysuria/Freq.: no  Dysuria or Hematuria: no  Fatigue/Achiness: no  Sick/Strep Exposure: no  Therapies tried and outcome: None    Nasal congestion  Sore throat  Mild Cough.   Started last Friday     Review of Systems         Objective           Vitals:  No vitals were obtained today due to virtual visit.    Physical Exam   GENERAL: Healthy, alert and no distress  NEURO: Cranial nerves grossly intact.  Mentation and speech appropriate for age.  PSYCH: Mentation appears normal, affect normal/bright, judgement and insight intact, normal speech and appearance well-groomed.    Diagnostic: Pending             Telephone:  5 minutes

## 2022-01-24 ENCOUNTER — ANCILLARY PROCEDURE (OUTPATIENT)
Dept: CT IMAGING | Facility: CLINIC | Age: 72
End: 2022-01-24
Attending: INTERNAL MEDICINE
Payer: COMMERCIAL

## 2022-01-24 DIAGNOSIS — C34.91 ADENOCARCINOMA, LUNG, RIGHT (H): ICD-10-CM

## 2022-01-24 LAB
CREAT BLD-MCNC: 0.9 MG/DL (ref 0.7–1.3)
GFR SERPL CREATININE-BSD FRML MDRD: >60 ML/MIN/1.73M2

## 2022-01-24 PROCEDURE — 71260 CT THORAX DX C+: CPT | Mod: GC | Performed by: RADIOLOGY

## 2022-01-24 PROCEDURE — 82565 ASSAY OF CREATININE: CPT | Performed by: PATHOLOGY

## 2022-01-24 RX ORDER — IOPAMIDOL 755 MG/ML
91 INJECTION, SOLUTION INTRAVASCULAR ONCE
Status: COMPLETED | OUTPATIENT
Start: 2022-01-24 | End: 2022-01-24

## 2022-01-24 RX ADMIN — IOPAMIDOL 91 ML: 755 INJECTION, SOLUTION INTRAVASCULAR at 11:47

## 2022-01-25 ENCOUNTER — VIRTUAL VISIT (OUTPATIENT)
Dept: ONCOLOGY | Facility: CLINIC | Age: 72
End: 2022-01-25
Attending: INTERNAL MEDICINE
Payer: COMMERCIAL

## 2022-01-25 DIAGNOSIS — C34.91 ADENOCARCINOMA OF LUNG, STAGE 1, RIGHT (H): Primary | ICD-10-CM

## 2022-01-25 PROCEDURE — 99213 OFFICE O/P EST LOW 20 MIN: CPT | Mod: 95 | Performed by: INTERNAL MEDICINE

## 2022-01-25 NOTE — LETTER
1/25/2022         RE: Vishnu Viveros  [address]  [place] [address]        Dear Colleague,    Thank you for referring your patient, Vishnu Viveros, to the [facility]. Please see a copy of my visit note below.    Vishnu is a 71 year old who is being evaluated via a billable telephone visit.      What phone number would you like to be contacted at? [phone]  How would you like to obtain your AVS? Mail a copy  Phone call duration: 15 minutes    Yamilex Brewster    REASON FOR VISIT:    CANCER STAGE: Cancer Staging  Adenocarcinoma of lung, stage 1, right (H)  Staging form: Lung, AJCC 8th Edition  - Clinical: No stage assigned - Unsigned        HISTORY OF PRESENT ILLNESS:  Mr. Viveros is a 71 yo man who was diagnosed with adenocarcinoma of the lung in 2019. He was having some difficulty breathing and some wheezing.  He had a CXR done that was negative for pneumonia but did show a RUL nodule.  He had a CT chest in 2012 that showed this nodule.  CT was done on 1/4/19 that showed a 13.4mmx10.2 RUL nodule that grew from 8.5x6.4 in 2012.  He saw Dr. LAURIE Lopez from pulmonary who ordered a PET/CT scan - 3/9/19 showed that the RUL nodule was hypermetabolic and now measured 66e44lc.  There was no mediastinal LNs or other lesions on PET. Pt was set up for surgical resection, but ended up going to [facility].  He had a wedge resection and LN dissection by Dr. Covington.  Pathology showed a 1.8cm tumor margins were negative. 11 LNs were negative for involvement with tumor including station 7 and 4R. He tolerated the surgery well.  He has not had any other complications since then.     Vishnu is doing well. Since we last spoke one year ago he has been in pretty good health. He has had some low back pains that have been bothering him, but he thinks this is degenerative in nature.  He otherwise is feeling well and has no complaints. He has no shortness of breath, he is eating  well, he has no cough or weight loss.      Review Of Systems  10-point review of systems were negative except as noted in HPI.        EXAM:  deferred    Current Outpatient Medications   Medication Sig Dispense Refill     acetaminophen (TYLENOL) 500 MG tablet Take 1-2 tablets (500-1,000 mg) by mouth every 6 hours as needed for mild pain 100 tablet 3     acetaminophen (TYLENOL) 500 MG tablet Take 1-2 tablets (500-1,000 mg) by mouth every 6 hours as needed for pain (arthritis) 120 tablet 3     Ascorbic Acid (VITAMIN C PO) Take 500 mg by mouth once       atorvastatin (LIPITOR) 20 MG tablet Take 1 tablet (20 mg) by mouth daily 90 tablet 3     bisacodyl (DULCOLAX) 5 MG EC tablet Take 1 tablet (5 mg) by mouth See Admin Instructions Refer to My chart message 4 tablet 0     chlorthalidone (HYGROTON) 25 MG tablet Take 1 tablet (25 mg) by mouth daily 90 tablet 3     Cholecalciferol (VITAMIN D) 1000 UNIT capsule Take 1 capsule by mouth daily.       Cyanocobalamin (VITAMIN B12 PO) Take 1 tablet by mouth. Pt states he takes this 2 or 3 times a week..        diclofenac (VOLTAREN) 1 % topical gel Apply 2 g topically 4 times daily 100 g 3     diclofenac (VOLTAREN) 1 % topical gel Apply 2 g topically 4 times daily 100 g 3     gabapentin (NEURONTIN) 300 MG capsule One capsule at bedtime 90 capsule 3     loratadine (CLARITIN) 10 MG tablet Take 1 tablet (10 mg) by mouth daily as needed for allergies 90 tablet 1     losartan (COZAAR) 100 MG tablet Take 1 tablet (100 mg) by mouth every evening 90 tablet 3     melatonin 3 MG tablet Take 1 tablet (3 mg) by mouth nightly as needed for sleep (may take 1-2 if needed) 60 tablet 3     metFORMIN (GLUCOPHAGE-XR) 500 MG 24 hr tablet Take 1 tablet (500 mg) by mouth daily (with dinner) 90 tablet 3     Multiple Vitamin (MULTIVITAMINS PO) Take 1 tablet by mouth daily.       nystatin (MYCOSTATIN) 695583 UNIT/GM external cream Apply topically 2 times daily to affected area. 15 g 4     Omega-3 Fatty Acids  (FISH OIL PO) Take 1 capsule by mouth as needed       order for DME Back brace 1 Device 0     polyethylene glycol (GOLYTELY) 236 g suspension Take 4,000 mLs by mouth See Admin Instructions Refer to My chart message 4000 mL 0     potassium chloride ER (KLOR-CON M) 20 MEQ CR tablet Take 1 tablet (20 mEq) by mouth daily 90 tablet 3     sildenafil (REVATIO) 20 MG tablet Take 1-5 tablets (20 mg) by mouth as needed for sexual activity.  Never use with nitroglycerin, terazosin or doxazosin. 90 tablet 11     tamsulosin (FLOMAX) 0.4 MG capsule Take 1 capsule (0.4 mg) by mouth daily 90 capsule 3     vardenafil (LEVITRA) 5 MG tablet Take 1-4 tabs by mouth as needed for sexual activity 30 tablet 11           Recent Labs   Lab Test 12/06/21  0947   WBC 3.7*   HGB 12.6*        @labrcent[na,potassium,chloride,co2,bun,cr@  Recent Labs   Lab Test 12/06/21  0947   PROTTOTAL 7.2   ALBUMIN 3.7   BILITOTAL 0.4   AST 14   ALT 27   ALKPHOS 52         Recent Results (from the past 744 hour(s))   CT Chest w Contrast    Narrative    EXAMINATION: Chest CT  1/24/2022 12:00 PM    CLINICAL HISTORY: restaging, early stage NSCLC; Adenocarcinoma, lung,  right (H)    COMPARISON: Chest CT 1/26/2021, 6/3/2028, 1/4/2019, and 11/12/2012.  Whole body PET CT 3/9/2019.    TECHNIQUE: CT imaging obtained through the chest with contrast. Axial,  coronal, and sagittal reconstructions and axial MIP reformatted images  are reviewed.     CONTRAST: 91 mL Isovue-370 IV    FINDINGS:  Lungs: Postoperative changes of right upper lobectomy with unchanged  adjacent fibroglandular atelectasis versus scarring. No evidence for  local disease recurrence. The trachea and central airways are patent.  No pneumothorax or pleural effusion. No focal airspace opacity. No  suspicious pulmonary nodule. Calcified granulomas in the left lower  lobe.    Mediastinum: Subcentimeter hypodense nodules in the thyroid. The heart  size is within normal limits. No pericardial effusion.  The ascending  aorta and main pulmonary artery diameters are within normal limits. No  central pulmonary embolus. Normal appearance and configuration of the  great vessels off of the aortic arch. No suspicious mediastinal,  hilar, or axillary lymph nodes.    Bones and soft tissues: No suspicious bone findings. There is  scattered degenerative change throughout the spine.    Upper Abdomen: Unremarkable appearance of the adrenal glands.  Calcified granuloma in the right hepatic lobe. Stable too small to  characterize hypodensity in the right hepatic lobe near the  gallbladder fossa, likely a benign hepatic cyst dating back to 2012.  Simple right renal cyst. Mild atrophy of the pancreatic head region  with fatty replacement The remainder of the partially visualized upper  abdomen is unremarkable.      Impression    IMPRESSION:   Postoperative changes of right upper lobectomy with associated  scarring/atelectasis. No evidence for local disease recurrence or  metastatic disease in the chest.    I have personally reviewed the examination and initial interpretation  and I agree with the findings.    JOE CORTÉS MD         SYSTEM ID:  [id number]           Assessment/Plan  Stage I Adenocarcinoma of the lung - s/p resection in 2019.  No evidence of recurrence on scan from yesterday.  He is feeling well overall.  We will continue yearly surveillance with reepeat chest CT next year.  Follow-up again in one year. Pt is agreeable to the plan and did not have any further questions.     I spent 15 minutes with the patient.  >50% of the time was spent in counseling and coordination of care.          Again, thank you for allowing me to participate in the care of your patient.      Sincerely,    Chava Kelley, DO

## 2022-01-25 NOTE — PROGRESS NOTES
Vishnu is a 71 year old who is being evaluated via a billable telephone visit.      What phone number would you like to be contacted at? [phone]  How would you like to obtain your AVS? Mail a copy  Phone call duration: 15 minutes    Yamilex Brewster    REASON FOR VISIT:    CANCER STAGE: Cancer Staging  Adenocarcinoma of lung, stage 1, right (H)  Staging form: Lung, AJCC 8th Edition  - Clinical: No stage assigned - Unsigned        HISTORY OF PRESENT ILLNESS:  Mr. Viveros is a 71 yo man who was diagnosed with adenocarcinoma of the lung in 2019. He was having some difficulty breathing and some wheezing.  He had a CXR done that was negative for pneumonia but did show a RUL nodule.  He had a CT chest in 2012 that showed this nodule.  CT was done on 1/4/19 that showed a 13.4mmx10.2 RUL nodule that grew from 8.5x6.4 in 2012.  He saw Dr. LAURIE Lopez from pulmonary who ordered a PET/CT scan - 3/9/19 showed that the RUL nodule was hypermetabolic and now measured [id number].  There was no mediastinal LNs or other lesions on PET. Pt was set up for surgical resection, but ended up going to [facility].  He had a wedge resection and LN dissection by Dr. Covington.  Pathology showed a 1.8cm tumor margins were negative. 11 LNs were negative for involvement with tumor including station 7 and 4R. He tolerated the surgery well.  He has not had any other complications since then.     Vishnu is doing well. Since we last spoke one year ago he has been in pretty good health. He has had some low back pains that have been bothering him, but he thinks this is degenerative in nature.  He otherwise is feeling well and has no complaints. He has no shortness of breath, he is eating well, he has no cough or weight loss.      Review Of Systems  10-point review of systems were negative except as noted in HPI.        EXAM:  deferred    Current Outpatient Medications   Medication Sig Dispense Refill     acetaminophen (TYLENOL) 500 MG tablet Take 1-2  tablets (500-1,000 mg) by mouth every 6 hours as needed for mild pain 100 tablet 3     acetaminophen (TYLENOL) 500 MG tablet Take 1-2 tablets (500-1,000 mg) by mouth every 6 hours as needed for pain (arthritis) 120 tablet 3     Ascorbic Acid (VITAMIN C PO) Take 500 mg by mouth once       atorvastatin (LIPITOR) 20 MG tablet Take 1 tablet (20 mg) by mouth daily 90 tablet 3     bisacodyl (DULCOLAX) 5 MG EC tablet Take 1 tablet (5 mg) by mouth See Admin Instructions Refer to My chart message 4 tablet 0     chlorthalidone (HYGROTON) 25 MG tablet Take 1 tablet (25 mg) by mouth daily 90 tablet 3     Cholecalciferol (VITAMIN D) 1000 UNIT capsule Take 1 capsule by mouth daily.       Cyanocobalamin (VITAMIN B12 PO) Take 1 tablet by mouth. Pt states he takes this 2 or 3 times a week..        diclofenac (VOLTAREN) 1 % topical gel Apply 2 g topically 4 times daily 100 g 3     diclofenac (VOLTAREN) 1 % topical gel Apply 2 g topically 4 times daily 100 g 3     gabapentin (NEURONTIN) 300 MG capsule One capsule at bedtime 90 capsule 3     loratadine (CLARITIN) 10 MG tablet Take 1 tablet (10 mg) by mouth daily as needed for allergies 90 tablet 1     losartan (COZAAR) 100 MG tablet Take 1 tablet (100 mg) by mouth every evening 90 tablet 3     melatonin 3 MG tablet Take 1 tablet (3 mg) by mouth nightly as needed for sleep (may take 1-2 if needed) 60 tablet 3     metFORMIN (GLUCOPHAGE-XR) 500 MG 24 hr tablet Take 1 tablet (500 mg) by mouth daily (with dinner) 90 tablet 3     Multiple Vitamin (MULTIVITAMINS PO) Take 1 tablet by mouth daily.       nystatin (MYCOSTATIN) 898076 UNIT/GM external cream Apply topically 2 times daily to affected area. 15 g 4     Omega-3 Fatty Acids (FISH OIL PO) Take 1 capsule by mouth as needed       order for DME Back brace 1 Device 0     polyethylene glycol (GOLYTELY) 236 g suspension Take 4,000 mLs by mouth See Admin Instructions Refer to My chart message 4000 mL 0     potassium chloride ER (KLOR-CON M) 20  MEQ CR tablet Take 1 tablet (20 mEq) by mouth daily 90 tablet 3     sildenafil (REVATIO) 20 MG tablet Take 1-5 tablets (20 mg) by mouth as needed for sexual activity.  Never use with nitroglycerin, terazosin or doxazosin. 90 tablet 11     tamsulosin (FLOMAX) 0.4 MG capsule Take 1 capsule (0.4 mg) by mouth daily 90 capsule 3     vardenafil (LEVITRA) 5 MG tablet Take 1-4 tabs by mouth as needed for sexual activity 30 tablet 11           Recent Labs   Lab Test 12/06/21  0947   WBC 3.7*   HGB 12.6*        @labrcent[na,potassium,chloride,co2,bun,cr@  Recent Labs   Lab Test 12/06/21  0947   PROTTOTAL 7.2   ALBUMIN 3.7   BILITOTAL 0.4   AST 14   ALT 27   ALKPHOS 52         Recent Results (from the past 744 hour(s))   CT Chest w Contrast    Narrative    EXAMINATION: Chest CT  1/24/2022 12:00 PM    CLINICAL HISTORY: restaging, early stage NSCLC; Adenocarcinoma, lung,  right (H)    COMPARISON: Chest CT 1/26/2021, 6/3/2028, 1/4/2019, and 11/12/2012.  Whole body PET CT 3/9/2019.    TECHNIQUE: CT imaging obtained through the chest with contrast. Axial,  coronal, and sagittal reconstructions and axial MIP reformatted images  are reviewed.     CONTRAST: 91 mL Isovue-370 IV    FINDINGS:  Lungs: Postoperative changes of right upper lobectomy with unchanged  adjacent fibroglandular atelectasis versus scarring. No evidence for  local disease recurrence. The trachea and central airways are patent.  No pneumothorax or pleural effusion. No focal airspace opacity. No  suspicious pulmonary nodule. Calcified granulomas in the left lower  lobe.    Mediastinum: Subcentimeter hypodense nodules in the thyroid. The heart  size is within normal limits. No pericardial effusion. The ascending  aorta and main pulmonary artery diameters are within normal limits. No  central pulmonary embolus. Normal appearance and configuration of the  great vessels off of the aortic arch. No suspicious mediastinal,  hilar, or axillary lymph nodes.    Bones  and soft tissues: No suspicious bone findings. There is  scattered degenerative change throughout the spine.    Upper Abdomen: Unremarkable appearance of the adrenal glands.  Calcified granuloma in the right hepatic lobe. Stable too small to  characterize hypodensity in the right hepatic lobe near the  gallbladder fossa, likely a benign hepatic cyst dating back to 2012.  Simple right renal cyst. Mild atrophy of the pancreatic head region  with fatty replacement The remainder of the partially visualized upper  abdomen is unremarkable.      Impression    IMPRESSION:   Postoperative changes of right upper lobectomy with associated  scarring/atelectasis. No evidence for local disease recurrence or  metastatic disease in the chest.    I have personally reviewed the examination and initial interpretation  and I agree with the findings.    JOE CORTÉS MD         SYSTEM ID:  [id number]           Assessment/Plan  Stage I Adenocarcinoma of the lung - s/p resection in 2019.  No evidence of recurrence on scan from yesterday.  He is feeling well overall.  We will continue yearly surveillance with reepeat chest CT next year.  Follow-up again in one year. Pt is agreeable to the plan and did not have any further questions.       I spent 15 minutes with the patient.  >50% of the time was spent in counseling and coordination of care.    Chava Kelley   of Medicine  Division of Hematology, Oncology, and Transplantation

## 2022-02-03 ENCOUNTER — TELEPHONE (OUTPATIENT)
Dept: OPHTHALMOLOGY | Facility: CLINIC | Age: 72
End: 2022-02-03
Payer: COMMERCIAL

## 2022-02-03 PROBLEM — H25.813 COMBINED FORMS OF AGE-RELATED CATARACT OF BOTH EYES: Status: ACTIVE | Noted: 2021-10-05

## 2022-02-03 NOTE — TELEPHONE ENCOUNTER
Vishnu called back to check on a follow up appointment he has scheduled with Dr. Whaley.     03/07 at 2:30pm    [facility] 9th Floor [facility]:  [address], [address]  [place] [address]    Vishnu has a few more questions about where surgery would be and follow up appointments, we spoke about scheduling surgery on a Thursday and that Dr. Whaley is only available specific Thursdays for surgery which we can look at when he is ready. I also explained there will be 4 follow up appointments scheduled at the time of scheduling surgery, 1 day, 1 week, 1 month and 2 month.    Vishnu said this was the information he needed and he would call back.

## 2022-02-03 NOTE — TELEPHONE ENCOUNTER
I called patient to schedule surgery with Dr. Rick Whaley. We discussed possibly Thursday March 10, 2022. Vishnu needs to think about it and look at moving some things on his schedule. I gave my direct number [phone] and current hours of 9:30am-6:00pm, Salvador said he will call me back.

## 2022-02-14 ENCOUNTER — TELEPHONE (OUTPATIENT)
Dept: FAMILY MEDICINE | Facility: CLINIC | Age: 72
End: 2022-02-14
Payer: COMMERCIAL

## 2022-02-14 NOTE — TELEPHONE ENCOUNTER
[facility] Call Center    Phone Message    May a detailed message be left on voicemail: yes     Reason for Call: Other: Pt requesting call back to discuss some referrals, pt stated he is needing a special chair to work from home. Pt stated Dr. Logan knows of his back problems. Pt requesting call back asap, pt is needing this referral or order within a fews days or they will deny his request      Action Taken: Message routed to:  [facility] ([facility]): [email or web address]

## 2022-02-15 ENCOUNTER — VIRTUAL VISIT (OUTPATIENT)
Dept: INTERNAL MEDICINE | Facility: CLINIC | Age: 72
End: 2022-02-15
Payer: COMMERCIAL

## 2022-02-15 ENCOUNTER — MEDICAL CORRESPONDENCE (OUTPATIENT)
Dept: HEALTH INFORMATION MANAGEMENT | Facility: CLINIC | Age: 72
End: 2022-02-15

## 2022-02-15 DIAGNOSIS — M54.41 CHRONIC BILATERAL LOW BACK PAIN WITH BILATERAL SCIATICA: ICD-10-CM

## 2022-02-15 DIAGNOSIS — M48.062 SPINAL STENOSIS, LUMBAR REGION, WITH NEUROGENIC CLAUDICATION: Primary | ICD-10-CM

## 2022-02-15 DIAGNOSIS — M54.42 CHRONIC BILATERAL LOW BACK PAIN WITH BILATERAL SCIATICA: ICD-10-CM

## 2022-02-15 DIAGNOSIS — G89.29 CHRONIC BILATERAL LOW BACK PAIN WITH BILATERAL SCIATICA: ICD-10-CM

## 2022-02-15 PROCEDURE — 99213 OFFICE O/P EST LOW 20 MIN: CPT | Mod: 95 | Performed by: NURSE PRACTITIONER

## 2022-02-15 NOTE — TELEPHONE ENCOUNTER
"Face-to-face visit require per health insurance for DME order for a \"special chair.\"    Dr. Logan is currently on vacation and will not return to clinic on 03/01/22.    Message sent to clinic coordinator to schedule an appt for patient.  Video or in-person only.  No tele visit. At visit my discuss referrals also.       Joe Ramírez CMA ([facility]) at 7:30 AM on 2/15/2022       "

## 2022-02-15 NOTE — TELEPHONE ENCOUNTER
Scheduled video visit w/ Zuleika Rubio today (2/15) at 10am todiscuss referral for [facility] special chair & back pain

## 2022-02-15 NOTE — PROGRESS NOTES
"Vishnu is a 71 year old who is being evaluated via a billable video visit.      How would you like to obtain your AVS? MyChart  If the video visit is dropped, the invitation should be resent by: Send to e-mail at: [email or web address]  Will anyone else be joining your video visit? No      Video Start Time: N/A, pt unable to get video working on his smart phone, no access to personal computer    Assessment & Plan     Spinal stenosis, lumbar region, with neurogenic claudication  Chronic bilateral low back pain with bilateral sciatica  Patient requires a letter for his employer stating history of back pain to provide ergonomic chair for use at home. Letter completed and will be faxed to: [phone], ATTN: Leave & Accomodation Management Office, [facility] [employer] [facility].    Return if symptoms worsen or fail to improve.    Zuleika Rubio, DILAN [facility] INTER[facility]IC[facility] [place]      Subjective   Vishnu is a 71 year old who presents for the following health issues     HPI     Per his PCP Dr. Logan's note (12/6/21), hx of spinal stenosis and dengerative disc disease, L back pain radiating down L leg, requires sitting to rest. Describes neuropathic pain radiating past the L knee. Has worked with PT, which he found helpful, and Gabapentin 300 mg at bedtime.     Today, he presents for a letter for a \"special chair\" that he can get through work (does not need a DME Order, describes that he simply needs a letter stating his past medical history in regards to needing a supportive office chair. He works from home as a  for [employer], working from home.      Review of Systems         Objective           Vitals:  No vitals were obtained today due to virtual visit.    Physical Exam   GENERAL: Healthy, alert and no distress  RESP: No audible wheeze, cough, or increased work of breathing.    PSYCH: Mentation appears normal, affect normal/bright, judgement and insight intact, normal speech " and appearance well-groomed.  Remainder of exam unable to be completed due to telephone visit.              Visit Details    Type of service:  Telephone Visit    6 minute phone call    Originating Location (pt. Location): Home    Distant Location (provider location):  [facility] INTER[facility] MEDICINE [place]     Platform used for Video Visit: Unable to complete video visit, Sammy and Steve not working for patient despite multiple attempts

## 2022-02-15 NOTE — LETTER
February 17, 2022      Vishnu Viveros  [address]  [place] [address]        Dear ,    We are writing to inform you of your test results.    {results letter list:[id number]}    No results found from the In Basket message.    If you have any questions or concerns, please call the clinic at the number listed above.       Sincerely,

## 2022-02-15 NOTE — Clinical Note
Can you please print this letter off so I can sign it tomorrow? Will need to be faxed to [phone], ATTN: Leave & Accomodation Management Office, [facility].  Thanks! -Zuleika

## 2022-02-15 NOTE — NURSING NOTE
Vishnu Viveros is a 71 year old male patient that presents today in clinic for the following:    Chief Complaint   Patient presents with     RECHECK     chair paperwork     The patient's allergies and medications were reviewed as noted. A set of vitals were recorded as noted without incident. The patient does not have any other questions for the provider.    Alfredo Walker, EMT at 11:20 AM on 2/15/2022

## 2022-02-15 NOTE — LETTER
Vishnu Viveros  [address]  [place] [address]  : 1950  MRN:  [id number]      February 15, 2022      To whom it may concern:    Mr. Vishnu Viveros ( 1950) has a history of chronic low back pain related to degenerative disc disease and spinal stenosis of the lumbar region with neurogenic claudication.    He would benefit from an adjustable, ergonomic office chair with lumbar support to help manage his back pain and prevent further exacerbation of his symptoms.      Thank you for your attention to this matter.    Sincerely,       DILAN Villanueva CNP

## 2022-03-10 ENCOUNTER — TELEPHONE (OUTPATIENT)
Dept: GASTROENTEROLOGY | Facility: CLINIC | Age: 72
End: 2022-03-10
Payer: COMMERCIAL

## 2022-03-10 ENCOUNTER — MYC MEDICAL ADVICE (OUTPATIENT)
Dept: FAMILY MEDICINE | Facility: CLINIC | Age: 72
End: 2022-03-10
Payer: COMMERCIAL

## 2022-03-10 DIAGNOSIS — M54.9 BACK PAIN WITH RADIATION: Primary | ICD-10-CM

## 2022-03-10 DIAGNOSIS — G89.29 CHRONIC LOW BACK PAIN: ICD-10-CM

## 2022-03-10 DIAGNOSIS — M54.50 CHRONIC LOW BACK PAIN: ICD-10-CM

## 2022-03-10 NOTE — TELEPHONE ENCOUNTER
Addended by: JAZZY DANILE on: 8/3/2017 08:13 AM     Modules accepted: Orders     Screening Questions  BlueKIND OF PREP RedLOCATION [review exclusion criteria] GreenSEDATION TYPE    1. Have you had a positive covid test in the last 90 days? N     2. Are you active on mychart? N    3. What insurance is in the chart? MEDICA ESSENTIAL      3.  Ordering/Referring Provider: Murali Logan MD    4. BMI 31.8 [BMI OVER 40-EXTENDED PREP]  If greater than 40 review exclusion criteria [PAC APPT IF @ [facility]]    5.  Respiratory Screening :  [If yes to any of the following HOSPITAL setting only]     Do you use daily home oxygen? N    Do you have mod to severe Obstructive Sleep Apnea? N  [OKAY @ [facility] SH PH RI]   Do you have Pulmonary Hypertension? N     Do you have UNCONTROLLED asthma? N      6. Have you had a heart or lung transplant? N      7. Are you currently on dialysis? N [ If yes, G-PREP & HOSPITAL setting only]     8. Do you have chronic kidney disease? N [ If yes, G-PREP ]    9. Have you had a stroke or Transient ischemic attack (TIA) within 6 months? N (If yes, do not schedule at [facility])    10. In the past 6 months, have you had any heart related issues including cardiomyopathy or heart attack? N        If yes, did it require cardiac stenting or other implantable device? N      11. Do you have any implantable devices in your body (pacemaker, defib, LVAD)? N (If yes, schedule at [facility])    12. Do you take nitroglycerin? N   If yes, how often? N  (if yes, HOSPITAL setting ONLY)    13. Are you currently taking any blood thinners? Y  [IF YES, INFORM PATIENT TO FOLLOW UP W/ ORDERING PROVIDER FOR BRIDGING INSTRUCTIONS]     14. Are you a diabetic? N   [ If yes, G-PREP ]    15. [FEMALES] Are you currently pregnant? NA    If yes, how many weeks? NA    16. Are you taking any prescription pain medications on a routine schedule?  N  [ If yes, EXTENDED PREP.] [If yes, MAC]    17. Do you have any chemical dependencies such as alcohol, street drugs, or methadone?  N [If yes, MAC]    18. Do you have any  history of post-traumatic stress syndrome, severe anxiety or history of psychosis?  N  [If yes, MAC]    19. Do you transfer independently?  Y    20.  Do you have any issues with constipation?  N  [ If yes, EXTENDED PREP.]    21. Preferred LOCAL Pharmacy for Pre Prescription  [id number] - [place], [place] - [address] AT [facility] [address] [facility] [address]  Scheduling Details      Caller : BONI  (Please ask for phone number if not scheduled by patient)    Type of Procedure Scheduled: COLON  Which Colonoscopy Prep was Sent?: ELISEO M   GIANFRANCO CF PATIENTS & GROEN'S PATIENTS NEEDS EXTENDED PREP  Surgeon:   Date of Procedure:   Location:       Sedation Type:   Conscious Sedation- Needs  for 6 hours after the procedure  MAC/General-Needs  for 24 hours after procedure    Pre-op Required at [facility] [place], [place] and OR for MAC sedation:   (advise patient they will need a pre-op prior to procedure -)      Informed patient they will need an adult    Cannot take any type of public or medical transportation alone    Pre-Procedure Covid test to be completed at [facility] or Externally:     Confirmed Nurse will call to complete assessment     Additional comments:  PATIENT WANTED [facility]

## 2022-03-11 NOTE — TELEPHONE ENCOUNTER
Orthopedics referral was faxed to [facility] @ [phone].    Maya Oliva RN on 3/11/2022 at 2:04 PM

## 2022-03-11 NOTE — TELEPHONE ENCOUNTER
March 11, 2022  Maya  Please set up referral for orthopedics as noted but call Vishnu Viveros to inform him he needs to check with his insurance for coverage as out of our network may not be covered even with primary care referral.  Diagnoses and all orders for this visit:    Back pain with radiation  -     Orthopedic  Referral; Future    Chronic low back pain  -     Orthopedic  Referral; Future      Best wishes,  Murali Logan MD

## 2022-03-23 DIAGNOSIS — E87.6 HYPOKALEMIA: Primary | ICD-10-CM

## 2022-03-26 RX ORDER — POTASSIUM CHLORIDE 1500 MG/1
20 TABLET, EXTENDED RELEASE ORAL DAILY
Qty: 90 TABLET | Refills: 0 | Status: SHIPPED | OUTPATIENT
Start: 2022-03-26 | End: 2022-06-13

## 2022-04-26 DIAGNOSIS — E11.9 TYPE 2 DIABETES MELLITUS TREATED WITHOUT INSULIN (H): Primary | ICD-10-CM

## 2022-04-26 DIAGNOSIS — I10 BENIGN ESSENTIAL HYPERTENSION: ICD-10-CM

## 2022-04-26 DIAGNOSIS — E55.9 VITAMIN D DEFICIENCY: ICD-10-CM

## 2022-04-26 DIAGNOSIS — D64.9 ANEMIA, UNSPECIFIED TYPE: ICD-10-CM

## 2022-04-26 NOTE — PROGRESS NOTES
Diagnoses and all orders for this visit:    Type 2 diabetes mellitus treated without insulin (H)  -     Comprehensive metabolic panel; Future  -     Hemoglobin A1c; Future  -     Albumin Random Urine Quantitative with Creat Ratio; Future    Benign essential hypertension    Anemia, unspecified type  -     CBC with platelets differential; Future  -     Ferritin; Future  -     Iron and iron binding capacity; Future    Vitamin D deficiency  -     Vitamin D Deficiency; Future    Murali Logan MD

## 2022-04-29 ENCOUNTER — TELEPHONE (OUTPATIENT)
Dept: FAMILY MEDICINE | Facility: CLINIC | Age: 72
End: 2022-04-29
Payer: COMMERCIAL

## 2022-04-29 NOTE — TELEPHONE ENCOUNTER
Discuss with patient lab has been ordered prior to visit with Dr Logan on 6/13/22. Patient schedule lab at 330 PM then will see provider at 430 PM. Patient confirmed appt.

## 2022-05-03 ENCOUNTER — TELEPHONE (OUTPATIENT)
Dept: UROLOGY | Facility: CLINIC | Age: 72
End: 2022-05-03

## 2022-05-03 ENCOUNTER — OFFICE VISIT (OUTPATIENT)
Dept: UROLOGY | Facility: CLINIC | Age: 72
End: 2022-05-03
Payer: COMMERCIAL

## 2022-05-03 VITALS — OXYGEN SATURATION: 97 %

## 2022-05-03 DIAGNOSIS — R97.20 ELEVATED PROSTATE SPECIFIC ANTIGEN (PSA): Primary | ICD-10-CM

## 2022-05-03 DIAGNOSIS — N40.1 BENIGN LOCALIZED PROSTATIC HYPERPLASIA WITH LOWER URINARY TRACT SYMPTOMS (LUTS): ICD-10-CM

## 2022-05-03 DIAGNOSIS — N52.8 OTHER MALE ERECTILE DYSFUNCTION: ICD-10-CM

## 2022-05-03 PROCEDURE — 99214 OFFICE O/P EST MOD 30 MIN: CPT | Mod: 25 | Performed by: UROLOGY

## 2022-05-03 PROCEDURE — 51798 US URINE CAPACITY MEASURE: CPT | Performed by: UROLOGY

## 2022-05-03 RX ORDER — TAMSULOSIN HYDROCHLORIDE 0.4 MG/1
0.4 CAPSULE ORAL DAILY
Qty: 90 CAPSULE | Refills: 3 | Status: SHIPPED | OUTPATIENT
Start: 2022-05-03 | End: 2023-06-12

## 2022-05-03 RX ORDER — FINASTERIDE 5 MG/1
5 TABLET, FILM COATED ORAL DAILY
Qty: 90 TABLET | Refills: 3 | Status: SHIPPED | OUTPATIENT
Start: 2022-05-03 | End: 2023-04-04

## 2022-05-03 RX ORDER — TADALAFIL 20 MG/1
20 TABLET ORAL
Qty: 30 TABLET | Refills: 3 | Status: SHIPPED | OUTPATIENT
Start: 2022-05-03 | End: 2023-07-24

## 2022-05-03 NOTE — PROGRESS NOTES
S: Vishnu Viveros is a pleasant  71 year old male who was seen for a consult with regard to patient's urinary complaints/elevated psa/ED.  Patient complains of Hesitancy in starting urinary stream, Sensation of incomplete emptying, Nocturia x 2 and Frequency.  He has history of elevated PSA.  Symptoms have been on going for   many years(s).  Seems to be worsened over time.  His recent PSA was found to be   PSA   Date Value Ref Range Status   11/16/2020 5.35 (H) 0 - 4 ug/L Final     Comment:     Assay Method:  Chemiluminescence using Siemens Vista analyzer     PSA Tumor Marker   Date Value Ref Range Status   12/06/2021 5.60 (H) 0.00 - 4.00 ug/L Final   .  His AUA Symptom Score:  8.  He is currently on flomax.  He also has h/o elevated psa s/p biopsy in the past without any cancer found.  psa is stable.  Prostate MRI several years ago showed only PIRADS 2 nodule with 50 gm prostate.  He has a long h/o ED.  He has only partial erections.  He used viagra in the past but had headache from it.    Current Outpatient Medications   Medication Sig Dispense Refill     acetaminophen (TYLENOL) 500 MG tablet Take 1-2 tablets (500-1,000 mg) by mouth every 6 hours as needed for mild pain 100 tablet 3     acetaminophen (TYLENOL) 500 MG tablet Take 1-2 tablets (500-1,000 mg) by mouth every 6 hours as needed for pain (arthritis) 120 tablet 3     Ascorbic Acid (VITAMIN C PO) Take 500 mg by mouth once       atorvastatin (LIPITOR) 20 MG tablet Take 1 tablet (20 mg) by mouth daily 90 tablet 3     bisacodyl (DULCOLAX) 5 MG EC tablet Take 1 tablet (5 mg) by mouth See Admin Instructions Refer to My chart message 4 tablet 0     chlorthalidone (HYGROTON) 25 MG tablet Take 1 tablet (25 mg) by mouth daily 90 tablet 3     Cholecalciferol (VITAMIN D) 1000 UNIT capsule Take 1 capsule by mouth daily.       Cyanocobalamin (VITAMIN B12 PO) Take 1 tablet by mouth Pt states he takes this 2 or 3 times a week.       diclofenac (VOLTAREN) 1 % topical gel  "Apply 2 g topically 4 times daily 100 g 3     diclofenac (VOLTAREN) 1 % topical gel Apply 2 g topically 4 times daily 100 g 3     finasteride (PROSCAR) 5 MG tablet Take 1 tablet (5 mg) by mouth daily 90 tablet 3     gabapentin (NEURONTIN) 300 MG capsule One capsule at bedtime 90 capsule 3     loratadine (CLARITIN) 10 MG tablet Take 1 tablet (10 mg) by mouth daily as needed for allergies 90 tablet 1     losartan (COZAAR) 100 MG tablet Take 1 tablet (100 mg) by mouth every evening 90 tablet 3     melatonin 3 MG tablet Take 1 tablet (3 mg) by mouth nightly as needed for sleep (may take 1-2 if needed) 60 tablet 3     metFORMIN (GLUCOPHAGE-XR) 500 MG 24 hr tablet Take 1 tablet (500 mg) by mouth daily (with dinner) 90 tablet 3     Multiple Vitamin (MULTIVITAMINS PO) Take 1 tablet by mouth daily.       nystatin (MYCOSTATIN) 310552 UNIT/GM external cream Apply topically 2 times daily to affected area. 15 g 4     Omega-3 Fatty Acids (FISH OIL PO) Take 1 capsule by mouth as needed       order for DME Back brace 1 Device 0     polyethylene glycol (GOLYTELY) 236 g suspension Take 4,000 mLs by mouth See Admin Instructions Refer to My chart message 4000 mL 0     potassium chloride ER (KLOR-CON M) 20 MEQ CR tablet Take 1 tablet (20 mEq) by mouth daily 90 tablet 0     tadalafil (CIALIS) 20 MG tablet Take 1 tablet (20 mg) by mouth every 48 hours as needed (sex) 30 tablet 3     tamsulosin (FLOMAX) 0.4 MG capsule Take 1 capsule (0.4 mg) by mouth daily 90 capsule 3     Allergies   Allergen Reactions     Pollen Extract      Other reaction(s): Headache  Sinus congestion, sinus \"drainage\", sneezing     Seasonal Allergies      Sinus congestion, sinus \"drainage\", sneezing     Nkda [No Known Drug Allergies]      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 eyelid, unspecified laterality      History of tobacco use  "     HLD (hyperlipidemia)      HTN (hypertension)      Obesity (BMI 30.0-34.9)      Pulmonary nodule      Past Surgical History:   Procedure Laterality Date     APPENDECTOMY OPEN CHILD       ARTHROSCOPY KNEE Left      BIOPSY PROSTATE TRANSRECTAL       EXCISE PTERYGIUM Right      EXCISE PTERYGIUM Right 2015     INJECT EPIDURAL LUMBAR / SACRAL SINGLE N/A 2016    Procedure: INJECT EPIDURAL LUMBAR / SACRAL SINGLE;  Surgeon: Judah Henriquez MD;  Location: [facility] OR     INJECT PARAVERTEBRAL FACET JOINT LUMBAR / SACRAL FIRST Right 2016    Procedure: INJECT PARAVERTEBRAL FACET JOINT LUMBAR / SACRAL FIRST;  Surgeon: Judah Henriquez MD;  Location: [facility] OR     INJECT PARAVERTEBRAL FACET JOINT LUMBAR / SACRAL FIRST Right 2016    Procedure: INJECT PARAVERTEBRAL FACET JOINT LUMBAR / SACRAL FIRST;  Surgeon: Judah Henriquez MD;  Location: [facility] OR     MEDIASTINOSCOPY Right 2019     [place]  Dr Hemanth Covington 19 bronchoscopy, cervical mediastinoscopy for staging and minimally invasive right thoracoscopic wedge resection 1.8 cm invasive adenocarcinoma [id number] stage 1A2 adenocarcinoma completely resected via minimally invasive right upper lobectomy.     PUNCTALPLASTY Bilateral 2018    Procedure: Bilateral Punctoplasty with Mini [place] Stent;  Surgeon: Levy Blue MD;  Location: [facility] OR     REPAIR ECTROPION BILATERAL Bilateral 2018    Procedure: Bilateral Lower Lid Ectropion Repair with Right Lower Lid Lesion Excision;  Surgeon: Levy Blue MD;  Location: [facility] OR      Family History   Problem Relation Age of Onset     Cerebrovascular Disease Brother          stroke      Breast Cancer Sister      Kidney Disease Mother         Kidney stones     Other - See Comments Father          in [place] parkinsons or post surgical eye surgery     Cerebral aneurysm Brother      Hypothyroidism Sister      Glaucoma No family hx of      Macular Degeneration No family hx of      He does not have a  family history of prostate cancer.  Social History     Socioeconomic History     Marital status:      Spouse name: Carmen     Number of children: 2     Years of education: None     Highest education level: None   Tobacco Use     Smoking status: Former Smoker     Years: 5.00     Types: Cigarettes     Quit date: 1981     Years since quittin.4     Smokeless tobacco: Never Used   Substance and Sexual Activity     Alcohol use: Yes     Alcohol/week: 2.5 standard drinks     Types: 3 Standard drinks or equivalent per week     Drug use: No     Sexual activity: Not Currently     Partners: Female   Social History Narrative     works as [employer].currently working remotely        His immediate family; his wife has MS and is an RN at [employer], his two children are in good health.         Social Determinants of Health     Financial Resource Strain: Low Risk      Difficulty of Paying Living Expenses: Not hard at all   Food Insecurity: No Food Insecurity     Worried About Running Out of Food in the Last Year: Never true     Ran Out of Food in the Last Year: Never true   Transportation Needs: No Transportation Needs     Lack of Transportation (Medical): No     Lack of Transportation (Non-Medical): No        REVIEW OF SYSTEMS  =================  C: NEGATIVE for fever, chills, change in weight  I: NEGATIVE for worrisome rashes, moles or lesions  E/M: NEGATIVE for ear, mouth and throat problems  R: NEGATIVE for significant cough or SHORTNESS OF BREATH  CV:  NEGATIVE for chest pain, palpitations or peripheral edema  GI: NEGATIVE for nausea, abdominal pain, heartburn, or change in bowel habits  NEURO: NEGATIVE numbness/weakness  : see HPI  PSYCH: NEGATIVE depression/anxiety  LYmph: no new enlarged lymph nodes  Ortho: no new trauma/movements           O: Exam:SpO2 97%      Constitutional: healthy, alert and no distress  Cardiovascular: negative, PMI normal.   Respiratory: negative, no  evidence of respiratory distress  Gastrointestinal: Abdomen soft, non-tender. BS normal. No masses, organomegaly  : penis no discharge.  Testis no masses.  No scrotal skin lesion.  Prostate 50 gm smooth.  PVR < 50 ml.  Musculoskeletal: extremities normal- no gross deformities noted, gait normal and normal muscle tone  Skin: no suspicious lesions or rashes  Neurologic: Alert and oriented  Psychiatric: mentation appears normal. and affect normal/bright  Hematologic/Lymphatic/Immunologic: normal ant/post cervical, axillary, supraclavicular and inguinal nodes    Assessment/Plan:   (R97.20) Elevated prostate specific antigen (PSA)  (primary encounter diagnosis)  Comment:    Plan: PSA total and free [LIS902]         In one year    (N40.1) Benign localized prostatic hyperplasia with lower urinary tract symptoms (LUTS)  Comment:  Cont with flomax  Plan: add proscar.  Side effects discussed (libido, gynecomastia)      (N52.8) Other male erectile dysfunction  Comment:  Discussed vacuum pump/med/penile injection/surgery  Plan: tadalafil (CIALIS) 20 MG tablet           Side effects discussed.

## 2022-05-03 NOTE — TELEPHONE ENCOUNTER
[facility] calling, they state they received a faxed prescription without a physical signature for patient's Cialis. Verbal order given per patient's medication list.     Fidelina Padron RN   [facility]

## 2022-05-24 ENCOUNTER — TRANSFERRED RECORDS (OUTPATIENT)
Dept: HEALTH INFORMATION MANAGEMENT | Facility: CLINIC | Age: 72
End: 2022-05-24

## 2022-05-26 ENCOUNTER — TRANSFERRED RECORDS (OUTPATIENT)
Dept: HEALTH INFORMATION MANAGEMENT | Facility: CLINIC | Age: 72
End: 2022-05-26

## 2022-06-13 ENCOUNTER — LAB (OUTPATIENT)
Dept: LAB | Facility: CLINIC | Age: 72
End: 2022-06-13
Payer: COMMERCIAL

## 2022-06-13 ENCOUNTER — OFFICE VISIT (OUTPATIENT)
Dept: FAMILY MEDICINE | Facility: CLINIC | Age: 72
End: 2022-06-13

## 2022-06-13 VITALS
OXYGEN SATURATION: 98 % | WEIGHT: 172 LBS | RESPIRATION RATE: 16 BRPM | HEART RATE: 63 BPM | DIASTOLIC BLOOD PRESSURE: 63 MMHG | BODY MASS INDEX: 29.37 KG/M2 | HEIGHT: 64 IN | SYSTOLIC BLOOD PRESSURE: 121 MMHG

## 2022-06-13 DIAGNOSIS — E55.9 VITAMIN D DEFICIENCY: ICD-10-CM

## 2022-06-13 DIAGNOSIS — B37.2 YEAST INFECTION OF THE SKIN: ICD-10-CM

## 2022-06-13 DIAGNOSIS — E11.9 TYPE 2 DIABETES MELLITUS TREATED WITHOUT INSULIN (H): ICD-10-CM

## 2022-06-13 DIAGNOSIS — I10 BENIGN ESSENTIAL HYPERTENSION: ICD-10-CM

## 2022-06-13 DIAGNOSIS — M54.16 LUMBAR RADICULOPATHY: ICD-10-CM

## 2022-06-13 DIAGNOSIS — E11.9 TYPE 2 DIABETES MELLITUS TREATED WITHOUT INSULIN (H): Primary | ICD-10-CM

## 2022-06-13 DIAGNOSIS — E78.5 HYPERLIPIDEMIA LDL GOAL <100: ICD-10-CM

## 2022-06-13 DIAGNOSIS — D64.9 ANEMIA, UNSPECIFIED TYPE: ICD-10-CM

## 2022-06-13 DIAGNOSIS — E87.6 HYPOKALEMIA: ICD-10-CM

## 2022-06-13 DIAGNOSIS — R26.89 BALANCE PROBLEMS: ICD-10-CM

## 2022-06-13 DIAGNOSIS — C34.91 ADENOCARCINOMA OF LUNG, STAGE 1, RIGHT (H): ICD-10-CM

## 2022-06-13 DIAGNOSIS — E08.42 DIABETIC POLYNEUROPATHY ASSOCIATED WITH DIABETES MELLITUS DUE TO UNDERLYING CONDITION (H): ICD-10-CM

## 2022-06-13 LAB
ALBUMIN SERPL-MCNC: 3.6 G/DL (ref 3.4–5)
ALP SERPL-CCNC: 56 U/L (ref 40–150)
ALT SERPL W P-5'-P-CCNC: 28 U/L (ref 0–70)
ANION GAP SERPL CALCULATED.3IONS-SCNC: 10 MMOL/L (ref 3–14)
AST SERPL W P-5'-P-CCNC: 15 U/L (ref 0–45)
BASOPHILS # BLD AUTO: 0 10E3/UL (ref 0–0.2)
BASOPHILS NFR BLD AUTO: 1 %
BILIRUB SERPL-MCNC: 0.4 MG/DL (ref 0.2–1.3)
BUN SERPL-MCNC: 26 MG/DL (ref 7–30)
CALCIUM SERPL-MCNC: 9.2 MG/DL (ref 8.5–10.1)
CHLORIDE BLD-SCNC: 104 MMOL/L (ref 94–109)
CO2 SERPL-SCNC: 29 MMOL/L (ref 20–32)
CREAT SERPL-MCNC: 1.02 MG/DL (ref 0.66–1.25)
CREAT UR-MCNC: 88 MG/DL
EOSINOPHIL # BLD AUTO: 0.3 10E3/UL (ref 0–0.7)
EOSINOPHIL NFR BLD AUTO: 7 %
ERYTHROCYTE [DISTWIDTH] IN BLOOD BY AUTOMATED COUNT: 12.7 % (ref 10–15)
FERRITIN SERPL-MCNC: 165 NG/ML (ref 26–388)
GFR SERPL CREATININE-BSD FRML MDRD: 78 ML/MIN/1.73M2
GLUCOSE BLD-MCNC: 108 MG/DL (ref 70–99)
HBA1C MFR BLD: 5.8 % (ref 0–5.6)
HCT VFR BLD AUTO: 36.8 % (ref 40–53)
HGB BLD-MCNC: 12.1 G/DL (ref 13.3–17.7)
IMM GRANULOCYTES # BLD: 0 10E3/UL
IMM GRANULOCYTES NFR BLD: 0 %
IRON SATN MFR SERPL: 26 % (ref 15–46)
IRON SERPL-MCNC: 75 UG/DL (ref 35–180)
LYMPHOCYTES # BLD AUTO: 1.9 10E3/UL (ref 0.8–5.3)
LYMPHOCYTES NFR BLD AUTO: 45 %
MCH RBC QN AUTO: 29.4 PG (ref 26.5–33)
MCHC RBC AUTO-ENTMCNC: 32.9 G/DL (ref 31.5–36.5)
MCV RBC AUTO: 89 FL (ref 78–100)
MICROALBUMIN UR-MCNC: 25 MG/L
MICROALBUMIN/CREAT UR: 28.41 MG/G CR (ref 0–17)
MONOCYTES # BLD AUTO: 0.4 10E3/UL (ref 0–1.3)
MONOCYTES NFR BLD AUTO: 9 %
NEUTROPHILS # BLD AUTO: 1.6 10E3/UL (ref 1.6–8.3)
NEUTROPHILS NFR BLD AUTO: 38 %
NRBC # BLD AUTO: 0 10E3/UL
NRBC BLD AUTO-RTO: 0 /100
PLATELET # BLD AUTO: 202 10E3/UL (ref 150–450)
POTASSIUM BLD-SCNC: 4 MMOL/L (ref 3.4–5.3)
PROT SERPL-MCNC: 7.2 G/DL (ref 6.8–8.8)
RBC # BLD AUTO: 4.12 10E6/UL (ref 4.4–5.9)
SODIUM SERPL-SCNC: 143 MMOL/L (ref 133–144)
TIBC SERPL-MCNC: 293 UG/DL (ref 240–430)
WBC # BLD AUTO: 4.1 10E3/UL (ref 4–11)

## 2022-06-13 PROCEDURE — 99000 SPECIMEN HANDLING OFFICE-LAB: CPT | Performed by: PATHOLOGY

## 2022-06-13 PROCEDURE — 99214 OFFICE O/P EST MOD 30 MIN: CPT | Performed by: FAMILY MEDICINE

## 2022-06-13 PROCEDURE — 36415 COLL VENOUS BLD VENIPUNCTURE: CPT | Performed by: PATHOLOGY

## 2022-06-13 PROCEDURE — 82043 UR ALBUMIN QUANTITATIVE: CPT | Performed by: PATHOLOGY

## 2022-06-13 PROCEDURE — 83036 HEMOGLOBIN GLYCOSYLATED A1C: CPT | Performed by: PATHOLOGY

## 2022-06-13 PROCEDURE — 85025 COMPLETE CBC W/AUTO DIFF WBC: CPT | Performed by: PATHOLOGY

## 2022-06-13 PROCEDURE — 82306 VITAMIN D 25 HYDROXY: CPT | Mod: 90 | Performed by: PATHOLOGY

## 2022-06-13 PROCEDURE — 83550 IRON BINDING TEST: CPT | Performed by: PATHOLOGY

## 2022-06-13 PROCEDURE — 82728 ASSAY OF FERRITIN: CPT | Performed by: PATHOLOGY

## 2022-06-13 PROCEDURE — 80053 COMPREHEN METABOLIC PANEL: CPT | Performed by: PATHOLOGY

## 2022-06-13 RX ORDER — METFORMIN HCL 500 MG
500 TABLET, EXTENDED RELEASE 24 HR ORAL
Qty: 90 TABLET | Refills: 3 | Status: SHIPPED | OUTPATIENT
Start: 2022-06-13 | End: 2022-12-12

## 2022-06-13 RX ORDER — POTASSIUM CHLORIDE 1500 MG/1
20 TABLET, EXTENDED RELEASE ORAL DAILY
Qty: 90 TABLET | Refills: 3 | Status: SHIPPED | OUTPATIENT
Start: 2022-06-13 | End: 2023-06-12

## 2022-06-13 RX ORDER — NYSTATIN 100000 U/G
CREAM TOPICAL 2 TIMES DAILY
Qty: 30 G | Refills: 4 | Status: SHIPPED | OUTPATIENT
Start: 2022-06-13 | End: 2023-07-24

## 2022-06-13 RX ORDER — ATORVASTATIN CALCIUM 20 MG/1
20 TABLET, FILM COATED ORAL DAILY
Qty: 90 TABLET | Refills: 3 | Status: SHIPPED | OUTPATIENT
Start: 2022-06-13 | End: 2023-06-12

## 2022-06-13 RX ORDER — LOSARTAN POTASSIUM 100 MG/1
100 TABLET ORAL EVERY EVENING
Qty: 90 TABLET | Refills: 3 | Status: SHIPPED | OUTPATIENT
Start: 2022-06-13 | End: 2023-06-12

## 2022-06-13 RX ORDER — GABAPENTIN 300 MG/1
CAPSULE ORAL
Qty: 90 CAPSULE | Refills: 3 | Status: SHIPPED | OUTPATIENT
Start: 2022-06-13 | End: 2023-06-12

## 2022-06-13 RX ORDER — CHLORTHALIDONE 25 MG/1
25 TABLET ORAL DAILY
Qty: 90 TABLET | Refills: 3 | Status: SHIPPED | OUTPATIENT
Start: 2022-06-13 | End: 2023-06-12

## 2022-06-13 NOTE — LETTER
June 20, 2022      Vishnu Viveros  [address]  [place] [address]        Dear ,    We are writing to inform you of your test results.    Dear Vishnu Viveros   Your thyroid, complete blood count including hemoglobin,white cell count for infection, iron students, ferritin,kidney, liver, blood sugar,A1C 5.8, calcium, Vit D, sodium and potassium were normal or within acceptable range.   Good results.   Best wishes,   Murali Logan MD           Resulted Orders   Comprehensive metabolic panel   Result Value Ref Range    Sodium 143 133 - 144 mmol/L    Potassium 4.0 3.4 - 5.3 mmol/L    Chloride 104 94 - 109 mmol/L    Carbon Dioxide (CO2) 29 20 - 32 mmol/L    Anion Gap 10 3 - 14 mmol/L    Urea Nitrogen 26 7 - 30 mg/dL    Creatinine 1.02 0.66 - 1.25 mg/dL    Calcium 9.2 8.5 - 10.1 mg/dL    Glucose 108 (H) 70 - 99 mg/dL    Alkaline Phosphatase 56 40 - 150 U/L    AST 15 0 - 45 U/L    ALT 28 0 - 70 U/L    Protein Total 7.2 6.8 - 8.8 g/dL    Albumin 3.6 3.4 - 5.0 g/dL    Bilirubin Total 0.4 0.2 - 1.3 mg/dL    GFR Estimate 78 >60 mL/min/1.73m2      Comment:      Effective December 21, 2021 eGFRcr in adults is calculated using the 2021 CKD-EPI creatinine equation which includes age and gender (Lio vasquez al., [place], DOI: 10.1056/DALKjw5756858)   Ferritin   Result Value Ref Range    Ferritin 165 26 - 388 ng/mL   Iron and iron binding capacity   Result Value Ref Range    Iron 75 35 - 180 ug/dL    Iron Binding Capacity 293 240 - 430 ug/dL    Iron Sat Index 26 15 - 46 %   Vitamin D Deficiency   Result Value Ref Range    Vitamin D, Total (25-Hydroxy) 43 20 - 75 ug/L    Narrative    Season, race, dietary intake, and treatment affect the concentration of 25-hydroxy-Vitamin D. Values may decrease during winter months and increase during summer months. Values 20-29 ug/L may indicate Vitamin D insufficiency and values <20 ug/L may indicate Vitamin D deficiency.    Vitamin D determination is routinely performed by  an immunoassay specific for 25 hydroxyvitamin D3.  If an individual is on vitamin D2(ergocalciferol) supplementation, please specify 25 OH vitamin D2 and D3 level determination by LCMSMS test VITD23.     Hemoglobin A1c   Result Value Ref Range    Hemoglobin A1C 5.8 (H) 0.0 - 5.6 %      Comment:      Normal <5.7%   Prediabetes 5.7-6.4%    Diabetes 6.5% or higher     Note: Adopted from ADA consensus guidelines.   Albumin Random Urine Quantitative with Creat Ratio   Result Value Ref Range    Creatinine Urine mg/dL 88 mg/dL    Albumin Urine mg/L 25 mg/L    Albumin Urine mg/g Cr 28.41 (H) 0.00 - 17.00 mg/g Cr   CBC with platelets and differential   Result Value Ref Range    WBC Count 4.1 4.0 - 11.0 10e3/uL    RBC Count 4.12 (L) 4.40 - 5.90 10e6/uL    Hemoglobin 12.1 (L) 13.3 - 17.7 g/dL    Hematocrit 36.8 (L) 40.0 - 53.0 %    MCV 89 78 - 100 fL    MCH 29.4 26.5 - 33.0 pg    MCHC 32.9 31.5 - 36.5 g/dL    RDW 12.7 10.0 - 15.0 %    Platelet Count 202 150 - 450 10e3/uL    % Neutrophils 38 %    % Lymphocytes 45 %    % Monocytes 9 %    % Eosinophils 7 %    % Basophils 1 %    % Immature Granulocytes 0 %    NRBCs per 100 WBC 0 <1 /100    Absolute Neutrophils 1.6 1.6 - 8.3 10e3/uL    Absolute Lymphocytes 1.9 0.8 - 5.3 10e3/uL    Absolute Monocytes 0.4 0.0 - 1.3 10e3/uL    Absolute Eosinophils 0.3 0.0 - 0.7 10e3/uL    Absolute Basophils 0.0 0.0 - 0.2 10e3/uL    Absolute Immature Granulocytes 0.0 <=0.4 10e3/uL    Absolute NRBCs 0.0 10e3/uL       If you have any questions or concerns, please call the clinic at the number listed above.       Sincerely,      Murali Logan MD

## 2022-06-13 NOTE — PROGRESS NOTES
Assessment & Plan   Vishnu Viveros is a 72 year old with a history of type 2 diabetes mellitus controlled, hyperlipidemia, hypertension, colon polyps, DJD with spinal stenosis back, elevated PSA, and stage 1 right adenocarcinoma of the lung s/p resection with clear margins 2019 who presents  for review of chronic conditions and refill medications.     Type 2 diabetes mellitus treated without insulin (H)  Diabetes well controlled continue metformin once daily he is also working on nutritional modification and has lost weight congratulated him.  - atorvastatin (LIPITOR) 20 MG tablet  Dispense: 90 tablet; Refill: 3  - metFORMIN (GLUCOPHAGE XR) 500 MG 24 hr tablet  Dispense: 90 tablet; Refill: 3    Diabetic polyneuropathy associated with diabetes mellitus due to underlying condition (H)  He has mild diabetic neuropathy also back pain gabapentin has assisted but he is only been able to tolerate at bedtime refill provided.  - gabapentin (NEURONTIN) 300 MG capsule  Dispense: 90 capsule; Refill: 3    Benign essential hypertension  Hypertension well-controlled refills provided  - chlorthalidone (HYGROTON) 25 MG tablet  Dispense: 90 tablet; Refill: 3  - losartan (COZAAR) 100 MG tablet  Dispense: 90 tablet; Refill: 3    Lumbar radiculopathy  Follows with orthopedics currently managing back pain conservatively declines surgical intervention.  He has been completing physical therapy he is remaining active and trying to walk 30 to 60 minutes daily.    Adenocarcinoma of lung, stage 1, right (H)  Has follow-up with pulmonary with CT scan scheduled in January.    Hypokalemia  Refills provided  - potassium chloride ER (KLOR-CON M) 20 MEQ CR tablet  Dispense: 90 tablet; Refill: 3    Balance problems  He has noted some mild balance problem in particular if he makes sudden movements and turning around therefore is wondering if he could have physical therapy to assist with muscle strengthening and also assist with his balance to  "learn physical activities that he can utilize to strengthen his muscles and assist with balance.  - Physical Therapy Referral    Yeast infection of the skin  He notes occasional flare of groin yeast infection and requested refill.  - nystatin (MYCOSTATIN) 608608 UNIT/GM external cream  Dispense: 30 g; Refill: 4    He wishes to have labs mailed to him and does not want Mychart.  Please deactivate Mychart per his request.   I need to have colonoscopy report done in May [facility]               BMI:   Estimated body mass index is 29.52 kg/m  as calculated from the following:    Height as of this encounter: 1.626 m (5' 4\").    Weight as of this encounter: 78 kg (172 lb).         30 minutes spent on the date of the encounter doing chart review, history, exam, diagnostics review, documentation, counseling and coordination of cares as noted.    Return in about 26 weeks (around 2022) for follow-up, Lab Work.    Murali Logan MD  [facility] KENA Mares is a 72 year old who presents for the following health issues     HPI   Vishnu Viveros is a 72 year old with a history of type 2 diabetes mellitus controlled, hyperlipidemia, hypertension, colon polyps, DJD with spinal stenosis back, elevated PSA, and stage 1 right adenocarcinoma of the lung s/p resection with clear margins  who presents  for review of chronic conditions and refill medications.      Adenocarcinoma [place] treatment for lung adenocarcinoma had CT in 2022 after follow-up with oncology in December. Stable at this time, annual CT in 2023.     Urology  PSA level was elevated had appointment with urology Dr Montaño in 2021 no nodules found recommended follow-up PSA in 6 months follows with urology had visit 2022 Dr Oglesby started proscar in addition to flomax     Diabetes/ Hyperlipidemia:  A1c was 5.9 Dec 2021 today 5.8. He is on MetforminXR 500 mg daily at supper. " Atorvastatin 20 mg daily has been effective at controlling his cholesterol. Had EYE exam, cataracts noted. Due for foot exam. Daughter is assisting in nutritional change has lost weight.     Hypertension His blood pressure is well-controlled on Losartan 100 mg daily and Chlorthalidone 25 mg with potassium supplementation.      Sleeping improved.   Weight loss has assisted with sleep urination not waking him from sleep.     Back, spinal stenosis DJD: Vishnu walks with some pain on the left side of the back radiating to the left leg and past his left knee at times requiring him to sit and rest.. He feels neuropathic like lightening pain radiating down his leg past the knee. PT has been helpful, wants conservative management if possible. He was not able to tolerate Gabapentin 300 mg twice daily taking it at bedtime only. He had a referral to orthopedics He still would like conservative treatment.      Immunizations   Could have covid booster  He completed colonoscopy in May 2022 at [facility] I do not have result need ROR.          Labs reviewed in EPIC  BP Readings from Last 3 Encounters:   06/13/22 121/63   12/06/21 114/67   11/08/21 125/69    Wt Readings from Last 3 Encounters:   06/13/22 78 kg (172 lb)   12/06/21 83.9 kg (185 lb)   11/08/21 83 kg (183 lb)            Immunization History   Administered Date(s) Administered     COVID-19,PF,Moderna 02/10/2021, 03/10/2021, 11/04/2021     COVID-19,PF,Pfizer (12+ Yrs) 05/07/2022     FLU 6-35 months 10/14/2010     Flu, Unspecified 11/06/2009     Influenza (H1N1) 02/02/2010     Influenza (High Dose) 3 valent vaccine 10/30/2014, 11/19/2015, 11/28/2016, 11/27/2017, 10/31/2018, 12/11/2019     Influenza (IIV3) PF 12/30/2005, 12/05/2006, 12/11/2007, 11/04/2008, 11/06/2009, 10/14/2010, 11/14/2011, 11/12/2012, 11/14/2013     Influenza, Quad, High Dose, Pf, 65yr+ (Fluzone HD) 11/16/2020, 12/06/2021     Pneumo Conj 13-V (2010&after) 10/30/2014     Pneumococcal 23 valent 11/19/2015      TD (ADULT, 7+) 02/12/2007     TDAP Vaccine (Boostrix) 07/31/2017     Td (Adult), Adsorbed 02/12/2007     Tdap (Adacel,Boostrix) 07/31/2017     Zoster vaccine recombinant adjuvanted (SHINGRIX) 12/11/2019, 08/26/2020           Patient Active Problem List   Diagnosis     Erectile dysfunction     Seasonal allergies     Vitamin D deficiency     Pulmonary nodule     Prostate nodule     Chronic low back pain     Degenerative disc disease, lumbar     Back pain with radiation     DJD (degenerative joint disease) of knee     HL (hearing loss)     Vision impairment     Tear film insufficiency     Recurrent pterygium     Senile nuclear sclerosis     Type 2 diabetes mellitus treated without insulin (H)     Diabetic polyneuropathy associated with diabetes mellitus due to underlying condition (H)     Tubulovillous adenoma of colon     Lumbar radiculopathy     Adenocarcinoma of lung, stage 1, right (H)     Benign essential hypertension     History of colonic polyps     Spinal stenosis, lumbar region, with neurogenic claudication     Elevated prostate specific antigen (PSA)     Obesity with body mass index 30 or greater     Solitary pulmonary nodule     Evaluation of hearing impairment     Combined forms of age-related cataract of both eyes     Balance problems     Past Surgical History:   Procedure Laterality Date     APPENDECTOMY OPEN CHILD       ARTHROSCOPY KNEE Left      BIOPSY PROSTATE TRANSRECTAL       EXCISE PTERYGIUM Right 1989     EXCISE PTERYGIUM Right 2/12/2015     INJECT EPIDURAL LUMBAR / SACRAL SINGLE N/A 7/21/2016    Procedure: INJECT EPIDURAL LUMBAR / SACRAL SINGLE;  Surgeon: Judah Henriquez MD;  Location: [facility]     INJECT PARAVERTEBRAL FACET JOINT LUMBAR / SACRAL FIRST Right 8/18/2016    Procedure: INJECT PARAVERTEBRAL FACET JOINT LUMBAR / SACRAL FIRST;  Surgeon: Judah Henriquez MD;  Location: [facility]     INJECT PARAVERTEBRAL FACET JOINT LUMBAR / SACRAL FIRST Right 9/8/2016    Procedure: INJECT PARAVERTEBRAL FACET  JOINT LUMBAR / SACRAL FIRST;  Surgeon: Judah Henriquez MD;  Location: [facility]     MEDIASTINOSCOPY Right 2019     [place]  Dr Hemanth Covington 19 bronchoscopy, cervical mediastinoscopy for staging and minimally invasive right thoracoscopic wedge resection 1.8 cm invasive adenocarcinoma [id number] stage 1A2 adenocarcinoma completely resected via minimally invasive right upper lobectomy.     PUNCTALPLASTY Bilateral 2018    Procedure: Bilateral Punctoplasty with Mini [place] Stent;  Surgeon: Levy Blue MD;  Location: [facility]     REPAIR ECTROPION BILATERAL Bilateral 2018    Procedure: Bilateral Lower Lid Ectropion Repair with Right Lower Lid Lesion Excision;  Surgeon: Levy Blue MD;  Location: [facility] OR       Social History     Tobacco Use     Smoking status: Former Smoker     Years: 5.00     Types: Cigarettes     Quit date: 1981     Years since quittin.6     Smokeless tobacco: Never Used   Substance Use Topics     Alcohol use: Yes     Alcohol/week: 2.5 standard drinks     Types: 3 Standard drinks or equivalent per week     Family History   Problem Relation Age of Onset     Cerebrovascular Disease Brother          stroke      Breast Cancer Sister      Kidney Disease Mother         Kidney stones     Other - See Comments Father          in [place] parkinsons or post surgical eye surgery     Cerebral aneurysm Brother      Hypothyroidism Sister      Glaucoma No family hx of      Macular Degeneration No family hx of          Current Outpatient Medications   Medication Sig Dispense Refill     acetaminophen (TYLENOL) 500 MG tablet Take 1-2 tablets (500-1,000 mg) by mouth every 6 hours as needed for mild pain 100 tablet 3     acetaminophen (TYLENOL) 500 MG tablet Take 1-2 tablets (500-1,000 mg) by mouth every 6 hours as needed for pain (arthritis) 120 tablet 3     Ascorbic Acid (VITAMIN C PO) Take 500 mg by mouth once       atorvastatin (LIPITOR) 20 MG tablet Take 1 tablet (20 mg) by  mouth daily 90 tablet 3     bisacodyl (DULCOLAX) 5 MG EC tablet Take 1 tablet (5 mg) by mouth See Admin Instructions Refer to My chart message 4 tablet 0     chlorthalidone (HYGROTON) 25 MG tablet Take 1 tablet (25 mg) by mouth daily 90 tablet 3     Cholecalciferol (VITAMIN D) 1000 UNIT capsule Take 1 capsule by mouth daily.       Cyanocobalamin (VITAMIN B12 PO) Take 1 tablet by mouth Pt states he takes this 2 or 3 times a week.       diclofenac (VOLTAREN) 1 % topical gel Apply 2 g topically 4 times daily 100 g 3     diclofenac (VOLTAREN) 1 % topical gel Apply 2 g topically 4 times daily 100 g 3     finasteride (PROSCAR) 5 MG tablet Take 1 tablet (5 mg) by mouth daily 90 tablet 3     gabapentin (NEURONTIN) 300 MG capsule One capsule at bedtime 90 capsule 3     loratadine (CLARITIN) 10 MG tablet Take 1 tablet (10 mg) by mouth daily as needed for allergies 90 tablet 1     losartan (COZAAR) 100 MG tablet Take 1 tablet (100 mg) by mouth every evening 90 tablet 3     melatonin 3 MG tablet Take 1 tablet (3 mg) by mouth nightly as needed for sleep (may take 1-2 if needed) 60 tablet 3     metFORMIN (GLUCOPHAGE XR) 500 MG 24 hr tablet Take 1 tablet (500 mg) by mouth daily (with dinner) 90 tablet 3     Multiple Vitamin (MULTIVITAMINS PO) Take 1 tablet by mouth daily.       nystatin (MYCOSTATIN) 654586 UNIT/GM external cream Apply topically 2 times daily to affected area. 30 g 4     Omega-3 Fatty Acids (FISH OIL PO) Take 1 capsule by mouth as needed       order for DME Back brace 1 Device 0     polyethylene glycol (GOLYTELY) 236 g suspension Take 4,000 mLs by mouth See Admin Instructions Refer to My chart message 4000 mL 0     potassium chloride ER (KLOR-CON M) 20 MEQ CR tablet Take 1 tablet (20 mEq) by mouth daily 90 tablet 3     tadalafil (CIALIS) 20 MG tablet Take 1 tablet (20 mg) by mouth every 48 hours as needed (sex) 30 tablet 3     tamsulosin (FLOMAX) 0.4 MG capsule Take 1 capsule (0.4 mg) by mouth daily 90 capsule 3  "    Allergies   Allergen Reactions     Pollen Extract      Other reaction(s): Headache  Sinus congestion, sinus \"drainage\", sneezing     Seasonal Allergies      Sinus congestion, sinus \"drainage\", sneezing     Nkda [No Known Drug Allergies]      Recent Labs   Lab Test 06/13/22  1550 01/24/22  1145 12/06/21  0947 06/07/21  1044 11/16/20  1026 05/27/20  0957 10/26/19  1040 11/27/17  1107 07/31/17  1031   A1C 5.8*  --  5.9* 5.8* 5.8*   < > 6.2*   < > 6.3*   LDL  --   --  47  --  39  --  58   < > 52   HDL  --   --  59  --  64  --  49   < > 60   TRIG  --   --  94  --  116  --  91   < > 54   ALT 28  --  27  --  27   < > 26   < > 29   CR 1.02 0.9 1.01 0.95 0.93   < > 0.96   < > 0.79   GFRESTIMATED 78 >60 74 80 82   < > 79   < > >90  Non  GFR Calc     GFRESTBLACK  --   --   --  >90 >90   < > >90   < > >90  African American GFR Calc     POTASSIUM 4.0  --  4.0 3.7 3.5   < > 3.8   < > 4.2   TSH  --   --   --   --   --   --  1.56  --  1.67    < > = values in this interval not displayed.      Review of Systems   Problem list, PMH, Surgical HX, FH, SH, allergies, medications,immunizations reviewed and updated in Epic.  ROS negative other than noted in HPI and ROS.        Objective    /63 (BP Location: Right arm, Patient Position: Sitting, Cuff Size: Adult Regular)   Pulse 63   Resp 16   Ht 1.626 m (5' 4\")   Wt 78 kg (172 lb)   SpO2 98%   BMI 29.52 kg/m    Body mass index is 29.52 kg/m .  Physical Exam   GENERAL APPEARANCE: healthy, alert and no distress BMI 29.52  EYES: Eyes grossly normal to inspection, PERRL and conjunctivae and sclerae normal  HENT: ear canals and TM's normal, mask removed breifly mouth without ulcers or lesions, oropharynx clear and oral mucous membranes moist, no thrush  NECK: no adenopathy, no asymmetry, masses, or scars and thyroid normal to palpation  RESP: lungs clear to auscultation - no rales, rhonchi or wheezes  CV: regular rate and rhythm, normal S1 S2, no S3 or S4, no " murmur, click or rub, no peripheral edema and peripheral pulses strong  Chest: well healed surgical scars from laparoscopic chest procedure  ABDOMEN: soft,obese, nontender, no hepatosplenomegaly, no masses and bowel sounds normal, well-healed abdominal scar  MS: Kyphosis and antalgic gait with left leg weakness compared to right.  SKIN: no suspicious lesions or rashes, congenital nevi in upper back that has been stable  NEURO: Normal strength and tone, sensory exam grossly normal, mentation intact and speech normal  PSYCH: mentation appears normal and affect normal/bright  Diabetic foot exam: thickened toenails, no ulcerations, normal DP and PT pulses, normal sensory exam, normal monofilament exam   Diabetic Foot Screen:  Any complaints of increased pain or numbness ? No  Is there a foot ulcer now or a history of foot ulcer? No  Does the foot have an abnormal shape? No  Are the nails thick, too long or ingrown? No slightly thick  Are there any redness or open areas? No         Sensation Testing done at all points on the diagram with monofilament     Right Foot: Sensation Normal at all points  Left Foot: Sensation Normal at all points     Risk Category: 0- No loss of protective sensation  Performed by Murali Logan MD       Latest Reference Range & Units 06/13/22 15:50 06/13/22 16:00   Sodium 133 - 144 mmol/L 143    Potassium 3.4 - 5.3 mmol/L 4.0    Chloride 94 - 109 mmol/L 104    Carbon Dioxide 20 - 32 mmol/L 29    Urea Nitrogen 7 - 30 mg/dL 26    Creatinine 0.66 - 1.25 mg/dL 1.02    GFR Estimate >60 mL/min/1.73m2 78 [1]    Calcium 8.5 - 10.1 mg/dL 9.2    Anion Gap 3 - 14 mmol/L 10    Albumin 3.4 - 5.0 g/dL 3.6    Protein Total 6.8 - 8.8 g/dL 7.2    Bilirubin Total 0.2 - 1.3 mg/dL 0.4    Alkaline Phosphatase 40 - 150 U/L 56    ALT 0 - 70 U/L 28    AST 0 - 45 U/L 15    Albumin Urine mg/g Cr 0.00 - 17.00 mg/g Cr  28.41 (H)   Albumin Urine mg/L mg/L  25   Creatinine Urine mg/dL  88   Ferritin 26 - 388 ng/mL 165     Hemoglobin A1C 0.0 - 5.6 % 5.8 (H) [2]    Iron 35 - 180 ug/dL 75    Iron Binding Cap 240 - 430 ug/dL 293    Iron Saturation Index 15 - 46 % 26    Vitamin D Deficiency screening 20 - 75 ug/L 43    Glucose 70 - 99 mg/dL 108 (H)    WBC 4.0 - 11.0 10e3/uL 4.1    Hemoglobin 13.3 - 17.7 g/dL 12.1 (L)    Hematocrit 40.0 - 53.0 % 36.8 (L)    Platelet Count 150 - 450 10e3/uL 202    RBC Count 4.40 - 5.90 10e6/uL 4.12 (L)    MCV 78 - 100 fL 89    MCH 26.5 - 33.0 pg 29.4    MCHC 31.5 - 36.5 g/dL 32.9    RDW 10.0 - 15.0 % 12.7    % Neutrophils % 38    % Lymphocytes % 45    % Monocytes % 9    % Eosinophils % 7    % Basophils % 1    Absolute Basophils 0.0 - 0.2 10e3/uL 0.0    Absolute Eosinophils 0.0 - 0.7 10e3/uL 0.3    Absolute Immature Granulocytes <=0.4 10e3/uL 0.0    Absolute Lymphocytes 0.8 - 5.3 10e3/uL 1.9    Absolute Monocytes 0.0 - 1.3 10e3/uL 0.4    % Immature Granulocytes % 0    Absolute Neutrophils 1.6 - 8.3 10e3/uL 1.6    Absolute NRBCs 10e3/uL 0.0    NRBCs per 100 WBC <1 /100 0    Results discussed at visit.  Murali Logan MD

## 2022-06-13 NOTE — NURSING NOTE
Vishnu Viveros is a 72 year old male patient that presents today in clinic for the following:    Chief Complaint   Patient presents with     RECHECK     Pt comes into clinic for a follow up     The patient's allergies and medications were reviewed as noted. A set of vitals were recorded as noted without incident. The patient does not have any other questions for the provider.    Vandana Nina, EMT at 4:38 PM on 6/13/2022

## 2022-06-14 PROBLEM — R26.89 BALANCE PROBLEMS: Status: ACTIVE | Noted: 2022-06-14

## 2022-06-14 LAB — DEPRECATED CALCIDIOL+CALCIFEROL SERPL-MC: 43 UG/L (ref 20–75)

## 2022-06-14 NOTE — PATIENT INSTRUCTIONS
I need to have colonoscopy report done in May [facility] Please ask provider to send report.  Best wishes,  Murali Logan MD

## 2022-06-16 ENCOUNTER — HOSPITAL ENCOUNTER (OUTPATIENT)
Facility: AMBULATORY SURGERY CENTER | Age: 72
End: 2022-06-16
Attending: OPHTHALMOLOGY
Payer: COMMERCIAL

## 2022-06-20 NOTE — RESULT ENCOUNTER NOTE
Dear Vishnu Viveros   Your thyroid, complete blood count including hemoglobin,white cell count for infection, iron students, ferritin,kidney, liver, blood sugar,A1C 5.8, calcium, Vit D, sodium and potassium were normal or within acceptable range.   Good results.  Best wishes,  Murali Logan MD

## 2022-06-26 PROBLEM — Z86.0100 HISTORY OF COLONIC POLYPS: Status: ACTIVE | Noted: 2019-12-11

## 2022-07-27 ENCOUNTER — HOSPITAL ENCOUNTER (OUTPATIENT)
Facility: AMBULATORY SURGERY CENTER | Age: 72
End: 2022-07-27
Attending: OPHTHALMOLOGY
Payer: COMMERCIAL

## 2022-08-15 ENCOUNTER — LAB (OUTPATIENT)
Dept: URGENT CARE | Facility: URGENT CARE | Age: 72
End: 2022-08-15
Payer: COMMERCIAL

## 2022-08-15 DIAGNOSIS — Z20.822 SUSPECTED COVID-19 VIRUS INFECTION: ICD-10-CM

## 2022-08-15 PROCEDURE — U0003 INFECTIOUS AGENT DETECTION BY NUCLEIC ACID (DNA OR RNA); SEVERE ACUTE RESPIRATORY SYNDROME CORONAVIRUS 2 (SARS-COV-2) (CORONAVIRUS DISEASE [COVID-19]), AMPLIFIED PROBE TECHNIQUE, MAKING USE OF HIGH THROUGHPUT TECHNOLOGIES AS DESCRIBED BY CMS-2020-01-R: HCPCS

## 2022-08-15 PROCEDURE — U0005 INFEC AGEN DETEC AMPLI PROBE: HCPCS

## 2022-08-15 PROCEDURE — 99207 PR NO CHARGE LOS: CPT

## 2022-08-16 ENCOUNTER — TELEPHONE (OUTPATIENT)
Dept: FAMILY MEDICINE | Facility: CLINIC | Age: 72
End: 2022-08-16

## 2022-08-16 ENCOUNTER — MYC MEDICAL ADVICE (OUTPATIENT)
Dept: FAMILY MEDICINE | Facility: CLINIC | Age: 72
End: 2022-08-16

## 2022-08-16 ENCOUNTER — VIRTUAL VISIT (OUTPATIENT)
Dept: INTERNAL MEDICINE | Facility: CLINIC | Age: 72
End: 2022-08-16
Payer: COMMERCIAL

## 2022-08-16 DIAGNOSIS — U07.1 INFECTION DUE TO 2019 NOVEL CORONAVIRUS: Primary | ICD-10-CM

## 2022-08-16 LAB — SARS-COV-2 RNA RESP QL NAA+PROBE: POSITIVE

## 2022-08-16 PROCEDURE — 99213 OFFICE O/P EST LOW 20 MIN: CPT | Mod: CS | Performed by: NURSE PRACTITIONER

## 2022-08-16 NOTE — Clinical Note
It's been a while since I've referred anyone for monoclonal antibodies--is he contacted by a central scheduling team or can you help him set this up? Thanks! -Zuleika

## 2022-08-16 NOTE — PROGRESS NOTES
"Vishnu is a 72 year old who is being evaluated via a billable telephone visit.      What phone number would you like to be contacted at? [phone]   How would you like to obtain your AVS? MyChart    Assessment & Plan     Infection due to 2019 novel coronavirus  Vishnu is unfortunately past the treatment window for Paxlovid but would benefit from monoclonal antibodies given his age >65, hx HTN, DM. A referral was provided for this. He agrees with the plan.  Reviewed home isolation and masking guidelines per MD.   - Covid Monoclonal Antibody Referral; Future      35 minutes spent on the date of the encounter doing chart review, history and exam, documentation and further activities per the note         Return if symptoms worsen or fail to improve.    DILAN Villanueva CNP  [facility] [place]    Subjective   Vishnu is a 72 year old, presenting for the following health issues:  No chief complaint on file.      HPI       +COVID19 8/15/22. Symptoms started on Thursday (8/11/22), with wheezing and generalized body aches. Symptoms started upon returning home from vacation, to [facility] [place], [place].  Feeling okay, overall feeling better than he was initially. Runny nose, and some cough in the AM. No SOB.   He is fully vaccinated/boosted against COVID19.   Asks about when he can return to work, works 3 days in the office for [employer].   Wife requests letter for her work ([employer]), for full 10 days.     Review of Systems   Constitutional, HEENT, cardiovascular, pulmonary, gi and gu systems are negative, except as otherwise noted.      Objective    Vitals - Patient Reported  Weight (Patient Reported): 76.7 kg (169 lb)  Height (Patient Reported): 162.6 cm (5' 4\")  BMI (Based on Pt Reported Ht/Wt): 29.01  Pain Score: Moderate Pain (5)  Pain Loc: Other - see comment (joint pain, around hands)        Physical Exam   healthy, alert and no distress  PSYCH: Alert and " oriented times 3; coherent speech, normal   rate and volume, able to articulate logical thoughts, able   to abstract reason, no tangential thoughts, no hallucinations   or delusions  His affect is normal  RESP: No cough, no audible wheezing, able to talk in full sentences  Remainder of exam unable to be completed due to telephone visits                Phone call duration: 25 minutes    .  ..  Shirley Rae VF

## 2022-08-16 NOTE — TELEPHONE ENCOUNTER
[facility] Call Center    Phone Message    May a detailed message be left on voicemail: yes     Reason for Call: Symptoms or Concerns     Current symptom or concern: Patient scheduled to see Zuleika Rubio CNP today at 2:00 PM for a virtual visit. Wanting to discuss possible COVID treatment options. Symptoms started on Thursday evening 08/11/2022, thought it was sinus problem since he has sinus issues. Friday night he developed a cough and body ache. Issues are still ongoing. Wondering about treatment options.    Has not gotten results back since he does not have [employer]. Send [employer] linked and transferred patient s wife to [employer] to set up his account. Travelled to [place], [place] this past weekend with his wife who is also COVID positive and symptomatic.    Symptoms have been present for:  5 day(s)    Has patient previously been seen for this? No    Are there any new or worsening symptoms? Yes: Symptoms are still ongoing.    Wanting Dr. Logan s input.      Action Taken: Message routed to:  [facility] Psychiatric    Travel Screening: Not Applicable

## 2022-08-16 NOTE — TELEPHONE ENCOUNTER
Coronavirus (COVID-19) Notification    Caller Name (Patient, parent, daughter/son, grandparent, etc)  Vishnu    Reason for call  Notify of Positive Coronavirus (COVID-19) lab results, assess symptoms,  review [facility] recommendations    Lab Result    Lab test:  2019-nCoV rRt-PCR or SARS-CoV-2 PCR    Oropharyngeal AND/OR nasopharyngeal swabs is POSITIVE for 2019-nCoV RNA/SARS-COV-2 PCR (COVID-19 virus)      Gather patient reported symptoms   Assessment   Current Symptoms at time of phone call, reported by patient: (if no symptoms, document: No symptoms] Sneezing stuffy nose bodyaches   Date of symptom(s) onset (if applicable) 8-12-22     If at time of call, Patients symptoms have worsened, the Patient should contact 911 or have someone drive them to Emergency Dept promptly:      If Patient calling 911, inform 911 personal that you have tested positive for the Coronavirus (COVID-19).  Place mask on and await 911 to arrive.    If Emergency Dept, If possible, please have another adult drive you to the Emergency Dept but you need to wear mask when in contact with other people.      Treatment Options:   Patient classified as COVID treatment eligible by Epic high risk algorithm: Yes  Is the patient symptomatic at the time of result notification? Yes. Was the onset of symptoms within the last 5 days? Yes.   There are now oral medications available for the treatment of COVID-19.  Taking one of these medications within the first five days of symptoms (when people may not yet feel severely ill) has been shown to make people feel better, prevent them from getting sicker, and preventing hospitalization and death.   Does the patient agree to have a visit with a provider to discuss treatment options? Yes. Is the patient seen at [facility]?  No: Warm transfer caller to [phone] to be scheduled with a virtual urgent provider.  During transfer, instruct  on appropriate time frame for visit      Review information with Patient    Your result was positive. This means you have COVID-19 (coronavirus).    How can I protect others?    These guidelines are for isolating before returning to work, school or .    If you DO have symptoms    Stay home and away from others     For at least 5 days after your symptoms started, AND    You are fever free for 24 hours (with no medicine that reduces fever), AND    Your other symptoms are better    Wear a mask for 10 full days anytime you are around others    If you DON'T have symptoms    Stay home and away from others for at least 5 days after your positive test    Wear a mask for 10 full days anytime you are around others    There may be different guidelines for healthcare facilities.  Please check with the specific sites before arriving.    If you have been told by a doctor that you were severely ill with COVID-19 or are immunocompromised, you should isolate for at least 10 days.    You should not go back to work until you meet the guidelines above for ending your home isolation. You don't need to be retested for COVID-19 before going back to work--studies show that you won't spread the virus if it's been at least 10 days since your symptoms started (or 20 days, if you have a weak immune system).    Employers, schools, and daycares: This is an official notice for this person's medical guidelines for returning in-person.  They must meet the above guidelines before going back to work, school or  in person.    You will receive a positive COVID-19 letter via [employer] or the mail soon with additional self-care information.    Would you like me to review some of that information with you now?  Yes    How can I take care of myself?      Get lots of rest. Drink extra fluids (unless a doctor has told you not to).      Take Tylenol (acetaminophen) for fever or pain. If you have liver or kidney problems, ask your family doctor if it's okay to take Tylenol.     Take  either:     650 mg (two 325 mg pills) every 4 to 6 hours, or     1,000 mg (two 500 mg pills) every 8 hours as needed.     Note: Do not take more than 3,000 mg in one day. Acetaminophen is found in many medicines (both prescribed and over-the-counter medicines). Read all labels to be sure you don't take too much.    For children, check the Tylenol bottle for the right dose (based on their age or weight).      If you have other health problems (like cancer, heart failure, an organ transplant or severe kidney disease): Call your specialty clinic if you don't feel better in the next 2 days.      Know when to call 911: Emergency warning signs include:    Trouble breathing or shortness of breath    Pain or pressure in the chest that doesn't go away    Feeling confused like you haven't felt before, or not being able to wake up    Bluish-colored lips or face        If you were tested for an upcoming procedure, please contact your provider for next steps.    Mitzy Serra

## 2022-08-16 NOTE — PATIENT INSTRUCTIONS
Thank you for visiting the Primary Care Center today at the [facility]! The following is some information about our clinic:     Primary Care Center Frequently-Asked Questions    (1) How do I schedule appointments at the [facility]?     Primary Care--to schedule or make changes to an existing appointment, please call our primary care line at [phone].    Labs--to schedule a lab appointment at the [facility] you can use [facility] or call [phone]. If you have a [place] location that is closer to home, you can reach out to that location for scheduling options.     Imaging--if you need to schedule a CT, X-ray, MRI, ultrasound, or other imaging study you can call [phone] to schedule at the [facility] or any other [facility] imaging location.     Referrals--if a referral to another specialty was ordered you can expect a phone call from their scheduling team. If you have not heard from them in a week, please call us or send us a [facility] message to check the status or get a scheduling number. Please note that this only applies to internal [facility] referrals. If the referral is external you would need to contact their office for scheduling.     (2) I have a question about my visit, who do I contact?     You can call us at the primary care line at [phone] to ask questions about your visit. You can also send a secure message through [facility], which is reviewed by clinic staff. Please note that [facility] messages have a twenty-four to forty-eight business hour turnaround time and should not be used for urgent concerns.    (3) How will I get the results of my tests?    If you are signed up for [email or web address] all tests will be released to you within twenty-four hours of resulting. Please allow three to five days for your doctor to review your results and place a note interpreting the results. If you do not have [employer] you will receive your  results through mail seven to ten business days following the return of the tests. Please note that if there should be any urgent or concerning results that your doctor or their registered nurse will reach out to you the same day as the tests come back. If you have follow up questions about your results or would like to discuss the results in detail please schedule a follow up with your provider either in person or virtually.     (4) How do I get refills of my prescriptions?     You should always first contact your pharmacy for refills of your medications. If submitting a refill request on [employer], please be sure to submit the request only once--repeat requests can cause delays in refill. If you are requesting a NEW medication or a medication related to new symptoms you will need to schedule an appointment with a provider prior to approval. Please note: Routine medication refills have up to one to three business day turnaround whereas controlled substances refills have up to five to seven business day turnaround.    (5) I have new symptoms, what do I do?     If you are having an immediate medical emergency, you should dial 911 for assistance.   For anything urgent that needs to be seen within a few hours to one day you should visit a local urgent care for assistance.  For non-urgent symptoms that need to be seen within a few days to a week you can schedule with an available provider in primary care by going to [employer] or calling [phone].   If you are not sure how serious your symptoms are or you would like to receive medical advice you can always call [phone] to speak with a triage nurse.

## 2022-08-22 ENCOUNTER — NURSE TRIAGE (OUTPATIENT)
Dept: NURSING | Facility: CLINIC | Age: 72
End: 2022-08-22

## 2022-08-22 NOTE — TELEPHONE ENCOUNTER
S: Questions about a repeat covid test    B: tested positive last Monday. Pt has quarantined. Patient states he is feeling much better. Denies fever, states he has a minimal cough. Says his employer is requiring a negative test for him to return to work.  Patient told that a another test is not recommended as you can test positive for up to 90 days.    A: resolving covid symptoms    R: Patient will speak with his employer about retesting. Pt given care advice per protocol. Pt agrees with and understands the plan.    SHIRAZ DAILEY RN         Reason for Disposition    COVID-19 Disease, questions about    Additional Information    Negative: SEVERE difficulty breathing (e.g., struggling for each breath, speaks in single words)    Negative: Difficult to awaken or acting confused (e.g., disoriented, slurred speech)    Negative: Bluish (or gray) lips or face now    Negative: Shock suspected (e.g., cold/pale/clammy skin, too weak to stand, low BP, rapid pulse)    Negative: Sounds like a life-threatening emergency to the triager    Negative: [1] Diagnosed or suspected COVID-19 AND [2] symptoms lasting 3 or more weeks    Negative: [1] COVID-19 exposure AND [2] no symptoms    Negative: COVID-19 vaccine reaction suspected (e.g., fever, headache, muscle aches) occurring 1 to 3 days after getting vaccine    Negative: COVID-19 vaccine, questions about    Negative: [1] Lives with someone known to have influenza (flu test positive) AND [2] flu-like symptoms (e.g., cough, runny nose, sore throat, SOB; with or without fever)    Negative: [1] Adult with possible COVID-19 symptoms AND [2] triager concerned about severity of symptoms or other causes    Negative: COVID-19 and breastfeeding, questions about    Negative: SEVERE or constant chest pain or pressure  (Exception: Mild central chest pain, present only when coughing.)    Negative: MODERATE difficulty breathing (e.g., speaks in phrases, SOB even at rest, pulse 100-120)     Negative: Headache and stiff neck (can't touch chin to chest)    Negative: Oxygen level (e.g., pulse oximetry) 90 percent or lower    Negative: Chest pain or pressure    Negative: Patient sounds very sick or weak to the triager    Negative: MILD difficulty breathing (e.g., minimal/no SOB at rest, SOB with walking, pulse <100)    Negative: Fever > 103 F (39.4 C)    Negative: [1] Fever > 101 F (38.3 C) AND [2] over 60 years of age    Negative: [1] Fever > 100.0 F (37.8 C) AND [2] bedridden (e.g., nursing home patient, CVA, chronic illness, recovering from surgery)    Negative: HIGH RISK for severe COVID complications (e.g., weak immune system, age > 64 years, obesity with BMI > 25, pregnant, chronic lung disease or other chronic medical condition) (Exception: Already seen by PCP and no new or worsening symptoms.)    Negative: [1] HIGH RISK patient AND [2] influenza is widespread in the community AND [3] ONE OR MORE respiratory symptoms: cough, sore throat, runny or stuffy nose    Negative: [1] HIGH RISK patient AND [2] influenza exposure within the last 7 days AND [3] ONE OR MORE respiratory symptoms: cough, sore throat, runny or stuffy nose    Negative: Oxygen level (e.g., pulse oximetry) 91 to 94 percent    Negative: [1] COVID-19 infection suspected by caller or triager AND [2] mild symptoms (cough, fever, or others) AND [3] negative COVID-19 rapid test    Negative: Fever present > 3 days (72 hours)    Negative: [1] Fever returns after gone for over 24 hours AND [2] symptoms worse or not improved    Negative: [1] Continuous (nonstop) coughing interferes with work or school AND [2] no improvement using cough treatment per Care Advice    Negative: Cough present > 3 weeks    Negative: [1] COVID-19 diagnosed by positive lab test (e.g., PCR, rapid self-test kit) AND [2] NO symptoms (e.g., cough, fever, others)    Negative: [1] COVID-19 diagnosed by positive lab test (e.g., PCR, rapid self-test kit) AND [2] mild symptoms  (e.g., cough, fever, others) AND [3] no complications or SOB    Negative: [1] COVID-19 diagnosed by doctor (or NP/PA) AND [2] mild symptoms (e.g., cough, fever, others) AND [3] no complications or SOB    Negative: [1] COVID-19 diagnosed AND [2] has mild nausea, vomiting or diarrhea    Negative: [1] COVID-19 infection suspected by caller or triager AND [2] mild symptoms (cough, fever, or others) AND [3] has not gotten tested yet    Negative: COVID-19 Home Isolation, questions about    Negative: COVID-19 Testing, questions about    Negative: COVID-19 Prevention and Healthy Living, questions about    Protocols used: CORONAVIRUS (COVID-19) DIAGNOSED OR [id number] 1.18.2022

## 2022-09-01 ENCOUNTER — TELEPHONE (OUTPATIENT)
Dept: OPHTHALMOLOGY | Facility: CLINIC | Age: 72
End: 2022-09-01

## 2022-09-01 ENCOUNTER — HOSPITAL ENCOUNTER (OUTPATIENT)
Facility: AMBULATORY SURGERY CENTER | Age: 72
End: 2022-09-01
Attending: OPHTHALMOLOGY
Payer: COMMERCIAL

## 2022-09-01 NOTE — TELEPHONE ENCOUNTER
I called patient to schedule surgery with Dr. Rick Whaley, I left a voicemail with callback # [phone]

## 2022-09-09 ENCOUNTER — ANCILLARY PROCEDURE (OUTPATIENT)
Dept: GENERAL RADIOLOGY | Facility: CLINIC | Age: 72
End: 2022-09-09
Attending: PEDIATRICS
Payer: COMMERCIAL

## 2022-09-09 ENCOUNTER — OFFICE VISIT (OUTPATIENT)
Dept: ORTHOPEDICS | Facility: CLINIC | Age: 72
End: 2022-09-09

## 2022-09-09 VITALS
SYSTOLIC BLOOD PRESSURE: 116 MMHG | DIASTOLIC BLOOD PRESSURE: 64 MMHG | HEIGHT: 64 IN | BODY MASS INDEX: 29.71 KG/M2 | WEIGHT: 174 LBS

## 2022-09-09 DIAGNOSIS — G89.29 CHRONIC BILATERAL LOW BACK PAIN, UNSPECIFIED WHETHER SCIATICA PRESENT: ICD-10-CM

## 2022-09-09 DIAGNOSIS — M54.41 CHRONIC BILATERAL LOW BACK PAIN WITH RIGHT-SIDED SCIATICA: Primary | ICD-10-CM

## 2022-09-09 DIAGNOSIS — G89.29 CHRONIC BILATERAL LOW BACK PAIN WITH RIGHT-SIDED SCIATICA: Primary | ICD-10-CM

## 2022-09-09 DIAGNOSIS — M54.50 CHRONIC BILATERAL LOW BACK PAIN, UNSPECIFIED WHETHER SCIATICA PRESENT: ICD-10-CM

## 2022-09-09 PROCEDURE — 99204 OFFICE O/P NEW MOD 45 MIN: CPT | Performed by: PEDIATRICS

## 2022-09-09 PROCEDURE — 72100 X-RAY EXAM L-S SPINE 2/3 VWS: CPT | Mod: TC | Performed by: RADIOLOGY

## 2022-09-09 RX ORDER — PREDNISONE 20 MG/1
20 TABLET ORAL 2 TIMES DAILY
Qty: 10 TABLET | Refills: 0 | Status: SHIPPED | OUTPATIENT
Start: 2022-09-09 | End: 2022-09-14

## 2022-09-09 NOTE — PATIENT INSTRUCTIONS
We discussed the low back pain, with radiation to the right leg.  Consistent with sciatica, or lumbar radiculopathy.  X-rays from today demonstrate once again prominent underlying degenerative change in the low back.  We also reviewed the previous lumbar MRI from December 2020, demonstrating multilevel degenerative change.  Reviewed options with imaging, therapy, injections, medications, chiropractic care, and returning to see a surgeon.  Following discussion, plan trial of short course of an oral steroid.  Prednisone prescription placed.  Take with food.  Plan to monitor 7 to 10 days to start.  Keep up with home exercises from previous therapy in the meantime.  If interested in return to formal physical therapy, contact clinic.  If any worsening or changing symptoms, we may consider updating an MRI.      If you have any further questions for your physician or physician s care team you can call [phone] and use option 3 to leave a voice message. Calls received during business hours will be returned same day.

## 2022-09-09 NOTE — LETTER
9/9/2022         RE: Vishnu Viveros  [address]  [place] [address]        Dear Colleague,    Thank you for referring your patient, Vishnu Viveros, to the [facility]. Please see a copy of my visit note below.    ASSESSMENT & PLAN    Vishnu was seen today for pain.    Diagnoses and all orders for this visit:    Chronic bilateral low back pain with right-sided sciatica  -     XR Lumbar Spine 2/3 Views; Future  -     predniSONE (DELTASONE) 20 MG tablet; Take 1 tablet (20 mg) by mouth 2 times daily for 5 days        See Patient Instructions  Patient Instructions   We discussed the low back pain, with radiation to the right leg.  Consistent with sciatica, or lumbar radiculopathy.  X-rays from today demonstrate once again prominent underlying degenerative change in the low back.  We also reviewed the previous lumbar MRI from December 2020, demonstrating multilevel degenerative change.  Reviewed options with imaging, therapy, injections, medications, chiropractic care, and returning to see a surgeon.  Following discussion, plan trial of short course of an oral steroid.  Prednisone prescription placed.  Take with food.  Plan to monitor 7 to 10 days to start.  Keep up with home exercises from previous therapy in the meantime.  If interested in return to formal physical therapy, contact clinic.  If any worsening or changing symptoms, we may consider updating an MRI.      If you have any further questions for your physician or physician s care team you can call [phone] and use option 3 to leave a voice message. Calls received during business hours will be returned same day.        Elmer Vickers DO  [facility]    -----  Chief Complaint   Patient presents with     Lower Back - Pain       SUBJECTIVE  Vishnu Viveros is a/an 72 year old male who is seen as a self referral for evaluation of bilateral low back and leg  pain.  Pain is in low back into lateral hips and down to his feet.  Does have numbness and tingling as well.  States he has been unable to get sleep due to not being able to get comfortable.     States he has a long history of low back pain.  Current pain has been present for 2 days.  Difficulty sleep and pain with bending over.  Feels pain in right leg is currently worse than the left at the moment.        Has done PT in the past as well as chiropractic care.  States he has pain medication for flares of pain.     Chart review shows patient has seen Neurosurgery in the past.  12/24/20    Feels he becomes more short of breath if he has to bend over currently.    Is on gabapentin, he is unsure if it is helpful as he was not told what is was for.      The patient is seen by themselves.      Onset: 2 days on chronic . Reports insidious onset without acute precipitating event.  Location of Pain: bilateral low back and legs.    Worsened by: bending, lying down, lifting   Better with: postureal training, sitting   Treatments tried: no treatment tried to date for current pain   Associated symptoms: swelling, tingling     Orthopedic/Surgical history: yes; see HPI and below  Social History/Occupation:           **  Virtual visit with neurosurgery, Dr Baldwin, 12/24/2020.  Per that note, patient was referred to physical therapy.  Apparently he did not want to consider surgery at that time, though per the note indicated Dr Baldwin thought that that would be needed.  There was discussion of an injection with temporary relief, and another ARYA was ordered.      **  Past couple days notes pain in low back, radiating to lateral hip area. Pain is more on right. Difficult to lie on side.  From there, pain radiates to right LE. Notes tingling right LE.  Previously tingling in left LE.    Right LE symptoms past ankle.  Some weakness in LE. Normally left LE a little weaker than right, but now is more on right.        REVIEW OF  "SYSTEMS:  Review of Systems      OBJECTIVE:  /64   Ht 1.62 m (5' 3.78\")   Wt 78.9 kg (174 lb)   BMI 30.07 kg/m     General: healthy, alert and in no distress  HEENT: no scleral icterus or conjunctival erythema  Skin: no suspicious lesions or rash. No jaundice.  CV: distal perfusion intact   Resp: normal respiratory effort without conversational dyspnea   Psych: normal mood and affect  Gait: ambulates independently  Neuro: Normal tone    Low back exam:    ROM:     Flexion hands proximal lower legs, some back tightness  Extension limited with tightness, pain    Strength: right min diminished hip flexion, knee extension, ankle DF     Able to rise on heels and on toes    Special tests:      slump test low back pain, some radiation              RADIOLOGY:  I independently ordered, visualized and reviewed these images with the patient  Multilevel degenerative change.      XR Lumbar Spine 2/3 Views    Narrative    LUMBAR SPINE TWO TO THREE VIEWS   9/9/2022 11:40 AM     HISTORY: Chronic bilateral low back pain, unspecified whether sciatica  present.    COMPARISON: X-rays 12/19/2020.      Impression    IMPRESSION: No fracture is identified. Multilevel severe degenerative  disc disease throughout the lumbar spine. Multilevel moderate to  severe facet arthropathy. Multilevel grade 1 spondylolisthesis. Slight  levoconvex curvature. Bilateral sacroiliac joint degenerative change.     AGUSTIN LOVE MD         SYSTEM ID:  [id number]       Previous imaging visualized/reviewed:    4 views lumbar spine radiographs 12/19/2020 2:51 PM     History: low back pain, please obtain standing AP/lateral/flex/ext  xrays, include femoral heads on standing neutral lateral; Lumbar  stenosis with neurogenic claudication     Comparison: MR from the same date and 8/8/2017, radiographs 5/17/2006     Findings:     Standing  AP, lateral including flexion extension views of the lumbar  spine were obtained.     In keeping with numbering " convention of the prior study, 5 lumbar type  vertebral bodies with hypoplastic T12 ribs, sacralized L5 with  rudimentary disc space at L5-S1 are assumed for the purpose of this  dictation.     There is no acute osseous abnormality.       Intervertebral disc fusion across L4-L5. Multi-level degenerative  changes greatest at L3-L4 with severe disc space loss. Additionally,  moderate to severe disc space loss especially posteriorly from L1 to  L3 are also present. Lower lumbar spine predominant facet  arthropathies. Hypertrophic changes of spinous processes.     Mild anterior wedging of T12 vertebral body, similar to prior.     On the flexion/extension views, 9 mm anterolisthesis of L3 on L4  reduces to 6 mm.     Vascular calcifications. The visualized bowel gas pattern is  non-obstructive. Degenerative changes of bilateral hips with prominent  lateral acetabular osteophytes. Degenerative changes of sacrococcygeal  junction.                                                                      IMPRESSION:  In keeping with numbering convention of the prior study, 5 lumbar type  vertebral bodies with hypoplastic T12 ribs, sacralized L5 with  rudimentary disc space at L5-S1 are assumed for the purpose of this  dictation.  1. Severe degenerative disc changes at L3-L4 with grade 1  anterolisthesis with approximately 3 mm motion between flexion and  extension.     JORGE GROSS          MR LUMBAR SPINE W/O CONTRAST 12/19/2020 12:59 PM     Provided History: L/S-spine stenosis; Lumbar stenosis with neurogenic  claudication     ICD-10: Lumbar stenosis with neurogenic claudication     Comparison: Lumbar spine MRI from 8/8/2017 and PET CT from 3/9/2019.     Technique: Sagittal T1-weighted, sagittal STIR, 3D volumetric axial  and sagittal reconstructed T2-weighted images of the lumbar spine were  obtained without intravenous contrast.      Findings: There are 4 lumbar-type vertebrae. Lumbosacral transitional  vertebra with  sacralized L5.The tip of the conus medullaris is at  T12-L1.  Grade 1 anterolisthesis of L3 over L4, similar to prior  study. Fusion of L4-5 and partial fusion of L5-S1. Multilevel disc  height narrowing, endplate degenerative changes and disc desiccation.  Schmorl's node-like deformity at L1 and L3 inferior endplate. Cauda  equina nerve roots are tangled secondary to severe spinal canal  stenosis at L3-4.     On a level by level basis:     T12-L1: No significant spinal canal or neuroforaminal stenosis.     L1-2: Disc bulge, bilateral significant facet arthropathy and  ligamentum flavum thickening, greater on the right. Moderate left,  moderate to severe right neural foraminal stenosis and severe spinal  canal stenosis.     L2-3: Posterior disc bulge. Bilateral facet arthropathy and ligamentum  flavum thickening. Bilateral mild-to-moderate neural foraminal  stenosis. Moderate spinal canal stenosis.     L3-4: Grade 1 anterolisthesis. Unroofing of the disc. Posterior disc  bulge. Bilateral marked facet arthropathy and ligamentum flavum  thickening. Severe spinal canal stenosis. Bilateral moderate severe  neural foraminal stenosis.     L4-5: Fusion. Mild left and moderate right neural foraminal stenosis.  Mild spinal canal stenosis.     L5-S1: Bilateral facet arthropathy and ligamentum flavum thickening.  No significant spinal canal or neuroforaminal stenosis.     Visualized retroperitoneal structures are grossly unremarkable.                                                                      Impression: Lumbosacral transitional vertebra with sacralized L5.      Multilevel lumbar spondylosis as above. Most notably, there is severe  spinal canal stenosis at L1-2 and L3-4, worse at L3-4. Advanced neural  foraminal stenosis at L1-2 and L3-4 as above. Overall findings are  slightly worsened compared to 8/8/2017 MRI.                    I have personally reviewed the examination and initial interpretation  and I agree with  the findings.     CAN MD SUKI        Review of prior external note(s) from - neurosurg  Review of the result(s) of each unique test - imaging  Independent interpretation of a test performed by another physician/other qualified health care professional (not separately reported) - imaging          Again, thank you for allowing me to participate in the care of your patient.        Sincerely,        Elmer Vickers, DO

## 2022-09-09 NOTE — PROGRESS NOTES
ASSESSMENT & PLAN    Vishnu was seen today for pain.    Diagnoses and all orders for this visit:    Chronic bilateral low back pain with right-sided sciatica  -     XR Lumbar Spine 2/3 Views; Future  -     predniSONE (DELTASONE) 20 MG tablet; Take 1 tablet (20 mg) by mouth 2 times daily for 5 days        See Patient Instructions  Patient Instructions   We discussed the low back pain, with radiation to the right leg.  Consistent with sciatica, or lumbar radiculopathy.  X-rays from today demonstrate once again prominent underlying degenerative change in the low back.  We also reviewed the previous lumbar MRI from December 2020, demonstrating multilevel degenerative change.  Reviewed options with imaging, therapy, injections, medications, chiropractic care, and returning to see a surgeon.  Following discussion, plan trial of short course of an oral steroid.  Prednisone prescription placed.  Take with food.  Plan to monitor 7 to 10 days to start.  Keep up with home exercises from previous therapy in the meantime.  If interested in return to formal physical therapy, contact clinic.  If any worsening or changing symptoms, we may consider updating an MRI.      If you have any further questions for your physician or physician s care team you can call [phone] and use option 3 to leave a voice message. Calls received during business hours will be returned same day.        Elmer Vickers [facility]    -----  Chief Complaint   Patient presents with     Lower Back - Pain       SUBJECTIVE  Vishnu Viveros is a/an 72 year old male who is seen as a self referral for evaluation of bilateral low back and leg pain.  Pain is in low back into lateral hips and down to his feet.  Does have numbness and tingling as well.  States he has been unable to get sleep due to not being able to get comfortable.     States he has a long history of low back pain.  Current pain has been present  "for 2 days.  Difficulty sleep and pain with bending over.  Feels pain in right leg is currently worse than the left at the moment.        Has done PT in the past as well as chiropractic care.  States he has pain medication for flares of pain.     Chart review shows patient has seen Neurosurgery in the past.  12/24/20    Feels he becomes more short of breath if he has to bend over currently.    Is on gabapentin, he is unsure if it is helpful as he was not told what is was for.      The patient is seen by themselves.      Onset: 2 days on chronic . Reports insidious onset without acute precipitating event.  Location of Pain: bilateral low back and legs.    Worsened by: bending, lying down, lifting   Better with: postureal training, sitting   Treatments tried: no treatment tried to date for current pain   Associated symptoms: swelling, tingling     Orthopedic/Surgical history: yes; see HPI and below  Social History/Occupation:           **  Virtual visit with neurosurgery, Dr Baldwin, 12/24/2020.  Per that note, patient was referred to physical therapy.  Apparently he did not want to consider surgery at that time, though per the note indicated Dr Baldwin thought that that would be needed.  There was discussion of an injection with temporary relief, and another ARYA was ordered.      **  Past couple days notes pain in low back, radiating to lateral hip area. Pain is more on right. Difficult to lie on side.  From there, pain radiates to right LE. Notes tingling right LE.  Previously tingling in left LE.    Right LE symptoms past ankle.  Some weakness in LE. Normally left LE a little weaker than right, but now is more on right.        REVIEW OF SYSTEMS:  Review of Systems      OBJECTIVE:  /64   Ht 1.62 m (5' 3.78\")   Wt 78.9 kg (174 lb)   BMI 30.07 kg/m     General: healthy, alert and in no distress  HEENT: no scleral icterus or conjunctival erythema  Skin: no suspicious lesions or rash. No jaundice.  CV: distal " perfusion intact   Resp: normal respiratory effort without conversational dyspnea   Psych: normal mood and affect  Gait: ambulates independently  Neuro: Normal tone    Low back exam:    ROM:     Flexion hands proximal lower legs, some back tightness  Extension limited with tightness, pain    Strength: right min diminished hip flexion, knee extension, ankle DF     Able to rise on heels and on toes    Special tests:      slump test low back pain, some radiation              RADIOLOGY:  I independently ordered, visualized and reviewed these images with the patient  Multilevel degenerative change.      XR Lumbar Spine 2/3 Views    Narrative    LUMBAR SPINE TWO TO THREE VIEWS   9/9/2022 11:40 AM     HISTORY: Chronic bilateral low back pain, unspecified whether sciatica  present.    COMPARISON: X-rays 12/19/2020.      Impression    IMPRESSION: No fracture is identified. Multilevel severe degenerative  disc disease throughout the lumbar spine. Multilevel moderate to  severe facet arthropathy. Multilevel grade 1 spondylolisthesis. Slight  levoconvex curvature. Bilateral sacroiliac joint degenerative change.     AGUSTIN LOVE MD         SYSTEM ID:  [id number]       Previous imaging visualized/reviewed:    4 views lumbar spine radiographs 12/19/2020 2:51 PM     History: low back pain, please obtain standing AP/lateral/flex/ext  xrays, include femoral heads on standing neutral lateral; Lumbar  stenosis with neurogenic claudication     Comparison: MR from the same date and 8/8/2017, radiographs 5/17/2006     Findings:     Standing  AP, lateral including flexion extension views of the lumbar  spine were obtained.     In keeping with numbering convention of the prior study, 5 lumbar type  vertebral bodies with hypoplastic T12 ribs, sacralized L5 with  rudimentary disc space at L5-S1 are assumed for the purpose of this  dictation.     There is no acute osseous abnormality.       Intervertebral disc fusion across L4-L5.  Multi-level degenerative  changes greatest at L3-L4 with severe disc space loss. Additionally,  moderate to severe disc space loss especially posteriorly from L1 to  L3 are also present. Lower lumbar spine predominant facet  arthropathies. Hypertrophic changes of spinous processes.     Mild anterior wedging of T12 vertebral body, similar to prior.     On the flexion/extension views, 9 mm anterolisthesis of L3 on L4  reduces to 6 mm.     Vascular calcifications. The visualized bowel gas pattern is  non-obstructive. Degenerative changes of bilateral hips with prominent  lateral acetabular osteophytes. Degenerative changes of sacrococcygeal  junction.                                                                      IMPRESSION:  In keeping with numbering convention of the prior study, 5 lumbar type  vertebral bodies with hypoplastic T12 ribs, sacralized L5 with  rudimentary disc space at L5-S1 are assumed for the purpose of this  dictation.  1. Severe degenerative disc changes at L3-L4 with grade 1  anterolisthesis with approximately 3 mm motion between flexion and  extension.     JORGE GROSS          MR LUMBAR SPINE W/O CONTRAST 12/19/2020 12:59 PM     Provided History: L/S-spine stenosis; Lumbar stenosis with neurogenic  claudication     ICD-10: Lumbar stenosis with neurogenic claudication     Comparison: Lumbar spine MRI from 8/8/2017 and PET CT from 3/9/2019.     Technique: Sagittal T1-weighted, sagittal STIR, 3D volumetric axial  and sagittal reconstructed T2-weighted images of the lumbar spine were  obtained without intravenous contrast.      Findings: There are 4 lumbar-type vertebrae. Lumbosacral transitional  vertebra with sacralized L5.The tip of the conus medullaris is at  T12-L1.  Grade 1 anterolisthesis of L3 over L4, similar to prior  study. Fusion of L4-5 and partial fusion of L5-S1. Multilevel disc  height narrowing, endplate degenerative changes and disc desiccation.  Schmorl's node-like  deformity at L1 and L3 inferior endplate. Cauda  equina nerve roots are tangled secondary to severe spinal canal  stenosis at L3-4.     On a level by level basis:     T12-L1: No significant spinal canal or neuroforaminal stenosis.     L1-2: Disc bulge, bilateral significant facet arthropathy and  ligamentum flavum thickening, greater on the right. Moderate left,  moderate to severe right neural foraminal stenosis and severe spinal  canal stenosis.     L2-3: Posterior disc bulge. Bilateral facet arthropathy and ligamentum  flavum thickening. Bilateral mild-to-moderate neural foraminal  stenosis. Moderate spinal canal stenosis.     L3-4: Grade 1 anterolisthesis. Unroofing of the disc. Posterior disc  bulge. Bilateral marked facet arthropathy and ligamentum flavum  thickening. Severe spinal canal stenosis. Bilateral moderate severe  neural foraminal stenosis.     L4-5: Fusion. Mild left and moderate right neural foraminal stenosis.  Mild spinal canal stenosis.     L5-S1: Bilateral facet arthropathy and ligamentum flavum thickening.  No significant spinal canal or neuroforaminal stenosis.     Visualized retroperitoneal structures are grossly unremarkable.                                                                      Impression: Lumbosacral transitional vertebra with sacralized L5.      Multilevel lumbar spondylosis as above. Most notably, there is severe  spinal canal stenosis at L1-2 and L3-4, worse at L3-4. Advanced neural  foraminal stenosis at L1-2 and L3-4 as above. Overall findings are  slightly worsened compared to 8/8/2017 MRI.                    I have personally reviewed the examination and initial interpretation  and I agree with the findings.     RAFIA COHN MD        Review of prior external note(s) from - neurosurg  Review of the result(s) of each unique test - imaging  Independent interpretation of a test performed by another physician/other qualified health care professional (not separately  reported) - imaging

## 2022-09-19 ENCOUNTER — OFFICE VISIT (OUTPATIENT)
Dept: FAMILY MEDICINE | Facility: CLINIC | Age: 72
End: 2022-09-19
Payer: COMMERCIAL

## 2022-09-19 VITALS
SYSTOLIC BLOOD PRESSURE: 102 MMHG | OXYGEN SATURATION: 97 % | DIASTOLIC BLOOD PRESSURE: 67 MMHG | RESPIRATION RATE: 16 BRPM | WEIGHT: 171.7 LBS | BODY MASS INDEX: 29.31 KG/M2 | HEIGHT: 64 IN | HEART RATE: 70 BPM

## 2022-09-19 DIAGNOSIS — M54.2 NECK PAIN: Primary | ICD-10-CM

## 2022-09-19 DIAGNOSIS — C34.91 ADENOCARCINOMA OF LUNG, STAGE 1, RIGHT (H): ICD-10-CM

## 2022-09-19 DIAGNOSIS — E08.42 DIABETIC POLYNEUROPATHY ASSOCIATED WITH DIABETES MELLITUS DUE TO UNDERLYING CONDITION (H): ICD-10-CM

## 2022-09-19 DIAGNOSIS — M54.2 NECK PAIN ON RIGHT SIDE: ICD-10-CM

## 2022-09-19 DIAGNOSIS — M47.892 OTHER OSTEOARTHRITIS OF SPINE, CERVICAL REGION: ICD-10-CM

## 2022-09-19 PROCEDURE — 99214 OFFICE O/P EST MOD 30 MIN: CPT | Performed by: FAMILY MEDICINE

## 2022-09-19 RX ORDER — CYCLOBENZAPRINE HCL 10 MG
10 TABLET ORAL 2 TIMES DAILY PRN
Qty: 30 TABLET | Refills: 0 | Status: SHIPPED | OUTPATIENT
Start: 2022-09-19 | End: 2023-02-08

## 2022-09-19 NOTE — PATIENT INSTRUCTIONS
Consider getting Covid booster bivalant and influenza vaccine through local pharmacy  but wait until neck pain better and after the images    To schedule your appointment with imaging, please call [phone].

## 2022-09-19 NOTE — PROGRESS NOTES
"  Assessment & Plan     Neck painOther osteoarthritis of spine, cervical regionNeck pain on right side  History of adenocarcinoma stage I resection of the right lung 2019.  During evaluation CT soft tissue noted possible abnormality requiring ENT direct visualization.  Subsequent CT scans have been stable postsurgical resection of lung cancer.  Patient is unaware if there was direct visualization of the lesion noted on CT soft tissue.  He presents today with pain on the right posterior neck and sometimes pain if he presses on the right mid neck area of location of pain is noted to be carotid artery sensitive to touch and I note no masses on physical exam however for clarification we will start with CT soft tissue neck to determine if ENT referral required for direct visualization.  Today I think most of his symptoms are posterior neck related with possible history of degenerative joint disease in the cervical spine we will proceed with MR cervical spine.  Also ordered physical therapy and recommended taking Flexeril 10 mg at bedtime to help with muscle spasm may take twice daily but must not drive after muscle relaxant dose.  He works from home several days of the week.  May also use topical Voltaren gel to apply to area of neck pain  - cyclobenzaprine (FLEXERIL) 10 MG tablet  Dispense: 30 tablet; Refill: 0  - Physical Therapy Referral  - MR CERVICAL SPINE W/O & W CONTRAST  - CT Soft Tissue Neck w Contrast  - diclofenac (VOLTAREN) 1 % topical gel  Dispense: 100 g; Refill: 3    Adenocarcinoma of lung, stage 1, right (H)  Recent stable CT lung reviewed January 2022, no respiratory concerns at this time.    30nminutes spent on the date of the encounter doing chart review, history, exam, diagnostics review, documentation, counseling and coordination of cares as noted.             BMI:   Estimated body mass index is 29.47 kg/m  as calculated from the following:    Height as of this encounter: 1.626 m (5' 4\").    Weight " as of this encounter: 77.9 kg (171 lb 11.2 oz).       Patient Instructions   Consider getting Covid booster bivalant and influenza vaccine through local pharmacy  but wait until neck pain better and after the images    To schedule your appointment with imaging, please call [phone].        Return in about 12 weeks (around 12/12/2022).   Already scheduled  Murali Logan MD  [facility] KENA Mares is a 72 year old, presenting for the following health issues:  Referral and Neck Pain (He first noticed the neck pain three or four days ago. He reports that it usually comes and goes. Icy-hot seems to help in the night but still present when he wakes up. He endorses a sharp 8/10 pain in his neck with movement. )      ROSAURA Viveros is a 72 year old with a history of type 2 diabetes mellitus controlled, hyperlipidemia, hypertension, colon polyps, DJD with spinal stenosis back, elevated PSA, and stage 1 right adenocarcinoma of the lung s/p resection with clear margins 2019 who presents  for acute onset of right-sided posterior neck pain and pain on the right side of his mid neck.  Neck pain on Right  Pain on the right neck for 3-4 days, no known injury, pain on pressure to the right neck. No trouble swallowing, no fever or chills.  Denies sore throat denies ear pain.  He has not been lifting anything heavy because he has chronic lower back problems and avoids heavy lifting.  Does not know his grass anymore due to physical limitations.  He works from home 3 days a week and works in person 2 days a week.  He mentions concern for neck tenderness noted 3 years ago in 2019 around the time he was diagnosed with right-sided lung cancer he had a PET scan with possible abnormalities noted on CT soft tissue.  He was told to follow-up with ear nose and throat (ENT) at that time indicates they mostly focused on his hearing and he is uncertain if they did further  evaluation of possible abnormality noted on CT soft tissue neck.  He is a slightly tangential historian and most of the information was clarified after review of the chart from 2019 noting PET scan and CT soft tissue see below.  He proceeded with further evaluation at [facility] per his choice for the resection of his lung abnormality which was found to be right adenocarcinoma stage I fully excised.  He is currently following at [facility] for his lung cancer screening most recent CT January 2022 has been stable following for annual CT scans.        Labs reviewed in EPIC  BP Readings from Last 3 Encounters:   09/19/22 102/67   09/09/22 116/64   06/13/22 121/63    Wt Readings from Last 3 Encounters:   09/19/22 77.9 kg (171 lb 11.2 oz)   09/09/22 78.9 kg (174 lb)   06/13/22 78 kg (172 lb)            Immunization History   Administered Date(s) Administered     COVID-19,PF,Moderna 02/10/2021, 03/10/2021, 11/04/2021     COVID-19,PF,Pfizer (12+ Yrs) 05/07/2022     FLU 6-35 months 10/14/2010     Flu, Unspecified 11/06/2009     Influenza (H1N1) 02/02/2010     Influenza (High Dose) 3 valent vaccine 10/30/2014, 11/19/2015, 11/28/2016, 11/27/2017, 10/31/2018, 12/11/2019     Influenza (IIV3) PF 12/30/2005, 12/05/2006, 12/11/2007, 11/04/2008, 11/06/2009, 10/14/2010, 11/14/2011, 11/12/2012, 11/14/2013     Influenza, Quad, High Dose, Pf, 65yr+ (Fluzone HD) 11/16/2020, 12/06/2021     Pneumo Conj 13-V (2010&after) 10/30/2014     Pneumococcal 23 valent 11/19/2015     TD (ADULT, 7+) 02/12/2007     TDAP Vaccine (Boostrix) 07/31/2017     Td (Adult), Adsorbed 02/12/2007     Tdap (Adacel,Boostrix) 07/31/2017     Zoster vaccine recombinant adjuvanted (SHINGRIX) 12/11/2019, 08/26/2020           Patient Active Problem List   Diagnosis     Erectile dysfunction     Seasonal allergies     Vitamin D deficiency     Pulmonary nodule     Prostate nodule     Chronic low back pain     Degenerative disc disease, lumbar     Back pain with  radiation     DJD (degenerative joint disease) of knee     HL (hearing loss)     Vision impairment     Tear film insufficiency     Recurrent pterygium     Senile nuclear sclerosis     Type 2 diabetes mellitus treated without insulin (H)     Diabetic polyneuropathy associated with diabetes mellitus due to underlying condition (H)     Tubulovillous adenoma of colon     Lumbar radiculopathy     Adenocarcinoma of lung, stage 1, right (H)     Benign essential hypertension     History of colonic polyps     Spinal stenosis, lumbar region, with neurogenic claudication     Elevated prostate specific antigen (PSA)     Obesity with body mass index 30 or greater     Solitary pulmonary nodule     Evaluation of hearing impairment     Combined forms of age-related cataract of both eyes     Balance problems     Past Surgical History:   Procedure Laterality Date     APPENDECTOMY OPEN CHILD       ARTHROSCOPY KNEE Left      BIOPSY PROSTATE TRANSRECTAL       EXCISE PTERYGIUM Right 1989     EXCISE PTERYGIUM Right 2/12/2015     INJECT EPIDURAL LUMBAR / SACRAL SINGLE N/A 7/21/2016    Procedure: INJECT EPIDURAL LUMBAR / SACRAL SINGLE;  Surgeon: Judah Henriquez MD;  Location: [facility] OR     INJECT PARAVERTEBRAL FACET JOINT LUMBAR / SACRAL FIRST Right 8/18/2016    Procedure: INJECT PARAVERTEBRAL FACET JOINT LUMBAR / SACRAL FIRST;  Surgeon: Judah Henriquez MD;  Location: [facility] OR     INJECT PARAVERTEBRAL FACET JOINT LUMBAR / SACRAL FIRST Right 9/8/2016    Procedure: INJECT PARAVERTEBRAL FACET JOINT LUMBAR / SACRAL FIRST;  Surgeon: Judah Henriquez MD;  Location: [facility] OR     MEDIASTINOSCOPY Right 04/08/2019     [place]  Dr Hemanth C[place] 4/8/19 bronchoscopy, cervical mediastinoscopy for staging and minimally invasive right thoracoscopic wedge resection 1.8 cm invasive adenocarcinoma [id number] stage 1A2 adenocarcinoma completely resected via minimally invasive right upper lobectomy.     PUNCTALPLASTY Bilateral 12/19/2018    Procedure: Bilateral  Punctoplasty with Mini [place] Stent;  Surgeon: Levy Blue MD;  Location: [facility] OR     REPAIR ECTROPION BILATERAL Bilateral 2018    Procedure: Bilateral Lower Lid Ectropion Repair with Right Lower Lid Lesion Excision;  Surgeon: Levy Blue MD;  Location: [facility] OR       Social History     Tobacco Use     Smoking status: Former Smoker     Years: 5.00     Types: Cigarettes     Quit date: 1981     Years since quittin.8     Smokeless tobacco: Never Used   Substance Use Topics     Alcohol use: Yes     Alcohol/week: 2.5 standard drinks     Types: 3 Standard drinks or equivalent per week     Family History   Problem Relation Age of Onset     Cerebrovascular Disease Brother          stroke      Breast Cancer Sister      Kidney Disease Mother         Kidney stones     Other - See Comments Father          in carmit[place] parkinsons or post surgical eye surgery     Cerebral aneurysm Brother      Hypothyroidism Sister      Glaucoma No family hx of      Macular Degeneration No family hx of          Current Outpatient Medications   Medication Sig Dispense Refill     acetaminophen (TYLENOL) 500 MG tablet Take 1-2 tablets (500-1,000 mg) by mouth every 6 hours as needed for mild pain 100 tablet 3     acetaminophen (TYLENOL) 500 MG tablet Take 1-2 tablets (500-1,000 mg) by mouth every 6 hours as needed for pain (arthritis) 120 tablet 3     Ascorbic Acid (VITAMIN C PO) Take 500 mg by mouth once       atorvastatin (LIPITOR) 20 MG tablet Take 1 tablet (20 mg) by mouth daily 90 tablet 3     chlorthalidone (HYGROTON) 25 MG tablet Take 1 tablet (25 mg) by mouth daily 90 tablet 3     Cholecalciferol (VITAMIN D) 1000 UNIT capsule Take 1 capsule by mouth daily.       Cyanocobalamin (VITAMIN B12 PO) Take 1 tablet by mouth Pt states he takes this 2 or 3 times a week.       finasteride (PROSCAR) 5 MG tablet Take 1 tablet (5 mg) by mouth daily 90 tablet 3     gabapentin (NEURONTIN) 300 MG capsule One capsule at bedtime  "90 capsule 3     loratadine (CLARITIN) 10 MG tablet Take 1 tablet (10 mg) by mouth daily as needed for allergies 90 tablet 1     losartan (COZAAR) 100 MG tablet Take 1 tablet (100 mg) by mouth every evening 90 tablet 3     melatonin 3 MG tablet Take 1 tablet (3 mg) by mouth nightly as needed for sleep (may take 1-2 if needed) 60 tablet 3     metFORMIN (GLUCOPHAGE XR) 500 MG 24 hr tablet Take 1 tablet (500 mg) by mouth daily (with dinner) 90 tablet 3     Multiple Vitamin (MULTIVITAMINS PO) Take 1 tablet by mouth daily.       nystatin (MYCOSTATIN) 964986 UNIT/GM external cream Apply topically 2 times daily to affected area. 30 g 4     Omega-3 Fatty Acids (FISH OIL PO) Take 1 capsule by mouth as needed       potassium chloride ER (KLOR-CON M) 20 MEQ CR tablet Take 1 tablet (20 mEq) by mouth daily 90 tablet 3     tamsulosin (FLOMAX) 0.4 MG capsule Take 1 capsule (0.4 mg) by mouth daily 90 capsule 3     bisacodyl (DULCOLAX) 5 MG EC tablet Take 1 tablet (5 mg) by mouth See Admin Instructions Refer to My chart message (Patient not taking: Reported on 9/19/2022) 4 tablet 0     diclofenac (VOLTAREN) 1 % topical gel Apply 2 g topically 4 times daily (Patient not taking: Reported on 9/19/2022) 100 g 3     order for DME Back brace 1 Device 0     polyethylene glycol (GOLYTELY) 236 g suspension Take 4,000 mLs by mouth See Admin Instructions Refer to My chart message (Patient not taking: Reported on 9/19/2022) 4000 mL 0     tadalafil (CIALIS) 20 MG tablet Take 1 tablet (20 mg) by mouth every 48 hours as needed (sex) (Patient not taking: Reported on 9/19/2022) 30 tablet 3     Allergies   Allergen Reactions     Pollen Extract      Other reaction(s): Headache  Sinus congestion, sinus \"drainage\", sneezing     Seasonal Allergies      Sinus congestion, sinus \"drainage\", sneezing     Nkda [No Known Drug Allergies]      Recent Labs   Lab Test 06/13/22  1550 01/24/22  1145 12/06/21  0947 06/07/21  1044 11/16/20  1026 05/27/20  0957 " "10/26/19  1040 11/27/17  1107 07/31/17  1031   A1C 5.8*  --  5.9* 5.8* 5.8*   < > 6.2*   < > 6.3*   LDL  --   --  47  --  39  --  58   < > 52   HDL  --   --  59  --  64  --  49   < > 60   TRIG  --   --  94  --  116  --  91   < > 54   ALT 28  --  27  --  27   < > 26   < > 29   CR 1.02 0.9 1.01 0.95 0.93   < > 0.96   < > 0.79   GFRESTIMATED 78 >60 74 80 82   < > 79   < > >90  Non  GFR Calc     GFRESTBLACK  --   --   --  >90 >90   < > >90   < > >90  African American GFR Calc     POTASSIUM 4.0  --  4.0 3.7 3.5   < > 3.8   < > 4.2   TSH  --   --   --   --   --   --  1.56  --  1.67    < > = values in this interval not displayed.      Review of Systems   Problem list, PMH, Surgical HX, FH, SH, allergies, medications,immunizations reviewed and updated in Epic. 10 point ROS negative other than noted in HPI and ROS.        Objective    /67 (BP Location: Right arm, Patient Position: Sitting, Cuff Size: Adult Regular)   Pulse 70   Resp 16   Ht 1.626 m (5' 4\")   Wt 77.9 kg (171 lb 11.2 oz)   SpO2 97%   BMI 29.47 kg/m    Body mass index is 29.47 kg/m .  Physical Exam     GENERAL APPEARANCE: healthy, alert and no distress BMI  29.47 weight stable  EYES: Eyes grossly normal to inspection, PERRL and conjunctivae and sclerae normal  HENT: ear canals and TM's normal, mask removed breifly mouth without ulcers or lesions, oropharynx clear and oral mucous membranes moist, no thrush  NECK: no adenopathy, no asymmetry, masses, or scars and thyroid normal to palpation nontender to palpation in anterior right neck where he points to slight pain sometimes when he touches the area posterior sternocleidomastoid.  This is near his carotid artery.  Range of motion of neck limitation with extension and slight limitation with flexion limitation with looking to the right and slight limitation with looking to the left complains of pain on the right trapezius and lower C6-7 region with some tightness in the muscle on the " right trapezius.  See musculoskeletal  RESP: lungs clear to auscultation - no rales, rhonchi or wheezes  CV: regular rate and rhythm, normal S1 S2, no S3 or S4, no murmur, click or rub, no peripheral edema   Chest: well healed surgical scars from laparoscopic chest procedure  ABDOMEN: soft,obese, nontender, no hepatosplenomegaly, no masses and bowel sounds normal, well-healed abdominal scar  MS: Upper body strength intact normal musculature.  Range of motion of shoulder intact with slight pain with moving right arm and pain with neck motion see neck.  Kyphosis and antalgic gait with left leg weakness compared to right.  SKIN: no suspicious lesions or rashes, congenital nevi in upper back that has been stable  NEURO: Normal strength except noted see musculoskeletal, DTRs bilateral upper extremities symmetrical 2+ biceps triceps brachioradialis, mentation intact and speech normal  PSYCH: mentation appears normal, slightly difficult historian chart reviewed for clarification, appreciative of cares.        EXAMINATION: Chest CT  1/24/2022 12:00 PM     CLINICAL HISTORY: restaging, early stage NSCLC; Adenocarcinoma, lung,  right (H)     COMPARISON: Chest CT 1/26/2021, 6/3/2028, 1/4/2019, and 11/12/2012.  Whole body PET CT 3/9/2019.     TECHNIQUE: CT imaging obtained through the chest with contrast. Axial,  coronal, and sagittal reconstructions and axial MIP reformatted images  are reviewed.      CONTRAST: 91 mL Isovue-370 IV     FINDINGS:  Lungs: Postoperative changes of right upper lobectomy with unchanged  adjacent fibroglandular atelectasis versus scarring. No evidence for  local disease recurrence. The trachea and central airways are patent.  No pneumothorax or pleural effusion. No focal airspace opacity. No  suspicious pulmonary nodule. Calcified granulomas in the left lower  lobe.     Mediastinum: Subcentimeter hypodense nodules in the thyroid. The heart  size is within normal limits. No pericardial effusion. The  ascending  aorta and main pulmonary artery diameters are within normal limits. No  central pulmonary embolus. Normal appearance and configuration of the  great vessels off of the aortic arch. No suspicious mediastinal,  hilar, or axillary lymph nodes.     Bones and soft tissues: No suspicious bone findings. There is  scattered degenerative change throughout the spine.     Upper Abdomen: Unremarkable appearance of the adrenal glands.  Calcified granuloma in the right hepatic lobe. Stable too small to  characterize hypodensity in the right hepatic lobe near the  gallbladder fossa, likely a benign hepatic cyst dating back to 2012.  Simple right renal cyst. Mild atrophy of the pancreatic head region  with fatty replacement The remainder of the partially visualized upper  abdomen is unremarkable.                                                                      IMPRESSION:   Postoperative changes of right upper lobectomy with associated  scarring/atelectasis. No evidence for local disease recurrence or  metastatic disease in the chest.     I have personally reviewed the examination and initial interpretation  and I agree with the findings.     JOE CORTÉS MD         SYSTEM ID:  [id number]         Study Result    Narrative & Impression   PET CT fusion examination 3/9/2019 2:04 PM  1. Neck CT with contrast  2. PET study of the neck  3. PET CT fusion study of the neck     History:  68-year-old male with enlarging right upper lobe nodule  which has been followed since 2010..     Comparison: Chest CT 1/4/2019 and 11/12/2012     Technique: Please refer to the accompanying whole body PET-CT for  report of the dose and whole body PET-CT findings.  Regarding the neck, axial images were obtained after nonionic  intravenous contrast administration, with sagittal and coronal  reconstructions performed. Neck CT images were reviewed in bone, soft  tissue, and lung windows, with review of the fused PET-CT images as  well in  multiple planes.     Findings: Regarding the mucosal spaces, there is asymmetric metabolic  activity in the right lateral oropharyngeal wall  with max SUV of 8.4.  On CT there is very subtle subcentimeter mucosal protuberance (series  5, image 64).   There is slight asymmetric uptake of the left submandibular gland  compared with the right however on CT no abnormality is identified.     Evaluation of the mucosal space demonstrates no abnormality or  abnormal metabolic uptake on PET CT in the nasopharynx, hypopharynx or  the glottis. The tongue base appears normal. Parotid glands are  normal.. Regarding thyroid gland, there are multiple non-FDG avid  subcentimeter sized nodules.     There is no evident cervical lymphadenopathy, and the cervical lymph  nodes are within normal limits by size criteria. No abnormal metabolic  uptake on PET CT.      Limited evaluation of the cervical vertebra demonstrates degenerative  disc disease at C5 C7. Posterior lateral uncovertebral joint  hypertrophy resulting in bilateral moderate neural foraminal stenosis  and mild spinal canal narrowing C6-7. The visualized paranasal sinuses  and mastoid air cells are clear. The major vascular structures in the  neck are patent.     Limited evaluation of the intracranial structures demonstrates mild  generalized cerebral atrophy and periventricular hypoattenuation most  likely secondary to underlying chronic small as ischemic disease.  Gray-white matter differentiation is preserved. No space-occupying  lesion. No abnormal FDG uptake intracranially within limits of this  study. Calvarium is unremarkable. Normal orbits.     Please refer to the whole body PET CT performed as a separate report,  for the findings of the remainder of the body.                                                                        Impression:   1. Asymmetric hypermetabolic activity and corresponding subcentimeter  nodular density along the mucosal surface of the right  lateral  oropharyngeal wall. Recommend consultation to ENT for direct  visualization.   2. No cervical lymphadenopathy.   3. Please refer to the whole body PET CT performed as a separate  report, for the findings of the remainder of the body.  [Consider Follow Up: Right hypermetabolic/asymmetric lateral  oropharyngeal wall. Recommend consultation to ENT for direct  visualization. ]     This report will be copied to the [facility] to ensure a  provider acknowledges the finding.      I have personally reviewed the examination and initial interpretation  and I agree with the findings.     TITUS BAEZ MD  Narrative & Impression   Combined Report of:    PET and CT on  3/9/2019 12:39 PM :     1. PET of the neck, chest, abdomen, and pelvis.  2. PET CT Fusion for Attenuation Correction and Anatomical  Localization:    3. Diagnostic CT scan of the chest, abdomen, and pelvis with  intravenous contrast for interpretation.  3. CT of the chest, abdomen and pelvis obtained for diagnostic  interpretation.  4. 3D MIP and PET-CT fused images were processed on an independent  workstation and archived to PACS and reviewed by a radiologist.     Technique:     1. PET: The patient received 12.54 mCi of F-18-FDG; the serum glucose  was 104 prior to administration, body weight was 86 kg. Images were  evaluated in the axial, sagittal, and coronal planes as well as the  rotational whole body MIP. Images were acquired from the Vertex to the  Feet.     UPTAKE WAS MEASURED AT 62 MINUTES.      BACKGROUND:  Liver SUV max= 3.6,   Aorta Blood SUV Max: 3.3.      2. CT: Volumetric acquisition for clinical interpretation of the  chest, abdomen, and pelvis acquired at 3 mm sections . The chest,  abdomen, and pelvis were evaluated at 5 mm sections in bone, soft  tissue, and lung windows.       The patient received 116 cc. Of Isovue 370 intravenously and 500 cc of  negative oral contrast (Breeza)  for the examination.       3. 3D MIP and  PET-CT fused images were processed on an independent  workstation and archived to PACS and reviewed by a radiologist.     INDICATION: Lung nodule, growing solid nodule on follow up or annual  LDCT, >= 8 mm; Pulmonary nodules     History:  68-year-old male with enlarging right upper lobe nodule  which has been followed since 2010..     Comparison: Chest CT 1/4/2019 and 11/12/2012     FINDINGS:      HEAD/NECK: See dedicated neuroradiology report for the results of the  high resolution PET CT of the neck.      CHEST: 1.4 x 1.4 cm hypermetabolic right upper lobe nodule with max  SUV of 6.7. On CT again the nodule demonstrates spiculated contours  with extensions to the adjacent pleura. No other similar nodule in the  lung parenchyma.     Breath-hold sequence is obtained in the expiration phase, therefore  there is diffuse heterogeneous mosaic attenuation. Central airways are  grossly patent. Normal caliber of the main pulmonary artery and aortic  arch. Origin of the main neck arteries is patent.     No cardiomegaly. No pericardial or pleural effusion. No  enlarged or  metabolic lymph node in the mediastinum or axilla.     ABDOMEN AND PELVIS:  There is no suspicious FDG uptake in the abdomen or pelvis.     There are no suspicious hepatic lesions. No hepatomegaly. Non-FDG avid  stable segment 5 subcentimeter cyst. Patent hepatic vasculature.  Intra-/extrahepatic biliary ducts are not dilated. There is no  splenomegaly or evidence for splenic or pancreatic mass lesion. No  pancreatic ductal dilatation.  There are no suspicious adrenal mass lesions or opaque gallbladder  calculi. There is symmetric nephrographic renal phase without  hydronephrosis. Stable right renal cortical cysts.     Diffuse bladder wall thickening and accompanying markedly enlarged  prostate, likely from bladder outlet obstruction.  No para-aortic, pelvic or inguinal lymphadenopathy.     Abdominal aorta and iliac arteries are patent. The origin of  the  celiac trunk is relatively narrower than expected and there is  increased caliber in the more distal segments concerning for  poststenotic dilatation.     No free air or fluid in the abdominal cavity.     EXTREMITIES:   No abnormal masses or hypermetabolic lesions. Bilateral mild FDG  uptake in the glenohumeral joints with subchondral degenerative cyst  in the humeral heads, findings are most consistent with degenerative  osteoarthritis.     BONES: Lumbosacral transitional vertebra with sacralized L5. Fusion of  L4 and L5. Anterior listhesis at L3-4 Disc bulge and posterior  osteophytes at L1-2 and L3-4. Severe canal stenosis at L1-2 and L3-4.  No lytic or sclerotic bone lesion. No suspicious FDG uptake in the  osseous structures.                                                                      IMPRESSION:   1. Hypermetabolic right upper lobe lung nodule with spiculated  margins, highly suspicious for malignancy. No evidence of hilar,  mediastinal or systemic metastasis.  2. Prostatomegaly and associated bladder wall thickening.  3. Suspected mild narrowing in the origin of the celiac trunk.  4. Lumbosacral transitional vertebra with sacralized L5. Severe spinal  canal stenosis at L1-2 and L3-4.  5. Bilateral degenerative arthritis of the glenohumeral joints.  6. See dedicated neuroradiology report for the results of the high  resolution PET CT of the neck.         [Consider Follow Up: Right upper lobe lung nodules highly suspicious  for malignancy]     This report will be copied to the [facility] to ensure a  provider acknowledges the finding.      I have personally reviewed the examination and initial interpretation  and I agree with the findings.     NELIDA SUN MD

## 2022-09-19 NOTE — NURSING NOTE
"Vishnu Viveros is a 72 year old male patient that presents today in clinic for the following:    Chief Complaint   Patient presents with     Referral     Neck Pain     He first noticed the neck pain three or four days ago. He reports that it usually comes and goes. Icy-hot seems to help in the night but still present when he wakes up. He endorses a sharp 8/10 pain in his neck with movement.      The patient's allergies and medications were reviewed as noted. A set of vitals were recorded as noted without incident: /67 (BP Location: Right arm, Patient Position: Sitting, Cuff Size: Adult Regular)   Pulse 70   Resp 16   Ht 1.626 m (5' 4\")   Wt 77.9 kg (171 lb 11.2 oz)   SpO2 97%   BMI 29.47 kg/m  . The patient does not have any other questions for the provider.    Thien Almodovar, EMT at 5:16 PM on 9/19/2022  "

## 2022-09-21 ENCOUNTER — ANCILLARY PROCEDURE (OUTPATIENT)
Dept: CT IMAGING | Facility: CLINIC | Age: 72
End: 2022-09-21
Attending: FAMILY MEDICINE
Payer: COMMERCIAL

## 2022-09-21 DIAGNOSIS — M54.2 NECK PAIN ON RIGHT SIDE: ICD-10-CM

## 2022-09-21 LAB
CREAT BLD-MCNC: 1 MG/DL (ref 0.7–1.3)
GFR SERPL CREATININE-BSD FRML MDRD: >60 ML/MIN/1.73M2

## 2022-09-21 PROCEDURE — 82565 ASSAY OF CREATININE: CPT | Performed by: PATHOLOGY

## 2022-09-21 PROCEDURE — 70491 CT SOFT TISSUE NECK W/DYE: CPT | Mod: GC | Performed by: RADIOLOGY

## 2022-09-21 RX ORDER — IOPAMIDOL 755 MG/ML
90 INJECTION, SOLUTION INTRAVASCULAR ONCE
Status: COMPLETED | OUTPATIENT
Start: 2022-09-21 | End: 2022-09-21

## 2022-09-21 RX ADMIN — IOPAMIDOL 90 ML: 755 INJECTION, SOLUTION INTRAVASCULAR at 17:30

## 2022-09-26 ENCOUNTER — TELEPHONE (OUTPATIENT)
Dept: OPHTHALMOLOGY | Facility: CLINIC | Age: 72
End: 2022-09-26

## 2022-09-26 NOTE — TELEPHONE ENCOUNTER
Spoke briefly with patient, he is not ready to schedule and would like a return call closer to the new year     Anna C. Schoenecker on 9/26/2022 at 10:04 AM

## 2022-09-27 ENCOUNTER — TELEPHONE (OUTPATIENT)
Dept: FAMILY MEDICINE | Facility: CLINIC | Age: 72
End: 2022-09-27

## 2022-09-27 DIAGNOSIS — M54.16 LUMBAR RADICULOPATHY: ICD-10-CM

## 2022-09-27 DIAGNOSIS — M47.12 CERVICAL SPONDYLOSIS WITH MYELOPATHY: Primary | ICD-10-CM

## 2022-09-27 DIAGNOSIS — M48.02 CERVICAL STENOSIS OF SPINAL CANAL: ICD-10-CM

## 2022-09-27 NOTE — TELEPHONE ENCOUNTER
Contact Information   [phone] (home)     4:14 PM I tried to call both numbers to review test results.  Please get a timeframe and phone number I can reach Vishnu to review next steps related to CT neck.  There is no soft tissue abnormalities noted, good result.  There is multiple levels of degenerative change cervical spondylosis most pronounced at C5-6 and C6-7 with moderate stenosis at C5-6.  Could consider neurosurgery consult as he is having symptoms.  He may not need to have MRI scan as CT images were adequate.  Diagnoses and all orders for this visit:    Cervical spondylosis with myelopathy  -     Neurosurgery Referral; Future    Cervical stenosis of spinal canal  Comments:  c5-6      Murali Logan MD  September 28, 2022 12:54 PM  I was able to review the results with his wife who he has requested I communicate results to her as she is a nurse.  I discussed we will cancel MRI as CT results were acceptable.  Referral was placed for neurosurgery consult to help with cervical spine pain as he is symptomatic at this time.  She also was wondering if a referral could be coordinated for his lumbar spine as his lower back also is problematic.  She did not know who the orthopedic doctor was he saw in the past and will communicate this with coordinators when they call back.  Murali Logan MD    EXAM: CT SOFT TISSUE NECK W CONTRAST  9/21/2022 5:42 PM      HISTORY:  HX of lung cancer abnormal PET and CT soft tissue 2019; Neck  pain on right side         COMPARISON:  PET/CT 3/9/2019 CT chest 1/24/2022      TECHNIQUE: Following intravenous administration of nonionic iodinated  contrast medium, thin section helical CT images were obtained from the  skull base down to the level of the aortic arch.  Axial, coronal and  sagittal reformations were performed with 2-3 mm slice thickness  reconstruction. Images were reviewed in soft tissue, lung and bone  windows.     CONTRAST: Isovue 370   90cc     FINDINGS: No abnormality along the pharyngeal mucosal spaces. No  suspicious masses or nodules identified in the oropharynx.     No nasopharyngeal, hypopharyngeal, or glottic mucosal space  abnormality. Normal tongue base. Salivary glands are within normal  limits.     No lymphadenopathy.     Subcentimeter hypoattenuating nodule in the left thyroid lobe, no  follow-up necessary.     Patent cervical vasculature; no high grade arterial stenosis.     No suspicious osseus lesion. Multilevel cervical spondylosis most  pronounced at C5-6 and C6-7. 2 mm anterolisthesis of C7 on T1.  Moderate bilateral foraminal stenosis at C5-6. Mild spinal canal  stenosis and C6. No high grade spinal canal stenosis.     Clear paranasal sinuses and mastoid air cells. No periapical dental  lucency. The imaged skull base, intracranial and orbital structures  are within normal limits.     Unchanged partially visualized scarring and atelectasis in the  anterior right upper lobe (series 2 image 145).                                                                      IMPRESSION:   1. No abnormalities along the pharyngeal mucosal spaces.  2. No suspicious soft tissue nodules or masses. No cervical  lymphadenopathy.  3. Multilevel cervical spondylosis most pronounced at C5-6 and C6-7.  Moderate foraminal stenosis at C5-6. Mild spinal canal stenosis at C6.        I have personally reviewed the examination and initial interpretation  and I agree with the findings.     TITUS BAEZ MD

## 2022-09-28 NOTE — TELEPHONE ENCOUNTER
RN called patient's spouse and let her know Dr. Logan wants to call patient to discuss imaging results.  She relayed that anytime in the afternoon today would be a good time and best number is [phone].    Maya Oliva RN on 9/28/2022 at 8:13 AM\

## 2022-09-28 NOTE — RESULT ENCOUNTER NOTE
Dear Vishnu Viveros   There is no soft tissue abnormalities noted on CT scan good result.  There are multiple levels of degenerative change cervical spondylosis most pronounced at C5-6 and C6-7 with moderate stenosis at C5-6 neurosurgery consult  was placed as you are having symptoms.  MRI scan was cancelled as CT images were adequate.  Murali Logan MD

## 2022-09-29 ENCOUNTER — TELEPHONE (OUTPATIENT)
Dept: NEUROSURGERY | Facility: CLINIC | Age: 72
End: 2022-09-29

## 2022-09-29 ENCOUNTER — TELEPHONE (OUTPATIENT)
Dept: FAMILY MEDICINE | Facility: CLINIC | Age: 72
End: 2022-09-29

## 2022-09-29 DIAGNOSIS — M54.16 LUMBAR RADICULOPATHY: ICD-10-CM

## 2022-09-29 DIAGNOSIS — M47.12 CERVICAL SPONDYLOSIS WITH MYELOPATHY: Primary | ICD-10-CM

## 2022-09-29 NOTE — TELEPHONE ENCOUNTER
RN left voice message with scheduling number for PM&R referral.  RN let patient know in message that the neurosurgery referral was reviewed, and it was advised that patient have the PM&R referral.    Maya Oliva RN on 9/29/2022 at 12:08 PM

## 2022-09-29 NOTE — TELEPHONE ENCOUNTER
[facility] Call Center    Phone Message    May a detailed message be left on voicemail: yes     Reason for Call: Appointment Intake    Referring Provider Name: Cody Logan  Diagnosis and/or Symptoms: M47.12 (ICD-10-CM) - Cervical spondylosis with myelopathy      Patient is calling in to schedule Neurosurgery referral. Please contact patient to schedule at [phone].    Action Taken: Message routed to:  [facility] Neurosurgery    Travel Screening: Not Applicable

## 2022-09-29 NOTE — TELEPHONE ENCOUNTER
LVM for patient that he is scheduled correctly with our medspine providers as scheduled.  A neurosurgeon has reviewed his imaging and request he starts with a medspine provider.    Felicita Dang

## 2022-09-29 NOTE — TELEPHONE ENCOUNTER
----- Message from Felicita Dang sent at 9/28/2022  6:04 PM CDT -----  Regarding: Referral to neurosurgery  Hi, Dr. Logan,    Dr. Mares, One of our surgeons, reviewed this referral and requests that patient begin care with a medspine provider such as one our physicians in pain or pmr.    We are happy to get her scheduled.    Let me know if you have any questions.    Felicita    Neurosciences Service

## 2022-09-29 NOTE — TELEPHONE ENCOUNTER
September 29, 2022 Message sent 7:30 AM  Please assist Mr. Viveros with a medspine provider in pain management. Contact me if I need to place an alternative referral. He has both cervical   (new concern) spine and lumbar spine concerns.  Vishnu chart reviewed today for referral.    Diagnoses and all orders for this visit:    Cervical spondylosis with myelopathy C5-6    Lumbar radiculopathy      Best wishes,  Murali Logan MD

## 2022-10-01 ENCOUNTER — MYC MEDICAL ADVICE (OUTPATIENT)
Dept: FAMILY MEDICINE | Facility: CLINIC | Age: 72
End: 2022-10-01

## 2022-10-03 NOTE — TELEPHONE ENCOUNTER
CBC with diff.  Comprehensive metabolic panel  Albumin random urine quantitative  Hemoglobin A1C  Lipid Reflex    Urology Dr. Oglesby placed order for PSA Total and Free

## 2022-10-31 ENCOUNTER — OFFICE VISIT (OUTPATIENT)
Dept: ANESTHESIOLOGY | Facility: CLINIC | Age: 72
End: 2022-10-31
Payer: COMMERCIAL

## 2022-10-31 VITALS — DIASTOLIC BLOOD PRESSURE: 67 MMHG | HEART RATE: 71 BPM | OXYGEN SATURATION: 98 % | SYSTOLIC BLOOD PRESSURE: 101 MMHG

## 2022-10-31 DIAGNOSIS — M54.2 CERVICALGIA: Primary | ICD-10-CM

## 2022-10-31 DIAGNOSIS — M79.18 MYOFASCIAL PAIN: ICD-10-CM

## 2022-10-31 DIAGNOSIS — M54.16 LUMBAR RADICULOPATHY: ICD-10-CM

## 2022-10-31 PROCEDURE — 99215 OFFICE O/P EST HI 40 MIN: CPT

## 2022-10-31 RX ORDER — METHOCARBAMOL 500 MG/1
500 TABLET, FILM COATED ORAL 3 TIMES DAILY PRN
Qty: 90 TABLET | Refills: 1 | Status: SHIPPED | OUTPATIENT
Start: 2022-10-31 | End: 2023-02-21

## 2022-10-31 ASSESSMENT — PAIN SCALES - GENERAL: PAINLEVEL: MODERATE PAIN (4)

## 2022-10-31 NOTE — PATIENT INSTRUCTIONS
1.  Pain Physical Therapy:  YES   I am referring to work on stretching, strengthening, and home exercise plan.    2.  Pain Psychologist to address relaxation, behavioral change, coping style, and other factors important to improvement.  NO   3.  Diagnostic Studies:  Reviewed recent CT of neck.    4.  Medication Management:   Stop Flexeril. Start methocarbamol 500 mg three times daily. This medication may cause drowsiness, so be careful driving until you know how it affects you.     5.  Potential procedures: Defer - may consider cervical procedures in the future, pending outcome from initial conservative therapies.     6.  Referrals/Orders:    1. TENS unit order    2. Pain physical therapy.    7.  Follow up with DILAN Davenport CNP in 12/23/22 at 4 pm

## 2022-10-31 NOTE — LETTER
10/31/2022       RE: Vishnu Viveros  [address]  [place] [address]     Dear Colleague,    Thank you for referring your patient, Vishnu Viveros, to the [facility] [place] at [facility]. Please see a copy of my visit note below.      [facility]     Date of visit: 10/31/2022    Assessment:  Vishnu Viveros is a 72 year old male with a past medical history significant for DM, HTN, chronic LBP who presents with complaints of neck and low back pain.     1. Neck and low back pain - Onset of neck pain occurred within the last 2 months, and symptoms are most consistent with myofascial etiology. His LBP is likely associated with multiple factors, including underlying degenerative changes with overlying myofascial component.     Visit diagnoses:   1. Cervicalgia    2. Myofascial pain    3. Lumbar radiculopathy        Plan:  The following recommendations were given to the patient. Diagnosis, treatment options, risks, benefits, and alternatives were discussed, and all questions were answered. The patient expressed understanding of the plan for management.     I am recommending a multidisciplinary treatment plan to help this patient better manage his pain.  This includes:      1.  Pain Physical Therapy:  YES   I am referring to work on stretching, strengthening, and home exercise plan.    2.  Pain Psychologist to address relaxation, behavioral change, coping style, and other factors important to improvement.  NO   3.  Diagnostic Studies:  Reviewed recent CT of neck.    4.  Medication Management:   1. Stop Flexeril. Start methocarbamol 500 mg three times daily. This medication may cause drowsiness, so be careful driving until you know how it affects you.     5.  Potential procedures: Defer - may consider cervical procedures in the future, pending outcome from initial conservative therapies.   "   6.  Referrals/Orders:    1. TENS unit order    2. Pain physical therapy.    7.  Follow up with DILAN Davenport CNP in 12/23/22 at 4 pm    Review of Electronic Chart: Today I have also reviewed available medical information in the patient's medical record at [facility] ([facility]) and Care Everywhere (if available), including relevant provider notes, laboratory work, and imaging.     Anitha Singh DNP, DILAN, AGNP-C  [facility]         -------------------------------------------------------------------    Subjective     Reason for consultation:    Vishnu Viveros is a 72 year old male who is seen in consultation today for neck pain and h/o LBP.     Please see the [facility] [place] health questionnaire which the patient completed and reviewed with me in detail (if available).     Review of [place] Prescription Monitoring Program (): No concern for abuse or misuse of controlled medications based on this report. Reviewed - appears appropriate.     Review of Electronic Chart: Today I have also reviewed available medical information in the patient's medical record at [facility] ([facility]), including relevant provider notes, laboratory work, and imaging.     Pain medications are being prescribed by N/A.     Chief Complaint:    Chief Complaint   Patient presents with     Consult     New patient: traceyal for spinal pain         HPI:     Vishnu Viveros is a 72 year old male presents with a chief complaint of neck pain.     The pain has been present for 2 months .    The pain is Moderate Pain (4) in severity.    The pain is described as sharp with movement.   The pain is alleviated by rest, meds, \"nothing\".    It is exacerbated by movement, turning head.    Modalities that have been utilized in the past which were helpful include diclofenac gel.    Things that were not helpful, but tried ,include diclofenac gel.    The patient has never tried TENS unit, pain PT.    -He is having " acute neck pain.   -Pain has been present for about 7 weeks.   -No precipitating injury   -No h/o cervical spine surgery   -PCP prescribed flexeril, was not very helpful and made drowsy/CNS effects.   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     The patient otherwise denies bowel or bladder incontinence, parasthesias, weakness, saddle anesthesia, unintentional weight loss, or fever/chills/sweats.     Vishnu Viveros has been seen at a pain clinic in the past.  He was previously seen at [facility] location, most recently by Dr. Dahl in 2019.       Current Pain Treatments:    2. Medications:    Methocarbamol 500 mg TID PRN    2. Other therapies:    Pain PT   TENS unit       Current Outpatient Medications   Medication     acetaminophen (TYLENOL) 500 MG tablet     acetaminophen (TYLENOL) 500 MG tablet     Ascorbic Acid (VITAMIN C PO)     atorvastatin (LIPITOR) 20 MG tablet     chlorthalidone (HYGROTON) 25 MG tablet     Cholecalciferol (VITAMIN D) 1000 UNIT capsule     Cyanocobalamin (VITAMIN B12 PO)     cyclobenzaprine (FLEXERIL) 10 MG tablet     diclofenac (VOLTAREN) 1 % topical gel     finasteride (PROSCAR) 5 MG tablet     gabapentin (NEURONTIN) 300 MG capsule     loratadine (CLARITIN) 10 MG tablet     losartan (COZAAR) 100 MG tablet     melatonin 3 MG tablet     metFORMIN (GLUCOPHAGE XR) 500 MG 24 hr tablet     methocarbamol (ROBAXIN) 500 MG tablet     Multiple Vitamin (MULTIVITAMINS PO)     nystatin (MYCOSTATIN) 565437 UNIT/GM external cream     Omega-3 Fatty Acids (FISH OIL PO)     order for DME     polyethylene glycol (GOLYTELY) 236 g suspension     potassium chloride ER (KLOR-CON M) 20 MEQ CR tablet     tadalafil (CIALIS) 20 MG tablet     tamsulosin (FLOMAX) 0.4 MG capsule     No current facility-administered medications for this visit.     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 eyelid, unspecified laterality      History of tobacco use      HLD (hyperlipidemia)      HTN (hypertension)      Obesity (BMI 30.0-34.9)      Pulmonary nodule      Past Surgical History:   Procedure Laterality Date     APPENDECTOMY OPEN CHILD       ARTHROSCOPY KNEE Left      BIOPSY PROSTATE TRANSRECTAL       EXCISE PTERYGIUM Right 1989     EXCISE PTERYGIUM Right 2/12/2015     INJECT EPIDURAL LUMBAR / SACRAL SINGLE N/A 7/21/2016    Procedure: INJECT EPIDURAL LUMBAR / SACRAL SINGLE;  Surgeon: Judah Henriquez MD;  Location: [facility] OR     INJECT PARAVERTEBRAL FACET JOINT LUMBAR / SACRAL FIRST Right 8/18/2016    Procedure: INJECT PARAVERTEBRAL FACET JOINT LUMBAR / SACRAL FIRST;  Surgeon: Judah Henriquez MD;  [facility]: [facility]     INJECT PARAVERTEBRAL FACET JOINT LUMBAR / SACRAL FIRST Right 9/8/2016    Procedure: INJECT PARAVERTEBRAL FACET JOINT LUMBAR / SACRAL FIRST;  Surgeon: Judah Henriquez MD;  Location: [facility] OR     MEDIASTINOSCOPY Right 04/08/2019     [place]  Dr Hemanth Covington 4/8/19 bronchoscopy, cervical mediastinoscopy for staging and minimally invasive right thoracoscopic wedge resection 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     PUNCTALPLASTY Bilateral 12/19/2018    Procedure: Bilateral Punctoplasty with Mini [place] Stent;  Surgeon: Levy Blue MD;  Location: [facility]     REPAIR ECTROPION BILATERAL Bilateral 12/19/2018    Procedure: Bilateral Lower Lid Ectropion Repair with Right Lower Lid Lesion Excision;  Surgeon: Levy Blue MD;  Location: [facility] OR     Family History   Problem " Relation Age of Onset     Cerebrovascular Disease Brother          stroke      Breast Cancer Sister      Kidney Disease Mother         Kidney stones     Other - See Comments Father          in [place]a parkinsons or post surgical eye surgery     Cerebral aneurysm Brother      Hypothyroidism Sister      Glaucoma No family hx of      Macular Degeneration No family hx of      Social History     Socioeconomic History     Marital status:      Spouse name: Carmen     Number of children: 2     Years of education: None     Highest education level: None   Tobacco Use     Smoking status: Former     Years: 5.00     Types: Cigarettes     Quit date: 1981     Years since quittin.9     Smokeless tobacco: Never   Substance and Sexual Activity     Alcohol use: Yes     Alcohol/week: 2.5 standard drinks     Types: 3 Standard drinks or equivalent per week     Drug use: No     Sexual activity: Not Currently     Partners: Female   Social History Narrative     works as [employer]currently working remotely        His immediate family; his wife has MS and is an RN at [employer], his two children are in good health.         Social Determinants of Health     Financial Resource Strain: Low Risk      Difficulty of Paying Living Expenses: Not hard at all   Food Insecurity: No Food Insecurity     Worried About Running Out of Food in the Last Year: Never true     Ran Out of Food in the Last Year: Never true   Transportation Needs: No Transportation Needs     Lack of Transportation (Medical): No     Lack of Transportation (Non-Medical): No      ROS: 7 point ROS neg other than the symptoms noted above in the HPI or patient questionnaire.      Objective      Diagnostic Testing - Imaging/Labs:    LUMBAR SPINE TWO TO THREE VIEWS   2022 11:40 AM      HISTORY: Chronic bilateral low back pain, unspecified whether sciatica  present.     COMPARISON: X-rays 2020.                                                                       IMPRESSION: No fracture is identified. Multilevel severe degenerative  disc disease throughout the lumbar spine. Multilevel moderate to  severe facet arthropathy. Multilevel grade 1 spondylolisthesis. Slight  levoconvex curvature. Bilateral sacroiliac joint degenerative change.     MR LUMBAR SPINE W/O CONTRAST 12/19/2020 12:59 PM     Provided History: L/S-spine stenosis; Lumbar stenosis with neurogenic  claudication     ICD-10: Lumbar stenosis with neurogenic claudication     Comparison: Lumbar spine MRI from 8/8/2017 and PET CT from 3/9/2019.     Technique: Sagittal T1-weighted, sagittal STIR, 3D volumetric axial  and sagittal reconstructed T2-weighted images of the lumbar spine were  obtained without intravenous contrast.      Findings: There are 4 lumbar-type vertebrae. Lumbosacral transitional  vertebra with sacralized L5.The tip of the conus medullaris is at  T12-L1.  Grade 1 anterolisthesis of L3 over L4, similar to prior  study. Fusion of L4-5 and partial fusion of L5-S1. Multilevel disc  height narrowing, endplate degenerative changes and disc desiccation.  Schmorl's node-like deformity at L1 and L3 inferior endplate. Cauda  equina nerve roots are tangled secondary to severe spinal canal  stenosis at L3-4.     On a level by level basis:     T12-L1: No significant spinal canal or neuroforaminal stenosis.     L1-2: Disc bulge, bilateral significant facet arthropathy and  ligamentum flavum thickening, greater on the right. Moderate left,  moderate to severe right neural foraminal stenosis and severe spinal  canal stenosis.     L2-3: Posterior disc bulge. Bilateral facet arthropathy and ligamentum  flavum thickening. Bilateral mild-to-moderate neural foraminal  stenosis. Moderate spinal canal stenosis.     L3-4: Grade 1 anterolisthesis. Unroofing of the disc. Posterior disc  bulge. Bilateral marked facet arthropathy and ligamentum flavum  thickening. Severe spinal canal  stenosis. Bilateral moderate severe  neural foraminal stenosis.     L4-5: Fusion. Mild left and moderate right neural foraminal stenosis.  Mild spinal canal stenosis.     L5-S1: Bilateral facet arthropathy and ligamentum flavum thickening.  No significant spinal canal or neuroforaminal stenosis.     Visualized retroperitoneal structures are grossly unremarkable.                                                                      Impression: Lumbosacral transitional vertebra with sacralized L5.      Multilevel lumbar spondylosis as above. Most notably, there is severe  spinal canal stenosis at L1-2 and L3-4, worse at L3-4. Advanced neural  foraminal stenosis at L1-2 and L3-4 as above. Overall findings are  slightly worsened compared to 8/8/2017 MRI.      Physical Exam  HENT:      Head: Normocephalic.   Pulmonary:      Effort: Pulmonary effort is normal.   Musculoskeletal:      Comments: See below    Skin:     General: Skin is warm.   Neurological:      General: No focal deficit present.      Mental Status: He is alert.   Psychiatric:         Mood and Affect: Mood normal.           Musculoskeletal exam:  Able to walk on the heels and toes with difficulty. Gait WNL.  Normal bulk and tone. Unremarkable spinal curvature.     Cervical spine:  Range of motion within normal limits   Tenderness in the cervical paraspinal muscles.Yes  Rotation/ext to right: painful   Rotation/ext to left: pain free    Thoracic spine:    Kyphosis. No   Tenderness in the thoracic paraspinal muscles.Yes    Lumbar spine:    Tenderness in the lumbar paraspinal muscles.Yes   Rotation/ext to right: pain free   Rotation/ext to left: pain free    Myofascial tenderness:  As noted above  Focal tenderness: No SI joint, gluteal, piriformis, or GT tenderness  Straight leg raise: negative   DALJIT: negative     Neurologic exam:  CN:  Cranial nerves 2-12 are grossly intact  Motor:  5/5 UE and LE strength            Reflexes:     Biceps:     R:  2/4 L:  2/4   Brachioradialis   R:  2/4 L: 2/4   Patella:  R:  2/4 L: 2/4   Achilles:  R:  2/4 L: 2/4    Sensory:   Light touch: normal bilateral upper and lower extremities    No allodynia, dysesthesia, or hyperalgesia.        BILLING TIME DOCUMENTATION:   The total TIME spent on this patient on the date of the encounter/appointment was 45 minutes.      TOTAL TIME includes:   Time spent preparing to see the patient (reviewing records and tests)   Time spent face to face (or over the phone) with the patient   Time spent ordering tests, medications, procedures and referrals   Time spent Referring and communicating with other healthcare professionals   Time spent documenting clinical information in Epic         Sincerely,    DILAN Davenport CNP

## 2022-10-31 NOTE — NURSING NOTE
Patient presents with:  Consult: New patient: eval for spinal pain      Moderate Pain (4)     Pain Medications     Analgesics Other Refills Start End     acetaminophen (TYLENOL) 500 MG tablet    3 12/6/2021     Sig - Route: Take 1-2 tablets (500-1,000 mg) by mouth every 6 hours as needed for mild pain - Oral    Class: E-Prescribe     acetaminophen (TYLENOL) 500 MG tablet    3 6/7/2021     Sig - Route: Take 1-2 tablets (500-1,000 mg) by mouth every 6 hours as needed for pain (arthritis) - Oral    Class: E-Prescribe          What medications are you using for pain? Acetaminophen, Flexeril, gabapentin    (New patients only) Have you been seen by another pain clinic/ provider? No    Guy Rajan, EMT

## 2022-10-31 NOTE — PROGRESS NOTES
[facility]     Date of visit: 10/31/2022    Assessment:  Vishnu Viveros is a 72 year old male with a past medical history significant for DM, HTN, chronic LBP who presents with complaints of neck and low back pain.     1. Neck and low back pain - Onset of neck pain occurred within the last 2 months, and symptoms are most consistent with myofascial etiology. His LBP is likely associated with multiple factors, including underlying degenerative changes with overlying myofascial component.     Visit diagnoses:   1. Cervicalgia    2. Myofascial pain    3. Lumbar radiculopathy        Plan:  The following recommendations were given to the patient. Diagnosis, treatment options, risks, benefits, and alternatives were discussed, and all questions were answered. The patient expressed understanding of the plan for management.     I am recommending a multidisciplinary treatment plan to help this patient better manage his pain.  This includes:      1.  Pain Physical Therapy:  YES   I am referring to work on stretching, strengthening, and home exercise plan.    2.  Pain Psychologist to address relaxation, behavioral change, coping style, and other factors important to improvement.  NO   3.  Diagnostic Studies:  Reviewed recent CT of neck.    4.  Medication Management:   1. Stop Flexeril. Start methocarbamol 500 mg three times daily. This medication may cause drowsiness, so be careful driving until you know how it affects you.     5.  Potential procedures: Defer - may consider cervical procedures in the future, pending outcome from initial conservative therapies.     6.  Referrals/Orders:    1. TENS unit order    2. Pain physical therapy.    7.  Follow up with DILAN Davenport CNP in 12/23/22 at 4 pm    Review of Electronic Chart: Today I have also reviewed available medical information in the patient's medical record at [facility] ([facility]) and Care Everywhere (if available), including relevant  "provider notes, laboratory work, and imaging.     Anitha Singh, TONIA, APRN, AGNP-C  [facility]         -------------------------------------------------------------------    Subjective     Reason for consultation:    Vishnu Viveros is a 72 year old male who is seen in consultation today for neck pain and h/o LBP.     Please see the [facility] health questionnaire which the patient completed and reviewed with me in detail (if available).     Review of [place] Prescription Monitoring Program (): No concern for abuse or misuse of controlled medications based on this report. Reviewed - appears appropriate.     Review of Electronic Chart: Today I have also reviewed available medical information in the patient's medical record at [facility] ([facility]), including relevant provider notes, laboratory work, and imaging.     Pain medications are being prescribed by N/A.     Chief Complaint:    Chief Complaint   Patient presents with     Consult     New patient: svetlana for spinal pain         HPI:     Vishnu Viveros is a 72 year old male presents with a chief complaint of neck pain.     The pain has been present for 2 months .    The pain is Moderate Pain (4) in severity.    The pain is described as sharp with movement.   The pain is alleviated by rest, meds, \"nothing\".    It is exacerbated by movement, turning head.    Modalities that have been utilized in the past which were helpful include diclofenac gel.    Things that were not helpful, but tried ,include diclofenac gel.    The patient has never tried TENS unit, pain PT.    -He is having acute neck pain.   -Pain has been present for about 7 weeks.   -No precipitating injury   -No h/o cervical spine surgery   -PCP prescribed flexeril, was not very helpful and made drowsy/CNS effects.   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     The " "patient otherwise denies bowel or bladder incontinence, parasthesias, weakness, saddle anesthesia, unintentional weight loss, or fever/chills/sweats.     Vishnu Viveros has been seen at a pain clinic in the past.  He was previously seen at [facility] location, most recently by Dr. Dahl in 2019.       Current Pain Treatments:    2. Medications:    Methocarbamol 500 mg TID PRN    2. Other therapies:    Pain PT   TENS unit       Current Outpatient Medications   Medication     acetaminophen (TYLENOL) 500 MG tablet     acetaminophen (TYLENOL) 500 MG tablet     Ascorbic Acid (VITAMIN C PO)     atorvastatin (LIPITOR) 20 MG tablet     chlorthalidone (HYGROTON) 25 MG tablet     Cholecalciferol (VITAMIN D) 1000 UNIT capsule     Cyanocobalamin (VITAMIN B12 PO)     cyclobenzaprine (FLEXERIL) 10 MG tablet     diclofenac (VOLTAREN) 1 % topical gel     finasteride (PROSCAR) 5 MG tablet     gabapentin (NEURONTIN) 300 MG capsule     loratadine (CLARITIN) 10 MG tablet     losartan (COZAAR) 100 MG tablet     melatonin 3 MG tablet     metFORMIN (GLUCOPHAGE XR) 500 MG 24 hr tablet     methocarbamol (ROBAXIN) 500 MG tablet     Multiple Vitamin (MULTIVITAMINS PO)     nystatin (MYCOSTATIN) 497155 UNIT/GM external cream     Omega-3 Fatty Acids (FISH OIL PO)     order for DME     polyethylene glycol (GOLYTELY) 236 g suspension     potassium chloride ER (KLOR-CON M) 20 MEQ CR tablet     tadalafil (CIALIS) 20 MG tablet     tamsulosin (FLOMAX) 0.4 MG capsule     No current facility-administered medications for this visit.     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 eyelid, " unspecified laterality      History of tobacco use      HLD (hyperlipidemia)      HTN (hypertension)      Obesity (BMI 30.0-34.9)      Pulmonary nodule      Past Surgical History:   Procedure Laterality Date     APPENDECTOMY OPEN CHILD       ARTHROSCOPY KNEE Left      BIOPSY PROSTATE TRANSRECTAL       EXCISE PTERYGIUM Right      EXCISE PTERYGIUM Right 2015     INJECT EPIDURAL LUMBAR / SACRAL SINGLE N/A 2016    Procedure: INJECT EPIDURAL LUMBAR / SACRAL SINGLE;  Surgeon: Judah Henriquez MD;  Location: [facility] OR     INJECT PARAVERTEBRAL FACET JOINT LUMBAR / SACRAL FIRST Right 2016    Procedure: INJECT PARAVERTEBRAL FACET JOINT LUMBAR / SACRAL FIRST;  Surgeon: Judah Henriquez MD;  Location: [facility] OR     INJECT PARAVERTEBRAL FACET JOINT LUMBAR / SACRAL FIRST Right 2016    Procedure: INJECT PARAVERTEBRAL FACET JOINT LUMBAR / SACRAL FIRST;  Surgeon: Judah Henriquez MD;  Location: [facility] OR     MEDIASTINOSCOPY Right 2019     [facility]  Dr Hemanth Covington 19 bronchoscopy, cervical mediastinoscopy for staging and minimally invasive right thoracoscopic wedge resection 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     PUNCTALPLASTY Bilateral 2018    Procedure: Bilateral Punctoplasty with Mini [place] Stent;  Surgeon: Levy Blue MD;  Location: [facility] OR     REPAIR ECTROPION BILATERAL Bilateral 2018    Procedure: Bilateral Lower Lid Ectropion Repair with Right Lower Lid Lesion Excision;  Surgeon: Levy Blue MD;  Location: [facility] OR     Family History   Problem Relation Age of Onset     Cerebrovascular Disease Brother          stroke      Breast Cancer Sister      Kidney Disease Mother         Kidney stones     Other - See Comments Father          in [place] parkinsons or post surgical eye surgery     Cerebral aneurysm Brother      Hypothyroidism Sister      Glaucoma No family hx of      Macular  Degeneration No family hx of      Social History     Socioeconomic History     Marital status:      Spouse name: Carmen     Number of children: 2     Years of education: None     Highest education level: None   Tobacco Use     Smoking status: Former     Years: 5.00     Types: Cigarettes     Quit date: 1981     Years since quittin.9     Smokeless tobacco: Never   Substance and Sexual Activity     Alcohol use: Yes     Alcohol/week: 2.5 standard drinks     Types: 3 Standard drinks or equivalent per week     Drug use: No     Sexual activity: Not Currently     Partners: Female   Social History Narrative     works as [employer].currently working remotely        His immediate family; his wife has MS and is an RN at [employer], his two children are in good health.         Social Determinants of Health     Financial Resource Strain: Low Risk      Difficulty of Paying Living Expenses: Not hard at all   Food Insecurity: No Food Insecurity     Worried About Running Out of Food in the Last Year: Never true     Ran Out of Food in the Last Year: Never true   Transportation Needs: No Transportation Needs     Lack of Transportation (Medical): No     Lack of Transportation (Non-Medical): No      ROS: 7 point ROS neg other than the symptoms noted above in the HPI or patient questionnaire.      Objective      Diagnostic Testing - Imaging/Labs:    LUMBAR SPINE TWO TO THREE VIEWS   2022 11:40 AM      HISTORY: Chronic bilateral low back pain, unspecified whether sciatica  present.     COMPARISON: X-rays 2020.                                                                      IMPRESSION: No fracture is identified. Multilevel severe degenerative  disc disease throughout the lumbar spine. Multilevel moderate to  severe facet arthropathy. Multilevel grade 1 spondylolisthesis. Slight  levoconvex curvature. Bilateral sacroiliac joint degenerative change.     MR LUMBAR SPINE W/O CONTRAST  12/19/2020 12:59 PM     Provided History: L/S-spine stenosis; Lumbar stenosis with neurogenic  claudication     ICD-10: Lumbar stenosis with neurogenic claudication     Comparison: Lumbar spine MRI from 8/8/2017 and PET CT from 3/9/2019.     Technique: Sagittal T1-weighted, sagittal STIR, 3D volumetric axial  and sagittal reconstructed T2-weighted images of the lumbar spine were  obtained without intravenous contrast.      Findings: There are 4 lumbar-type vertebrae. Lumbosacral transitional  vertebra with sacralized L5.The tip of the conus medullaris is at  T12-L1.  Grade 1 anterolisthesis of L3 over L4, similar to prior  study. Fusion of L4-5 and partial fusion of L5-S1. Multilevel disc  height narrowing, endplate degenerative changes and disc desiccation.  Schmorl's node-like deformity at L1 and L3 inferior endplate. Cauda  equina nerve roots are tangled secondary to severe spinal canal  stenosis at L3-4.     On a level by level basis:     T12-L1: No significant spinal canal or neuroforaminal stenosis.     L1-2: Disc bulge, bilateral significant facet arthropathy and  ligamentum flavum thickening, greater on the right. Moderate left,  moderate to severe right neural foraminal stenosis and severe spinal  canal stenosis.     L2-3: Posterior disc bulge. Bilateral facet arthropathy and ligamentum  flavum thickening. Bilateral mild-to-moderate neural foraminal  stenosis. Moderate spinal canal stenosis.     L3-4: Grade 1 anterolisthesis. Unroofing of the disc. Posterior disc  bulge. Bilateral marked facet arthropathy and ligamentum flavum  thickening. Severe spinal canal stenosis. Bilateral moderate severe  neural foraminal stenosis.     L4-5: Fusion. Mild left and moderate right neural foraminal stenosis.  Mild spinal canal stenosis.     L5-S1: Bilateral facet arthropathy and ligamentum flavum thickening.  No significant spinal canal or neuroforaminal stenosis.     Visualized retroperitoneal structures are grossly  unremarkable.                                                                      Impression: Lumbosacral transitional vertebra with sacralized L5.      Multilevel lumbar spondylosis as above. Most notably, there is severe  spinal canal stenosis at L1-2 and L3-4, worse at L3-4. Advanced neural  foraminal stenosis at L1-2 and L3-4 as above. Overall findings are  slightly worsened compared to 8/8/2017 MRI.      Physical Exam  HENT:      Head: Normocephalic.   Pulmonary:      Effort: Pulmonary effort is normal.   Musculoskeletal:      Comments: See below    Skin:     General: Skin is warm.   Neurological:      General: No focal deficit present.      Mental Status: He is alert.   Psychiatric:         Mood and Affect: Mood normal.           Musculoskeletal exam:  Able to walk on the heels and toes with difficulty. Gait WNL.  Normal bulk and tone. Unremarkable spinal curvature.     Cervical spine:  Range of motion within normal limits   Tenderness in the cervical paraspinal muscles.Yes  Rotation/ext to right: painful   Rotation/ext to left: pain free    Thoracic spine:    Kyphosis. No   Tenderness in the thoracic paraspinal muscles.Yes    Lumbar spine:    Tenderness in the lumbar paraspinal muscles.Yes   Rotation/ext to right: pain free   Rotation/ext to left: pain free    Myofascial tenderness:  As noted above  Focal tenderness: No SI joint, gluteal, piriformis, or GT tenderness  Straight leg raise: negative   DALJIT: negative     Neurologic exam:  CN:  Cranial nerves 2-12 are grossly intact  Motor:  5/5 UE and LE strength            Reflexes:     Biceps:     R:  2/4 L: 2/4   Brachioradialis   R:  2/4 L: 2/4   Patella:  R:  2/4 L: 2/4   Achilles:  R:  2/4 L: 2/4    Sensory:   Light touch: normal bilateral upper and lower extremities    No allodynia, dysesthesia, or hyperalgesia.        BILLING TIME DOCUMENTATION:   The total TIME spent on this patient on the date of the encounter/appointment was 45 minutes.      TOTAL  TIME includes:   Time spent preparing to see the patient (reviewing records and tests)   Time spent face to face (or over the phone) with the patient   Time spent ordering tests, medications, procedures and referrals   Time spent Referring and communicating with other healthcare professionals   Time spent documenting clinical information in Epic

## 2022-11-19 ENCOUNTER — HEALTH MAINTENANCE LETTER (OUTPATIENT)
Age: 72
End: 2022-11-19

## 2022-12-10 ENCOUNTER — LAB (OUTPATIENT)
Dept: LAB | Facility: CLINIC | Age: 72
End: 2022-12-10
Payer: COMMERCIAL

## 2022-12-10 DIAGNOSIS — R97.20 ELEVATED PROSTATE SPECIFIC ANTIGEN (PSA): ICD-10-CM

## 2022-12-10 DIAGNOSIS — E11.9 TYPE 2 DIABETES MELLITUS TREATED WITHOUT INSULIN (H): ICD-10-CM

## 2022-12-10 DIAGNOSIS — D64.9 ANEMIA, UNSPECIFIED TYPE: ICD-10-CM

## 2022-12-10 DIAGNOSIS — E78.5 HYPERLIPIDEMIA LDL GOAL <100: ICD-10-CM

## 2022-12-10 LAB
ALBUMIN SERPL BCG-MCNC: 4.4 G/DL (ref 3.5–5.2)
ALP SERPL-CCNC: 55 U/L (ref 40–129)
ALT SERPL W P-5'-P-CCNC: 17 U/L (ref 10–50)
ANION GAP SERPL CALCULATED.3IONS-SCNC: 9 MMOL/L (ref 7–15)
AST SERPL W P-5'-P-CCNC: 19 U/L (ref 10–50)
BASOPHILS # BLD AUTO: 0 10E3/UL (ref 0–0.2)
BASOPHILS NFR BLD AUTO: 1 %
BILIRUB SERPL-MCNC: 0.5 MG/DL
BUN SERPL-MCNC: 23.7 MG/DL (ref 8–23)
CALCIUM SERPL-MCNC: 9.9 MG/DL (ref 8.8–10.2)
CHLORIDE SERPL-SCNC: 103 MMOL/L (ref 98–107)
CHOLEST SERPL-MCNC: 145 MG/DL
CREAT SERPL-MCNC: 1.06 MG/DL (ref 0.67–1.17)
DEPRECATED HCO3 PLAS-SCNC: 30 MMOL/L (ref 22–29)
EOSINOPHIL # BLD AUTO: 0.2 10E3/UL (ref 0–0.7)
EOSINOPHIL NFR BLD AUTO: 6 %
ERYTHROCYTE [DISTWIDTH] IN BLOOD BY AUTOMATED COUNT: 13 % (ref 10–15)
GFR SERPL CREATININE-BSD FRML MDRD: 74 ML/MIN/1.73M2
GLUCOSE SERPL-MCNC: 110 MG/DL (ref 70–99)
HBA1C MFR BLD: 6.7 %
HCT VFR BLD AUTO: 37.8 % (ref 40–53)
HDLC SERPL-MCNC: 71 MG/DL
HGB BLD-MCNC: 12.2 G/DL (ref 13.3–17.7)
IMM GRANULOCYTES # BLD: 0 10E3/UL
IMM GRANULOCYTES NFR BLD: 0 %
LDLC SERPL CALC-MCNC: 60 MG/DL
LYMPHOCYTES # BLD AUTO: 1.8 10E3/UL (ref 0.8–5.3)
LYMPHOCYTES NFR BLD AUTO: 46 %
MCH RBC QN AUTO: 28.9 PG (ref 26.5–33)
MCHC RBC AUTO-ENTMCNC: 32.3 G/DL (ref 31.5–36.5)
MCV RBC AUTO: 90 FL (ref 78–100)
MONOCYTES # BLD AUTO: 0.4 10E3/UL (ref 0–1.3)
MONOCYTES NFR BLD AUTO: 10 %
NEUTROPHILS # BLD AUTO: 1.4 10E3/UL (ref 1.6–8.3)
NEUTROPHILS NFR BLD AUTO: 37 %
NONHDLC SERPL-MCNC: 74 MG/DL
NRBC # BLD AUTO: 0 10E3/UL
NRBC BLD AUTO-RTO: 0 /100
PLATELET # BLD AUTO: 195 10E3/UL (ref 150–450)
POTASSIUM SERPL-SCNC: 4.4 MMOL/L (ref 3.4–5.3)
PROT SERPL-MCNC: 7.1 G/DL (ref 6.4–8.3)
RBC # BLD AUTO: 4.22 10E6/UL (ref 4.4–5.9)
SODIUM SERPL-SCNC: 142 MMOL/L (ref 136–145)
TRIGL SERPL-MCNC: 70 MG/DL
WBC # BLD AUTO: 3.8 10E3/UL (ref 4–11)

## 2022-12-10 PROCEDURE — 83036 HEMOGLOBIN GLYCOSYLATED A1C: CPT | Mod: 90 | Performed by: PATHOLOGY

## 2022-12-10 PROCEDURE — 85025 COMPLETE CBC W/AUTO DIFF WBC: CPT | Performed by: PATHOLOGY

## 2022-12-10 PROCEDURE — 80053 COMPREHEN METABOLIC PANEL: CPT | Performed by: PATHOLOGY

## 2022-12-10 PROCEDURE — 84153 ASSAY OF PSA TOTAL: CPT | Mod: 90 | Performed by: PATHOLOGY

## 2022-12-10 PROCEDURE — 36415 COLL VENOUS BLD VENIPUNCTURE: CPT | Performed by: PATHOLOGY

## 2022-12-10 PROCEDURE — 99000 SPECIMEN HANDLING OFFICE-LAB: CPT | Performed by: PATHOLOGY

## 2022-12-10 PROCEDURE — 80061 LIPID PANEL: CPT | Performed by: PATHOLOGY

## 2022-12-10 PROCEDURE — 82043 UR ALBUMIN QUANTITATIVE: CPT | Mod: 90 | Performed by: PATHOLOGY

## 2022-12-10 PROCEDURE — 84154 ASSAY OF PSA FREE: CPT | Mod: 90 | Performed by: PATHOLOGY

## 2022-12-11 LAB
CREAT UR-MCNC: 109 MG/DL
MICROALBUMIN UR-MCNC: <12 MG/L
MICROALBUMIN/CREAT UR: NORMAL MG/G{CREAT}
PSA FREE MFR SERPL: 42.37 %
PSA FREE SERPL-MCNC: 1 NG/ML
PSA SERPL-MCNC: 2.36 NG/ML (ref 0–6.5)

## 2022-12-12 ENCOUNTER — OFFICE VISIT (OUTPATIENT)
Dept: FAMILY MEDICINE | Facility: CLINIC | Age: 72
End: 2022-12-12
Payer: COMMERCIAL

## 2022-12-12 VITALS
BODY MASS INDEX: 29.68 KG/M2 | SYSTOLIC BLOOD PRESSURE: 128 MMHG | WEIGHT: 172.9 LBS | DIASTOLIC BLOOD PRESSURE: 69 MMHG | TEMPERATURE: 97.5 F | OXYGEN SATURATION: 98 % | HEART RATE: 67 BPM

## 2022-12-12 DIAGNOSIS — C34.91 ADENOCARCINOMA OF LUNG, STAGE 1, RIGHT (H): ICD-10-CM

## 2022-12-12 DIAGNOSIS — I10 BENIGN ESSENTIAL HYPERTENSION: ICD-10-CM

## 2022-12-12 DIAGNOSIS — Z23 ENCOUNTER FOR IMMUNIZATION: ICD-10-CM

## 2022-12-12 DIAGNOSIS — Z23 HIGH PRIORITY FOR 2019-NCOV VACCINE: ICD-10-CM

## 2022-12-12 DIAGNOSIS — E11.9 TYPE 2 DIABETES MELLITUS TREATED WITHOUT INSULIN (H): Primary | ICD-10-CM

## 2022-12-12 DIAGNOSIS — H25.89 OTHER AGE-RELATED CATARACT OF LEFT EYE: ICD-10-CM

## 2022-12-12 PROCEDURE — 0124A COVID-19 VACCINE BIVALENT BOOSTER 12+ (PFIZER): CPT | Performed by: FAMILY MEDICINE

## 2022-12-12 PROCEDURE — 91312 COVID-19 VACCINE BIVALENT BOOSTER 12+ (PFIZER): CPT | Performed by: FAMILY MEDICINE

## 2022-12-12 PROCEDURE — 99214 OFFICE O/P EST MOD 30 MIN: CPT | Mod: 25 | Performed by: FAMILY MEDICINE

## 2022-12-12 PROCEDURE — 90662 IIV NO PRSV INCREASED AG IM: CPT | Performed by: FAMILY MEDICINE

## 2022-12-12 PROCEDURE — 90471 IMMUNIZATION ADMIN: CPT | Performed by: FAMILY MEDICINE

## 2022-12-12 RX ORDER — METFORMIN HCL 500 MG
1000 TABLET, EXTENDED RELEASE 24 HR ORAL
Qty: 180 TABLET | Refills: 3 | Status: SHIPPED | OUTPATIENT
Start: 2022-12-12 | End: 2023-06-12

## 2022-12-12 NOTE — PROGRESS NOTES
"  Assessment & Plan     Type 2 diabetes mellitus treated without insulin (H)  Today we discussed diabetes mellitus A1c increased to 6.7, he will continue with lifestyle modification lowering carbohydrates and we will increase his dose of metformin to 2 at 1 time for 1000 mg dose at supper.  He is due for eye exam and follow-up of cataracts.  - Adult Eye  Referral  - metFORMIN (GLUCOPHAGE XR) 500 MG 24 hr tablet  Dispense: 180 tablet; Refill: 3  - Hemoglobin A1c  - Comprehensive metabolic panel    Adenocarcinoma of lung, stage 1, right (H)  History of resection in 2019 last CT was in January 2022 he will follow-up with oncology and will discuss next CT scan.  He was hesitant for me to order CT chest.  I reviewed we just did a CT of his neck September 21, 2022 because of concern related to previous possible suspicion of mass which was not found on the CT soft tissue, also he was having cervical spondylosis neck pain symptoms.    Benign essential hypertension  Blood pressure well controlled at this time continue losartan 100 mg and chlorthalidone 25 mg  - Comprehensive metabolic panel    Other age-related cataract of left eye  History of cataracts was planning for surgery but he canceled would like him to follow-up with ophthalmology for next steps.  - Adult Eye  Referral    High priority for 2019-nCoV vaccine  Provided vaccine today  - COVID-19,PF,PFIZER BOOSTER BIVALENT 12+Yrs    Encounter for immunization  Provided vaccine today  - FLU VACCINE HIGH DOSE PRESERVATIVE FREE, AGE =>65 YR               BMI:   Estimated body mass index is 29.68 kg/m  as calculated from the following:    Height as of 9/19/22: 1.626 m (5' 4\").    Weight as of this encounter: 78.4 kg (172 lb 14.4 oz).           Return in about 6 months (around 6/12/2023) for Lab Work, follow-up.    Murali Logan MD  [facility] KENA Mares is a 72 year old, presenting for the following " health issues:  Follow Up (6 month follow up) and Imm/Inj (Flu and covid vaccine )      HPI   Vishnu Viveros is a 72 year old with a history of type 2 diabetes mellitus controlled, hyperlipidemia, hypertension, colon polyps, DJD with spinal stenosis back, elevated PSA, and stage 1 right adenocarcinoma of the lung s/p resection with clear margins  who presents  for primary care visit    Adenocarcinoma [facility] treatment for lung adenocarcinoma had CT in 2022 after follow-up with oncology end . Stable at this time, annual CT in 2023 due through oncology, he will discuss.     Urology  PSA level was elevated had appointment with urology Dr Montaño in 2021 no nodules found recommended follow-up PSA in 6 months. He follow-up with Dr Oglesby who ordered labs performed with diabetes labs. He will review with Dr Oglesby.     Diabetes/ Hyperlipidemia:  A1c was 6.7 elevated from prior. He is on MetforminXR 500 mg daily at supper. He went of his low carbohydrate diet and has been less active. He is motivated to improve his diet.  Atorvastatin 20 mg daily has been effective at controlling his cholesterol. Had EYE exam, cataracts noted. He canceled his eye surgery needs follow-up with ophthalmology.  He worries about problems with memory. Still working full time.     Hypertension His blood pressure is well-controlled on Losartan 100 mg daily and Chlorthalidone 25 mg with potassium supplementation.     HCM  Due for immunizations covid and flu     Social History     Social History Narrative     works as [email or web address] working remotely        His immediate family; his wife has MS and is an RN at [employer], his two children are in good health.           Labs reviewed in EPIC  BP Readings from Last 3 Encounters:   22 128/69   10/31/22 101/67   22 102/67    Wt Readings from Last 3 Encounters:   22 78.4 kg (172 lb 14.4 oz)   22 77.9 kg (171 lb  11.2 oz)   09/09/22 78.9 kg (174 lb)            Immunization History   Administered Date(s) Administered     COVID-19 Vaccine 12+ (Pfizer) 05/07/2022     COVID-19 Vaccine 18+ (Moderna) 02/10/2021, 03/10/2021, 11/04/2021     FLU 6-35 months 10/14/2010     Flu, Unspecified 11/06/2009     Influenza (H1N1) 02/02/2010     Influenza (High Dose) 3 valent vaccine 10/30/2014, 11/19/2015, 11/28/2016, 11/27/2017, 10/31/2018, 12/11/2019     Influenza (IIV3) PF 12/30/2005, 12/05/2006, 12/11/2007, 11/04/2008, 11/06/2009, 10/14/2010, 11/14/2011, 11/12/2012, 11/14/2013     Influenza Vaccine 65+ (Fluzone HD) 11/16/2020, 12/06/2021     Pneumo Conj 13-V (2010&after) 10/30/2014     Pneumococcal 23 valent 11/19/2015     TD (ADULT, 7+) 02/12/2007     TDAP Vaccine (Boostrix) 07/31/2017     Td (Adult), Adsorbed 02/12/2007     Tdap (Adacel,Boostrix) 07/31/2017     Zoster vaccine recombinant adjuvanted (SHINGRIX) 12/11/2019, 08/26/2020           Patient Active Problem List   Diagnosis     Erectile dysfunction     Seasonal allergies     Vitamin D deficiency     Pulmonary nodule     Prostate nodule     Chronic low back pain     Degenerative disc disease, lumbar     Back pain with radiation     DJD (degenerative joint disease) of knee     HL (hearing loss)     Vision impairment     Tear film insufficiency     Recurrent pterygium     Other age-related cataract of left eye     Type 2 diabetes mellitus treated without insulin (H)     Diabetic polyneuropathy associated with diabetes mellitus due to underlying condition (H)     Tubulovillous adenoma of colon     Lumbar radiculopathy     Adenocarcinoma of lung, stage 1, right (H)     Benign essential hypertension     History of colonic polyps     Spinal stenosis, lumbar region, with neurogenic claudication     Elevated prostate specific antigen (PSA)     Obesity with body mass index 30 or greater     Solitary pulmonary nodule     Evaluation of hearing impairment     Combined forms of age-related  cataract of both eyes     Balance problems     Neck pain     Cervical spondylosis with myelopathy     Cervical stenosis of spinal canal     Past Surgical History:   Procedure Laterality Date     APPENDECTOMY OPEN CHILD       ARTHROSCOPY KNEE Left      BIOPSY PROSTATE TRANSRECTAL       EXCISE PTERYGIUM Right      EXCISE PTERYGIUM Right 2015     INJECT EPIDURAL LUMBAR / SACRAL SINGLE N/A 2016    Procedure: INJECT EPIDURAL LUMBAR / SACRAL SINGLE;  Surgeon: Judah Henriquez MD;  Location: [facility] OR     INJECT PARAVERTEBRAL FACET JOINT LUMBAR / SACRAL FIRST Right 2016    Procedure: INJECT PARAVERTEBRAL FACET JOINT LUMBAR / SACRAL FIRST;  Surgeon: Judah Henriquez MD;  Location: [facility] OR     INJECT PARAVERTEBRAL FACET JOINT LUMBAR / SACRAL FIRST Right 2016    Procedure: INJECT PARAVERTEBRAL FACET JOINT LUMBAR / SACRAL FIRST;  Surgeon: Judah Henriquez MD;  Location: [facility] OR     MEDIASTINOSCOPY Right 2019     [place]  Dr Hemanth C[place] 19 bronchoscopy, cervical mediastinoscopy for staging and minimally invasive right thoracoscopic wedge resection 1.8 cm invasive adenocarcinoma [id number] stage 1A2 adenocarcinoma completely resected via minimally invasive right upper lobectomy.     PUNCTALPLASTY Bilateral 2018    Procedure: Bilateral Punctoplasty with Mini [place] Stent;  Surgeon: Levy Blue MD;  Location:  OR     REPAIR ECTROPION BILATERAL Bilateral 2018    Procedure: Bilateral Lower Lid Ectropion Repair with Right Lower Lid Lesion Excision;  Surgeon: Levy Blue MD;  Location: [facility] OR       Social History     Tobacco Use     Smoking status: Former     Years: 5.00     Types: Cigarettes     Quit date: 1981     Years since quittin.1     Smokeless tobacco: Never   Substance Use Topics     Alcohol use: Yes     Alcohol/week: 2.5 standard drinks     Types: 3 Standard drinks or equivalent per week     Family History   Problem Relation Age of Onset     Cerebrovascular Disease  Brother          stroke      Breast Cancer Sister      Kidney Disease Mother         Kidney stones     Other - See Comments Father          in [place] parkinsons or post surgical eye surgery     Cerebral aneurysm Brother      Hypothyroidism Sister      Glaucoma No family hx of      Macular Degeneration No family hx of          Current Outpatient Medications   Medication Sig Dispense Refill     acetaminophen (TYLENOL) 500 MG tablet Take 1-2 tablets (500-1,000 mg) by mouth every 6 hours as needed for mild pain 100 tablet 3     acetaminophen (TYLENOL) 500 MG tablet Take 1-2 tablets (500-1,000 mg) by mouth every 6 hours as needed for pain (arthritis) 120 tablet 3     Ascorbic Acid (VITAMIN C PO) Take 500 mg by mouth once       atorvastatin (LIPITOR) 20 MG tablet Take 1 tablet (20 mg) by mouth daily 90 tablet 3     chlorthalidone (HYGROTON) 25 MG tablet Take 1 tablet (25 mg) by mouth daily 90 tablet 3     Cholecalciferol (VITAMIN D) 1000 UNIT capsule Take 1 capsule by mouth daily.       Cyanocobalamin (VITAMIN B12 PO) Take 1 tablet by mouth Pt states he takes this 2 or 3 times a week.       cyclobenzaprine (FLEXERIL) 10 MG tablet Take 1 tablet (10 mg) by mouth 2 times daily as needed for muscle spasms (take at bedtime for muscle pain) Must not drive 30 tablet 0     diclofenac (VOLTAREN) 1 % topical gel Apply 2 g topically 4 times daily as needed for moderate pain 100 g 3     finasteride (PROSCAR) 5 MG tablet Take 1 tablet (5 mg) by mouth daily 90 tablet 3     gabapentin (NEURONTIN) 300 MG capsule One capsule at bedtime 90 capsule 3     loratadine (CLARITIN) 10 MG tablet Take 1 tablet (10 mg) by mouth daily as needed for allergies 90 tablet 1     losartan (COZAAR) 100 MG tablet Take 1 tablet (100 mg) by mouth every evening 90 tablet 3     melatonin 3 MG tablet Take 1 tablet (3 mg) by mouth nightly as needed for sleep (may take 1-2 if needed) 60 tablet 3     metFORMIN (GLUCOPHAGE XR) 500 MG 24 hr tablet Take 1  "tablet (500 mg) by mouth daily (with dinner) 90 tablet 3     methocarbamol (ROBAXIN) 500 MG tablet Take 1 tablet (500 mg) by mouth 3 times daily as needed for muscle spasms 90 tablet 1     Multiple Vitamin (MULTIVITAMINS PO) Take 1 tablet by mouth daily.       nystatin (MYCOSTATIN) 378917 UNIT/GM external cream Apply topically 2 times daily to affected area. 30 g 4     Omega-3 Fatty Acids (FISH OIL PO) Take 1 capsule by mouth as needed       order for DME Back brace 1 Device 0     polyethylene glycol (GOLYTELY) 236 g suspension Take 4,000 mLs by mouth See Admin Instructions Refer to My chart message 4000 mL 0     potassium chloride ER (KLOR-CON M) 20 MEQ CR tablet Take 1 tablet (20 mEq) by mouth daily 90 tablet 3     tadalafil (CIALIS) 20 MG tablet Take 1 tablet (20 mg) by mouth every 48 hours as needed (sex) 30 tablet 3     tamsulosin (FLOMAX) 0.4 MG capsule Take 1 capsule (0.4 mg) by mouth daily 90 capsule 3     Allergies   Allergen Reactions     Pollen Extract      Other reaction(s): Headache  Sinus congestion, sinus \"drainage\", sneezing     Seasonal Allergies      Sinus congestion, sinus \"drainage\", sneezing     Nkda [No Known Drug Allergies]      Recent Labs   Lab Test 12/10/22  1021 09/21/22  1727 06/13/22  1550 01/24/22  1145 12/06/21  0947 06/07/21  1044 11/16/20  1026 05/27/20  0957 10/26/19  1040 11/27/17  1107 07/31/17  1031   0000   A1C 6.7*  --  5.8*  --  5.9* 5.8* 5.8*   < > 6.2*   < > 6.3*  --    LDL 60  --   --   --  47  --  39  --  58   < > 52  --    HDL 71  --   --   --  59  --  64  --  49   < > 60  --    TRIG 70  --   --   --  94  --  116  --  91   < > 54  --    ALT 17  --  28  --  27  --  27   < > 26   < > 29  --    CR 1.06 1.0 1.02   < > 1.01 0.95 0.93   < > 0.96   < > 0.79   < >   GFRESTIMATED 74 >60 78   < > 74 80 82   < > 79   < > >90  Non  GFR Calc     < >   GFRESTBLACK  --   --   --   --   --  >90 >90   < > >90   < > >90  African American GFR Calc    --    POTASSIUM 4.4  --  " 4.0  --  4.0 3.7 3.5   < > 3.8   < > 4.2   < >   TSH  --   --   --   --   --   --   --   --  1.56  --  1.67  --     < > = values in this interval not displayed.      Review of Systems   Problem list, PMH, Surgical HX, FH, SH, allergies, medications,immunizations reviewed and updated in Epic.  ROS negative other than noted in HPI and ROS.        Objective    /69 (BP Location: Right arm, Patient Position: Sitting, Cuff Size: Adult Regular)   Pulse 67   Temp 97.5  F (36.4  C)   Wt 78.4 kg (172 lb 14.4 oz)   SpO2 98%   BMI 29.68 kg/m    Body mass index is 29.68 kg/m .  Physical Exam     GENERAL APPEARANCE: healthy, alert and no distress BMI 29.68  EYES: Eyes grossly normal to inspection, PERRL and conjunctivae and sclerae normal  HENT: ear canals and TM's normal, mask removed breifly mouth without ulcers or lesions, oropharynx clear and oral mucous membranes moist, no thrush  NECK: no adenopathy, no asymmetry, masses, or scars and thyroid normal to palpation  RESP: lungs clear to auscultation - no rales, rhonchi or wheezes  CV: regular rate and rhythm, normal S1 S2, no S3 or S4, no murmur, click or rub, no peripheral edema and peripheral pulses strong  Chest: well healed surgical scars from laparoscopic chest procedure  ABDOMEN: soft,obese, nontender, no hepatosplenomegaly, no masses and bowel sounds normal, well-healed abdominal scar  MS: Kyphosis and antalgic gait with left leg weakness compared to right.  SKIN: no suspicious lesions or rashes, congenital nevi in upper back that has been stable  NEURO: Normal strength and tone, sensory exam grossly normal, mentation intact and speech normal  PSYCH: mentation appears normal and affect normal  PHQ-2 Score:     PHQ-2 ( 1999 Pfizer) 6/13/2022 1/5/2022   Q1: Little interest or pleasure in doing things 0 0   Q2: Feeling down, depressed or hopeless 0 -   PHQ-2 Score 0 -   PHQ-2 Total Score (12-17 Years)- Positive if 3 or more points; Administer PHQ-A if positive -  -   Q1: Little interest or pleasure in doing things - -   Q2: Feeling down, depressed or hopeless - -   PHQ-2 Score - -      Latest Reference Range & Units 12/10/22 10:21 12/10/22 12:18   Sodium 136 - 145 mmol/L 142    Potassium 3.4 - 5.3 mmol/L 4.4    Chloride 98 - 107 mmol/L 103    Carbon Dioxide (CO2) 22 - 29 mmol/L 30 (H)    Urea Nitrogen 8.0 - 23.0 mg/dL 23.7 (H)    Creatinine 0.67 - 1.17 mg/dL 1.06    GFR Estimate >60 mL/min/1.73m2 74    Calcium 8.8 - 10.2 mg/dL 9.9    Anion Gap 7 - 15 mmol/L 9    Albumin 3.5 - 5.2 g/dL 4.4    Protein Total 6.4 - 8.3 g/dL 7.1    Alkaline Phosphatase 40 - 129 U/L 55    ALT 10 - 50 U/L 17    AST 10 - 50 U/L 19    Albumin Urine mg/g Cr   See Comment   Albumin Urine mg/L mg/L  <12.0   Bilirubin Total <=1.2 mg/dL 0.5    Cholesterol <200 mg/dL 145    Creatinine Urine mg/dL  109.0   Glucose 70 - 99 mg/dL 110 (H)    HDL Cholesterol >=40 mg/dL 71    Hemoglobin A1C <5.7 % 6.7 (H)    LDL Cholesterol Calculated <=100 mg/dL 60    Non HDL Cholesterol <130 mg/dL 74    PSA Tumor Marker 0.00 - 6.50 ng/mL 2.36    PSA Free ng/mL 1.0    Prostate Specific Antigen, Percent Free % 42.37    Triglycerides <150 mg/dL 70     Above reviewed at visit. He will review PSA with Dr Oglesby.  Murali Logan MD

## 2022-12-13 NOTE — RESULT ENCOUNTER NOTE
Dear Vishnu Viveros   We reviewed at appointment need to improve diabetes control as A1c increased to 6.7 from previous 5.8.  I recommended increasing metformin to 1000 mg taking 2 tablets once daily at supper. No protein in urine good result.  Your complete blood count including hemoglobin,white cell count for infection, kidney, liver, calcium, cholesterol, sodium and potassium were normal or within acceptable range. Continue all cares.  Other labs PSA ordered by another provider please review results with ordering provider.  Best wishes,  Murali Logan MD

## 2022-12-21 NOTE — LETTER
3/5/2019       RE: Vishnu Viveros  [address]  [place] [address]     Dear Colleague,    Thank you for referring your patient, Vishnu Viveros, to the [facility] at [facility]. Please see a copy of my visit note below.    LUNG NODULE & INTERVENTIONAL PULMONARY CLINIC  CLINICS & SURGERY [place], [facility]     Vishnu Viveros MRN# [id number]   Age: 68 year old YOB: 1950     Reason for Consultation: lung nodule(s)    Requesting Physician: Zuleika Rubio, APRN CNP  [address] [place] [address]       Assessment and Plan:    1. Established solitary pulmonary lung nodule(s). Given the characteristics on current/previous imaging and risk factors; I would classify this to be Intermediate (6-65%) risk for cancer. Although risk factors are low for primary lung cancer it has slowly grown from 8.5mm to 13.5mm in the past 9years. Plan PET-CT to risk stratify and PFT.         Billing: The patient was seen and examined by me and the findings, assessment, and plan as documented was explained to the patient/family who expressed understand.     Marco Lopez MD   of Medicine  Interventional Pulmonology  Department of Pulmonary, Allergy, Critical Care and Sleep Medicine   [facility]  Pager: [phone]          History:     Vishnu Viveros is a 68 year old male with sig h/o for DM, HTN, pulmonary nodule and hyperlipidemia who is here for evaluation/followup of lung nodule(s).    - No new resp sx or complaints. Denies dyspnea or cough.   - Had RUL nodule that has been followed since 2010.   - Personal hx of cancer: No. Up-to-date on c-scope.   - Family hx of cancer: No lung cancer.   - Exposure hx: Denies asbestos or radon exposure   - Tobacco hx: Past Smoker: 0.5ppd for 5years. Quit 40yrs ago.   - My interpretation of the images relevant for  this visit includes: RUL nodule   - My interpretation of the PFT's relevant for this visit includes: Normal     Culprit Nodule(s):   1. Spiculated right upper lobe nodule on series 4 image 103  measures 13.4 x 10.2 mm, showing slight interval growth since 2010  when it measured 8.5 x 6.4, measuring 9.7 x 7.7 in 2011, measuring  11.7 x 9.6 in 2012.    Other active medical problems include:   - Has DM, HTN and hyperlipidemia. Stable.             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 eyelid, unspecified laterality      History of tobacco use      HLD (hyperlipidemia)      HTN (hypertension)      Obesity (BMI 30.0-34.9)      Pulmonary nodule            Past Surgical History:      Past Surgical History:   Procedure Laterality Date     APPENDECTOMY OPEN CHILD       ARTHROSCOPY KNEE Left      BIOPSY PROSTATE TRANSRECTAL       EXCISE PTERYGIUM Right 1989     EXCISE PTERYGIUM Right 2/12/2015     INJECT EPIDURAL LUMBAR / SACRAL SINGLE N/A 7/21/2016    Procedure: INJECT EPIDURAL LUMBAR / SACRAL SINGLE;  Surgeon: Judah Henriquez MD;  Location: [facility] OR     INJECT PARAVERTEBRAL FACET JOINT LUMBAR / SACRAL FIRST Right 8/18/2016    Procedure: INJECT PARAVERTEBRAL FACET JOINT LUMBAR / SACRAL FIRST;  Surgeon: Judah Henriquez MD;  [facility]: [facility]     INJECT PARAVERTEBRAL FACET JOINT LUMBAR / SACRAL FIRST Right 9/8/2016    Procedure: INJECT PARAVERTEBRAL FACET JOINT LUMBAR / SACRAL FIRST;  Surgeon: Judah Henriquez MD;  Location: [facility]     PUNCTALPLASTY Bilateral 12/19/2018    Procedure: Bilateral Punctoplasty with Mini [place] Stent;  Surgeon: Levy Blue MD;  Location: [facility] OR     REPAIR ECTROPION BILATERAL Bilateral 12/19/2018    Procedure: Bilateral Lower Lid Ectropion Repair with Right Lower Lid Lesion Excision;  Surgeon: Levy Blue MD;  Location: [facility] OR           "Social History:     Social History     Tobacco Use     Smoking status: Former Smoker     Years: 5.00     Types: Cigarettes     Last attempt to quit: 1981     Years since quittin.3     Smokeless tobacco: Never Used   Substance Use Topics     Alcohol use: Yes     Alcohol/week: 1.5 oz     Types: 3 Standard drinks or equivalent per week     Frequency: 4 or more times a week     Drinks per session: 1 or 2     Binge frequency: Never          Family History:     Family History   Problem Relation Age of Onset     Cerebrovascular Disease Brother          stroke      Breast Cancer Sister      Kidney Disease Mother         Kidney stones     Other - See Comments Father          in [place] parkinsons or post surgical eye surgery     Cerebral aneurysm Brother      Hypothyroidism Sister      Glaucoma No family hx of      Macular Degeneration No family hx of            Allergies:      Allergies   Allergen Reactions     Seasonal Allergies      Sinus congestion, sinus \"drainage\", sneezing     Nkda [No Known Drug Allergies]           Medications:     Current Outpatient Medications   Medication Sig     acetaminophen (TYLENOL) 500 MG tablet Take 1-2 tablets (500-1,000 mg) by mouth every 6 hours as needed for pain (arthritis)     Ascorbic Acid (VITAMIN C PO) Take 500 mg by mouth once     atorvastatin (LIPITOR) 20 MG tablet Take 1 tablet (20 mg) by mouth daily (Patient taking differently: Take 20 mg by mouth every evening )     Cholecalciferol (VITAMIN D) 1000 UNIT capsule Take 1 capsule by mouth daily.     Cyanocobalamin (VITAMIN B12 PO) Take 1 tablet by mouth. Pt states he takes this 2 or 3 times a week..      gabapentin (NEURONTIN) 300 MG capsule One capsule at bedtime for one week the increase to twice daily     HYDROcodone-acetaminophen (NORCO) 5-325 MG tablet Take 1-2 tablets by mouth every 4 hours as needed for moderate to severe pain     losartan (COZAAR) 100 MG tablet Take 1 tablet (100 mg) by mouth daily " (Patient taking differently: Take 100 mg by mouth every evening )     metFORMIN (GLUCOPHAGE-XR) 500 MG 24 hr tablet Take 1 tablet (500 mg) by mouth daily (with dinner)     Multiple Vitamin (MULTIVITAMINS PO) Take 1 tablet by mouth daily.     Omega-3 Fatty Acids (FISH OIL PO) Take 1 capsule by mouth as needed     sildenafil (REVATIO) 20 MG tablet Take 1-5 tablets (20 mg) by mouth as needed for sexual activity.  Never use with nitroglycerin, terazosin or doxazosin.     tamsulosin (FLOMAX) 0.4 MG capsule Take 1 capsule (0.4 mg) by mouth daily     albuterol (PROAIR HFA/PROVENTIL HFA/VENTOLIN HFA) 108 (90 Base) MCG/ACT inhaler Inhale 2 puffs into the lungs every 6 hours (Patient not taking: Reported on 3/5/2019)     aspirin 81 MG tablet Take 1 tablet by mouth every morning      loratadine (CLARITIN) 10 MG tablet Take 1 tablet (10 mg) by mouth daily as needed for allergies (Patient not taking: Reported on 3/5/2019)     neomycin-polymyxin-dexamethasone (MAXITROL) 3.5-97501-0.1 SUSP ophthalmic susp Place 1 drop into both eyes 2 times daily for 10 days (Patient not taking: Reported on 3/5/2019)     order for DME Back brace (Patient not taking: Reported on 3/5/2019)     VIRTUSSIN A/C 100-10 MG/5ML solution 5-10 MLS PO Q 4 H PRN COUGH (Patient not taking: Reported on 3/5/2019)     No current facility-administered medications for this visit.      Facility-Administered Medications Ordered in Other Visits   Medication     iohexol (OMNIPAQUE) 300 mg/mL injection 10 mL          Review of Systems:     CONSTITUTIONAL: negative for fever, chills, change in weight  INTEGUMENTARY/SKIN: no rash or obvious new lesions  ENT/MOUTH: no sore throat, new sinus pain or nasal drainage  RESP: see interval history  CV: negative for chest pain, palpitations or peripheral edema  GI: no nausea, vomiting, change in stools  : no dysuria  MUSCULOSKELETAL: no myalgias, arthralgias  ENDOCRINE: blood sugars with adequate control  PSYCHIATRIC: mood  stable  LYMPHATIC: no new lymphadenopathy  HEME: no bleeding or easy bruisability  NEURO: no numbness, weakness, headaches         Physical Exam:     Temp:  [97.8  F (36.6  C)] 97.8  F (36.6  C)  Pulse:  [64] 64  BP: (143-147)/(81-84) 143/81  SpO2:  [98 %] 98 %  Wt Readings from Last 4 Encounters:   03/05/19 86.3 kg (190 lb 4.8 oz)   01/03/19 88.5 kg (195 lb)   01/02/19 88.5 kg (195 lb 1.6 oz)   12/19/18 86.2 kg (190 lb)     Constitutional:   Awake, alert and in no apparent distress     Eyes:   Nonicteric, STEVEN     ENT:    Trachea is midline. No gross neck abnormalities      Neck:   Supple without supraclavicular or cervical lymphadenopathy     Lungs:   Good air flow.  No crackles. No rhonchi.  No wheezes.     Cardiovascular:   Normal S1 and S2.  RRR.  No murmur, gallop or rub.  Radial, DP and PT pulses normal and symmetric     Abdomen:   NABS, soft, nontender, nondistended.  No HSM.     Musculoskeletal:   No edema.      Neurologic:   Alert and conversant. Cranial nerves  intact.       Skin:   Warm, dry.  No rash on limited exam.           Current Laboratory Data:   All laboratory and imaging data reviewed.           Previous Cardiology Imaging   No results found for this or any previous visit (from the past 8760 hour(s)).               Marco Lopez MD       Opt out

## 2023-01-02 ENCOUNTER — TELEPHONE (OUTPATIENT)
Dept: PHYSICAL MEDICINE AND REHAB | Facility: CLINIC | Age: 73
End: 2023-01-02

## 2023-01-02 NOTE — TELEPHONE ENCOUNTER
Left message for pt, to call back and discuss canceling appt with Dr Rob for 1-4-23. Per Dr Rob pt is seeing pain clinic for the same reason.

## 2023-01-24 ENCOUNTER — ONCOLOGY VISIT (OUTPATIENT)
Dept: ONCOLOGY | Facility: CLINIC | Age: 73
End: 2023-01-24
Attending: INTERNAL MEDICINE
Payer: COMMERCIAL

## 2023-01-24 VITALS
HEART RATE: 61 BPM | SYSTOLIC BLOOD PRESSURE: 117 MMHG | BODY MASS INDEX: 30.21 KG/M2 | RESPIRATION RATE: 16 BRPM | OXYGEN SATURATION: 100 % | WEIGHT: 176 LBS | TEMPERATURE: 97.9 F | DIASTOLIC BLOOD PRESSURE: 69 MMHG

## 2023-01-24 DIAGNOSIS — C34.91 ADENOCARCINOMA OF LUNG, STAGE 1, RIGHT (H): Primary | ICD-10-CM

## 2023-01-24 PROCEDURE — 99213 OFFICE O/P EST LOW 20 MIN: CPT | Performed by: INTERNAL MEDICINE

## 2023-01-24 PROCEDURE — G0463 HOSPITAL OUTPT CLINIC VISIT: HCPCS

## 2023-01-24 PROCEDURE — G0463 HOSPITAL OUTPT CLINIC VISIT: HCPCS | Performed by: INTERNAL MEDICINE

## 2023-01-24 ASSESSMENT — PAIN SCALES - GENERAL: PAINLEVEL: SEVERE PAIN (6)

## 2023-01-24 NOTE — PROGRESS NOTES
REASON FOR VISIT:    CANCER STAGE:  Cancer Staging   Adenocarcinoma of lung, stage 1, right (H)  Staging form: Lung, AJCC 8th Edition  - Clinical: No stage assigned - Unsigned        HISTORY OF PRESENT ILLNESS:    Vishnu is doing well. He really has no complaints. He is concerned about gaining weight. He has not been exercising as much. He would like to return to the gym.  He otherwise feels well. He has no shortness of breath. He is eating an ddrinking well.  He has had some neck pain, but a CT was negative in September.      Review Of Systems  10-point review of systems were negative except as noted in HPI.        EXAM:  Blood pressure 117/69, pulse 61, temperature 97.9  F (36.6  C), temperature source Oral, resp. rate 16, weight 79.8 kg (176 lb), SpO2 100 %.  GEN: alert and oriented x 3, nad  HEENT: perrla, eomi, sclera anicteric, oral mucosa moist without thrush  NECK: supple, no palpable LAD  HT: reg rate and rhythm, no murmurs  LUNGS: clear to auscultation bilaterally  ABD: soft, nt, nd, +bs x 4  EXT: no clubbing, cyanosis, or edema  NEURO: CN 2-12 intact, MS 5/5 b/l    Current Outpatient Medications   Medication Sig Dispense Refill     acetaminophen (TYLENOL) 500 MG tablet Take 1-2 tablets (500-1,000 mg) by mouth every 6 hours as needed for mild pain 100 tablet 3     Ascorbic Acid (VITAMIN C PO) Take 500 mg by mouth once       atorvastatin (LIPITOR) 20 MG tablet Take 1 tablet (20 mg) by mouth daily 90 tablet 3     chlorthalidone (HYGROTON) 25 MG tablet Take 1 tablet (25 mg) by mouth daily 90 tablet 3     Cholecalciferol (VITAMIN D) 1000 UNIT capsule Take 1 capsule by mouth daily.       finasteride (PROSCAR) 5 MG tablet Take 1 tablet (5 mg) by mouth daily 90 tablet 3     gabapentin (NEURONTIN) 300 MG capsule One capsule at bedtime 90 capsule 3     losartan (COZAAR) 100 MG tablet Take 1 tablet (100 mg) by mouth every evening 90 tablet 3     metFORMIN (GLUCOPHAGE XR) 500 MG 24 hr tablet Take 2 tablets (1,000  mg) by mouth daily (with dinner) 180 tablet 3     Multiple Vitamin (MULTIVITAMINS PO) Take 1 tablet by mouth daily.       potassium chloride ER (KLOR-CON M) 20 MEQ CR tablet Take 1 tablet (20 mEq) by mouth daily 90 tablet 3     acetaminophen (TYLENOL) 500 MG tablet Take 1-2 tablets (500-1,000 mg) by mouth every 6 hours as needed for pain (arthritis) 120 tablet 3     Cyanocobalamin (VITAMIN B12 PO) Take 1 tablet by mouth Pt states he takes this 2 or 3 times a week. (Patient not taking: Reported on 1/24/2023)       cyclobenzaprine (FLEXERIL) 10 MG tablet Take 1 tablet (10 mg) by mouth 2 times daily as needed for muscle spasms (take at bedtime for muscle pain) Must not drive (Patient not taking: Reported on 1/24/2023) 30 tablet 0     diclofenac (VOLTAREN) 1 % topical gel Apply 2 g topically 4 times daily as needed for moderate pain (Patient not taking: Reported on 1/24/2023) 100 g 3     loratadine (CLARITIN) 10 MG tablet Take 1 tablet (10 mg) by mouth daily as needed for allergies (Patient not taking: Reported on 1/24/2023) 90 tablet 1     melatonin 3 MG tablet Take 1 tablet (3 mg) by mouth nightly as needed for sleep (may take 1-2 if needed) (Patient not taking: Reported on 1/24/2023) 60 tablet 3     methocarbamol (ROBAXIN) 500 MG tablet Take 1 tablet (500 mg) by mouth 3 times daily as needed for muscle spasms (Patient not taking: Reported on 1/24/2023) 90 tablet 1     nystatin (MYCOSTATIN) 599738 UNIT/GM external cream Apply topically 2 times daily to affected area. (Patient not taking: Reported on 1/24/2023) 30 g 4     Omega-3 Fatty Acids (FISH OIL PO) Take 1 capsule by mouth as needed (Patient not taking: Reported on 1/24/2023)       order for DME Back brace 1 Device 0     tadalafil (CIALIS) 20 MG tablet Take 1 tablet (20 mg) by mouth every 48 hours as needed (sex) (Patient not taking: Reported on 1/24/2023) 30 tablet 3     tamsulosin (FLOMAX) 0.4 MG capsule Take 1 capsule (0.4 mg) by mouth daily (Patient not  taking: Reported on 1/24/2023) 90 capsule 3           Recent Labs   Lab Test 12/10/22  1021   WBC 3.8*   HGB 12.2*        @labrcent[na,potassium,chloride,co2,bun,cr@  Recent Labs   Lab Test 12/10/22  1021   PROTTOTAL 7.1   ALBUMIN 4.4   BILITOTAL 0.5   AST 19   ALT 17   ALKPHOS 55         No results found for this or any previous visit (from the past 744 hour(s)).        Assessment/Plan  Stage I NSCLC - He is 4 years post surgery.  He clinically is doing well. He is concerned about radiation exposure from repeated scans.  We discussed the rationale for CT surveillance.  He would like to wait for 6 months to do this.  I think this is reasonable.  His risk of recurrence is relatively low.  He is also concerned about the risk of contrast with scans.  We discussed that we could do non-contrast CT imaging.  His risk of new lung cancer is the biggest concern and that will only increase with time.  Thus, we agreed to do a noncontrast CT in 6 months and then again in one year.     I spent 25 minutes with the patient.  >50% of the time was spent in counseling and coordination of care.    Chava Kelley   of Medicine  Division of Hematology, Oncology, and Transplantation

## 2023-01-24 NOTE — LETTER
1/24/2023         RE: Vishnu Viveros  [address]  [place] [address]        Dear Colleague,    Thank you for referring your patient, Vishnu Viveros, to the [facility] [place] [facility]. Please see a copy of my visit note below.    REASON FOR VISIT:    CANCER STAGE:  Cancer Staging   Adenocarcinoma of lung, stage 1, right (H)  Staging form: Lung, AJCC 8th Edition  - Clinical: No stage assigned - Unsigned        HISTORY OF PRESENT ILLNESS:    Vishnu is doing well. He really has no complaints. He is concerned about gaining weight. He has not been exercising as much. He would like to return to the gym.  He otherwise feels well. He has no shortness of breath. He is eating an ddrinking well.  He has had some neck pain, but a CT was negative in September.      Review Of Systems  10-point review of systems were negative except as noted in HPI.        EXAM:  Blood pressure 117/69, pulse 61, temperature 97.9  F (36.6  C), temperature source Oral, resp. rate 16, weight 79.8 kg (176 lb), SpO2 100 %.  GEN: alert and oriented x 3, nad  HEENT: perrla, eomi, sclera anicteric, oral mucosa moist without thrush  NECK: supple, no palpable LAD  HT: reg rate and rhythm, no murmurs  LUNGS: clear to auscultation bilaterally  ABD: soft, nt, nd, +bs x 4  EXT: no clubbing, cyanosis, or edema  NEURO: CN 2-12 intact, MS 5/5 b/l    Current Outpatient Medications   Medication Sig Dispense Refill     acetaminophen (TYLENOL) 500 MG tablet Take 1-2 tablets (500-1,000 mg) by mouth every 6 hours as needed for mild pain 100 tablet 3     Ascorbic Acid (VITAMIN C PO) Take 500 mg by mouth once       atorvastatin (LIPITOR) 20 MG tablet Take 1 tablet (20 mg) by mouth daily 90 tablet 3     chlorthalidone (HYGROTON) 25 MG tablet Take 1 tablet (25 mg) by mouth daily 90 tablet 3     Cholecalciferol (VITAMIN D) 1000 UNIT capsule Take 1 capsule by mouth daily.       finasteride (PROSCAR) 5 MG tablet Take 1 tablet (5 mg) by mouth  daily 90 tablet 3     gabapentin (NEURONTIN) 300 MG capsule One capsule at bedtime 90 capsule 3     losartan (COZAAR) 100 MG tablet Take 1 tablet (100 mg) by mouth every evening 90 tablet 3     metFORMIN (GLUCOPHAGE XR) 500 MG 24 hr tablet Take 2 tablets (1,000 mg) by mouth daily (with dinner) 180 tablet 3     Multiple Vitamin (MULTIVITAMINS PO) Take 1 tablet by mouth daily.       potassium chloride ER (KLOR-CON M) 20 MEQ CR tablet Take 1 tablet (20 mEq) by mouth daily 90 tablet 3     acetaminophen (TYLENOL) 500 MG tablet Take 1-2 tablets (500-1,000 mg) by mouth every 6 hours as needed for pain (arthritis) 120 tablet 3     Cyanocobalamin (VITAMIN B12 PO) Take 1 tablet by mouth Pt states he takes this 2 or 3 times a week. (Patient not taking: Reported on 1/24/2023)       cyclobenzaprine (FLEXERIL) 10 MG tablet Take 1 tablet (10 mg) by mouth 2 times daily as needed for muscle spasms (take at bedtime for muscle pain) Must not drive (Patient not taking: Reported on 1/24/2023) 30 tablet 0     diclofenac (VOLTAREN) 1 % topical gel Apply 2 g topically 4 times daily as needed for moderate pain (Patient not taking: Reported on 1/24/2023) 100 g 3     loratadine (CLARITIN) 10 MG tablet Take 1 tablet (10 mg) by mouth daily as needed for allergies (Patient not taking: Reported on 1/24/2023) 90 tablet 1     melatonin 3 MG tablet Take 1 tablet (3 mg) by mouth nightly as needed for sleep (may take 1-2 if needed) (Patient not taking: Reported on 1/24/2023) 60 tablet 3     methocarbamol (ROBAXIN) 500 MG tablet Take 1 tablet (500 mg) by mouth 3 times daily as needed for muscle spasms (Patient not taking: Reported on 1/24/2023) 90 tablet 1     nystatin (MYCOSTATIN) 862390 UNIT/GM external cream Apply topically 2 times daily to affected area. (Patient not taking: Reported on 1/24/2023) 30 g 4     Omega-3 Fatty Acids (FISH OIL PO) Take 1 capsule by mouth as needed (Patient not taking: Reported on 1/24/2023)       order for DME Back brace  1 Device 0     tadalafil (CIALIS) 20 MG tablet Take 1 tablet (20 mg) by mouth every 48 hours as needed (sex) (Patient not taking: Reported on 1/24/2023) 30 tablet 3     tamsulosin (FLOMAX) 0.4 MG capsule Take 1 capsule (0.4 mg) by mouth daily (Patient not taking: Reported on 1/24/2023) 90 capsule 3           Recent Labs   Lab Test 12/10/22  1021   WBC 3.8*   HGB 12.2*        @labrcent[na,potassium,chloride,co2,bun,cr@  Recent Labs   Lab Test 12/10/22  1021   PROTTOTAL 7.1   ALBUMIN 4.4   BILITOTAL 0.5   AST 19   ALT 17   ALKPHOS 55         No results found for this or any previous visit (from the past 744 hour(s)).        Assessment/Plan  Stage I NSCLC - He is 4 years post surgery.  He clinically is doing well. He is concerned about radiation exposure from repeated scans.  We discussed the rationale for CT surveillance.  He would like to wait for 6 months to do this.  I think this is reasonable.  His risk of recurrence is relatively low.  He is also concerned about the risk of contrast with scans.  We discussed that we could do non-contrast CT imaging.  His risk of new lung cancer is the biggest concern and that will only increase with time.  Thus, we agreed to do a noncontrast CT in 6 months and then again in one year.     I spent 25 minutes with the patient.  >50% of the time was spent in counseling and coordination of care.    Chava Kelley   of Medicine  Division of Hematology, Oncology, and Transplantation

## 2023-01-24 NOTE — NURSING NOTE
"Oncology Rooming Note    January 24, 2023 2:03 PM   Vishnu Viveros is a 72 year old male who presents for:    Chief Complaint   Patient presents with     Oncology Clinic Visit     Lung cancer     Initial Vitals: /69   Pulse 61   Temp 97.9  F (36.6  C) (Oral)   Resp 16   Wt 79.8 kg (176 lb)   SpO2 100%   BMI 30.21 kg/m   Estimated body mass index is 30.21 kg/m  as calculated from the following:    Height as of 9/19/22: 1.626 m (5' 4\").    Weight as of this encounter: 79.8 kg (176 lb). Body surface area is 1.9 meters squared.  Severe Pain (6) Comment: Data Unavailable   No LMP for male patient.  Allergies reviewed: Yes  Medications reviewed: Yes    Medications: Medication refills not needed today.  Pharmacy name entered into [facility]:    [id number] - [facility], [place] [facility] [address] [id number] - [place], [place] - [address] [facility]    Clinical concerns: none       Juju Lopez CMA            "

## 2023-02-02 ENCOUNTER — OFFICE VISIT (OUTPATIENT)
Dept: OPHTHALMOLOGY | Facility: CLINIC | Age: 73
End: 2023-02-02
Attending: OPHTHALMOLOGY
Payer: COMMERCIAL

## 2023-02-02 DIAGNOSIS — H11.042 STATIONARY PERIPHERAL PTERYGIUM OF LEFT EYE: ICD-10-CM

## 2023-02-02 DIAGNOSIS — H25.13 AGE-RELATED NUCLEAR CATARACT OF BOTH EYES: Primary | ICD-10-CM

## 2023-02-02 DIAGNOSIS — E11.9 TYPE 2 DIABETES MELLITUS TREATED WITHOUT INSULIN (H): ICD-10-CM

## 2023-02-02 PROCEDURE — 92014 COMPRE OPH EXAM EST PT 1/>: CPT | Mod: GC | Performed by: OPHTHALMOLOGY

## 2023-02-02 PROCEDURE — 92134 CPTRZ OPH DX IMG PST SGM RTA: CPT | Mod: 26 | Performed by: OPHTHALMOLOGY

## 2023-02-02 PROCEDURE — G0463 HOSPITAL OUTPT CLINIC VISIT: HCPCS

## 2023-02-02 PROCEDURE — 92134 CPTRZ OPH DX IMG PST SGM RTA: CPT | Performed by: OPHTHALMOLOGY

## 2023-02-02 ASSESSMENT — CONF VISUAL FIELD
OD_INFERIOR_NASAL_RESTRICTION: 0
OD_INFERIOR_TEMPORAL_RESTRICTION: 0
OS_NORMAL: 1
OS_INFERIOR_TEMPORAL_RESTRICTION: 0
OS_SUPERIOR_NASAL_RESTRICTION: 0
METHOD: COUNTING FINGERS
OD_SUPERIOR_TEMPORAL_RESTRICTION: 0
OD_SUPERIOR_NASAL_RESTRICTION: 0
OS_INFERIOR_NASAL_RESTRICTION: 0
OS_SUPERIOR_TEMPORAL_RESTRICTION: 0
OD_NORMAL: 1

## 2023-02-02 ASSESSMENT — REFRACTION_WEARINGRX
OD_SPHERE: +1.25
OD_ADD: +2.50
OS_SPHERE: +1.00
OD_CYLINDER: +0.50
OD_AXIS: 110
OS_CYLINDER: +0.50
OS_ADD: +2.50
OS_AXIS: 015

## 2023-02-02 ASSESSMENT — CUP TO DISC RATIO
OS_RATIO: 0.5
OD_RATIO: 0.5

## 2023-02-02 ASSESSMENT — VISUAL ACUITY
METHOD: SNELLEN - LINEAR
OD_CC: 20/30
OS_CC: 20/25
OS_CC+: -3
OD_CC+: -2

## 2023-02-02 ASSESSMENT — SLIT LAMP EXAM - LIDS
COMMENTS: NORMAL
COMMENTS: NORMAL

## 2023-02-02 ASSESSMENT — TONOMETRY
OD_IOP_MMHG: 14
OS_IOP_MMHG: 18
IOP_METHOD: TONOPEN

## 2023-02-02 ASSESSMENT — EXTERNAL EXAM - LEFT EYE: OS_EXAM: NORMAL

## 2023-02-02 ASSESSMENT — EXTERNAL EXAM - RIGHT EYE: OD_EXAM: NORMAL

## 2023-02-02 NOTE — PROGRESS NOTES
Chief Complaint(s) and History of Present Illness(es)     Diabetic Eye Exam Follow Up            Comments: DM type 2            Comments    Pt states no change in VA since last visit  C/o trouble seeing, some glare at night   No flashes, floaters or eye pain  LBS: does not check   Last A1c:6.7  Lab Results       Component                Value               Date                       A1C                      6.7                 12/10/2022                 A1C                      5.8                 06/13/2022                 A1C                      5.9                 12/06/2021                 A1C                      5.8                 06/07/2021                 A1C                      5.8                 11/16/2020                 A1C                      6.0                 05/27/2020                 A1C                      6.2                 10/26/2019                 A1C                      6.4                 06/12/2019          Wendi Campbell COT 1:17 PM February 2, 2023        Review of systems for the eyes was negative other than the pertinent positives/negatives listed in the HPI.    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 Type 2 diabetes mellitus treated without insulin (H)    2. Age-related nuclear cataract of both eyes      Cataract, right eye > left eye   Visually significant both eyes   Risks, benefits and alternatives to cataract extraction/IOL implantation discussed  Recommend clearance from retina prior to proceeding (see below)     Special equipment/needs: none     Anesthesia:Topical  Dilation:Good  Iris expansion:Not  needed  Pseudoexfoliation: No pseudoexfoliation  Trypan Blue: Yes, both eyes   Goal emmetropia both eyes   Right eye first    Macular pigment changes, subretinal fluid both eyes   Possible history of CSCR each eye   Seen by Dr. Mckeon in 2018, maculopathy deemed consistent with possible old CSCR or pattern dystrophy  Repeat OCT mac today with worsening subretinal fluid right eye.  Stable to improved left eye   Recommend eval +/- treatment prior to proceeding to cataract extraction     DM2 without retinopathy  No diabetic retinopathy in either eye on exam today  Discussed the importance of good blood sugar control and blood pressure control under PCP supervision and yearly exams  Recheck annually    Patient disposition:   Return for Dr. Mckeon for eval/treatment of subretinal fluid in both eyes.   Call when ready to schedule surgery     Davida Palomares MD  Ophthalmology Resident, PGY-4    Attending Physician Attestation:  Complete documentation of historical and exam elements from today's encounter can be found in the full encounter summary report (not reduplicated in this progress note).  I personally obtained the chief complaint(s) and history of present illness.  I confirmed and edited as necessary the review of systems, past medical/surgical history, family history, social history, and examination findings as documented by others; and I examined the patient myself.  I personally reviewed the relevant tests, images, and reports as documented above.  I formulated and edited as necessary the assessment and plan and discussed the findings and management plan with the patient and family. Attending Physician Image/Tesing Attestation: I personally reviewed the ophthalmic test(s) associated with this encounter, agree with the interpretation(s) as documented by the resident/fellow, and have edited the corresponding report(s) as necessary.  Today with Vishnu Viveros, I reviewed the indications, risks, benefits, and  alternatives of the proposed surgical procedure including, but not limited to, failure obtain the desired result  and need for additional surgery, bleeding, infection, loss of vision, loss of the eye, and the remote possibility of permanent damage to any organ system or death with the use of anesthesia.  I provided multiple opportunities for the questions, answered all questions to the best of my ability, and confirmed that my answers and my discussion were understood.      . - Rick Campbell MD

## 2023-02-02 NOTE — NURSING NOTE
Chief Complaints and History of Present Illnesses   Patient presents with     Diabetic Eye Exam Follow Up     DM type 2       Chief Complaint(s) and History of Present Illness(es)     Diabetic Eye Exam Follow Up            Comments: DM type 2            Comments    Pt states no change in VA since last visit  C/o trouble seeing, some glare at night   No flashes, floaters or eye pain  LBS: does not check   Last A1c:6.7  Lab Results       Component                Value               Date                       A1C                      6.7                 12/10/2022                 A1C                      5.8                 06/13/2022                 A1C                      5.9                 12/06/2021                 A1C                      5.8                 06/07/2021                 A1C                      5.8                 11/16/2020                 A1C                      6.0                 05/27/2020                 A1C                      6.2                 10/26/2019                 A1C                      6.4                 06/12/2019          Wendi Campbell COT 1:17 PM February 2, 2023

## 2023-02-04 DIAGNOSIS — M54.2 NECK PAIN: ICD-10-CM

## 2023-02-08 RX ORDER — CYCLOBENZAPRINE HCL 10 MG
TABLET ORAL
Qty: 30 TABLET | Refills: 3 | Status: SHIPPED | OUTPATIENT
Start: 2023-02-08

## 2023-02-08 NOTE — TELEPHONE ENCOUNTER
cyclobenzaprine (FLEXERIL) 10 MG tablet       Last Written Prescription Date:  09-  Last Fill Quantity: 30,   # refills: 0  Last Office Visit : 12-  Future Office visit:  06-    Routing refill request to provider for review/approval because:  Off protocol

## 2023-02-08 NOTE — TELEPHONE ENCOUNTER
Vishnu chart reviewed today for refill request.    Diagnoses and all orders for this visit:    Neck pain  -     cyclobenzaprine (FLEXERIL) 10 MG tablet; Take 1 tablet (10 mg) by mouth 2 times daily as needed for muscle spasms (take at bedtime for muscle pain) Must not drive - Oral     #30 3 RF  Murali Logan MD

## 2023-02-18 ENCOUNTER — MYC MEDICAL ADVICE (OUTPATIENT)
Dept: FAMILY MEDICINE | Facility: CLINIC | Age: 73
End: 2023-02-18
Payer: COMMERCIAL

## 2023-02-20 NOTE — TELEPHONE ENCOUNTER
"  Don't exercise, eat or drink large amounts of liquid within 2 hours of your bedtime.    If you haven't been able to get to sleep after about 30 minutes or more, get up and do something calming or boring until you feel sleepy. Then return to bed and try again.    Don't nap during the day. If you must nap, make sure it is for less than 20 minutes.    Dim the lights at night    Create sleep rituals that remind your body it is time to sleep. Examples include breathing exercises, stretching or reading a book.    Avoid all electronic media (smart phone, computer, tablet) within 2 hours of bed time. The \"blue light\" in these devices activates the part of the brain that keeps you awake.    Dim the lights at night.    Try a bath or shower before bed. Having a warm bath 1 to 2 hours before bedtime can help you feel sleepy. Hot baths can make you alert, so be mindful of the temperature.    Don't watch the clock. Checking the clock during the night can wake you up. It can also lead to negative thoughts such as, \"I will never fall asleep,\" which can increase anxiety and sleeplessness.    Create the right sleeping area. A cool, dark, quiet room is best. If needed, try earplugs, fans and blackout curtains.          "

## 2023-02-21 DIAGNOSIS — M54.2 CERVICALGIA: ICD-10-CM

## 2023-02-21 DIAGNOSIS — M79.18 MYOFASCIAL PAIN: ICD-10-CM

## 2023-02-21 RX ORDER — METHOCARBAMOL 500 MG/1
500 TABLET, FILM COATED ORAL 3 TIMES DAILY PRN
Qty: 90 TABLET | Refills: 1 | Status: SHIPPED | OUTPATIENT
Start: 2023-02-21

## 2023-02-21 NOTE — TELEPHONE ENCOUNTER
"Patient see[facility] [place] for his cervicalgia, lumbar radiculopathy, and myofascial pain.  Pt was suppose to follow up with DILAN Davenport CNP on 12/23/22      Pain Management   [facility]  [address] [place] [id number]  Phone: [phone]        Spoke to patient.  Patient report of back pain for the last few days.  I note that Dr. Logan referred patient to pain clinic for patient back and neck pain. Patient was not aware that.  Let pt know he saw Anitha KONG for this and if he want their contact number to reach out about his back pain.  Patient did not want the number, instead asked if  will send a medication for patient back pain.  When ask what medication, he kept saying \"mesul\" medication.  Even spell \"mesul\".  Report a pain medication.  Recommended medication after tylenol and ibuprofen.   I still don't understand the name of medication patient is requesting.  Pt was frustrated and want to end conversation.  I encourage on last time and I caught patient saying \"muscle\" clearly.   Confirmed with patient that the medication is asking about is a muscle spasm medication, called methocarbamol (Robaxin).   All patient wanted was a medication refill request.  Advise patient request refill on mychart refill or call the pharmacy next time.         Med refill request sent to med refill team.    methocarbamol (ROBAXIN) 500 MG tablet 90 tablet 1 10/31/2022  --   Sig - Route: Take 1 tablet (500 mg) by mouth 3 times daily as needed for muscle spasms - [id number] - [address] AT [facility] & [facility] [address]      Joe Ramírez CMA (AAMA) at 12:40 PM on 2/21/2023             "

## 2023-02-21 NOTE — TELEPHONE ENCOUNTER
[facility] Call Center    Phone Message    May a detailed message be left on voicemail: yes     Reason for Call: Other: Patient would like a call back to discuss back pain, he wants pain medication. Please call the patient back as soon as possible     Action Taken: Message routed to:  [facility] pcp    Travel Screening: Not Applicable

## 2023-02-21 NOTE — TELEPHONE ENCOUNTER
methocarbamol (ROBAXIN) 500 MG tablet  Last Written Prescription Date:   10/31/2022  Last Fill Quantity: 91,   # refills: 1  Last Office Visit :  12/12/2022  Future Office visit:   6/12/2022    Routing refill request to provider for review/approval because:  Drug not on the [facility], [facility] or [facility] refill protocol or controlled substance      Carla Law RN  Central Triage Red Flags/Med Refills

## 2023-03-23 ENCOUNTER — TELEPHONE (OUTPATIENT)
Dept: OPHTHALMOLOGY | Facility: CLINIC | Age: 73
End: 2023-03-23
Payer: COMMERCIAL

## 2023-03-23 NOTE — TELEPHONE ENCOUNTER
Patient reached out stating he was told he needed to have cataract surgery, informed patient I did not have a case request to schedule and will reach out to provider, will return call to schedule when we have what we need, patient was understanding and will await our call    Anna C. Schoenecker on 3/23/2023 at 11:10 AM

## 2023-03-23 NOTE — TELEPHONE ENCOUNTER
Last glasses Rx from 8-2021 placed in outgoing mail today    Jamil Foley RN 11:21 AM 03/23/23          [facility] Call Center    Phone Message    May a detailed message be left on voicemail: yes     Reason for Call: Other: Please mail Eye Rx to home address.  Thank you.     Action Taken: Message routed to:  [facility] ([facility]): Ophthalmology    Travel Screening: Not Applicable

## 2023-03-29 NOTE — TELEPHONE ENCOUNTER
LVM for pt regarding the fact that Dr. Campbell had requested an evaluation by Dr. Butts prior to proceeding with cataract surgery.    I informed the pt he could be seen as early as next Monday, 4/3, with Dr. Butts and left my direct call back number.    ORVILLE Logan 5:08 PM March 29, 2023

## 2023-04-04 DIAGNOSIS — N40.1 BENIGN LOCALIZED PROSTATIC HYPERPLASIA WITH LOWER URINARY TRACT SYMPTOMS (LUTS): ICD-10-CM

## 2023-04-04 RX ORDER — FINASTERIDE 5 MG/1
5 TABLET, FILM COATED ORAL DAILY
Qty: 90 TABLET | Refills: 0 | Status: SHIPPED | OUTPATIENT
Start: 2023-04-04 | End: 2023-06-12

## 2023-04-04 NOTE — TELEPHONE ENCOUNTER
ONE TIME ALDA Refill Finasteride. Last OV= 05/2022. My chart message sent to patient to schedule an appointment for future fills.    Aura DYER RN Urology 4/4/2023 12:41 PM

## 2023-04-09 ENCOUNTER — HEALTH MAINTENANCE LETTER (OUTPATIENT)
Age: 73
End: 2023-04-09

## 2023-04-10 ENCOUNTER — OFFICE VISIT (OUTPATIENT)
Dept: OPHTHALMOLOGY | Facility: CLINIC | Age: 73
End: 2023-04-10
Attending: OPHTHALMOLOGY
Payer: COMMERCIAL

## 2023-04-10 DIAGNOSIS — H35.3230 BILATERAL EXUDATIVE AGE-RELATED MACULAR DEGENERATION, UNSPECIFIED STAGE (H): ICD-10-CM

## 2023-04-10 DIAGNOSIS — H31.8 POLYPOIDAL CHOROIDAL VASCULOPATHY: Primary | ICD-10-CM

## 2023-04-10 DIAGNOSIS — H25.13 SENILE NUCLEAR SCLEROSIS, BILATERAL: ICD-10-CM

## 2023-04-10 DIAGNOSIS — H35.713 CENTRAL SEROUS CHORIORETINOPATHY OF BOTH EYES: ICD-10-CM

## 2023-04-10 PROCEDURE — G0463 HOSPITAL OUTPT CLINIC VISIT: HCPCS | Performed by: OPHTHALMOLOGY

## 2023-04-10 PROCEDURE — 92134 CPTRZ OPH DX IMG PST SGM RTA: CPT | Performed by: OPHTHALMOLOGY

## 2023-04-10 PROCEDURE — 99207 FUNDUS AUTOFLUORESCENCE IMAGE (FAF) OU (BOTH EYES): CPT | Mod: 26 | Performed by: OPHTHALMOLOGY

## 2023-04-10 PROCEDURE — 99214 OFFICE O/P EST MOD 30 MIN: CPT | Mod: GC | Performed by: OPHTHALMOLOGY

## 2023-04-10 PROCEDURE — 92250 FUNDUS PHOTOGRAPHY W/I&R: CPT | Performed by: OPHTHALMOLOGY

## 2023-04-10 ASSESSMENT — CONF VISUAL FIELD
OS_SUPERIOR_TEMPORAL_RESTRICTION: 0
OD_INFERIOR_TEMPORAL_RESTRICTION: 0
OS_SUPERIOR_NASAL_RESTRICTION: 0
OD_SUPERIOR_TEMPORAL_RESTRICTION: 0
OD_INFERIOR_NASAL_RESTRICTION: 0
OD_NORMAL: 1
OS_NORMAL: 1
OS_INFERIOR_NASAL_RESTRICTION: 0
OD_SUPERIOR_NASAL_RESTRICTION: 0
METHOD: COUNTING FINGERS
OS_INFERIOR_TEMPORAL_RESTRICTION: 0

## 2023-04-10 ASSESSMENT — TONOMETRY
OS_IOP_MMHG: 19
OD_IOP_MMHG: 19
IOP_METHOD: TONOPEN

## 2023-04-10 ASSESSMENT — SLIT LAMP EXAM - LIDS
COMMENTS: NORMAL
COMMENTS: NORMAL

## 2023-04-10 ASSESSMENT — CUP TO DISC RATIO
OD_RATIO: 0.5
OS_RATIO: 0.5

## 2023-04-10 ASSESSMENT — REFRACTION_WEARINGRX
OD_CYLINDER: +0.50
OS_AXIS: 015
OS_SPHERE: +1.00
OD_AXIS: 110
OS_CYLINDER: +0.50
OD_SPHERE: +1.25
OD_ADD: +2.50
OS_ADD: +2.50

## 2023-04-10 ASSESSMENT — VISUAL ACUITY
OD_CC: 20/30
OD_CC+: -2
METHOD: SNELLEN - LINEAR
OS_CC: 20/40
OS_CC+: -2

## 2023-04-10 ASSESSMENT — EXTERNAL EXAM - RIGHT EYE: OD_EXAM: NORMAL

## 2023-04-10 ASSESSMENT — EXTERNAL EXAM - LEFT EYE: OS_EXAM: NORMAL

## 2023-04-10 NOTE — NURSING NOTE
Chief Complaints and History of Present Illnesses   Patient presents with     Retinal Evaluation     Chief Complaint(s) and History of Present Illness(es)     Retinal Evaluation            Laterality: both eyes    Onset: gradual    Associated symptoms: dryness and itching.  Negative for eye pain, tearing, flashes and floaters    Pain scale: 0/10          Comments    Vishnu is here referred by Dr Campbell for eval/treatment of subretinal fluid in both eyes. He says the fluid in his retina has been there up to a year   but he is unsure.     Lavon Butts COT 2:05 PM April 10, 2023

## 2023-04-10 NOTE — PROGRESS NOTES
I have confirmed the patient's and reviewed Past Medical History, Past Surgical History, Social History, Family History, Problem List, Medication List and agree with Tech note.        CC: subretinal fluid    HPI: Vishnu Viveros is a 72 year old male with diabetes who is here for evaluation of subretinal fluid. He was referred by Dr. Bo Campbell in our department.     He reports that he has started noticing vision changes this year. Things appear to double at times. He has to bring things closer to see them. He has not tried testing each eye separately to say which eye is responsible.     He has been having trouble driving at night for 2-3 years now.     He has had diabetes, type II for several years and is taking oral meds no insulin     OCT Macula 04/10/23  OD: Central PED with focal area of associated SR material, no IRF, central foveal contour somewhat flattened by PED, no drusen except in involved area, choroid normal thickness, hyaloid attached - improved  OS: VMA, foveal contour flattened by central PED, no drusen, interval improvement in associated SRF, no IRF, choroid thick with dilated stromal vessels, no significant drusen    OCTA 04/10/23  OD: no CNVM, en face imaging suggests a PED with associated SR material but no CNVM  OS: CNVM is present centrally with a looping vascular net extending to include the fovea    Assessment/plan:    1.   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  Inactive CNVM OS   - demonstrated on OCTA 4/10/23   - no exudation today   - monitor    3.  Diabetes mellitus no nonproliferative diabetic retinopathy     Blood glucose control   Last HbA1C is 6.2   OCT and FAF show     4.  Nuclear sclerotic cataract  Both eyes    - visually significant   - refract as needed    - no new prescription glasses issued    - seen by Rick Campbell MD          RTC 2 months for VTD with  OCT Ángel Viera MD  V[employer]reoretinal Surgical Fellow, PGY6  [facility]    ATTESTATION:  I have seen and examined the patient with Dr. Viera and agree with the findings in this note, as well as the interpretations of the diagnostic tests.    Joan Butts MD PhD.  Professor & Chair

## 2023-04-11 ENCOUNTER — TELEPHONE (OUTPATIENT)
Dept: OPHTHALMOLOGY | Facility: CLINIC | Age: 73
End: 2023-04-11
Payer: COMMERCIAL

## 2023-04-14 ENCOUNTER — TELEPHONE (OUTPATIENT)
Dept: OPHTHALMOLOGY | Facility: CLINIC | Age: 73
End: 2023-04-14
Payer: COMMERCIAL

## 2023-04-18 ENCOUNTER — TELEPHONE (OUTPATIENT)
Dept: OPHTHALMOLOGY | Facility: CLINIC | Age: 73
End: 2023-04-18
Payer: COMMERCIAL

## 2023-04-18 DIAGNOSIS — M48.062 SPINAL STENOSIS, LUMBAR REGION, WITH NEUROGENIC CLAUDICATION: ICD-10-CM

## 2023-04-18 NOTE — TELEPHONE ENCOUNTER
Called patient to schedule surgery with Dr. Campbell    Date of Surgery: 7/10,7/24    Location of surgery: [facility]    Pre-Op H&P: PCP    Pre/Post Imaging:  No    Post-Op Appt Date: 7/25,8/15    Surgery Packet Mailed: yes    Additional comments: Spoke with patient, he is aware of above dates, will review packet and reach out with any questions         Anna C. Schoenecker on 4/18/2023 at 12:43 PM

## 2023-04-20 RX ORDER — ACETAMINOPHEN 500 MG
500-1000 TABLET ORAL EVERY 6 HOURS PRN
Qty: 100 TABLET | Refills: 3 | Status: SHIPPED | OUTPATIENT
Start: 2023-04-20 | End: 2023-07-24

## 2023-04-20 NOTE — TELEPHONE ENCOUNTER
Last Clinic Visit: 12/12/2022  [facility] [place]

## 2023-05-04 ENCOUNTER — DOCUMENTATION ONLY (OUTPATIENT)
Dept: FAMILY MEDICINE | Facility: CLINIC | Age: 73
End: 2023-05-04
Payer: COMMERCIAL

## 2023-05-04 NOTE — PROGRESS NOTES
Type of Form Received:     Form Received (Date) 5/4/23   Form Filled out Yes 5/8/23   Placed in provider folder Yes

## 2023-05-08 ENCOUNTER — TELEPHONE (OUTPATIENT)
Dept: FAMILY MEDICINE | Facility: CLINIC | Age: 73
End: 2023-05-08
Payer: COMMERCIAL

## 2023-05-08 DIAGNOSIS — M54.16 LUMBAR RADICULOPATHY: ICD-10-CM

## 2023-05-08 DIAGNOSIS — M48.062 SPINAL STENOSIS, LUMBAR REGION, WITH NEUROGENIC CLAUDICATION: Primary | ICD-10-CM
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
## 2023-05-10 ENCOUNTER — TELEPHONE (OUTPATIENT)
Dept: OPHTHALMOLOGY | Facility: CLINIC | Age: 73
End: 2023-05-10
Payer: COMMERCIAL

## 2023-05-10 NOTE — TELEPHONE ENCOUNTER
Called and spoke to Vishnu     He has an upcoming appointment with Dr. Campbell on 5/17 for cataract eval - I cancelled this appointment     Dr. RICHTER wanted Taylor to be seen in two months ( around this time in May)     Nikole Slater Communication Facilitator on 5/10/2023 at 10:14 AM

## 2023-05-10 NOTE — TELEPHONE ENCOUNTER
[facility] Call Center    Phone Message    May a detailed message be left on voicemail: yes     Reason for Call: Other: Patient calling in to see why he has an appointment on 5/17 with Dr Campbell for cataract eval when he already had one and is under the impression that he already has surgery scheduled. Please call patient back to see if he still need to come in at [phone]. Thank you.    Action Taken: Message routed to:  [facility] Eye    Travel Screening: Not Applicable

## 2023-05-30 DIAGNOSIS — E11.9 TYPE 2 DIABETES MELLITUS TREATED WITHOUT INSULIN (H): Primary | ICD-10-CM

## 2023-06-01 DIAGNOSIS — J30.2 SEASONAL ALLERGIES: ICD-10-CM

## 2023-06-06 RX ORDER — LORATADINE 10 MG/1
10 TABLET ORAL DAILY PRN
Qty: 90 TABLET | Refills: 1 | Status: SHIPPED | OUTPATIENT
Start: 2023-06-06 | End: 2024-05-23

## 2023-06-06 NOTE — TELEPHONE ENCOUNTER
loratadine (CLARITIN) 10 MG tablet      Last Written Prescription Date:  5/24/21  Last Fill Quantity: 90,   # refills: 1  Last Office Visit : 12/12/22  Future Office visit:  6/12/23    Routing refill request to provider for review/approval because:  Age > 63 years  Gap in refills - last ordered 5/24/21

## 2023-06-10 ENCOUNTER — LAB (OUTPATIENT)
Dept: LAB | Facility: CLINIC | Age: 73
End: 2023-06-10
Payer: COMMERCIAL

## 2023-06-10 DIAGNOSIS — I10 BENIGN ESSENTIAL HYPERTENSION: ICD-10-CM

## 2023-06-10 DIAGNOSIS — E11.9 TYPE 2 DIABETES MELLITUS TREATED WITHOUT INSULIN (H): ICD-10-CM

## 2023-06-10 LAB
ALBUMIN SERPL BCG-MCNC: 4.2 G/DL (ref 3.5–5.2)
ALP SERPL-CCNC: 49 U/L (ref 40–129)
ALT SERPL W P-5'-P-CCNC: 18 U/L (ref 10–50)
ANION GAP SERPL CALCULATED.3IONS-SCNC: 10 MMOL/L (ref 7–15)
AST SERPL W P-5'-P-CCNC: 20 U/L (ref 10–50)
BILIRUB SERPL-MCNC: 0.4 MG/DL
BUN SERPL-MCNC: 23 MG/DL (ref 8–23)
CALCIUM SERPL-MCNC: 9.7 MG/DL (ref 8.8–10.2)
CHLORIDE SERPL-SCNC: 103 MMOL/L (ref 98–107)
CREAT SERPL-MCNC: 0.98 MG/DL (ref 0.67–1.17)
DEPRECATED HCO3 PLAS-SCNC: 27 MMOL/L (ref 22–29)
GFR SERPL CREATININE-BSD FRML MDRD: 81 ML/MIN/1.73M2
GLUCOSE SERPL-MCNC: 147 MG/DL (ref 70–99)
HBA1C MFR BLD: 6.4 %
POTASSIUM SERPL-SCNC: 4 MMOL/L (ref 3.4–5.3)
PROT SERPL-MCNC: 6.8 G/DL (ref 6.4–8.3)
SODIUM SERPL-SCNC: 140 MMOL/L (ref 136–145)

## 2023-06-10 PROCEDURE — 36415 COLL VENOUS BLD VENIPUNCTURE: CPT | Performed by: PATHOLOGY

## 2023-06-10 PROCEDURE — 99000 SPECIMEN HANDLING OFFICE-LAB: CPT | Performed by: PATHOLOGY

## 2023-06-10 PROCEDURE — 80053 COMPREHEN METABOLIC PANEL: CPT | Performed by: PATHOLOGY

## 2023-06-10 PROCEDURE — 83036 HEMOGLOBIN GLYCOSYLATED A1C: CPT | Mod: 90 | Performed by: PATHOLOGY

## 2023-06-12 ENCOUNTER — OFFICE VISIT (OUTPATIENT)
Dept: FAMILY MEDICINE | Facility: CLINIC | Age: 73
End: 2023-06-12
Payer: COMMERCIAL

## 2023-06-12 VITALS
SYSTOLIC BLOOD PRESSURE: 117 MMHG | HEIGHT: 64 IN | HEART RATE: 69 BPM | BODY MASS INDEX: 29.89 KG/M2 | TEMPERATURE: 97.7 F | WEIGHT: 175.1 LBS | RESPIRATION RATE: 16 BRPM | OXYGEN SATURATION: 99 % | DIASTOLIC BLOOD PRESSURE: 72 MMHG

## 2023-06-12 DIAGNOSIS — E78.5 HYPERLIPIDEMIA LDL GOAL <100: ICD-10-CM

## 2023-06-12 DIAGNOSIS — I10 BENIGN ESSENTIAL HYPERTENSION: ICD-10-CM

## 2023-06-12 DIAGNOSIS — E11.9 TYPE 2 DIABETES MELLITUS TREATED WITHOUT INSULIN (H): Primary | ICD-10-CM

## 2023-06-12 DIAGNOSIS — N40.1 BENIGN LOCALIZED PROSTATIC HYPERPLASIA WITH LOWER URINARY TRACT SYMPTOMS (LUTS): ICD-10-CM

## 2023-06-12 DIAGNOSIS — E87.6 HYPOKALEMIA: ICD-10-CM

## 2023-06-12 DIAGNOSIS — E08.42 DIABETIC POLYNEUROPATHY ASSOCIATED WITH DIABETES MELLITUS DUE TO UNDERLYING CONDITION (H): ICD-10-CM

## 2023-06-12 DIAGNOSIS — J30.1 SEASONAL ALLERGIC RHINITIS DUE TO POLLEN: ICD-10-CM

## 2023-06-12 DIAGNOSIS — C34.91 ADENOCARCINOMA OF LUNG, STAGE 1, RIGHT (H): ICD-10-CM

## 2023-06-12 PROCEDURE — 99214 OFFICE O/P EST MOD 30 MIN: CPT | Performed by: FAMILY MEDICINE

## 2023-06-12 PROCEDURE — 99207 PR FOOT EXAM NO CHARGE: CPT | Performed by: FAMILY MEDICINE

## 2023-06-12 RX ORDER — CHLORTHALIDONE 25 MG/1
25 TABLET ORAL DAILY
Qty: 90 TABLET | Refills: 3 | Status: SHIPPED | OUTPATIENT
Start: 2023-06-12 | End: 2024-07-01

## 2023-06-12 RX ORDER — ATORVASTATIN CALCIUM 20 MG/1
20 TABLET, FILM COATED ORAL DAILY
Qty: 90 TABLET | Refills: 3 | Status: SHIPPED | OUTPATIENT
Start: 2023-06-12 | End: 2024-07-08

## 2023-06-12 RX ORDER — POTASSIUM CHLORIDE 1500 MG/1
20 TABLET, EXTENDED RELEASE ORAL DAILY
Qty: 90 TABLET | Refills: 3 | Status: SHIPPED | OUTPATIENT
Start: 2023-06-12 | End: 2024-06-28

## 2023-06-12 RX ORDER — LOSARTAN POTASSIUM 100 MG/1
100 TABLET ORAL EVERY EVENING
Qty: 90 TABLET | Refills: 3 | Status: SHIPPED | OUTPATIENT
Start: 2023-06-12 | End: 2024-07-08

## 2023-06-12 RX ORDER — GABAPENTIN 300 MG/1
CAPSULE ORAL
Qty: 90 CAPSULE | Refills: 3 | Status: SHIPPED | OUTPATIENT
Start: 2023-06-12 | End: 2024-07-01

## 2023-06-12 RX ORDER — FINASTERIDE 5 MG/1
5 TABLET, FILM COATED ORAL DAILY
Qty: 90 TABLET | Refills: 3 | Status: SHIPPED | OUTPATIENT
Start: 2023-06-12 | End: 2024-06-28

## 2023-06-12 RX ORDER — MONTELUKAST SODIUM 10 MG/1
10 TABLET ORAL AT BEDTIME
Qty: 90 TABLET | Refills: 1 | Status: SHIPPED | OUTPATIENT
Start: 2023-06-12 | End: 2023-12-27

## 2023-06-12 RX ORDER — TAMSULOSIN HYDROCHLORIDE 0.4 MG/1
0.4 CAPSULE ORAL DAILY
Qty: 90 CAPSULE | Refills: 3 | Status: SHIPPED | OUTPATIENT
Start: 2023-06-12 | End: 2024-06-28

## 2023-06-12 RX ORDER — FLUTICASONE PROPIONATE 50 MCG
1 SPRAY, SUSPENSION (ML) NASAL DAILY
Qty: 16 G | Refills: 3 | Status: SHIPPED | OUTPATIENT
Start: 2023-06-12 | End: 2023-07-24

## 2023-06-12 RX ORDER — METFORMIN HCL 500 MG
1000 TABLET, EXTENDED RELEASE 24 HR ORAL
Qty: 180 TABLET | Refills: 3 | Status: SHIPPED | OUTPATIENT
Start: 2023-06-12 | End: 2024-06-28

## 2023-06-12 NOTE — PROGRESS NOTES
Assessment & Plan     Type 2 diabetes mellitus treated without insulin (H)  Hyperlipidemia LDL goal <100  Diabetes is well controlled current dose of metformin 1000 mg daily continue this dose continue with lifestyle modification lowering carbohydrates in diet and physical activity as tolerated also renewed atorvastatin we completed foot exam today recommended cutting his nails.  - metFORMIN (GLUCOPHAGE XR) 500 MG 24 hr tablet  Dispense: 180 tablet; Refill: 3  - atorvastatin (LIPITOR) 20 MG tablet  Dispense: 90 tablet; Refill: 3  - FOOT EXAM    Diabetic polyneuropathy associated with diabetes mellitus due to underlying condition (H)  Renewal for gabapentin taking 1 capsule at bedtime  - gabapentin (NEURONTIN) 300 MG capsule  Dispense: 90 capsule; Refill: 3          Benign essential hypertension  Renewal antihypertensives blood pressure in excellent range.  - chlorthalidone (HYGROTON) 25 MG tablet  Dispense: 90 tablet; Refill: 3  - losartan (COZAAR) 100 MG tablet  Dispense: 90 tablet; Refill: 3    Hypokalemia  Requires potassium supplementation due to chlorthalidone tolerating.  - potassium chloride ER (KLOR-CON M) 20 MEQ CR tablet  Dispense: 90 tablet; Refill: 3    Seasonal allergic rhinitis due to pollen  Significant seasonal allergic rhinitis this year recommended continue with loratadine may take twice daily for severe allergies we will add montelukast and fluticasone 1 or 2 sprays each nostril during season of allergy.  - fluticasone (FLONASE) 50 MCG/ACT nasal spray  Dispense: 16 g; Refill: 3  - montelukast (SINGULAIR) 10 MG tablet  Dispense: 90 tablet; Refill: 1    Benign localized prostatic hyperplasia with lower urinary tract symptoms (LUTS)  He requested refills for lower urinary tract symptoms and BPH resulting in significant improved urinary flow.  - tamsulosin (FLOMAX) 0.4 MG capsule  Dispense: 90 capsule; Refill: 3  - finasteride (PROSCAR) 5 MG tablet  Dispense: 90 tablet; Refill: 3               BMI:  "  Estimated body mass index is 30.04 kg/m  as calculated from the following:    Height as of this encounter: 1.626 m (5' 4.02\").    Weight as of this encounter: 79.4 kg (175 lb 1.6 oz).           Return in about 6 months (around 2023) for follow-up, Lab Work.    Murali Logan MD  [facility] KENA Mares is a 73 year old, presenting for the following health issues:  Follow Up (6 month follow up), foot exam, Results (Pt would like to go over labs done 6/10), and Allergies (Pt having issues with allergies, medication is not working well enough. Pt reports runny nose and congestion)    ROSAURA Viveros is a 73 year old with a history of type 2 diabetes mellitus controlled, hyperlipidemia, hypertension, colon polyps, DJD with spinal stenosis back, elevated PSA, and stage 1 right adenocarcinoma of the lung s/p resection with clear margins  who presents  for primary care visit     Diabetes/ Hyperlipidemia:  A1c was 6.4 improved from prior. He is on MetforminXR 1000 mg daily at supper. He follows low carbohydrate diet and has been more active.   Atorvastatin 20 mg daily has been effective at controlling his cholesterol.   Had EYE exam, cataracts noted. He has scheduled eye procedure in July.     Hypertension His blood pressure is well-controlled on Losartan 100 mg daily and Chlorthalidone 25 mg with potassium supplementation.  He is also on finasteride 5 mg for prostate health and tamsulosin 0.4 mg.    Adenocarcinoma [place] treatment for lung adenocarcinoma had CT in 2022 after follow-up with oncology end of January. Stable at this time, annual CT was due 2023  through oncology scheduled for 2023     Urology  PSA level was elevated had appointment with urology Dr Montaño in 2021 no nodules found recommended follow-up PSA in 6 months. He follow-up with Dr Oglesby who ordered labs stable.He is also on finasteride 5 mg for " prostate health and tamsulosin 0.4 mg requests refills as they have been effective.  Significant seasonal allergies, rhinitis  He has been experiencing significant nasal rhinitis congestion trying loratadine 10 mg also taking it twice per day.  Currently not using nasal steroid.  He indicates he is miserable and would like additional treatment if possible.  HCM  Due for immunizations covid second bivalent booster.            Labs reviewed in EPIC  BP Readings from Last 3 Encounters:   06/12/23 117/72   01/24/23 117/69   12/12/22 128/69    Wt Readings from Last 3 Encounters:   06/12/23 79.4 kg (175 lb 1.6 oz)   01/24/23 79.8 kg (176 lb)   12/12/22 78.4 kg (172 lb 14.4 oz)               Immunization History   Administered Date(s) Administered     COVID-19 Bivalent 12+ (Pfizer) 12/12/2022     COVID-19 MONOVALENT 12+ (Pfizer) 05/07/2022     COVID-19 Monovalent 18+ (Moderna) 02/10/2021, 03/10/2021, 11/04/2021     FLU 6-35 months 10/14/2010     Flu, Unspecified 11/06/2009     Influenza (H1N1) 02/02/2010     Influenza (High Dose) 3 valent vaccine 10/30/2014, 11/19/2015, 11/28/2016, 11/27/2017, 10/31/2018, 12/11/2019     Influenza (IIV3) PF 12/30/2005, 12/05/2006, 12/11/2007, 11/04/2008, 11/06/2009, 10/14/2010, 11/14/2011, 11/12/2012, 11/14/2013     Influenza Vaccine 65+ (Fluzone HD) 11/16/2020, 12/06/2021, 12/12/2022     Pneumo Conj 13-V (2010&after) 10/30/2014     Pneumococcal 23 valent 11/19/2015     TD,PF 7+ (Tenivac) 02/12/2007     TDAP (Adacel,Boostrix) 07/31/2017     TDAP Vaccine (Boostrix) 07/31/2017     Td (Adult), Adsorbed 02/12/2007     Zoster recombinant adjuvanted (SHINGRIX) 12/11/2019, 08/26/2020        Patient Active Problem List   Diagnosis     Erectile dysfunction     Seasonal allergies     Vitamin D deficiency     Pulmonary nodule     Prostate nodule     Chronic low back pain     Degenerative disc disease, lumbar     Back pain with radiation     DJD (degenerative joint disease) of knee     HL (hearing  loss)     Vision impairment     Tear film insufficiency     Recurrent pterygium     Other age-related cataract of left eye     Type 2 diabetes mellitus treated without insulin (H)     Diabetic polyneuropathy associated with diabetes mellitus due to underlying condition (H)     Tubulovillous adenoma of colon     Lumbar radiculopathy     Adenocarcinoma of lung, stage 1, right (H)     Benign essential hypertension     History of colonic polyps     Spinal stenosis, lumbar region, with neurogenic claudication     Elevated prostate specific antigen (PSA)     Obesity with body mass index 30 or greater     Solitary pulmonary nodule     Evaluation of hearing impairment     Combined forms of age-related cataract of both eyes     Balance problems     Neck pain     Cervical spondylosis with myelopathy     Cervical stenosis of spinal canal     Past Surgical History:   Procedure Laterality Date     APPENDECTOMY OPEN CHILD       ARTHROSCOPY KNEE Left      BIOPSY PROSTATE TRANSRECTAL       EXCISE PTERYGIUM Right 1989     EXCISE PTERYGIUM Right 2/12/2015     INJECT EPIDURAL LUMBAR / SACRAL SINGLE N/A 7/21/2016    Procedure: INJECT EPIDURAL LUMBAR / SACRAL SINGLE;  Surgeon: Judah Henriquez MD;  Location: [facility] OR     INJECT PARAVERTEBRAL FACET JOINT LUMBAR / SACRAL FIRST Right 8/18/2016    Procedure: INJECT PARAVERTEBRAL FACET JOINT LUMBAR / SACRAL FIRST;  Surgeon: Judah Henriquez MD;  Location: [facility] OR     INJECT PARAVERTEBRAL FACET JOINT LUMBAR / SACRAL FIRST Right 9/8/2016    Procedure: INJECT PARAVERTEBRAL FACET JOINT LUMBAR / SACRAL FIRST;  Surgeon: Judah Henriquez MD;  Location: [facility] OR     MEDIASTINOSCOPY Right 04/08/2019     [place]  Dr Hemanth Covington 4/8/19 bronchoscopy, cervical mediastinoscopy for staging and minimally invasive right thoracoscopic wedge resection 1.8 cm invasive adenocarcinoma [id number] stage 1A2 adenocarcinoma completely resected via minimally invasive right upper lobectomy.     PUNCTALPLASTY Bilateral  2018    Procedure: Bilateral Punctoplasty with Mini [place] Stent;  Surgeon: Levy Blue MD;  Location: [facility] OR     REPAIR ECTROPION BILATERAL Bilateral 2018    Procedure: Bilateral Lower Lid Ectropion Repair with Right Lower Lid Lesion Excision;  Surgeon: Levy Blue MD;  Location: [facility] OR       Social History     Tobacco Use     Smoking status: Former     Years: 5.00     Types: Cigarettes     Quit date: 1981     Years since quittin.6     Smokeless tobacco: Never   Vaping Use     Vaping status: Not on file   Substance Use Topics     Alcohol use: Yes     Alcohol/week: 2.5 standard drinks of alcohol     Types: 3 Standard drinks or equivalent per week     Family History   Problem Relation Age of Onset     Cerebrovascular Disease Brother          stroke      Breast Cancer Sister      Kidney Disease Mother         Kidney stones     Other - See Comments Father          in carmita parkinsons or post surgical eye surgery     Cerebral aneurysm Brother      Hypothyroidism Sister      Glaucoma No family hx of      Macular Degeneration No family hx of          Current Outpatient Medications   Medication Sig Dispense Refill     acetaminophen (ACETAMINOPHEN EXTRA STRENGTH) 500 MG tablet Take 1-2 tablets (500-1,000 mg) by mouth every 6 hours as needed for mild pain 100 tablet 3     acetaminophen (TYLENOL) 500 MG tablet Take 1-2 tablets (500-1,000 mg) by mouth every 6 hours as needed for pain (arthritis) 120 tablet 3     Ascorbic Acid (VITAMIN C PO) Take 500 mg by mouth once       atorvastatin (LIPITOR) 20 MG tablet Take 1 tablet (20 mg) by mouth daily 90 tablet 3     chlorthalidone (HYGROTON) 25 MG tablet Take 1 tablet (25 mg) by mouth daily 90 tablet 3     Cholecalciferol (VITAMIN D) 1000 UNIT capsule Take 1 capsule by mouth daily.       Cyanocobalamin (VITAMIN B12 PO) Take 1 tablet by mouth Pt states he takes this 2 or 3 times a week.       cyclobenzaprine (FLEXERIL) 10 MG tablet Take 1  "tablet (10 mg) by mouth 2 times daily as needed for muscle spasms (take at bedtime for muscle pain) Must not drive - Oral 30 tablet 3     diclofenac (VOLTAREN) 1 % topical gel Apply 2 g topically 4 times daily as needed for moderate pain 100 g 3     finasteride (PROSCAR) 5 MG tablet Take 1 tablet (5 mg) by mouth daily 90 tablet 0     gabapentin (NEURONTIN) 300 MG capsule One capsule at bedtime 90 capsule 3     loratadine (CLARITIN) 10 MG tablet Take 1 tablet (10 mg) by mouth daily as needed for allergies 90 tablet 1     losartan (COZAAR) 100 MG tablet Take 1 tablet (100 mg) by mouth every evening 90 tablet 3     melatonin 3 MG tablet Take 1 tablet (3 mg) by mouth nightly as needed for sleep (may take 1-2 if needed) 60 tablet 3     metFORMIN (GLUCOPHAGE XR) 500 MG 24 hr tablet Take 2 tablets (1,000 mg) by mouth daily (with dinner) 180 tablet 3     methocarbamol (ROBAXIN) 500 MG tablet Take 1 tablet (500 mg) by mouth 3 times daily as needed for muscle spasms 90 tablet 1     Multiple Vitamin (MULTIVITAMINS PO) Take 1 tablet by mouth daily.       nystatin (MYCOSTATIN) 434800 UNIT/GM external cream Apply topically 2 times daily to affected area. 30 g 4     Omega-3 Fatty Acids (FISH OIL PO) Take 1 capsule by mouth as needed       order for DME Back brace 1 Device 0     potassium chloride ER (KLOR-CON M) 20 MEQ CR tablet Take 1 tablet (20 mEq) by mouth daily 90 tablet 3     tadalafil (CIALIS) 20 MG tablet Take 1 tablet (20 mg) by mouth every 48 hours as needed (sex) 30 tablet 3     tamsulosin (FLOMAX) 0.4 MG capsule Take 1 capsule (0.4 mg) by mouth daily 90 capsule 3     Allergies   Allergen Reactions     Pollen Extract      Other reaction(s): Headache  Sinus congestion, sinus \"drainage\", sneezing     Seasonal Allergies      Sinus congestion, sinus \"drainage\", sneezing     Nkda [No Known Drug Allergy]      Recent Labs   Lab Test 06/10/23  0955 12/10/22  1021 09/21/22  1727 06/13/22  1550 01/24/22  1145 12/06/21  0947 " "06/07/21  1044 11/16/20  1026 05/27/20  0957 10/26/19  1040 11/27/17  1107 07/31/17  1031   0000   A1C 6.4* 6.7*  --  5.8*  --  5.9* 5.8* 5.8*   < > 6.2*   < > 6.3*   < >   LDL  --  60  --   --   --  47  --  39  --  58   < > 52  --    HDL  --  71  --   --   --  59  --  64  --  49   < > 60  --    TRIG  --  70  --   --   --  94  --  116  --  91   < > 54  --    ALT 18 17  --  28  --  27  --  27   < > 26   < > 29   < >   CR 0.98 1.06   < > 1.02   < > 1.01 0.95 0.93   < > 0.96   < > 0.79   < >   GFRESTIMATED 81 74   < > 78   < > 74 80 82   < > 79   < > >90  Non  GFR Calc     < >   GFRESTBLACK  --   --   --   --   --   --  >90 >90   < > >90   < > >90  African American GFR Calc    --    POTASSIUM 4.0 4.4  --  4.0  --  4.0 3.7 3.5   < > 3.8   < > 4.2   < >   TSH  --   --   --   --   --   --   --   --   --  1.56  --  1.67  --     < > = values in this interval not displayed.        Review of Systems   Problem list, PMH, Surgical HX, SH, allergies, medications,immunizations reviewed and updated in Epic.  ROS negative other than noted in HPI and ROS.        Objective    /72 (BP Location: Right arm, Patient Position: Sitting, Cuff Size: Adult Regular)   Pulse 69   Temp 97.7  F (36.5  C) (Oral)   Resp 16   Ht 1.626 m (5' 4.02\")   Wt 79.4 kg (175 lb 1.6 oz)   SpO2 99%   BMI 30.04 kg/m    Body mass index is 30.04 kg/m .  Physical Exam     GENERAL APPEARANCE: healthy, alert and no distress BMI 30.04  EYES: Eyes grossly normal to inspection, PERRL and conjunctivae and sclerae normal  HENT: ear canals and TM's normal, significant nasal congestion clear rhinorrhea nasal bogginess, mouth without ulcers or lesions, oropharynx clear and oral mucous membranes moist, no thrush  NECK: no adenopathy, no asymmetry, masses, or scars and thyroid normal to palpation  RESP: lungs clear to auscultation - no rales, rhonchi or wheezes  CV: regular rate and rhythm, normal S1 S2, no S3 or S4, no murmur, click or rub, no " peripheral edema and peripheral pulses strong  Chest: well healed surgical scars from laparoscopic chest procedure  ABDOMEN: soft,obese, nontender, no hepatosplenomegaly, no masses and bowel sounds normal, well-healed abdominal scar  MS: Kyphosis and antalgic gait with left leg weakness compared to right.  SKIN: no suspicious lesions or rashes, congenital nevi in upper back that has been stable  NEURO: Normal strength and tone, sensory exam grossly normal, mentation intact and speech normal  PSYCH: mentation appears normal and affect normal  Diabetic Foot Screen:  Any complaints of increased pain or numbness ? No  Is there a foot ulcer now or a history of foot ulcer? No  Does the foot have an abnormal shape? No  Are the nails thick, too long or ingrown?  YES long and thick  Are there any redness or open areas? No         Sensation Testing done at all points on the diagram with monofilament     Right Foot: Sensation Normal at all points  Left Foot: Sensation Normal at all points     Risk Category: 0- No loss of protective sensation  Performed by Murali Logan MD

## 2023-06-12 NOTE — NURSING NOTE
"Vishnu Viveros is a 73 year old male patient that presents today in clinic for the following:    Chief Complaint   Patient presents with     Follow Up     6 month follow up     foot exam     Results     Pt would like to go over labs done 6/10     Allergies     Pt having issues with allergies, medication is not working well enough. Pt reports runny nose and congestion     The patient's allergies and medications were reviewed as noted. A set of vitals were recorded as noted without incident: /72 (BP Location: Right arm, Patient Position: Sitting, Cuff Size: Adult Regular)   Pulse 69   Ht 1.626 m (5' 4.02\")   Wt 79.4 kg (175 lb 1.6 oz)   SpO2 99%   BMI 30.04 kg/m  . The patient does not have any other questions for the provider.    Son Melton, EMT 5:18 PM on 6/12/2023   "

## 2023-06-12 NOTE — NURSING NOTE
"Vishnu Viveros is a 73 year old male patient that presents today in clinic for the following:    Chief Complaint   Patient presents with     Follow Up     6 month follow up     foot exam     Results     Pt would like to go over labs done 6/10     Allergies     Pt having issues with allergies, medication is not working well enough. Pt reports runny nose and congestion     The patient's allergies and medications were reviewed as noted. A set of vitals were recorded as noted without incident: /72 (BP Location: Right arm, Patient Position: Sitting, Cuff Size: Adult Regular)   Pulse 69   Temp 97.7  F (36.5  C) (Oral)   Resp 16   Ht 1.626 m (5' 4.02\")   Wt 79.4 kg (175 lb 1.6 oz)   SpO2 99%   BMI 30.04 kg/m  . The patient does not have any other questions for the provider.    Son Melton, EMT 5:19 PM on 6/12/2023   "

## 2023-06-12 NOTE — RESULT ENCOUNTER NOTE
Dear Vishnu Viveros   Results show excellent control of diabetes continue current cares.  Murali Logan MD

## 2023-06-12 NOTE — PATIENT INSTRUCTIONS
Think about get covid bivalant booster in the fall, Flue shot in the fall  Immunization History   Administered Date(s) Administered    COVID-19 Bivalent 12+ (Pfizer) 12/12/2022    COVID-19 MONOVALENT 12+ (Pfizer) 05/07/2022    COVID-19 Monovalent 18+ (Moderna) 02/10/2021, 03/10/2021, 11/04/2021    FLU 6-35 months 10/14/2010    Flu, Unspecified 11/06/2009    Influenza (H1N1) 02/02/2010    Influenza (High Dose) 3 valent vaccine 10/30/2014, 11/19/2015, 11/28/2016, 11/27/2017, 10/31/2018, 12/11/2019    Influenza (IIV3) PF 12/30/2005, 12/05/2006, 12/11/2007, 11/04/2008, 11/06/2009, 10/14/2010, 11/14/2011, 11/12/2012, 11/14/2013    Influenza Vaccine 65+ (Fluzone HD) 11/16/2020, 12/06/2021, 12/12/2022    Pneumo Conj 13-V (2010&after) 10/30/2014    Pneumococcal 23 valent 11/19/2015    TD,PF 7+ (Tenivac) 02/12/2007    TDAP (Adacel,Boostrix) 07/31/2017    TDAP Vaccine (Boostrix) 07/31/2017    Td (Adult), Adsorbed 02/12/2007    Zoster recombinant adjuvanted (SHINGRIX) 12/11/2019, 08/26/2020

## 2023-07-03 ENCOUNTER — OFFICE VISIT (OUTPATIENT)
Dept: FAMILY MEDICINE | Facility: CLINIC | Age: 73
End: 2023-07-03
Payer: COMMERCIAL

## 2023-07-03 VITALS
HEART RATE: 77 BPM | SYSTOLIC BLOOD PRESSURE: 97 MMHG | BODY MASS INDEX: 29.6 KG/M2 | OXYGEN SATURATION: 97 % | TEMPERATURE: 97.6 F | DIASTOLIC BLOOD PRESSURE: 63 MMHG | RESPIRATION RATE: 18 BRPM | WEIGHT: 173.4 LBS | HEIGHT: 64 IN

## 2023-07-03 DIAGNOSIS — H25.813 COMBINED FORMS OF AGE-RELATED CATARACT OF BOTH EYES: ICD-10-CM

## 2023-07-03 DIAGNOSIS — H54.7 VISION IMPAIRMENT: ICD-10-CM

## 2023-07-03 DIAGNOSIS — I10 BENIGN ESSENTIAL HYPERTENSION: ICD-10-CM

## 2023-07-03 DIAGNOSIS — Z01.818 PREOP GENERAL PHYSICAL EXAM: Primary | ICD-10-CM

## 2023-07-03 DIAGNOSIS — M47.12 CERVICAL SPONDYLOSIS WITH MYELOPATHY: ICD-10-CM

## 2023-07-03 DIAGNOSIS — E11.9 TYPE 2 DIABETES MELLITUS TREATED WITHOUT INSULIN (H): ICD-10-CM

## 2023-07-03 PROCEDURE — 99214 OFFICE O/P EST MOD 30 MIN: CPT | Performed by: FAMILY MEDICINE

## 2023-07-03 RX ORDER — AMLODIPINE BESYLATE 5 MG/1
1 TABLET ORAL DAILY
COMMUNITY
End: 2024-07-08

## 2023-07-03 NOTE — H&P (VIEW-ONLY)
[facility] [address] [address]  Phone: [phone]  Fax: [phone]  Primary Provider: Murali Logan  Pre-op Performing Provider: MURALI LOGAN      PREOPERATIVE EVALUATION:  Today's date: 7/3/2023    Vishnu Viveros is a 73 year old male who presents for a preoperative evaluation.    Surgical Information:  Surgery/Procedure:   Provider Role   Rick Campbell MD Primary    Procedure Laterality Anesthesia   RIGHT EYE PHACOEMULSIFICATION, CATARACT, WITH STANDARD INTRAOCULAR LENS IMPLANT INSERTION Right MAC with Topical        Provider Role   Rick Campbell MD Primary    Procedure Laterality Anesthesia   LEFT EYE PHACOEMULSIFICATION, CATARACT, WITH STANDARD INTRAOCULAR LENS IMPLANT INSERTION Left MAC with Topical          Surgery Location: [place] [address]  Surgeon: Dr Rick Campbell  Surgery Date: 7/10/2024 for right eye 9:30 am, 7/24/2023 for left eye 8:25 am  Time of Surgery: 9:30 am  Where patient plans to recover: At home with family  Fax number for surgical facility: Note does not need to be faxed, will be available electronically in Epic.      Assessment & Plan     The proposed surgical procedure is considered LOW risk.    Preop general physical exam  Vision impairment  Combined forms of age-related cataract of both eyes  He will proceed with eye procedures as noted preop to be effective for both procedures.  He will stop fish oil vitamin C multiple vitamin 1 week prior to procedure.    Type 2 diabetes mellitus treated without insulin (H)  Most recent A1c 6.4 diabetes well controlled reviewed do not take medications the day of the procedure, reviewed fasting for 8 hours prior, must have a .    Benign essential hypertension  Blood pressure was slightly lower today reviewed options to lower dose of diuretic but he wanted to continue current medications as he feels well.  He will not take diuretics the day of the  procedure.  Information provided and after visit summary.    Cervical spondylosis with myelopathy  Careful positioning due to previous significant cervical spondylosis.                 Risks and Recommendations:  The patient has the following additional risks and recommendations for perioperative complications:   - No identified additional risk factors other than previously addressed    Antiplatelet or Anticoagulation Medication Instructions:   - Patient is on no antiplatelet or anticoagulation medications.   - Bleeding risk is low for this procedure (e.g. dental, skin, cataract).    Additional Medication Instructions:  Patient is to take all scheduled medications on the day of surgery EXCEPT for modifications listed below:   - Diuretics: HOLD on the day of surgery.   - metformin: HOLD day of surgery.    RECOMMENDATION:  APPROVAL GIVEN to proceed with proposed procedure, without further diagnostic evaluation.    Subjective   Vishnu Viveros is a 73 year old with a history of type 2 diabetes mellitus controlled, hyperlipidemia, hypertension, colon polyps, DJD with spinal stenosis back, elevated PSA, and stage 1 right adenocarcinoma of the lung s/p resection with clear margins 2019  present for pre-operative assessment for eye surgery.    HPI related to upcoming procedure: Decreased night vision bilaterally interfering with driving and other activities.          7/3/2023     3:46 PM   Preop Questions   1. Have you ever had a heart attack or stroke? No   2. Have you ever had surgery on your heart or blood vessels, such as a stent placement, a coronary artery bypass, or surgery on an artery in your head, neck, heart, or legs? No   3. Do you have chest pain with activity? No   4. Do you have a history of  heart failure? No   5. Do you currently have a cold, bronchitis or symptoms of other infection? No   6. Do you have a cough, shortness of breath, or wheezing? No   7. Do you or anyone in your family have previous  history of blood clots? No   8. Do you or does anyone in your family have a serious bleeding problem such as prolonged bleeding following surgeries or cuts? No   9. Have you ever had problems with anemia or been told to take iron pills? No   10. Have you had any abnormal blood loss such as black, tarry or bloody stools? No   11. Have you ever had a blood transfusion? No   12. Are you willing to have a blood transfusion if it is medically needed before, during, or after your surgery? Yes   13. Have you or any of your relatives ever had problems with anesthesia? No   14. Do you have sleep apnea, excessive snoring or daytime drowsiness? YES - Does not use CPAP   14a. Do you have a CPAP machine? No   15. Do you have any artifical heart valves or other implanted medical devices like a pacemaker, defibrillator, or continuous glucose monitor? No   16. Do you have artificial joints? No   17. Are you allergic to latex? No     Health Care Directive:  Patient does not have a Health Care Directive or Living Will:     Preoperative Review of :   reviewed - no record of controlled substances prescribed.        BP Readings from Last 3 Encounters:   07/03/23 97/63   06/12/23 117/72   01/24/23 117/69    Wt Readings from Last 3 Encounters:   07/03/23 78.7 kg (173 lb 6.4 oz)   06/12/23 79.4 kg (175 lb 1.6 oz)   01/24/23 79.8 kg (176 lb)                  Status of Chronic Conditions:  DIABETES - Patient has a longstanding history of DiabetesType Type II . Patient is being treated with diet, oral agents and exercise and denies significant side effects. Control has been good. Complicating factors include but are not limited to: hypertension and hyperlipidemia.     HYPERLIPIDEMIA - Patient has a long history of significant Hyperlipidemia requiring medication for treatment with recent good control. Patient reports no problems or side effects with the medication.     HYPERTENSION - Patient has longstanding history of HTN , currently  denies any symptoms referable to elevated blood pressure. Specifically denies chest pain, palpitations, dyspnea, orthopnea, PND or peripheral edema. Blood pressure readings have been in normal range. Current medication regimen is as listed below. Patient denies any side effects of medication. Today had to rush to appointment did not have lunch. Denies lightheaded symptoms prefers to continue current medicaitons.      Review of Systems   Problem list, PMH, Surgical HX, FH, SH, allergies, medications,immunizations reviewed and updated in Epic. 10 point ROS negative other than noted in HPI and ROS.        Patient Active Problem List    Diagnosis Date Noted     Cervical spondylosis with myelopathy 09/27/2022     Priority: Medium     Cervical stenosis of spinal canal 09/27/2022     Priority: Medium     c5-6       Neck pain 09/19/2022     Priority: Medium     Balance problems 06/14/2022     Priority: Medium     Combined forms of age-related cataract of both eyes 10/05/2021     Priority: Medium     Added automatically from request for surgery [id number]       Evaluation of hearing impairment 11/17/2020     Priority: Medium     History of colonic polyps 12/11/2019     Priority: Medium     Hyperplastic polyp 5/24/22 colonoscopy [facility] next in 5 years due 2027       Spinal stenosis, lumbar region, with neurogenic claudication 12/11/2019     Priority: Medium     Elevated prostate specific antigen (PSA) 12/11/2019     Priority: Medium     Adenocarcinoma of lung, stage 1, right (H) 04/25/2019     Priority: Medium     pT1b pN0 invasive adnemocarcinoma acinar type 1.8 clear margins       Benign essential hypertension 04/25/2019     Priority: Medium     Obesity with body mass index 30 or greater 04/05/2019     Priority: Medium     Solitary pulmonary nodule 03/27/2019     Priority: Medium     Added automatically from request for surgery [id number]       Lumbar radiculopathy 11/26/2018     Priority: Medium     L5 left       Tubulovillous  adenoma of colon 02/10/2017     Priority: Medium     Type 2 diabetes mellitus treated without insulin (H) 12/02/2016     Priority: Medium     Diabetic polyneuropathy associated with diabetes mellitus due to underlying condition (H) 12/02/2016     Priority: Medium     Tear film insufficiency 12/12/2014     Priority: Medium     Problem list name updated by automated process. Provider to review       Recurrent pterygium 12/12/2014     Priority: Medium     Other age-related cataract of left eye 12/12/2014     Priority: Medium     DJD (degenerative joint disease) of knee 10/30/2014     Priority: Medium     HL (hearing loss) 10/30/2014     Priority: Medium     Vision impairment 10/30/2014     Priority: Medium     Back pain with radiation 02/05/2014     Priority: Medium     Degenerative disc disease, lumbar 10/08/2012     Priority: Medium     Chronic low back pain 10/04/2012     Priority: Medium     Vitamin D deficiency 11/14/2011     Priority: Medium     Pulmonary nodule 11/14/2011     Priority: Medium     Prostate nodule 11/14/2011     Priority: Medium     Erectile dysfunction 11/09/2011     Priority: Medium     Seasonal allergies 11/09/2011     Priority: Medium      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 eyelid, unspecified laterality      History of tobacco use      HLD (hyperlipidemia)      HTN (hypertension)      Obesity (BMI 30.0-34.9)      Pulmonary nodule      Past Surgical History:   Procedure Laterality Date     APPENDECTOMY OPEN CHILD       ARTHROSCOPY KNEE Left      BIOPSY PROSTATE TRANSRECTAL       EXCISE PTERYGIUM Right 1989     EXCISE PTERYGIUM Right 2/12/2015     INJECT EPIDURAL LUMBAR / SACRAL SINGLE N/A 7/21/2016    Procedure: INJECT EPIDURAL LUMBAR / SACRAL SINGLE;  Surgeon: Judah Henriquez MD;  Location: [facility] OR     INJECT PARAVERTEBRAL FACET JOINT LUMBAR / SACRAL FIRST  Right 8/18/2016    Procedure: INJECT PARAVERTEBRAL FACET JOINT LUMBAR / SACRAL FIRST;  Surgeon: Judah Henriquez MD;  Location: [facility]     INJECT PARAVERTEBRAL FACET JOINT LUMBAR / SACRAL FIRST Right 9/8/2016    Procedure: INJECT PARAVERTEBRAL FACET JOINT LUMBAR / SACRAL FIRST;  Surgeon: Judah Henriquez MD;  Location: [facility] OR     MEDIASTINOSCOPY Right 04/08/2019     [facility]  Dr Hemanth Covington 4/8/19 bronchoscopy, cervical mediastinoscopy for staging and minimally invasive right thoracoscopic wedge resection 1.8 cm invasive adenocarcinoma [id number] stage 1A2 adenocarcinoma completely resected via minimally invasive right upper lobectomy.     PUNCTALPLASTY Bilateral 12/19/2018    Procedure: Bilateral Punctoplasty with Mini [place] Stent;  Surgeon: Levy Blue MD;  Location: [facility]     REPAIR ECTROPION BILATERAL Bilateral 12/19/2018    Procedure: Bilateral Lower Lid Ectropion Repair with Right Lower Lid Lesion Excision;  Surgeon: Levy Blue MD;  Location: [facility] OR     Current Outpatient Medications   Medication Sig Dispense Refill     acetaminophen (ACETAMINOPHEN EXTRA STRENGTH) 500 MG tablet Take 1-2 tablets (500-1,000 mg) by mouth every 6 hours as needed for mild pain 100 tablet 3     amLODIPine (NORVASC) 5 MG tablet Take 1 tablet by mouth daily       Ascorbic Acid (VITAMIN C PO) Take 500 mg by mouth once       atorvastatin (LIPITOR) 20 MG tablet Take 1 tablet (20 mg) by mouth daily 90 tablet 3     chlorthalidone (HYGROTON) 25 MG tablet Take 1 tablet (25 mg) by mouth daily 90 tablet 3     Cholecalciferol (VITAMIN D) 1000 UNIT capsule Take 1 capsule by mouth daily.       Cyanocobalamin (VITAMIN B12 PO) Take 1 tablet by mouth Pt states he takes this 2 or 3 times a week.       cyclobenzaprine (FLEXERIL) 10 MG tablet Take 1 tablet (10 mg) by mouth 2 times daily as needed for muscle spasms (take at bedtime for muscle pain) Must not drive - Oral 30 tablet 3     finasteride (PROSCAR) 5 MG tablet Take 1 tablet (5 mg) by  mouth daily 90 tablet 3     gabapentin (NEURONTIN) 300 MG capsule One capsule at bedtime 90 capsule 3     loratadine (CLARITIN) 10 MG tablet Take 1 tablet (10 mg) by mouth daily as needed for allergies 90 tablet 1     losartan (COZAAR) 100 MG tablet Take 1 tablet (100 mg) by mouth every evening 90 tablet 3     metFORMIN (GLUCOPHAGE XR) 500 MG 24 hr tablet Take 2 tablets (1,000 mg) by mouth daily (with dinner) 180 tablet 3     methocarbamol (ROBAXIN) 500 MG tablet Take 1 tablet (500 mg) by mouth 3 times daily as needed for muscle spasms 90 tablet 1     montelukast (SINGULAIR) 10 MG tablet Take 1 tablet (10 mg) by mouth At Bedtime During allergy 90 tablet 1     Multiple Vitamin (MULTIVITAMINS PO) Take 1 tablet by mouth daily.       Omega-3 Fatty Acids (FISH OIL PO) Take 1 capsule by mouth as needed       potassium chloride ER (KLOR-CON M) 20 MEQ CR tablet Take 1 tablet (20 mEq) by mouth daily 90 tablet 3     tamsulosin (FLOMAX) 0.4 MG capsule Take 1 capsule (0.4 mg) by mouth daily 90 capsule 3     acetaminophen (TYLENOL) 500 MG tablet Take 1-2 tablets (500-1,000 mg) by mouth every 6 hours as needed for pain (arthritis) (Patient not taking: Reported on 7/3/2023) 120 tablet 3     diclofenac (VOLTAREN) 1 % topical gel Apply 2 g topically 4 times daily as needed for moderate pain (Patient not taking: Reported on 7/3/2023) 100 g 3     fluticasone (FLONASE) 50 MCG/ACT nasal spray Spray 1 spray into both nostrils daily (Patient not taking: Reported on 7/3/2023) 16 g 3     melatonin 3 MG tablet Take 1 tablet (3 mg) by mouth nightly as needed for sleep (may take 1-2 if needed) (Patient not taking: Reported on 7/3/2023) 60 tablet 3     nystatin (MYCOSTATIN) 447590 UNIT/GM external cream Apply topically 2 times daily to affected area. (Patient not taking: Reported on 7/3/2023) 30 g 4     order for DME Back brace 1 Device 0     tadalafil (CIALIS) 20 MG tablet Take 1 tablet (20 mg) by mouth every 48 hours as needed (sex) (Patient  "not taking: Reported on 7/3/2023) 30 tablet 3       Allergies   Allergen Reactions     Pollen Extract      Other reaction(s): Headache  Sinus congestion, sinus \"drainage\", sneezing     Seasonal Allergies      Sinus congestion, sinus \"drainage\", sneezing     Nkda [No Known Drug Allergy]         Social History     Tobacco Use     Smoking status: Former     Years: 5.00     Types: Cigarettes     Quit date: 1981     Years since quittin.6     Smokeless tobacco: Never   Substance Use Topics     Alcohol use: Yes     Alcohol/week: 2.5 standard drinks of alcohol     Types: 3 Standard drinks or equivalent per week     Family History   Problem Relation Age of Onset     Cerebrovascular Disease Brother          stroke      Breast Cancer Sister      Kidney Disease Mother         Kidney stones     Other - See Comments Father          in carmita parkinsons or post surgical eye surgery     Cerebral aneurysm Brother      Hypothyroidism Sister      Glaucoma No family hx of      Macular Degeneration No family hx of      History   Drug Use No         Objective     BP 97/63 (BP Location: Right arm, Patient Position: Sitting, Cuff Size: Adult Regular)   Pulse 77   Temp 97.6  F (36.4  C)   Resp 18   Ht 1.626 m (5' 4.02\")   Wt 78.7 kg (173 lb 6.4 oz)   SpO2 97%   BMI 29.75 kg/m      Physical Exam   GENERAL APPEARANCE: healthy, alert and no distress BMI 29.75  EYES: Eyes grossly normal to inspection, cataracts, PERRL and conjunctivae and sclerae normal  HENT: ear canals and TM's normal,  mouth without ulcers or lesions, oropharynx clear and oral mucous membranes moist, no thrush  NECK: no adenopathy, no asymmetry, masses, or scars and thyroid normal to palpation  RESP: lungs clear to auscultation - no rales, rhonchi or wheezes  CV: regular rate and rhythm, normal S1 S2, no S3 or S4, no murmur, click or rub, no peripheral edema   Chest: well healed surgical scars from laparoscopic chest procedure  ABDOMEN: " soft,obese, nontender, no hepatosplenomegaly, no masses and bowel sounds normal, well-healed abdominal scar  MS: Kyphosis and antalgic gait with left leg weakness compared to right.  SKIN: no suspicious lesions or rashes  NEURO: Normal strength and tone, sensory exam grossly normal, mentation intact and speech normal  PSYCH: mentation appears normal and affect normal      Recent Labs   Lab Test 06/10/23  0955 12/10/22  1021 09/21/22  1727 06/13/22  1550   HGB  --  12.2*  --  12.1*   PLT  --  195  --  202    142  --  143   POTASSIUM 4.0 4.4  --  4.0   CR 0.98 1.06   < > 1.02   A1C 6.4* 6.7*  --  5.8*    < > = values in this interval not displayed.        Revised Cardiac Risk Index (RCRI):  The patient has the following serious cardiovascular risks for perioperative complications:   - No serious cardiac risks = 0 points     RCRI Interpretation: 0 points: Class I (very low risk - 0.4% complication rate)           Signed Electronically by: Murali Logan MD  Copy of this evaluation report is provided to requesting physician.

## 2023-07-03 NOTE — PATIENT INSTRUCTIONS
For informational purposes only. Not to replace the advice of your health care provider. Copyright   2003,  [employer] [facility]. All rights reserved. Clinically reviewed by Nica Bazan MD. [employer] [id number] - REV .  Preparing for Your Surgery  Getting started  A nurse will call you to review your health history and instructions. They will give you an arrival time based on your scheduled surgery time. Please be ready to share:  Your doctor's clinic name and phone number  Your medical, surgical, and anesthesia history  A list of allergies and sensitivities  A list of medicines, including herbal treatments and over-the-counter drugs  Whether the patient has a legal guardian (ask how to send us the papers in advance)  Please tell us if you're pregnant--or if there's any chance you might be pregnant. Some surgeries may injure a fetus (unborn baby), so they require a pregnancy test. Surgeries that are safe for a fetus don't always need a test, and you can choose whether to have one.   If you have a child who's having surgery, please ask for a copy of Preparing for Your Child's Surgery.    Preparing for surgery  Within 10 to 30 days of surgery: Have a pre-op exam (sometimes called an H&P, or History and Physical). This can be done at a clinic or pre-operative center.  If you're having a , you may not need this exam. Talk to your care team.  At your pre-op exam, talk to your care team about all medicines you take. If you need to stop any medicines before surgery, ask when to start taking them again.  We do this for your safety. Many medicines can make you bleed too much during surgery. Some change how well surgery (anesthesia) drugs work.  Call your insurance company to let them know you're having surgery. (If you don't have insurance, call [phone].)  Call your clinic if there's any change in your health. This includes signs of a cold or flu (sore throat, runny nose, cough, rash, fever). It  also includes a scrape or scratch near the surgery site.  If you have questions on the day of surgery, call your hospital or surgery center.  Eating and drinking guidelines  For your safety: Unless your surgeon tells you otherwise, follow the guidelines below.  Eat and drink as usual until 8 hours before you arrive for surgery. After that, no food or milk.  Drink clear liquids until 4 hours before you arrive. These are liquids you can see through, like water, Gatorade, and Propel Water. They also include plain black coffee and tea (no cream or milk), candy, and breath mints. You can spit out gum when you arrive.  If you drink alcohol: Stop drinking it the night before surgery.  If your care team tells you to take medicine on the morning of surgery, it's okay to take it with a sip of water.  Preventing infection  Shower or bathe the night before and morning of your surgery. Follow the instructions your clinic gave you. (If no instructions, use regular soap.)  Don't shave or clip hair near your surgery site. We'll remove the hair if needed.  Don't smoke or vape the morning of surgery. You may chew nicotine gum up to 2 hours before surgery. A nicotine patch is okay.  Note: Some surgeries require you to completely quit smoking and nicotine. Check with your surgeon.  Your care team will make every effort to keep you safe from infection. We will:  Clean our hands often with soap and water (or an alcohol-based hand rub).  Clean the skin at your surgery site with a special soap that kills germs.  Give you a special gown to keep you warm. (Cold raises the risk of infection.)  Wear special hair covers, masks, gowns and gloves during surgery.  Give antibiotic medicine, if prescribed. Not all surgeries need antibiotics.  What to bring on the day of surgery  Photo ID and insurance card  Copy of your health care directive, if you have one  Glasses and hearing aids (bring cases)  You can't wear contacts during surgery  Inhaler and  eye drops, if you use them (tell us about these when you arrive)  CPAP machine or breathing device, if you use them  A few personal items, if spending the night  If you have . . .  A pacemaker, ICD (cardiac defibrillator) or other implant: Bring the ID card.  An implanted stimulator: Bring the remote control.  A legal guardian: Bring a copy of the certified (court-stamped) guardianship papers.  Please remove any jewelry, including body piercings. Leave jewelry and other valuables at home.  If you're going home the day of surgery  You must have a responsible adult drive you home. They should stay with you overnight as well.  If you don't have someone to stay with you, and you aren't safe to go home alone, we may keep you overnight. Insurance often won't pay for this.  After surgery  If it's hard to control your pain or you need more pain medicine, please call your surgeon's office.  Questions?   If you have any questions for your care team, list them here: _________________________________________________________________________________________________________________________________________________________________________ ____________________________________ ____________________________________ ____________________________________    How to Take Your Medication Before Surgery  - HOLD (do not take) your Chlorthalidone on the morning of surgery.   - HOLD (do not take) your METFORMIN on the morning of surgery.  - STOP taking all vitamins and herbal supplements 14 days before surgery.  Stop Fish oil, Vit C and multiple vit today

## 2023-07-03 NOTE — PROGRESS NOTES
[facility] [address] [address]  Phone: [phone]  Fax: [phone]  Primary Provider: Murali Logan  Pre-op Performing Provider: MURALI LOGAN      PREOPERATIVE EVALUATION:  Today's date: 7/3/2023    Vishnu Viveros is a 73 year old male who presents for a preoperative evaluation.    Surgical Information:  Surgery/Procedure:   Provider Role   Rick Campbell MD Primary    Procedure Laterality Anesthesia   RIGHT EYE PHACOEMULSIFICATION, CATARACT, WITH STANDARD INTRAOCULAR LENS IMPLANT INSERTION Right MAC with Topical        Provider Role   Rick Campbell MD Primary    Procedure Laterality Anesthesia   LEFT EYE PHACOEMULSIFICATION, CATARACT, WITH STANDARD INTRAOCULAR LENS IMPLANT INSERTION Left MAC with Topical          Surgery Location: [place] [address]  Surgeon: Dr Rick Campbell  Surgery Date: 7/10/2024 for right eye 9:30 am, 7/24/2023 for left eye 8:25 am  Time of Surgery: 9:30 am  Where patient plans to recover: At home with family  Fax number for surgical facility: Note does not need to be faxed, will be available electronically in Epic.      Assessment & Plan     The proposed surgical procedure is considered LOW risk.    Preop general physical exam  Vision impairment  Combined forms of age-related cataract of both eyes  He will proceed with eye procedures as noted preop to be effective for both procedures.  He will stop fish oil vitamin C multiple vitamin 1 week prior to procedure.    Type 2 diabetes mellitus treated without insulin (H)  Most recent A1c 6.4 diabetes well controlled reviewed do not take medications the day of the procedure, reviewed fasting for 8 hours prior, must have a .    Benign essential hypertension  Blood pressure was slightly lower today reviewed options to lower dose of diuretic but he wanted to continue current medications as he feels well.  He will not take diuretics the day of the  procedure.  Information provided and after visit summary.    Cervical spondylosis with myelopathy  Careful positioning due to previous significant cervical spondylosis.                 Risks and Recommendations:  The patient has the following additional risks and recommendations for perioperative complications:   - No identified additional risk factors other than previously addressed    Antiplatelet or Anticoagulation Medication Instructions:   - Patient is on no antiplatelet or anticoagulation medications.   - Bleeding risk is low for this procedure (e.g. dental, skin, cataract).    Additional Medication Instructions:  Patient is to take all scheduled medications on the day of surgery EXCEPT for modifications listed below:   - Diuretics: HOLD on the day of surgery.   - metformin: HOLD day of surgery.    RECOMMENDATION:  APPROVAL GIVEN to proceed with proposed procedure, without further diagnostic evaluation.    Subjective   Vishnu Viveros is a 73 year old with a history of type 2 diabetes mellitus controlled, hyperlipidemia, hypertension, colon polyps, DJD with spinal stenosis back, elevated PSA, and stage 1 right adenocarcinoma of the lung s/p resection with clear margins 2019  present for pre-operative assessment for eye surgery.    HPI related to upcoming procedure: Decreased night vision bilaterally interfering with driving and other activities.          7/3/2023     3:46 PM   Preop Questions   1. Have you ever had a heart attack or stroke? No   2. Have you ever had surgery on your heart or blood vessels, such as a stent placement, a coronary artery bypass, or surgery on an artery in your head, neck, heart, or legs? No   3. Do you have chest pain with activity? No   4. Do you have a history of  heart failure? No   5. Do you currently have a cold, bronchitis or symptoms of other infection? No   6. Do you have a cough, shortness of breath, or wheezing? No   7. Do you or anyone in your family have previous  history of blood clots? No   8. Do you or does anyone in your family have a serious bleeding problem such as prolonged bleeding following surgeries or cuts? No   9. Have you ever had problems with anemia or been told to take iron pills? No   10. Have you had any abnormal blood loss such as black, tarry or bloody stools? No   11. Have you ever had a blood transfusion? No   12. Are you willing to have a blood transfusion if it is medically needed before, during, or after your surgery? Yes   13. Have you or any of your relatives ever had problems with anesthesia? No   14. Do you have sleep apnea, excessive snoring or daytime drowsiness? YES - Does not use CPAP   14a. Do you have a CPAP machine? No   15. Do you have any artifical heart valves or other implanted medical devices like a pacemaker, defibrillator, or continuous glucose monitor? No   16. Do you have artificial joints? No   17. Are you allergic to latex? No     Health Care Directive:  Patient does not have a Health Care Directive or Living Will:     Preoperative Review of :   reviewed - no record of controlled substances prescribed.        BP Readings from Last 3 Encounters:   07/03/23 97/63   06/12/23 117/72   01/24/23 117/69    Wt Readings from Last 3 Encounters:   07/03/23 78.7 kg (173 lb 6.4 oz)   06/12/23 79.4 kg (175 lb 1.6 oz)   01/24/23 79.8 kg (176 lb)                  Status of Chronic Conditions:  DIABETES - Patient has a longstanding history of DiabetesType Type II . Patient is being treated with diet, oral agents and exercise and denies significant side effects. Control has been good. Complicating factors include but are not limited to: hypertension and hyperlipidemia.     HYPERLIPIDEMIA - Patient has a long history of significant Hyperlipidemia requiring medication for treatment with recent good control. Patient reports no problems or side effects with the medication.     HYPERTENSION - Patient has longstanding history of HTN , currently  denies any symptoms referable to elevated blood pressure. Specifically denies chest pain, palpitations, dyspnea, orthopnea, PND or peripheral edema. Blood pressure readings have been in normal range. Current medication regimen is as listed below. Patient denies any side effects of medication. Today had to rush to appointment did not have lunch. Denies lightheaded symptoms prefers to continue current medicaitons.      Review of Systems   Problem list, PMH, Surgical HX, FH, SH, allergies, medications,immunizations reviewed and updated in Epic. 10 point ROS negative other than noted in HPI and ROS.        Patient Active Problem List    Diagnosis Date Noted     Cervical spondylosis with myelopathy 09/27/2022     Priority: Medium     Cervical stenosis of spinal canal 09/27/2022     Priority: Medium     c5-6       Neck pain 09/19/2022     Priority: Medium     Balance problems 06/14/2022     Priority: Medium     Combined forms of age-related cataract of both eyes 10/05/2021     Priority: Medium     Added automatically from request for surgery [id number]       Evaluation of hearing impairment 11/17/2020     Priority: Medium     History of colonic polyps 12/11/2019     Priority: Medium     Hyperplastic polyp 5/24/22 colonoscopy [facility] next in 5 years due 2027       Spinal stenosis, lumbar region, with neurogenic claudication 12/11/2019     Priority: Medium     Elevated prostate specific antigen (PSA) 12/11/2019     Priority: Medium     Adenocarcinoma of lung, stage 1, right (H) 04/25/2019     Priority: Medium     pT1b pN0 invasive adnemocarcinoma acinar type 1.8 clear margins       Benign essential hypertension 04/25/2019     Priority: Medium     Obesity with body mass index 30 or greater 04/05/2019     Priority: Medium     Solitary pulmonary nodule 03/27/2019     Priority: Medium     Added automatically from request for surgery [id number]       Lumbar radiculopathy 11/26/2018     Priority: Medium     L5 left       Tubulovillous  adenoma of colon 02/10/2017     Priority: Medium     Type 2 diabetes mellitus treated without insulin (H) 12/02/2016     Priority: Medium     Diabetic polyneuropathy associated with diabetes mellitus due to underlying condition (H) 12/02/2016     Priority: Medium     Tear film insufficiency 12/12/2014     Priority: Medium     Problem list name updated by automated process. Provider to review       Recurrent pterygium 12/12/2014     Priority: Medium     Other age-related cataract of left eye 12/12/2014     Priority: Medium     DJD (degenerative joint disease) of knee 10/30/2014     Priority: Medium     HL (hearing loss) 10/30/2014     Priority: Medium     Vision impairment 10/30/2014     Priority: Medium     Back pain with radiation 02/05/2014     Priority: Medium     Degenerative disc disease, lumbar 10/08/2012     Priority: Medium     Chronic low back pain 10/04/2012     Priority: Medium     Vitamin D deficiency 11/14/2011     Priority: Medium     Pulmonary nodule 11/14/2011     Priority: Medium     Prostate nodule 11/14/2011     Priority: Medium     Erectile dysfunction 11/09/2011     Priority: Medium     Seasonal allergies 11/09/2011     Priority: Medium      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 eyelid, unspecified laterality      History of tobacco use      HLD (hyperlipidemia)      HTN (hypertension)      Obesity (BMI 30.0-34.9)      Pulmonary nodule      Past Surgical History:   Procedure Laterality Date     APPENDECTOMY OPEN CHILD       ARTHROSCOPY KNEE Left      BIOPSY PROSTATE TRANSRECTAL       EXCISE PTERYGIUM Right 1989     EXCISE PTERYGIUM Right 2/12/2015     INJECT EPIDURAL LUMBAR / SACRAL SINGLE N/A 7/21/2016    Procedure: INJECT EPIDURAL LUMBAR / SACRAL SINGLE;  Surgeon: Judah Henriquez MD;  [facility]     INJECT PARAVERTEBRAL FACET JOINT LUMBAR / SACRAL FIRST  Right 8/18/2016    Procedure: INJECT PARAVERTEBRAL FACET JOINT LUMBAR / SACRAL FIRST;  Surgeon: Judah Henriquez MD;  [facility]     INJECT PARAVERTEBRAL FACET JOINT LUMBAR / SACRAL FIRST Right 9/8/2016    Procedure: INJECT PARAVERTEBRAL FACET JOINT LUMBAR / SACRAL FIRST;  Surgeon: Judah Henriquez MD;  [facility]     MEDIASTINOSCOPY Right 04/08/2019     [place]  Dr Hemanth Covington 4/8/19 bronchoscopy, cervical mediastinoscopy for staging and minimally invasive right thoracoscopic wedge resection 1.8 cm invasive adenocarcinoma [id number] stage 1A2 adenocarcinoma completely resected via minimally invasive right upper lobectomy.     PUNCTALPLASTY Bilateral 12/19/2018    Procedure: Bilateral Punctoplasty with Mini Monoka Stent;  Surgeon: Levy Blue MD;  [facility]     REPAIR ECTROPION BILATERAL Bilateral 12/19/2018    Procedure: Bilateral Lower Lid Ectropion Repair with Right Lower Lid Lesion Excision;  Surgeon: Levy Blue MD;  [facility]     Current Outpatient Medications   Medication Sig Dispense Refill     acetaminophen (ACETAMINOPHEN EXTRA STRENGTH) 500 MG tablet Take 1-2 tablets (500-1,000 mg) by mouth every 6 hours as needed for mild pain 100 tablet 3     amLODIPine (NORVASC) 5 MG tablet Take 1 tablet by mouth daily       Ascorbic Acid (VITAMIN C PO) Take 500 mg by mouth once       atorvastatin (LIPITOR) 20 MG tablet Take 1 tablet (20 mg) by mouth daily 90 tablet 3     chlorthalidone (HYGROTON) 25 MG tablet Take 1 tablet (25 mg) by mouth daily 90 tablet 3     Cholecalciferol (VITAMIN D) 1000 UNIT capsule Take 1 capsule by mouth daily.       Cyanocobalamin (VITAMIN B12 PO) Take 1 tablet by mouth Pt states he takes this 2 or 3 times a week.       cyclobenzaprine (FLEXERIL) 10 MG tablet Take 1 tablet (10 mg) by mouth 2 times daily as needed for muscle spasms (take at bedtime for muscle pain) Must not drive - Oral 30 tablet 3     finasteride (PROSCAR) 5 MG tablet Take 1 tablet (5 mg) by  mouth daily 90 tablet 3     gabapentin (NEURONTIN) 300 MG capsule One capsule at bedtime 90 capsule 3     loratadine (CLARITIN) 10 MG tablet Take 1 tablet (10 mg) by mouth daily as needed for allergies 90 tablet 1     losartan (COZAAR) 100 MG tablet Take 1 tablet (100 mg) by mouth every evening 90 tablet 3     metFORMIN (GLUCOPHAGE XR) 500 MG 24 hr tablet Take 2 tablets (1,000 mg) by mouth daily (with dinner) 180 tablet 3     methocarbamol (ROBAXIN) 500 MG tablet Take 1 tablet (500 mg) by mouth 3 times daily as needed for muscle spasms 90 tablet 1     montelukast (SINGULAIR) 10 MG tablet Take 1 tablet (10 mg) by mouth At Bedtime During allergy 90 tablet 1     Multiple Vitamin (MULTIVITAMINS PO) Take 1 tablet by mouth daily.       Omega-3 Fatty Acids (FISH OIL PO) Take 1 capsule by mouth as needed       potassium chloride ER (KLOR-CON M) 20 MEQ CR tablet Take 1 tablet (20 mEq) by mouth daily 90 tablet 3     tamsulosin (FLOMAX) 0.4 MG capsule Take 1 capsule (0.4 mg) by mouth daily 90 capsule 3     acetaminophen (TYLENOL) 500 MG tablet Take 1-2 tablets (500-1,000 mg) by mouth every 6 hours as needed for pain (arthritis) (Patient not taking: Reported on 7/3/2023) 120 tablet 3     diclofenac (VOLTAREN) 1 % topical gel Apply 2 g topically 4 times daily as needed for moderate pain (Patient not taking: Reported on 7/3/2023) 100 g 3     fluticasone (FLONASE) 50 MCG/ACT nasal spray Spray 1 spray into both nostrils daily (Patient not taking: Reported on 7/3/2023) 16 g 3     melatonin 3 MG tablet Take 1 tablet (3 mg) by mouth nightly as needed for sleep (may take 1-2 if needed) (Patient not taking: Reported on 7/3/2023) 60 tablet 3     nystatin (MYCOSTATIN) 754274 UNIT/GM external cream Apply topically 2 times daily to affected area. (Patient not taking: Reported on 7/3/2023) 30 g 4     order for DME Back brace 1 Device 0     tadalafil (CIALIS) 20 MG tablet Take 1 tablet (20 mg) by mouth every 48 hours as needed (sex) (Patient  "not taking: Reported on 7/3/2023) 30 tablet 3       Allergies   Allergen Reactions     Pollen Extract      Other reaction(s): Headache  Sinus congestion, sinus \"drainage\", sneezing     Seasonal Allergies      Sinus congestion, sinus \"drainage\", sneezing     Nkda [No Known Drug Allergy]         Social History     Tobacco Use     Smoking status: Former     Years: 5.00     Types: Cigarettes     Quit date: 1981     Years since quittin.6     Smokeless tobacco: Never   Substance Use Topics     Alcohol use: Yes     Alcohol/week: 2.5 standard drinks of alcohol     Types: 3 Standard drinks or equivalent per week     Family History   Problem Relation Age of Onset     Cerebrovascular Disease Brother          stroke      Breast Cancer Sister      Kidney Disease Mother         Kidney stones     Other - See Comments Father          in [place] parkinsons or post surgical eye surgery     Cerebral aneurysm Brother      Hypothyroidism Sister      Glaucoma No family hx of      Macular Degeneration No family hx of      History   Drug Use No         Objective     BP 97/63 (BP Location: Right arm, Patient Position: Sitting, Cuff Size: Adult Regular)   Pulse 77   Temp 97.6  F (36.4  C)   Resp 18   Ht 1.626 m (5' 4.02\")   Wt 78.7 kg (173 lb 6.4 oz)   SpO2 97%   BMI 29.75 kg/m      Physical Exam   GENERAL APPEARANCE: healthy, alert and no distress BMI 29.75  EYES: Eyes grossly normal to inspection, cataracts, PERRL and conjunctivae and sclerae normal  HENT: ear canals and TM's normal,  mouth without ulcers or lesions, oropharynx clear and oral mucous membranes moist, no thrush  NECK: no adenopathy, no asymmetry, masses, or scars and thyroid normal to palpation  RESP: lungs clear to auscultation - no rales, rhonchi or wheezes  CV: regular rate and rhythm, normal S1 S2, no S3 or S4, no murmur, click or rub, no peripheral edema   Chest: well healed surgical scars from laparoscopic chest procedure  ABDOMEN: " soft,obese, nontender, no hepatosplenomegaly, no masses and bowel sounds normal, well-healed abdominal scar  MS: Kyphosis and antalgic gait with left leg weakness compared to right.  SKIN: no suspicious lesions or rashes  NEURO: Normal strength and tone, sensory exam grossly normal, mentation intact and speech normal  PSYCH: mentation appears normal and affect normal      Recent Labs   Lab Test 06/10/23  0955 12/10/22  1021 09/21/22  1727 06/13/22  1550   HGB  --  12.2*  --  12.1*   PLT  --  195  --  202    142  --  143   POTASSIUM 4.0 4.4  --  4.0   CR 0.98 1.06   < > 1.02   A1C 6.4* 6.7*  --  5.8*    < > = values in this interval not displayed.        Revised Cardiac Risk Index (RCRI):  The patient has the following serious cardiovascular risks for perioperative complications:   - No serious cardiac risks = 0 points     RCRI Interpretation: 0 points: Class I (very low risk - 0.4% complication rate)           Signed Electronically by: Murali Logan MD  Copy of this evaluation report is provided to requesting physician.

## 2023-07-03 NOTE — NURSING NOTE
"Vishnu Viveros is a 73 year old male patient that presents today in clinic for the following:    Chief Complaint   Patient presents with     Pre-Op Exam     Pt here for pre-op for cataract surgery on 7/10 and 7/24 with Dr. Campbell at [facility].     The patient's allergies and medications were reviewed as noted. A set of vitals were recorded as noted without incident: BP 97/63 (BP Location: Right arm, Patient Position: Sitting, Cuff Size: Adult Regular)   Pulse 77   Temp 97.6  F (36.4  C)   Resp 18   Ht 1.626 m (5' 4.02\")   Wt 78.7 kg (173 lb 6.4 oz)   SpO2 97%   BMI 29.75 kg/m  . The patient does not have any other questions for the provider.    Ivette Cotto, EMT at 3:44 PM on 7/3/2023  "

## 2023-07-03 NOTE — PROGRESS NOTES
Surgeon: Rick Campbell MD  Fax number for Preop Evaluation:   Location of Surgery: [facility] OR  Date of Surgery: 7/10/23, 7/24/23  Procedure: cataract surgery  History of reaction to anesthesia? No    Ivette Cotto, EMT at 3:46 PM on 7/3/2023

## 2023-07-07 ENCOUNTER — ANESTHESIA EVENT (OUTPATIENT)
Dept: SURGERY | Facility: AMBULATORY SURGERY CENTER | Age: 73
End: 2023-07-07
Payer: COMMERCIAL

## 2023-07-10 ENCOUNTER — TELEPHONE (OUTPATIENT)
Dept: OPHTHALMOLOGY | Facility: CLINIC | Age: 73
End: 2023-07-10

## 2023-07-10 ENCOUNTER — ANESTHESIA (OUTPATIENT)
Dept: SURGERY | Facility: AMBULATORY SURGERY CENTER | Age: 73
End: 2023-07-10
Payer: COMMERCIAL

## 2023-07-10 ENCOUNTER — HOSPITAL ENCOUNTER (OUTPATIENT)
Facility: AMBULATORY SURGERY CENTER | Age: 73
Discharge: HOME | End: 2023-07-10
Attending: OPHTHALMOLOGY
Payer: COMMERCIAL

## 2023-07-10 ENCOUNTER — OFFICE VISIT (OUTPATIENT)
Dept: OPHTHALMOLOGY | Facility: CLINIC | Age: 73
End: 2023-07-10

## 2023-07-10 VITALS
RESPIRATION RATE: 16 BRPM | TEMPERATURE: 97.4 F | HEART RATE: 59 BPM | SYSTOLIC BLOOD PRESSURE: 116 MMHG | BODY MASS INDEX: 29.53 KG/M2 | DIASTOLIC BLOOD PRESSURE: 65 MMHG | OXYGEN SATURATION: 97 % | HEIGHT: 64 IN | WEIGHT: 173 LBS

## 2023-07-10 DIAGNOSIS — H25.89 OTHER AGE-RELATED CATARACT OF LEFT EYE: Primary | ICD-10-CM

## 2023-07-10 DIAGNOSIS — Z98.890 POSTOPERATIVE EYE STATE: Primary | ICD-10-CM

## 2023-07-10 LAB — GLUCOSE BLDC GLUCOMTR-MCNC: 110 MG/DL (ref 70–99)

## 2023-07-10 PROCEDURE — 82962 GLUCOSE BLOOD TEST: CPT | Performed by: PATHOLOGY

## 2023-07-10 PROCEDURE — 66984 XCAPSL CTRC RMVL W/O ECP: CPT | Mod: RT

## 2023-07-10 PROCEDURE — 99024 POSTOP FOLLOW-UP VISIT: CPT | Mod: GC | Performed by: OPHTHALMOLOGY

## 2023-07-10 PROCEDURE — 66984 XCAPSL CTRC RMVL W/O ECP: CPT | Mod: RT | Performed by: OPHTHALMOLOGY

## 2023-07-10 DEVICE — LENS CC60WF 22.0 CLAREON UV ASPHERIC BICONVEX IOL: Type: IMPLANTABLE DEVICE | Site: EYE | Status: FUNCTIONAL

## 2023-07-10 RX ORDER — CYCLOPENTOLAT/TROPIC/PHENYLEPH 1%-1%-2.5%
1 DROPS (EA) OPHTHALMIC (EYE)
Status: COMPLETED | OUTPATIENT
Start: 2023-07-10 | End: 2023-07-10

## 2023-07-10 RX ORDER — DEXAMETHASONE SODIUM PHOSPHATE 4 MG/ML
INJECTION, SOLUTION INTRA-ARTICULAR; INTRALESIONAL; INTRAMUSCULAR; INTRAVENOUS; SOFT TISSUE PRN
Status: DISCONTINUED | OUTPATIENT
Start: 2023-07-10 | End: 2023-07-10 | Stop reason: HOSPADM

## 2023-07-10 RX ORDER — BALANCED SALT SOLUTION 6.4; .75; .48; .3; 3.9; 1.7 MG/ML; MG/ML; MG/ML; MG/ML; MG/ML; MG/ML
SOLUTION OPHTHALMIC PRN
Status: DISCONTINUED | OUTPATIENT
Start: 2023-07-10 | End: 2023-07-10 | Stop reason: HOSPADM

## 2023-07-10 RX ORDER — ONDANSETRON 4 MG/1
4 TABLET, ORALLY DISINTEGRATING ORAL EVERY 30 MIN PRN
Status: DISCONTINUED | OUTPATIENT
Start: 2023-07-10 | End: 2023-07-10 | Stop reason: HOSPADM

## 2023-07-10 RX ORDER — MOXIFLOXACIN 5 MG/ML
1 SOLUTION/ DROPS OPHTHALMIC 3 TIMES DAILY
Qty: 3 ML | Refills: 1 | Status: SHIPPED | OUTPATIENT
Start: 2023-07-10 | End: 2023-09-28

## 2023-07-10 RX ORDER — HYDROMORPHONE HYDROCHLORIDE 1 MG/ML
0.4 INJECTION, SOLUTION INTRAMUSCULAR; INTRAVENOUS; SUBCUTANEOUS EVERY 5 MIN PRN
Status: DISCONTINUED | OUTPATIENT
Start: 2023-07-10 | End: 2023-07-10 | Stop reason: HOSPADM

## 2023-07-10 RX ORDER — TETRACAINE HYDROCHLORIDE 5 MG/ML
SOLUTION OPHTHALMIC PRN
Status: DISCONTINUED | OUTPATIENT
Start: 2023-07-10 | End: 2023-07-10 | Stop reason: HOSPADM

## 2023-07-10 RX ORDER — LIDOCAINE 40 MG/G
CREAM TOPICAL
Status: DISCONTINUED | OUTPATIENT
Start: 2023-07-10 | End: 2023-07-10 | Stop reason: HOSPADM

## 2023-07-10 RX ORDER — FENTANYL CITRATE 50 UG/ML
INJECTION, SOLUTION INTRAMUSCULAR; INTRAVENOUS PRN
Status: DISCONTINUED | OUTPATIENT
Start: 2023-07-10 | End: 2023-07-10

## 2023-07-10 RX ORDER — TIMOLOL MALEATE 5 MG/ML
SOLUTION/ DROPS OPHTHALMIC PRN
Status: DISCONTINUED | OUTPATIENT
Start: 2023-07-10 | End: 2023-07-10 | Stop reason: HOSPADM

## 2023-07-10 RX ORDER — SODIUM CHLORIDE, SODIUM LACTATE, POTASSIUM CHLORIDE, CALCIUM CHLORIDE 600; 310; 30; 20 MG/100ML; MG/100ML; MG/100ML; MG/100ML
INJECTION, SOLUTION INTRAVENOUS CONTINUOUS
Status: DISCONTINUED | OUTPATIENT
Start: 2023-07-10 | End: 2023-07-10 | Stop reason: HOSPADM

## 2023-07-10 RX ORDER — PREDNISOLONE ACETATE 10 MG/ML
1 SUSPENSION/ DROPS OPHTHALMIC 4 TIMES DAILY
Qty: 10 ML | Refills: 1 | Status: SHIPPED | OUTPATIENT
Start: 2023-07-10 | End: 2023-07-25

## 2023-07-10 RX ORDER — MOXIFLOXACIN IN NACL,ISO-OS/PF 0.3MG/0.3
SYRINGE (ML) INTRAOCULAR PRN
Status: DISCONTINUED | OUTPATIENT
Start: 2023-07-10 | End: 2023-07-10 | Stop reason: HOSPADM

## 2023-07-10 RX ORDER — HYDROMORPHONE HYDROCHLORIDE 1 MG/ML
0.2 INJECTION, SOLUTION INTRAMUSCULAR; INTRAVENOUS; SUBCUTANEOUS EVERY 5 MIN PRN
Status: DISCONTINUED | OUTPATIENT
Start: 2023-07-10 | End: 2023-07-10 | Stop reason: HOSPADM

## 2023-07-10 RX ORDER — SODIUM CHLORIDE, SODIUM LACTATE, POTASSIUM CHLORIDE, CALCIUM CHLORIDE 600; 310; 30; 20 MG/100ML; MG/100ML; MG/100ML; MG/100ML
INJECTION, SOLUTION INTRAVENOUS CONTINUOUS
Status: DISCONTINUED | OUTPATIENT
Start: 2023-07-10 | End: 2023-07-11 | Stop reason: HOSPADM

## 2023-07-10 RX ORDER — ONDANSETRON 2 MG/ML
4 INJECTION INTRAMUSCULAR; INTRAVENOUS EVERY 30 MIN PRN
Status: DISCONTINUED | OUTPATIENT
Start: 2023-07-10 | End: 2023-07-10 | Stop reason: HOSPADM

## 2023-07-10 RX ORDER — KETOROLAC TROMETHAMINE 4 MG/ML
1 SOLUTION/ DROPS OPHTHALMIC 4 TIMES DAILY
Qty: 5 ML | Refills: 1 | Status: SHIPPED | OUTPATIENT
Start: 2023-07-10 | End: 2023-09-28

## 2023-07-10 RX ORDER — FENTANYL CITRATE 50 UG/ML
50 INJECTION, SOLUTION INTRAMUSCULAR; INTRAVENOUS EVERY 5 MIN PRN
Status: DISCONTINUED | OUTPATIENT
Start: 2023-07-10 | End: 2023-07-10 | Stop reason: HOSPADM

## 2023-07-10 RX ORDER — PROPARACAINE HYDROCHLORIDE 5 MG/ML
1 SOLUTION/ DROPS OPHTHALMIC ONCE
Status: COMPLETED | OUTPATIENT
Start: 2023-07-10 | End: 2023-07-10

## 2023-07-10 RX ORDER — SODIUM CHLORIDE, SODIUM LACTATE, POTASSIUM CHLORIDE, CALCIUM CHLORIDE 600; 310; 30; 20 MG/100ML; MG/100ML; MG/100ML; MG/100ML
500 INJECTION, SOLUTION INTRAVENOUS CONTINUOUS
Status: DISCONTINUED | OUTPATIENT
Start: 2023-07-10 | End: 2023-07-10 | Stop reason: HOSPADM

## 2023-07-10 RX ORDER — LIDOCAINE HYDROCHLORIDE 10 MG/ML
INJECTION, SOLUTION EPIDURAL; INFILTRATION; INTRACAUDAL; PERINEURAL PRN
Status: DISCONTINUED | OUTPATIENT
Start: 2023-07-10 | End: 2023-07-10 | Stop reason: HOSPADM

## 2023-07-10 RX ORDER — FENTANYL CITRATE 50 UG/ML
25 INJECTION, SOLUTION INTRAMUSCULAR; INTRAVENOUS EVERY 5 MIN PRN
Status: DISCONTINUED | OUTPATIENT
Start: 2023-07-10 | End: 2023-07-10 | Stop reason: HOSPADM

## 2023-07-10 RX ADMIN — PROPARACAINE HYDROCHLORIDE 1 DROP: 5 SOLUTION/ DROPS OPHTHALMIC at 08:36

## 2023-07-10 RX ADMIN — SODIUM CHLORIDE, SODIUM LACTATE, POTASSIUM CHLORIDE, CALCIUM CHLORIDE 500 ML: 600; 310; 30; 20 INJECTION, SOLUTION INTRAVENOUS at 08:36

## 2023-07-10 RX ADMIN — Medication 1 DROP: at 08:36

## 2023-07-10 RX ADMIN — FENTANYL CITRATE 25 MCG: 50 INJECTION, SOLUTION INTRAMUSCULAR; INTRAVENOUS at 10:21

## 2023-07-10 RX ADMIN — Medication 1 DROP: at 08:45

## 2023-07-10 RX ADMIN — Medication 1 DROP: at 08:41

## 2023-07-10 ASSESSMENT — EXTERNAL EXAM - RIGHT EYE: OD_EXAM: NORMAL

## 2023-07-10 ASSESSMENT — TONOMETRY
OD_IOP_MMHG: 22
IOP_METHOD: TONOPEN

## 2023-07-10 ASSESSMENT — SLIT LAMP EXAM - LIDS: COMMENTS: NORMAL

## 2023-07-10 ASSESSMENT — VISUAL ACUITY
METHOD: SNELLEN - LINEAR
OD_SC+: -2
OD_SC: 20/40

## 2023-07-10 NOTE — ANESTHESIA PREPROCEDURE EVALUATION
Anesthesia Pre-Procedure Evaluation    Patient: Vishnu Viveros   MRN: [id number] : 1950        Procedure : Procedure(s):  RIGHT EYE PHACOEMULSIFICATION, CATARACT, WITH STANDARD INTRAOCULAR LENS IMPLANT INSERTION          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 eyelid, unspecified laterality      History of tobacco use      HLD (hyperlipidemia)      HTN (hypertension)      Obesity (BMI 30.0-34.9)      Pulmonary nodule       Past Surgical History:   Procedure Laterality Date     APPENDECTOMY OPEN CHILD       ARTHROSCOPY KNEE Left      BIOPSY PROSTATE TRANSRECTAL       EXCISE PTERYGIUM Right      EXCISE PTERYGIUM Right 2015     INJECT EPIDURAL LUMBAR / SACRAL SINGLE N/A 2016    Procedure: INJECT EPIDURAL LUMBAR / SACRAL SINGLE;  Surgeon: Judah Henriquez MD;  [facility]     INJECT PARAVERTEBRAL FACET JOINT LUMBAR / SACRAL FIRST Right 2016    Procedure: INJECT PARAVERTEBRAL FACET JOINT LUMBAR / SACRAL FIRST;  Surgeon: Judah Henriquez MD;  [facility]     INJECT PARAVERTEBRAL FACET JOINT LUMBAR / SACRAL FIRST Right 2016    Procedure: INJECT PARAVERTEBRAL FACET JOINT LUMBAR / SACRAL FIRST;  Surgeon: Judah Henriquez MD;  [facility]     MEDIASTINOSCOPY Right 2019     [place]  Dr Hemanth Covington 19 bronchoscopy, cervical mediastinoscopy for staging and minimally invasive right thoracoscopic wedge resection 1.8 cm invasive adenocarcinoma [id number] stage 1A2 adenocarcinoma completely resected via minimally invasive right upper lobectomy.     PUNCTALPLASTY Bilateral 2018    Procedure: Bilateral Punctoplasty with Mini [place]oka Stent;  Surgeon: Levy Blue MD;  [facility]     REPAIR ECTROPION BILATERAL Bilateral 2018    Procedure: Bilateral Lower Lid Ectropion Repair with Right Lower Lid Lesion Excision;  Surgeon: Mirza  "MD Levy;  [facility]      Allergies   Allergen Reactions     Pollen Extract      Other reaction(s): Headache  Sinus congestion, sinus \"drainage\", sneezing     Seasonal Allergies      Sinus congestion, sinus \"drainage\", sneezing     Nkda [No Known Drug Allergy]       Social History     Tobacco Use     Smoking status: Former     Years: 5.00     Types: Cigarettes     Quit date: 1981     Years since quittin.6     Smokeless tobacco: Never   Substance Use Topics     Alcohol use: Yes     Alcohol/week: 2.5 standard drinks of alcohol     Types: 3 Standard drinks or equivalent per week      Wt Readings from Last 1 Encounters:   07/10/23 78.5 kg (173 lb)        Anesthesia Evaluation   Pt has had prior anesthetic. Type: General and MAC.    No history of anesthetic complications       ROS/MED HX  ENT/Pulmonary:  - neg pulmonary ROS     Neurologic:  - neg neurologic ROS     Cardiovascular:     (+) hypertension-----    METS/Exercise Tolerance: 4 - Raking leaves, gardening    Hematologic:  - neg hematologic  ROS     Musculoskeletal:   (+) arthritis,     GI/Hepatic:  - neg GI/hepatic ROS     Renal/Genitourinary:  - neg Renal ROS     Endo:     (+) type II DM (5.7),  (-) Type I DM   Psychiatric/Substance Use:  - neg psychiatric ROS     Infectious Disease:  - neg infectious disease ROS     Malignancy:   (+) Malignancy, History of Lung.    Other:      (+) , H/O Chronic Pain,        Physical Exam    Airway        Mallampati: II   TM distance: > 3 FB   Neck ROM: full   Mouth opening: > 3 cm    Respiratory Devices and Support         Dental       (+) Completely normal teeth      Cardiovascular   cardiovascular exam normal          Pulmonary   pulmonary exam normal                OUTSIDE LABS:  CBC:   Lab Results   Component Value Date    WBC 3.8 (L) 12/10/2022    WBC 4.1 2022    HGB 12.2 (L) 12/10/2022    HGB 12.1 (L) 2022    HCT 37.8 (L) 12/10/2022    HCT 36.8 (L) 2022     12/10/2022     " 06/13/2022     BMP:   Lab Results   Component Value Date     06/10/2023     12/10/2022    POTASSIUM 4.0 06/10/2023    POTASSIUM 4.4 12/10/2022    CHLORIDE 103 06/10/2023    CHLORIDE 103 12/10/2022    CO2 27 06/10/2023    CO2 30 (H) 12/10/2022    BUN 23.0 06/10/2023    BUN 23.7 (H) 12/10/2022    CR 0.98 06/10/2023    CR 1.06 12/10/2022     (H) 07/10/2023     (H) 06/10/2023     COAGS: No results found for: PTT, INR, FIBR  POC:   Lab Results   Component Value Date     (H) 03/09/2019     HEPATIC:   Lab Results   Component Value Date    ALBUMIN 4.2 06/10/2023    PROTTOTAL 6.8 06/10/2023    ALT 18 06/10/2023    AST 20 06/10/2023    ALKPHOS 49 06/10/2023    BILITOTAL 0.4 06/10/2023     OTHER:   Lab Results   Component Value Date    A1C 6.4 (H) 06/10/2023    FRANCIA 9.7 06/10/2023    PHOS 3.8 08/19/2010    MAG 2.1 12/06/2021    TSH 1.56 10/26/2019    CRP <5.0 11/06/2009    SED 16 08/19/2010       Anesthesia Plan    ASA Status:  2      Anesthesia Type: MAC.   Induction: Intravenous.   Maintenance: TIVA.        Consents    Anesthesia Plan(s) and associated risks, benefits, and realistic alternatives discussed. Questions answered and patient/representative(s) expressed understanding.    - Discussed:     - Discussed with:  Patient      - Extended Intubation/Ventilatory Support Discussed: Yes.         Postoperative Care    Pain management: IV analgesics.   PONV prophylaxis: Ondansetron (or other 5HT-3)     Comments:                Baldo Lopez MD

## 2023-07-10 NOTE — DISCHARGE INSTRUCTIONS
[facility]  Home Care Following Anesthesia  For 24 hours after surgery:  Get plenty of rest.  A responsible adult must stay with you for at least 24 hours after you leave the surgery center.  Do not drive or use heavy equipment.  If you have weakness or tingling, don't drive or use heavy equipment until this feeling goes away.   Do not drink alcohol.   Avoid strenuous or risky activities.  Ask for help when climbing stairs.  You may feel lightheaded.  IF so, sit for a few minutes before standing.  Have someone help you get up.   If you have nausea (feel sick to your stomach): Drink only clear liquids such as apple juice, ginger ale, broth or 7-Up.  Rest may also help.  Be sure to drink enough fluids.  Move to a regular diet as you feel able.   You may have a slight fever.  Call the doctor if your fever is over 100 F (37.7 C) (taken under the tongue) or lasts longer than 24 hours.  You may have a dry mouth, a sore throat, muscle aches or trouble sleeping. These should go away after 24 hours.  Do not make important or legal decisions.   It is recommended to avoid smoking.               Tips for taking pain medications  To get the best pain relief possible, remember these points:  Take pain medications as directed, before pain becomes severe.  Pain medication can upset your stomach: taking it with food may help.  Constipation is a common side effect of pain medication. Drink plenty of  fluids.  Eat foods high in fiber. Take a stool softener if recommended by your doctor or pharmacist.  Do not drink alcohol, drive or operate machinery while taking pain medications.  Ask about other ways to control pain, such as with heat, ice or relaxation.    Tylenol/Acetaminophen Consumption    If you feel your pain relief is insufficient, you may take Tylenol/Acetaminophen in addition to your narcotic pain medication.   Be careful not to exceed 4,000 mg of Tylenol/Acetaminophen in a 24 hour  period from all sources.  If you are taking extra strength Tylenol/acetaminophen (500 mg), the maximum dose is 8 tablets in 24 hours.  If you are taking regular strength acetaminophen (325 mg), the maximum dose is 12 tablets in 24 hours.    Call a doctor for any of the following:  Signs of infection (fever, growing tenderness at the surgery site, a large amount of drainage or bleeding, severe pain, foul-smelling drainage, redness, swelling).  It has been over 8 to 10 hours since surgery and you are still not able to urinate (pass water).  Headache for over 24 hours.  Numbness, tingling or weakness the day after surgery (if you had spinal anesthesia).  Signs of Covid-19 infection (temperature over 100 degrees, shortness of breath, cough, loss of taste/smell, generalized body aches, persistent headache, chills, sore throat, nausea/vomiting/diarrhea)  Your doctor is:       Dr. Rick Campbell, Ophthalmology: [phone]               Or dial [phone] and ask for the resident on call for:  Ophthalmology  For emergency care, call the:  [facility]:  [phone] (TTY for hearing impaired: [phone])

## 2023-07-10 NOTE — PROGRESS NOTES
Chief Complaint(s) and History of Present Illness(es)    No visit information to display     Review of systems for the eyes was negative other than the pertinent positives/negatives listed in the HPI.      Assessment & Plan      Vishnu Viveros is a 73 year old male with the following diagnoses:   1. Postoperative eye state       PostOp, right eye, day 0    Doing well  Keep patch in place at night for 5 days  Start post-operative drops and taper according to instructions - also ketorolac 4x/day in right eye  Post-operative do's and don'ts reviewed, questions answered    Recheck 2-3 weeks with refraction             Calvin Gaytan MD  PGY-4  Ophthalmology   [facility]    Attending Physician Attestation:  Complete documentation of historical and exam elements from today's encounter can be found in the full encounter summary report (not reduplicated in this progress note).  I personally obtained the chief complaint(s) and history of present illness.  I confirmed and edited as necessary the review of systems, past medical/surgical history, family history, social history, and examination findings as documented by others; and I examined the patient myself.  I personally reviewed the relevant tests, images, and reports as documented above.  I formulated and edited as necessary the assessment and plan and discussed the findings and management plan with the patient and family. . - Rick Campbell MD

## 2023-07-10 NOTE — OP NOTE
PREOPERATIVE DIAGNOSIS:   1. Combined age-related cataract of Right eye      POSTOPERATIVE DIAGNOSIS: Same   PROCEDURES:   1. Cataract extraction with intraocular lens implant Right eye.  SURGEON: Rick Campbell M.D.  Assistant: Calvin Gaytan MD         INDICATIONS: The patient Vishnu Viveros presented to the eye clinic with decreased vision secondary to cataract in the Right eye. The risks, benefits and alternatives to cataract extraction were discussed. The patient elected to proceed. All questions were answered to the patient's satisfaction.   DESCRIPTION OF PROCEDURE:   Prior to the procedure, appropriate cardiac and respiratory monitors were applied to the patient.  In the pre-operative holding area, a drop of topical tetracaine followed by lidocaine gel followed by povidone iodine.  The patient was brought to the operating room where a surgical pause was carried out to identify with all members of the surgical team the correct surgical site.  With adequate anesthesia, the Right eye was prepped and draped in the usual sterile fashion. A lid speculum was placed, and the operating microscope was rotated into position. A paracentesis was created.  Trypan blue stain was injected under air to stain the anterior lens capsule.  Trypan blue stain was elected due to a poor red reflex in the setting of dense cataract. Through this limbal paracentesis, the anterior chamber was filled with preservative-free lidocaine followed by viscoelastic.  A temporal wound was created at the limbus using a 2.6 mm blade. A capsulorrhexis was initiated using a bent 25-gauge needle and was completed in continuous and circular fashion using the capsulorrhexis forceps. The lens nucleus was hydrodissected using balanced salt solution.  The lens nucleus was rotated and removed using phacoemulsification in a stop and chop technique.  Residual cortical material was removed using irrigation-aspiration.  The capsular bag was reinflated to  its maximal extent with cohesive viscoelastic.  A 22.0 diopter CC60WF was inserted into the capsular bag.  The lens power selected was reviewed using the intraocular lens power measurements that were obtained preoperatively to confirm that the correct lens was selected for the desired post-operative refractive state. The residual viscoelastic was removed in its entirety, the wound were hydrated and found to be self-sealing.  Intracameral moxifloxacin was administered. Tactile pressure was confirmed to be in a normal range.  Subconjunctival Dexamethasone (2 mg) was injected..  The lid speculum was removed and a patch and shield were applied.  The patient tolerated the procedure well, and there were no complications.    PLAN: The patient will be discharged to home and will follow up tomorrow morning in the eye clinic.  EBL:  None  Complications:  None  Findings: Floppy iris syndrome  Implant Name Type Inv. Item Serial No.  Lot No. LRB No. Used Action   LENS CC60WF.220 [employer] UV ASPHERIC BICONVEX IOL - [id number] Lens/Eye Implant LENS [id number].220 CL[employer] UV ASPHERIC BICONVEX IOL [id number] [employer]  Right 1 Implanted        Attending Physician Procedure Attestation: I was present for the entire procedure       Rick Campbell MD  , Comprehensive Ophthalmology  Department of Ophthalmology and Visual Neurosciences  [facility]

## 2023-07-10 NOTE — ANESTHESIA CARE TRANSFER NOTE
Patient: Vishnu Viveros    Procedure: Procedure(s):  RIGHT EYE PHACOEMULSIFICATION, CATARACT, WITH STANDARD INTRAOCULAR LENS IMPLANT INSERTION       Diagnosis: Age-related nuclear cataract of both eyes [H25.13]  Diagnosis Additional Information: No value filed.    Anesthesia Type:   MAC     Note:    Oropharynx: oropharynx clear of all foreign objects and spontaneously breathing  Level of Consciousness: awake  Oxygen Supplementation: room air    Independent Airway: airway patency satisfactory and stable  Dentition: dentition unchanged  Vital Signs Stable: post-procedure vital signs reviewed and stable  Report to RN Given: handoff report given  Patient transferred to: Phase II    Handoff Report: Identifed the Patient, Identified the Reponsible Provider, Reviewed the pertinent medical history, Discussed the surgical course, Reviewed Intra-OP anesthesia mangement and issues during anesthesia, Set expectations for post-procedure period and Allowed opportunity for questions and acknowledgement of understanding      Vitals:  Vitals Value Taken Time   /62 07/10/23 1043   Temp 36.3  C (97.4  F) 07/10/23 1043   Pulse 59 07/10/23 1043   Resp 14 07/10/23 1043   SpO2 98 % 07/10/23 1043       Electronically Signed By: DILAN Shah CRNA  July 10, 2023  10:44 AM

## 2023-07-10 NOTE — ANESTHESIA POSTPROCEDURE EVALUATION
Patient: Vishnu Viveros    Procedure: Procedure(s):  RIGHT EYE PHACOEMULSIFICATION, CATARACT, WITH STANDARD INTRAOCULAR LENS IMPLANT INSERTION       Anesthesia Type:  MAC    Note:  Disposition: Outpatient   Postop Pain Control: Uneventful            Sign Out: Well controlled pain   PONV: No   Neuro/Psych: Uneventful            Sign Out: Acceptable/Baseline neuro status   Airway/Respiratory: Uneventful            Sign Out: Acceptable/Baseline resp. status   CV/Hemodynamics: Uneventful            Sign Out: Acceptable CV status; No obvious hypovolemia; No obvious fluid overload   Other NRE: NONE   DID A NON-ROUTINE EVENT OCCUR? No           Last vitals:  Vitals Value Taken Time   /62 07/10/23 1043   Temp 36.3  C (97.4  F) 07/10/23 1043   Pulse 59 07/10/23 1043   Resp 14 07/10/23 1043   SpO2 98 % 07/10/23 1043       Electronically Signed By: Baldo Lopez MD  July 10, 2023  10:56 AM

## 2023-07-10 NOTE — TELEPHONE ENCOUNTER
[facility] Call Center    Phone Message    May a detailed message be left on voicemail: yes     Reason for Call: Other: Spouse called requesting a call back. She is wondering if the eye drops that were given are suppose to be started today. Patient had surgery this morning. Please call to advise.      Action Taken: Other: eye    Travel Screening: Not Applicable

## 2023-07-21 ENCOUNTER — ANESTHESIA EVENT (OUTPATIENT)
Dept: SURGERY | Facility: AMBULATORY SURGERY CENTER | Age: 73
End: 2023-07-21
Payer: COMMERCIAL

## 2023-07-22 ENCOUNTER — ANCILLARY PROCEDURE (OUTPATIENT)
Dept: CT IMAGING | Facility: CLINIC | Age: 73
End: 2023-07-22
Attending: INTERNAL MEDICINE
Payer: COMMERCIAL

## 2023-07-22 DIAGNOSIS — C34.91 ADENOCARCINOMA OF LUNG, STAGE 1, RIGHT (H): ICD-10-CM

## 2023-07-22 PROCEDURE — 71250 CT THORAX DX C-: CPT | Mod: GC | Performed by: RADIOLOGY

## 2023-07-24 ENCOUNTER — HOSPITAL ENCOUNTER (OUTPATIENT)
Facility: AMBULATORY SURGERY CENTER | Age: 73
Discharge: HOME | End: 2023-07-24
Attending: OPHTHALMOLOGY
Payer: COMMERCIAL

## 2023-07-24 ENCOUNTER — ANESTHESIA (OUTPATIENT)
Dept: SURGERY | Facility: AMBULATORY SURGERY CENTER | Age: 73
End: 2023-07-24
Payer: COMMERCIAL

## 2023-07-24 VITALS
HEART RATE: 62 BPM | DIASTOLIC BLOOD PRESSURE: 62 MMHG | SYSTOLIC BLOOD PRESSURE: 103 MMHG | RESPIRATION RATE: 16 BRPM | WEIGHT: 173 LBS | HEIGHT: 64 IN | BODY MASS INDEX: 29.53 KG/M2 | TEMPERATURE: 97.4 F | OXYGEN SATURATION: 99 %

## 2023-07-24 DIAGNOSIS — H25.812 COMBINED FORM OF AGE-RELATED CATARACT, LEFT EYE: ICD-10-CM

## 2023-07-24 DIAGNOSIS — H25.89 OTHER AGE-RELATED CATARACT OF LEFT EYE: Primary | ICD-10-CM

## 2023-07-24 LAB — GLUCOSE BLDC GLUCOMTR-MCNC: 115 MG/DL (ref 70–99)

## 2023-07-24 PROCEDURE — 82962 GLUCOSE BLOOD TEST: CPT | Performed by: PATHOLOGY

## 2023-07-24 PROCEDURE — 66982 XCAPSL CTRC RMVL CPLX WO ECP: CPT | Mod: 79 | Performed by: OPHTHALMOLOGY

## 2023-07-24 PROCEDURE — 66982 XCAPSL CTRC RMVL CPLX WO ECP: CPT | Mod: LT

## 2023-07-24 DEVICE — LENS CC60WF 22.5 CLAREON UV ASPHERIC BICONVEX IOL: Type: IMPLANTABLE DEVICE | Site: EYE | Status: FUNCTIONAL

## 2023-07-24 RX ORDER — OXYCODONE HYDROCHLORIDE 5 MG/1
10 TABLET ORAL
Status: DISCONTINUED | OUTPATIENT
Start: 2023-07-24 | End: 2023-07-25 | Stop reason: HOSPADM

## 2023-07-24 RX ORDER — ACETAZOLAMIDE 500 MG/1
500 CAPSULE, EXTENDED RELEASE ORAL EVERY 12 HOURS SCHEDULED
Status: DISCONTINUED | OUTPATIENT
Start: 2023-07-24 | End: 2023-07-25 | Stop reason: HOSPADM

## 2023-07-24 RX ORDER — CYCLOPENTOLAT/TROPIC/PHENYLEPH 1%-1%-2.5%
1 DROPS (EA) OPHTHALMIC (EYE)
Status: COMPLETED | OUTPATIENT
Start: 2023-07-24 | End: 2023-07-24

## 2023-07-24 RX ORDER — DEXAMETHASONE SODIUM PHOSPHATE 4 MG/ML
INJECTION, SOLUTION INTRA-ARTICULAR; INTRALESIONAL; INTRAMUSCULAR; INTRAVENOUS; SOFT TISSUE PRN
Status: DISCONTINUED | OUTPATIENT
Start: 2023-07-24 | End: 2023-07-24 | Stop reason: HOSPADM

## 2023-07-24 RX ORDER — SODIUM CHLORIDE, SODIUM LACTATE, POTASSIUM CHLORIDE, CALCIUM CHLORIDE 600; 310; 30; 20 MG/100ML; MG/100ML; MG/100ML; MG/100ML
INJECTION, SOLUTION INTRAVENOUS CONTINUOUS
Status: DISCONTINUED | OUTPATIENT
Start: 2023-07-24 | End: 2023-07-25 | Stop reason: HOSPADM

## 2023-07-24 RX ORDER — ACETAZOLAMIDE 500 MG/1
500 CAPSULE, EXTENDED RELEASE ORAL ONCE
Status: SHIPPED | OUTPATIENT
Start: 2023-07-24

## 2023-07-24 RX ORDER — ONDANSETRON 2 MG/ML
INJECTION INTRAMUSCULAR; INTRAVENOUS PRN
Status: DISCONTINUED | OUTPATIENT
Start: 2023-07-24 | End: 2023-07-24

## 2023-07-24 RX ORDER — PROPARACAINE HYDROCHLORIDE 5 MG/ML
1 SOLUTION/ DROPS OPHTHALMIC ONCE
Status: COMPLETED | OUTPATIENT
Start: 2023-07-24 | End: 2023-07-24

## 2023-07-24 RX ORDER — HYDROMORPHONE HYDROCHLORIDE 1 MG/ML
0.2 INJECTION, SOLUTION INTRAMUSCULAR; INTRAVENOUS; SUBCUTANEOUS EVERY 5 MIN PRN
Status: DISCONTINUED | OUTPATIENT
Start: 2023-07-24 | End: 2023-07-24 | Stop reason: HOSPADM

## 2023-07-24 RX ORDER — TIMOLOL MALEATE 5 MG/ML
SOLUTION/ DROPS OPHTHALMIC PRN
Status: DISCONTINUED | OUTPATIENT
Start: 2023-07-24 | End: 2023-07-24 | Stop reason: HOSPADM

## 2023-07-24 RX ORDER — MOXIFLOXACIN 5 MG/ML
1 SOLUTION/ DROPS OPHTHALMIC 3 TIMES DAILY
Qty: 3 ML | Refills: 1 | Status: SHIPPED | OUTPATIENT
Start: 2023-07-24 | End: 2023-09-28

## 2023-07-24 RX ORDER — ONDANSETRON 4 MG/1
4 TABLET, ORALLY DISINTEGRATING ORAL EVERY 30 MIN PRN
Status: DISCONTINUED | OUTPATIENT
Start: 2023-07-24 | End: 2023-07-24 | Stop reason: HOSPADM

## 2023-07-24 RX ORDER — SODIUM CHLORIDE, SODIUM LACTATE, POTASSIUM CHLORIDE, CALCIUM CHLORIDE 600; 310; 30; 20 MG/100ML; MG/100ML; MG/100ML; MG/100ML
INJECTION, SOLUTION INTRAVENOUS CONTINUOUS
Status: DISCONTINUED | OUTPATIENT
Start: 2023-07-24 | End: 2023-07-24 | Stop reason: HOSPADM

## 2023-07-24 RX ORDER — OXYCODONE HYDROCHLORIDE 5 MG/1
5 TABLET ORAL
Status: DISCONTINUED | OUTPATIENT
Start: 2023-07-24 | End: 2023-07-25 | Stop reason: HOSPADM

## 2023-07-24 RX ORDER — LIDOCAINE HYDROCHLORIDE 10 MG/ML
INJECTION, SOLUTION EPIDURAL; INFILTRATION; INTRACAUDAL; PERINEURAL PRN
Status: DISCONTINUED | OUTPATIENT
Start: 2023-07-24 | End: 2023-07-24 | Stop reason: HOSPADM

## 2023-07-24 RX ORDER — ONDANSETRON 2 MG/ML
4 INJECTION INTRAMUSCULAR; INTRAVENOUS EVERY 30 MIN PRN
Status: DISCONTINUED | OUTPATIENT
Start: 2023-07-24 | End: 2023-07-25 | Stop reason: HOSPADM

## 2023-07-24 RX ORDER — HYDROMORPHONE HYDROCHLORIDE 1 MG/ML
0.4 INJECTION, SOLUTION INTRAMUSCULAR; INTRAVENOUS; SUBCUTANEOUS EVERY 5 MIN PRN
Status: DISCONTINUED | OUTPATIENT
Start: 2023-07-24 | End: 2023-07-24 | Stop reason: HOSPADM

## 2023-07-24 RX ORDER — KETOROLAC TROMETHAMINE 5 MG/ML
1 SOLUTION OPHTHALMIC 4 TIMES DAILY
Qty: 5 ML | Refills: 1 | Status: SHIPPED | OUTPATIENT
Start: 2023-07-24 | End: 2023-09-28

## 2023-07-24 RX ORDER — FENTANYL CITRATE 50 UG/ML
25 INJECTION, SOLUTION INTRAMUSCULAR; INTRAVENOUS EVERY 5 MIN PRN
Status: DISCONTINUED | OUTPATIENT
Start: 2023-07-24 | End: 2023-07-24 | Stop reason: HOSPADM

## 2023-07-24 RX ORDER — ONDANSETRON 4 MG/1
4 TABLET, ORALLY DISINTEGRATING ORAL EVERY 30 MIN PRN
Status: DISCONTINUED | OUTPATIENT
Start: 2023-07-24 | End: 2023-07-25 | Stop reason: HOSPADM

## 2023-07-24 RX ORDER — PREDNISOLONE ACETATE 10 MG/ML
1 SUSPENSION/ DROPS OPHTHALMIC 4 TIMES DAILY
Qty: 10 ML | Refills: 1 | Status: SHIPPED | OUTPATIENT
Start: 2023-07-24 | End: 2023-09-28

## 2023-07-24 RX ORDER — TETRACAINE HYDROCHLORIDE 5 MG/ML
SOLUTION OPHTHALMIC PRN
Status: DISCONTINUED | OUTPATIENT
Start: 2023-07-24 | End: 2023-07-24 | Stop reason: HOSPADM

## 2023-07-24 RX ORDER — FENTANYL CITRATE 50 UG/ML
50 INJECTION, SOLUTION INTRAMUSCULAR; INTRAVENOUS EVERY 5 MIN PRN
Status: DISCONTINUED | OUTPATIENT
Start: 2023-07-24 | End: 2023-07-24 | Stop reason: HOSPADM

## 2023-07-24 RX ORDER — ONDANSETRON 2 MG/ML
4 INJECTION INTRAMUSCULAR; INTRAVENOUS EVERY 30 MIN PRN
Status: DISCONTINUED | OUTPATIENT
Start: 2023-07-24 | End: 2023-07-24 | Stop reason: HOSPADM

## 2023-07-24 RX ORDER — MOXIFLOXACIN IN NACL,ISO-OS/PF 0.3MG/0.3
SYRINGE (ML) INTRAOCULAR PRN
Status: DISCONTINUED | OUTPATIENT
Start: 2023-07-24 | End: 2023-07-24 | Stop reason: HOSPADM

## 2023-07-24 RX ORDER — LIDOCAINE 40 MG/G
CREAM TOPICAL
Status: DISCONTINUED | OUTPATIENT
Start: 2023-07-24 | End: 2023-07-24 | Stop reason: HOSPADM

## 2023-07-24 RX ORDER — BALANCED SALT SOLUTION 6.4; .75; .48; .3; 3.9; 1.7 MG/ML; MG/ML; MG/ML; MG/ML; MG/ML; MG/ML
SOLUTION OPHTHALMIC PRN
Status: DISCONTINUED | OUTPATIENT
Start: 2023-07-24 | End: 2023-07-24 | Stop reason: HOSPADM

## 2023-07-24 RX ORDER — FENTANYL CITRATE 50 UG/ML
INJECTION, SOLUTION INTRAMUSCULAR; INTRAVENOUS PRN
Status: DISCONTINUED | OUTPATIENT
Start: 2023-07-24 | End: 2023-07-24

## 2023-07-24 RX ORDER — ACETAMINOPHEN 325 MG/1
975 TABLET ORAL ONCE
Status: COMPLETED | OUTPATIENT
Start: 2023-07-24 | End: 2023-07-24

## 2023-07-24 RX ORDER — ACETAZOLAMIDE 500 MG/1
500 CAPSULE, EXTENDED RELEASE ORAL 2 TIMES DAILY
Qty: 2 CAPSULE | Refills: 0 | Status: SHIPPED | OUTPATIENT
Start: 2023-07-24 | End: 2023-09-28

## 2023-07-24 RX ADMIN — Medication 1 DROP: at 08:59

## 2023-07-24 RX ADMIN — PROPARACAINE HYDROCHLORIDE 1 DROP: 5 SOLUTION/ DROPS OPHTHALMIC at 08:43

## 2023-07-24 RX ADMIN — ONDANSETRON 4 MG: 2 INJECTION INTRAMUSCULAR; INTRAVENOUS at 09:44

## 2023-07-24 RX ADMIN — SODIUM CHLORIDE, SODIUM LACTATE, POTASSIUM CHLORIDE, CALCIUM CHLORIDE: 600; 310; 30; 20 INJECTION, SOLUTION INTRAVENOUS at 08:59

## 2023-07-24 RX ADMIN — ACETAZOLAMIDE 500 MG: 500 CAPSULE, EXTENDED RELEASE ORAL at 11:12

## 2023-07-24 RX ADMIN — SODIUM CHLORIDE, SODIUM LACTATE, POTASSIUM CHLORIDE, CALCIUM CHLORIDE: 600; 310; 30; 20 INJECTION, SOLUTION INTRAVENOUS at 09:28

## 2023-07-24 RX ADMIN — Medication 1 DROP: at 08:52

## 2023-07-24 RX ADMIN — FENTANYL CITRATE 50 MCG: 50 INJECTION, SOLUTION INTRAMUSCULAR; INTRAVENOUS at 09:44

## 2023-07-24 RX ADMIN — Medication 1 DROP: at 08:43

## 2023-07-24 ASSESSMENT — LIFESTYLE VARIABLES: TOBACCO_USE: 1

## 2023-07-24 NOTE — OP NOTE
PREOPERATIVE DIAGNOSIS:   1. Combined form of age-related cataract, left eye         POSTOPERATIVE DIAGNOSIS: Same   PROCEDURES:   1. Complex cataract extraction with intraocular lens implant Left eye.  SURGEON: Rick Campbell M.D.  Assistant: Calvin Gaytan MD         INDICATIONS: The patient Vishnu Viveros presented to the eye clinic with decreased vision secondary to cataract in the Left eye. The risks, benefits and alternatives to cataract extraction were discussed. The patient elected to proceed. All questions were answered to the patient's satisfaction.   DESCRIPTION OF PROCEDURE:   Prior to the procedure, appropriate cardiac and respiratory monitors were applied to the patient.  In the pre-operative holding area, a drop of topical tetracaine followed by lidocaine gel followed by povidone iodine.  The patient was brought to the operating room where a surgical pause was carried out to identify with all members of the surgical team the correct surgical site.  With adequate anesthesia, the Left eye was prepped and draped in the usual sterile fashion. A lid speculum was placed, and the operating microscope was rotated into position. A paracentesis was created.  Trypan blue stain was injected under air to stain the anterior lens capsule.  Trypan blue stain was elected due to a poor red reflex in the setting of dense cataract. Through this limbal paracentesis, the anterior chamber was filled with preservative-free lidocaine + epinephrine followed by viscoelastic.  A temporal wound was created at the limbus using a 2.6 mm blade. Due to poor mydriasis, mechanical stretch of the iris was performed for pupillary expansion. A capsulorrhexis was initiated using a bent 25-gauge needle and was completed in continuous and circular fashion using the capsulorrhexis forceps. The lens nucleus was hydrodissected using balanced salt solution.  The lens nucleus was rotated and removed using phacoemulsification in a stop and  chop technique.  Residual cortical material was removed using irrigation-aspiration.  The capsular bag was reinflated to its maximal extent with cohesive viscoelastic.  A 22.5 diopter CC60WF was inserted into the capsular bag.  During lens insertion, iris prolapse was encountered.  A cyclodialysis spatula was used to reposit the iris in the anterior chamber and hold back the iris while the lens was inserted.  The lens power selected was reviewed using the intraocular lens power measurements that were obtained preoperatively to confirm that the correct lens was selected for the desired post-operative refractive state. A 10-0 nylon suture was placed in the temporal incision and the knot buried.  The residual viscoelastic was removed in its entirety using bimanual irrigation-aspiration.  The wounds were hydrated and found to be self-sealing.  Intracameral moxifloxacin was administered. Tactile pressure was confirmed to be in a normal range.  Subconjunctival Dexamethasone (2 mg) was injected.. The lid speculum was removed and a patch and shield were applied.  The patient tolerated the procedure well.  PLAN: The patient will be discharged to home and will follow up tomorrow morning in the eye clinic.  EBL:  None  Complications:  Intraoperative floppy iris syndrome  Implant Name Type Inv. Item Serial No.  Lot No. LRB No. Used Action   LENS CC60WF.225 [employer] UV ASPHERIC BICONVEX IOL - [id number] Lens/Eye Implant LENS CC60WF.225 [employer] UV ASPHERIC BICONVEX IOL [id number] [employer]  Left 1 Implanted          Attending Physician Procedure Attestation: I was present for the entire procedure       Rick Campbell MD  , Comprehensive Ophthalmology  Department of Ophthalmology and Visual Neurosciences  [facility]

## 2023-07-24 NOTE — PROGRESS NOTES
REASON FOR VISIT:    CANCER STAGE:  Cancer Staging   Adenocarcinoma of lung, stage 1, right (H)  Staging form: Lung, AJCC 8th Edition  - Clinical: No stage assigned - Unsigned        HISTORY OF PRESENT ILLNESS:  Mr. Viveros is a 71 yo man who was diagnosed with adenocarcinoma of the lung in 2019. He was having some difficulty breathing and some wheezing.  He had a CXR done that was negative for pneumonia but did show a RUL nodule.  He had a CT chest in 2012 that showed this nodule.  CT was done on 1/4/19 that showed a 13.4mmx10.2 RUL nodule that grew from 8.5x6.4 in 2012.  He saw Dr. LAURIE Lopez from pulmonary who ordered a PET/CT scan - 3/9/19 showed that the RUL nodule was hypermetabolic and now measured [id number].  There was no mediastinal LNs or other lesions on PET. Pt was set up for surgical resection, but ended up going to [facility].  He had a wedge resection and LN dissection by Dr. Covington.  Pathology showed a 1.8cm tumor margins were negative. 11 LNs were negative for involvement with tumor including station 7 and 4R. He tolerated the surgery well.  He has not had any other complications since then.     Vishnu is doing well.  He has no real complaints.  His health is good overall.  He has some chronic back pain that gives him some trouble but he is managing. In spite of this, he contines to walk regularly about 4 miles per day.  He continues to work full time as a  for [employer].  He is eating well.  He gets some allergies in the spring, but this is fairly mild.   Review Of Systems  10-point review of systems were negative except as noted in HPI.        EXAM:  There were no vitals taken for this visit.  GEN: alert and oriented x 3, nad  HEENT: perrla, eomi, sclera anicteric, oral mucosa moist without thrush  NECK: supple, no palpable LAD  HT: reg rate and rhythm, no murmurs  LUNGS: clear to auscultation bilaterally  ABD: soft, nt, nd, +bs x 4  EXT: no clubbing, cyanosis, or edema  NEURO: CN  2-12 intact, MS 5/5 b/l    Current Outpatient Medications   Medication Sig Dispense Refill    acetaminophen (TYLENOL) 500 MG tablet Take 1-2 tablets (500-1,000 mg) by mouth every 6 hours as needed for pain (arthritis) 120 tablet 3    amLODIPine (NORVASC) 5 MG tablet Take 1 tablet by mouth daily      Ascorbic Acid (VITAMIN C PO) Take 500 mg by mouth once      atorvastatin (LIPITOR) 20 MG tablet Take 1 tablet (20 mg) by mouth daily 90 tablet 3    chlorthalidone (HYGROTON) 25 MG tablet Take 1 tablet (25 mg) by mouth daily 90 tablet 3    Cholecalciferol (VITAMIN D) 1000 UNIT capsule Take 1 capsule by mouth daily.      Cyanocobalamin (VITAMIN B12 PO) Take 1 tablet by mouth Pt states he takes this 2 or 3 times a week.      cyclobenzaprine (FLEXERIL) 10 MG tablet Take 1 tablet (10 mg) by mouth 2 times daily as needed for muscle spasms (take at bedtime for muscle pain) Must not drive - Oral 30 tablet 3    finasteride (PROSCAR) 5 MG tablet Take 1 tablet (5 mg) by mouth daily 90 tablet 3    gabapentin (NEURONTIN) 300 MG capsule One capsule at bedtime 90 capsule 3    ketorolac (ACULAR) 0.5 % ophthalmic solution Apply 1 drop to eye 4 times daily 5 mL 1    ketorolac tromethamine (ACULAR-LS) 0.4 % SOLN ophthalmic solution Apply 1 drop to eye 4 times daily To operative eye 5 mL 1    loratadine (CLARITIN) 10 MG tablet Take 1 tablet (10 mg) by mouth daily as needed for allergies 90 tablet 1    losartan (COZAAR) 100 MG tablet Take 1 tablet (100 mg) by mouth every evening 90 tablet 3    melatonin 3 MG tablet Take 1 tablet (3 mg) by mouth nightly as needed for sleep (may take 1-2 if needed) 60 tablet 3    metFORMIN (GLUCOPHAGE XR) 500 MG 24 hr tablet Take 2 tablets (1,000 mg) by mouth daily (with dinner) 180 tablet 3    methocarbamol (ROBAXIN) 500 MG tablet Take 1 tablet (500 mg) by mouth 3 times daily as needed for muscle spasms 90 tablet 1    montelukast (SINGULAIR) 10 MG tablet Take 1 tablet (10 mg) by mouth At Bedtime During  allergy 90 tablet 1    moxifloxacin (VIGAMOX) 0.5 % ophthalmic solution Apply 1 drop to eye 3 times daily To operative eye 3 mL 1    moxifloxacin (VIGAMOX) 0.5 % ophthalmic solution Apply 1 drop to eye 3 times daily To operative eye 3 mL 1    Multiple Vitamin (MULTIVITAMINS PO) Take 1 tablet by mouth daily.      Omega-3 Fatty Acids (FISH OIL PO) Take 1 capsule by mouth as needed      order for DME Back brace 1 Device 0    potassium chloride ER (KLOR-CON M) 20 MEQ CR tablet Take 1 tablet (20 mEq) by mouth daily 90 tablet 3    prednisoLONE acetate (PRED FORTE) 1 % ophthalmic suspension Apply 1 drop to eye 4 times daily To operative eye 10 mL 1    prednisoLONE acetate (PRED FORTE) 1 % ophthalmic suspension Apply 1 drop to eye 4 times daily To operative eye 10 mL 1    tamsulosin (FLOMAX) 0.4 MG capsule Take 1 capsule (0.4 mg) by mouth daily 90 capsule 3           Recent Labs   Lab Test 12/10/22  1021   WBC 3.8*   HGB 12.2*        @labrcent[na,potassium,chloride,co2,bun,cr@  Recent Labs   Lab Test 06/10/23  0955   PROTTOTAL 6.8   ALBUMIN 4.2   BILITOTAL 0.4   AST 20   ALT 18   ALKPHOS 49         Recent Results (from the past 744 hour(s))   CT Chest w/o Contrast    Narrative    EXAM: CT CHEST W/O CONTRAST 7/22/2023 2:33 PM    HISTORY: 73 years Male Lung cancer, current or r/o recurrence; Lung  cancer staging/restaging; No known/automatically detected potential  contraindications to imaging; Adenocarcinoma of lung, stage 1, right  (H)    COMPARISON: Chest CT 1/24/2022, 1/26/2021..    TECHNIQUE: Helical CT imaging of the chest was obtained without  contrast. Multiplanar post-processed and MIP reformats were obtained  reviewed. Inspiratory and expiratory images were obtained.    FINDINGS:    LINES/TUBES: None.    LUNG: Right upper lobe lobectomy with stable postsurgical scarring  right middle lobe. No focal consolidation. Unchanged 6 mm pulmonary  nodule in the left lower lobe (series 4, image 171) with stable 3  mm  solid component inferiorly. No new or suspicious pulmonary nodules.    LARGE AIRWAYS: Patent tracheobronchial tree without intraluminal mass.    PLEURA: No pneumothorax or pleural effusion. Pleural thickening in the  lateral right lower lobe is stable.    VESSELS: Bovine aortic arch. Normal size/configuration of the great  vessels.    HEART: No cardiomegaly. No pericardial effusion. Mild coronary artery  atherosclerotic calcifications. No intracardiac thrombus or mass.    MEDIASTINUM AND TARAS: No suspicious lymphadenopathy.    CHEST WALL: Unremarkable.    LOWER NECK: Thyroid unremarkable.    UPPER ABDOMEN: Limited evaluation due to lack of contrast. Esophagus  appears unremarkable. Stable appearance of 4.3 cm simple renal cyst in  the right mid pole. Hyperdense intraluminal contents of the stomach  representing ingested contents. Diverticulosis of portions of the  large intestine.    BONES: Moderate multilevel thoracic spine degenerative changes. No  acute osseous abnormality. No suspicious bony lesions. Unremarkable  soft tissues.      Impression    IMPRESSION:   1.  No evidence of neoplastic recurrence or metastasis, with  postsurgical changes of right upper lobe lobectomy and stable changes  in the right middle lobe.  2.  Pleural thickening at the lateral right lower lobe is stable.   3.  Stable 6 mm solid pulmonary nodule in the left lower lobe.    I have personally reviewed the examination and initial interpretation  and I agree with the findings.    JOE CORTÉS MD         SYSTEM ID:  [id number]           Assessment/Plan  Stage I NSCLC - He is now 4+ years out from completion of therapy.  No evidence of disease.  We will repeat a non-contrast CT scan in 1 year. Pt is agreeable tot he plan. Though he is likely cured from his stage I lung cancer, he has ongoing risk of a new lung cancer.  We discussed doing noncontrast Chest CT yearly indefinitely.         I spent 20 minutes with the patient, reviewing  scan and documenting this note.     Chava Kelley   of Medicine  Division of Hematology, Oncology, and Transplantation

## 2023-07-24 NOTE — ANESTHESIA POSTPROCEDURE EVALUATION
Patient: Vishnu Viveros    Procedure: Procedure(s):  LEFT EYE PHACOEMULSIFICATION, CATARACT, WITH STANDARD INTRAOCULAR LENS IMPLANT INSERTION       Anesthesia Type:  MAC    Note:  Disposition: Outpatient   Postop Pain Control: Uneventful            Sign Out: Well controlled pain   PONV: No   Neuro/Psych: Uneventful            Sign Out: Acceptable/Baseline neuro status   Airway/Respiratory: Uneventful            Sign Out: Acceptable/Baseline resp. status   CV/Hemodynamics: Uneventful            Sign Out: Acceptable CV status; No obvious hypovolemia; No obvious fluid overload   Other NRE: NONE   DID A NON-ROUTINE EVENT OCCUR? No           Last vitals:  Vitals Value Taken Time   /62 07/24/23 1031   Temp 36.3  C (97.4  F) 07/24/23 1031   Pulse 62 07/24/23 1031   Resp 16 07/24/23 1031   SpO2 99 % 07/24/23 1031       Electronically Signed By: Shaan Prieto DO  July 24, 2023  10:49 AM

## 2023-07-24 NOTE — ANESTHESIA PREPROCEDURE EVALUATION
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 eyelid, unspecified laterality      History of tobacco use      HLD (hyperlipidemia)      HTN (hypertension)      Obesity (BMI 30.0-34.9)      Pulmonary nodule       Past Surgical History:   Procedure Laterality Date     APPENDECTOMY OPEN CHILD       ARTHROSCOPY KNEE Left      BIOPSY PROSTATE TRANSRECTAL       EXCISE PTERYGIUM Right      EXCISE PTERYGIUM Right 2015     INJECT EPIDURAL LUMBAR / SACRAL SINGLE N/A 2016    Procedure: INJECT EPIDURAL LUMBAR / SACRAL SINGLE;  Surgeon: Judah Henriquez MD;  [facility]     INJECT PARAVERTEBRAL FACET JOINT LUMBAR / SACRAL FIRST Right 2016    Procedure: INJECT PARAVERTEBRAL FACET JOINT LUMBAR / SACRAL FIRST;  Surgeon: Judah Henriquez MD;  [facility]     INJECT PARAVERTEBRAL FACET JOINT LUMBAR / SACRAL FIRST Right 2016    Procedure: INJECT PARAVERTEBRAL FACET JOINT LUMBAR / SACRAL FIRST;  Surgeon: Judah Henriquez MD;  [facility]     MEDIASTINOSCOPY Right 2019     [place]  Dr Hemanth Covington 19 bronchoscopy, cervical mediastinoscopy for staging and minimally invasive right thoracoscopic wedge resection 1.8 cm invasive adenocarcinoma [id number] stage 1A2 adenocarcinoma completely resected via minimally invasive right upper lobectomy.     PHACOEMULSIFICATION WITH STANDARD INTRAOCULAR LENS IMPLANT Right 7/10/2023    Procedure: RIGHT EYE PHACOEMULSIFICATION, CATARACT, WITH STANDARD INTRAOCULAR LENS IMPLANT INSERTION;  Surgeon: Rick Campbell MD;  [facility]     PUNCTALPLASTY Bilateral 2018    Procedure: Bilateral  "Punctoplasty with Mini [place] Stent;  Surgeon: Levy Blue MD;  [facility]     REPAIR ECTROPION BILATERAL Bilateral 2018    Procedure: Bilateral Lower Lid Ectropion Repair with Right Lower Lid Lesion Excision;  Surgeon: Levy Blue MD;  [facility]      Allergies   Allergen Reactions     Pollen Extract      Other reaction(s): Headache  Sinus congestion, sinus \"drainage\", sneezing     Seasonal Allergies      Sinus congestion, sinus \"drainage\", sneezing     Nkda [No Known Drug Allergy]       Social History     Tobacco Use     Smoking status: Former     Years: 5.00     Types: Cigarettes     Quit date: 1981     Years since quittin.7     Smokeless tobacco: Never   Substance Use Topics     Alcohol use: Yes     Alcohol/week: 2.5 standard drinks of alcohol     Types: 3 Standard drinks or equivalent per week      Wt Readings from Last 1 Encounters:   07/10/23 78.5 kg (173 lb)        Anesthesia Evaluation   Pt has had prior anesthetic. Type: General and MAC.    No history of anesthetic complications       ROS/MED HX  ENT/Pulmonary:  - neg pulmonary ROS   (+) tobacco use, Past use,     Neurologic:  - neg neurologic ROS     Cardiovascular:     (+) Dyslipidemia hypertension-----    METS/Exercise Tolerance: 4 - Raking leaves, gardening    Hematologic:  - neg hematologic  ROS     Musculoskeletal:   (+) arthritis,     GI/Hepatic:  - neg GI/hepatic ROS     Renal/Genitourinary:  - neg Renal ROS     Endo:     (+) type II DM (5.7),  (-) Type I DM   Psychiatric/Substance Use:  - neg psychiatric ROS     Infectious Disease:  - neg infectious disease ROS     Malignancy:   (+) Malignancy, History of Lung.    Other:      (+) , H/O Chronic Pain,        Physical Exam    Airway        Mallampati: II   TM distance: > 3 FB   Neck ROM: full   Mouth opening: > 3 cm    Respiratory Devices and Support         Dental       (+) Completely normal teeth      Cardiovascular   cardiovascular exam normal      "     Pulmonary   pulmonary exam normal                OUTSIDE LABS:  CBC:   Lab Results   Component Value Date    WBC 3.8 (L) 12/10/2022    WBC 4.1 06/13/2022    HGB 12.2 (L) 12/10/2022    HGB 12.1 (L) 06/13/2022    HCT 37.8 (L) 12/10/2022    HCT 36.8 (L) 06/13/2022     12/10/2022     06/13/2022     BMP:   Lab Results   Component Value Date     06/10/2023     12/10/2022    POTASSIUM 4.0 06/10/2023    POTASSIUM 4.4 12/10/2022    CHLORIDE 103 06/10/2023    CHLORIDE 103 12/10/2022    CO2 27 06/10/2023    CO2 30 (H) 12/10/2022    BUN 23.0 06/10/2023    BUN 23.7 (H) 12/10/2022    CR 0.98 06/10/2023    CR 1.06 12/10/2022     (H) 07/10/2023     (H) 06/10/2023     COAGS: No results found for: PTT, INR, FIBR  POC:   Lab Results   Component Value Date     (H) 03/09/2019     HEPATIC:   Lab Results   Component Value Date    ALBUMIN 4.2 06/10/2023    PROTTOTAL 6.8 06/10/2023    ALT 18 06/10/2023    AST 20 06/10/2023    ALKPHOS 49 06/10/2023    BILITOTAL 0.4 06/10/2023     OTHER:   Lab Results   Component Value Date    A1C 6.4 (H) 06/10/2023    FRANCIA 9.7 06/10/2023    PHOS 3.8 08/19/2010    MAG 2.1 12/06/2021    TSH 1.56 10/26/2019    CRP <5.0 11/06/2009    SED 16 08/19/2010       Anesthesia Plan    ASA Status:  3   NPO Status:  NPO Appropriate    Anesthesia Type: MAC.     - Reason for MAC: straight local not clinically adequate   Induction: N/a.   Maintenance: N/A.        Consents    Anesthesia Plan(s) and associated risks, benefits, and realistic alternatives discussed. Questions answered and patient/representative(s) expressed understanding.    - Discussed:     - Discussed with:  Patient    Use of blood products discussed: No .     Postoperative Care    Pain management: Multi-modal analgesia.   PONV prophylaxis: Ondansetron (or other 5HT-3)     Comments:           H&P reviewed: Unable to attach H&P to encounter due to EHR limitations. H&P Update: appropriate H&P reviewed, patient  examined. No interval changes since H&P (within 30 days).         Shaan Prieto, DO

## 2023-07-24 NOTE — ANESTHESIA CARE TRANSFER NOTE
Patient: Vishnu Viveros    Procedure: Procedure(s):  LEFT EYE PHACOEMULSIFICATION, CATARACT, WITH STANDARD INTRAOCULAR LENS IMPLANT INSERTION       Diagnosis: Age-related nuclear cataract of both eyes [H25.13]  Diagnosis Additional Information: No value filed.    Anesthesia Type:   MAC     Note:    Oropharynx: oropharynx clear of all foreign objects  Level of Consciousness: awake  Oxygen Supplementation: room air    Independent Airway: airway patency satisfactory and stable  Dentition: dentition unchanged  Vital Signs Stable: post-procedure vital signs reviewed and stable    Patient transferred to: Phase II    Handoff Report: Identifed the Patient, Identified the Reponsible Provider, Reviewed the pertinent medical history, Discussed the surgical course, Reviewed Intra-OP anesthesia mangement and issues during anesthesia, Set expectations for post-procedure period and Allowed opportunity for questions and acknowledgement of understanding      Vitals:  Vitals Value Taken Time   /62 07/24/23 1031   Temp 36.3  C (97.4  F) 07/24/23 1031   Pulse 62 07/24/23 1031   Resp 16 07/24/23 1031   SpO2 99 % 07/24/23 1031       Electronically Signed By: DILAN Veronica CRNA  July 24, 2023  10:32 AM

## 2023-07-24 NOTE — INTERVAL H&P NOTE
"I have reviewed the surgical (or preoperative) H&P that is linked to this encounter, and examined the patient. There are no significant changes    Clinical Conditions Present on Arrival:  Clinically Significant Risk Factors Present on Admission                  # Overweight: Estimated body mass index is 29.7 kg/m  as calculated from the following:    Height as of 7/10/23: 1.626 m (5' 4\").    Weight as of 7/10/23: 78.5 kg (173 lb).       "

## 2023-07-25 ENCOUNTER — ONCOLOGY VISIT (OUTPATIENT)
Dept: ONCOLOGY | Facility: CLINIC | Age: 73
End: 2023-07-25
Attending: INTERNAL MEDICINE
Payer: COMMERCIAL

## 2023-07-25 ENCOUNTER — OFFICE VISIT (OUTPATIENT)
Dept: OPHTHALMOLOGY | Facility: CLINIC | Age: 73
End: 2023-07-25
Attending: OPHTHALMOLOGY
Payer: COMMERCIAL

## 2023-07-25 VITALS
BODY MASS INDEX: 29.53 KG/M2 | TEMPERATURE: 97.4 F | WEIGHT: 173 LBS | OXYGEN SATURATION: 99 % | RESPIRATION RATE: 16 BRPM | HEART RATE: 62 BPM | SYSTOLIC BLOOD PRESSURE: 103 MMHG | DIASTOLIC BLOOD PRESSURE: 62 MMHG | HEIGHT: 64 IN

## 2023-07-25 DIAGNOSIS — C34.91 ADENOCARCINOMA OF LUNG, STAGE 1, RIGHT (H): Primary | ICD-10-CM

## 2023-07-25 DIAGNOSIS — E11.9 TYPE 2 DIABETES MELLITUS TREATED WITHOUT INSULIN (H): ICD-10-CM

## 2023-07-25 DIAGNOSIS — H25.89 OTHER AGE-RELATED CATARACT OF LEFT EYE: ICD-10-CM

## 2023-07-25 DIAGNOSIS — Z98.890 POSTOPERATIVE EYE STATE: Primary | ICD-10-CM

## 2023-07-25 PROCEDURE — 99213 OFFICE O/P EST LOW 20 MIN: CPT | Performed by: INTERNAL MEDICINE

## 2023-07-25 PROCEDURE — G0463 HOSPITAL OUTPT CLINIC VISIT: HCPCS | Performed by: OPHTHALMOLOGY

## 2023-07-25 PROCEDURE — G0463 HOSPITAL OUTPT CLINIC VISIT: HCPCS | Mod: 27 | Performed by: INTERNAL MEDICINE

## 2023-07-25 PROCEDURE — 99024 POSTOP FOLLOW-UP VISIT: CPT | Mod: GC | Performed by: OPHTHALMOLOGY

## 2023-07-25 RX ORDER — PREDNISOLONE ACETATE 10 MG/ML
1 SUSPENSION/ DROPS OPHTHALMIC 4 TIMES DAILY
Qty: 10 ML | Refills: 1 | Status: SHIPPED | OUTPATIENT
Start: 2023-07-25 | End: 2023-09-28

## 2023-07-25 ASSESSMENT — EXTERNAL EXAM - RIGHT EYE: OD_EXAM: NORMAL

## 2023-07-25 ASSESSMENT — EXTERNAL EXAM - LEFT EYE: OS_EXAM: NORMAL

## 2023-07-25 ASSESSMENT — VISUAL ACUITY
OS_SC+: -2
OS_PH_SC+: -2
OD_SC: 20/20
METHOD: SNELLEN - LINEAR
OD_SC+: -2
OS_SC: 20/60
OS_PH_SC: 20/30

## 2023-07-25 ASSESSMENT — SLIT LAMP EXAM - LIDS
COMMENTS: NORMAL
COMMENTS: NORMAL

## 2023-07-25 ASSESSMENT — TONOMETRY
IOP_METHOD: TONOPEN
OS_IOP_MMHG: 17
OD_IOP_MMHG: 14

## 2023-07-25 NOTE — PATIENT INSTRUCTIONS
Vigamox three times per day for one week then stop  Prednisolone four times per day for one week then taper to three times per day for a week then two times per day for a week, then daily for a week  then stop  Ketorolac four times per day for two weeks then stop

## 2023-07-25 NOTE — PROGRESS NOTES
Chief Complaint(s) and History of Present Illness(es)     Post Op (Ophthalmology) Left Eye            Laterality: left eye    Associated symptoms: Negative for eye pain, flashes and floaters    Treatments tried: eye drops          Comments    Vishnu Viveros is a(n) 73 year old male who presents for 1-day post op.   Eye is doing well. Compliant with drops. No eye pain. No flashes or   floaters. No difficulty sleeping.     Virgil Torres COT 10:40 AM July 25, 2023      Interval History Jul 25, 2023: States he is using his drops as instructed, three different drops in each eye. Wife helps keep track of them. Using in each eye. Follows instructions on box. Vision slower to recover in left eye compared to right but improving. Mild irritation in left eye improved from yesterday.      Review of systems for the eyes was negative other than the pertinent positives/negatives listed in the HPI.      Assessment & Plan      Vishnu Viveros is a 73 year old male with the following diagnoses:   1. Postoperative eye state    2. Type 2 diabetes mellitus treated without insulin (H)         # Pseudophakia OS 7/24/23   PostOp, left eye, day 1     Doing well VA 20/60, ph 20/30  Keep patch in place at night for 5 days  Start post-operative drops and taper according to instructions -   Post-operative do's and don'ts reviewed, questions answered    Vigamox three times per day for one week then stop  Prednisolone four times per day for one week then taper to three times per day for a week then two times per day for a week, then daily for a week  then stop  Ketorolac four times per day for two weeks then stop   Not on diamox.    # Pseudophakia OD 7/10/2023  VA 20/20. Doing well. Ran out of pred  Restart twice a day and use until next visit  Return precautions reviewed         Patient disposition:   Return for as scheduled 8/15, V,T,D MRX OU.           Calvin Gaytan MD  PGY-4  Ophthalmology   [facility]      Attending Physician  Attestation:  Complete documentation of historical and exam elements from today's encounter can be found in the full encounter summary report (not reduplicated in this progress note).  I personally obtained the chief complaint(s) and history of present illness.  I confirmed and edited as necessary the review of systems, past medical/surgical history, family history, social history, and examination findings as documented by others; and I examined the patient myself.  I personally reviewed the relevant tests, images, and reports as documented above.  I formulated and edited as necessary the assessment and plan and discussed the findings and management plan with the patient and family. . - Rick Campbell MD

## 2023-07-25 NOTE — NURSING NOTE
Chief Complaints and History of Present Illnesses   Patient presents with    Post Op (Ophthalmology) Left Eye     Chief Complaint(s) and History of Present Illness(es)       Post Op (Ophthalmology) Left Eye              Laterality: left eye    Associated symptoms: Negative for eye pain, flashes and floaters    Treatments tried: eye drops              Comments    Vishnu Viveros is a(n) 73 year old male who presents for 1-day post op. Eye is doing well. Compliant with drops. No eye pain. No flashes or floaters. No difficulty sleeping.     Virgil Torres COT 10:40 AM July 25, 2023

## 2023-07-25 NOTE — NURSING NOTE
"Oncology Rooming Note    July 25, 2023 2:00 PM   Vishnu Viveros is a 73 year old male who presents for:    Chief Complaint   Patient presents with    Oncology Clinic Visit     RTN for Adenocarcinoma of Lung     Initial Vitals: Blood Pressure 103/62   Pulse 62   Temperature 97.4  F (36.3  C) (Oral)   Respiration 16   Height 1.626 m (5' 4\")   Weight 78.5 kg (173 lb)   Oxygen Saturation 99%   Body Mass Index 29.70 kg/m   Estimated body mass index is 29.7 kg/m  as calculated from the following:    Height as of this encounter: 1.626 m (5' 4\").    Weight as of this encounter: 78.5 kg (173 lb). Body surface area is 1.88 meters squared.  Data Unavailable Comment: Data Unavailable   No LMP for male patient.  Allergies reviewed: Yes  Medications reviewed: Yes    Medications: Medication refills not needed today.  Pharmacy name entered into [facility]:    [id number] - [place], [place] - [address] [place] [address] AT [address] [facility] [id number] - [place], [place] - [address] [address]    Clinical concerns: none       Meghna Wren MA             "

## 2023-07-25 NOTE — LETTER
7/25/2023         RE: Vishnu Viveros  [address]  [place] [address]        Dear Colleague,    Thank you for referring your patient, Vishnu Viveros, to the [facility]. Please see a copy of my visit note below.    REASON FOR VISIT:    CANCER STAGE:  Cancer Staging   Adenocarcinoma of lung, stage 1, right (H)  Staging form: Lung, AJCC 8th Edition  - Clinical: No stage assigned - Unsigned        HISTORY OF PRESENT ILLNESS:  Mr. Viveros is a 69 yo man who was diagnosed with adenocarcinoma of the lung in 2019. He was having some difficulty breathing and some wheezing.  He had a CXR done that was negative for pneumonia but did show a RUL nodule.  He had a CT chest in 2012 that showed this nodule.  CT was done on 1/4/19 that showed a 13.4mmx10.2 RUL nodule that grew from 8.5x6.4 in 2012.  He saw Dr. LAURIE Lopez from pulmonary who ordered a PET/CT scan - 3/9/19 showed that the RUL nodule was hypermetabolic and now measured 44l30vl.  There was no mediastinal LNs or other lesions on PET. Pt was set up for surgical resection, but ended up going to [facility].  He had a wedge resection and LN dissection by Dr. Covington.  Pathology showed a 1.8cm tumor margins were negative. 11 LNs were negative for involvement with tumor including station 7 and 4R. He tolerated the surgery well.  He has not had any other complications since then.     Vishnu is doing well.  He has no real complaints.  His health is good overall.  He has some chronic back pain that gives him some trouble but he is managing. In spite of this, he contines to walk regularly about 4 miles per day.  He continues to work full time as a  for [employer].  He is eating well.  He gets some allergies in the spring, but this is fairly mild.   Review Of Systems  10-point review of systems were negative except as noted in HPI.        EXAM:  There were no vitals taken for this visit.  GEN: alert and oriented x 3,  nad  HEENT: perrla, eomi, sclera anicteric, oral mucosa moist without thrush  NECK: supple, no palpable LAD  HT: reg rate and rhythm, no murmurs  LUNGS: clear to auscultation bilaterally  ABD: soft, nt, nd, +bs x 4  EXT: no clubbing, cyanosis, or edema  NEURO: CN 2-12 intact, MS 5/5 b/l    Current Outpatient Medications   Medication Sig Dispense Refill    acetaminophen (TYLENOL) 500 MG tablet Take 1-2 tablets (500-1,000 mg) by mouth every 6 hours as needed for pain (arthritis) 120 tablet 3    amLODIPine (NORVASC) 5 MG tablet Take 1 tablet by mouth daily      Ascorbic Acid (VITAMIN C PO) Take 500 mg by mouth once      atorvastatin (LIPITOR) 20 MG tablet Take 1 tablet (20 mg) by mouth daily 90 tablet 3    chlorthalidone (HYGROTON) 25 MG tablet Take 1 tablet (25 mg) by mouth daily 90 tablet 3    Cholecalciferol (VITAMIN D) 1000 UNIT capsule Take 1 capsule by mouth daily.      Cyanocobalamin (VITAMIN B12 PO) Take 1 tablet by mouth Pt states he takes this 2 or 3 times a week.      cyclobenzaprine (FLEXERIL) 10 MG tablet Take 1 tablet (10 mg) by mouth 2 times daily as needed for muscle spasms (take at bedtime for muscle pain) Must not drive - Oral 30 tablet 3    finasteride (PROSCAR) 5 MG tablet Take 1 tablet (5 mg) by mouth daily 90 tablet 3    gabapentin (NEURONTIN) 300 MG capsule One capsule at bedtime 90 capsule 3    ketorolac (ACULAR) 0.5 % ophthalmic solution Apply 1 drop to eye 4 times daily 5 mL 1    ketorolac tromethamine (ACULAR-LS) 0.4 % SOLN ophthalmic solution Apply 1 drop to eye 4 times daily To operative eye 5 mL 1    loratadine (CLARITIN) 10 MG tablet Take 1 tablet (10 mg) by mouth daily as needed for allergies 90 tablet 1    losartan (COZAAR) 100 MG tablet Take 1 tablet (100 mg) by mouth every evening 90 tablet 3    melatonin 3 MG tablet Take 1 tablet (3 mg) by mouth nightly as needed for sleep (may take 1-2 if needed) 60 tablet 3    metFORMIN (GLUCOPHAGE XR) 500 MG 24 hr tablet Take 2 tablets (1,000 mg)  by mouth daily (with dinner) 180 tablet 3    methocarbamol (ROBAXIN) 500 MG tablet Take 1 tablet (500 mg) by mouth 3 times daily as needed for muscle spasms 90 tablet 1    montelukast (SINGULAIR) 10 MG tablet Take 1 tablet (10 mg) by mouth At Bedtime During allergy 90 tablet 1    moxifloxacin (VIGAMOX) 0.5 % ophthalmic solution Apply 1 drop to eye 3 times daily To operative eye 3 mL 1    moxifloxacin (VIGAMOX) 0.5 % ophthalmic solution Apply 1 drop to eye 3 times daily To operative eye 3 mL 1    Multiple Vitamin (MULTIVITAMINS PO) Take 1 tablet by mouth daily.      Omega-3 Fatty Acids (FISH OIL PO) Take 1 capsule by mouth as needed      order for DME Back brace 1 Device 0    potassium chloride ER (KLOR-CON M) 20 MEQ CR tablet Take 1 tablet (20 mEq) by mouth daily 90 tablet 3    prednisoLONE acetate (PRED FORTE) 1 % ophthalmic suspension Apply 1 drop to eye 4 times daily To operative eye 10 mL 1    prednisoLONE acetate (PRED FORTE) 1 % ophthalmic suspension Apply 1 drop to eye 4 times daily To operative eye 10 mL 1    tamsulosin (FLOMAX) 0.4 MG capsule Take 1 capsule (0.4 mg) by mouth daily 90 capsule 3           Recent Labs   Lab Test 12/10/22  1021   WBC 3.8*   HGB 12.2*        @labrcent[na,potassium,chloride,co2,bun,cr@  Recent Labs   Lab Test 06/10/23  0955   PROTTOTAL 6.8   ALBUMIN 4.2   BILITOTAL 0.4   AST 20   ALT 18   ALKPHOS 49         Recent Results (from the past 744 hour(s))   CT Chest w/o Contrast    Narrative    EXAM: CT CHEST W/O CONTRAST 7/22/2023 2:33 PM    HISTORY: 73 years Male Lung cancer, current or r/o recurrence; Lung  cancer staging/restaging; No known/automatically detected potential  contraindications to imaging; Adenocarcinoma of lung, stage 1, right  (H)    COMPARISON: Chest CT 1/24/2022, 1/26/2021..    TECHNIQUE: Helical CT imaging of the chest was obtained without  contrast. Multiplanar post-processed and MIP reformats were obtained  reviewed. Inspiratory and expiratory images  were obtained.    FINDINGS:    LINES/TUBES: None.    LUNG: Right upper lobe lobectomy with stable postsurgical scarring  right middle lobe. No focal consolidation. Unchanged 6 mm pulmonary  nodule in the left lower lobe (series 4, image 171) with stable 3 mm  solid component inferiorly. No new or suspicious pulmonary nodules.    LARGE AIRWAYS: Patent tracheobronchial tree without intraluminal mass.    PLEURA: No pneumothorax or pleural effusion. Pleural thickening in the  lateral right lower lobe is stable.    VESSELS: Bovine aortic arch. Normal size/configuration of the great  vessels.    HEART: No cardiomegaly. No pericardial effusion. Mild coronary artery  atherosclerotic calcifications. No intracardiac thrombus or mass.    MEDIASTINUM AND TARAS: No suspicious lymphadenopathy.    CHEST WALL: Unremarkable.    LOWER NECK: Thyroid unremarkable.    UPPER ABDOMEN: Limited evaluation due to lack of contrast. Esophagus  appears unremarkable. Stable appearance of 4.3 cm simple renal cyst in  the right mid pole. Hyperdense intraluminal contents of the stomach  representing ingested contents. Diverticulosis of portions of the  large intestine.    BONES: Moderate multilevel thoracic spine degenerative changes. No  acute osseous abnormality. No suspicious bony lesions. Unremarkable  soft tissues.      Impression    IMPRESSION:   1.  No evidence of neoplastic recurrence or metastasis, with  postsurgical changes of right upper lobe lobectomy and stable changes  in the right middle lobe.  2.  Pleural thickening at the lateral right lower lobe is stable.   3.  Stable 6 mm solid pulmonary nodule in the left lower lobe.    I have personally reviewed the examination and initial interpretation  and I agree with the findings.    JOE CORTÉS MD         SYSTEM ID:  [id number]           Assessment/Plan  Stage I NSCLC - He is now 4+ years out from completion of therapy.  No evidence of disease.  We will repeat a non-contrast CT scan  in 1 year. Pt is agreeable tot he plan. Though he is likely cured from his stage I lung cancer, he has ongoing risk of a new lung cancer.  We discussed doing noncontrast Chest CT yearly indefinitely.         I spent 20 minutes with the patient, reviewing scan and documenting this note.     Chava Kelley   of Medicine  Division of Hematology, Oncology, and Transplantation

## 2023-08-10 ENCOUNTER — TELEPHONE (OUTPATIENT)
Dept: OPHTHALMOLOGY | Facility: CLINIC | Age: 73
End: 2023-08-10
Payer: COMMERCIAL

## 2023-08-10 DIAGNOSIS — H25.812 COMBINED FORM OF AGE-RELATED CATARACT, LEFT EYE: ICD-10-CM

## 2023-08-11 NOTE — TELEPHONE ENCOUNTER
Received a page from Drew at [facility], was informed that the patient only received the prednisolone and had not started vigamox or ketorolac post op from cataract surgery.     Called patient who reported having taken 3 separate drops after surgery, prednisolone, ketorolac and moxifloxacin. He has since run out of moxifloxacin and ketorolac and only has prednisolone remaining. His eye is doing fine.     Per post op day 1 note, plan at this time was to have finished moxifloxacin and ketorolac with a continued taper of prednisolone.     Informed patient he was on appropriate plan and no further prescriptions were needed.

## 2023-08-15 ENCOUNTER — OFFICE VISIT (OUTPATIENT)
Dept: OPHTHALMOLOGY | Facility: CLINIC | Age: 73
End: 2023-08-15
Attending: OPHTHALMOLOGY
Payer: COMMERCIAL

## 2023-08-15 DIAGNOSIS — Z96.1 PSEUDOPHAKIA OF BOTH EYES: Primary | ICD-10-CM

## 2023-08-15 DIAGNOSIS — H04.123 BILATERAL DRY EYES: ICD-10-CM

## 2023-08-15 PROCEDURE — 99024 POSTOP FOLLOW-UP VISIT: CPT | Performed by: OPHTHALMOLOGY

## 2023-08-15 PROCEDURE — G0463 HOSPITAL OUTPT CLINIC VISIT: HCPCS | Performed by: OPHTHALMOLOGY

## 2023-08-15 PROCEDURE — 92015 DETERMINE REFRACTIVE STATE: CPT

## 2023-08-15 ASSESSMENT — TONOMETRY
IOP_METHOD: TONOPEN
OS_IOP_MMHG: 12
OD_IOP_MMHG: 12

## 2023-08-15 ASSESSMENT — VISUAL ACUITY
OS_SC: 20/40
OD_SC+: -1
OD_SC: 20/20
METHOD: SNELLEN - LINEAR
OS_SC+: -1
OS_PH_SC: 20/30

## 2023-08-15 ASSESSMENT — REFRACTION_MANIFEST
OD_CYLINDER: SPHERE
OD_SPHERE: +0.25
OD_ADD: +2.50
OS_AXIS: 005
OS_SPHERE: +0.50
OS_ADD: +2.50
OS_CYLINDER: +0.50

## 2023-08-15 ASSESSMENT — CUP TO DISC RATIO
OD_RATIO: 0.5
OS_RATIO: 0.5

## 2023-08-15 ASSESSMENT — SLIT LAMP EXAM - LIDS
COMMENTS: NORMAL
COMMENTS: NORMAL

## 2023-08-15 ASSESSMENT — EXTERNAL EXAM - LEFT EYE: OS_EXAM: NORMAL

## 2023-08-15 ASSESSMENT — EXTERNAL EXAM - RIGHT EYE: OD_EXAM: NORMAL

## 2023-08-15 NOTE — NURSING NOTE
"Chief Complaints and History of Present Illnesses   Patient presents with    Post Op (Ophthalmology) Left Eye     3 week S/P CE/IOL left eye 7/24/23.   5 week S/P CE/IOL right eye 7/10/23.     Patient states vision is improving each eye in the last few weeks.      Chief Complaint(s) and History of Present Illness(es)       Post Op (Ophthalmology) Left Eye              Laterality: left eye    Onset: weeks ago    Associated symptoms: foreign body sensation (scratchy feeling; feels like a stitch is in the eye) and burning (OS especially, comes and goes).  Negative for eye pain, tearing, flashes and floaters    Treatments tried: eye drops    Pain scale: 0/10    Comments: 3 week S/P CE/IOL left eye 7/24/23.   5 week S/P CE/IOL right eye 7/10/23.     Patient states vision is improving each eye in the last few weeks.               Comments    Ocular meds:   - Prednisolone TID left eye, \"sometimes in right eye\"     Мария King, TARA 10:56 AM 08/15/2023                           "

## 2023-08-15 NOTE — PROGRESS NOTES
"Chief Complaint(s) and History of Present Illness(es)     Post Op (Ophthalmology) Left Eye            Laterality: left eye    Onset: weeks ago    Associated symptoms: foreign body sensation (scratchy feeling; feels like   a stitch is in the eye) and burning (OS especially, comes and goes).    Negative for eye pain, tearing, flashes and floaters    Treatments tried: eye drops    Pain scale: 0/10    Comments: 3 week S/P CE/IOL left eye 7/24/23.   5 week S/P CE/IOL right eye 7/10/23.     Patient states vision is improving each eye in the last few weeks.           Comments    Ocular meds:   - Prednisolone TID left eye, \"sometimes in right eye\"     TARA Arreaga 10:56 AM 08/15/2023             Review of systems for the eyes was negative other than the pertinent positives/negatives listed in the HPI.      Assessment & Plan      Vishnu Viveros is a 73 year old male with the following diagnoses:   1. Pseudophakia of both eyes    2. Bilateral dry eyes         # Pseudophakia OS 7/24/23   # Pseudophakia OD 7/10/2023    Doing well.  Right eye doing great, left eye improving  Using Predforte three times a day left eye     Ok to resume normal activities  Taper Predforte as directed  Start artificial tears twice a day and as needed   Hold on glasses for now  Return precautions reviewed       Patient disposition:   Return in about 4 weeks (around 9/12/2023) for Francis Kauffman same day.  Suture removal left eye next visit           Attending Physician Attestation:  Complete documentation of historical and exam elements from today's encounter can be found in the full encounter summary report (not reduplicated in this progress note).  I personally obtained the chief complaint(s) and history of present illness.  I confirmed and edited as necessary the review of systems, past medical/surgical history, family history, social history, and examination findings as documented by others; and I examined the patient myself.  I " personally reviewed the relevant tests, images, and reports as documented above.  I formulated and edited as necessary the assessment and plan and discussed the findings and management plan with the patient and family. . - Rick Campbell MD

## 2023-08-17 ENCOUNTER — OFFICE VISIT (OUTPATIENT)
Dept: OPHTHALMOLOGY | Facility: CLINIC | Age: 73
End: 2023-08-17
Attending: OPHTHALMOLOGY
Payer: COMMERCIAL

## 2023-08-17 ENCOUNTER — TELEPHONE (OUTPATIENT)
Dept: OPHTHALMOLOGY | Facility: CLINIC | Age: 73
End: 2023-08-17
Payer: COMMERCIAL

## 2023-08-17 DIAGNOSIS — H01.02B SQUAMOUS BLEPHARITIS OF UPPER AND LOWER EYELIDS OF BOTH EYES: ICD-10-CM

## 2023-08-17 DIAGNOSIS — H01.02A SQUAMOUS BLEPHARITIS OF UPPER AND LOWER EYELIDS OF BOTH EYES: ICD-10-CM

## 2023-08-17 DIAGNOSIS — H20.9 IRITIS OF RIGHT EYE: Primary | ICD-10-CM

## 2023-08-17 PROCEDURE — G0463 HOSPITAL OUTPT CLINIC VISIT: HCPCS | Performed by: OPHTHALMOLOGY

## 2023-08-17 PROCEDURE — 99024 POSTOP FOLLOW-UP VISIT: CPT | Performed by: OPHTHALMOLOGY

## 2023-08-17 RX ORDER — NEOMYCIN SULFATE, POLYMYXIN B SULFATE, AND DEXAMETHASONE 3.5; 10000; 1 MG/G; [USP'U]/G; MG/G
0.5 OINTMENT OPHTHALMIC AT BEDTIME
Qty: 7 G | Refills: 1 | Status: SHIPPED | OUTPATIENT
Start: 2023-08-17 | End: 2023-12-11

## 2023-08-17 ASSESSMENT — VISUAL ACUITY
METHOD: SNELLEN - LINEAR
OD_PH_SC: 20/25
OD_PH_SC+: -2
OD_SC+: -2
OD_SC: 20/30
OS_SC+: -1
OS_SC: 20/40
OS_PH_SC+: -2
OS_PH_SC: 20/30

## 2023-08-17 ASSESSMENT — TONOMETRY
OD_IOP_MMHG: 13
OS_IOP_MMHG: 17
IOP_METHOD: TONOPEN

## 2023-08-17 ASSESSMENT — EXTERNAL EXAM - RIGHT EYE: OD_EXAM: NORMAL

## 2023-08-17 ASSESSMENT — SLIT LAMP EXAM - LIDS
COMMENTS: NORMAL
COMMENTS: BLEPHARITIS

## 2023-08-17 ASSESSMENT — EXTERNAL EXAM - LEFT EYE: OS_EXAM: NORMAL

## 2023-08-17 NOTE — TELEPHONE ENCOUNTER
Walk in patient today with complaint of new right eye pain this morning around 4 AM--post-op pt    Redness and pain around eye-- more than a scratch per pt    Scheduled with Dr. Campbell at 0930    Jamil Foley RN 9:28 AM 08/17/23

## 2023-08-17 NOTE — PROGRESS NOTES
Chief Complaint(s) and History of Present Illness(es)     Post Op (Ophthalmology) Left Eye            Laterality: left eye    Associated symptoms: eye pain    Pain scale: 7/10          Comments    Vishnu is here post  cataract surgery (LE;7-24), (RE; 7-10) complaining   of Right eye pain early this morning that woke him up. He took a tylenol   and a drop of pred and Systane, but pain still present. He was unable to   go to work today due to the pain. The pain is dull and pulses. He also   says he sees something in RE.      Lavon Butts COT 9:52 AM August 17, 2023                 Review of systems for the eyes was negative other than the pertinent positives/negatives listed in the HPI.      Assessment & Plan      Vishnu Viveros is a 73 year old male with the following diagnoses:   1. Iritis of right eye    2. Squamous blepharitis of upper and lower eyelids of both eyes         Rebound iritis right eye   Restart Predforte 6x daily right eye   Continue taper left eye as instructed  Maxitrol austen to eyelids right eye at bedtime   Artificial tears as needed   Return precautions reviewed     Patient disposition:   Return in about 1 week (around 8/24/2023) for VT only.           Attending Physician Attestation:  Complete documentation of historical and exam elements from today's encounter can be found in the full encounter summary report (not reduplicated in this progress note).  I personally obtained the chief complaint(s) and history of present illness.  I confirmed and edited as necessary the review of systems, past medical/surgical history, family history, social history, and examination findings as documented by others; and I examined the patient myself.  I personally reviewed the relevant tests, images, and reports as documented above.  I formulated and edited as necessary the assessment and plan and discussed the findings and management plan with the patient and family. . - Rick Campbell MD

## 2023-08-17 NOTE — PATIENT INSTRUCTIONS
Resume Prednisolone 6x times per day in the right eye     Add maxitrol ointment to right eyelid at bedtime     Continue to taper prednisolone in the left eye as before     Continue artificial tears every hour as needed for irritation    Cool compresses as needed for irritation

## 2023-08-17 NOTE — NURSING NOTE
Chief Complaints and History of Present Illnesses   Patient presents with    Post Op (Ophthalmology) Left Eye     Chief Complaint(s) and History of Present Illness(es)       Post Op (Ophthalmology) Left Eye              Laterality: left eye    Associated symptoms: eye pain    Pain scale: 7/10              Comments    Vishnu is here post  cataract surgery (LE;7-24), (RE; 7-10) complaining of Right eye pain early this morning that woke him up. He took a tylenol and a drop of pred and Systane, but pain still present. He was unable to go to work today due to the pain. The pain is dull and pulses. He also says he sees something in RE.      Lavon Butts COT 9:52 AM August 17, 2023

## 2023-08-24 ENCOUNTER — OFFICE VISIT (OUTPATIENT)
Dept: OPHTHALMOLOGY | Facility: CLINIC | Age: 73
End: 2023-08-24
Attending: OPHTHALMOLOGY
Payer: COMMERCIAL

## 2023-08-24 DIAGNOSIS — H20.9 IRITIS OF RIGHT EYE: Primary | ICD-10-CM

## 2023-08-24 DIAGNOSIS — Z96.1 PSEUDOPHAKIA OF BOTH EYES: ICD-10-CM

## 2023-08-24 PROCEDURE — 99024 POSTOP FOLLOW-UP VISIT: CPT | Mod: GC

## 2023-08-24 PROCEDURE — G0463 HOSPITAL OUTPT CLINIC VISIT: HCPCS

## 2023-08-24 ASSESSMENT — CONF VISUAL FIELD
OD_SUPERIOR_TEMPORAL_RESTRICTION: 0
OD_INFERIOR_TEMPORAL_RESTRICTION: 0
OD_SUPERIOR_NASAL_RESTRICTION: 0
OS_INFERIOR_TEMPORAL_RESTRICTION: 0
OS_SUPERIOR_TEMPORAL_RESTRICTION: 0
OS_INFERIOR_NASAL_RESTRICTION: 0
OS_SUPERIOR_NASAL_RESTRICTION: 0
METHOD: COUNTING FINGERS
OD_NORMAL: 1
OS_NORMAL: 1
OD_INFERIOR_NASAL_RESTRICTION: 0

## 2023-08-24 ASSESSMENT — SLIT LAMP EXAM - LIDS: COMMENTS: NORMAL

## 2023-08-24 ASSESSMENT — TONOMETRY
OS_IOP_MMHG: 15
OD_IOP_MMHG: 14
IOP_METHOD: TONOPEN

## 2023-08-24 ASSESSMENT — EXTERNAL EXAM - LEFT EYE: OS_EXAM: NORMAL

## 2023-08-24 ASSESSMENT — EXTERNAL EXAM - RIGHT EYE: OD_EXAM: NORMAL

## 2023-08-24 ASSESSMENT — VISUAL ACUITY
OS_SC: 20/40
OD_SC+: -1
OS_SC+: +1
METHOD: SNELLEN - LINEAR
OS_PH_SC+: +1
OS_PH_SC: 20/30
OD_SC: 20/25

## 2023-08-24 NOTE — PATIENT INSTRUCTIONS
Take predforte 3 times per day for 2 weeks in each eye, then take 2 times per day for 2 weeks in each eye, then take 1 time per day for 1 week in each eye, then stop.

## 2023-08-24 NOTE — NURSING NOTE
"Chief Complaints and History of Present Illnesses   Patient presents with    Follow Up     1 week follow up Iritis of right eye      Chief Complaint(s) and History of Present Illness(es)       Follow Up              Laterality: right eye    Associated symptoms: foreign body sensation.  Negative for dryness, eye pain, flashes, floaters and burning    Treatments tried: eye drops    Pain scale: 0/10    Comments: 1 week follow up Iritis of right eye               Comments    No eye pain today, but \"scratchy feeling\" both eyes.   Foreign body sensation both eyes, especially when asleep.   Pt states he has been busy and gets eyedrops in when he can.    Ocular meds:   Moxifloxacin TID both eyes  Ketorolac TID both eyes  Prednisolone TID both eyes  Maxitrol austen qPM both eyes.     Moe Martins 9:07 AM August 24, 2023                      "

## 2023-08-24 NOTE — PROGRESS NOTES
"[facility]     HPI       Follow Up    In right eye.  Associated symptoms include foreign body sensation.  Negative for dryness, eye pain, flashes, floaters and burning.  Treatments tried include eye drops.  Pain was noted as 0/10. Additional comments: 1 week follow up Iritis of right eye              Comments    No eye pain today, but \"scratchy feeling\" both eyes.   Foreign body sensation both eyes, especially when asleep.   Pt states he has been busy and gets eyedrops in when he can.    Ocular meds:   Moxifloxacin TID both eyes  Ketorolac TID both eyes  Prednisolone TID both eyes  Maxitrol austen qPM both eyes.     Moe Martins 9:07 AM August 24, 2023              Last edited by Moe Martins on 8/24/2023  9:07 AM.          HPI:   Vishnu Viveros is a 73 year old male who presents for 1 week follow up of rebound iritis right eye. He was last seen 8/17/23 where rebound iritis was diagnosed and predforte 6x/day and maxitrol were prescribed in addition to his post-operative drops.    Since then he notes improvement in pain but scratchy sensation.    Past Ocular history:   - Glasses: Yes  - Contact lens wear: No  - Ocular Surgical History: CE IOL: right eye 7/10/23, left eye 7/24/23   - Current Eye drops: Predforte TID each eye, maxitrol at bedtime each eye, ketoroloac TID each eye, Moxifloxacin TID each eye     PMH: DM, Lung adenocarcinoma, HTN    Review of systems for the eyes was negative other than the pertinent positives/negatives listed in the HPI.      Assessment & Plan      Vishnu Viveros is a 73 year old male with the following diagnoses:   1. Iritis of right eye    2. Pseudophakia of both eyes      CE IOL right eye 7/10/23 complicated by rebound iritis improving today with improvement in symptoms and on exam with trace-1+ cell. He is currently taking predforte TID in each eye.   CE IOL left eye 7/24/23: Doing well with no post-op inflammation. ST suture removed at slit lamp today, tolerated well.   - Mrx " at follow up   - Given rebound iritis will recommend prolonged taper. Predforte TID for 2 weeks, BID for 2 weeks then once per day for 2 weeks then stop.   - Continue all other post-op drop taper as planned     Patient disposition:   Return for follow up as scheduled, MRx.    Patient seen with Dr. Adrian Logan MD  Resident Physician, PGY-2  Department of Ophthalmology  08/24/23     Attending Physician Attestation:  Complete documentation of historical and exam elements from today's encounter can be found in the full encounter summary report (not reduplicated in this progress note).  I personally obtained the chief complaint(s) and history of present illness.  I confirmed and edited as necessary the review of systems, past medical/surgical history, family history, social history, and examination findings as documented by others; and I examined the patient myself.  I personally reviewed the relevant tests, images, and reports as documented above.  I formulated and edited as necessary the assessment and plan and discussed the findings and management plan with the patient and family. . - Rick Campbell MD

## 2023-09-28 ENCOUNTER — OFFICE VISIT (OUTPATIENT)
Dept: OPHTHALMOLOGY | Facility: CLINIC | Age: 73
End: 2023-09-28
Attending: OPHTHALMOLOGY
Payer: COMMERCIAL

## 2023-09-28 DIAGNOSIS — H35.3221 EXUDATIVE AGE-RELATED MACULAR DEGENERATION OF LEFT EYE WITH ACTIVE CHOROIDAL NEOVASCULARIZATION (H): Primary | ICD-10-CM

## 2023-09-28 DIAGNOSIS — E11.9 TYPE 2 DIABETES MELLITUS TREATED WITHOUT INSULIN (H): ICD-10-CM

## 2023-09-28 DIAGNOSIS — H31.8 POLYPOIDAL CHOROIDAL VASCULOPATHY: ICD-10-CM

## 2023-09-28 DIAGNOSIS — H20.9 IRITIS OF RIGHT EYE: ICD-10-CM

## 2023-09-28 DIAGNOSIS — Z96.1 PSEUDOPHAKIA OF BOTH EYES: Primary | ICD-10-CM

## 2023-09-28 DIAGNOSIS — H25.812 COMBINED FORM OF AGE-RELATED CATARACT, LEFT EYE: ICD-10-CM

## 2023-09-28 PROCEDURE — G0463 HOSPITAL OUTPT CLINIC VISIT: HCPCS | Performed by: OPHTHALMOLOGY

## 2023-09-28 PROCEDURE — 92015 DETERMINE REFRACTIVE STATE: CPT

## 2023-09-28 PROCEDURE — G0463 HOSPITAL OUTPT CLINIC VISIT: HCPCS | Mod: 27 | Performed by: OPHTHALMOLOGY

## 2023-09-28 PROCEDURE — 92134 CPTRZ OPH DX IMG PST SGM RTA: CPT | Performed by: OPHTHALMOLOGY

## 2023-09-28 PROCEDURE — 99214 OFFICE O/P EST MOD 30 MIN: CPT | Mod: 24 | Performed by: OPHTHALMOLOGY

## 2023-09-28 PROCEDURE — 99024 POSTOP FOLLOW-UP VISIT: CPT | Mod: GC | Performed by: OPHTHALMOLOGY

## 2023-09-28 PROCEDURE — 999N000103 HC STATISTIC NO CHARGE FACILITY FEE

## 2023-09-28 RX ORDER — PREDNISOLONE ACETATE 10 MG/ML
1 SUSPENSION/ DROPS OPHTHALMIC DAILY
Qty: 5 ML | Refills: 1 | Status: SHIPPED | OUTPATIENT
Start: 2023-09-28

## 2023-09-28 ASSESSMENT — CONF VISUAL FIELD
OD_NORMAL: 1
OS_SUPERIOR_NASAL_RESTRICTION: 0
OD_SUPERIOR_TEMPORAL_RESTRICTION: 0
OS_NORMAL: 1
OD_SUPERIOR_TEMPORAL_RESTRICTION: 0
OD_INFERIOR_TEMPORAL_RESTRICTION: 0
OS_SUPERIOR_TEMPORAL_RESTRICTION: 0
OS_INFERIOR_NASAL_RESTRICTION: 0
OS_INFERIOR_NASAL_RESTRICTION: 0
OS_SUPERIOR_NASAL_RESTRICTION: 0
OS_NORMAL: 1
OD_INFERIOR_NASAL_RESTRICTION: 0
OD_INFERIOR_TEMPORAL_RESTRICTION: 0
OS_INFERIOR_TEMPORAL_RESTRICTION: 0
OD_NORMAL: 1
METHOD: COUNTING FINGERS
OS_SUPERIOR_TEMPORAL_RESTRICTION: 0
METHOD: COUNTING FINGERS
OS_INFERIOR_TEMPORAL_RESTRICTION: 0
OD_SUPERIOR_NASAL_RESTRICTION: 0
OD_SUPERIOR_NASAL_RESTRICTION: 0
OD_INFERIOR_NASAL_RESTRICTION: 0

## 2023-09-28 ASSESSMENT — TONOMETRY
OS_IOP_MMHG: 11
IOP_METHOD: TONOPEN
OD_IOP_MMHG: 13
IOP_METHOD: TONOPEN
OD_IOP_MMHG: 13
OS_IOP_MMHG: 11

## 2023-09-28 ASSESSMENT — VISUAL ACUITY
OD_SC+: -1
OS_PH_SC+: -2
METHOD: SNELLEN - LINEAR
OD_PH_SC+: -2
OS_SC: 20/40
OS_PH_SC: 20/30
OD_PH_SC+: -2
OS_PH_SC: 20/30
OD_SC: 20/30
OS_SC: 20/40
OD_PH_SC: 20/25
OD_PH_SC: 20/25
OS_PH_SC+: -2
OD_SC+: -1
METHOD: SNELLEN - LINEAR
OD_SC: 20/30
OS_SC+: -1
OS_SC+: -1

## 2023-09-28 ASSESSMENT — REFRACTION_MANIFEST
OD_CYLINDER: +0.75
OS_CYLINDER: +0.50
OS_SPHERE: +0.25
OD_ADD: +2.50
OS_ADD: +2.50
OD_SPHERE: PLANO
OS_AXIS: 065
OD_AXIS: 170

## 2023-09-28 ASSESSMENT — EXTERNAL EXAM - LEFT EYE
OS_EXAM: MILD BROW PTOSIS
OS_EXAM: NORMAL

## 2023-09-28 ASSESSMENT — SLIT LAMP EXAM - LIDS
COMMENTS: NORMAL

## 2023-09-28 ASSESSMENT — CUP TO DISC RATIO
OS_RATIO: 0.5
OD_RATIO: 0.5

## 2023-09-28 ASSESSMENT — EXTERNAL EXAM - RIGHT EYE
OD_EXAM: MILD BROW PTOSIS
OD_EXAM: NORMAL

## 2023-09-28 NOTE — NURSING NOTE
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  He continues to have a mild foreign body sensation in his right eye.  Though his vision in the right eye seems clearer than in his left eye.    He is using:  Prednisolone acetate daily in each eye  Systane as needed in each eye    Lab Results       Component                Value               Date                       A1C                      6.4                 06/10/2023                 A1C                      6.7                 12/10/2022                 A1C                      5.8                 06/13/2022                 A1C                      5.9                 12/06/2021                 A1C                      5.8                 06/07/2021                 A1C                      5.8                 11/16/2020                 A1C                      6.0                 05/27/2020              TARA Turpin 9:28 AM  September 28, 2023

## 2023-09-28 NOTE — PROGRESS NOTES
Office Visit  4/10/2023  [facility] [address]  Joan Mckeon MD  Ophthalmology Polypoidal choroidal vasculopathy +3 more  Dx Retinal Evaluation; Referred by Murali Logan MD  Reason for Visit     Progress Notes  Joan Mckeon MD (Physician)   Ophthalmology  I have confirmed the patient's and reviewed Past Medical History, Past Surgical History, Social History, Family History, Problem List, Medication List and agree with Tech note.           CC: subretinal fluid     HPI: Vishnu Viveros is a 73 year old male with diabetes who is here for evaluation of subretinal fluid. He was referred by Dr. Bo Campbell in our department.      He reports that he has started noticing vision changes this year. Things appear to double at times. He has to bring things closer to see them. He has not tried testing each eye separately to say which eye is responsible.      He has been having trouble driving at night for 2-3 years now.      He has had diabetes, type II for several years and is taking oral meds no insulin      OCT Macula 04/10/23  OD: Central PED with focal area of associated SR material, no IRF, central foveal contour somewhat flattened by PED, no drusen except in involved area, choroid normal thickness, hyaloid attached - improved  OS: VMA, foveal contour flattened by central PED, no drusen, interval improvement in associated SRF, no IRF, choroid thick with dilated stromal vessels, no significant drusen     OCTA 04/10/23  OD: no CNVM, en face imaging suggests a PED with associated SR material but no CNVM  OS: CNVM is present centrally with a looping vascular net extending to include the fovea     Assessment/plan:     1.   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  Inactive CNVM OS              - demonstrated on OCTA 4/10/23               - new  intra retinal fluid exudation today              - monitor in 4 weeks and mar for Avastin os     3.  Diabetes mellitus no nonproliferative diabetic retinopathy                         Blood glucose control              Last HbA1C is 6.2              OCT and FAF show      4.  Nuclear sclerotic cataract  Both eyes               - visually significant              - refract as needed               - no new prescription glasses issued               - seen by Rick Campbell MD             RTC 2 months for VTD with OCT Ángel Butts MD PhD.  Professor & Chair              Left +1.00 +0.50 015 +2.50   Edited by: Lavon Butts

## 2023-09-28 NOTE — NURSING NOTE
Chief Complaints and History of Present Illnesses   Patient presents with    Post Op (Ophthalmology) Both Eyes     Cataract extraction with IOL in the right eye on 07/10/2023  Cataract extraction with IOL in the left eye on 07/24/2023          Chief Complaint(s) and History of Present Illness(es)       Post Op (Ophthalmology) Both Eyes              Laterality: both eyes    Course: gradually improving    Associated symptoms: dryness, floaters (right eye) and foreign body sensation (right eye).  Negative for eye pain, photophobia and flashes    Treatments tried: eye drops and artificial tears    Pain scale: 0/10    Comments: Cataract extraction with IOL in the right eye on 07/10/2023  Cataract extraction with IOL in the left eye on 07/24/2023                   Comments    He tells me that his vision seems brighter since his surgeries.  He continues to have a mild foreign body sensation in his right eye.  Though his vision in the right eye seems clearer than in his left eye.    He is using:  Prednisolone acetate daily in each eye  Systane as needed in each eye    Lab Results       Component                Value               Date                       A1C                      6.4                 06/10/2023                 A1C                      6.7                 12/10/2022                 A1C                      5.8                 06/13/2022                 A1C                      5.9                 12/06/2021                 A1C                      5.8                 06/07/2021                 A1C                      5.8                 11/16/2020                 A1C                      6.0                 05/27/2020              TARA Turpin 9:28 AM  September 28, 2023

## 2023-09-28 NOTE — PROGRESS NOTES
Chief Complaint(s) and History of Present Illness(es)     Post Op (Ophthalmology) Both Eyes            Laterality: both eyes    Course: gradually improving    Associated symptoms: dryness, floaters (right eye) and foreign body   sensation (right eye).  Negative for eye pain, photophobia and flashes    Treatments tried: eye drops and artificial tears    Pain scale: 0/10    Comments: Cataract extraction with IOL in the right eye on 07/10/2023  Cataract extraction with IOL in the left eye on 07/24/2023            Comments    He tells me that his vision seems brighter since his surgeries.  He   continues to have a mild foreign body sensation in his right eye.  Though   his vision in the right eye seems clearer than in his left eye.    He is using:  Prednisolone acetate daily in each eye  Systane as needed in each eye    Lab Results       Component                Value               Date                       A1C                      6.4                 06/10/2023                 A1C                      6.7                 12/10/2022                 A1C                      5.8                 06/13/2022                 A1C                      5.9                 12/06/2021                 A1C                      5.8                 06/07/2021                 A1C                      5.8                 11/16/2020                 A1C                      6.0                 05/27/2020              TARA Turpin 9:28 AM  September 28, 2023           Review of systems for the eyes was negative other than the pertinent positives/negatives listed in the HPI.      Assessment & Plan      Vishnu Viveros is a 73 year old male with the following diagnoses:   1. Pseudophakia of both eyes    2. 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 Type 2 diabetes mellitus treated without insulin (H)       S/P CE IOL right eye 7/10/23 complicated by rebound iritis   S/P CE IOL left eye 7/24/23      Still on daily Predforte daily in both eyes   Mild persistent flare and cell in both eyes   Asymptomatic.  Unclear etiology  Continue Predforte daily both eyes for now  Consult JY for persistent PostOp inflammation    Best corrected visual acuity 20/20- both eyes today   Refractive options reviewed  Refraction given     Seeing EVK for repeat dilated fundus exam today        My privilege to be part of your care,  Bradley Oglesby MD, MSc  Ophthalmology PGY-4 resident physician    Attending Physician Attestation:  Complete documentation of historical and exam elements from today's encounter can be found in the full encounter summary report (not reduplicated in this progress note).  I personally obtained the chief complaint(s) and history of present illness.  I confirmed and edited as necessary the review of systems, past medical/surgical history, family history, social history, and examination findings as documented by others; and I examined the patient myself.  I personally reviewed the relevant tests, images, and reports as documented above.  I formulated and edited as necessary the assessment and plan and discussed the findings and management plan with the patient and family. . - Rick Campbell MD

## 2023-10-17 DIAGNOSIS — E11.9 TYPE 2 DIABETES MELLITUS TREATED WITHOUT INSULIN (H): Primary | ICD-10-CM

## 2023-10-17 DIAGNOSIS — H35.3221 EXUDATIVE AGE-RELATED MACULAR DEGENERATION OF LEFT EYE WITH ACTIVE CHOROIDAL NEOVASCULARIZATION (H): ICD-10-CM

## 2023-10-23 ENCOUNTER — OFFICE VISIT (OUTPATIENT)
Dept: OPHTHALMOLOGY | Facility: CLINIC | Age: 73
End: 2023-10-23
Attending: OPHTHALMOLOGY
Payer: COMMERCIAL

## 2023-10-23 DIAGNOSIS — H35.3221 EXUDATIVE AGE-RELATED MACULAR DEGENERATION OF LEFT EYE WITH ACTIVE CHOROIDAL NEOVASCULARIZATION (H): ICD-10-CM

## 2023-10-23 DIAGNOSIS — E11.9 TYPE 2 DIABETES MELLITUS TREATED WITHOUT INSULIN (H): Primary | ICD-10-CM

## 2023-10-23 PROCEDURE — 92134 CPTRZ OPH DX IMG PST SGM RTA: CPT | Performed by: OPHTHALMOLOGY

## 2023-10-23 PROCEDURE — 99211 OFF/OP EST MAY X REQ PHY/QHP: CPT | Mod: 25 | Performed by: OPHTHALMOLOGY

## 2023-10-23 PROCEDURE — 99214 OFFICE O/P EST MOD 30 MIN: CPT | Performed by: OPHTHALMOLOGY

## 2023-10-23 ASSESSMENT — CONF VISUAL FIELD
OS_SUPERIOR_TEMPORAL_RESTRICTION: 0
OD_SUPERIOR_NASAL_RESTRICTION: 0
OD_NORMAL: 1
OS_SUPERIOR_NASAL_RESTRICTION: 0
METHOD: COUNTING FINGERS
OD_SUPERIOR_TEMPORAL_RESTRICTION: 0
OS_INFERIOR_TEMPORAL_RESTRICTION: 0
OS_INFERIOR_NASAL_RESTRICTION: 0
OD_INFERIOR_TEMPORAL_RESTRICTION: 0
OD_INFERIOR_NASAL_RESTRICTION: 0
OS_NORMAL: 1

## 2023-10-23 ASSESSMENT — VISUAL ACUITY
OS_SC: 20/40
OS_SC+: -2
METHOD: SNELLEN - LINEAR
OD_SC: 20/25
OS_PH_SC+: -2
OS_PH_SC: 20/30
OD_SC+: -2

## 2023-10-23 ASSESSMENT — CUP TO DISC RATIO
OS_RATIO: 0.5
OD_RATIO: 0.5

## 2023-10-23 ASSESSMENT — TONOMETRY
OS_IOP_MMHG: 15
IOP_METHOD: TONOPEN
OD_IOP_MMHG: 12

## 2023-10-23 ASSESSMENT — SLIT LAMP EXAM - LIDS
COMMENTS: NORMAL
COMMENTS: NORMAL

## 2023-10-23 ASSESSMENT — EXTERNAL EXAM - RIGHT EYE: OD_EXAM: NORMAL

## 2023-10-23 ASSESSMENT — EXTERNAL EXAM - LEFT EYE: OS_EXAM: NORMAL

## 2023-10-23 NOTE — PROGRESS NOTES
Office Visit  4/10/2023  [facility]  Joan Mckeon MD  Ophthalmology Polypoidal choroidal vasculopathy +3 more  Dx Retinal Evaluation; Referred by Murali Logan MD  Reason for Visit     Progress Notes  Joan Mckeon MD (Physician)   Ophthalmology  I have confirmed the patient's and reviewed Past Medical History, Past Surgical History, Social History, Family History, Problem List, Medication List and agree with Tech note.           CC: subretinal fluid     HPI: Vishnu Viveros is a 73 year old male with diabetes who is here for evaluation of subretinal fluid. He was referred by Dr. Bo Campbell in our department.      He reports that he has started noticing vision changes this year. Things appear to double at times. He has to bring things closer to see them. He has not tried testing each eye separately to say which eye is responsible.      He has been having trouble driving at night for 2-3 years now.      He has had diabetes, type II for several years and is taking oral meds no insulin           OCTA today  OD: no CNVM, en face imaging suggests a PED with associated SR material but no CNVM  OS: CNVM is present centrally with a looping vascular net extending to include the fovea     Assessment/plan:     1.   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  Inactive CNVM OS              - demonstrated on OCTA 4/10/23              - new  intra retinal fluid exudation today              - monitor in 4 weeks and mar for Avastin os     3.  Diabetes mellitus no nonproliferative diabetic retinopathy                         Blood glucose control              Last HbA1C is 6.2              OCT and FAF show      4.  Nuclear sclerotic cataract  Both eyes               - visually significant              - refract as needed                - no new prescription glasses issued               - seen by Rick Campbell MD             RTC 4 months for VTD with OCT Ángel Butts MD PhD.  Professor & Chair              Left +1.00 +0.50 015 +2.50   Edited by: Lavon Butts

## 2023-10-23 NOTE — NURSING NOTE
Chief Complaints and History of Present Illnesses   Patient presents with    Diabetic Eye Exam     Chief Complaint(s) and History of Present Illness(es)       Diabetic Eye Exam              Associated symptoms: Negative for foreign body sensation, discharge and photophobia    Diabetes Type: Type 2 and taking oral medications    Pain scale: 0/10              Comments    Vishnu is here for a diabetic eye exam. Today he states no vision changes form last visit    Lab Results       Component                Value               Date                       A1C                      6.4                 06/10/2023                 A1C                      6.7                 12/10/2022                 A1C                      5.8                 06/13/2022                 A1C                      5.9                 12/06/2021                 A1C                      5.8                 06/07/2021                 A1C                      5.8                 11/16/2020                BS    unknown    Lavon Butts COT 3:20 PM October 23, 2023

## 2023-11-17 ENCOUNTER — TELEPHONE (OUTPATIENT)
Dept: FAMILY MEDICINE | Facility: CLINIC | Age: 73
End: 2023-11-17
Payer: COMMERCIAL

## 2023-11-17 DIAGNOSIS — M54.16 LUMBAR RADICULOPATHY: Primary | ICD-10-CM

## 2023-11-17 NOTE — TELEPHONE ENCOUNTER
[facility] Call Center    Phone Message    May a detailed message be left on voicemail: yes     Reason for Call: Patient wife called in for a referral to: [facility], Dr Coello, [facility], [place] [address], [address], ph: [phone], please call if questions, thank you!   Appt is scheduled for Dec 26, they need the referral prior to the appointment.       Action Taken: Message routed to:  [facility]    Travel Screening: Not Applicable                                                                          I

## 2023-11-17 NOTE — TELEPHONE ENCOUNTER
Spoke to wife Annie.  Requesting spine referral for lumbar radiculopathy to location [facility], Dr Coello, [address] [facility], [place] [address], [address], ph: [phone].    Last spine referral they saw at [facility] suggest surgery. They would like to explore different options and has heard good things about [facility].  Spouse checked with [employer] insurance.  [employer] covers if get a referral from patient primary care physician.    External referral sent to provider.  Pt spouse would like a call once referral is faxed.  Okay to leave .         Donavan Coello D.O., M.  [facility]  [address] [address]  Phone: [phone]  Fax: [phone]        Joe Ramírez CMA ([facility]) at 3:29 PM on 11/17/2023

## 2023-11-17 NOTE — TELEPHONE ENCOUNTER
November 17, 2023     I signed the order for referral.   Even with a primary MD referral please be aware there may be out of pocket expenses for services. Please check with your insurance for coverage information.  Best wishes,  Murali Logan MD

## 2023-11-20 NOTE — TELEPHONE ENCOUNTER
Called spouse Annie.  External spine referral faxed with pt MRI/XR lumbar spine results.      Joe Ramírez CMA ([facility]) at 9:38 AM on 11/20/2023

## 2023-12-09 ENCOUNTER — LAB (OUTPATIENT)
Dept: LAB | Facility: CLINIC | Age: 73
End: 2023-12-09
Payer: COMMERCIAL

## 2023-12-09 DIAGNOSIS — E78.5 HYPERLIPIDEMIA LDL GOAL <100: ICD-10-CM

## 2023-12-09 DIAGNOSIS — C34.91 ADENOCARCINOMA OF LUNG, STAGE 1, RIGHT (H): ICD-10-CM

## 2023-12-09 DIAGNOSIS — N40.1 BENIGN LOCALIZED PROSTATIC HYPERPLASIA WITH LOWER URINARY TRACT SYMPTOMS (LUTS): ICD-10-CM

## 2023-12-09 DIAGNOSIS — E11.9 TYPE 2 DIABETES MELLITUS TREATED WITHOUT INSULIN (H): ICD-10-CM

## 2023-12-09 LAB
ALBUMIN SERPL BCG-MCNC: 4.3 G/DL (ref 3.5–5.2)
ALP SERPL-CCNC: 53 U/L (ref 40–150)
ALT SERPL W P-5'-P-CCNC: 14 U/L (ref 0–70)
ANION GAP SERPL CALCULATED.3IONS-SCNC: 10 MMOL/L (ref 7–15)
AST SERPL W P-5'-P-CCNC: 20 U/L (ref 0–45)
BASOPHILS # BLD AUTO: 0 10E3/UL (ref 0–0.2)
BASOPHILS NFR BLD AUTO: 1 %
BILIRUB SERPL-MCNC: 0.3 MG/DL
BUN SERPL-MCNC: 23.5 MG/DL (ref 8–23)
CALCIUM SERPL-MCNC: 10 MG/DL (ref 8.8–10.2)
CHLORIDE SERPL-SCNC: 105 MMOL/L (ref 98–107)
CHOLEST SERPL-MCNC: 131 MG/DL
CREAT SERPL-MCNC: 0.91 MG/DL (ref 0.67–1.17)
CREAT UR-MCNC: 70.1 MG/DL
DEPRECATED HCO3 PLAS-SCNC: 26 MMOL/L (ref 22–29)
EGFRCR SERPLBLD CKD-EPI 2021: 88 ML/MIN/1.73M2
EOSINOPHIL # BLD AUTO: 0.2 10E3/UL (ref 0–0.7)
EOSINOPHIL NFR BLD AUTO: 5 %
ERYTHROCYTE [DISTWIDTH] IN BLOOD BY AUTOMATED COUNT: 12.8 % (ref 10–15)
FASTING STATUS PATIENT QL REPORTED: YES
GLUCOSE SERPL-MCNC: 109 MG/DL (ref 70–99)
HBA1C MFR BLD: 6.5 %
HCT VFR BLD AUTO: 36.2 % (ref 40–53)
HDLC SERPL-MCNC: 65 MG/DL
HGB BLD-MCNC: 12.2 G/DL (ref 13.3–17.7)
IMM GRANULOCYTES # BLD: 0 10E3/UL
IMM GRANULOCYTES NFR BLD: 0 %
LDLC SERPL CALC-MCNC: 55 MG/DL
LYMPHOCYTES # BLD AUTO: 1.9 10E3/UL (ref 0.8–5.3)
LYMPHOCYTES NFR BLD AUTO: 48 %
MCH RBC QN AUTO: 29.8 PG (ref 26.5–33)
MCHC RBC AUTO-ENTMCNC: 33.7 G/DL (ref 31.5–36.5)
MCV RBC AUTO: 88 FL (ref 78–100)
MICROALBUMIN UR-MCNC: <12 MG/L
MICROALBUMIN/CREAT UR: NORMAL MG/G{CREAT}
MONOCYTES # BLD AUTO: 0.3 10E3/UL (ref 0–1.3)
MONOCYTES NFR BLD AUTO: 8 %
NEUTROPHILS # BLD AUTO: 1.5 10E3/UL (ref 1.6–8.3)
NEUTROPHILS NFR BLD AUTO: 38 %
NONHDLC SERPL-MCNC: 66 MG/DL
NRBC # BLD AUTO: 0 10E3/UL
NRBC BLD AUTO-RTO: 0 /100
PLATELET # BLD AUTO: 194 10E3/UL (ref 150–450)
POTASSIUM SERPL-SCNC: 3.9 MMOL/L (ref 3.4–5.3)
PROT SERPL-MCNC: 7.1 G/DL (ref 6.4–8.3)
RBC # BLD AUTO: 4.1 10E6/UL (ref 4.4–5.9)
SODIUM SERPL-SCNC: 141 MMOL/L (ref 135–145)
TRIGL SERPL-MCNC: 53 MG/DL
VIT B12 SERPL-MCNC: 1087 PG/ML (ref 232–1245)
WBC # BLD AUTO: 3.9 10E3/UL (ref 4–11)

## 2023-12-09 PROCEDURE — 99000 SPECIMEN HANDLING OFFICE-LAB: CPT | Performed by: PATHOLOGY

## 2023-12-09 PROCEDURE — 80053 COMPREHEN METABOLIC PANEL: CPT | Performed by: PATHOLOGY

## 2023-12-09 PROCEDURE — 84153 ASSAY OF PSA TOTAL: CPT | Performed by: FAMILY MEDICINE

## 2023-12-09 PROCEDURE — 83036 HEMOGLOBIN GLYCOSYLATED A1C: CPT | Performed by: FAMILY MEDICINE

## 2023-12-09 PROCEDURE — 82570 ASSAY OF URINE CREATININE: CPT | Performed by: FAMILY MEDICINE

## 2023-12-09 PROCEDURE — 36415 COLL VENOUS BLD VENIPUNCTURE: CPT | Performed by: PATHOLOGY

## 2023-12-09 PROCEDURE — 84154 ASSAY OF PSA FREE: CPT | Performed by: FAMILY MEDICINE

## 2023-12-09 PROCEDURE — 82607 VITAMIN B-12: CPT | Performed by: FAMILY MEDICINE

## 2023-12-09 PROCEDURE — 85025 COMPLETE CBC W/AUTO DIFF WBC: CPT | Performed by: PATHOLOGY

## 2023-12-09 PROCEDURE — 80061 LIPID PANEL: CPT | Performed by: PATHOLOGY

## 2023-12-11 ENCOUNTER — OFFICE VISIT (OUTPATIENT)
Dept: FAMILY MEDICINE | Facility: CLINIC | Age: 73
End: 2023-12-11
Payer: COMMERCIAL

## 2023-12-11 VITALS
HEIGHT: 64 IN | TEMPERATURE: 98.5 F | DIASTOLIC BLOOD PRESSURE: 74 MMHG | SYSTOLIC BLOOD PRESSURE: 112 MMHG | BODY MASS INDEX: 30.54 KG/M2 | OXYGEN SATURATION: 97 % | WEIGHT: 178.9 LBS | RESPIRATION RATE: 18 BRPM | HEART RATE: 74 BPM

## 2023-12-11 DIAGNOSIS — Z71.89 COUNSELING REGARDING ADVANCED DIRECTIVES: ICD-10-CM

## 2023-12-11 DIAGNOSIS — B35.6 TINEA CRURIS: ICD-10-CM

## 2023-12-11 DIAGNOSIS — C34.91 ADENOCARCINOMA OF LUNG, STAGE 1, RIGHT (H): ICD-10-CM

## 2023-12-11 DIAGNOSIS — I10 BENIGN ESSENTIAL HYPERTENSION: ICD-10-CM

## 2023-12-11 DIAGNOSIS — N40.1 BENIGN LOCALIZED PROSTATIC HYPERPLASIA WITH LOWER URINARY TRACT SYMPTOMS (LUTS): ICD-10-CM

## 2023-12-11 DIAGNOSIS — Z00.00 MEDICARE ANNUAL WELLNESS VISIT, SUBSEQUENT: Primary | ICD-10-CM

## 2023-12-11 DIAGNOSIS — E11.9 TYPE 2 DIABETES MELLITUS TREATED WITHOUT INSULIN (H): ICD-10-CM

## 2023-12-11 DIAGNOSIS — Z23 ENCOUNTER FOR IMMUNIZATION: ICD-10-CM

## 2023-12-11 PROCEDURE — 90662 IIV NO PRSV INCREASED AG IM: CPT | Performed by: FAMILY MEDICINE

## 2023-12-11 PROCEDURE — 99397 PER PM REEVAL EST PAT 65+ YR: CPT | Mod: 25 | Performed by: FAMILY MEDICINE

## 2023-12-11 PROCEDURE — 90471 IMMUNIZATION ADMIN: CPT | Performed by: FAMILY MEDICINE

## 2023-12-11 RX ORDER — CLOTRIMAZOLE 1 %
CREAM (GRAM) TOPICAL 2 TIMES DAILY PRN
Qty: 60 G | Refills: 1 | Status: SHIPPED | OUTPATIENT
Start: 2023-12-11

## 2023-12-11 NOTE — PROGRESS NOTES
"  Assessment & Plan     Medicare annual wellness visit, subsequent  He presents today for Medicare wellness in addition to chronic health review, lab discussion.  He agrees to influenza vaccine today but declines COVID-vaccine.  He understands he could get RSV vaccine through local pharmacy.  Advanced directive provided.  - REVIEW OF HEALTH MAINTENANCE PROTOCOL ORDERS  - INFLUENZA VACCINE 65+ (FLUZONE HD)    Encounter for immunization  Provided today  - INFLUENZA VACCINE 65+ (FLUZONE HD)    Adenocarcinoma of lung, stage 1, right (H)  Following with oncology most recent imaging in July 2023 stable    Benign essential hypertension  Blood pressure is in range on current dose of medications he has supplies through June, contact pharmacy for refills if needed    Benign localized prostatic hyperplasia with lower urinary tract symptoms (LUTS)  I added PSA to labs completed result is currently pending.  - PSA, total and free    Tinea cruris  Provided clotrimazole to apply topically for tinea cruris if needed.  - clotrimazole (LOTRIMIN) 1 % external cream  Dispense: 60 g; Refill: 1       BMI:   Estimated body mass index is 30.69 kg/m  as calculated from the following:    Height as of this encounter: 1.626 m (5' 4.02\").    Weight as of this encounter: 81.1 kg (178 lb 14.4 oz).   Weight management plan: Discussed healthy diet and exercise guidelines        Return in about 6 months (around 6/12/2024) for follow-up, BP Recheck  diabetes, Lab Work.    Murali Logan MD  [facility] [place]    Scarlett Mares is a 73 year old, presenting for the following health issues:  Physical        12/11/2023     4:37 PM   Additional Questions   Roomed by Ivette Cotto   Accompanied by N/A       ROSAURA   Vishnu Madisonhany 73 HX type 2 diabetes mellitus controlled, hyperlipidemia, hypertension, colon polyps, DJD with spinal stenosis back, elevated PSA, and stage 1 right adenocarcinoma of the lung s/p resection " with clear margins  who presents  for primary care visit, medicare wellness, review of labs     Diabetes/ Hyperlipidemia:  A1c was 6.5 on MetforminXR 1000 mg daily at supper. He follows low carbohydrate diet and tries to be active.  He notes occasional itching in the groin would like refill for antifungal.  Does not have rash today.  Atorvastatin 20 mg daily has been effective at controlling his cholesterol.   Had EYE exam, cataracts noted, eye procedure in July iritis Right eye post cataract.  Vision improved,he is glad he had procedures.     Hypertension His blood pressure well-controlled Losartan 100 mg daily, amlodipine 5 mg, Chlorthalidone 25 mg with potassium supplementation.  He is also on finasteride 5 mg for prostate health and tamsulosin 0.4 mg.     Adenocarcinoma [place] treatment for lung adenocarcinoma had CT in 2022 after follow-up with oncology end of January. Stable at this time, annual CT  through oncology 2023 stable.     Urology  PSA level was elevated had appointment with urology Dr Montaño in 2021 no nodules found recommended follow-up PSA in 6 months. He follow-up with Dr Oglesby who ordered labs stable.He is also on finasteride 5 mg for prostate health and tamsulosin 0.4 mg they have been effective. Would like PSA added to labs.  HCM  Due for immunizations covid declines  & flu OK  RSV through local pharmacy   Medicare wellness visit today, reviewing advanced directive.  Social History     Social History Narrative     works as [employer]        His immediate family; his wife has MS and is an RN at [employer], his two children are in good health.    1 grandchild.          Labs reviewed in EPIC  BP Readings from Last 3 Encounters:   23 112/74   23 103/62   23 103/62    Wt Readings from Last 3 Encounters:   23 81.1 kg (178 lb 14.4 oz)   23 78.5 kg (173 lb)   23 78.5 kg (173 lb)               Immunization  History   Administered Date(s) Administered    COVID-19 Bivalent 12+ (Pfizer) 12/12/2022    COVID-19 MONOVALENT 12+ (Pfizer) 05/07/2022    COVID-19 Monovalent 18+ (Moderna) 02/10/2021, 03/10/2021, 11/04/2021    Flu, Unspecified 11/06/2009    Influenza (H1N1) 02/02/2010    Influenza (High Dose) 3 valent vaccine 10/30/2014, 11/19/2015, 11/28/2016, 11/27/2017, 10/31/2018, 12/11/2019    Influenza (IIV3) PF 12/30/2005, 12/05/2006, 12/11/2007, 11/04/2008, 11/06/2009, 10/14/2010, 11/14/2011, 11/12/2012, 11/14/2013    Influenza Vaccine 65+ (Fluzone HD) 11/16/2020, 12/06/2021, 12/12/2022, 12/11/2023    Influenza, seasonal, injectable, PF 10/14/2010    Pneumo Conj 13-V (2010&after) 10/30/2014    Pneumococcal 23 valent 11/19/2015    TD,PF 7+ (Tenivac) 02/12/2007    TDAP (Adacel,Boostrix) 07/31/2017    TDAP Vaccine (Boostrix) 07/31/2017    Td (Adult), Adsorbed 02/12/2007    Zoster recombinant adjuvanted (SHINGRIX) 12/11/2019, 08/26/2020         Patient Active Problem List   Diagnosis    Erectile dysfunction    Seasonal allergies    Vitamin D deficiency    Pulmonary nodule    Prostate nodule    Chronic low back pain    Degenerative disc disease, lumbar    Back pain with radiation    DJD (degenerative joint disease) of knee    HL (hearing loss)    Vision impairment    Tear film insufficiency    Recurrent pterygium    Other age-related cataract of left eye    Type 2 diabetes mellitus treated without insulin (H)    Diabetic polyneuropathy associated with diabetes mellitus due to underlying condition (H)    Tubulovillous adenoma of colon    Lumbar radiculopathy    Adenocarcinoma of lung, stage 1, right (H)    Benign essential hypertension    History of colonic polyps    Spinal stenosis, lumbar region, with neurogenic claudication    Elevated prostate specific antigen (PSA)    Obesity with body mass index 30 or greater    Solitary pulmonary nodule    Evaluation of hearing impairment    Combined forms of age-related cataract of both  eyes    Balance problems    Neck pain    Cervical spondylosis with myelopathy    Cervical stenosis of spinal canal     Past Surgical History:   Procedure Laterality Date    APPENDECTOMY OPEN CHILD      ARTHROSCOPY KNEE Left     BIOPSY PROSTATE TRANSRECTAL      CATARACT IOL, RT/LT      EXCISE PTERYGIUM Right 1989    EXCISE PTERYGIUM Right 02/12/2015    INJECT EPIDURAL LUMBAR / SACRAL SINGLE N/A 07/21/2016    Procedure: INJECT EPIDURAL LUMBAR / SACRAL SINGLE;  Surgeon: Judah Henriquez MD;  Location: [facility] OR    INJECT PARAVERTEBRAL FACET JOINT LUMBAR / SACRAL FIRST Right 08/18/2016    Procedure: INJECT PARAVERTEBRAL FACET JOINT LUMBAR / SACRAL FIRST;  Surgeon: Judah Henriquez MD;  Location: [facility] OR    INJECT PARAVERTEBRAL FACET JOINT LUMBAR / SACRAL FIRST Right 09/08/2016    Procedure: INJECT PARAVERTEBRAL FACET JOINT LUMBAR / SACRAL FIRST;  Surgeon: Judah Henriquez MD;  Location: [facility] OR    MEDIASTINOSCOPY Right 04/08/2019     [facility]  Dr Hemanth C[place] 4/8/19 bronchoscopy, cervical mediastinoscopy for staging and minimally invasive right thoracoscopic wedge resection 1.8 cm invasive adenocarcinoma [id number] stage 1A2 adenocarcinoma completely resected via minimally invasive right upper lobectomy.    PHACOEMULSIFICATION WITH STANDARD INTRAOCULAR LENS IMPLANT Right 07/10/2023    Procedure: RIGHT EYE PHACOEMULSIFICATION, CATARACT, WITH STANDARD INTRAOCULAR LENS IMPLANT INSERTION;  Surgeon: Rick Campbell MD;  Location: [facility] OR    PHACOEMULSIFICATION WITH STANDARD INTRAOCULAR LENS IMPLANT Left 07/24/2023    Procedure: LEFT EYE PHACOEMULSIFICATION, CATARACT, WITH STANDARD INTRAOCULAR LENS IMPLANT INSERTION;  Surgeon: Rick Campbell MD;  Location: [facility] OR    PUNCTALPLASTY Bilateral 12/19/2018    Procedure: Bilateral Punctoplasty with Mini [place] Stent;  Surgeon: Levy Blue MD;  Location: [facility] OR    REPAIR ECTROPION BILATERAL Bilateral 12/19/2018    Procedure: Bilateral Lower Lid Ectropion Repair with Right  Lower Lid Lesion Excision;  Surgeon: Levy Blue MD;  Location: [facility] OR       Social History     Tobacco Use    Smoking status: Former     Years: 5     Types: Cigarettes     Quit date: 1981     Years since quittin.1    Smokeless tobacco: Never   Substance Use Topics    Alcohol use: Yes     Alcohol/week: 2.5 standard drinks of alcohol     Types: 3 Standard drinks or equivalent per week     Family History   Problem Relation Age of Onset    Cerebrovascular Disease Brother          stroke     Breast Cancer Sister     Kidney Disease Mother         Kidney stones    Other - See Comments Father          in [place] parkinsons or post surgical eye surgery    Cerebral aneurysm Brother     Hypothyroidism Sister     Glaucoma No family hx of     Macular Degeneration No family hx of          Current Outpatient Medications   Medication Sig Dispense Refill    acetaminophen (TYLENOL) 500 MG tablet Take 1-2 tablets (500-1,000 mg) by mouth every 6 hours as needed for pain (arthritis) 120 tablet 3    amLODIPine (NORVASC) 5 MG tablet Take 1 tablet by mouth daily      Ascorbic Acid (VITAMIN C PO) Take 500 mg by mouth once      atorvastatin (LIPITOR) 20 MG tablet Take 1 tablet (20 mg) by mouth daily 90 tablet 3    chlorthalidone (HYGROTON) 25 MG tablet Take 1 tablet (25 mg) by mouth daily 90 tablet 3    Cholecalciferol (VITAMIN D) 1000 UNIT capsule Take 1 capsule by mouth daily.      clotrimazole (LOTRIMIN) 1 % external cream Apply topically 2 times daily as needed (rash) 60 g 1    Cyanocobalamin (VITAMIN B12 PO) Take 1 tablet by mouth Pt states he takes this 2 or 3 times a week.      finasteride (PROSCAR) 5 MG tablet Take 1 tablet (5 mg) by mouth daily 90 tablet 3    gabapentin (NEURONTIN) 300 MG capsule One capsule at bedtime 90 capsule 3    loratadine (CLARITIN) 10 MG tablet Take 1 tablet (10 mg) by mouth daily as needed for allergies 90 tablet 1    losartan (COZAAR) 100 MG tablet Take 1 tablet (100 mg) by mouth  "every evening 90 tablet 3    melatonin 3 MG tablet Take 1 tablet (3 mg) by mouth nightly as needed for sleep (may take 1-2 if needed) 60 tablet 3    metFORMIN (GLUCOPHAGE XR) 500 MG 24 hr tablet Take 2 tablets (1,000 mg) by mouth daily (with dinner) 180 tablet 3    montelukast (SINGULAIR) 10 MG tablet Take 1 tablet (10 mg) by mouth At Bedtime During allergy 90 tablet 1    Multiple Vitamin (MULTIVITAMINS PO) Take 1 tablet by mouth daily.      Omega-3 Fatty Acids (FISH OIL PO) Take 1 capsule by mouth as needed      potassium chloride ER (KLOR-CON M) 20 MEQ CR tablet Take 1 tablet (20 mEq) by mouth daily 90 tablet 3    prednisoLONE acetate (PRED FORTE) 1 % ophthalmic suspension Apply 1 drop to eye daily To operative eye 5 mL 1    tamsulosin (FLOMAX) 0.4 MG capsule Take 1 capsule (0.4 mg) by mouth daily 90 capsule 3    cyclobenzaprine (FLEXERIL) 10 MG tablet Take 1 tablet (10 mg) by mouth 2 times daily as needed for muscle spasms (take at bedtime for muscle pain) Must not drive - Oral (Patient not taking: Reported on 12/11/2023) 30 tablet 3    methocarbamol (ROBAXIN) 500 MG tablet Take 1 tablet (500 mg) by mouth 3 times daily as needed for muscle spasms (Patient not taking: Reported on 12/11/2023) 90 tablet 1    order for [facility] Back brace 1 Device 0     Allergies   Allergen Reactions    Pollen Extract      Other reaction(s): Headache  Sinus congestion, sinus \"drainage\", sneezing    Seasonal Allergies      Sinus congestion, sinus \"drainage\", sneezing    Nkda [No Known Drug Allergy]      Recent Labs   Lab Test 12/09/23  1013 06/10/23  0955 12/10/22  1021 01/24/22  1145 12/06/21  0947 06/07/21  1044 11/16/20  1026 05/27/20  0957 10/26/19  1040 11/27/17  1107 07/31/17  1031   A1C 6.5* 6.4* 6.7*   < > 5.9* 5.8* 5.8*   < > 6.2*   < > 6.3*   LDL 55  --  60  --  47  --  39  --  58   < > 52   HDL 65  --  71  --  59  --  64  --  49   < > 60   TRIG 53  --  70  --  94  --  116  --  91   < > 54   ALT 14 18 17   < > 27  --  27   < > 26 " "  < > 29   CR 0.91 0.98 1.06   < > 1.01 0.95 0.93   < > 0.96   < > 0.79   GFRESTIMATED 88 81 74   < > 74 80 82   < > 79   < > >90  Non  GFR Calc     GFRESTBLACK  --   --   --   --   --  >90 >90   < > >90   < > >90  African American GFR Calc     POTASSIUM 3.9 4.0 4.4   < > 4.0 3.7 3.5   < > 3.8   < > 4.2   TSH  --   --   --   --   --   --   --   --  1.56  --  1.67    < > = values in this interval not displayed.       Review of Systems   Problem list, PMH, Surgical HX, FH, SH, allergies, medications,immunizations reviewed and updated in Epic.  ROS negative other than noted in HPI and ROS.       Objective    /74 (BP Location: Right arm, Patient Position: Sitting, Cuff Size: Adult Regular)   Pulse 74   Temp 98.5  F (36.9  C)   Resp 18   Ht 1.626 m (5' 4.02\")   Wt 81.1 kg (178 lb 14.4 oz)   SpO2 97%   BMI 30.69 kg/m    Body mass index is 30.69 kg/m .  Physical Exam   GENERAL APPEARANCE: healthy, alert and no distress BMI 30.69  EYES: Eyes grossly normal to inspection, PERRL and conjunctivae and sclerae normal  HENT: ear canals and TM's normal,  mouth without ulcers or lesions, oropharynx clear and oral mucous membranes moist, no thrush  NECK: no adenopathy, no asymmetry, masses, or scars and thyroid normal to palpation  RESP: lungs clear to auscultation - no rales, rhonchi or wheezes  CV: regular rate and rhythm, normal S1 S2, no S3 or S4, no murmur, click or rub, no peripheral edema   Chest: well healed surgical scars from laparoscopic chest procedure  ABDOMEN: soft,obese, nontender, no hepatosplenomegaly, no masses and bowel sounds normal, well-healed abdominal scar  MS: Kyphosis and antalgic gait with left leg weakness compared to right.  SKIN: no suspicious lesions or rashes  NEURO: Normal strength and tone,mentation intact and speech normal  PSYCH: mentation appears normal and affect normal   Latest Reference Range & Units 12/09/23 10:13 12/09/23 10:16   Sodium 135 - 145 mmol/L 141  "   Potassium 3.4 - 5.3 mmol/L 3.9    Chloride 98 - 107 mmol/L 105    Carbon Dioxide (CO2) 22 - 29 mmol/L 26    Urea Nitrogen 8.0 - 23.0 mg/dL 23.5 (H)    Creatinine 0.67 - 1.17 mg/dL 0.91    GFR Estimate >60 mL/min/1.73m2 88    Calcium 8.8 - 10.2 mg/dL 10.0    Anion Gap 7 - 15 mmol/L 10    Albumin 3.5 - 5.2 g/dL 4.3    Protein Total 6.4 - 8.3 g/dL 7.1    Alkaline Phosphatase 40 - 150 U/L 53    ALT 0 - 70 U/L 14    AST 0 - 45 U/L 20    Albumin Urine mg/g Cr   See Comment   Albumin Urine mg/L mg/L  <12.0   Bilirubin Total <=1.2 mg/dL 0.3    Cholesterol <200 mg/dL 131    Creatinine Urine mg/dL  70.1   Patient Fasting?  Yes    Glucose 70 - 99 mg/dL 109 (H)    HDL Cholesterol >=40 mg/dL 65    Hemoglobin A1C <5.7 % 6.5 (H)    LDL Cholesterol Calculated <=100 mg/dL 55    Non HDL Cholesterol <130 mg/dL 66    Triglycerides <150 mg/dL 53    Vitamin B12 232 - 1,245 pg/mL 1,087    WBC 4.0 - 11.0 10e3/uL 3.9 (L)    Hemoglobin 13.3 - 17.7 g/dL 12.2 (L)    Hematocrit 40.0 - 53.0 % 36.2 (L)    Platelet Count 150 - 450 10e3/uL 194    RBC Count 4.40 - 5.90 10e6/uL 4.10 (L)    MCV 78 - 100 fL 88    MCH 26.5 - 33.0 pg 29.8    MCHC 31.5 - 36.5 g/dL 33.7    RDW 10.0 - 15.0 % 12.8    % Neutrophils % 38    % Lymphocytes % 48    % Monocytes % 8    % Eosinophils % 5    % Basophils % 1    Absolute Basophils 0.0 - 0.2 10e3/uL 0.0    Absolute Eosinophils 0.0 - 0.7 10e3/uL 0.2    Absolute Immature Granulocytes <=0.4 10e3/uL 0.0    Absolute Lymphocytes 0.8 - 5.3 10e3/uL 1.9    Absolute Monocytes 0.0 - 1.3 10e3/uL 0.3    % Immature Granulocytes % 0    Absolute Neutrophils 1.6 - 8.3 10e3/uL 1.5 (L)    Absolute NRBCs 10e3/uL 0.0    NRBCs per 100 WBC <1 /100 0    (H): Data is abnormally high  (L): Data is abnormally low

## 2023-12-11 NOTE — PROGRESS NOTES
Medicare Annual Wellness Questionnaire:  This 73 year old year old male presents for a Medicare Wellness Exam.    Fall Risk Assessment:  Have you fallen 2 or more times in the last year?  No    How many times were you injured due to a fall in the last year?  No    PHQ-2:  Over the last 2 weeks, how often have you been bothered by feeling down, depressed, or hopeless?  Not at all (0)     Over the last 2 weeks, how often have you had little interest or pleasure in doing things?  Not at all (0)     Social History:  What is your marital status?      Who lives in your household?  N/A    Does your home have loose rugs in the hallway:     No    Does your home have grab bars in the bathroom:    No    Does your home have handrails on the stairs?  No    Does your home have poorly lit areas?    No    Do you feel threatened or controlled by a partner, ex-partner or anyone in your life?   No    Has anyone hurt you physically, for example by pushing, hitting, slapping or kicking you or forcing you to have sex?   No    Do you need help with the phone, transportation, shopping, preparing meals, housework, laundry, medications or managing money?   No    Sexual Health:  Are you sexually active?    Yes     If yes, with men, women, or both?  Women     If yes, how many partners?  N/A    If yes, are you using condoms?    No    Have you had any sexually transmitted infections in the last year?   No    Do you have any sexual concerns?    Yes weak erection at a time     General Health Assessment:  Have you noticed any hearing difficulties?   Yes     Do you wear hearing aids?   No    Have you seen a hearing professional such as an audiologist in the last 1 year?   Yes     Do you have vision difficulty?    No    Do you wear glasses or contacts?   Yes     Have you seen an eye doctor in the last 1 year?   Yes     How many servings of fruits and vegetables do you eat a day?  Fruit: 1  Vegetables: N/A    How often do you exercise in a  week?  3    How long and what kind of exercise do you do?  Several    Tobacco and Alcohol History:  Do you use tobacco/nicotine products?    No    If yes, please list the method of use and average weekly consumption?  N/A    Do you use any other drugs?   No         Do you drink alcohol?   No    If you drink alcohol, how many drinks per week?  N/A    Jenny Ram, EMT at 5:29 PM on 12/11/2023

## 2023-12-11 NOTE — PROGRESS NOTES
Vishnu is a 73 year old that presents in clinic today for the following:     Chief Complaint   Patient presents with    Physical           12/11/2023     4:37 PM   Additional Questions   Roomed by Ivette Cotto   Accompanied by N/A       Screenings from encounters over the past 10 days    No data recorded       Ivette Cotto at 4:51 PM on 12/11/2023

## 2023-12-12 LAB
PSA FREE MFR SERPL: 39.55 %
PSA FREE SERPL-MCNC: 0.7 NG/ML
PSA SERPL DL<=0.01 NG/ML-MCNC: 1.77 NG/ML (ref 0–6.5)

## 2023-12-12 NOTE — RESULT ENCOUNTER NOTE
We discussed test results at visit show stable glucose control with 3-month measure of A1c 6.5 in excellent range.  Your PSA prostate lab came back in excellent range.  All other results in good/stable range.  Murali Logan MD

## 2023-12-13 ENCOUNTER — PATIENT OUTREACH (OUTPATIENT)
Dept: GASTROENTEROLOGY | Facility: CLINIC | Age: 73
End: 2023-12-13
Payer: COMMERCIAL

## 2023-12-22 DIAGNOSIS — J30.1 SEASONAL ALLERGIC RHINITIS DUE TO POLLEN: ICD-10-CM

## 2023-12-27 RX ORDER — MONTELUKAST SODIUM 10 MG/1
1 TABLET ORAL AT BEDTIME
Qty: 90 TABLET | Refills: 3 | Status: SHIPPED | OUTPATIENT
Start: 2023-12-27 | End: 2024-08-26

## 2023-12-27 NOTE — TELEPHONE ENCOUNTER
montelukast (SINGULAIR) 10 MG tablet 90 tablet 1 6/12/2023  Last Office Visit : 12/11/2023  [facility] [place]     Murali Logan MD     Future Office visit:  6/10/24

## 2024-01-11 ENCOUNTER — MEDICAL CORRESPONDENCE (OUTPATIENT)
Dept: SCHEDULING | Facility: CLINIC | Age: 74
End: 2024-01-11
Payer: COMMERCIAL

## 2024-01-16 ENCOUNTER — TRANSCRIBE ORDERS (OUTPATIENT)
Dept: OTHER | Age: 74
End: 2024-01-16

## 2024-01-16 DIAGNOSIS — M54.50 LOW BACK PAIN, UNSPECIFIED: Primary | ICD-10-CM

## 2024-01-24 ENCOUNTER — ANCILLARY PROCEDURE (OUTPATIENT)
Dept: MRI IMAGING | Facility: CLINIC | Age: 74
End: 2024-01-24
Attending: ORTHOPAEDIC SURGERY
Payer: COMMERCIAL

## 2024-01-24 ENCOUNTER — ANCILLARY PROCEDURE (OUTPATIENT)
Dept: GENERAL RADIOLOGY | Facility: CLINIC | Age: 74
End: 2024-01-24
Attending: ORTHOPAEDIC SURGERY
Payer: COMMERCIAL

## 2024-01-24 DIAGNOSIS — M54.50 LOW BACK PAIN, UNSPECIFIED: ICD-10-CM

## 2024-01-24 DIAGNOSIS — M25.561 PAIN IN RIGHT KNEE: ICD-10-CM

## 2024-01-24 PROCEDURE — 73560 X-RAY EXAM OF KNEE 1 OR 2: CPT | Mod: TC | Performed by: RADIOLOGY

## 2024-01-24 PROCEDURE — 72148 MRI LUMBAR SPINE W/O DYE: CPT | Mod: TC | Performed by: RADIOLOGY

## 2024-01-31 NOTE — TELEPHONE ENCOUNTER
Referral from November faxed again to the following:  Donavan Coello D.O., M.   [facility]   [address] [address]   Phone: [phone]   Fax: [phone]   Abigail MITCHELL LPN  [facility]

## 2024-01-31 NOTE — TELEPHONE ENCOUNTER
Pt's spouse Annie called and stated the referral to [facility] was never received and their bill is close to being sent to collections. [facility]

## 2024-02-12 DIAGNOSIS — E11.9 TYPE 2 DIABETES MELLITUS TREATED WITHOUT INSULIN (H): Primary | ICD-10-CM

## 2024-02-23 PROBLEM — H35.713 CENTRAL SEROUS CHORIORETINOPATHY OF BOTH EYES: Status: ACTIVE | Noted: 2024-02-23

## 2024-02-26 ENCOUNTER — OFFICE VISIT (OUTPATIENT)
Dept: OPHTHALMOLOGY | Facility: CLINIC | Age: 74
End: 2024-02-26
Attending: OPHTHALMOLOGY
Payer: COMMERCIAL

## 2024-02-26 DIAGNOSIS — H35.713 CENTRAL SEROUS CHORIORETINOPATHY OF BOTH EYES: Primary | ICD-10-CM

## 2024-02-26 DIAGNOSIS — E11.9 TYPE 2 DIABETES MELLITUS TREATED WITHOUT INSULIN (H): ICD-10-CM

## 2024-02-26 DIAGNOSIS — H50.34 INTERMITTENT EXOTROPIA, ALTERNATING: ICD-10-CM

## 2024-02-26 PROCEDURE — 99214 OFFICE O/P EST MOD 30 MIN: CPT | Mod: 25

## 2024-02-26 PROCEDURE — 92242 FLUORESCEIN&ICG ANGIOGRAPHY: CPT

## 2024-02-26 PROCEDURE — 92134 CPTRZ OPH DX IMG PST SGM RTA: CPT

## 2024-02-26 PROCEDURE — 99214 OFFICE O/P EST MOD 30 MIN: CPT

## 2024-02-26 ASSESSMENT — CUP TO DISC RATIO
OD_RATIO: 0.5
OS_RATIO: 0.5

## 2024-02-26 ASSESSMENT — VISUAL ACUITY
CORRECTION_TYPE: GLASSES
OS_CC+: -2
OD_CC: 20/30
OD_PH_CC: 20/25
OS_CC: 20/30
METHOD: SNELLEN - LINEAR
OD_CC+: -2

## 2024-02-26 ASSESSMENT — REFRACTION_WEARINGRX
OD_SPHERE: PLANO
OD_ADD: +2.50
OD_CYLINDER: +0.75
OS_SPHERE: +0.25
OS_CYLINDER: +0.50
SPECS_TYPE: PAL
OS_ADD: +2.50
OS_AXIS: 065
OD_AXIS: 170

## 2024-02-26 ASSESSMENT — SLIT LAMP EXAM - LIDS
COMMENTS: NORMAL
COMMENTS: NORMAL

## 2024-02-26 ASSESSMENT — TONOMETRY
IOP_METHOD: TONOPEN
OS_IOP_MMHG: 11
OD_IOP_MMHG: 13

## 2024-02-26 ASSESSMENT — EXTERNAL EXAM - LEFT EYE: OS_EXAM: NORMAL

## 2024-02-26 ASSESSMENT — EXTERNAL EXAM - RIGHT EYE: OD_EXAM: NORMAL

## 2024-02-26 NOTE — NURSING NOTE
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              Comments    States occ double vision when working on the computer   States no flashes or floaters   No eye pain or tearing   LBS:  pt does not check     Last A1C: 6.5  Lab Results       Component                Value               Date                       A1C                      6.5                 12/09/2023                 A1C                      6.4                 06/10/2023                 A1C                      6.7                 12/10/2022                 A1C                      5.8                 06/13/2022                 A1C                      5.9                 12/06/2021                 A1C                      5.8                 06/07/2021                 A1C                      5.8                 11/16/2020                 A1C                      6.0                 05/27/2020                 A1C                      6.2                 10/26/2019                 A1C                      6.4                 06/12/2019          Wendi Campbell COT 2:23 PM February 26, 2024

## 2024-03-14 ENCOUNTER — OFFICE VISIT (OUTPATIENT)
Dept: OPHTHALMOLOGY | Facility: CLINIC | Age: 74
End: 2024-03-14
Payer: COMMERCIAL

## 2024-03-14 DIAGNOSIS — E11.9 TYPE 2 DIABETES MELLITUS TREATED WITHOUT INSULIN (H): Primary | ICD-10-CM

## 2024-03-14 DIAGNOSIS — H35.052 CNVM (CHOROIDAL NEOVASCULAR MEMBRANE), LEFT: ICD-10-CM

## 2024-03-14 PROCEDURE — 99207 PR DROP WITH A PROCEDURE: CPT | Mod: 25

## 2024-03-14 PROCEDURE — 67028 INJECTION EYE DRUG: CPT | Mod: LT

## 2024-03-14 PROCEDURE — 92134 CPTRZ OPH DX IMG PST SGM RTA: CPT

## 2024-03-14 PROCEDURE — 250N000011 HC RX IP 250 OP 636: Performed by: OPHTHALMOLOGY

## 2024-03-14 RX ADMIN — Medication 1.25 MG: at 14:20

## 2024-03-14 ASSESSMENT — VISUAL ACUITY
OD_SC: 20/25
METHOD: SNELLEN - LINEAR
OD_SC+: -2
OS_CC: 20/40
OD_CC: 20/30

## 2024-03-14 ASSESSMENT — CONF VISUAL FIELD
OS_NORMAL: 1
OD_SUPERIOR_TEMPORAL_RESTRICTION: 0
OS_INFERIOR_NASAL_RESTRICTION: 0
OS_SUPERIOR_TEMPORAL_RESTRICTION: 0
OD_INFERIOR_NASAL_RESTRICTION: 0
OS_INFERIOR_TEMPORAL_RESTRICTION: 0
OD_NORMAL: 1
OS_SUPERIOR_NASAL_RESTRICTION: 0
METHOD: COUNTING FINGERS
OD_SUPERIOR_NASAL_RESTRICTION: 0
OD_INFERIOR_TEMPORAL_RESTRICTION: 0

## 2024-03-14 ASSESSMENT — TONOMETRY
OD_IOP_MMHG: 15
IOP_METHOD: TONOPEN
OS_IOP_MMHG: 15

## 2024-03-14 ASSESSMENT — REFRACTION_WEARINGRX
OD_AXIS: 170
SPECS_TYPE: PAL
OD_SPHERE: PLANO
OS_SPHERE: +0.25
OS_AXIS: 065
OS_CYLINDER: +0.50
OS_ADD: +2.50
OD_CYLINDER: +0.75
OD_ADD: +2.50

## 2024-03-14 NOTE — NURSING NOTE
"Chief Complaints and History of Present Illnesses   Patient presents with    Central Serous Retinopathy Follow Up     2 week follow-up      Chief Complaint(s) and History of Present Illness(es)       Central Serous Retinopathy Follow Up              Laterality: both eyes    Associated symptoms: double vision (OS \"moves things to the right\", constant per pt, closes an eye to make it go away, horizontal diplopia per pt).  Negative for eye pain    Pain scale: 0/10    Comments: 2 week follow-up               Comments    Pt notes he has no new concerns per pt.   Last BGL: doesn't check  Lab Results       Component                Value               Date                       A1C                      6.5                 12/09/2023                 A1C                      6.4                 06/10/2023                 A1C                      6.7                 12/10/2022                 A1C                      5.8                 06/13/2022                 A1C                      5.9                 12/06/2021                 A1C                      5.8                 06/07/2021                 A1C                      5.8                 11/16/2020                 A1C                      6.0                 05/27/2020                 A1C                      6.2                 10/26/2019                 A1C                      6.4                 06/12/2019              Candice PACHECO March 14, 2024 1:42 PM                       "

## 2024-03-14 NOTE — PROGRESS NOTES
CC: chronic CSCR vs pattern dystrophy    HPI: Mr Viveros,  is complaining of blurred vision in the left eye, the letters seem darker and more stretched than in the right eye. He also complained of a few episodes of double vision that resolves with blinking and re-focusing.    PMHx:   DM type II  Hx of lung adenoCA  Imaging:    OCT: 2024   Right eye: CST:  SRHM, PEDs seen 10/23 are resolving, no IRF, dense vitreous base ( probably due to high gain), PVD   Left eye:CST: 313 (was 260) SRHM with increased SRF today compared to     FA/IC23:  OD: good transit time, no leakage along the arcades, there is increase AF at the macula with respect to time,   Retina Laser procedures:good transit time, no leakage along the arcades or at the macula.    Retinal laser procedures:  None     Intravitreal injections:  OD: none  OS: Avastin started: 2024    RIGHT: Date Interval Retina LEFT: Date  Interval Retina   none                                                      Avastin 2024           Assessment/ Plan: 2024       #  Active CNVM left eye  Demonstrated on OCTA 4/10/23  New  intra retinal fluid exudation noticed last visit 10/23, opted to observe  Today there is increases SRF on Oct and patient has noticed subjective change in quality of vision in the left eye.   FA/ICG suggestive of occult CNVM OS with leakage. Objectively the vision in LEFT eye is 20/30-stable.  The patient is coming in today after discussing with his family members and researching about the topic and would like to proceed w/ injection in his left eye   R/b/a to Anti-VEGF therapy was discussed with the patient.  Will give Avastin injection today LEFT eye.  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  FAF demonstrates progression since 2018 which is consistent with either     # Diabetes mellitus no nonproliferative diabetic retinopathy              Blood glucose control  Last HbA1C is 6.5 (2 months ago)  No evidence of diabetic retinopathy on exam today  Monitor yearly    # Pseudophakia OU   S/P CE IOL right eye 7/10/23 (complicated by rebound iritis)   S/P CE IOL left eye 7/24/23   -stable    # Intermitted XT   Small angle on ACT  Patient was dilated during exam    Follow up in 4 weeks: DFE + OCT both eyes, possible injection LEFT eye    Raina Morocho MD     Medical Retina  [facility]     Attending Physician Attestation: Complete documentation of historical and exam elements from today's encounter can be found in the full encounter summary report (not reduplicated in this progress note). I personally obtained the chief complaint(s) and history of present illness. I confirmed and edited as necessary the review of systems, past medical/surgical history, family history, social history, and examination findings as documented by others; and I examined the patient myself. I personally reviewed the relevant tests, images, and reports as documented above. I formulated and edited as necessary the assessment and plan and discussed the findings and management plan with the patient and family.  I was present for the entire procedure(s).   Raina Morocho. MD

## 2024-04-03 DIAGNOSIS — E11.9 TYPE 2 DIABETES MELLITUS TREATED WITHOUT INSULIN (H): Primary | ICD-10-CM

## 2024-04-15 ENCOUNTER — OFFICE VISIT (OUTPATIENT)
Dept: OPHTHALMOLOGY | Facility: CLINIC | Age: 74
End: 2024-04-15
Payer: COMMERCIAL

## 2024-04-15 DIAGNOSIS — H35.3230 BILATERAL EXUDATIVE AGE-RELATED MACULAR DEGENERATION, UNSPECIFIED STAGE (H): Primary | ICD-10-CM

## 2024-04-15 DIAGNOSIS — E11.9 TYPE 2 DIABETES MELLITUS TREATED WITHOUT INSULIN (H): ICD-10-CM

## 2024-04-15 PROCEDURE — 99213 OFFICE O/P EST LOW 20 MIN: CPT | Mod: 25

## 2024-04-15 PROCEDURE — 99214 OFFICE O/P EST MOD 30 MIN: CPT | Mod: 25

## 2024-04-15 PROCEDURE — 92134 CPTRZ OPH DX IMG PST SGM RTA: CPT

## 2024-04-15 PROCEDURE — 67028 INJECTION EYE DRUG: CPT | Mod: LT

## 2024-04-15 PROCEDURE — 250N000011 HC RX IP 250 OP 636

## 2024-04-15 RX ADMIN — Medication 1.25 MG: at 16:07

## 2024-04-15 ASSESSMENT — VISUAL ACUITY
OD_SC+: -2
OD_SC: 20/20
METHOD: SNELLEN - LINEAR
OS_SC: 20/40
OS_SC+: -1

## 2024-04-15 ASSESSMENT — TONOMETRY
OS_IOP_MMHG: 10
OD_IOP_MMHG: 10
IOP_METHOD: TONOPEN

## 2024-04-15 ASSESSMENT — REFRACTION_WEARINGRX
OD_ADD: +2.50
OD_SPHERE: PLANO
OD_AXIS: 170
OD_CYLINDER: +0.75
OS_AXIS: 065
OS_SPHERE: +0.25
SPECS_TYPE: PAL
OS_ADD: +2.50
OS_CYLINDER: +0.50

## 2024-04-15 ASSESSMENT — CUP TO DISC RATIO
OD_RATIO: 0.5
OS_RATIO: 0.5

## 2024-04-15 ASSESSMENT — SLIT LAMP EXAM - LIDS
COMMENTS: NORMAL
COMMENTS: NORMAL

## 2024-04-15 ASSESSMENT — EXTERNAL EXAM - LEFT EYE: OS_EXAM: NORMAL

## 2024-04-15 ASSESSMENT — EXTERNAL EXAM - RIGHT EYE: OD_EXAM: NORMAL

## 2024-04-15 NOTE — PROGRESS NOTES
CC: chronic CSCR vs pattern dystrophy    HPI: Mr Viveros,  is complaining of blurred vision in the left eye, the letters seem darker and more stretched than in the right eye. He also complained of a few episodes of double vision that resolves with blinking and re-focusing.    PMHx:   DM type II  Hx of lung adenoCA  Imaging:    OCT: 04/15/2024   Right eye: CST:  SRHM, PEDs seen 10/23 are resolving, no IRF, dense vitreous base ( probably due to high gain), PVD   Left eye:CST: SRHM with improved SRF today compared to last visit    FA/IC23:  OD: good transit time, no leakage along the arcades, there is increase AF at the macula with respect to time,   Retina Laser procedures:good transit time, no leakage along the arcades or at the macula.    Retinal laser procedures:  None     Intravitreal injections:  OD: none  OS: Avastin started: 2024    RIGHT: Date Interval Retina LEFT: Date  Interval Retina   none                                            Avastin  04/15/2024 4 weeks        Avastin 2024           Assessment/ Plan: 04/15/2024       #  Active CNVM left eye  Demonstrated on OCTA 4/10/23  New  intra retinal fluid exudation noticed last visit 10/23, opted to observe  Today there is improvement in SRF on Oct   FA/ICG suggestive of occult CNVM OS with leakage. Objectively the vision in LEFT eye is 20/30-stable.  The patient is coming in today after discussing with his family members and researching about the topic and would like to proceed w/ injection in his left eye   R/b/a to Anti-VEGF therapy was discussed with the patient.  Will give Avastin injection today LEFT eye.  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 the hope is that if we improve vision in the LEFT eye then fusion can be imprved  If not, will attempt fresnel prisms    Follow up in 4 weeks: DFE + OCT both eyes, possible injection LEFT eye ( might switch to Eylea if not to great a response)    Raina Morocho MD     Medical Retina  [facility]     Attending Physician Attestation: Complete documentation of historical and exam elements from today's encounter can be found in the full encounter summary report (not reduplicated in this progress note). I personally obtained the chief complaint(s) and history of present illness. I confirmed and edited as necessary the review of systems, past medical/surgical history, family history, social history, and examination findings as documented by others; and I examined the patient myself. I personally reviewed the relevant tests, images, and reports as documented above. I formulated and edited as necessary the assessment and plan and discussed the findings and management plan with the patient and family.  I was present for the entire procedure(s).   Raina Morocho. MD

## 2024-04-15 NOTE — NURSING NOTE
Chief Complaints and History of Present Illnesses   Patient presents with    Diabetic Retinopathy Follow Up     Chief Complaint(s) and History of Present Illness(es)       Diabetic Retinopathy Follow Up              Laterality: both eyes    Onset: 4 weeks ago              Comments    Pt. States that VA is still blurry LE. No c/o VA RE. No pain BE. No flashes or floaters BE.   Kerry Calle COT 3:00 PM April 15, 2024

## 2024-04-29 DIAGNOSIS — H35.3230 BILATERAL EXUDATIVE AGE-RELATED MACULAR DEGENERATION, UNSPECIFIED STAGE (H): Primary | ICD-10-CM

## 2024-05-03 ENCOUNTER — TRANSCRIBE ORDERS (OUTPATIENT)
Dept: OTHER | Age: 74
End: 2024-05-03

## 2024-05-03 DIAGNOSIS — M54.50 LOW BACK PAIN, UNSPECIFIED: Primary | ICD-10-CM

## 2024-05-13 ENCOUNTER — OFFICE VISIT (OUTPATIENT)
Dept: OPHTHALMOLOGY | Facility: CLINIC | Age: 74
End: 2024-05-13
Payer: COMMERCIAL

## 2024-05-13 DIAGNOSIS — H01.119 CONTACT AND ALLERGIC DERMATITIS OF EYELID: ICD-10-CM

## 2024-05-13 DIAGNOSIS — J30.2 SEASONAL ALLERGIES: ICD-10-CM

## 2024-05-13 DIAGNOSIS — H35.3230 BILATERAL EXUDATIVE AGE-RELATED MACULAR DEGENERATION, UNSPECIFIED STAGE (H): Primary | ICD-10-CM

## 2024-05-13 DIAGNOSIS — H10.13 ALLERGIC CONJUNCTIVITIS OF BOTH EYES: ICD-10-CM

## 2024-05-13 PROCEDURE — 67028 INJECTION EYE DRUG: CPT | Mod: LT

## 2024-05-13 PROCEDURE — 92134 CPTRZ OPH DX IMG PST SGM RTA: CPT | Mod: 26

## 2024-05-13 PROCEDURE — 92134 CPTRZ OPH DX IMG PST SGM RTA: CPT

## 2024-05-13 PROCEDURE — 250N000011 HC RX IP 250 OP 636

## 2024-05-13 PROCEDURE — 99214 OFFICE O/P EST MOD 30 MIN: CPT | Mod: 25

## 2024-05-13 PROCEDURE — 99213 OFFICE O/P EST LOW 20 MIN: CPT | Mod: 25

## 2024-05-13 RX ORDER — OLOPATADINE HYDROCHLORIDE 2 MG/ML
1 SOLUTION/ DROPS OPHTHALMIC DAILY
Qty: 2.5 ML | Refills: 5 | Status: SHIPPED | OUTPATIENT
Start: 2024-05-13

## 2024-05-13 RX ADMIN — Medication 1.25 MG: at 15:55

## 2024-05-13 ASSESSMENT — SLIT LAMP EXAM - LIDS
COMMENTS: NORMAL
COMMENTS: NORMAL

## 2024-05-13 ASSESSMENT — VISUAL ACUITY
OS_PH_SC: 20/30
OS_SC+: +2
METHOD: SNELLEN - LINEAR
OS_SC: 20/40
OD_SC: 20/25
OS_PH_SC+: -2

## 2024-05-13 ASSESSMENT — CUP TO DISC RATIO
OS_RATIO: 0.5
OD_RATIO: 0.5

## 2024-05-13 ASSESSMENT — TONOMETRY
OD_IOP_MMHG: 15
IOP_METHOD: TONOPEN
OS_IOP_MMHG: 13

## 2024-05-13 ASSESSMENT — EXTERNAL EXAM - RIGHT EYE: OD_EXAM: NORMAL

## 2024-05-13 ASSESSMENT — EXTERNAL EXAM - LEFT EYE: OS_EXAM: NORMAL

## 2024-05-13 NOTE — PROGRESS NOTES
CC: chronic CSCR vs pattern dystrophy    HPI: Mr Viveros,  is complaining of blurred vision in the left eye, the letters seem darker and more stretched than in the right eye. He also complained of a few episodes of double vision that resolves with blinking and re-focusing.    Interval: one month follow up. Vision did not seem to improve significantly since last visit. He is also noticing a bubble in his vision when he looks down. He was seeing the images shift to the right before but he is not noticing that today with  both eyes open.    PMHx:   DM type II  Hx of lung adenoCA  Imaging:    OCT: 2024   Right eye: CST:  SRHM, PEDs seen 10/23 are resolving, no IRF, dense vitreous base ( probably due to high gain), PVD   Left eye:CST: SRHM with improved SRF today compared to last visit    FA/IC23:  OD: good transit time, no leakage along the arcades, there is increase AF at the macula with respect to time,   Retina Laser procedures:good transit time, no leakage along the arcades or at the macula.    Retinal laser procedures:  None     Intravitreal injections:  OD: none  OS: Avastin started: 2024    RIGHT: Date Interval Retina LEFT: Date  Interval Retina   none                                  Avastin 2024 4 weeks        Avastin  04/15/2024 4 weeks        Avastin 2024           Assessment/ Plan: 2024       # Active CNVM left eye  Demonstrated on OCTA 4/10/23  New  intra retinal fluid exudation noticed last visit 10/23, opted to observe  Today there is improvement in SRF on Oct   FA/ICG suggestive of occult CNVM OS with leakage. Objectively the vision in LEFT eye is 20/30-stable.  IRF has improved after two doses of Avastin  R/b/a to Anti-VEGF therapy was discussed with the patient.  Will give Avastin injection today LEFT eye.  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 the hope is that if we improve vision in the LEFT eye then fusion can be improved  If not, will attempt fresnel prisms    Follow up in 4 weeks: DFE + OCT both eyes, possible injection LEFT eye ( might switch to Eylea if not to great a response)    Alyce Madden MD  PGY3 Ophthalmology Resident  [facility]    Raina Morocho MD     Medical Retina  [facility]     Attending Physician Attestation: Complete documentation of historical and exam elements from today's encounter can be found in the full encounter summary report (not reduplicated in this progress note). I personally obtained the chief complaint(s) and history of present illness. I confirmed and edited as necessary the review of systems, past medical/surgical history, family history, social history, and examination findings as documented by others; and I examined the patient myself. I personally reviewed the relevant tests, images, and reports as documented above. I formulated and edited as necessary the assessment and plan and discussed the findings and management plan with the patient and family.  I was present for the entire procedure(s).   Raian Morocho. MD

## 2024-05-13 NOTE — NURSING NOTE
"Chief Complaints and History of Present Illnesses   Patient presents with    Follow Up     4 week follow up for Active CNVM left eye, Chronic central serous chorioretinopathy each eye, and DM.     \"I don't think there's been much of a difference since the last injection. The letters are now drifting towards the right. It looks like a hunting bow. I may have to start going elsewhere.\" Patient notes when he bends down, he sees a bubble. Patient notes he wears glasses for reading. Patient notes seasonal allergies are bothersome currently.      Chief Complaint(s) and History of Present Illness(es)       Follow Up              Laterality: both eyes    Onset: weeks ago    Quality: distorted vision    Course: stable    Associated symptoms: itching.  Negative for eye pain, flashes and floaters    Treatments tried: glasses    Pain scale: 0/10    Comments: 4 week follow up for Active CNVM left eye, Chronic central serous chorioretinopathy each eye, and DM.     \"I don't think there's been much of a difference since the last injection. The letters are now drifting towards the right. It looks like a hunting bow. I may have to start going elsewhere.\" Patient notes when he bends down, he sees a bubble. Patient notes he wears glasses for reading. Patient notes seasonal allergies are bothersome currently.               Comments    DM2  Lab Results       Component                Value               Date                       A1C                      6.5                 12/09/2023                 A1C                      6.4                 06/10/2023                 A1C                      6.7                 12/10/2022                 A1C                      5.8                 06/13/2022                 A1C                      5.9                 12/06/2021                 A1C                      5.8                 06/07/2021                 A1C                      5.8                 11/16/2020                 A1C            "           6.0                 05/27/2020                 A1C                      6.2                 10/26/2019                 A1C                      6.4                 06/12/2019              TRAA Arreaga 2:29 PM 05/13/2024

## 2024-05-23 RX ORDER — LORATADINE 10 MG/1
10 TABLET ORAL DAILY PRN
Qty: 90 TABLET | Refills: 1 | Status: SHIPPED | OUTPATIENT
Start: 2024-05-23 | End: 2024-08-26

## 2024-05-23 NOTE — TELEPHONE ENCOUNTER
loratadine (CLARITIN) 10 MG tablet 90 tablet 1 6/6/2023       Last Office Visit: 12/11/23  Future Office visit:   6/10/24    Antihistamines Protocol Onxdds2705/13/2024 05:45 PM   Protocol Details Patient is 3-64 years of age       Routing refill request to provider for review/approval because:  PATIENT IS >64     Fartun Myers RN  P Red Flag Triage/MRT

## 2024-05-28 DIAGNOSIS — H35.3230 BILATERAL EXUDATIVE AGE-RELATED MACULAR DEGENERATION, UNSPECIFIED STAGE (H): Primary | ICD-10-CM

## 2024-05-31 ENCOUNTER — THERAPY VISIT (OUTPATIENT)
Dept: PHYSICAL THERAPY | Facility: CLINIC | Age: 74
End: 2024-05-31
Attending: ORTHOPAEDIC SURGERY
Payer: COMMERCIAL

## 2024-05-31 DIAGNOSIS — M25.511 ACUTE PAIN OF RIGHT SHOULDER: Primary | ICD-10-CM

## 2024-05-31 PROCEDURE — 97530 THERAPEUTIC ACTIVITIES: CPT | Mod: GP | Performed by: PHYSICAL THERAPIST

## 2024-05-31 PROCEDURE — 97161 PT EVAL LOW COMPLEX 20 MIN: CPT | Mod: GP | Performed by: PHYSICAL THERAPIST

## 2024-05-31 NOTE — PROGRESS NOTES
PHYSICAL THERAPY EVALUATION  Type of Visit: Evaluation    See electronic medical record for Abuse and Falls Screening details.    Therapist Impression:   Vishnu presents with [facility] pathology    NEXT: scaption    PTRX: handouts    GOALS: reaching    Subjective       Presenting condition or subjective complaint: couple of issues regarding sholder and neck  Date of onset: 03/31/24    Relevant medical history:     Dates & types of surgery: lung surgery four years ago regarding l    Prior diagnostic imaging/testing results: Other     Prior therapy history for the same diagnosis, illness or injury:        Prior Level of Function  Transfers: Independent  Ambulation: Independent  ADL: Independent  IADL:     Living Environment  Social support: With a significant other or spouse   Type of home: House   Stairs to enter the home:         Ramp: No   Stairs inside the home: Yes 12 Is there a railing: No   Help at home: None  Equipment owned:       Employment: Yes  at [employer]/Interests: reading documentaries and [employer] books    Patient goals for therapy: mostly i physical therapy    Pain assessment: Pain present, moving arm side to side is painful going across body.  Pain with reaching up OH.  Reports pain at rest.  Pain wakes him at night.  Used to do more lifting at gym but has decreased weight or reduced exercises.     Objective     STATIC POSTURE  Forward head: moderate   Rounded shoulders:moderate  Shoulder internally rotated: moderate   Visual inspection:       SHOULDER RANGE OF MOTION  WNL except limited ext/IR R    SHOULDER STRENGTH  MMT Right Left   Flexion 4-/5 4-/5   Abduction     ER 4/5 4/5   IR 4/5 4/5   P= pain for all motions    Assessment & Plan   CLINICAL IMPRESSIONS  Medical Diagnosis: R shoulder and neck pain    Treatment Diagnosis: R shoulder and neck pain   Impression/Assessment: Patient is a 73 year old male with R shoulder and neck complaints.  The following significant  findings have been identified: Pain, Decreased ROM/flexibility, Decreased strength, Impaired muscle performance, and Impaired posture. These impairments interfere with their ability to perform self care tasks, work tasks, and recreational activities as compared to previous level of function.     Clinical Decision Making (Complexity):  Clinical Presentation: Stable/Uncomplicated  Clinical Presentation Rationale: based on medical and personal factors listed in PT evaluation  Clinical Decision Making (Complexity): Low complexity    PLAN OF CARE  Treatment Interventions:  Interventions: Manual Therapy, Neuromuscular Re-education, Therapeutic Activity, Therapeutic Exercise, Self-Care/Home Management    Long Term Goals     PT Goal 1  Goal Identifier: Reaching  Goal Description: Be able to reach OH across body without pain  Rationale: to maximize safety and independence with performance of ADLs and functional tasks;to maximize safety and independence within the home;to maximize safety and independence within the community  Target Date: 07/26/24      Frequency of Treatment: 1 x week  Duration of Treatment: 2 months    Recommended Referrals to Other Professionals:   Education Assessment:        Risks and benefits of evaluation/treatment have been explained.   Patient/Family/caregiver agrees with Plan of Care.     Evaluation Time:     PT Eval, Low Complexity Minutes (48609): 15       Signing Clinician: Tobi Silverio PT

## 2024-06-08 ENCOUNTER — LAB (OUTPATIENT)
Dept: LAB | Facility: CLINIC | Age: 74
End: 2024-06-08
Payer: COMMERCIAL

## 2024-06-08 DIAGNOSIS — E11.9 TYPE 2 DIABETES MELLITUS TREATED WITHOUT INSULIN (H): ICD-10-CM

## 2024-06-08 LAB
ANION GAP SERPL CALCULATED.3IONS-SCNC: 11 MMOL/L (ref 7–15)
BUN SERPL-MCNC: 21.7 MG/DL (ref 8–23)
CALCIUM SERPL-MCNC: 9.4 MG/DL (ref 8.8–10.2)
CHLORIDE SERPL-SCNC: 104 MMOL/L (ref 98–107)
CREAT SERPL-MCNC: 0.95 MG/DL (ref 0.67–1.17)
DEPRECATED HCO3 PLAS-SCNC: 25 MMOL/L (ref 22–29)
EGFRCR SERPLBLD CKD-EPI 2021: 84 ML/MIN/1.73M2
GLUCOSE SERPL-MCNC: 106 MG/DL (ref 70–99)
HBA1C MFR BLD: 6.3 %
POTASSIUM SERPL-SCNC: 4.2 MMOL/L (ref 3.4–5.3)
SODIUM SERPL-SCNC: 140 MMOL/L (ref 135–145)

## 2024-06-08 PROCEDURE — 83036 HEMOGLOBIN GLYCOSYLATED A1C: CPT | Performed by: FAMILY MEDICINE

## 2024-06-08 PROCEDURE — 80048 BASIC METABOLIC PNL TOTAL CA: CPT | Performed by: PATHOLOGY

## 2024-06-08 PROCEDURE — 99000 SPECIMEN HANDLING OFFICE-LAB: CPT | Performed by: PATHOLOGY

## 2024-06-08 PROCEDURE — 36415 COLL VENOUS BLD VENIPUNCTURE: CPT | Performed by: PATHOLOGY

## 2024-06-11 NOTE — RESULT ENCOUNTER NOTE
Diabetes well controlled A1c 6.3.   Your  kidney, blood sugar,calcium, sodium and potassium were normal or within acceptable range.   Murali Logan MD

## 2024-06-19 DIAGNOSIS — N40.1 BENIGN LOCALIZED PROSTATIC HYPERPLASIA WITH LOWER URINARY TRACT SYMPTOMS (LUTS): ICD-10-CM

## 2024-06-19 DIAGNOSIS — E87.6 HYPOKALEMIA: ICD-10-CM

## 2024-06-19 DIAGNOSIS — E11.9 TYPE 2 DIABETES MELLITUS TREATED WITHOUT INSULIN (H): ICD-10-CM

## 2024-06-27 ENCOUNTER — OFFICE VISIT (OUTPATIENT)
Dept: OPHTHALMOLOGY | Facility: CLINIC | Age: 74
End: 2024-06-27
Payer: COMMERCIAL

## 2024-06-27 DIAGNOSIS — H35.3230 BILATERAL EXUDATIVE AGE-RELATED MACULAR DEGENERATION, UNSPECIFIED STAGE (H): ICD-10-CM

## 2024-06-27 PROCEDURE — 99214 OFFICE O/P EST MOD 30 MIN: CPT | Mod: GC

## 2024-06-27 PROCEDURE — 92134 CPTRZ OPH DX IMG PST SGM RTA: CPT

## 2024-06-27 PROCEDURE — 99211 OFF/OP EST MAY X REQ PHY/QHP: CPT

## 2024-06-27 ASSESSMENT — VISUAL ACUITY
METHOD: SNELLEN - LINEAR
OS_SC+: -2
OD_PH_SC+: -2
OD_PH_SC: 20/20
OS_SC: 20/25
OD_SC: 20/30
OD_SC+: +2

## 2024-06-27 ASSESSMENT — CONF VISUAL FIELD
OS_INFERIOR_NASAL_RESTRICTION: 0
OS_SUPERIOR_TEMPORAL_RESTRICTION: 0
OS_SUPERIOR_NASAL_RESTRICTION: 0
OD_SUPERIOR_TEMPORAL_RESTRICTION: 0
OS_INFERIOR_TEMPORAL_RESTRICTION: 0
OD_INFERIOR_NASAL_RESTRICTION: 0
OD_SUPERIOR_NASAL_RESTRICTION: 0
OD_NORMAL: 1
OS_NORMAL: 1
OD_INFERIOR_TEMPORAL_RESTRICTION: 0

## 2024-06-27 ASSESSMENT — TONOMETRY
OS_IOP_MMHG: 15
OD_IOP_MMHG: 13
IOP_METHOD: TONOPEN

## 2024-06-27 NOTE — PROGRESS NOTES
CC: chronic CSCR vs pattern dystrophy    HPI: Mr Viveros,  is complaining of blurred vision in the left eye, the letters seem darker and more stretched than in the right eye. He also complained of a few episodes of double vision that resolves with blinking and re-focusing.    Interval: one month follow up. Vision did not seem to improve significantly since last visit. He is also noticing a bubble in his vision when he looks down. He was seeing the images shift to the right before but he is not noticing that today with  both eyes open.    PMHx:   DM type II  Hx of lung adenoCA  Imaging:    OCT: 2024   Right eye: CST:  SRHM, PEDs seen 10/23 are resolving, no IRF, dense vitreous base ( probably due to high gain), PVD   Left eye:CST: SRHM with improved SRF today compared to last visit    FA/IC23:  OD: good transit time, no leakage along the arcades, there is increase AF at the macula with respect to time,   Retina Laser procedures:good transit time, no leakage along the arcades or at the macula.    Retinal laser procedures:  None     Intravitreal injections:  OD: none  OS: Avastin started: 2024    Assessment/ Plan: 2024       # Active CNVM left eye  Demonstrated on OCTA 4/10/23  New  intra retinal fluid exudation noticed last visit 10/23, opted to observe  Today there is improvement in SRF on Oct   FA/ICG suggestive of occult CNVM OS with leakage. Objectively the vision in LEFT eye is 20/30-stable.  IRF has improved after two doses of Avastin  R/b/a to Anti-VEGF therapy was discussed with the patient.  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 the hope is that if we improve vision in the LEFT eye then fusion can be improved  If not, will attempt fresnel prisms    Follow up in 4 weeks: DFE + OCT both eyes, possible injection LEFT eye ( might switch to Eylea if not to great a response)    Alyce Madden MD  PGY3 Ophthalmology Resident  [facility]    Raina Morocho MD     Medical Retina  [facility]     Attending Physician Attestation:  Complete documentation of historical and exam elements from today's encounter can be found in the full encounter summary report (not reduplicated in this progress note).  I personally obtained the chief complaint(s) and history of present illness.  I confirmed and edited as necessary the review of systems, past medical/surgical history, family history, social history, and examination findings as documented by others; and I examined the patient myself.  I personally reviewed the relevant tests, images, and reports as documented above.  I formulated and edited as necessary the assessment and plan and discussed the findings and management plan with the patient and family. I personally reviewed the ophthalmic test(s) associated with this encounter, agree with the interpretation(s) as documented by the resident/fellow, and have edited the corresponding report(s) as necessary. - Raina Morocho MD

## 2024-06-27 NOTE — NURSING NOTE
"Chief Complaints and History of Present Illnesses   Patient presents with    Follow Up     # Active CNVM left eye     Chief Complaint(s) and History of Present Illness(es)       Follow Up              Associated symptoms: floaters (OS).  Negative for eye pain and flashes    Treatments tried: artificial tears    Comments: # Active CNVM left eye              Comments    Pt reports his left eye \"doubles up letters on the computer or book when I read\". He noticed slanting of Snellen chart letters during VA right eye.     Lab Results       Component                Value               Date                       A1C                      6.3                 06/08/2024                 A1C                      6.5                 12/09/2023                 A1C                      6.4                 06/10/2023                         Gloria Christianson OA 3:00 PM June 27, 2024                      "

## 2024-06-28 RX ORDER — METFORMIN HCL 500 MG
1000 TABLET, EXTENDED RELEASE 24 HR ORAL
Qty: 180 TABLET | Refills: 0 | Status: SHIPPED | OUTPATIENT
Start: 2024-06-28 | End: 2024-07-08

## 2024-06-28 RX ORDER — TAMSULOSIN HYDROCHLORIDE 0.4 MG/1
0.4 CAPSULE ORAL DAILY
Qty: 90 CAPSULE | Refills: 1 | Status: SHIPPED | OUTPATIENT
Start: 2024-06-28

## 2024-06-28 RX ORDER — FINASTERIDE 5 MG/1
5 TABLET, FILM COATED ORAL DAILY
Qty: 90 TABLET | Refills: 1 | Status: SHIPPED | OUTPATIENT
Start: 2024-06-28

## 2024-06-28 RX ORDER — POTASSIUM CHLORIDE 1500 MG/1
20 TABLET, EXTENDED RELEASE ORAL DAILY
Qty: 90 TABLET | Refills: 1 | Status: SHIPPED | OUTPATIENT
Start: 2024-06-28 | End: 2024-07-23

## 2024-06-28 NOTE — TELEPHONE ENCOUNTER
Patient confirmed scheduled appointment:  Date: 7/22  Time: 5:30p  Visit type: rtn  Provider: pcp

## 2024-07-01 DIAGNOSIS — I10 BENIGN ESSENTIAL HYPERTENSION: ICD-10-CM

## 2024-07-01 DIAGNOSIS — E08.42 DIABETIC POLYNEUROPATHY ASSOCIATED WITH DIABETES MELLITUS DUE TO UNDERLYING CONDITION (H): ICD-10-CM

## 2024-07-02 RX ORDER — CHLORTHALIDONE 25 MG/1
25 TABLET ORAL DAILY
Qty: 90 TABLET | Refills: 1 | Status: SHIPPED | OUTPATIENT
Start: 2024-07-02 | End: 2024-07-22 | Stop reason: DRUGHIGH

## 2024-07-02 NOTE — TELEPHONE ENCOUNTER
Please refill ASAP, the patient is out, also refill     chlorthalidone (HYGROTON) 25 MG tablet   and     losartan (COZAAR) 100 MG tablet     Please send refills to:     [id number] - [place], [place] - [address] [place] [address] AT [facility] [address]

## 2024-07-02 NOTE — TELEPHONE ENCOUNTER
gabapentin (NEURONTIN) 300 MG capsule         Last Written Prescription Date:  6/12/23  Last Fill Quantity: 90,   # refills: 3  chlorthalidone (HYGROTON) 25 MG tablet   Last Written Prescription Date:  6/12/23  Last Fill Quantity: 90,   # refills: 3    Last Office Visit : 12/11/23  Future Office visit:  7/22/24    Routing refill request to provider for review/approval because:   Refill team is not authorized to refill Gabapentin.    Last Comprehensive Metabolic Panel:  Lab Results   Component Value Date     06/08/2024    POTASSIUM 4.2 06/08/2024    CHLORIDE 104 06/08/2024    CO2 25 06/08/2024    ANIONGAP 11 06/08/2024     (H) 06/08/2024    BUN 21.7 06/08/2024    CR 0.95 06/08/2024    GFRESTIMATED 84 06/08/2024    FRANCIA 9.4 06/08/2024

## 2024-07-03 ASSESSMENT — SLIT LAMP EXAM - LIDS
COMMENTS: NORMAL
COMMENTS: NORMAL

## 2024-07-03 ASSESSMENT — EXTERNAL EXAM - LEFT EYE: OS_EXAM: NORMAL

## 2024-07-03 ASSESSMENT — CUP TO DISC RATIO
OD_RATIO: 0.5
OS_RATIO: 0.5

## 2024-07-03 ASSESSMENT — EXTERNAL EXAM - RIGHT EYE: OD_EXAM: NORMAL

## 2024-07-05 RX ORDER — GABAPENTIN 300 MG/1
CAPSULE ORAL
Qty: 90 CAPSULE | Refills: 1 | Status: SHIPPED | OUTPATIENT
Start: 2024-07-05 | End: 2024-07-08

## 2024-07-06 DIAGNOSIS — I10 BENIGN ESSENTIAL HYPERTENSION: ICD-10-CM

## 2024-07-08 ENCOUNTER — OFFICE VISIT (OUTPATIENT)
Dept: FAMILY MEDICINE | Facility: CLINIC | Age: 74
End: 2024-07-08
Payer: COMMERCIAL

## 2024-07-08 ENCOUNTER — ANCILLARY PROCEDURE (OUTPATIENT)
Dept: GENERAL RADIOLOGY | Facility: CLINIC | Age: 74
End: 2024-07-08
Attending: FAMILY MEDICINE
Payer: COMMERCIAL

## 2024-07-08 VITALS
SYSTOLIC BLOOD PRESSURE: 129 MMHG | DIASTOLIC BLOOD PRESSURE: 77 MMHG | OXYGEN SATURATION: 98 % | HEIGHT: 64 IN | BODY MASS INDEX: 31.77 KG/M2 | HEART RATE: 62 BPM | WEIGHT: 186.1 LBS

## 2024-07-08 DIAGNOSIS — M48.02 CERVICAL STENOSIS OF SPINAL CANAL: ICD-10-CM

## 2024-07-08 DIAGNOSIS — E11.9 TYPE 2 DIABETES MELLITUS TREATED WITHOUT INSULIN (H): ICD-10-CM

## 2024-07-08 DIAGNOSIS — E08.42 DIABETIC POLYNEUROPATHY ASSOCIATED WITH DIABETES MELLITUS DUE TO UNDERLYING CONDITION (H): ICD-10-CM

## 2024-07-08 DIAGNOSIS — M25.511 ACUTE PAIN OF RIGHT SHOULDER: ICD-10-CM

## 2024-07-08 DIAGNOSIS — M19.011 ARTHRITIS OF RIGHT ACROMIOCLAVICULAR JOINT: ICD-10-CM

## 2024-07-08 DIAGNOSIS — M25.511 ACUTE PAIN OF RIGHT SHOULDER: Primary | ICD-10-CM

## 2024-07-08 PROCEDURE — 99214 OFFICE O/P EST MOD 30 MIN: CPT | Performed by: FAMILY MEDICINE

## 2024-07-08 PROCEDURE — 72040 X-RAY EXAM NECK SPINE 2-3 VW: CPT | Mod: GC | Performed by: RADIOLOGY

## 2024-07-08 PROCEDURE — 73030 X-RAY EXAM OF SHOULDER: CPT | Mod: RT | Performed by: RADIOLOGY

## 2024-07-08 RX ORDER — NAPROXEN 500 MG/1
500 TABLET ORAL 2 TIMES DAILY PRN
Qty: 30 TABLET | Refills: 0 | Status: SHIPPED | OUTPATIENT
Start: 2024-07-08

## 2024-07-08 RX ORDER — METFORMIN HCL 500 MG
1000 TABLET, EXTENDED RELEASE 24 HR ORAL
Qty: 180 TABLET | Refills: 1 | Status: SHIPPED | OUTPATIENT
Start: 2024-07-08

## 2024-07-08 RX ORDER — GABAPENTIN 300 MG/1
CAPSULE ORAL
Qty: 180 CAPSULE | Refills: 1 | Status: SHIPPED | OUTPATIENT
Start: 2024-07-08

## 2024-07-08 RX ORDER — LOSARTAN POTASSIUM 100 MG/1
100 TABLET ORAL EVERY EVENING
Qty: 90 TABLET | Refills: 1 | Status: SHIPPED | OUTPATIENT
Start: 2024-07-08

## 2024-07-08 RX ORDER — ATORVASTATIN CALCIUM 20 MG/1
20 TABLET, FILM COATED ORAL DAILY
Qty: 90 TABLET | Refills: 3 | Status: SHIPPED | OUTPATIENT
Start: 2024-07-08
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
## 2024-07-08 NOTE — LETTER
July 22, 2024      Vishnu Viveros  [address]  [place] [address]        Dear ,    We are writing to inform you of your test results.    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 Please review with orthopedic provider.     Recent Results (from the past 744 hour(s))   XR Cervical Spine 2/3 Views    Narrative    EXAM: XR CERVICAL SPINE 2/3 VIEWS 7/8/2024 5:47 PM    HISTORY: Acute pain of right shoulder; Cervical stenosis of spinal  canal     COMPARISON: Neck CT 9/21/2022. Chest CT 7/22/2023. Cervical spine CT  6/27/2010    FINDINGS: AP and lateral views of the cervical spine were obtained.  Grade 1 anterolisthesis C7-T1, unchanged compared to 7/22/2023.  Multilevel disc height loss, most pronounced at C6-7 with severe  height loss. Additional multilevel degenerative changes including  endplate osteophytosis, uncinate hypertrophy, and facet hypertrophy.  No prevertebral edema. No mass in the visualized lung apices.      Impression    IMPRESSION:  Multilevel cervical degenerative changes, most pronounced at C6-7,  similar to 9/21/2022.    I have personally reviewed the examination and initial interpretation  and I agree with the findings.    ERNESTINA RAMIREZ MD         SYSTEM ID:  [id number]   XR Shoulder Right G/E 3 Views    Narrative    4 views right shoulder radiographs 7/8/2024 6:30 PM    History: Acute pain of right shoulder    Comparison: None available    Findings:    AP, Grashey, transscapular Y, axillary  views of the right shoulder  were obtained.     No acute osseous abnormality. Glenohumeral and acromioclavicular  joints are congruent.    Mild degenerative changes of the acromioclavicular joint. Mild  degenerative change of the glenohumeral joint. Subcortical cystic  changes and sclerosis within the superolateral humeral head, as can be  seen in rotator cuff pathology.    Soft tissue is unremarkable. The visualized lung is clear.      Impression     Impression:  1. No acute osseous abnormality.  2. No substantial degenerative change.  3. Subcortical cystic changes and sclerosis within the superolateral  humeral head, as can be seen in rotator cuff pathology.    JUTTA ELLERMANN, MD         SYSTEM ID:  [id number]       If you have any questions or concerns, please call the clinic at the number listed above.       Sincerely,    Murali Logan MD

## 2024-07-08 NOTE — LETTER
July 15, 2024      Vishnu Viveros  [address]  [place] [address]        Dear ,    We are writing to inform you of your test results.    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 Please review with orthopedic provider.     Resulted Orders   XR Cervical Spine 2/3 Views    Narrative    EXAM: XR CERVICAL SPINE 2/3 VIEWS 7/8/2024 5:47 PM    HISTORY: Acute pain of right shoulder; Cervical stenosis of spinal  canal     COMPARISON: Neck CT 9/21/2022. Chest CT 7/22/2023. Cervical spine CT  6/27/2010    FINDINGS: AP and lateral views of the cervical spine were obtained.  Grade 1 anterolisthesis C7-T1, unchanged compared to 7/22/2023.  Multilevel disc height loss, most pronounced at C6-7 with severe  height loss. Additional multilevel degenerative changes including  endplate osteophytosis, uncinate hypertrophy, and facet hypertrophy.  No prevertebral edema. No mass in the visualized lung apices.      Impression    IMPRESSION:  Multilevel cervical degenerative changes, most pronounced at C6-7,  similar to 9/21/2022.    I have personally reviewed the examination and initial interpretation  and I agree with the findings.    ERNESTINA RAMIREZ MD         SYSTEM ID:  [id number]       If you have any questions or concerns, please call the clinic at the number listed above.       Sincerely,      Murali Logan MD

## 2024-07-08 NOTE — PATIENT INSTRUCTIONS
Increase gabapentin  300 mg to twice daily   Short course of naprosyn 500 mg twice daily  if needed for neck and arm pain take with food.

## 2024-07-08 NOTE — TELEPHONE ENCOUNTER
losartan (COZAAR) 100 MG tablet 90 tablet 3 6/12/2023 -- No   Sig - Route: Take 1 tablet (100 mg) by mouth every evening - Oral     ----------------------  Last Office Visit : 12/11/2023  [facility] Office visit:     4:30 PM (30 min)  Angel   Arrive by:  4:15 PM   [facility] RETURN   [facility] ([facility])   Murali Logan MD     ----------------------      BP Readings from Last 3 Encounters:   12/11/23 112/74   07/25/23 103/62   07/24/23 103/62   Last Comprehensive Metabolic Panel:  Lab Results   Component Value Date     06/08/2024    POTASSIUM 4.2 06/08/2024    CHLORIDE 104 06/08/2024    CO2 25 06/08/2024    ANIONGAP 11 06/08/2024     (H) 06/08/2024    BUN 21.7 06/08/2024    CR 0.95 06/08/2024    GFRESTIMATED 84 06/08/2024    FRANCIA 9.4 06/08/2024

## 2024-07-09 PROBLEM — M19.011 ARTHRITIS OF RIGHT ACROMIOCLAVICULAR JOINT: Status: ACTIVE | Noted: 2024-07-09

## 2024-07-09 NOTE — RESULT ENCOUNTER NOTE
It looks like you may have right acromioclavicular and rotator cuff concerns causing the pain in right shoulder. I placed a referral to orthopedics. The coordinators will call you to arrange a visit with orthopedic provider.  Murali Logan MD

## 2024-07-10 ENCOUNTER — TELEPHONE (OUTPATIENT)
Dept: ORTHOPEDICS | Facility: CLINIC | Age: 74
End: 2024-07-10

## 2024-07-10 NOTE — TELEPHONE ENCOUNTER
Left Voicemail (1st Attempt) and Sent Mychart (1st Attempt) for the patient to call back and schedule the following:    Appointment type: NEW SHOULDER  Provider: Dr. Tashi French  Return date: Next available

## 2024-07-12 ENCOUNTER — TELEPHONE (OUTPATIENT)
Dept: ORTHOPEDICS | Facility: CLINIC | Age: 74
End: 2024-07-12
Payer: COMMERCIAL

## 2024-07-12 DIAGNOSIS — M25.511 RIGHT SHOULDER PAIN, UNSPECIFIED CHRONICITY: Primary | ICD-10-CM

## 2024-07-12 NOTE — TELEPHONE ENCOUNTER
DIAGNOSIS:    Acute pain of right shoulder [M25.511]  - Primary  Arthritis of right acromioclavicular joint [M19.011]   APPOINTMENT DATE: 07/15/2024   NOTES STATUS DETAILS   OFFICE NOTE from referring provider Internal 07/08/2024 - Murali Logan MD - [facility]   OFFICE NOTE from other specialist Internal 05/31/2024 - Tobi Silverio PT   - [facility]   (IMAGES & REPORTS) Internal

## 2024-07-12 NOTE — PROGRESS NOTES
CHIEF COMPLAINT: Right shoulder pain    DIAGNOSIS: Right shoulder pain, likely rotator cuff tear and biceps tendinopathy    OCCUPATION/SPORT: [employer]    HPI:   Vishnu Viveros is a very pleasant 74 year old, right-hand dominant male who presents for evaluation of right shoulder pain. Symptoms started in June of 2024. There was not a precipitating event. The pain is located to the superior shoulder and radiates down the arm to above the elbow. Worst pain is rated a 9 of 10, and current pain is rated at 0 of 10. Symptoms are worsened by movement and usage, pain is worse at night. Pain is exacerbated with the arm away from the body. Sleep is disrupted secondary to pain. Symptoms are improved with rest. Patient has tried Naprosyn with minimal relief. Associated symptoms include pain and limited ROM. Patient has pain and tingling radiating down the arm, with numbness. Notably, the patient has had no history of surgery on the shoulder. No other concerns or complaints at this time.  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     Chronic low back pain     Diabetes (H)     Ectropion due to laxity of eyelid, unspecified laterality     History of tobacco use     HLD (hyperlipidemia)     HTN (hypertension)     Obesity (BMI 30.0-34.9)     Pulmonary nodule        PAST SURGICAL HISTORY:  Past Surgical History:   Procedure Laterality Date    APPENDECTOMY OPEN CHILD      ARTHROSCOPY KNEE Left     BIOPSY PROSTATE TRANSRECTAL      CATARACT IOL, RT/LT      EXCISE PTERYGIUM Right 1989    EXCISE PTERYGIUM Right 02/12/2015    INJECT EPIDURAL LUMBAR / SACRAL SINGLE N/A 07/21/2016    Procedure: INJECT EPIDURAL LUMBAR / SACRAL SINGLE;  Surgeon: Judah Henriquez MD;  Location:  OR    INJECT PARAVERTEBRAL FACET JOINT LUMBAR / SACRAL FIRST Right 08/18/2016    Procedure: INJECT PARAVERTEBRAL FACET JOINT LUMBAR / SACRAL  FIRST;  Surgeon: Judah Henriquez MD;  Location: [facility] OR    INJECT PARAVERTEBRAL FACET JOINT LUMBAR / SACRAL FIRST Right 09/08/2016    Procedure: INJECT PARAVERTEBRAL FACET JOINT LUMBAR / SACRAL FIRST;  Surgeon: Judah Henriquez MD;  Location: [facility] OR    MEDIASTINOSCOPY Right 04/08/2019     [facility]  Dr Hemanth Covington 4/8/19 bronchoscopy, cervical mediastinoscopy for staging and minimally invasive right thoracoscopic wedge resection 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    PHACOEMULSIFICATION WITH STANDARD INTRAOCULAR LENS IMPLANT Right 07/10/2023    Procedure: RIGHT EYE PHACOEMULSIFICATION, CATARACT, WITH STANDARD INTRAOCULAR LENS IMPLANT INSERTION;  Surgeon: Rick Campbell MD;  Location: [facility] OR    PHACOEMULSIFICATION WITH STANDARD INTRAOCULAR LENS IMPLANT Left 07/24/2023    Procedure: LEFT EYE PHACOEMULSIFICATION, CATARACT, WITH STANDARD INTRAOCULAR LENS IMPLANT INSERTION;  Surgeon: Rick Campbell MD;  Location: [facility] OR    PUNCTALPLASTY Bilateral 12/19/2018    Procedure: Bilateral Punctoplasty with Mini [place] Stent;  Surgeon: Levy Blue MD;  Location: [facility] OR    REPAIR ECTROPION BILATERAL Bilateral 12/19/2018    Procedure: Bilateral Lower Lid Ectropion Repair with Right Lower Lid Lesion Excision;  Surgeon: Levy Blue MD;  Location: [facility] OR       CURRENT MEDICATIONS:  Current Outpatient Medications   Medication Sig Dispense Refill    acetaminophen (TYLENOL) 500 MG tablet Take 1-2 tablets (500-1,000 mg) by mouth every 6 hours as needed for pain (arthritis) 120 tablet 3    Ascorbic Acid (VITAMIN C PO) Take 500 mg by mouth once      atorvastatin (LIPITOR) 20 MG tablet Take 1 tablet (20 mg) by mouth daily 90 tablet 3    chlorthalidone (HYGROTON) 25 MG tablet Take 1 tablet (25 mg) by mouth daily 90 tablet 1    Cholecalciferol (VITAMIN D) 1000 UNIT capsule Take 1 capsule by mouth daily.      clotrimazole  (LOTRIMIN) 1 % external cream Apply topically 2 times daily as needed (rash) 60 g 1    Cyanocobalamin (VITAMIN B12 PO) Take 1 tablet by mouth Pt states he takes this 2 or 3 times a week.      cyclobenzaprine (FLEXERIL) 10 MG tablet Take 1 tablet (10 mg) by mouth 2 times daily as needed for muscle spasms (take at bedtime for muscle pain) Must not drive - Oral 30 tablet 3    finasteride (PROSCAR) 5 MG tablet Take 1 tablet (5 mg) by mouth daily 90 tablet 1    gabapentin (NEURONTIN) 300 MG capsule Increase to twice daily 180 capsule 1    loratadine (CLARITIN) 10 MG tablet Take 1 tablet (10 mg) by mouth daily as needed for allergies 90 tablet 1    losartan (COZAAR) 100 MG tablet Take 1 tablet (100 mg) by mouth every evening 90 tablet 1    melatonin 3 MG tablet Take 1 tablet (3 mg) by mouth nightly as needed for sleep (may take 1-2 if needed) 60 tablet 3    metFORMIN (GLUCOPHAGE XR) 500 MG 24 hr tablet Take 2 tablets (1,000 mg) by mouth daily (with dinner) 180 tablet 1    methocarbamol (ROBAXIN) 500 MG tablet Take 1 tablet (500 mg) by mouth 3 times daily as needed for muscle spasms 90 tablet 1    montelukast (SINGULAIR) 10 MG tablet Take 1 tablet (10 mg) by mouth at bedtime 90 tablet 3    Multiple Vitamin (MULTIVITAMINS PO) Take 1 tablet by mouth daily.      naproxen (NAPROSYN) 500 MG tablet Take 1 tablet (500 mg) by mouth 2 times daily as needed for moderate pain (take with food) 30 tablet 0    olopatadine (PATADAY) 0.2 % ophthalmic solution Place 0.05 mLs (1 drop) into both eyes daily 2.5 mL 5    Omega-3 Fatty Acids (FISH OIL PO) Take 1 capsule by mouth as needed      order for DME Back brace 1 Device 0    potassium chloride angelica ER (KLOR-CON M20) 20 MEQ CR tablet Take 1 tablet (20 mEq) by mouth daily 90 tablet 1    prednisoLONE acetate (PRED FORTE) 1 % ophthalmic suspension Apply 1 drop to eye daily To operative eye 5 mL 1    tamsulosin (FLOMAX) 0.4 MG capsule Take 1 capsule (0.4 mg) by mouth daily 90 capsule 1  "      ALLERGIES:      Allergies   Allergen Reactions    Pollen Extract      Other reaction(s): Headache  Sinus congestion, sinus \"drainage\", sneezing    Seasonal Allergies      Sinus congestion, sinus \"drainage\", sneezing    Nkda [No Known Drug Allergy]          FAMILY HISTORY: No pertinent family history, reviewed in EMR.    SOCIAL HISTORY:   Social History     Socioeconomic History    Marital status:      Spouse name: Carmen    Number of children: 2    Years of education: Not on file    Highest education level: Not on file   Occupational History    Not on file   Tobacco Use    Smoking status: Former     Current packs/day: 0.00     Types: Cigarettes     Start date: 1976     Quit date: 1981     Years since quittin.6    Smokeless tobacco: Never   Substance and Sexual Activity    Alcohol use: Yes     Alcohol/week: 2.5 standard drinks of alcohol     Types: 3 Standard drinks or equivalent per week    Drug use: No    Sexual activity: Not Currently     Partners: Female   Other Topics Concern    Not on file   Social History Narrative     works as [employer].        His immediate family; his wife has MS and is an RN at [place], his two children are in good health.    1 grandchild.         Social Determinants of Health     Financial Resource Strain: Low Risk  (2023)    Financial Resource Strain     Within the past 12 months, have you or your family members you live with been unable to get utilities (heat, electricity) when it was really needed?: No   Food Insecurity: Low Risk  (2023)    Food Insecurity     Within the past 12 months, did you worry that your food would run out before you got money to buy more?: No     Within the past 12 months, did the food you bought just not last and you didn t have money to get more?: No   Transportation Needs: Low Risk  (2023)    Transportation Needs     Within the past 12 months, has lack of transportation kept you " from medical appointments, getting your medicines, non-medical meetings or appointments, work, or from getting things that you need?: No   Physical Activity: Not on file   Stress: Not on file   Social Connections: Not on file   Interpersonal Safety: Low Risk  (12/11/2023)    Interpersonal Safety     Do you feel physically and emotionally safe where you currently live?: Yes     Within the past 12 months, have you been hit, slapped, kicked or otherwise physically hurt by someone?: No     Within the past 12 months, have you been humiliated or emotionally abused in other ways by your partner or ex-partner?: No   Housing Stability: Low Risk  (12/11/2023)    Housing Stability     Do you have housing? : Yes     Are you worried about losing your housing?: No       REVIEW OF SYSTEMS: Positive for that noted in past medical history and history of present illness and otherwise reviewed in EMR    PHYSICAL EXAM:  Patient is Data Unavailable and weighs 0 lbs 0 oz There were no vitals taken for this visit.  There is no height or weight on file to calculate BMI.   Constitutional: Well-developed, well-nourished, healthy appearing male.  Skin: Warm, dry   HEENT: Normal  Cardiac: Well perfused extremities, strong 2+ peripheral pulses. No edema.   Pulmonary: Breathing room air    Musculoskeletal:   Right shoulder:  AROM right shoulder: 150 degrees with discomfort /90 /40/back pocket  AROM left shoulder: 160 to 170 degrees /130 /50/mid thoracic spine  PROM right shoulder: 170 degrees with discomfort at about the 90 degree motion roman/ 50 degrees of external rotation   4/5 supraspinatus with pain, 4/5 infraspinatus with pain, 5/5 subscapularis  No AC joint pain, Pain with cross body adduction  Pain with Neer and Irizarry impingement signs  Negative belly-press/lift-off  Diffuse pain with Speed's test  Neurovascular exam and cervical spine exam are normal.    X-RAYS:   AP, Grashey and scapular Y view of the shoulder were obtained  demonstrates mild to moderate degenerative changes the glenohumeral joint.  The acromial humeral space is 9 mm and well-maintained.  Mild degenerative changes of the AC joint.    ADVANCED IMAGING: No advanced imaging at this time    IMPRESSION: 74 year old-year-old right hand dominant male, with likely right shoulder rotator cuff tear and biceps tendinopathy        PLAN:   Discussed with the patient the likely diagnosis of rotator cuff pathology and potential treatment options as well as the natural history of this.  We discussed obtaining a MRI of the shoulder to further evaluate for potential rotator cuff tears.  We discussed that over the age of 70 that 50% of this population has a rotator cuff tear at baseline.  The patient noted concern getting an MRI noting that he has a significant amount of imaging studies performed and concern for radiation.  We discussed with the patient multiple times that MRIs do not provide additional radiation.  The patient declined obtaining an MRI at this time.  We discussed other nonoperative modalities such as continued oral medications, physical therapy and corticosteroid injections that could be done into the subacromial space as well as the biceps tendon.  The patient elected to proceed with physical therapy as well as corticosteroid injections.  We will place a referral to physical therapy as well as to primary care sports medicine for a subacromial corticosteroid injection and an ultrasound-guided proximal biceps injection.  Patient verbalized agreement and understanding of this plan he will follow-up on an as-needed basis if his symptoms fail to improve.    At the conclusion of the office visit, Vishnu verbally acknowledged that I answered all of his questions satisfactorily.    I saw the patient with the resident and agree with the findings and the plan of care as documented in the note.    All exam and discussion from beginning to end were done in the presence of my  resident, Jacob Sprague MD  Orthopedic Surgery Sports Medicine and Shoulder Surgery

## 2024-07-12 NOTE — TELEPHONE ENCOUNTER
Patient Contacted to be scheduled for the following:    Appointment type: NEW SHOULDER  Provider: Dr. Sprague  Return date: 7/15/2024  Pt Wife wanted her email address on file. Updated her email and had appt information sent to [facility].

## 2024-07-15 ENCOUNTER — PRE VISIT (OUTPATIENT)
Dept: ORTHOPEDICS | Facility: CLINIC | Age: 74
End: 2024-07-15

## 2024-07-15 ENCOUNTER — OFFICE VISIT (OUTPATIENT)
Dept: ORTHOPEDICS | Facility: CLINIC | Age: 74
End: 2024-07-15
Payer: COMMERCIAL

## 2024-07-15 DIAGNOSIS — M75.121 NONTRAUMATIC COMPLETE TEAR OF RIGHT ROTATOR CUFF: ICD-10-CM

## 2024-07-15 DIAGNOSIS — M25.511 RIGHT SHOULDER PAIN, UNSPECIFIED CHRONICITY: Primary | ICD-10-CM

## 2024-07-15 PROCEDURE — 99203 OFFICE O/P NEW LOW 30 MIN: CPT | Mod: GC | Performed by: ORTHOPAEDIC SURGERY

## 2024-07-15 NOTE — RESULT ENCOUNTER NOTE
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 Please review with orthopedic provider.  Best wishes,  Murali Logan MD

## 2024-07-15 NOTE — LETTER
7/15/2024      Vishnu Viveros  [address]  [place] [address]      Dear Colleague,    Thank you for referring your patient, Vishnu Viveros, to the [facility] [place]. Please see a copy of my visit note below.    CHIEF COMPLAINT: Right shoulder pain    DIAGNOSIS: Right shoulder pain, likely rotator cuff tear and biceps tendinopathy    OCCUPATION/SPORT: [employer]    HPI:   Vishnu Viveros is a very pleasant 74 year old, right-hand dominant male who presents for evaluation of right shoulder pain. Symptoms started in June of 2024. There was not a precipitating event. The pain is located to the superior shoulder and radiates down the arm to above the elbow. Worst pain is rated a 9 of 10, and current pain is rated at 0 of 10. Symptoms are worsened by movement and usage, pain is worse at night. Pain is exacerbated with the arm away from the body. Sleep is disrupted secondary to pain. Symptoms are improved with rest. Patient has tried Naprosyn with minimal relief. Associated symptoms include pain and limited ROM. Patient has pain and tingling radiating down the arm, with numbness. Notably, the patient has had no history of surgery on the shoulder. No other concerns or complaints at this time.  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     Chronic low back pain     Diabetes (H)     Ectropion due to laxity of eyelid, unspecified laterality     History of tobacco use     HLD (hyperlipidemia)     HTN (hypertension)     Obesity (BMI 30.0-34.9)     Pulmonary nodule        PAST SURGICAL HISTORY:  Past Surgical History:   Procedure Laterality Date    APPENDECTOMY OPEN CHILD      ARTHROSCOPY KNEE Left     BIOPSY PROSTATE TRANSRECTAL      CATARACT IOL, RT/LT      EXCISE PTERYGIUM Right 1989    EXCISE PTERYGIUM Right 02/12/2015    INJECT EPIDURAL LUMBAR / SACRAL SINGLE  N/A 07/21/2016    Procedure: INJECT EPIDURAL LUMBAR / SACRAL SINGLE;  Surgeon: Judah Henriquez MD;  [facility]: [facility]    INJECT PARAVERTEBRAL FACET JOINT LUMBAR / SACRAL FIRST Right 08/18/2016    Procedure: INJECT PARAVERTEBRAL FACET JOINT LUMBAR / SACRAL FIRST;  Surgeon: Judah Henriquez MD;  [facility]    INJECT PARAVERTEBRAL FACET JOINT LUMBAR / SACRAL FIRST Right 09/08/2016    Procedure: INJECT PARAVERTEBRAL FACET JOINT LUMBAR / SACRAL FIRST;  Surgeon: Judah Henriquez MD;  Location: [facility]    MEDIASTINOSCOPY Right 04/08/2019     [facility]  Dr Hemanth Covington 4/8/19 bronchoscopy, cervical mediastinoscopy for staging and minimally invasive right thoracoscopic wedge resection 1.8 cm invasive adenocarcinoma [id number] stage 1A2 adenocarcinoma completely resected via minimally invasive right upper lobectomy.    PHACOEMULSIFICATION WITH STANDARD INTRAOCULAR LENS IMPLANT Right 07/10/2023    Procedure: RIGHT EYE PHACOEMULSIFICATION, CATARACT, WITH STANDARD INTRAOCULAR LENS IMPLANT INSERTION;  Surgeon: Rick Campbell MD;  Location: [facility] OR    PHACOEMULSIFICATION WITH STANDARD INTRAOCULAR LENS IMPLANT Left 07/24/2023    Procedure: LEFT EYE PHACOEMULSIFICATION, CATARACT, WITH STANDARD INTRAOCULAR LENS IMPLANT INSERTION;  Surgeon: Rick Campbell MD;  Location: [facility] OR    PUNCTALPLASTY Bilateral 12/19/2018    Procedure: Bilateral Punctoplasty with Mini [place] Stent;  Surgeon: Levy Blue MD;  [facility]    REPAIR ECTROPION BILATERAL Bilateral 12/19/2018    Procedure: Bilateral Lower Lid Ectropion Repair with Right Lower Lid Lesion Excision;  Surgeon: Levy Blue MD;  [facility]: [facility]       CURRENT MEDICATIONS:  Current Outpatient Medications   Medication Sig Dispense Refill    acetaminophen (TYLENOL) 500 MG tablet Take 1-2 tablets (500-1,000 mg) by mouth every 6 hours as needed for pain (arthritis) 120 tablet 3    Ascorbic Acid (VITAMIN C PO) Take 500 mg by mouth once      atorvastatin (LIPITOR) 20  MG tablet Take 1 tablet (20 mg) by mouth daily 90 tablet 3    chlorthalidone (HYGROTON) 25 MG tablet Take 1 tablet (25 mg) by mouth daily 90 tablet 1    Cholecalciferol (VITAMIN D) 1000 UNIT capsule Take 1 capsule by mouth daily.      clotrimazole (LOTRIMIN) 1 % external cream Apply topically 2 times daily as needed (rash) 60 g 1    Cyanocobalamin (VITAMIN B12 PO) Take 1 tablet by mouth Pt states he takes this 2 or 3 times a week.      cyclobenzaprine (FLEXERIL) 10 MG tablet Take 1 tablet (10 mg) by mouth 2 times daily as needed for muscle spasms (take at bedtime for muscle pain) Must not drive - Oral 30 tablet 3    finasteride (PROSCAR) 5 MG tablet Take 1 tablet (5 mg) by mouth daily 90 tablet 1    gabapentin (NEURONTIN) 300 MG capsule Increase to twice daily 180 capsule 1    loratadine (CLARITIN) 10 MG tablet Take 1 tablet (10 mg) by mouth daily as needed for allergies 90 tablet 1    losartan (COZAAR) 100 MG tablet Take 1 tablet (100 mg) by mouth every evening 90 tablet 1    melatonin 3 MG tablet Take 1 tablet (3 mg) by mouth nightly as needed for sleep (may take 1-2 if needed) 60 tablet 3    metFORMIN (GLUCOPHAGE XR) 500 MG 24 hr tablet Take 2 tablets (1,000 mg) by mouth daily (with dinner) 180 tablet 1    methocarbamol (ROBAXIN) 500 MG tablet Take 1 tablet (500 mg) by mouth 3 times daily as needed for muscle spasms 90 tablet 1    montelukast (SINGULAIR) 10 MG tablet Take 1 tablet (10 mg) by mouth at bedtime 90 tablet 3    Multiple Vitamin (MULTIVITAMINS PO) Take 1 tablet by mouth daily.      naproxen (NAPROSYN) 500 MG tablet Take 1 tablet (500 mg) by mouth 2 times daily as needed for moderate pain (take with food) 30 tablet 0    olopatadine (PATADAY) 0.2 % ophthalmic solution Place 0.05 mLs (1 drop) into both eyes daily 2.5 mL 5    Omega-3 Fatty Acids (FISH OIL PO) Take 1 capsule by mouth as needed      order for DME Back brace 1 Device 0    potassium chloride angelica ER (KLOR-CON M20) 20 MEQ CR tablet Take 1  "tablet (20 mEq) by mouth daily 90 tablet 1    prednisoLONE acetate (PRED FORTE) 1 % ophthalmic suspension Apply 1 drop to eye daily To operative eye 5 mL 1    tamsulosin (FLOMAX) 0.4 MG capsule Take 1 capsule (0.4 mg) by mouth daily 90 capsule 1       ALLERGIES:      Allergies   Allergen Reactions    Pollen Extract      Other reaction(s): Headache  Sinus congestion, sinus \"drainage\", sneezing    Seasonal Allergies      Sinus congestion, sinus \"drainage\", sneezing    Nkda [No Known Drug Allergy]          FAMILY HISTORY: No pertinent family history, reviewed in EMR.    SOCIAL HISTORY:   Social History     Socioeconomic History    Marital status:      Spouse name: Carmen    Number of children: 2    Years of education: Not on file    Highest education level: Not on file   Occupational History    Not on file   Tobacco Use    Smoking status: Former     Current packs/day: 0.00     Types: Cigarettes     Start date: 1976     Quit date: 1981     Years since quittin.6    Smokeless tobacco: Never   Substance and Sexual Activity    Alcohol use: Yes     Alcohol/week: 2.5 standard drinks of alcohol     Types: 3 Standard drinks or equivalent per week    Drug use: No    Sexual activity: Not Currently     Partners: Female   Other Topics Concern    Not on file   Social History Narrative     works as [employer].        His immediate family; his wife has MS and is an RN at [employer], his two children are in good health.    1 grandchild.         Social Determinants of Health     Financial Resource Strain: Low Risk  (2023)    Financial Resource Strain     Within the past 12 months, have you or your family members you live with been unable to get utilities (heat, electricity) when it was really needed?: No   Food Insecurity: Low Risk  (2023)    Food Insecurity     Within the past 12 months, did you worry that your food would run out before you got money to buy more?: No     " Within the past 12 months, did the food you bought just not last and you didn t have money to get more?: No   Transportation Needs: Low Risk  (12/11/2023)    Transportation Needs     Within the past 12 months, has lack of transportation kept you from medical appointments, getting your medicines, non-medical meetings or appointments, work, or from getting things that you need?: No   Physical Activity: Not on file   Stress: Not on file   Social Connections: Not on file   Interpersonal Safety: Low Risk  (12/11/2023)    Interpersonal Safety     Do you feel physically and emotionally safe where you currently live?: Yes     Within the past 12 months, have you been hit, slapped, kicked or otherwise physically hurt by someone?: No     Within the past 12 months, have you been humiliated or emotionally abused in other ways by your partner or ex-partner?: No   Housing Stability: Low Risk  (12/11/2023)    Housing Stability     Do you have housing? : Yes     Are you worried about losing your housing?: No       REVIEW OF SYSTEMS: Positive for that noted in past medical history and history of present illness and otherwise reviewed in EMR    PHYSICAL EXAM:  Patient is Data Unavailable and weighs 0 lbs 0 oz There were no vitals taken for this visit.  There is no height or weight on file to calculate BMI.   Constitutional: Well-developed, well-nourished, healthy appearing male.  Skin: Warm, dry   HEENT: Normal  Cardiac: Well perfused extremities, strong 2+ peripheral pulses. No edema.   Pulmonary: Breathing room air    Musculoskeletal:   Right shoulder:  AROM right shoulder: 150 degrees with discomfort /90 /40/back pocket  AROM left shoulder: 160 to 170 degrees /130 /50/mid thoracic spine  PROM right shoulder: 170 degrees with discomfort at about the 90 degree motion roman/ 50 degrees of external rotation   4/5 supraspinatus with pain, 4/5 infraspinatus with pain, 5/5 subscapularis  No AC joint pain, Pain with cross body  adduction  Pain with Neer and Irizarry impingement signs  Negative belly-press/lift-off  Diffuse pain with Speed's test  Neurovascular exam and cervical spine exam are normal.    X-RAYS:   AP, Grashey and scapular Y view of the shoulder were obtained demonstrates mild to moderate degenerative changes the glenohumeral joint.  The acromial humeral space is 9 mm and well-maintained.  Mild degenerative changes of the AC joint.    ADVANCED IMAGING: No advanced imaging at this time    IMPRESSION: 74 year old-year-old right hand dominant male, with likely right shoulder rotator cuff tear and biceps tendinopathy        PLAN:   Discussed with the patient the likely diagnosis of rotator cuff pathology and potential treatment options as well as the natural history of this.  We discussed obtaining a MRI of the shoulder to further evaluate for potential rotator cuff tears.  We discussed that over the age of 70 that 50% of this population has a rotator cuff tear at baseline.  The patient noted concern getting an MRI noting that he has a significant amount of imaging studies performed and concern for radiation.  We discussed with the patient multiple times that MRIs do not provide additional radiation.  The patient declined obtaining an MRI at this time.  We discussed other nonoperative modalities such as continued oral medications, physical therapy and corticosteroid injections that could be done into the subacromial space as well as the biceps tendon.  The patient elected to proceed with physical therapy as well as corticosteroid injections.  We will place a referral to physical therapy as well as to primary care sports medicine for a subacromial corticosteroid injection and an ultrasound-guided proximal biceps injection.  Patient verbalized agreement and understanding of this plan he will follow-up on an as-needed basis if his symptoms fail to improve.    At the conclusion of the office visit, Vishnu verbally acknowledged that  I answered all of his questions satisfactorily.    I saw the patient with the resident and agree with the findings and the plan of care as documented in the note.    All exam and discussion from beginning to end were done in the presence of my resident, Jacob Sprague MD  Orthopedic Surgery Sports Medicine and Shoulder Surgery

## 2024-07-18 ENCOUNTER — THERAPY VISIT (OUTPATIENT)
Dept: PHYSICAL THERAPY | Facility: CLINIC | Age: 74
End: 2024-07-18
Payer: COMMERCIAL

## 2024-07-18 DIAGNOSIS — R29.898 WEAKNESS OF RIGHT SHOULDER: ICD-10-CM

## 2024-07-18 DIAGNOSIS — M25.611 STIFFNESS OF RIGHT SHOULDER JOINT: Primary | ICD-10-CM

## 2024-07-18 DIAGNOSIS — M25.511 RIGHT SHOULDER PAIN, UNSPECIFIED CHRONICITY: ICD-10-CM

## 2024-07-18 PROCEDURE — 97110 THERAPEUTIC EXERCISES: CPT | Mod: GP

## 2024-07-18 PROCEDURE — 97161 PT EVAL LOW COMPLEX 20 MIN: CPT | Mod: GP

## 2024-07-18 ASSESSMENT — ACTIVITIES OF DAILY LIVING (ADL)
WASHING_YOUR_BACK: 9
PLEASE_INDICATE_YOR_PRIMARY_REASON_FOR_REFERRAL_TO_THERAPY:: SHOULDER
REACHING_FOR_SOMETHING_ON_A_HIGH_SHELF: 8
PUTTING_ON_YOUR_PANTS: 8
PUTTING_ON_A_SHIRT_THAT_BUTTONS_DOWN_THE_FRONT: 8
PLACING_AN_OBJECT_ON_A_HIGH_SHELF: 9
AT_ITS_WORST?: 8
CARRYING_A_HEAVY_OBJECT_OF_10_POUNDS: 10
PUSHING_WITH_THE_INVOLVED_ARM: 8
WHEN_LYING_ON_THE_INVOLVED_SIDE: 9
WASHING_YOUR_HAIR?: 8
REMOVING_SOMETHING_FROM_YOUR_BACK_POCKET: 9
TOUCHING_THE_BACK_OF_YOUR_NECK: 9
PUTTING_ON_AN_UNDERSHIRT_OR_A_PULLOVER_SWEATER: 9

## 2024-07-18 NOTE — PROGRESS NOTES
PHYSICAL THERAPY EVALUATION  Type of Visit: Evaluation              Subjective   Pt is a 75 yo male presenting to PT with R shoulder pain. Pt states his pain started ~8 wks ago. He came in for one visit on 5/31 with Tobi Silverio and then did not follow up until receiving this new order. His pain is located superiorly, and refers down to his elbow. Aggravating factors include overhead movement, and lifting objects; he often has to use his L arm to assist raising his R arm. The pain also bothers him at night and he wakes up from the pain; he tries to use pillows for support. He was taking a pain killer for some time but this didn't help; he now will take Ibuprofen and Asprin. He wishes to move his arm and work as a  pain free.           Presenting condition or subjective complaint: having pain frm to sholder down to my arm  Date of onset: 05/01/24    Relevant medical history:     Dates & types of surgery: about six years ago regarding lung    Prior diagnostic imaging/testing results: X-ray     Prior therapy history for the same diagnosis, illness or injury:        Prior Level of Function  Transfers: Independent  Ambulation: Independent  ADL: Independent    Living Environment  Social support: With a significant other or spouse   Type of home: House   Stairs to enter the home:         Ramp: No   Stairs inside the home:         Help at home:    Equipment owned:       Employment:      Hobbies/Interests:      Patient goals for therapy: cyrus do my normal work without pain    Pain assessment: Pain present     Objective   SHOULDER EVALUATION  POSTURE:  Rounded shoulders  ROM:  L shoulder WNL. R shoulder: flex = ~140 deg and painful, abd = ~130 deg and painful, ER = slight reduction and end range pain. IR = R glute (vs. Upper lumbar spine L)   STRENGTH:  L shoulder: 4/5 flex/abd, 5/5 IR/ER R shoulder: 3+/5 flexion/abd/ER and pain, 5/5 IR  PALPATION:  Increased tenderness to R UT, anterior shoulder  musculature  CERVICAL SCREEN: WNL    Assessment & Plan   CLINICAL IMPRESSIONS  Medical Diagnosis: R shoulder pain    Treatment Diagnosis: R shoulder pain, stiffness, weakness   Impression/Assessment: Patient is a 74 year old male with R shoulder pain complaints.  The following significant findings have been identified: Pain, Decreased ROM/flexibility, Decreased strength, Impaired muscle performance, Decreased activity tolerance, and Impaired posture. These impairments interfere with their ability to perform self care tasks, work tasks, recreational activities, household chores, and driving  as compared to previous level of function.     Clinical Decision Making (Complexity):  Clinical Presentation: Stable/Uncomplicated  Clinical Presentation Rationale: based on medical and personal factors listed in PT evaluation  Clinical Decision Making (Complexity): Low complexity    PLAN OF CARE  Treatment Interventions:  Modalities: Cryotherapy, E-stim  Interventions: Manual Therapy, Neuromuscular Re-education, Therapeutic Activity, Therapeutic Exercise    Long Term Goals     PT Goal 1  Goal Identifier: ROM  Goal Description: Pt will attain pain free R shoulder ROM that is symmetrical to his L shoulder  Rationale: to maximize safety and independence with performance of ADLs and functional tasks;to maximize safety and independence within the home;to maximize safety and independence within the community;to maximize safety and independence with transportation  Goal Progress: 140 flex, 130 abd, slight reduction in ER, IR to R glute, all painful  Target Date: 09/12/24      Frequency of Treatment: 1x/wk  Duration of Treatment: 8 wks    Recommended Referrals to Other Professionals:  none  Education Assessment:   Learner/Method: Patient;Demonstration;Pictures/Video    Risks and benefits of evaluation/treatment have been explained.   Patient/Family/caregiver agrees with Plan of Care.     Evaluation Time:     PT Eval, Low Complexity  Minutes ([id number]): 22     Signing Clinician: CHARLI CELIS PT

## 2024-07-20 ENCOUNTER — ANCILLARY PROCEDURE (OUTPATIENT)
Dept: CT IMAGING | Facility: CLINIC | Age: 74
End: 2024-07-20
Attending: INTERNAL MEDICINE
Payer: COMMERCIAL

## 2024-07-20 DIAGNOSIS — C34.91 ADENOCARCINOMA OF LUNG, STAGE 1, RIGHT (H): ICD-10-CM

## 2024-07-20 PROCEDURE — 71250 CT THORAX DX C-: CPT | Mod: GC | Performed by: RADIOLOGY

## 2024-07-22 ENCOUNTER — OFFICE VISIT (OUTPATIENT)
Dept: FAMILY MEDICINE | Facility: CLINIC | Age: 74
End: 2024-07-22
Payer: COMMERCIAL

## 2024-07-22 VITALS
WEIGHT: 182.2 LBS | TEMPERATURE: 97.3 F | OXYGEN SATURATION: 99 % | SYSTOLIC BLOOD PRESSURE: 104 MMHG | BODY MASS INDEX: 31.1 KG/M2 | HEART RATE: 68 BPM | DIASTOLIC BLOOD PRESSURE: 67 MMHG | HEIGHT: 64 IN

## 2024-07-22 DIAGNOSIS — E11.9 TYPE 2 DIABETES MELLITUS TREATED WITHOUT INSULIN (H): ICD-10-CM

## 2024-07-22 DIAGNOSIS — I10 BENIGN ESSENTIAL HYPERTENSION: Primary | ICD-10-CM

## 2024-07-22 DIAGNOSIS — C34.91 ADENOCARCINOMA OF LUNG, STAGE 1, RIGHT (H): ICD-10-CM

## 2024-07-22 DIAGNOSIS — M75.101 ROTATOR CUFF SYNDROME, RIGHT: ICD-10-CM

## 2024-07-22 PROCEDURE — 99213 OFFICE O/P EST LOW 20 MIN: CPT | Performed by: FAMILY MEDICINE

## 2024-07-22 PROCEDURE — G2211 COMPLEX E/M VISIT ADD ON: HCPCS | Performed by: FAMILY MEDICINE

## 2024-07-22 NOTE — PROGRESS NOTES
Vishnu is a 74 year old that presents in clinic today for the following:     Chief Complaint   Patient presents with    Follow Up    Hypertension    Diabetes    Results     Pt has recent CT chest completed 7/20/24 7/22/2024     5:06 PM   Additional Questions   Roomed by RAJIV RODRIGEZ     Screenings from encounters over the past 10 days    No data recorded       Son Melton at 5:07 PM on 7/22/2024    Answers submitted by the patient for this visit:  General Questionnaire (Submitted on 7/8/2024)  Chief Complaint: Chronic problems general questions HPI Form  How many servings of fruits and vegetables do you eat daily?: 2-3  On average, how many sweetened beverages do you drink each day (Examples: soda, juice, sweet tea, etc.  Do NOT count diet or artificially sweetened beverages)?: 0  How many minutes a day do you exercise enough to make your heart beat faster?: 30 to 60  How many days a week do you exercise enough to make your heart beat faster?: 3 or less  How many days per week do you miss taking your medication?: 0  General Concern (Submitted on 7/8/2024)  Chief Complaint: Chronic problems general questions HPI Form  What is the reason for your visit today?: Right shoulder pain  When did your symptoms begin?: 3-4 weeks ago  What are your symptoms?: Sharp pain starting in shoulder  How would you describe these symptoms?: Severe  Are your symptoms:: Staying the same  Have you had these symptoms before?: No  Is there anything that makes you feel worse?: movement  Is there anything that makes you feel better?: ibuprofen in pm

## 2024-07-22 NOTE — PATIENT INSTRUCTIONS
Stop Chlorthalidone as your blood pressure has been slightly low  Check blood pressure at home  goal less than 140/90    Bring FMLA papers at follow-up

## 2024-07-22 NOTE — PROGRESS NOTES
"  Assessment & Plan     Benign essential hypertension    Recommended discontinuation of chlorthalidone discontinue potassium continue losartan 100 mg daily return the end of August beginning of September for recheck blood pressure and please bring in FMLA forms at that time.  Adenocarcinoma of lung, stage 1, right (H)  Today reviewed his CT imaging showed slight increase pleural-based nodule in the right upper lobe measuring up to 6 mm, please review with oncology next steps for follow-up imaging.  Stable 6 mm left lower lobe nodule.    Type 2 diabetes mellitus treated without insulin (H)  Diabetes very well-controlled continue on metformin XR 1000 mg daily    Rotator cuff syndrome, right  Reviewed it appears Dr. Sprague wanted MRI of his shoulder however I do see orders for x-ray of shoulder however we had completed x-ray prior thus he wanted to avoid x-ray radiation and declined to have right shoulder x-ray ordered by orthopedics.  He is scheduled to see Dr. Fazal Lind for injection.  Encouraged follow-up with orthopedics.          BMI  Estimated body mass index is 31.27 kg/m  as calculated from the following:    Height as of this encounter: 1.626 m (5' 4\").    Weight as of this encounter: 82.6 kg (182 lb 3.2 oz).             Return in about 5 weeks (around 8/28/2024) for BP Recheck.  The longitudinal plan of care for the diagnosis(es)/condition(s) as documented were addressed during this visit. Due to the added complexity in care, I will continue to support Vishnu in the subsequent management and with ongoing continuity of care.  27 minutes spent on the date of the encounter doing chart review, history, exam, diagnostics review, documentation, evaluation & management, counseling and coordination of cares as noted.     July 23, 2024 11:28 AM  Phone call to review as we are stopping discontinuing chlorthalidone also need to discontinue potassium supplement.  Also reviewed with his wife who manages his medication.  " They have an appointment today with Dr. Kelley to review CT scan and follow-up.  He also will see Dr. Fazal Lind August 1 for a right shoulder injection and encouraged follow-up with orthopedics if right shoulder discomfort continues, I also reviewed cervical spine film showed degenerative changes C6-7 similar to September 2022.  May have multifocal causes for right shoulder discomfort.  Murali Logan MD   Scarlett Mares is a 74 year old, presenting for the following health issues:  Follow Up, Hypertension, Diabetes, and Results (Pt has recent CT chest completed 7/20/24)        7/8/2024     4:38 PM   Additional Questions   Roomed by KTE, EMT     History of Present Illness       Reason for visit:  Right shoulder pain  Symptom onset:  3-4 weeks ago  Symptoms include:  Sharp pain starting in shoulder  Symptom intensity:  Severe  Symptom progression:  Staying the same  Had these symptoms before:  No  What makes it worse:  Movement  What makes it better:  Ibuprofen in pm    He eats 2-3 servings of fruits and vegetables daily.He consumes 0 sweetened beverage(s) daily.He exercises with enough effort to increase his heart rate 30 to 60 minutes per day.  He exercises with enough effort to increase his heart rate 3 or less days per week.   He is taking medications regularly.     Vishnu [place]etta 74 HX type 2 diabetes mellitus controlled, hyperlipidemia, hypertension, colon polyps, DJD with spinal stenosis back, previous elevated PSA, and stage 1 right adenocarcinoma of the lung s/p resection with clear margins 2019 recent right shoulder pain suspected rotator cuff injury who presents  for primary care visit.  He missed his visit with me in June for follow-up of hypertension and diabetes, had recent visit for acute right shoulder pain.  Today he came early for the visit.  He began by explaining Ortho visit from July 15 indicating they want to recheck a shoulder x-ray and he declined.  I discussed documentation  looks as though they were talking about MRI scan although I do see an order for shoulder x-ray.  History of stage I right adenocarcinoma of the lung 2019  He also wanted to review CT imaging for follow-up of lung cancer has an appointment with oncology on 7/23/2024.  Today reviewed his CT imaging showed slight increase pleural-based nodule in the right upper lobe measuring up to 6 mm, please review with oncology next steps for follow-up imaging.  Stable 6 mm left lower lobe nodule.  Hypertension  Currently on chlorthalidone 25 mg and Losartan 100 mg daily on recent blood pressure readings have been in acceptable range in fact slightly low therefore we are discussing discontinuation of chlorthalidone.  He also will need to discontinue potassium.  Type 2 diabetes  Currently metformin  mg 2 tablets at dinner for 1000 mg dose, most recent A1c 6.3 June 8 excellent range.    Problem list, PMH, Surgical HX, FH, SH, allergies, medications,immunizations reviewed and updated in Epic.  ROS noted in HPI and ROS,staff / patient questionnaires reviewed by me.   SH: He continues to work as  [employer]. He indicated needs FMLA forms completed but he did not bring FMLA forms, reminder need to contact his human resources for FMLA forms and an appointment would be required to complete.    Labs reviewed in EPIC  BP Readings from Last 3 Encounters:   07/22/24 104/67   07/08/24 129/77   12/11/23 112/74    Wt Readings from Last 3 Encounters:   07/22/24 82.6 kg (182 lb 3.2 oz)   07/08/24 84.4 kg (186 lb 1.6 oz)   12/11/23 81.1 kg (178 lb 14.4 oz)                  Patient Active Problem List   Diagnosis    Erectile dysfunction    Seasonal allergies    Vitamin D deficiency    Pulmonary nodule    Prostate nodule    DJD (degenerative joint disease) of knee    HL (hearing loss)    Vision impairment    Tear film insufficiency    Recurrent pterygium    Other age-related cataract of left eye    Type 2 diabetes mellitus  treated without insulin (H)    Diabetic polyneuropathy associated with diabetes mellitus due to underlying condition (H)    Tubulovillous adenoma of colon    Lumbar radiculopathy    Adenocarcinoma of lung, stage 1, right (H)    Benign essential hypertension    History of colonic polyps    Spinal stenosis, lumbar region, with neurogenic claudication    Elevated prostate specific antigen (PSA)    Obesity with body mass index 30 or greater    Solitary pulmonary nodule    Evaluation of hearing impairment    Combined forms of age-related cataract of both eyes    Balance problems    Neck pain    Cervical spondylosis with myelopathy    Cervical stenosis of spinal canal    Central serous chorioretinopathy of both eyes    Intermittent exotropia, alternating    CNVM (choroidal neovascular membrane), left    Acute pain of right shoulder    Arthritis of right acromioclavicular joint    Stiffness of right shoulder joint    Weakness of right shoulder     Past Surgical History:   Procedure Laterality Date    APPENDECTOMY OPEN CHILD      ARTHROSCOPY KNEE Left     BIOPSY PROSTATE TRANSRECTAL      CATARACT IOL, RT/LT      EXCISE PTERYGIUM Right 1989    EXCISE PTERYGIUM Right 02/12/2015    INJECT EPIDURAL LUMBAR / SACRAL SINGLE N/A 07/21/2016    Procedure: INJECT EPIDURAL LUMBAR / SACRAL SINGLE;  Surgeon: Judah Henriquez MD;  Location: [facility]    INJECT PARAVERTEBRAL FACET JOINT LUMBAR / SACRAL FIRST Right 08/18/2016    Procedure: INJECT PARAVERTEBRAL FACET JOINT LUMBAR / SACRAL FIRST;  Surgeon: Judah Henriquez MD;  Location: [facility]    INJECT PARAVERTEBRAL FACET JOINT LUMBAR / SACRAL FIRST Right 09/08/2016    Procedure: INJECT PARAVERTEBRAL FACET JOINT LUMBAR / SACRAL FIRST;  Surgeon: Judah Henriquez MD;  Location: [facility]    MEDIASTINOSCOPY Right 04/08/2019     [place]  Dr Hemanth Covington 4/8/19 bronchoscopy, cervical mediastinoscopy for staging and minimally invasive right thoracoscopic wedge resection 1.8 cm invasive adenocarcinoma  [id number] stage 1A2 adenocarcinoma completely resected via minimally invasive right upper lobectomy.    PHACOEMULSIFICATION WITH STANDARD INTRAOCULAR LENS IMPLANT Right 07/10/2023    Procedure: RIGHT EYE PHACOEMULSIFICATION, CATARACT, WITH STANDARD INTRAOCULAR LENS IMPLANT INSERTION;  Surgeon: Rick Campbell MD;  Location: [facility]    PHACOEMULSIFICATION WITH STANDARD INTRAOCULAR LENS IMPLANT Left 2023    Procedure: LEFT EYE PHACOEMULSIFICATION, CATARACT, WITH STANDARD INTRAOCULAR LENS IMPLANT INSERTION;  Surgeon: Rick Campbell MD;  Location: [facility] OR    PUNCTALPLASTY Bilateral 2018    Procedure: Bilateral Punctoplasty with Mini [place] Stent;  Surgeon: Levy Blue MD;  [facility]    REPAIR ECTROPION BILATERAL Bilateral 2018    Procedure: Bilateral Lower Lid Ectropion Repair with Right Lower Lid Lesion Excision;  Surgeon: Levy Blue MD;  Location: [facility]       Social History     Tobacco Use    Smoking status: Former     Current packs/day: 0.00     Types: Cigarettes     Start date: 1976     Quit date: 1981     Years since quittin.7    Smokeless tobacco: Never   Substance Use Topics    Alcohol use: Yes     Alcohol/week: 2.5 standard drinks of alcohol     Types: 3 Standard drinks or equivalent per week     Family History   Problem Relation Age of Onset    Cerebrovascular Disease Brother          stroke     Breast Cancer Sister     Kidney Disease Mother         Kidney stones    Other - See Comments Father          in [place] parkinsons or post surgical eye surgery    Cerebral aneurysm Brother     Hypothyroidism Sister     Glaucoma No family hx of     Macular Degeneration No family hx of          Current Outpatient Medications   Medication Sig Dispense Refill    acetaminophen (TYLENOL) 500 MG tablet Take 1-2 tablets (500-1,000 mg) by mouth every 6 hours as needed for pain (arthritis) 120 tablet 3    Ascorbic Acid (VITAMIN C PO) Take 500 mg by mouth  once      atorvastatin (LIPITOR) 20 MG tablet Take 1 tablet (20 mg) by mouth daily 90 tablet 3    chlorthalidone (HYGROTON) 25 MG tablet Take 1 tablet (25 mg) by mouth daily 90 tablet 1    Cholecalciferol (VITAMIN D) 1000 UNIT capsule Take 1 capsule by mouth daily.      clotrimazole (LOTRIMIN) 1 % external cream Apply topically 2 times daily as needed (rash) 60 g 1    Cyanocobalamin (VITAMIN B12 PO) Take 1 tablet by mouth Pt states he takes this 2 or 3 times a week.      cyclobenzaprine (FLEXERIL) 10 MG tablet Take 1 tablet (10 mg) by mouth 2 times daily as needed for muscle spasms (take at bedtime for muscle pain) Must not drive - Oral 30 tablet 3    finasteride (PROSCAR) 5 MG tablet Take 1 tablet (5 mg) by mouth daily 90 tablet 1    gabapentin (NEURONTIN) 300 MG capsule Increase to twice daily 180 capsule 1    loratadine (CLARITIN) 10 MG tablet Take 1 tablet (10 mg) by mouth daily as needed for allergies 90 tablet 1    losartan (COZAAR) 100 MG tablet Take 1 tablet (100 mg) by mouth every evening 90 tablet 1    melatonin 3 MG tablet Take 1 tablet (3 mg) by mouth nightly as needed for sleep (may take 1-2 if needed) 60 tablet 3    metFORMIN (GLUCOPHAGE XR) 500 MG 24 hr tablet Take 2 tablets (1,000 mg) by mouth daily (with dinner) 180 tablet 1    methocarbamol (ROBAXIN) 500 MG tablet Take 1 tablet (500 mg) by mouth 3 times daily as needed for muscle spasms 90 tablet 1    montelukast (SINGULAIR) 10 MG tablet Take 1 tablet (10 mg) by mouth at bedtime 90 tablet 3    Multiple Vitamin (MULTIVITAMINS PO) Take 1 tablet by mouth daily.      naproxen (NAPROSYN) 500 MG tablet Take 1 tablet (500 mg) by mouth 2 times daily as needed for moderate pain (take with food) 30 tablet 0    olopatadine (PATADAY) 0.2 % ophthalmic solution Place 0.05 mLs (1 drop) into both eyes daily 2.5 mL 5    Omega-3 Fatty Acids (FISH OIL PO) Take 1 capsule by mouth as needed      order for DME Back brace 1 Device 0    potassium chloride angelica ER  "(KLOR-CON M20) 20 MEQ CR tablet Take 1 tablet (20 mEq) by mouth daily 90 tablet 1    prednisoLONE acetate (PRED FORTE) 1 % ophthalmic suspension Apply 1 drop to eye daily To operative eye 5 mL 1    tamsulosin (FLOMAX) 0.4 MG capsule Take 1 capsule (0.4 mg) by mouth daily 90 capsule 1     Allergies   Allergen Reactions    Pollen Extract      Other reaction(s): Headache  Sinus congestion, sinus \"drainage\", sneezing    Seasonal Allergies      Sinus congestion, sinus \"drainage\", sneezing    Nkda [No Known Drug Allergy]         Recent Labs   Lab Test 06/08/24  0946 12/09/23  1013 06/10/23  0955 12/10/22  1021 01/24/22  1145 12/06/21  0947 06/07/21  1044 11/16/20  1026 05/27/20  0957 10/26/19  1040 11/27/17  1107 07/31/17  1031   A1C 6.3* 6.5* 6.4* 6.7*   < > 5.9* 5.8* 5.8*   < > 6.2*   < > 6.3*   LDL  --  55  --  60  --  47  --  39  --  58   < > 52   HDL  --  65  --  71  --  59  --  64  --  49   < > 60   TRIG  --  53  --  70  --  94  --  116  --  91   < > 54   ALT  --  14 18 17   < > 27  --  27   < > 26   < > 29   CR 0.95 0.91 0.98 1.06   < > 1.01 0.95 0.93   < > 0.96   < > 0.79   GFRESTIMATED 84 88 81 74   < > 74 80 82   < > 79   < > >90  Non  GFR Calc     GFRESTBLACK  --   --   --   --   --   --  >90 >90   < > >90   < > >90  African American GFR Calc     POTASSIUM 4.2 3.9 4.0 4.4   < > 4.0 3.7 3.5   < > 3.8   < > 4.2   TSH  --   --   --   --   --   --   --   --   --  1.56  --  1.67    < > = values in this interval not displayed.                     Objective    /67 (BP Location: Right arm, Patient Position: Sitting, Cuff Size: Adult Regular)   Pulse 68   Temp 97.3  F (36.3  C) (Oral)   Ht 1.626 m (5' 4\")   Wt 82.6 kg (182 lb 3.2 oz)   SpO2 99%   BMI 31.27 kg/m    Body mass index is 31.27 kg/m .  Physical Exam   GENERAL APPEARANCE: healthy, alert and no distress BMI  31.27  EYES: Eyes grossly normal to inspection, PERRL and conjunctivae and sclerae normal  NECK: no adenopathy, no asymmetry, " masses, or scars and thyroid normal to palpation  RESP: lungs clear to auscultation - no rales, rhonchi or wheezes  CV: regular rate and rhythm, normal S1 S2, no S3 or S4, no murmur, click or rub  NEURO: Normal strength  mentation intact and speech normal  PSYCH: mentation appears normal appears worried related to CT imaging results.      Narrative & Impression   EXAM: CT chest without intravenous contrast. 7/20/2024 10:33 AM     HISTORY: Lung cancer, current or r/o recurrence; Lung cancer  surveillance; No known/automatically detected potential  contraindications to imaging; Adenocarcinoma of lung, stage 1, right  (H)     TECHNIQUE: Helical acquisition of image data was performed for the  chest without intravenous contrast.     COMPARISON: CT chest 7/22/2023     FINDINGS:     Airways: Central airways are patent.      Lungs: Postsurgical changes of right upper lobe lobectomy with stable  scarring in the right middle lobe. Adjacent traction bronchiectasis.  Lingular subsegmental atelectasis. No focal airspace consolidation.      Left lower lobe solitary calcified pulmonary nodule measuring 4.5 x 3  mm (4/170), previously 4.5 x 3 mm on 7/26/2023.     Right upper lobe nodular thickening along the right posterior  pleura,measuring 5.8 x 3.9 mm (4/40), previously 4.8 x 1.7 mm.     Pleura: No pleural effusions. No pneumothorax.     Thyroid: 1.1 cm greatest dimension hypoattenuating nodule in the left  lobe (3/33)     Lymph nodes: No enlarged axillary or mediastinal lymph nodes by short  axis criteria. Evaluation of the chris is limited by lack of  intravenous contrast, however, no bulky hilar adenopathy is  appreciated.     Cardiac: The heart is not enlarged. No pericardial effusion. No  coronary artery calcifications.     Vessels: Thoracic aorta and main pulmonary artery are normal in  caliber. Left common carotid and brachiocephalic are of a common  origin.     Esophagus: The esophagus appears unremarkable.     Upper  abdomen: Visualized portions of the upper abdomen are  unremarkable. Right renal cyst measuring up to 4.8 mm (2/57).  Diverticulosis. Rounded hypodensity in the liver near the gallbladder  (2/48) subcentimeter in size is unchanged, again, likely cyst. No  adrenal nodule. Mild pancreatic atrophy.     Bones/soft tissue: Degenerative changes of the spine. No acute or  suspicious osseous abnormality.                                                                      IMPRESSION:  1. Stable post surgical changes of the right upper and middle lobes.  There is a minimally increased pleural-based nodule in the right upper  lobe, measuring up to 6 mm, consider short-term follow-up to assess  stability.  3. Unchanged 6 mm solid pulmonary nodule in the left lower lobe.     I have personally reviewed the examination and initial interpretation  and I agree with the findings.     JOE CORTÉS MD    Reviewed CT at visit and copy provided to him, encouraged discussion with oncology.  Signed Electronically by: Murali Logan MD

## 2024-07-23 ENCOUNTER — ONCOLOGY VISIT (OUTPATIENT)
Dept: ONCOLOGY | Facility: CLINIC | Age: 74
End: 2024-07-23
Attending: INTERNAL MEDICINE
Payer: COMMERCIAL

## 2024-07-23 ENCOUNTER — TUMOR CONFERENCE (OUTPATIENT)
Dept: ONCOLOGY | Facility: CLINIC | Age: 74
End: 2024-07-23
Payer: COMMERCIAL

## 2024-07-23 VITALS
RESPIRATION RATE: 12 BRPM | SYSTOLIC BLOOD PRESSURE: 108 MMHG | HEART RATE: 70 BPM | TEMPERATURE: 97.9 F | BODY MASS INDEX: 31.24 KG/M2 | DIASTOLIC BLOOD PRESSURE: 63 MMHG | OXYGEN SATURATION: 100 % | WEIGHT: 182 LBS

## 2024-07-23 DIAGNOSIS — C34.91 ADENOCARCINOMA OF LUNG, STAGE 1, RIGHT (H): Primary | ICD-10-CM

## 2024-07-23 PROBLEM — M75.101 ROTATOR CUFF SYNDROME, RIGHT: Status: ACTIVE | Noted: 2024-07-23

## 2024-07-23 PROCEDURE — 99213 OFFICE O/P EST LOW 20 MIN: CPT | Performed by: INTERNAL MEDICINE

## 2024-07-23 PROCEDURE — 99211 OFF/OP EST MAY X REQ PHY/QHP: CPT | Performed by: INTERNAL MEDICINE

## 2024-07-23 ASSESSMENT — PAIN SCALES - GENERAL: PAINLEVEL: NO PAIN (0)

## 2024-07-23 NOTE — PROGRESS NOTES
REASON FOR VISIT:    CANCER STAGE:  Cancer Staging   Adenocarcinoma of lung, stage 1, right (H)  Staging form: Lung, AJCC 8th Edition  - Clinical: No stage assigned - Unsigned        HISTORY OF PRESENT ILLNESS:  Mr. Viveros is a 69 yo man who was diagnosed with adenocarcinoma of the lung in 2019. He was having some difficulty breathing and some wheezing.  He had a CXR done that was negative for pneumonia but did show a RUL nodule.  He had a CT chest in 2012 that showed this nodule.  CT was done on 1/4/19 that showed a 13.4mmx10.2 RUL nodule that grew from 8.5x6.4 in 2012.  He saw Dr. LAURIE Lopez from pulmonary who ordered a PET/CT scan - 3/9/19 showed that the RUL nodule was hypermetabolic and now measured [id number].  There was no mediastinal LNs or other lesions on PET. Pt was set up for surgical resection, but ended up going to [facility].  He had a wedge resection and LN dissection by Dr. Covington.  Pathology showed a 1.8cm tumor margins were negative. 11 LNs were negative for involvement with tumor including station 7 and 4R. He tolerated the surgery well.  He has not had any other complications since then.      Vishnu is her in follow-up.  He is doing well overall. He has not had any major health issues in the past year.  He has no chest pain, no shortness of breath.  His weight is stable. He has no other complaints.     Review Of Systems  10-point review of systems were negative except as noted in HPI.        EXAM:  /63 (BP Location: Right arm, Patient Position: Sitting, Cuff Size: Adult Regular)   Pulse 70   Temp 97.9  F (36.6  C) (Oral)   Resp 12   Wt 82.6 kg (182 lb)   SpO2 100%   BMI 31.24 kg/m    GEN: alert and oriented x 3, nad  HEENT: perrla, eomi, sclera anicteric, oral mucosa moist without thrush  NECK: supple, no palpable LAD  HT: reg rate and rhythm, no murmurs  LUNGS: clear to auscultation bilaterally  ABD: soft, nt, nd, +bs x 4  EXT: no clubbing, cyanosis, or edema  NEURO: CN 2-12 intact, MS  5/5 b/l    Current Outpatient Medications   Medication Sig Dispense Refill    acetaminophen (TYLENOL) 500 MG tablet Take 1-2 tablets (500-1,000 mg) by mouth every 6 hours as needed for pain (arthritis) 120 tablet 3    Ascorbic Acid (VITAMIN C PO) Take 500 mg by mouth once      atorvastatin (LIPITOR) 20 MG tablet Take 1 tablet (20 mg) by mouth daily 90 tablet 3    Cholecalciferol (VITAMIN D) 1000 UNIT capsule Take 1 capsule by mouth daily.      clotrimazole (LOTRIMIN) 1 % external cream Apply topically 2 times daily as needed (rash) 60 g 1    Cyanocobalamin (VITAMIN B12 PO) Take 1 tablet by mouth Pt states he takes this 2 or 3 times a week.      cyclobenzaprine (FLEXERIL) 10 MG tablet Take 1 tablet (10 mg) by mouth 2 times daily as needed for muscle spasms (take at bedtime for muscle pain) Must not drive - Oral 30 tablet 3    finasteride (PROSCAR) 5 MG tablet Take 1 tablet (5 mg) by mouth daily 90 tablet 1    gabapentin (NEURONTIN) 300 MG capsule Increase to twice daily 180 capsule 1    loratadine (CLARITIN) 10 MG tablet Take 1 tablet (10 mg) by mouth daily as needed for allergies 90 tablet 1    losartan (COZAAR) 100 MG tablet Take 1 tablet (100 mg) by mouth every evening 90 tablet 1    melatonin 3 MG tablet Take 1 tablet (3 mg) by mouth nightly as needed for sleep (may take 1-2 if needed) 60 tablet 3    metFORMIN (GLUCOPHAGE XR) 500 MG 24 hr tablet Take 2 tablets (1,000 mg) by mouth daily (with dinner) 180 tablet 1    methocarbamol (ROBAXIN) 500 MG tablet Take 1 tablet (500 mg) by mouth 3 times daily as needed for muscle spasms 90 tablet 1    montelukast (SINGULAIR) 10 MG tablet Take 1 tablet (10 mg) by mouth at bedtime 90 tablet 3    Multiple Vitamin (MULTIVITAMINS PO) Take 1 tablet by mouth daily.      naproxen (NAPROSYN) 500 MG tablet Take 1 tablet (500 mg) by mouth 2 times daily as needed for moderate pain (take with food) 30 tablet 0    olopatadine (PATADAY) 0.2 % ophthalmic solution Place 0.05 mLs (1 drop)  into both eyes daily 2.5 mL 5    Omega-3 Fatty Acids (FISH OIL PO) Take 1 capsule by mouth as needed      order for DME Back brace 1 Device 0    prednisoLONE acetate (PRED FORTE) 1 % ophthalmic suspension Apply 1 drop to eye daily To operative eye 5 mL 1    tamsulosin (FLOMAX) 0.4 MG capsule Take 1 capsule (0.4 mg) by mouth daily 90 capsule 1           Recent Labs   Lab Test 12/09/23  1013   WBC 3.9*   HGB 12.2*        @labrcent[na,potassium,chloride,co2,bun,cr@  Recent Labs   Lab Test 12/09/23  1013   PROTTOTAL 7.1   ALBUMIN 4.3   BILITOTAL 0.3   AST 20   ALT 14   ALKPHOS 53         Recent Results (from the past 744 hour(s))   XR Cervical Spine 2/3 Views    Narrative    EXAM: XR CERVICAL SPINE 2/3 VIEWS 7/8/2024 5:47 PM    HISTORY: Acute pain of right shoulder; Cervical stenosis of spinal  canal     COMPARISON: Neck CT 9/21/2022. Chest CT 7/22/2023. Cervical spine CT  6/27/2010    FINDINGS: AP and lateral views of the cervical spine were obtained.  Grade 1 anterolisthesis C7-T1, unchanged compared to 7/22/2023.  Multilevel disc height loss, most pronounced at C6-7 with severe  height loss. Additional multilevel degenerative changes including  endplate osteophytosis, uncinate hypertrophy, and facet hypertrophy.  No prevertebral edema. No mass in the visualized lung apices.      Impression    IMPRESSION:  Multilevel cervical degenerative changes, most pronounced at C6-7,  similar to 9/21/2022.    I have personally reviewed the examination and initial interpretation  and I agree with the findings.    ERNESTINA RAMIREZ MD         SYSTEM ID:  [id number]   XR Shoulder Right G/E 3 Views    Narrative    4 views right shoulder radiographs 7/8/2024 6:30 PM    History: Acute pain of right shoulder    Comparison: None available    Findings:    AP, Grashey, transscapular Y, axillary  views of the right shoulder  were obtained.     No acute osseous abnormality. Glenohumeral and acromioclavicular  joints are congruent.    Mild  degenerative changes of the acromioclavicular joint. Mild  degenerative change of the glenohumeral joint. Subcortical cystic  changes and sclerosis within the superolateral humeral head, as can be  seen in rotator cuff pathology.    Soft tissue is unremarkable. The visualized lung is clear.      Impression    Impression:  1. No acute osseous abnormality.  2. No substantial degenerative change.  3. Subcortical cystic changes and sclerosis within the superolateral  humeral head, as can be seen in rotator cuff pathology.    JUTTA ELLERMANN, MD         SYSTEM ID:  [id number]   CT Chest w/o Contrast    Narrative    EXAM: CT chest without intravenous contrast. 7/20/2024 10:33 AM    HISTORY: Lung cancer, current or r/o recurrence; Lung cancer  surveillance; No known/automatically detected potential  contraindications to imaging; Adenocarcinoma of lung, stage 1, right  (H)    TECHNIQUE: Helical acquisition of image data was performed for the  chest without intravenous contrast.    COMPARISON: CT chest 7/22/2023    FINDINGS:    Airways: Central airways are patent.     Lungs: Postsurgical changes of right upper lobe lobectomy with stable  scarring in the right middle lobe. Adjacent traction bronchiectasis.  Lingular subsegmental atelectasis. No focal airspace consolidation.     Left lower lobe solitary calcified pulmonary nodule measuring 4.5 x 3  mm (4/170), previously 4.5 x 3 mm on 7/26/2023.    Right upper lobe nodular thickening along the right posterior  pleura,measuring 5.8 x 3.9 mm (4/40), previously 4.8 x 1.7 mm.    Pleura: No pleural effusions. No pneumothorax.    Thyroid: 1.1 cm greatest dimension hypoattenuating nodule in the left  lobe (3/33)    Lymph nodes: No enlarged axillary or mediastinal lymph nodes by short  axis criteria. Evaluation of the chris is limited by lack of  intravenous contrast, however, no bulky hilar adenopathy is  appreciated.    Cardiac: The heart is not enlarged. No pericardial effusion.  No  coronary artery calcifications.    Vessels: Thoracic aorta and main pulmonary artery are normal in  caliber. Left common carotid and brachiocephalic are of a common  origin.    Esophagus: The esophagus appears unremarkable.    Upper abdomen: Visualized portions of the upper abdomen are  unremarkable. Right renal cyst measuring up to 4.8 mm (2/57).  Diverticulosis. Rounded hypodensity in the liver near the gallbladder  (2/48) subcentimeter in size is unchanged, again, likely cyst. No  adrenal nodule. Mild pancreatic atrophy.    Bones/soft tissue: Degenerative changes of the spine. No acute or  suspicious osseous abnormality.      Impression    IMPRESSION:  1. Stable post surgical changes of the right upper and middle lobes.  There is a minimally increased pleural-based nodule in the right upper  lobe, measuring up to 6 mm, consider short-term follow-up to assess  stability.  3. Unchanged 6 mm solid pulmonary nodule in the left lower lobe.    I have personally reviewed the examination and initial interpretation  and I agree with the findings.    JOE CORTÉS MD         SYSTEM ID:  [id number]           Assessment/Plan  Stage I NSCLC - He is now over 5 years since his surgery for stage I NSCLC.  He is doing well.  He has no evidence of disease recurrence.  His CT report shows a slight increase in pleural nodularity, but this is still only about 1mm different in a year long period.  In my review of his scan this is not a notable difference and could be due to his breath hold or natural variation from scan to scan. Therefore, we will simply monitor with yearly scan as we have been doing.  I discussed this with the patient and he is in agreement with the plan.     Follow-up in 1 year with repeat CT chest without contrast.  I told him to give us a call in the interim if he were having more symptoms or had more concerns.     I spent 25 minutes with the patient, reviewing imaging, and documenting this note on the  date of the encounter.     Chava Kelley   of Medicine  Division of Hematology, Oncology, and Transplantation

## 2024-07-23 NOTE — LETTER
7/23/2024      Vishnu Viveros  [address]  [place] [address]      Dear Colleague,    Thank you for referring your patient, Vishnu Viveros, to the [facility]. Please see a copy of my visit note below.    REASON FOR VISIT:    CANCER STAGE:  Cancer Staging   Adenocarcinoma of lung, stage 1, right (H)  Staging form: Lung, AJCC 8th Edition  - Clinical: No stage assigned - Unsigned        HISTORY OF PRESENT ILLNESS:  Mr. Viveros is a 71 yo man who was diagnosed with adenocarcinoma of the lung in 2019. He was having some difficulty breathing and some wheezing.  He had a CXR done that was negative for pneumonia but did show a RUL nodule.  He had a CT chest in 2012 that showed this nodule.  CT was done on 1/4/19 that showed a 13.4mmx10.2 RUL nodule that grew from 8.5x6.4 in 2012.  He saw Dr. LAURIE Lopez from pulmonary who ordered a PET/CT scan - 3/9/19 showed that the RUL nodule was hypermetabolic and now measured 29p14af.  There was no mediastinal LNs or other lesions on PET. Pt was set up for surgical resection, but ended up going to [facility].  He had a wedge resection and LN dissection by Dr. Covington.  Pathology showed a 1.8cm tumor margins were negative. 11 LNs were negative for involvement with tumor including station 7 and 4R. He tolerated the surgery well.  He has not had any other complications since then.      Vishnu is her in follow-up.  He is doing well overall. He has not had any major health issues in the past year.  He has no chest pain, no shortness of breath.  His weight is stable. He has no other complaints.     Review Of Systems  10-point review of systems were negative except as noted in HPI.        EXAM:  /63 (BP Location: Right arm, Patient Position: Sitting, Cuff Size: Adult Regular)   Pulse 70   Temp 97.9  F (36.6  C) (Oral)   Resp 12   Wt 82.6 kg (182 lb)   SpO2 100%   BMI 31.24 kg/m    GEN: alert and oriented x 3, nad  HEENT: perrla, eomi, sclera  anicteric, oral mucosa moist without thrush  NECK: supple, no palpable LAD  HT: reg rate and rhythm, no murmurs  LUNGS: clear to auscultation bilaterally  ABD: soft, nt, nd, +bs x 4  EXT: no clubbing, cyanosis, or edema  NEURO: CN 2-12 intact, MS 5/5 b/l    Current Outpatient Medications   Medication Sig Dispense Refill     acetaminophen (TYLENOL) 500 MG tablet Take 1-2 tablets (500-1,000 mg) by mouth every 6 hours as needed for pain (arthritis) 120 tablet 3     Ascorbic Acid (VITAMIN C PO) Take 500 mg by mouth once       atorvastatin (LIPITOR) 20 MG tablet Take 1 tablet (20 mg) by mouth daily 90 tablet 3     Cholecalciferol (VITAMIN D) 1000 UNIT capsule Take 1 capsule by mouth daily.       clotrimazole (LOTRIMIN) 1 % external cream Apply topically 2 times daily as needed (rash) 60 g 1     Cyanocobalamin (VITAMIN B12 PO) Take 1 tablet by mouth Pt states he takes this 2 or 3 times a week.       cyclobenzaprine (FLEXERIL) 10 MG tablet Take 1 tablet (10 mg) by mouth 2 times daily as needed for muscle spasms (take at bedtime for muscle pain) Must not drive - Oral 30 tablet 3     finasteride (PROSCAR) 5 MG tablet Take 1 tablet (5 mg) by mouth daily 90 tablet 1     gabapentin (NEURONTIN) 300 MG capsule Increase to twice daily 180 capsule 1     loratadine (CLARITIN) 10 MG tablet Take 1 tablet (10 mg) by mouth daily as needed for allergies 90 tablet 1     losartan (COZAAR) 100 MG tablet Take 1 tablet (100 mg) by mouth every evening 90 tablet 1     melatonin 3 MG tablet Take 1 tablet (3 mg) by mouth nightly as needed for sleep (may take 1-2 if needed) 60 tablet 3     metFORMIN (GLUCOPHAGE XR) 500 MG 24 hr tablet Take 2 tablets (1,000 mg) by mouth daily (with dinner) 180 tablet 1     methocarbamol (ROBAXIN) 500 MG tablet Take 1 tablet (500 mg) by mouth 3 times daily as needed for muscle spasms 90 tablet 1     montelukast (SINGULAIR) 10 MG tablet Take 1 tablet (10 mg) by mouth at bedtime 90 tablet 3     Multiple Vitamin  (MULTIVITAMINS PO) Take 1 tablet by mouth daily.       naproxen (NAPROSYN) 500 MG tablet Take 1 tablet (500 mg) by mouth 2 times daily as needed for moderate pain (take with food) 30 tablet 0     olopatadine (PATADAY) 0.2 % ophthalmic solution Place 0.05 mLs (1 drop) into both eyes daily 2.5 mL 5     Omega-3 Fatty Acids (FISH OIL PO) Take 1 capsule by mouth as needed       order for DME Back brace 1 Device 0     prednisoLONE acetate (PRED FORTE) 1 % ophthalmic suspension Apply 1 drop to eye daily To operative eye 5 mL 1     tamsulosin (FLOMAX) 0.4 MG capsule Take 1 capsule (0.4 mg) by mouth daily 90 capsule 1           Recent Labs   Lab Test 12/09/23  1013   WBC 3.9*   HGB 12.2*        @labrcent[na,potassium,chloride,co2,bun,cr@  Recent Labs   Lab Test 12/09/23  1013   PROTTOTAL 7.1   ALBUMIN 4.3   BILITOTAL 0.3   AST 20   ALT 14   ALKPHOS 53         Recent Results (from the past 744 hour(s))   XR Cervical Spine 2/3 Views    Narrative    EXAM: XR CERVICAL SPINE 2/3 VIEWS 7/8/2024 5:47 PM    HISTORY: Acute pain of right shoulder; Cervical stenosis of spinal  canal     COMPARISON: Neck CT 9/21/2022. Chest CT 7/22/2023. Cervical spine CT  6/27/2010    FINDINGS: AP and lateral views of the cervical spine were obtained.  Grade 1 anterolisthesis C7-T1, unchanged compared to 7/22/2023.  Multilevel disc height loss, most pronounced at C6-7 with severe  height loss. Additional multilevel degenerative changes including  endplate osteophytosis, uncinate hypertrophy, and facet hypertrophy.  No prevertebral edema. No mass in the visualized lung apices.      Impression    IMPRESSION:  Multilevel cervical degenerative changes, most pronounced at C6-7,  similar to 9/21/2022.    I have personally reviewed the examination and initial interpretation  and I agree with the findings.    ERNESTINA RAMIREZ MD         SYSTEM ID:  [id number]   XR Shoulder Right G/E 3 Views    Narrative    4 views right shoulder radiographs 7/8/2024 6:30  PM    History: Acute pain of right shoulder    Comparison: None available    Findings:    AP, Grashey, transscapular Y, axillary  views of the right shoulder  were obtained.     No acute osseous abnormality. Glenohumeral and acromioclavicular  joints are congruent.    Mild degenerative changes of the acromioclavicular joint. Mild  degenerative change of the glenohumeral joint. Subcortical cystic  changes and sclerosis within the superolateral humeral head, as can be  seen in rotator cuff pathology.    Soft tissue is unremarkable. The visualized lung is clear.      Impression    Impression:  1. No acute osseous abnormality.  2. No substantial degenerative change.  3. Subcortical cystic changes and sclerosis within the superolateral  humeral head, as can be seen in rotator cuff pathology.    JUTTA ELLERMANN, MD         SYSTEM ID:  [id number]   CT Chest w/o Contrast    Narrative    EXAM: CT chest without intravenous contrast. 7/20/2024 10:33 AM    HISTORY: Lung cancer, current or r/o recurrence; Lung cancer  surveillance; No known/automatically detected potential  contraindications to imaging; Adenocarcinoma of lung, stage 1, right  (H)    TECHNIQUE: Helical acquisition of image data was performed for the  chest without intravenous contrast.    COMPARISON: CT chest 7/22/2023    FINDINGS:    Airways: Central airways are patent.     Lungs: Postsurgical changes of right upper lobe lobectomy with stable  scarring in the right middle lobe. Adjacent traction bronchiectasis.  Lingular subsegmental atelectasis. No focal airspace consolidation.     Left lower lobe solitary calcified pulmonary nodule measuring 4.5 x 3  mm (4/170), previously 4.5 x 3 mm on 7/26/2023.    Right upper lobe nodular thickening along the right posterior  pleura,measuring 5.8 x 3.9 mm (4/40), previously 4.8 x 1.7 mm.    Pleura: No pleural effusions. No pneumothorax.    Thyroid: 1.1 cm greatest dimension hypoattenuating nodule in the left  lobe  (3/33)    Lymph nodes: No enlarged axillary or mediastinal lymph nodes by short  axis criteria. Evaluation of the chris is limited by lack of  intravenous contrast, however, no bulky hilar adenopathy is  appreciated.    Cardiac: The heart is not enlarged. No pericardial effusion. No  coronary artery calcifications.    Vessels: Thoracic aorta and main pulmonary artery are normal in  caliber. Left common carotid and brachiocephalic are of a common  origin.    Esophagus: The esophagus appears unremarkable.    Upper abdomen: Visualized portions of the upper abdomen are  unremarkable. Right renal cyst measuring up to 4.8 mm (2/57).  Diverticulosis. Rounded hypodensity in the liver near the gallbladder  (2/48) subcentimeter in size is unchanged, again, likely cyst. No  adrenal nodule. Mild pancreatic atrophy.    Bones/soft tissue: Degenerative changes of the spine. No acute or  suspicious osseous abnormality.      Impression    IMPRESSION:  1. Stable post surgical changes of the right upper and middle lobes.  There is a minimally increased pleural-based nodule in the right upper  lobe, measuring up to 6 mm, consider short-term follow-up to assess  stability.  3. Unchanged 6 mm solid pulmonary nodule in the left lower lobe.    I have personally reviewed the examination and initial interpretation  and I agree with the findings.    JOE CORTÉS MD         SYSTEM ID:  [id number]           Assessment/Plan  Stage I NSCLC - He is now over 5 years since his surgery for stage I NSCLC.  He is doing well.  He has no evidence of disease recurrence.  His CT report shows a slight increase in pleural nodularity, but this is still only about 1mm different in a year long period.  In my review of his scan this is not a notable difference and could be due to his breath hold or natural variation from scan to scan. Therefore, we will simply monitor with yearly scan as we have been doing.  I discussed this with the patient and he is in  agreement with the plan.     Follow-up in 1 year with repeat CT chest without contrast.  I told him to give us a call in the interim if he were having more symptoms or had more concerns.     I spent 25 minutes with the patient, reviewing imaging, and documenting this note on the date of the encounter.     Chava Kelley   of Medicine  Division of Hematology, Oncology, and Transplantation      Again, thank you for allowing me to participate in the care of your patient.        Sincerely,        Chava Kelley, DO

## 2024-07-23 NOTE — NURSING NOTE
"Oncology Rooming Note    July 23, 2024 2:05 PM   Vishnu Viveros is a 74 year old male who presents for:    Chief Complaint   Patient presents with    Oncology Clinic Visit     Adenocarcinoma of lung, stage 1, right      Initial Vitals: /63 (BP Location: Right arm, Patient Position: Sitting, Cuff Size: Adult Regular)   Pulse 70   Temp 97.9  F (36.6  C) (Oral)   Resp 12   Wt 82.6 kg (182 lb)   SpO2 100%   BMI 31.24 kg/m   Estimated body mass index is 31.24 kg/m  as calculated from the following:    Height as of 7/22/24: 1.626 m (5' 4\").    Weight as of this encounter: 82.6 kg (182 lb). Body surface area is 1.93 meters squared.  No Pain (0) Comment: Data Unavailable   No LMP for male patient.  Allergies reviewed: Yes  Medications reviewed: Yes    Medications: no  Pharmacy name entered into [employer]:    [id number] - [place], [place] - [address] [facility] [address] [id number] - [place], [place] - [address] [facility]    Frailty Screening:   Is the patient here for a new oncology consult visit in cancer care? 2. No      Clinical concerns: would like copy of ct scan       Rick Antunez              "

## 2024-07-24 DIAGNOSIS — H35.3230 BILATERAL EXUDATIVE AGE-RELATED MACULAR DEGENERATION, UNSPECIFIED STAGE (H): Primary | ICD-10-CM

## 2024-08-16 ENCOUNTER — THERAPY VISIT (OUTPATIENT)
Dept: PHYSICAL THERAPY | Facility: CLINIC | Age: 74
End: 2024-08-16
Payer: COMMERCIAL

## 2024-08-16 DIAGNOSIS — M25.511 ACUTE PAIN OF RIGHT SHOULDER: Primary | ICD-10-CM

## 2024-08-16 DIAGNOSIS — R29.898 WEAKNESS OF RIGHT SHOULDER: ICD-10-CM

## 2024-08-16 DIAGNOSIS — M25.611 STIFFNESS OF RIGHT SHOULDER JOINT: ICD-10-CM

## 2024-08-16 PROCEDURE — 97140 MANUAL THERAPY 1/> REGIONS: CPT | Mod: GP

## 2024-08-16 PROCEDURE — 97110 THERAPEUTIC EXERCISES: CPT | Mod: GP

## 2024-08-16 PROCEDURE — 97530 THERAPEUTIC ACTIVITIES: CPT | Mod: GP

## 2024-08-22 ENCOUNTER — THERAPY VISIT (OUTPATIENT)
Dept: PHYSICAL THERAPY | Facility: CLINIC | Age: 74
End: 2024-08-22
Payer: COMMERCIAL

## 2024-08-22 DIAGNOSIS — M25.511 ACUTE PAIN OF RIGHT SHOULDER: Primary | ICD-10-CM

## 2024-08-22 DIAGNOSIS — M25.611 STIFFNESS OF RIGHT SHOULDER JOINT: ICD-10-CM

## 2024-08-22 DIAGNOSIS — R29.898 WEAKNESS OF RIGHT SHOULDER: ICD-10-CM

## 2024-08-22 PROCEDURE — 97530 THERAPEUTIC ACTIVITIES: CPT | Mod: GP

## 2024-08-22 PROCEDURE — 97140 MANUAL THERAPY 1/> REGIONS: CPT | Mod: GP

## 2024-08-22 PROCEDURE — 97110 THERAPEUTIC EXERCISES: CPT | Mod: GP

## 2024-08-26 ENCOUNTER — OFFICE VISIT (OUTPATIENT)
Dept: FAMILY MEDICINE | Facility: CLINIC | Age: 74
End: 2024-08-26
Payer: COMMERCIAL

## 2024-08-26 VITALS
WEIGHT: 188.8 LBS | HEART RATE: 65 BPM | SYSTOLIC BLOOD PRESSURE: 130 MMHG | DIASTOLIC BLOOD PRESSURE: 84 MMHG | OXYGEN SATURATION: 96 % | TEMPERATURE: 97.4 F | BODY MASS INDEX: 32.41 KG/M2

## 2024-08-26 DIAGNOSIS — I10 BENIGN ESSENTIAL HYPERTENSION: Primary | ICD-10-CM

## 2024-08-26 DIAGNOSIS — J30.1 SEASONAL ALLERGIC RHINITIS DUE TO POLLEN: ICD-10-CM

## 2024-08-26 DIAGNOSIS — J30.2 SEASONAL ALLERGIES: ICD-10-CM

## 2024-08-26 PROCEDURE — 99214 OFFICE O/P EST MOD 30 MIN: CPT | Performed by: FAMILY MEDICINE

## 2024-08-26 RX ORDER — LORATADINE 10 MG/1
10 TABLET ORAL DAILY PRN
Qty: 90 TABLET | Refills: 3 | Status: SHIPPED | OUTPATIENT
Start: 2024-08-26

## 2024-08-26 RX ORDER — MONTELUKAST SODIUM 10 MG/1
1 TABLET ORAL AT BEDTIME
Qty: 90 TABLET | Refills: 3 | Status: SHIPPED | OUTPATIENT
Start: 2024-08-26

## 2024-08-26 NOTE — PROGRESS NOTES
"  Assessment & Plan     Benign essential hypertension  Blood pressure adequate control on losartan 100 mg, he has discontinued chlorthalidone and potassium supplementation.  He also takes tamsulosin 0.4 mg capsules at bedtime for lower urinary tract symptoms    Seasonal allergies  Seasonal allergic rhinitis due to pollen    I refilled montelukast 10 mg and Claritin 10 mg for his seasonal allergies.  - montelukast (SINGULAIR) 10 MG tablet  Dispense: 90 tablet; Refill: 3  -     loratadine (CLARITIN) 10 MG tablet; Take 1 tablet (10 mg) by mouth daily as needed for allergies.             BMI  Estimated body mass index is 32.41 kg/m  as calculated from the following:    Height as of 7/22/24: 1.626 m (5' 4\").    Weight as of this encounter: 85.6 kg (188 lb 12.8 oz).           I recommended RSV vaccine through local pharmacy.  I recommended influenza vaccine through local pharmacy   I recommended COVID-vaccine he indicates he is not able to take the COVID-vaccine.    34 minutes spent on the date of the encounter doing chart review, history, exam, diagnostics review, documentation, evaluation & management, counseling and coordination of cares as noted.   Return in about 6 months (around 2/26/2025) for follow-up.  Mruali Logan MD   Scarlett Mares is a 74 year old, presenting for the following health issues:  Follow Up (Blood pressure follow up, right leg pain onset about a month ago; pt unsure about cause )        8/26/2024     8:49 AM   Additional Questions   Roomed by MR     History of Present Illness       Reason for visit:  Blood pressure      Vishnu Viveros 74 HX type 2 diabetes mellitus controlled, hyperlipidemia, hypertension, colon polyps, DJD with spinal stenosis back, previous elevated PSA, and stage 1 right adenocarcinoma of the lung s/p resection with clear margins 2019, CT follow-up in one year due 7/2025, recent right shoulder pain suspected rotator cuff injury who presents  for primary care " visit follow-up Hypertension after reduction in doses.  Hypertension  Currently on Losartan 100 mg daily with discontinuation of chlorthalidone and potassium due to slightly low bp readings.  He feels stable on his medications.  Type 2 diabetes  Currently metformin  mg 2 tablets at dinner for 1000 mg dose, most recent A1c 6.3 June 8 excellent range.  Seasonal allergies  Requires refill of loratadine 10 mg and Singulair 10 mg.  He briefly mentioned occasional right back pain but is not concerned about this at this time does not want to follow-up with orthopedics or pain management for injections.  He is following with physical therapy for right shoulder discomfort.  Problem list, PMH, Surgical HX, FH, SH, allergies, medications,immunizations reviewed and updated in Epic.  ROS noted in HPI and ROS,staff / patient questionnaires reviewed by me.   SH: He continues to work as  [employer].  They may be planning a trip to [place] in [place] in January and he will schedule a travel visit prior.      BP Readings from Last 3 Encounters:   08/26/24 130/84   07/23/24 108/63   07/22/24 104/67    Wt Readings from Last 3 Encounters:   08/26/24 85.6 kg (188 lb 12.8 oz)   07/23/24 82.6 kg (182 lb)   07/22/24 82.6 kg (182 lb 3.2 oz)               Immunization History   Administered Date(s) Administered    COVID-19 Bivalent 12+ (Pfizer) 12/12/2022    COVID-19 MONOVALENT 12+ (Pfizer) 05/07/2022    COVID-19 Monovalent 18+ (Moderna) 02/10/2021, 03/10/2021, 11/04/2021    Flu, Unspecified 11/06/2009    Influenza (H1N1) 02/02/2010    Influenza (High Dose) 3 valent vaccine 10/30/2014, 11/19/2015, 11/28/2016, 11/27/2017, 10/31/2018, 12/11/2019    Influenza (IIV3) PF 12/30/2005, 12/05/2006, 12/11/2007, 11/04/2008, 11/06/2009, 10/14/2010, 11/14/2011, 11/12/2012, 11/14/2013    Influenza Vaccine 65+ (Fluzone HD) 11/16/2020, 12/06/2021, 12/12/2022, 12/11/2023    Influenza, seasonal, injectable, PF 10/14/2010    Pneumo Conj 13-V  (2010&after) 10/30/2014    Pneumococcal 23 valent 11/19/2015    TD,PF 7+ (Tenivac) 02/12/2007    TDAP (Adacel,Boostrix) 07/31/2017    TDAP Vaccine (Boostrix) 07/31/2017    Td (Adult), Adsorbed 02/12/2007    Zoster recombinant adjuvanted (SHINGRIX) 12/11/2019, 08/26/2020         Patient Active Problem List   Diagnosis    Erectile dysfunction    Seasonal allergies    Vitamin D deficiency    Pulmonary nodule    Prostate nodule    DJD (degenerative joint disease) of knee    HL (hearing loss)    Vision impairment    Tear film insufficiency    Recurrent pterygium    Other age-related cataract of left eye    Type 2 diabetes mellitus treated without insulin (H)    Diabetic polyneuropathy associated with diabetes mellitus due to underlying condition (H)    Tubulovillous adenoma of colon    Lumbar radiculopathy    Adenocarcinoma of lung, stage 1, right (H)    Benign essential hypertension    History of colonic polyps    Spinal stenosis, lumbar region, with neurogenic claudication    Elevated prostate specific antigen (PSA)    Obesity with body mass index 30 or greater    Solitary pulmonary nodule    Evaluation of hearing impairment    Combined forms of age-related cataract of both eyes    Balance problems    Neck pain    Cervical spondylosis with myelopathy    Cervical stenosis of spinal canal    Central serous chorioretinopathy of both eyes    Intermittent exotropia, alternating    CNVM (choroidal neovascular membrane), left    Acute pain of right shoulder    Arthritis of right acromioclavicular joint    Stiffness of right shoulder joint    Weakness of right shoulder    Rotator cuff syndrome, right     Past Surgical History:   Procedure Laterality Date    APPENDECTOMY OPEN CHILD      ARTHROSCOPY KNEE Left     BIOPSY PROSTATE TRANSRECTAL      CATARACT IOL, RT/LT      EXCISE PTERYGIUM Right 1989    EXCISE PTERYGIUM Right 02/12/2015    INJECT EPIDURAL LUMBAR / SACRAL SINGLE N/A 07/21/2016    Procedure: INJECT EPIDURAL LUMBAR /  SACRAL SINGLE;  Surgeon: Judah Henriquez MD;  Location: [facility]    INJECT PARAVERTEBRAL FACET JOINT LUMBAR / SACRAL FIRST Right 2016    Procedure: INJECT PARAVERTEBRAL FACET JOINT LUMBAR / SACRAL FIRST;  Surgeon: Judah Henriquez MD;  Location: [facility]    INJECT PARAVERTEBRAL FACET JOINT LUMBAR / SACRAL FIRST Right 2016    Procedure: INJECT PARAVERTEBRAL FACET JOINT LUMBAR / SACRAL FIRST;  Surgeon: Judah Henriquez MD;  Location: [facility]    MEDIASTINOSCOPY Right 2019     [place]  Dr Hemanth Covington 18 bronchoscopy, cervical mediastinoscopy for staging and minimally invasive right thoracoscopic wedge resection 1.8 cm invasive adenocarcinoma [id number] stage 1A2 adenocarcinoma completely resected via minimally invasive right upper lobectomy.    PHACOEMULSIFICATION WITH STANDARD INTRAOCULAR LENS IMPLANT Right 07/10/2023    Procedure: RIGHT EYE PHACOEMULSIFICATION, CATARACT, WITH STANDARD INTRAOCULAR LENS IMPLANT INSERTION;  Surgeon: Rick Campbell MD;  Location: [facility] OR    PHACOEMULSIFICATION WITH STANDARD INTRAOCULAR LENS IMPLANT Left 2023    Procedure: LEFT EYE PHACOEMULSIFICATION, CATARACT, WITH STANDARD INTRAOCULAR LENS IMPLANT INSERTION;  Surgeon: Rick Campbell MD;  Location: [facility] OR    PUNCTALPLASTY Bilateral 2018    Procedure: Bilateral Punctoplasty with Mini [place] Stent;  Surgeon: Levy Blue MD;  Location: [facility]    REPAIR ECTROPION BILATERAL Bilateral 2018    Procedure: Bilateral Lower Lid Ectropion Repair with Right Lower Lid Lesion Excision;  Surgeon: Levy Blue MD;  Location: [facility]       Social History     Tobacco Use    Smoking status: Former     Current packs/day: 0.00     Types: Cigarettes     Start date: 1976     Quit date: 1981     Years since quittin.8    Smokeless tobacco: Never   Substance Use Topics    Alcohol use: Yes     Alcohol/week: 2.5 standard drinks of alcohol     Types: 3 Standard drinks or equivalent per week      Family History   Problem Relation Age of Onset    Cerebrovascular Disease Brother          stroke     Breast Cancer Sister     Kidney Disease Mother         Kidney stones    Other - See Comments Father          in [place] parkinsons or post surgical eye surgery    Cerebral aneurysm Brother     Hypothyroidism Sister     Glaucoma No family hx of     Macular Degeneration No family hx of          Current Outpatient Medications   Medication Sig Dispense Refill    acetaminophen (TYLENOL) 500 MG tablet Take 1-2 tablets (500-1,000 mg) by mouth every 6 hours as needed for pain (arthritis) 120 tablet 3    Ascorbic Acid (VITAMIN C PO) Take 500 mg by mouth once      atorvastatin (LIPITOR) 20 MG tablet Take 1 tablet (20 mg) by mouth daily 90 tablet 3    Cholecalciferol (VITAMIN D) 1000 UNIT capsule Take 1 capsule by mouth daily.      clotrimazole (LOTRIMIN) 1 % external cream Apply topically 2 times daily as needed (rash) 60 g 1    Cyanocobalamin (VITAMIN B12 PO) Take 1 tablet by mouth Pt states he takes this 2 or 3 times a week.      cyclobenzaprine (FLEXERIL) 10 MG tablet Take 1 tablet (10 mg) by mouth 2 times daily as needed for muscle spasms (take at bedtime for muscle pain) Must not drive - Oral 30 tablet 3    finasteride (PROSCAR) 5 MG tablet Take 1 tablet (5 mg) by mouth daily 90 tablet 1    gabapentin (NEURONTIN) 300 MG capsule Increase to twice daily 180 capsule 1    loratadine (CLARITIN) 10 MG tablet Take 1 tablet (10 mg) by mouth daily as needed for allergies. 90 tablet 3    losartan (COZAAR) 100 MG tablet Take 1 tablet (100 mg) by mouth every evening 90 tablet 1    melatonin 3 MG tablet Take 1 tablet (3 mg) by mouth nightly as needed for sleep (may take 1-2 if needed) 60 tablet 3    metFORMIN (GLUCOPHAGE XR) 500 MG 24 hr tablet Take 2 tablets (1,000 mg) by mouth daily (with dinner) 180 tablet 1    methocarbamol (ROBAXIN) 500 MG tablet Take 1 tablet (500 mg) by mouth 3 times daily as needed for muscle spasms  "90 tablet 1    montelukast (SINGULAIR) 10 MG tablet Take 1 tablet (10 mg) by mouth at bedtime. 90 tablet 3    Multiple Vitamin (MULTIVITAMINS PO) Take 1 tablet by mouth daily.      naproxen (NAPROSYN) 500 MG tablet Take 1 tablet (500 mg) by mouth 2 times daily as needed for moderate pain (take with food) 30 tablet 0    olopatadine (PATADAY) 0.2 % ophthalmic solution Place 0.05 mLs (1 drop) into both eyes daily 2.5 mL 5    Omega-3 Fatty Acids (FISH OIL PO) Take 1 capsule by mouth as needed      order for DME Back brace 1 Device 0    prednisoLONE acetate (PRED FORTE) 1 % ophthalmic suspension Apply 1 drop to eye daily To operative eye 5 mL 1    tamsulosin (FLOMAX) 0.4 MG capsule Take 1 capsule (0.4 mg) by mouth daily 90 capsule 1     Allergies   Allergen Reactions    Pollen Extract      Other reaction(s): Headache  Sinus congestion, sinus \"drainage\", sneezing    Seasonal Allergies      Sinus congestion, sinus \"drainage\", sneezing    Nkda [No Known Drug Allergy]      Recent Labs   Lab Test 06/08/24  0946 12/09/23  1013 06/10/23  0955 12/10/22  1021 01/24/22  1145 12/06/21  0947 06/07/21  1044 11/16/20  1026 05/27/20  0957 10/26/19  1040 11/27/17  1107 07/31/17  1031   A1C 6.3* 6.5* 6.4* 6.7*   < > 5.9* 5.8* 5.8*   < > 6.2*   < > 6.3*   LDL  --  55  --  60  --  47  --  39  --  58   < > 52   HDL  --  65  --  71  --  59  --  64  --  49   < > 60   TRIG  --  53  --  70  --  94  --  116  --  91   < > 54   ALT  --  14 18 17   < > 27  --  27   < > 26   < > 29   CR 0.95 0.91 0.98 1.06   < > 1.01 0.95 0.93   < > 0.96   < > 0.79   GFRESTIMATED 84 88 81 74   < > 74 80 82   < > 79   < > >90  Non  GFR Calc     GFRESTBLACK  --   --   --   --   --   --  >90 >90   < > >90   < > >90  African American GFR Calc     POTASSIUM 4.2 3.9 4.0 4.4   < > 4.0 3.7 3.5   < > 3.8   < > 4.2   TSH  --   --   --   --   --   --   --   --   --  1.56  --  1.67    < > = values in this interval not displayed.                 Review of " Systems  Problem list, PMH, Surgical HX, SH, allergies, medications,immunizations reviewed and updated in Epic.  ROS noted in HPI and ROS,staff / patient questionnaires reviewed by me.         Objective    /84 (BP Location: Left arm, Patient Position: Sitting, Cuff Size: Adult Large)   Pulse 65   Temp 97.4  F (36.3  C)   Wt 85.6 kg (188 lb 12.8 oz)   SpO2 96%   BMI 32.41 kg/m    Body mass index is 32.41 kg/m .  Physical Exam   GENERAL APPEARANCE: healthy, alert and no distress BMI  32.41  EYES: Eyes grossly normal to inspection, PERRL and conjunctivae and sclerae normal  NECK: no adenopathy, no asymmetry, masses, or scars and thyroid normal to palpation  RESP: lungs clear to auscultation - no rales, rhonchi or wheezes  CV: regular rate and rhythm, normal S1 S2, no S3 or S4, no murmur, click or rub  NEURO: Normal strength  mentation intact and speech normal  PSYCH: mentation appears normal     Signed Electronically by: Murali Logan MD

## 2024-08-30 ENCOUNTER — THERAPY VISIT (OUTPATIENT)
Dept: PHYSICAL THERAPY | Facility: CLINIC | Age: 74
End: 2024-08-30
Payer: COMMERCIAL

## 2024-08-30 DIAGNOSIS — R29.898 WEAKNESS OF RIGHT SHOULDER: ICD-10-CM

## 2024-08-30 DIAGNOSIS — M25.511 ACUTE PAIN OF RIGHT SHOULDER: Primary | ICD-10-CM

## 2024-08-30 DIAGNOSIS — M25.611 STIFFNESS OF RIGHT SHOULDER JOINT: ICD-10-CM

## 2024-08-30 PROCEDURE — 97530 THERAPEUTIC ACTIVITIES: CPT | Mod: GP

## 2024-08-30 PROCEDURE — 97110 THERAPEUTIC EXERCISES: CPT | Mod: GP

## 2024-08-30 PROCEDURE — 97140 MANUAL THERAPY 1/> REGIONS: CPT | Mod: GP

## 2024-09-13 ENCOUNTER — THERAPY VISIT (OUTPATIENT)
Dept: PHYSICAL THERAPY | Facility: CLINIC | Age: 74
End: 2024-09-13
Payer: COMMERCIAL

## 2024-09-13 DIAGNOSIS — M25.611 STIFFNESS OF RIGHT SHOULDER JOINT: ICD-10-CM

## 2024-09-13 DIAGNOSIS — M25.511 ACUTE PAIN OF RIGHT SHOULDER: Primary | ICD-10-CM

## 2024-09-13 DIAGNOSIS — R29.898 WEAKNESS OF RIGHT SHOULDER: ICD-10-CM

## 2024-09-13 PROCEDURE — 97530 THERAPEUTIC ACTIVITIES: CPT | Mod: GP | Performed by: PHYSICAL THERAPIST

## 2024-09-13 PROCEDURE — 97110 THERAPEUTIC EXERCISES: CPT | Mod: GP | Performed by: PHYSICAL THERAPIST

## 2024-09-13 PROCEDURE — 97140 MANUAL THERAPY 1/> REGIONS: CPT | Mod: GP | Performed by: PHYSICAL THERAPIST

## 2024-09-13 NOTE — PROGRESS NOTES
SHOULDER RANGE OF MOTION  Limited ext/IR L4, no change following MT  Improved pain with OH reaching post MT    PROM Flexion Abd 90-90 ER 90-90 IR Scap Stabilized Horizontal Adduction   Left 150 na  na 50 na   Right 150 na 90 45 na   GH indicates pure glenohumeral ROM

## 2024-09-25 ENCOUNTER — TELEPHONE (OUTPATIENT)
Dept: FAMILY MEDICINE | Facility: CLINIC | Age: 74
End: 2024-09-25
Payer: COMMERCIAL

## 2024-09-25 NOTE — TELEPHONE ENCOUNTER
Patient confirmed scheduled appointment:  Date: 10/9/2024  Time: 7:30am  Visit type: UMP RETURN  Provider: PCP    Additional notes: Referral for pain per pt

## 2024-09-25 NOTE — TELEPHONE ENCOUNTER
[facility] Call Center    Phone Message    May a detailed message be left on voicemail: yes     Reason for Call: Other: Pt requesting call back from a nurse, pt would like to discuss getting an order for a CT scan. Writer tried to ask what type of CT scan etc and pt stated I just need to speak with a nurse and that the writer was asking too many questions

## 2024-10-09 ENCOUNTER — OFFICE VISIT (OUTPATIENT)
Dept: FAMILY MEDICINE | Facility: CLINIC | Age: 74
End: 2024-10-09
Payer: COMMERCIAL

## 2024-10-09 ENCOUNTER — LAB (OUTPATIENT)
Dept: LAB | Facility: CLINIC | Age: 74
End: 2024-10-09
Payer: COMMERCIAL

## 2024-10-09 VITALS
HEART RATE: 67 BPM | DIASTOLIC BLOOD PRESSURE: 80 MMHG | OXYGEN SATURATION: 98 % | BODY MASS INDEX: 32.61 KG/M2 | SYSTOLIC BLOOD PRESSURE: 149 MMHG | TEMPERATURE: 97.8 F | WEIGHT: 190 LBS

## 2024-10-09 DIAGNOSIS — E78.5 HYPERLIPIDEMIA LDL GOAL <100: ICD-10-CM

## 2024-10-09 DIAGNOSIS — M47.12 CERVICAL SPONDYLOSIS WITH MYELOPATHY: Primary | ICD-10-CM

## 2024-10-09 DIAGNOSIS — M47.12 CERVICAL SPONDYLOSIS WITH MYELOPATHY: ICD-10-CM

## 2024-10-09 DIAGNOSIS — M54.16 LUMBAR RADICULOPATHY: ICD-10-CM

## 2024-10-09 DIAGNOSIS — E08.42 DIABETIC POLYNEUROPATHY ASSOCIATED WITH DIABETES MELLITUS DUE TO UNDERLYING CONDITION (H): ICD-10-CM

## 2024-10-09 DIAGNOSIS — R29.898 RIGHT ARM WEAKNESS: ICD-10-CM

## 2024-10-09 DIAGNOSIS — N40.1 BENIGN LOCALIZED PROSTATIC HYPERPLASIA WITH LOWER URINARY TRACT SYMPTOMS (LUTS): ICD-10-CM

## 2024-10-09 DIAGNOSIS — I10 BENIGN ESSENTIAL HYPERTENSION: ICD-10-CM

## 2024-10-09 DIAGNOSIS — M48.02 CERVICAL STENOSIS OF SPINAL CANAL: ICD-10-CM

## 2024-10-09 DIAGNOSIS — R26.9 ABNORMAL GAIT: ICD-10-CM

## 2024-10-09 DIAGNOSIS — E11.9 TYPE 2 DIABETES MELLITUS TREATED WITHOUT INSULIN (H): ICD-10-CM

## 2024-10-09 DIAGNOSIS — J30.2 SEASONAL ALLERGIES: ICD-10-CM

## 2024-10-09 DIAGNOSIS — Z23 ENCOUNTER FOR IMMUNIZATION: ICD-10-CM

## 2024-10-09 DIAGNOSIS — J30.1 SEASONAL ALLERGIC RHINITIS DUE TO POLLEN: ICD-10-CM

## 2024-10-09 LAB
ALBUMIN SERPL BCG-MCNC: 4.4 G/DL (ref 3.5–5.2)
ALP SERPL-CCNC: 52 U/L (ref 40–150)
ALT SERPL W P-5'-P-CCNC: 19 U/L (ref 0–70)
ANION GAP SERPL CALCULATED.3IONS-SCNC: 8 MMOL/L (ref 7–15)
AST SERPL W P-5'-P-CCNC: 20 U/L (ref 0–45)
BASOPHILS # BLD AUTO: 0 10E3/UL (ref 0–0.2)
BASOPHILS NFR BLD AUTO: 1 %
BILIRUB SERPL-MCNC: 0.3 MG/DL
BUN SERPL-MCNC: 20.5 MG/DL (ref 8–23)
CALCIUM SERPL-MCNC: 9.5 MG/DL (ref 8.8–10.4)
CHLORIDE SERPL-SCNC: 104 MMOL/L (ref 98–107)
CHOLEST SERPL-MCNC: 147 MG/DL
CREAT SERPL-MCNC: 0.91 MG/DL (ref 0.51–1.17)
CRP SERPL-MCNC: <3 MG/L
EGFRCR SERPLBLD CKD-EPI 2021: 88 ML/MIN/1.73M2
EOSINOPHIL # BLD AUTO: 0.2 10E3/UL (ref 0–0.7)
EOSINOPHIL NFR BLD AUTO: 6 %
ERYTHROCYTE [DISTWIDTH] IN BLOOD BY AUTOMATED COUNT: 12.7 % (ref 10–15)
EST. AVERAGE GLUCOSE BLD GHB EST-MCNC: 134 MG/DL
FASTING STATUS PATIENT QL REPORTED: YES
FASTING STATUS PATIENT QL REPORTED: YES
GLUCOSE SERPL-MCNC: 103 MG/DL (ref 70–99)
HBA1C MFR BLD: 6.3 %
HCO3 SERPL-SCNC: 27 MMOL/L (ref 22–29)
HCT VFR BLD AUTO: 37 % (ref 35–53)
HDLC SERPL-MCNC: 60 MG/DL
HGB BLD-MCNC: 12 G/DL (ref 11.7–17.7)
IMM GRANULOCYTES # BLD: 0 10E3/UL
IMM GRANULOCYTES NFR BLD: 0 %
LDLC SERPL CALC-MCNC: 71 MG/DL
LYMPHOCYTES # BLD AUTO: 1.7 10E3/UL (ref 0.8–5.3)
LYMPHOCYTES NFR BLD AUTO: 47 %
MCH RBC QN AUTO: 29.3 PG (ref 26.5–33)
MCHC RBC AUTO-ENTMCNC: 32.4 G/DL (ref 31.5–36.5)
MCV RBC AUTO: 90 FL (ref 78–100)
MONOCYTES # BLD AUTO: 0.4 10E3/UL (ref 0–1.3)
MONOCYTES NFR BLD AUTO: 10 %
NEUTROPHILS # BLD AUTO: 1.3 10E3/UL (ref 1.6–8.3)
NEUTROPHILS NFR BLD AUTO: 36 %
NONHDLC SERPL-MCNC: 87 MG/DL
NRBC # BLD AUTO: 0 10E3/UL
NRBC BLD AUTO-RTO: 0 /100
PLATELET # BLD AUTO: 199 10E3/UL (ref 150–450)
POTASSIUM SERPL-SCNC: 4.2 MMOL/L (ref 3.4–5.3)
PROT SERPL-MCNC: 7 G/DL (ref 6.4–8.3)
RBC # BLD AUTO: 4.1 10E6/UL (ref 3.8–5.9)
SODIUM SERPL-SCNC: 139 MMOL/L (ref 135–145)
TRIGL SERPL-MCNC: 82 MG/DL
TSH SERPL DL<=0.005 MIU/L-ACNC: 3.51 UIU/ML (ref 0.3–4.2)
WBC # BLD AUTO: 3.6 10E3/UL (ref 4–11)

## 2024-10-09 PROCEDURE — 86140 C-REACTIVE PROTEIN: CPT | Performed by: PATHOLOGY

## 2024-10-09 PROCEDURE — 36415 COLL VENOUS BLD VENIPUNCTURE: CPT | Performed by: PATHOLOGY

## 2024-10-09 PROCEDURE — 99417 PROLNG OP E/M EACH 15 MIN: CPT | Performed by: FAMILY MEDICINE

## 2024-10-09 PROCEDURE — 85025 COMPLETE CBC W/AUTO DIFF WBC: CPT | Performed by: PATHOLOGY

## 2024-10-09 PROCEDURE — 90471 IMMUNIZATION ADMIN: CPT | Performed by: FAMILY MEDICINE

## 2024-10-09 PROCEDURE — 80061 LIPID PANEL: CPT | Performed by: PATHOLOGY

## 2024-10-09 PROCEDURE — 83036 HEMOGLOBIN GLYCOSYLATED A1C: CPT | Performed by: FAMILY MEDICINE

## 2024-10-09 PROCEDURE — 99215 OFFICE O/P EST HI 40 MIN: CPT | Mod: 25 | Performed by: FAMILY MEDICINE

## 2024-10-09 PROCEDURE — 84443 ASSAY THYROID STIM HORMONE: CPT | Performed by: PATHOLOGY

## 2024-10-09 PROCEDURE — 99000 SPECIMEN HANDLING OFFICE-LAB: CPT | Performed by: PATHOLOGY

## 2024-10-09 PROCEDURE — 90662 IIV NO PRSV INCREASED AG IM: CPT | Performed by: FAMILY MEDICINE

## 2024-10-09 PROCEDURE — 80053 COMPREHEN METABOLIC PANEL: CPT | Performed by: PATHOLOGY

## 2024-10-09 RX ORDER — CELECOXIB 100 MG/1
100 CAPSULE ORAL DAILY
Qty: 30 CAPSULE | Refills: 1 | Status: SHIPPED | OUTPATIENT
Start: 2024-10-09

## 2024-10-09 RX ORDER — TAMSULOSIN HYDROCHLORIDE 0.4 MG/1
0.4 CAPSULE ORAL DAILY
Qty: 90 CAPSULE | Refills: 1 | Status: SHIPPED | OUTPATIENT
Start: 2024-10-09

## 2024-10-09 RX ORDER — FINASTERIDE 5 MG/1
5 TABLET, FILM COATED ORAL DAILY
Qty: 90 TABLET | Refills: 1 | Status: SHIPPED | OUTPATIENT
Start: 2024-10-09

## 2024-10-09 RX ORDER — CELECOXIB 100 MG/1
100 CAPSULE ORAL DAILY
Qty: 30 CAPSULE | Refills: 1 | Status: SHIPPED | OUTPATIENT
Start: 2024-10-09 | End: 2024-10-09

## 2024-10-09 RX ORDER — METFORMIN HYDROCHLORIDE 500 MG/1
1000 TABLET, EXTENDED RELEASE ORAL
Qty: 180 TABLET | Refills: 1 | Status: SHIPPED | OUTPATIENT
Start: 2024-10-09

## 2024-10-09 RX ORDER — LOSARTAN POTASSIUM 100 MG/1
100 TABLET ORAL EVERY EVENING
Qty: 90 TABLET | Refills: 1 | Status: SHIPPED | OUTPATIENT
Start: 2024-10-09

## 2024-10-09 RX ORDER — MONTELUKAST SODIUM 10 MG/1
1 TABLET ORAL AT BEDTIME
Qty: 90 TABLET | Refills: 3 | Status: SHIPPED | OUTPATIENT
Start: 2024-10-09

## 2024-10-09 RX ORDER — LORATADINE 10 MG/1
10 TABLET ORAL DAILY PRN
Qty: 90 TABLET | Refills: 3 | Status: SHIPPED | OUTPATIENT
Start: 2024-10-09

## 2024-10-09 RX ORDER — ATORVASTATIN CALCIUM 20 MG/1
20 TABLET, FILM COATED ORAL DAILY
Qty: 90 TABLET | Refills: 3 | Status: SHIPPED | OUTPATIENT
Start: 2024-10-09

## 2024-10-09 RX ORDER — GABAPENTIN 300 MG/1
CAPSULE ORAL
Qty: 180 CAPSULE | Refills: 1 | Status: SHIPPED | OUTPATIENT
Start: 2024-10-09 | End: 2024-10-18

## 2024-10-09 ASSESSMENT — PAIN SCALES - GENERAL: PAINLEVEL: EXTREME PAIN (8)

## 2024-10-09 NOTE — PROGRESS NOTES
Assessment & Plan     Cervical spondylosis with myelopathy  Cervical stenosis of spinal canal  Right arm weakness  Recommended MRI of cervical spine, started Celebrex 100 mg daily, discontinue other nonsteroidal anti-inflammatory medications.  Recommend referral for cervical spine evaluation suspect C6-7 radicular symptoms.  - Spine  Referral  - CBC with platelets and differential  - CRP inflammation  - celecoxib (CELEBREX) 100 MG capsule  Dispense: 30 capsule; Refill: 1  - MR Cervical Spine w/o & w Contrast      Lumbar radiculopathy  Abnormal gait  HX of Moderate to severe spinal canal stenosis at L2-3, . Moderate spinal canal stenosis at L1-2, moderate spinal  canal stenosis at L3-4, Solid bony fusion at L4-5. Need lumber spine referral.  - Spine  Referral  - celecoxib (CELEBREX) 100 MG capsule  Dispense: 30 capsule; Refill: 1    Encounter for immunization  Provided today  - INFLUENZA HIGH DOSE, TRIVALENT, PF (FLUZONE)    Diabetic polyneuropathy associated with diabetes mellitus due to underlying condition (H)  Continue current management metformin  - CBC with platelets and differential  - Comprehensive metabolic panel (BMP + Alb, Alk Phos, ALT, AST, Total. Bili, TP)  - CRP inflammation  - Hemoglobin A1c  -     gabapentin (NEURONTIN) 300 MG capsule; Increase to twice daily  -     metFORMIN (GLUCOPHAGE XR) 500 MG 24 hr tablet; Take 2 tablets (1,000 mg) by mouth daily (with dinner).  Hyperlipidemia LDL goal <100  Atorvastatin 20 mg daily  - Lipid panel reflex to direct LDL Fasting  -     atorvastatin (LIPITOR) 20 MG tablet; Take 1 tablet (20 mg) by mouth daily.    Benign localized prostatic hyperplasia with lower urinary tract symptoms (LUTS)  -     finasteride (PROSCAR) 5 MG tablet; Take 1 tablet (5 mg) by mouth daily.  -     tamsulosin (FLOMAX) 0.4 MG capsule; Take 1 capsule (0.4 mg) by mouth daily.    Seasonal allergies  -     loratadine (CLARITIN) 10 MG tablet; Take 1 tablet (10 mg) by mouth  "daily as needed for allergies.  -     montelukast (SINGULAIR) 10 MG tablet; Take 1 tablet (10 mg) by mouth at bedtime.  Benign essential hypertension  -     losartan (COZAAR) 100 MG tablet; Take 1 tablet (100 mg) by mouth every evening.         He would like medications sent to [facility] we had to contact is spouse for correct pharmacy sent after visit.      BMI  Estimated body mass index is 32.61 kg/m  as calculated from the following:    Height as of 7/22/24: 1.626 m (5' 4\").    Weight as of this encounter: 86.2 kg (190 lb).     I discussed need to reestablish primary care as I am leaving the clinic as of November 1, 2024 due to long-term.      55 minutes spent on the date of the encounter doing chart review, history, exam, diagnostics review, documentation, evaluation & management, counseling and coordination of cares as noted.   Return in about 1 month (around 11/11/2024). Will establish with Dr Hu Logan MD   Subjective   Vishnu is a 74 year old, presenting for the following health issues:  Follow Up (Pain in right hip and radiates down to knee; right shoulder pain persists and pt is currently in PT for right shoulder but only has one appointment left  )        10/9/2024     7:23 AM   Additional Questions   Roomed by MR     History of Present Illness       Reason for visit:  Joint pain Vishnu is missing 6 dose(s) of medications per week.     Vishnu [place] 74 HX type 2 diabetes mellitus controlled, hyperlipidemia, hypertension, colon polyps, DJD with spinal stenosis back, previous elevated PSA, and stage 1 right adenocarcinoma of the lung s/p resection with clear margins 2019, CT follow-up in one year due 7/2025, recent right shoulder pain suspected rotator cuff injury following with Dr Sprague and PT who presents  for primary care visit.   Has difficulty walking for at last 7 weeks  due to pain on the right side and has noted  weakness of the right arm worried it may be coming from " the cervical spine.  He continues to work in  at [employer] but colleagues have noted him having more difficulty ambulating and slowing down.  He notes right arm weakness, was working with physical therapy for rotator cuff has had increased range of motion in the shoulder feels physical therapy has been helpful.   He last saw lumbar spine provider in 2021.  He has chronic right lower back, hip and knee pain with pain radiating down right leg past his knee, weakness of the right leg.  He has type 2 diabetes currently taking metformin  mg 2 tablets for 1000 mg dose at dinner.  Hypertension takes losartan 100 mg daily, discontinued Chlorthalidone in July due to slightly low blood pressures & joint pain.      BP Readings from Last 3 Encounters:   10/09/24 (!) 149/80   08/26/24 130/84   07/23/24 108/63    Wt Readings from Last 3 Encounters:   10/09/24 86.2 kg (190 lb)   08/26/24 85.6 kg (188 lb 12.8 oz)   07/23/24 82.6 kg (182 lb)                  Patient Active Problem List   Diagnosis    Erectile dysfunction    Seasonal allergies    Vitamin D deficiency    Pulmonary nodule    Prostate nodule    DJD (degenerative joint disease) of knee    HL (hearing loss)    Vision impairment    Tear film insufficiency    Recurrent pterygium    Other age-related cataract of left eye    Type 2 diabetes mellitus treated without insulin (H)    Diabetic polyneuropathy associated with diabetes mellitus due to underlying condition (H)    Tubulovillous adenoma of colon    Lumbar radiculopathy    Adenocarcinoma of lung, stage 1, right (H)    Benign essential hypertension    History of colonic polyps    Spinal stenosis, lumbar region, with neurogenic claudication    Elevated prostate specific antigen (PSA)    Obesity with body mass index 30 or greater    Solitary pulmonary nodule    Evaluation of hearing impairment    Combined forms of age-related cataract of both eyes    Balance problems    Neck pain    Cervical  spondylosis with myelopathy    Cervical stenosis of spinal canal    Central serous chorioretinopathy of both eyes    Intermittent exotropia, alternating    CNVM (choroidal neovascular membrane), left    Acute pain of right shoulder    Arthritis of right acromioclavicular joint    Stiffness of right shoulder joint    Weakness of right shoulder    Rotator cuff syndrome, right     Past Surgical History:   Procedure Laterality Date    APPENDECTOMY OPEN CHILD      ARTHROSCOPY KNEE Left     BIOPSY PROSTATE TRANSRECTAL      CATARACT IOL, RT/LT      EXCISE PTERYGIUM Right 1989    EXCISE PTERYGIUM Right 02/12/2015    INJECT EPIDURAL LUMBAR / SACRAL SINGLE N/A 07/21/2016    Procedure: INJECT EPIDURAL LUMBAR / SACRAL SINGLE;  Surgeon: Judah Henriquez MD;  Location: [facility]    INJECT PARAVERTEBRAL FACET JOINT LUMBAR / SACRAL FIRST Right 08/18/2016    Procedure: INJECT PARAVERTEBRAL FACET JOINT LUMBAR / SACRAL FIRST;  Surgeon: Judah Henriquez MD;  Location: [facility]    INJECT PARAVERTEBRAL FACET JOINT LUMBAR / SACRAL FIRST Right 09/08/2016    Procedure: INJECT PARAVERTEBRAL FACET JOINT LUMBAR / SACRAL FIRST;  Surgeon: Judah Henriquez MD;  Location: [facility] OR    MEDIASTINOSCOPY Right 04/08/2019     [place]  Dr Hemanth C[place] 4/8/19 bronchoscopy, cervical mediastinoscopy for staging and minimally invasive right thoracoscopic wedge resection 1.8 cm invasive adenocarcinoma [id number] stage 1A2 adenocarcinoma completely resected via minimally invasive right upper lobectomy.    PHACOEMULSIFICATION WITH STANDARD INTRAOCULAR LENS IMPLANT Right 07/10/2023    Procedure: RIGHT EYE PHACOEMULSIFICATION, CATARACT, WITH STANDARD INTRAOCULAR LENS IMPLANT INSERTION;  Surgeon: Rick Campbell MD;  Location: [facility] OR    PHACOEMULSIFICATION WITH STANDARD INTRAOCULAR LENS IMPLANT Left 07/24/2023    Procedure: LEFT EYE PHACOEMULSIFICATION, CATARACT, WITH STANDARD INTRAOCULAR LENS IMPLANT INSERTION;  Surgeon: Rick Campbell MD;  [place] [facility] [place]     PUNCTALPLASTY Bilateral 2018    Procedure: Bilateral Punctoplasty with Mini [place] Stent;  Surgeon: Levy Blue MD;  Location: [facility]    REPAIR ECTROPION BILATERAL Bilateral 2018    Procedure: Bilateral Lower Lid Ectropion Repair with Right Lower Lid Lesion Excision;  Surgeon: Levy Blue MD;  Location: [facility] OR       Social History     Tobacco Use    Smoking status: Former     Current packs/day: 0.00     Types: Cigarettes     Start date: 1976     Quit date: 1981     Years since quittin.9    Smokeless tobacco: Never   Substance Use Topics    Alcohol use: Yes     Alcohol/week: 2.5 standard drinks of alcohol     Types: 3 Standard drinks or equivalent per week     Family History   Problem Relation Age of Onset    Cerebrovascular Disease Brother          stroke     Breast Cancer Sister     Kidney Disease Mother         Kidney stones    Other - See Comments Father          in [place] parkinsons or post surgical eye surgery    Cerebral aneurysm Brother     Hypothyroidism Sister     Glaucoma No family hx of     Macular Degeneration No family hx of          Current Outpatient Medications   Medication Sig Dispense Refill    acetaminophen (TYLENOL) 500 MG tablet Take 1-2 tablets (500-1,000 mg) by mouth every 6 hours as needed for pain (arthritis) 120 tablet 3    Ascorbic Acid (VITAMIN C PO) Take 500 mg by mouth once      atorvastatin (LIPITOR) 20 MG tablet Take 1 tablet (20 mg) by mouth daily 90 tablet 3    Cholecalciferol (VITAMIN D) 1000 UNIT capsule Take 1 capsule by mouth daily.      clotrimazole (LOTRIMIN) 1 % external cream Apply topically 2 times daily as needed (rash) 60 g 1    Cyanocobalamin (VITAMIN B12 PO) Take 1 tablet by mouth Pt states he takes this 2 or 3 times a week.      cyclobenzaprine (FLEXERIL) 10 MG tablet Take 1 tablet (10 mg) by mouth 2 times daily as needed for muscle spasms (take at bedtime for muscle pain) Must not drive - Oral 30 tablet 3    finasteride  "(PROSCAR) 5 MG tablet Take 1 tablet (5 mg) by mouth daily 90 tablet 1    gabapentin (NEURONTIN) 300 MG capsule Increase to twice daily 180 capsule 1    loratadine (CLARITIN) 10 MG tablet Take 1 tablet (10 mg) by mouth daily as needed for allergies. 90 tablet 3    losartan (COZAAR) 100 MG tablet Take 1 tablet (100 mg) by mouth every evening 90 tablet 1    melatonin 3 MG tablet Take 1 tablet (3 mg) by mouth nightly as needed for sleep (may take 1-2 if needed) 60 tablet 3    metFORMIN (GLUCOPHAGE XR) 500 MG 24 hr tablet Take 2 tablets (1,000 mg) by mouth daily (with dinner) 180 tablet 1    methocarbamol (ROBAXIN) 500 MG tablet Take 1 tablet (500 mg) by mouth 3 times daily as needed for muscle spasms 90 tablet 1    montelukast (SINGULAIR) 10 MG tablet Take 1 tablet (10 mg) by mouth at bedtime. 90 tablet 3    Multiple Vitamin (MULTIVITAMINS PO) Take 1 tablet by mouth daily.      naproxen (NAPROSYN) 500 MG tablet Take 1 tablet (500 mg) by mouth 2 times daily as needed for moderate pain (take with food) 30 tablet 0    olopatadine (PATADAY) 0.2 % ophthalmic solution Place 0.05 mLs (1 drop) into both eyes daily 2.5 mL 5    Omega-3 Fatty Acids (FISH OIL PO) Take 1 capsule by mouth as needed      order for DME Back brace 1 Device 0    prednisoLONE acetate (PRED FORTE) 1 % ophthalmic suspension Apply 1 drop to eye daily To operative eye 5 mL 1    tamsulosin (FLOMAX) 0.4 MG capsule Take 1 capsule (0.4 mg) by mouth daily 90 capsule 1     Allergies   Allergen Reactions    Pollen Extract      Other reaction(s): Headache  Sinus congestion, sinus \"drainage\", sneezing    Seasonal Allergies      Sinus congestion, sinus \"drainage\", sneezing    Nkda [No Known Drug Allergy]      Recent Labs   Lab Test 06/08/24  0946 12/09/23  1013 06/10/23  0955 12/10/22  1021 01/24/22  1145 12/06/21  0947 06/07/21  1044 11/16/20  1026 05/27/20  0957 10/26/19  1040 11/27/17  1107 07/31/17  1031   A1C 6.3* 6.5* 6.4* 6.7*   < > 5.9* 5.8* 5.8*   < > 6.2*   < " > 6.3*   LDL  --  55  --  60  --  47  --  39  --  58   < > 52   HDL  --  65  --  71  --  59  --  64  --  49   < > 60   TRIG  --  53  --  70  --  94  --  116  --  91   < > 54   ALT  --  14 18 17   < > 27  --  27   < > 26   < > 29   CR 0.95 0.91 0.98 1.06   < > 1.01 0.95 0.93   < > 0.96   < > 0.79   GFRESTIMATED 84 88 81 74   < > 74 80 82   < > 79   < > >90  Non  GFR Calc     GFRESTBLACK  --   --   --   --   --   --  >90 >90   < > >90   < > >90  African American GFR Calc     POTASSIUM 4.2 3.9 4.0 4.4   < > 4.0 3.7 3.5   < > 3.8   < > 4.2   TSH  --   --   --   --   --   --   --   --   --  1.56  --  1.67    < > = values in this interval not displayed.                     Review of Systems        Objective    BP (!) 149/80 (BP Location: Left arm, Patient Position: Sitting, Cuff Size: Adult Regular)   Pulse 67   Temp 97.8  F (36.6  C)   Wt 86.2 kg (190 lb)   SpO2 98%   BMI 32.61 kg/m    Body mass index is 32.61 kg/m .  Physical Exam   GENERAL APPEARANCE: healthy, alert and no distress although gait abnormal antalgic, due to right leg pain.  EYES: Eyes grossly normal to inspection, PERRL and conjunctivae and sclerae normal  NECK: no adenopathy, no asymmetry, masses, or scars and thyroid normal to palpation, non-tender to palpation in anterior right neck, slight pain right trapezius.  Range of motion of neck limitation with extension and slight limitation with flexion limitation with looking to the right and slight limitation with looking to the left complains of pain on the right trapezius and lower C6-7 region with some tightness in the muscle on the right trapezius.  See musculoskeletal  RESP: lungs clear to auscultation - no rales, rhonchi or wheezes  CV: regular rate and rhythm, normal S1 S2, no S3 or S4, no murmur, click or rub  MS: Upper body strength right arm slightly weak, normal musculature.  Range of motion of shoulder intact with slight pain with moving right arm and pain with neck motion  see neck.  Kyphosis.  Right arm decreased extension, internal and external rotation right arm due to right shoulder pain, pain with abduction and adduction.  Pain with palpation over acromioclavicular joint. Lower extremities right leg pain strength intact  NEURO: Normal strength except noted see musculoskeletal, DTRs right upper extremities 1+ right biceps, triceps, absent brachioradialis, Left arm 2+ biceps triceps brachioradialis, mentation intact and speech normal, Not able to toe walk on right.  PSYCH: mentation appears normal        Narrative & Impression   EXAM: XR CERVICAL SPINE 2/3 VIEWS 7/8/2024 5:47 PM     HISTORY: Acute pain of right shoulder; Cervical stenosis of spinal  canal      COMPARISON: Neck CT 9/21/2022. Chest CT 7/22/2023. Cervical spine CT  6/27/2010     FINDINGS: AP and lateral views of the cervical spine were obtained.  Grade 1 anterolisthesis C7-T1, unchanged compared to 7/22/2023.  Multilevel disc height loss, most pronounced at C6-7 with severe  height loss. Additional multilevel degenerative changes including  endplate osteophytosis, uncinate hypertrophy, and facet hypertrophy.  No prevertebral edema. No mass in the visualized lung apices.                                                                      IMPRESSION:  Multilevel cervical degenerative changes, most pronounced at C6-7,  similar to 9/21/2022.     I have personally reviewed the examination and initial interpretation  and I agree with the findings.     ERNESTINA RAMIREZ MD              4 views right shoulder radiographs 7/8/2024 6:30 PM     History: Acute pain of right shoulder     Comparison: None available     Findings:     AP, Grashey, transscapular Y, axillary  views of the right shoulder  were obtained.      No acute osseous abnormality. Glenohumeral and acromioclavicular  joints are congruent.     Mild degenerative changes of the acromioclavicular joint. Mild  degenerative change of the glenohumeral joint. Subcortical  cystic  changes and sclerosis within the superolateral humeral head, as can be  seen in rotator cuff pathology.     Soft tissue is unremarkable. The visualized lung is clear.                                                                      Impression:  1. No acute osseous abnormality.  2. No substantial degenerative change.  3. Subcortical cystic changes and sclerosis within the superolateral  humeral head, as can be seen in rotator cuff pathology.     JUTTA ELLERMANN, MD        Narrative & Impression   MR LUMBAR SPINE W/O CONTRAST 1/24/2024 9:48 AM     Provided History: Low back pain, unspecified     ICD-10: Low back pain, unspecified     Comparison: Plain radiographs 9/9/2022 and lumbar MR 8/8/2017     Technique: Sagittal T1-weighted, sagittal STIR, sagittal  diffusion-weighted (with ADC map), axial T1-weighted, and 3D  volumetric axial and sagittal reconstructed T2-weighted images of the  lumbar spine were obtained without intravenous contrast.      Findings:   There are 5 lumbar-type vertebrae assumed for the purposes of this  dictation.       The tip of the conus medullaris is at L1.       4 mm anterolisthesis at L3-4. Anterior bony fusion at L4-5.     There is multilevel disc height narrowing and disc desiccation.  Multilevel Schmorl's node-like endplate deformities. Multilevel  opposing endplate edema, most pronounced at L2-3. On a level by level  basis:     T12-L1: Disc bulge and bilateral facet hypertrophy. Mild bilateral  neural foraminal stenosis. No spinal canal stenosis.     L1-2: Disc osteophyte complex and bilateral facet hypertrophy.  Thickening of the ligamentum flavum. Moderate to severe bilateral  neural foraminal stenosis. Moderate spinal canal stenosis. Unchanged  from prior.     L2-3: Disc osteophyte complex and bilateral facet hypertrophy.  Moderate right and mild to moderate left neural foraminal stenosis.  Moderate to severe spinal canal stenosis. Progressed compared to  prior.     L3-4:  Severe loss of disc height. Anterolisthesis and posterior  osteophyte formation. Bilateral facet hypertrophy. Moderate spinal  canal stenosis. Mild to moderate bilateral neural foraminal stenosis.  Improved compared to to prior.     L4-5: Bony fusion. Bilateral facet hypertrophy. Posterior osteophyte  formation with mild to moderate bilateral neural foraminal stenosis.  Mild spinal canal stenosis. No significant change from prior.     L5-S1: No spinal canal or neuroforaminal stenosis.     Paraspinous tissues are within normal limits. Bilateral renal cysts.                                                                      Impression:   1. Multilevel lumbar spondylosis as detailed above. Moderate to severe  spinal canal stenosis at L2-3, progressed compared to prior. Moderate  spinal canal stenosis at L1-2 unchanged from prior and moderate spinal  canal stenosis at L3-4, improved compared to prior.  2. Solid bony fusion at L4-5.     CEM SCHWARTZ MD         Results for orders placed or performed in visit on 10/09/24   Comprehensive metabolic panel (BMP + Alb, Alk Phos, ALT, AST, Total. Bili, TP)     Status: Abnormal   Result Value Ref Range    Sodium 139 135 - 145 mmol/L    Potassium 4.2 3.4 - 5.3 mmol/L    Carbon Dioxide (CO2) 27 22 - 29 mmol/L    Anion Gap 8 7 - 15 mmol/L    Urea Nitrogen 20.5 8.0 - 23.0 mg/dL    Creatinine 0.91 0.51 - 1.17 mg/dL    GFR Estimate 88 >60 mL/min/1.73m2    Calcium 9.5 8.8 - 10.4 mg/dL    Chloride 104 98 - 107 mmol/L    Glucose 103 (H) 70 - 99 mg/dL    Alkaline Phosphatase 52 40 - 150 U/L    AST 20 0 - 45 U/L    ALT 19 0 - 70 U/L    Protein Total 7.0 6.4 - 8.3 g/dL    Albumin 4.4 3.5 - 5.2 g/dL    Bilirubin Total 0.3 <=1.2 mg/dL    Patient Fasting > 8hrs? Yes     Narrative    The generation of reference intervals for this test is currently based on binary male or female sex. If the electronic health record information indicates another gender identity or if Legal Sex is recorded as  "\"Unknown\", both male and female reference intervals are provided where applicable, and should be considered according to the individual's appropriate clinical context.   CRP inflammation     Status: Normal   Result Value Ref Range    CRP Inflammation <3.00 <5.00 mg/L   Lipid panel reflex to direct LDL Fasting     Status: None   Result Value Ref Range    Cholesterol 147 <200 mg/dL    Triglycerides 82 <150 mg/dL    Direct Measure HDL 60 >=40 mg/dL    LDL Cholesterol Calculated 71 <100 mg/dL    Non HDL Cholesterol 87 <130 mg/dL    Patient Fasting > 8hrs? Yes     Narrative    Cholesterol  Desirable: < 200 mg/dL  Borderline High: 200 - 239 mg/dL  High: >= 240 mg/dL    Triglycerides  Normal: < 150 mg/dL  Borderline High: 150 - 199 mg/dL  High: 200-499 mg/dL  Very High: >= 500 mg/dL    Direct Measure HDL  Female: >= 50 mg/dL   Male: >= 40 mg/dL    LDL Cholesterol  Desirable: < 100 mg/dL  Above Desirable: 100 - 129 mg/dL   Borderline High: 130 - 159 mg/dL   High:  160 - 189 mg/dL   Very High: >= 190 mg/dL    Non HDL Cholesterol  Desirable: < 130 mg/dL  Above Desirable: 130 - 159 mg/dL  Borderline High: 160 - 189 mg/dL  High: 190 - 219 mg/dL  Very High: >= 220 mg/dL   Hemoglobin A1c     Status: Abnormal   Result Value Ref Range    Estimated Average Glucose 134 (H) <117 mg/dL    Hemoglobin A1C 6.3 (H) <5.7 %   TSH with free T4 reflex     Status: Normal   Result Value Ref Range    TSH 3.51 0.30 - 4.20 uIU/mL   CBC with platelets and differential     Status: Abnormal   Result Value Ref Range    WBC Count 3.6 (L) 4.0 - 11.0 10e3/uL    RBC Count 4.10 3.80 - 5.90 10e6/uL    Hemoglobin 12.0 11.7 - 17.7 g/dL    Hematocrit 37.0 35.0 - 53.0 %    MCV 90 78 - 100 fL    MCH 29.3 26.5 - 33.0 pg    MCHC 32.4 31.5 - 36.5 g/dL    RDW 12.7 10.0 - 15.0 %    Platelet Count 199 150 - 450 10e3/uL    % Neutrophils 36 %    % Lymphocytes 47 %    % Monocytes 10 %    % Eosinophils 6 %    % Basophils 1 %    % Immature Granulocytes 0 %    NRBCs per 100 " "WBC 0 <1 /100    Absolute Neutrophils 1.3 (L) 1.6 - 8.3 10e3/uL    Absolute Lymphocytes 1.7 0.8 - 5.3 10e3/uL    Absolute Monocytes 0.4 0.0 - 1.3 10e3/uL    Absolute Eosinophils 0.2 0.0 - 0.7 10e3/uL    Absolute Basophils 0.0 0.0 - 0.2 10e3/uL    Absolute Immature Granulocytes 0.0 <=0.4 10e3/uL    Absolute NRBCs 0.0 10e3/uL    Narrative    The generation of reference intervals for this test is currently based on binary male or female sex. If the electronic health record information indicates another gender identity or if Legal Sex is recorded as \"Unknown\", both male and female reference intervals are provided where applicable, and should be considered according to the individual's appropriate clinical context.   CBC with platelets and differential     Status: Abnormal    Narrative    The following orders were created for panel order CBC with platelets and differential.  Procedure                               Abnormality         Status                     ---------                               -----------         ------                     CBC with platelets and d...[[id number]]  Abnormal            Final result                 Please view results for these tests on the individual orders.      Signed Electronically by: Murali Logan MD    "

## 2024-10-09 NOTE — Clinical Note
Please mail AVS as I updated his medications after visit sent medications to the updated correct pharmacy.and removed a few old medications from his list. Please call him Thursday to note I did this after the visit. Murali Logan MD

## 2024-10-10 NOTE — RESULT ENCOUNTER NOTE
3-month measure glucose control A1c 6.3 in excellent range continue current dose of Metformin.  Your Lipids, thyroid, complete blood count including hemoglobin,white cell count for infection, kidney, liver, blood sugar,calcium, sodium and potassium were normal or within acceptable range.   CRP for inflammation in normal range.  Murali Logan MD

## 2024-10-14 DIAGNOSIS — M54.16 LUMBAR RADICULOPATHY: ICD-10-CM

## 2024-10-14 DIAGNOSIS — E08.42 DIABETIC POLYNEUROPATHY ASSOCIATED WITH DIABETES MELLITUS DUE TO UNDERLYING CONDITION (H): Primary | ICD-10-CM

## 2024-10-20 RX ORDER — GABAPENTIN 300 MG/1
CAPSULE ORAL
Qty: 180 CAPSULE | Refills: 1 | Status: SHIPPED | OUTPATIENT
Start: 2024-10-20 | End: 2024-11-04

## 2024-10-20 NOTE — TELEPHONE ENCOUNTER
Diagnoses and all orders for this visit:    Diabetic polyneuropathy associated with diabetes mellitus due to underlying condition (H)  Lumbar radiculopathy  -     gabapentin (NEURONTIN) 300 MG capsule; Take 300 mg one capsule twice daily     Clarification of RX.  Murali Logan MD

## 2024-10-25 NOTE — TELEPHONE ENCOUNTER
DIAGNOSIS:   Cervical spondylosis with myelopathy [M47.12]  - Primary  Cervical stenosis of spinal canal [M48.02]  Right arm weakness [R29.898]  Lumbar radiculopathy [M54.16]  Abnormal gait [R26.9]   APPOINTMENT DATE: 10/29/2024   NOTES STATUS DETAILS   OFFICE NOTE from referring provider Internal 10/09/2024 - Murali Logan MD - [facility]   OFFICE NOTE from other specialist Internal 9/13/2024  [facility]    07/1/2024 - Juju Sprague MD - [facility]     MRI PACS Internal   CT SCAN PACS Internal   XRAYS (IMAGES & REPORTS) PACS Internal

## 2024-10-28 ENCOUNTER — ANCILLARY PROCEDURE (OUTPATIENT)
Dept: MRI IMAGING | Facility: CLINIC | Age: 74
End: 2024-10-28
Attending: FAMILY MEDICINE
Payer: COMMERCIAL

## 2024-10-28 DIAGNOSIS — M48.02 CERVICAL STENOSIS OF SPINAL CANAL: ICD-10-CM

## 2024-10-28 DIAGNOSIS — R29.898 RIGHT ARM WEAKNESS: ICD-10-CM

## 2024-10-28 DIAGNOSIS — M47.12 CERVICAL SPONDYLOSIS WITH MYELOPATHY: ICD-10-CM

## 2024-10-28 DIAGNOSIS — M54.2 CERVICAL SPINE PAIN: Primary | ICD-10-CM

## 2024-10-28 PROCEDURE — A9585 GADOBUTROL INJECTION: HCPCS | Performed by: INTERNAL MEDICINE

## 2024-10-28 PROCEDURE — 72156 MRI NECK SPINE W/O & W/DYE: CPT | Mod: TC | Performed by: INTERNAL MEDICINE

## 2024-10-28 RX ORDER — GADOBUTROL 604.72 MG/ML
0.1 INJECTION INTRAVENOUS ONCE
Status: COMPLETED | OUTPATIENT
Start: 2024-10-28 | End: 2024-10-28

## 2024-10-28 RX ADMIN — GADOBUTROL 8.5 ML: 604.72 INJECTION INTRAVENOUS at 16:18

## 2024-10-28 NOTE — LETTER
October 30, 2024      Vishnu Viveros  [address]  [place] [address]        Dear  Felice,    We are writing to inform you of your test results.    Moderate to severe cervical spine narrowing ( stenosis)  at C 6-7 and severe at C3-4 on the right likely contributing to right arm symptoms. Please review with Dr Grya 10/29.     Resulted Orders   MR Cervical Spine w/o & w Contrast    Narrative    EXAM: MR CERVICAL SPINE W/O and W CONTRAST  LOCATION: [facility]  DATE: 10/28/2024    INDICATION:  Cervical spondylosis with myelopathy, Cervical stenosis of spinal canal, Right arm weakness  COMPARISON: Cervical spine radiograph 7/8/2024.  CONTRAST: 8.5mL Gadavist  TECHNIQUE: MRI Cervical Spine without and with IV contrast.    FINDINGS:   Trace anterolisthesis of C5 on C6 and trace retrolisthesis of C6 on C7. Mild anterolisthesis of C7 on T1. Cervical vertebral body heights appear maintained. No evidence of abnormal cord signal or enhancement. No extraspinal abnormality.    Craniovertebral junction and C1-C2: Normally aligned.    C2-C3: Normal disc height. Mild posterior disc bulge. No significant spinal canal stenosis. Bilateral facet and uncovertebral degenerative hypertrophy. Mild to moderate right neural foraminal stenosis.     C3-C4: Mild loss of disc height. Posterior disc bulge, indenting the medical sac without contacting the cord. Mild spinal canal stenosis. Bilateral facet and uncovertebral joint hypertrophy. Moderate to severe right and moderate left neural foraminal   stenosis.     C4-C5: Mild loss of disc height. Posterior disc osteophyte complex, eccentric to the left. This indents the ventral thecal sac and mildly flattens the ventral aspect record. Moderate spinal canal stenosis. Bilateral facet and uncovertebral joint   hypertrophy. Moderate bilateral neural foraminal stenosis.     C5-C6: Advanced loss of disc height. Posterior disc osteophyte complex, indenting the ventral  sac and mildly flattening the ventral aspect of the cord. Mild to moderate spinal canal stenosis. Bilateral facet and uncovertebral joint hypertrophy. Mild   bilateral neural foraminal stenosis.     C6-C7: Advanced loss of disc height. Posterior disc osteophyte complex, indenting the ventral thecal sac and flattens the ventral cord, greater on the right. Moderate spinal canal stenosis. Bilateral facet and uncovertebral joint hypertrophy. Moderate   left and mild-to-moderate right neural foraminal stenosis.     C7-T1: Moderate loss of disc height. Uncovering of the disc with posterior disc bulge, which indents the ventral thecal sac but does not contact the cord. Mild spinal canal stenosis. Bilateral facet hypertrophy. Moderate bilateral neural foraminal   stenosis.      Impression    IMPRESSION:  1.  Moderate spinal canal stenosis at C4-5 and C6-7, with flattening of the cord. No evidence of abnormal cord signal or enhancement.  2.  Moderate to severe neural foraminal stenosis on the right at C3-4.  3.  Additional multilevel cervical spondylosis, contributing to multilevel mild to moderate spinal canal stenosis and neural foraminal stenosis, as detailed above.         If you have any questions or concerns, please call the clinic at the number listed above.     Sincerely,    Murali Logan MD

## 2024-10-29 ENCOUNTER — ANCILLARY PROCEDURE (OUTPATIENT)
Dept: GENERAL RADIOLOGY | Facility: CLINIC | Age: 74
End: 2024-10-29
Attending: ORTHOPAEDIC SURGERY
Payer: COMMERCIAL

## 2024-10-29 ENCOUNTER — PRE VISIT (OUTPATIENT)
Dept: ORTHOPEDICS | Facility: CLINIC | Age: 74
End: 2024-10-29

## 2024-10-29 ENCOUNTER — OFFICE VISIT (OUTPATIENT)
Dept: ORTHOPEDICS | Facility: CLINIC | Age: 74
End: 2024-10-29
Payer: COMMERCIAL

## 2024-10-29 DIAGNOSIS — M54.16 LUMBAR RADICULOPATHY: ICD-10-CM

## 2024-10-29 DIAGNOSIS — R26.9 ABNORMAL GAIT: ICD-10-CM

## 2024-10-29 DIAGNOSIS — M47.12 CERVICAL SPONDYLOSIS WITH MYELOPATHY: ICD-10-CM

## 2024-10-29 DIAGNOSIS — M48.02 CERVICAL STENOSIS OF SPINAL CANAL: ICD-10-CM

## 2024-10-29 DIAGNOSIS — R29.898 RIGHT ARM WEAKNESS: ICD-10-CM

## 2024-10-29 DIAGNOSIS — M54.2 CERVICAL SPINE PAIN: ICD-10-CM

## 2024-10-29 PROCEDURE — 72040 X-RAY EXAM NECK SPINE 2-3 VW: CPT | Mod: GC | Performed by: RADIOLOGY

## 2024-10-29 PROCEDURE — 99214 OFFICE O/P EST MOD 30 MIN: CPT | Performed by: ORTHOPAEDIC SURGERY

## 2024-10-29 NOTE — RESULT ENCOUNTER NOTE
Moderate to severe cervical spine narrowing ( stenosis)  at C 6-7 and severe at C3-4 on the right likely contributing to right arm symptoms. Please review with Dr Gray 10/29.  Best wishes,  Murali Logan MD

## 2024-10-29 NOTE — PROGRESS NOTES
Spine Surgery Consultation    REFERRING PHYSICIAN: Murali Logan   PRIMARY CARE PHYSICIAN: Murali Logan           Chief Complaint:   Consult (New patient consult for cervical spine.  Also has concerns for lumbar spine and right hip.)      History of Present Illness:  Symptom Profile Including: location of symptoms, onset, severity, exacerbating/alleviating factors, previous treatments:        Vishnu Viveros is a 74 year old adult who presents today for evaluation of both neck lumbar and hip problems.  He says over the last 3 months has become harder and harder to walk.  The main thing limiting him is buttock and right thigh pain.  Is worse when he is up standing and walking and improves somewhat when he lies down or rests.  This can have a burning and tingling sensation into the thighs and it aches a lot in that area.  He also has some right shoulder pain.  It is hard for him to lift the arm up overhead.  He denies any myelopathic symptoms no hand clumsiness or gait instability.  He has been taking gabapentin for this.         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     Chronic low back pain     Diabetes (H)     Ectropion due to laxity of eyelid, unspecified laterality     History of tobacco use     HLD (hyperlipidemia)     HTN (hypertension)     Obesity (BMI 30.0-34.9)     Pulmonary nodule             Past Surgical History:     Past Surgical History:   Procedure Laterality Date    APPENDECTOMY OPEN CHILD      ARTHROSCOPY KNEE Left     BIOPSY PROSTATE TRANSRECTAL      CATARACT IOL, RT/LT      EXCISE PTERYGIUM Right 1989    EXCISE PTERYGIUM Right 02/12/2015    INJECT EPIDURAL LUMBAR / SACRAL SINGLE N/A 07/21/2016    Procedure: INJECT EPIDURAL LUMBAR / SACRAL SINGLE;  Surgeon: Judah Henriquez MD;  Location: [facility] OR    INJECT PARAVERTEBRAL FACET JOINT LUMBAR / SACRAL FIRST Right 08/18/2016    Procedure: INJECT PARAVERTEBRAL  FACET JOINT LUMBAR / SACRAL FIRST;  Surgeon: Judah Henriquez MD;  Location: [facility]    INJECT PARAVERTEBRAL FACET JOINT LUMBAR / SACRAL FIRST Right 2016    Procedure: INJECT PARAVERTEBRAL FACET JOINT LUMBAR / SACRAL FIRST;  Surgeon: Judah Henriquez MD;  Location: [facility] OR    MEDIASTINOSCOPY Right 2019     [place]  Dr Hemanth Covington 19 bronchoscopy, cervical mediastinoscopy for staging and minimally invasive right thoracoscopic wedge resection 1.8 cm invasive adenocarcinoma [id number] stage 1A2 adenocarcinoma completely resected via minimally invasive right upper lobectomy.    PHACOEMULSIFICATION WITH STANDARD INTRAOCULAR LENS IMPLANT Right 07/10/2023    Procedure: RIGHT EYE PHACOEMULSIFICATION, CATARACT, WITH STANDARD INTRAOCULAR LENS IMPLANT INSERTION;  Surgeon: Rick Campbell MD;  Location: [facility] OR    PHACOEMULSIFICATION WITH STANDARD INTRAOCULAR LENS IMPLANT Left 2023    Procedure: LEFT EYE PHACOEMULSIFICATION, CATARACT, WITH STANDARD INTRAOCULAR LENS IMPLANT INSERTION;  Surgeon: Rick Campbell MD;  Location: [facility] OR    PUNCTALPLASTY Bilateral 2018    Procedure: Bilateral Punctoplasty with Mini [place] Stent;  Surgeon: Levy Blue MD;  [facility]: [facility]    REPAIR ECTROPION BILATERAL Bilateral 2018    Procedure: Bilateral Lower Lid Ectropion Repair with Right Lower Lid Lesion Excision;  Surgeon: Levy Blue MD;  Location: [facility] OR            Social History:     Social History     Tobacco Use    Smoking status: Former     Current packs/day: 0.00     Types: Cigarettes     Start date: 1976     Quit date: 1981     Years since quittin.9    Smokeless tobacco: Never   Substance Use Topics    Alcohol use: Yes     Alcohol/week: 2.5 standard drinks of alcohol     Types: 3 Standard drinks or equivalent per week            Family History:     Family History   Problem Relation Age of Onset    Cerebrovascular Disease Brother          stroke     Breast Cancer  "Sister     Kidney Disease Mother         Kidney stones    Other - See Comments Father          in carmita parkinsons or post surgical eye surgery    Cerebral aneurysm Brother     Hypothyroidism Sister     Glaucoma No family hx of     Macular Degeneration No family hx of             Allergies:     Allergies   Allergen Reactions    Pollen Extract      Other reaction(s): Headache  Sinus congestion, sinus \"drainage\", sneezing    Seasonal Allergies      Sinus congestion, sinus \"drainage\", sneezing    Nkda [No Known Drug Allergy]             Medications:     Current Outpatient Medications   Medication Sig Dispense Refill    acetaminophen (TYLENOL) 500 MG tablet Take 1-2 tablets (500-1,000 mg) by mouth every 6 hours as needed for pain (arthritis) 120 tablet 3    Ascorbic Acid (VITAMIN C PO) Take 500 mg by mouth once      atorvastatin (LIPITOR) 20 MG tablet Take 1 tablet (20 mg) by mouth daily. 90 tablet 3    celecoxib (CELEBREX) 100 MG capsule Take 1 capsule (100 mg) by mouth daily. 30 capsule 1    Cholecalciferol (VITAMIN D) 1000 UNIT capsule Take 1 capsule by mouth daily.      clotrimazole (LOTRIMIN) 1 % external cream Apply topically 2 times daily as needed (rash) 60 g 1    Cyanocobalamin (VITAMIN B12 PO) Take 1 tablet by mouth Pt states he takes this 2 or 3 times a week.      finasteride (PROSCAR) 5 MG tablet Take 1 tablet (5 mg) by mouth daily. 90 tablet 1    gabapentin (NEURONTIN) 300 MG capsule Take 300 mg one capsule twice daily 180 capsule 1    loratadine (CLARITIN) 10 MG tablet Take 1 tablet (10 mg) by mouth daily as needed for allergies. 90 tablet 3    losartan (COZAAR) 100 MG tablet Take 1 tablet (100 mg) by mouth every evening. 90 tablet 1    melatonin 3 MG tablet Take 1 tablet (3 mg) by mouth nightly as needed for sleep (may take 1-2 if needed) 60 tablet 3    metFORMIN (GLUCOPHAGE XR) 500 MG 24 hr tablet Take 2 tablets (1,000 mg) by mouth daily (with dinner). 180 tablet 1    montelukast (SINGULAIR) 10 MG " tablet Take 1 tablet (10 mg) by mouth at bedtime. 90 tablet 3    Multiple Vitamin (MULTIVITAMINS PO) Take 1 tablet by mouth daily.      olopatadine (PATADAY) 0.2 % ophthalmic solution Place 0.05 mLs (1 drop) into both eyes daily 2.5 mL 5    Omega-3 Fatty Acids (FISH OIL PO) Take 1 capsule by mouth as needed      order for DME Back brace 1 Device 0    prednisoLONE acetate (PRED FORTE) 1 % ophthalmic suspension Apply 1 drop to eye daily To operative eye 5 mL 1    tamsulosin (FLOMAX) 0.4 MG capsule Take 1 capsule (0.4 mg) by mouth daily. 90 capsule 1     Current Facility-Administered Medications   Medication Dose Route Frequency Provider Last Rate Last Admin    acetaZOLAMIDE (DIAMOX SEQUEL) 12 hr capsule 500 mg  500 mg Oral Once Rick Campbell MD        aflibercept (EYLEA) injection prefilled syringe 2 mg  2 mg Intravitreal Q28 Days Raina Gan MD        bevacizumab (AVASTIN) intravitreal inj 1.25 mg  1.25 mg Intravitreal Q28 Days Raina Gan MD   1.25 mg at 05/13/24 1555    bevacizumab (AVASTIN) intravitreal inj 1.25 mg  1.25 mg Intravitreal Q28 Days Joan Mckeon MD   1.25 mg at 03/14/24 1420    bevacizumab (AVASTIN) intravitreal inj 1.25 mg  1.25 mg Intravitreal Q28 Days Joan Mckeon MD                 Review of Systems:     A 10 point ROS was performed and reviewed. Specific responses to these questions are noted at the end of the document.         Physical Exam:   Vitals: There were no vitals taken for this visit.  Constitutional: awake, alert, cooperative, no apparent distress, appears stated age.    Eyes: The sclera are white.  Ears, Nose, Throat: The trachea is midline.  Psychiatric: The patient has a normal affect.  Respiratory: breathing non-labored  Cardiovascular: The extremities are warm and perfused.  Skin: no obvious rashes or lesions.  Musculoskeletal, Neurologic, and Spine:     Cervical spine:    Appearance -no gross step-offs, kyphosis.    Motor -      C5: Deltoids R 5/5 and L 5/5 strength    C6: Biceps R 5/5 and L 5/5 strength     C7: Triceps R 5/5 and L 5/5 strength     C8:  R 5/5 and L 5/5 strength     T1: Dorsal interossei R 4/5 and L 4/5 strength        Sensation: intact to light touch in C5-T1      Special Tests -      Lhermitte's Test - Negative     Spurling's Test - Negative      Norton's Test - Negative       Lumbar Spine:    Appearance - No gross stepoffs or deformities    Motor -     L2-3: Hip flexion 5/5 R and 5/5 L strength          L3/4:  Knee extension R 5/5 and L 5/5 strength         L4/5:  Foot dorsiflexion R 5/5 L 5/5 and       EHL dorsiflexion R 4/5 L 4/5 strength         S1:  Plantarflexion/Peroneal Muscles  R 5/5 and L 5/5 strength    Sensation: intact to light touch L3-S1 distribution BLE      Neurologic:      REFLEXES Left Right   Biceps 1+ 1+   Triceps 1+ 1+   Brachioradialis 1+ 1+   Patella 1+ 1+   Ankle jerk 1+ 1+   Babinski No upgoing great toe No upgoing great toe   Clonus 0 beats 0 beats     Hip Exam:  No pain with hip log roll and no tenderness over the greater trochanters.    Alignment:  Patient stands with a neutral standing sagittal balance.           Imaging:   We ordered and independently reviewed new radiographs at this clinic visit. The results were discussed with the patient.  Findings include:    Lumbar MRI 1/24/24 shows severe central lumbar stenosis L1-4, with autofusion L4-S1, relative lumbar flatback, lumbar 1-2 foraminal stenosis     Cervical MRI shows multilevel spondylosis with moderate stenosis some CSF effacement and cord shape change at multiple levels    AP and lateral cervical radiographs from July 8 show diffuse spondylosis, flexion-extension views today show no dynamic instability           Assessment and Plan:   Assessment:  74 year old adult with both cervical and lumbar spondylosis.  I think his lumbar stenosis and spondylosis is most symptomatic so we focused our efforts in treatment on that today.   Although he does have cervical spondylosis and neck pain, I think this is likely unrelated to his shoulder pain itself, I recommended physical therapy for that.  For the lumbar spine I recommended to return to physical therapy and epidural injections.  He is going to continue the gabapentin and oral medication regimen.  If he did not get better we could consider a decompression or possibly decompression fusion due to the foraminal stenosis at L1-2.  He not going to talk further about this at subsequent visits.  For now we will plan to initiate this nonoperative treatment      Respectfully,  Leandro Gray MD  Spine Surgery  [facility]    Attending MD (Dr. Leandro Gray) : I personally performed greater than 50% of the effort related to this patient visit and am responsible for the medical decision making and billing in this case.  I met with the patient, reviewed and verified the history and physical exam of the patient and discussed the patient's management with the other clinical providers involved in this patient's care including any involved residents or physicians assistants. I also personally reviewed the imaging and I personally formulated the treatment plan and diagnosis in their entirety.  I reviewed the above note and agree with the documented findings and plan of care, which were communicated to the patient.      Leandro Gray MD

## 2024-10-29 NOTE — PROGRESS NOTES
Assessment & Plan     Hip pain, right    Vishnu presents with over a month of R groin pain with radiation of pain and tinging down to the foot.  He has lumbar spine stenosis and I suspect symptoms are likely radicular but he'd like to evaluate for hip arthritis as we will get an x-ray.  He has a lumbar injection scheduled for January and needs to reschedule PT.      - XR Hip Right 2-3 Views; Future    Diabetic polyneuropathy associated with diabetes mellitus due to underlying condition (H)  and  Lumbar radiculopathy    Gabapentin helpful for symptoms but has side effects so does not want to increase the dose; continue current dosing.    - gabapentin (NEURONTIN) 300 MG capsule; Take 2 capsules (600 mg) by mouth at bedtime.    Type 2 diabetes mellitus treated without insulin (H)    Diabetic foot exam discussed but he reports having one done recently so deferred.           Subjective   Vishnu is a 74 year old, presenting for the following health issues:  Back Pain (Follow up on back pain, was seeing Dr Logan. Pt reports chronic right back pain, radiates down back and into leg. Pt states he cannot lift more than 3 pounds right arm above shoulder.) and Results (Pt recently completed xray and MRI of cervical spine)    History of Present Illness       Back Pain:  Vishnu presents for follow up of back pain. Patient's back pain is a chronic problem.  Location of back pain:  Right lower back, right middle of back, right upper back, right buttock, right hip and right side of waist  Description of back pain: dull ache, sharp and shooting  Back pain spreads: right knee, left knee, right foot, left foot and right shoulder    Since patient first noticed back pain, pain is: gradually worsening  Does back pain interfere with Vishnu's job:  Yes       Vishnu eats 0-1 servings of fruits and vegetables daily.Vishnu consumes 1 sweetened beverage(s) daily.Vishnu exercises with enough effort to increase Vishnu's heart  "rate 9 or less minutes per day.    Vishnu is taking medications regularly.       Vishnu saw his previous PCP (who has now retired) a month ago for neck pain and R arm weakness.  MRI was ordered with findings as noted below, Celebrex started, and spine referral placed for both the neck and known prior lumbar stenosis which was done on 10/29 and they recommended PT and epidural injections.      Cervical spine MRI on 10/28:  IMPRESSION:  1.  Moderate spinal canal stenosis at C4-5 and C6-7, with flattening of the cord. No evidence of abnormal cord signal or enhancement.  2.  Moderate to severe neural foraminal stenosis on the right at C3-4.  3.  Additional multilevel cervical spondylosis, contributing to multilevel mild to moderate spinal canal stenosis and neural foraminal stenosis, as detailed above.    Gabapentin was increased for diabetic neuropathy.  A1C was well controlled at 6.3.      Today, Vishnu reports ongoing shoulder and back pain.  Having R hip pain since about a month ago.  Having radiating pain and tingling to the foot.  Having trouble walking.  This was discussed with the spine clinic last week but not clear to me based on the note if this was attributed to the back pain or the hip.      Gets some brain fog from the gababpentin when taking during the day, so he takes 600mg after dinner instead of 300mg BID.       Constitutional, MSK and neuro systems are negative, except as otherwise noted.       Objective    /78 (BP Location: Right arm, Patient Position: Sitting, Cuff Size: Adult Large)   Pulse 69   Resp 16   Ht 1.626 m (5' 4.02\")   Wt 84.1 kg (185 lb 4.8 oz)   SpO2 97%   BMI 31.79 kg/m    Body mass index is 31.79 kg/m .  Physical Exam   GENERAL: alert and no distress  MS: Pain in the right groin and radiating down the right leg with ROM of the hip which seems slightly reduced          Signed Electronically by: Archie Lui MD  "

## 2024-10-29 NOTE — LETTER
10/29/2024      Vishnu Viveros  [address] [place] [facility]  [place] [address]      Dear Colleague,    Thank you for referring your patient, Vishnu Viveros, to the [facility] [place]. Please see a copy of my visit note below.    Spine Surgery Consultation    REFERRING PHYSICIAN: Murali Logan   PRIMARY CARE PHYSICIAN: Murali Logan           Chief Complaint:   Consult (New patient consult for cervical spine.  Also has concerns for lumbar spine and right hip.)      History of Present Illness:  Symptom Profile Including: location of symptoms, onset, severity, exacerbating/alleviating factors, previous treatments:        Vishnu Viveros is a 74 year old adult who presents today for evaluation of both neck lumbar and hip problems.  He says over the last 3 months has become harder and harder to walk.  The main thing limiting him is buttock and right thigh pain.  Is worse when he is up standing and walking and improves somewhat when he lies down or rests.  This can have a burning and tingling sensation into the thighs and it aches a lot in that area.  He also has some right shoulder pain.  It is hard for him to lift the arm up overhead.  He denies any myelopathic symptoms no hand clumsiness or gait instability.  He has been taking gabapentin for this.         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 eyelid, unspecified laterality      History of tobacco use      HLD (hyperlipidemia)      HTN (hypertension)      Obesity (BMI 30.0-34.9)      Pulmonary nodule             Past Surgical History:     Past Surgical History:   Procedure Laterality Date     APPENDECTOMY OPEN CHILD       ARTHROSCOPY KNEE Left      BIOPSY PROSTATE TRANSRECTAL       CATARACT IOL, RT/LT       EXCISE PTERYGIUM Right 1989     EXCISE PTERYGIUM Right 02/12/2015      INJECT EPIDURAL LUMBAR / SACRAL SINGLE N/A 2016    Procedure: INJECT EPIDURAL LUMBAR / SACRAL SINGLE;  Surgeon: Judah Henriquez MD;  [facility]     INJECT PARAVERTEBRAL FACET JOINT LUMBAR / SACRAL FIRST Right 2016    Procedure: INJECT PARAVERTEBRAL FACET JOINT LUMBAR / SACRAL FIRST;  Surgeon: Judah Henriquez MD;  [facility]     INJECT PARAVERTEBRAL FACET JOINT LUMBAR / SACRAL FIRST Right 2016    Procedure: INJECT PARAVERTEBRAL FACET JOINT LUMBAR / SACRAL FIRST;  Surgeon: Judah Henriquez MD;  [facility]     MEDIASTINOSCOPY Right 2019     [place]  Dr Hemanth Covington 19 bronchoscopy, cervical mediastinoscopy for staging and minimally invasive right thoracoscopic wedge resection 1.8 cm invasive adenocarcinoma [id number] stage 1A2 adenocarcinoma completely resected via minimally invasive right upper lobectomy.     PHACOEMULSIFICATION WITH STANDARD INTRAOCULAR LENS IMPLANT Right 07/10/2023    Procedure: RIGHT EYE PHACOEMULSIFICATION, CATARACT, WITH STANDARD INTRAOCULAR LENS IMPLANT INSERTION;  Surgeon: Rick Campbell MD;  Location: [facility]     PHACOEMULSIFICATION WITH STANDARD INTRAOCULAR LENS IMPLANT Left 2023    Procedure: LEFT EYE PHACOEMULSIFICATION, CATARACT, WITH STANDARD INTRAOCULAR LENS IMPLANT INSERTION;  Surgeon: Rick Campbell MD;  Location: [facility]     PUNCTALPLASTY Bilateral 2018    Procedure: Bilateral Punctoplasty with Mini [place] Stent;  Surgeon: Leyv Blue MD;  [facility]     REPAIR ECTROPION BILATERAL Bilateral 2018    Procedure: Bilateral Lower Lid Ectropion Repair with Right Lower Lid Lesion Excision;  Surgeon: Levy Blue MD;  [facility]            Social History:     Social History     Tobacco Use     Smoking status: Former     Current packs/day: 0.00     Types: Cigarettes     Start date: 1976     Quit date: 1981     Years since quittin.9     Smokeless tobacco: Never   Substance Use  "Topics     Alcohol use: Yes     Alcohol/week: 2.5 standard drinks of alcohol     Types: 3 Standard drinks or equivalent per week            Family History:     Family History   Problem Relation Age of Onset     Cerebrovascular Disease Brother          stroke      Breast Cancer Sister      Kidney Disease Mother         Kidney stones     Other - See Comments Father          in carmita parkinsons or post surgical eye surgery     Cerebral aneurysm Brother      Hypothyroidism Sister      Glaucoma No family hx of      Macular Degeneration No family hx of             Allergies:     Allergies   Allergen Reactions     Pollen Extract      Other reaction(s): Headache  Sinus congestion, sinus \"drainage\", sneezing     Seasonal Allergies      Sinus congestion, sinus \"drainage\", sneezing     Nkda [No Known Drug Allergy]             Medications:     Current Outpatient Medications   Medication Sig Dispense Refill     acetaminophen (TYLENOL) 500 MG tablet Take 1-2 tablets (500-1,000 mg) by mouth every 6 hours as needed for pain (arthritis) 120 tablet 3     Ascorbic Acid (VITAMIN C PO) Take 500 mg by mouth once       atorvastatin (LIPITOR) 20 MG tablet Take 1 tablet (20 mg) by mouth daily. 90 tablet 3     celecoxib (CELEBREX) 100 MG capsule Take 1 capsule (100 mg) by mouth daily. 30 capsule 1     Cholecalciferol (VITAMIN D) 1000 UNIT capsule Take 1 capsule by mouth daily.       clotrimazole (LOTRIMIN) 1 % external cream Apply topically 2 times daily as needed (rash) 60 g 1     Cyanocobalamin (VITAMIN B12 PO) Take 1 tablet by mouth Pt states he takes this 2 or 3 times a week.       finasteride (PROSCAR) 5 MG tablet Take 1 tablet (5 mg) by mouth daily. 90 tablet 1     gabapentin (NEURONTIN) 300 MG capsule Take 300 mg one capsule twice daily 180 capsule 1     loratadine (CLARITIN) 10 MG tablet Take 1 tablet (10 mg) by mouth daily as needed for allergies. 90 tablet 3     losartan (COZAAR) 100 MG tablet Take 1 tablet (100 mg) by " mouth every evening. 90 tablet 1     melatonin 3 MG tablet Take 1 tablet (3 mg) by mouth nightly as needed for sleep (may take 1-2 if needed) 60 tablet 3     metFORMIN (GLUCOPHAGE XR) 500 MG 24 hr tablet Take 2 tablets (1,000 mg) by mouth daily (with dinner). 180 tablet 1     montelukast (SINGULAIR) 10 MG tablet Take 1 tablet (10 mg) by mouth at bedtime. 90 tablet 3     Multiple Vitamin (MULTIVITAMINS PO) Take 1 tablet by mouth daily.       olopatadine (PATADAY) 0.2 % ophthalmic solution Place 0.05 mLs (1 drop) into both eyes daily 2.5 mL 5     Omega-3 Fatty Acids (FISH OIL PO) Take 1 capsule by mouth as needed       order for DME Back brace 1 Device 0     prednisoLONE acetate (PRED FORTE) 1 % ophthalmic suspension Apply 1 drop to eye daily To operative eye 5 mL 1     tamsulosin (FLOMAX) 0.4 MG capsule Take 1 capsule (0.4 mg) by mouth daily. 90 capsule 1     Current Facility-Administered Medications   Medication Dose Route Frequency Provider Last Rate Last Admin     acetaZOLAMIDE (DIAMOX SEQUEL) 12 hr capsule 500 mg  500 mg Oral Once Rick Campbell MD         aflibercept (EYLEA) injection prefilled syringe 2 mg  2 mg Intravitreal Q28 Days Raina Gan MD         bevacizumab (AVASTIN) intravitreal inj 1.25 mg  1.25 mg Intravitreal Q28 Days Raina Gan MD   1.25 mg at 05/13/24 1555     bevacizumab (AVASTIN) intravitreal inj 1.25 mg  1.25 mg Intravitreal Q28 Days Joan Mckeon MD   1.25 mg at 03/14/24 1420     bevacizumab (AVASTIN) intravitreal inj 1.25 mg  1.25 mg Intravitreal Q28 Days Joan Mckeon MD                 Review of Systems:     A 10 point ROS was performed and reviewed. Specific responses to these questions are noted at the end of the document.         Physical Exam:   Vitals: There were no vitals taken for this visit.  Constitutional: awake, alert, cooperative, no apparent distress, appears stated age.    Eyes: The sclera are white.  Ears, Nose, Throat:  The trachea is midline.  Psychiatric: The patient has a normal affect.  Respiratory: breathing non-labored  Cardiovascular: The extremities are warm and perfused.  Skin: no obvious rashes or lesions.  Musculoskeletal, Neurologic, and Spine:     Cervical spine:    Appearance -no gross step-offs, kyphosis.    Motor -     C5: Deltoids R 5/5 and L 5/5 strength    C6: Biceps R 5/5 and L 5/5 strength     C7: Triceps R 5/5 and L 5/5 strength     C8:  R 5/5 and L 5/5 strength     T1: Dorsal interossei R 4/5 and L 4/5 strength        Sensation: intact to light touch in C5-T1      Special Tests -      Lhermitte's Test - Negative     Spurling's Test - Negative      Norton's Test - Negative       Lumbar Spine:    Appearance - No gross stepoffs or deformities    Motor -     L2-3: Hip flexion 5/5 R and 5/5 L strength          L3/4:  Knee extension R 5/5 and L 5/5 strength         L4/5:  Foot dorsiflexion R 5/5 L 5/5 and       EHL dorsiflexion R 4/5 L 4/5 strength         S1:  Plantarflexion/Peroneal Muscles  R 5/5 and L 5/5 strength    Sensation: intact to light touch L3-S1 distribution BLE      Neurologic:      REFLEXES Left Right   Biceps 1+ 1+   Triceps 1+ 1+   Brachioradialis 1+ 1+   Patella 1+ 1+   Ankle jerk 1+ 1+   Babinski No upgoing great toe No upgoing great toe   Clonus 0 beats 0 beats     Hip Exam:  No pain with hip log roll and no tenderness over the greater trochanters.    Alignment:  Patient stands with a neutral standing sagittal balance.           Imaging:   We ordered and independently reviewed new radiographs at this clinic visit. The results were discussed with the patient.  Findings include:    Lumbar MRI 1/24/24 shows severe central lumbar stenosis L1-4, with autofusion L4-S1, relative lumbar flatback, lumbar 1-2 foraminal stenosis     Cervical MRI shows multilevel spondylosis with moderate stenosis some CSF effacement and cord shape change at multiple levels    AP and lateral cervical radiographs from  July 8 show diffuse spondylosis, flexion-extension views today show no dynamic instability           Assessment and Plan:   Assessment:  74 year old adult with both cervical and lumbar spondylosis.  I think his lumbar stenosis and spondylosis is most symptomatic so we focused our efforts in treatment on that today.  Although he does have cervical spondylosis and neck pain, I think this is likely unrelated to his shoulder pain itself, I recommended physical therapy for that.  For the lumbar spine I recommended to return to physical therapy and epidural injections.  He is going to continue the gabapentin and oral medication regimen.  If he did not get better we could consider a decompression or possibly decompression fusion due to the foraminal stenosis at L1-2.  He not going to talk further about this at subsequent visits.  For now we will plan to initiate this nonoperative treatment      Respectfully,  Leandro Gray MD  Spine Surgery  [facility]    Attending MD (Dr. Leandro Gray) : I personally performed greater than 50% of the effort related to this patient visit and am responsible for the medical decision making and billing in this case.  I met with the patient, reviewed and verified the history and physical exam of the patient and discussed the patient's management with the other clinical providers involved in this patient's care including any involved residents or physicians assistants. I also personally reviewed the imaging and I personally formulated the treatment plan and diagnosis in their entirety.  I reviewed the above note and agree with the documented findings and plan of care, which were communicated to the patient.      Leandro Gray MD      Again, thank you for allowing me to participate in the care of your patient.        Sincerely,        Leandro Gray MD

## 2024-11-04 ENCOUNTER — OFFICE VISIT (OUTPATIENT)
Dept: INTERNAL MEDICINE | Facility: CLINIC | Age: 74
End: 2024-11-04
Payer: COMMERCIAL

## 2024-11-04 VITALS
DIASTOLIC BLOOD PRESSURE: 78 MMHG | HEART RATE: 69 BPM | BODY MASS INDEX: 31.63 KG/M2 | SYSTOLIC BLOOD PRESSURE: 136 MMHG | OXYGEN SATURATION: 97 % | WEIGHT: 185.3 LBS | HEIGHT: 64 IN | RESPIRATION RATE: 16 BRPM

## 2024-11-04 DIAGNOSIS — M54.16 LUMBAR RADICULOPATHY: ICD-10-CM

## 2024-11-04 DIAGNOSIS — E08.42 DIABETIC POLYNEUROPATHY ASSOCIATED WITH DIABETES MELLITUS DUE TO UNDERLYING CONDITION (H): ICD-10-CM

## 2024-11-04 DIAGNOSIS — E11.9 TYPE 2 DIABETES MELLITUS TREATED WITHOUT INSULIN (H): ICD-10-CM

## 2024-11-04 DIAGNOSIS — M25.551 HIP PAIN, RIGHT: Primary | ICD-10-CM

## 2024-11-04 PROCEDURE — 99213 OFFICE O/P EST LOW 20 MIN: CPT | Performed by: INTERNAL MEDICINE

## 2024-11-04 RX ORDER — GABAPENTIN 300 MG/1
600 CAPSULE ORAL AT BEDTIME
Qty: 180 CAPSULE | Refills: 1 | Status: SHIPPED | OUTPATIENT
Start: 2024-11-04

## 2024-11-04 ASSESSMENT — ENCOUNTER SYMPTOMS: BACK PAIN: 1

## 2024-11-04 NOTE — PROGRESS NOTES
"  {PROVIDER CHARTING PREFERENCE:988191}    Subjective   Vishnu is a 74 year old, presenting for the following health issues:  Back Pain (Follow up on back pain, was seeing Dr Logan. Pt reports chronic right back pain, radiates down back and into leg. Pt states he cannot lift more than 3 pounds right arm above shoulder.) and Results (Pt recently completed xray and MRI of cervical spine)      11/4/2024     1:54 PM   Additional Questions   Roomed by jea emt     Back Pain     History of Present Illness       Back Pain:  Vishnu presents for follow up of back pain. Patient's back pain is a chronic problem.  Location of back pain:  Right lower back, right middle of back, right upper back, right buttock, right hip and right side of waist  Description of back pain: dull ache, sharp and shooting  Back pain spreads: right knee, left knee, right foot, left foot and right shoulder    Since patient first noticed back pain, pain is: gradually worsening  Does back pain interfere with Vishnu's job:  Yes       Vishnu eats 0-1 servings of fruits and vegetables daily.Vishnu consumes 1 sweetened beverage(s) daily.Vishnu exercises with enough effort to increase Vishnu's heart rate 9 or less minutes per day.    Vishnu is taking medications regularly.       {MA/LPN/RN Pre-Provider Visit Orders- hCG/UA/Strep (Optional):951806}  {SUPERLIST (Optional):335727}  {additonal problems for provider to add (Optional):316822}    {ROS Picklists (Optional):087565}      Objective    /78 (BP Location: Right arm, Patient Position: Sitting, Cuff Size: Adult Large)   Pulse 69   Resp 16   Ht 1.626 m (5' 4.02\")   Wt 84.1 kg (185 lb 4.8 oz)   SpO2 97%   BMI 31.79 kg/m    Body mass index is 31.79 kg/m .  Physical Exam   {Exam List (Optional):740128}    {Diagnostic Test Results (Optional):138794}        Signed Electronically by: Archie Lui MD  {Email feedback regarding this note to " [email or web address]   :966524}

## 2024-11-21 ENCOUNTER — PRE VISIT (OUTPATIENT)
Dept: UROLOGY | Facility: CLINIC | Age: 74
End: 2024-11-21
Payer: COMMERCIAL

## 2024-11-21 NOTE — TELEPHONE ENCOUNTER
Reason for visit:   Annual Follow-up      Relevant information:   Dx: Elevated PSA, BPH w/ LUTS, other male ED  5/3/2022 LOV w/ [place] w/ [facility]  POC PSA in 1 year    Records/imaging/labs/orders:   12/6/2021 PSA available in Epic    Pt called: No need for a call    At Rooming:   Have patient void in hat; PVR, ensure questionnaires complete.    Keri Victoria LPN  11/21/2024  1:04 PM

## 2024-12-16 NOTE — PROGRESS NOTES
[facility] EM[facility]PART[facility]  eMERGENCY dEPARTMENT eNCOUnter      Pt Name: Miguel A Ness  MRN: [id number]  Birthdate 2008  Date of evaluation: 12/15/2024  Provider: Sebas Renee PA-C    CHIEF COMPLAINT       Chief Complaint   Patient presents with    Pharyngitis     Started Friday    Fever     Started last night           HISTORY OF PRESENT ILLNESS  (Location/Symptom, Timing/Onset, Context/Setting, Quality, Duration, Modifying Factors, Severity.)   Miguel A Ness is a 16 y.o. male who presents to the emergency department with mother complaining of a sore throat for the past day.  States it's painful to swallow and feels scratchy, denies any trouble breathing, shortness of breath, denies any fevers, abdominal pain, nausea or vomiting.  Denies any runny nose, cough, ear pain  Has not taken any medicaiton at home      Nursing Notes were reviewed.    REVIEW OF SYSTEMS    (2-9 systems for level 4, 10 or more for level 5)     Review of Systems   Complaining of a sore throat  Denies cp, sob, abd pain, n/v    Except as noted above the remainder of the review of systems was reviewed and negative.       PAST MEDICAL HISTORY     Past Medical History:   Diagnosis Date    Asthma      None otherwise stated in nurses notes    SURGICAL HISTORY       Past Surgical History:   Procedure Laterality Date    TONSILLECTOMY  2011    TYMPANOSTOMY TUBE PLACEMENT  2011     None otherwise stated in nurses notes    CURRENT MEDICATIONS       Discharge Medication List as of 12/15/2024  6:41 PM        CONTINUE these medications which have NOT CHANGED    Details   albuterol sulfate HFA (VENTOLIN HFA) 108 (90 Base) MCG/ACT inhaler Inhale 2 puffs into the lungs 4 times daily as needed for Wheezing, Disp-18 g, R-0Normal      acetaminophen (TYLENOL) 500 MG tablet Take 1 tablet by mouth every 6 hours as needed for Pain or Fever, Disp-30 tablet, R-0Normal      ibuprofen (ADVIL;MOTRIN) 600 MG tablet Take 1 tablet by mouth every 8 hours as  CC: chronic CSCR vs pattern dystrophy    HPI: Mr Viveros,  is complaining of blurred vision in the left eye, the letters seem darker and more stretched than in the right eye. He also complained of a few episodes of double vision that resolves with blinking and re-focusing.    PMHx:   DM type II  Hx of lung adenoCA      Imaging:    OCT: 2024   Right eye: CST:  SRHM, PEDs seen 10/23 are resolving, no IRF, dense vitreous base ( probably due to high gain), PVD   Left eye:CST: 313 (was 260) SRHM with increased SRF today compared to 10/23      FA/IC23:  OD: good transit time, no leakage along the arcades, there is increase AF at the macula with respect to time,   Retina Laser procedures:good transit time, no leakage along the arcades or at the macula.    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    #  Hx CNVM left eye  Demonstrated on OCTA 4/10/23  New  intra retinal fluid exudation noticed last visit 10/23, opted to observe  Today there is increases SRF on Oct and patient has noticed subjective change in quality of vision in the left eye.   FA/ICG suggestive of occult CNVM OS with leakage. Objectively the vision in LEFT eye is 20/30-stable.  After discussing with the patient the findings, Anti-VEGF treatment and side effects was discussed at length with the patient. The patient would like to discuss the findings today and treatment options with his family before proceeding with injections.  Follow up in 2 weeks for VA/IOP and possible injection LEFT eye. No dilation needed.    # Diabetes mellitus no nonproliferative diabetic retinopathy             Blood glucose control  Last HbA1C is 6.5 (2 months ago)  No  evidence of diabetic retinopathy on exam today  Will monitor     # Pseudophakia OU   S/P CE IOL right eye 7/10/23 (complicated by rebound iritis)   S/P CE IOL left eye 7/24/23   -stable    # Intermitted XT   Small angle on ACT  Patient was dilated during exam    Raina Morocho MD     Medical Retina  [facility]     Attending Physician Attestation:  Complete documentation of historical and exam elements from today's encounter can be found in the full encounter summary report (not reduplicated in this progress note).  I personally obtained the chief complaint(s) and history of present illness.  I confirmed and edited as necessary the review of systems, past medical/surgical history, family history, social history, and examination findings as documented by others; and I examined the patient myself.  I personally reviewed the relevant tests, images, and reports as documented above.  I formulated and edited as necessary the assessment and plan and discussed the findings and management plan with the patient and family. Raina Morocho MD

## 2024-12-18 ENCOUNTER — TRANSFERRED RECORDS (OUTPATIENT)
Dept: HEALTH INFORMATION MANAGEMENT | Facility: CLINIC | Age: 74
End: 2024-12-18
Payer: COMMERCIAL

## 2025-01-07 NOTE — PROGRESS NOTES
New Patient Oncology Nurse Navigator Note     Referring provider: Murali Logan    Referring Clinic/Organization: [facility]     Referred to: Medical Oncology - Solid Tumor     Requested provider (if applicable): Dr. Chava Kelley    Referral Received: 05/21/20       Evaluation for : History of Lung cancer     Clinical History (per Nurse review of records provided):      Copied from [facility] everywhere:     Vishnu Viveros is a 69 y.o. male who presents today for a surveillance visit. Dr. Gutierrez took him to the operating room on April 3, 2019 for a bronchoscopy, mediastinoscopy, right VATS, right upper lobectomy, and mediastinal lymphadenectomy for surgical pathology revealing invasive lung adenocarcinoma measuring 1.8 cm in greatest dimension. Margins negative. 0 of 11 lymph nodes involved. He was discharged from the hospital after an uneventful postoperative course, on April 5th 2019       Clinical Assessment / Barriers to Care (Per Nurse):  Patient was being followed at [place] by Thoracic Team.   Now wants to see    Question if  appropriate as no med onc intervention occurred or in needed. And he has history with our Thoracic team.     He is due for a CT chest as well ; He still needs active surveillance, scans every 6 months,     Records Location: [facility] Everywhere     Records Needed:     Images from [place]    Additional testing needed prior to consult:     Chest CT is due    PLAN: Ok to schedule with Chava Kelley, as MAYBE eligible for a study, otherwise would follow with Thoracic for surveillance    Elizabeth Kohler LPN          07-Jan-2025 15:06

## 2025-01-16 ENCOUNTER — TELEPHONE (OUTPATIENT)
Dept: ORTHOPEDICS | Facility: CLINIC | Age: 75
End: 2025-01-16

## 2025-01-16 NOTE — TELEPHONE ENCOUNTER
ATC called patient, left VM requesting MC or call back to let us know about missed appts for cervical/lumbar injections and whether they want to keep 1/21/25 visit with Dr. Gray, or reschedule that, and/or need assistance rescheduling injections.    Stevan Peck, MS, ATC, LAT, EDWIN  [facility]  Dr. Leandro Carranza

## 2025-01-23 ENCOUNTER — TELEPHONE (OUTPATIENT)
Dept: NEUROSURGERY | Facility: CLINIC | Age: 75
End: 2025-01-23
Payer: COMMERCIAL

## 2025-01-23 NOTE — TELEPHONE ENCOUNTER
See phone message from pt.  See dictation of 10-29-24 for plan & many canceled & NOS aopts.  See triage messages from Stevan humphries VMs.    I called pt back stated got 100% relief of LBP after Lumbar epidural injection done 12-18-24 at [facility] & report scanned in epic also states 100%.  Pt stated still getting good relief now.  C/O continued neck & upper back/shoulder pain.    I told pt that I called [facility] & they still have the order for C7-T1 injection so pt should call [phone] & schedule & pt agreed.    I also advised pt call [phone] & [facility] PT appts canceled or NOSd & make at least 6 PT appts & pt agreed.  Also call our Orthopedic clinic back [phone] & make RTN appt with  for after the date of next injection & pt agreed.    I sent message to [facility] to transfer imaging from injection of 12-18-24.    Call back prn.  Pt agreed.    W.JESSICA./Ann Marie Osman RN.

## 2025-01-23 NOTE — TELEPHONE ENCOUNTER
Other: According to document scanned on patients chart, patient had spinal injection on 12/18/24 at [facility]. Patient has had relief since spinal injection and would like to know more information about the injections and if he should schedule another injection when he is next able     Could we send this information to you in [employer] or would you prefer to receive a phone call?:   Patient would prefer a phone call   Okay to leave a detailed message?: Yes at Cell number on file:    Telephone Information:   Mobile [phone]

## 2025-01-25 ENCOUNTER — TELEPHONE (OUTPATIENT)
Dept: NEUROSURGERY | Facility: CLINIC | Age: 75
End: 2025-01-25
Payer: COMMERCIAL

## 2025-01-25 NOTE — TELEPHONE ENCOUNTER
Patient Returning Call    Reason for call:  patient is calling needing injection order to be sent to [facility]    Information relayed to patient:  te sent to clinic     Patient has additional questions:  No      Could we send this information to you in [facility] or would you prefer to receive a phone call?:   Patient would prefer a phone call   Okay to leave a detailed message?: Yes at Cell number on file:    Telephone Information:   Mobile [phone]

## 2025-01-27 ENCOUNTER — OFFICE VISIT (OUTPATIENT)
Dept: OPHTHALMOLOGY | Facility: CLINIC | Age: 75
End: 2025-01-27
Payer: COMMERCIAL

## 2025-01-27 DIAGNOSIS — H35.3230 BILATERAL EXUDATIVE AGE-RELATED MACULAR DEGENERATION, UNSPECIFIED STAGE (H): ICD-10-CM

## 2025-01-27 DIAGNOSIS — H35.052 CNVM (CHOROIDAL NEOVASCULAR MEMBRANE), LEFT: ICD-10-CM

## 2025-01-27 DIAGNOSIS — H35.713 CENTRAL SEROUS CHORIORETINOPATHY OF BOTH EYES: Primary | ICD-10-CM

## 2025-01-27 PROCEDURE — 99214 OFFICE O/P EST MOD 30 MIN: CPT | Mod: 25

## 2025-01-27 PROCEDURE — 67028 INJECTION EYE DRUG: CPT | Mod: LT

## 2025-01-27 PROCEDURE — 250N000011 HC RX IP 250 OP 636

## 2025-01-27 PROCEDURE — 92134 CPTRZ OPH DX IMG PST SGM RTA: CPT

## 2025-01-27 PROCEDURE — 99214 OFFICE O/P EST MOD 30 MIN: CPT

## 2025-01-27 RX ADMIN — Medication 1.25 MG: at 16:44

## 2025-01-27 ASSESSMENT — CUP TO DISC RATIO
OS_RATIO: 0.5
OD_RATIO: 0.5

## 2025-01-27 ASSESSMENT — CONF VISUAL FIELD
OS_SUPERIOR_TEMPORAL_RESTRICTION: 0
OS_INFERIOR_TEMPORAL_RESTRICTION: 0
OS_SUPERIOR_NASAL_RESTRICTION: 0
OD_SUPERIOR_TEMPORAL_RESTRICTION: 0
OD_INFERIOR_TEMPORAL_RESTRICTION: 0
OS_INFERIOR_NASAL_RESTRICTION: 0
OD_SUPERIOR_NASAL_RESTRICTION: 0
OS_NORMAL: 1
OD_NORMAL: 1
OD_INFERIOR_NASAL_RESTRICTION: 0

## 2025-01-27 ASSESSMENT — TONOMETRY
IOP_METHOD: TONOPEN
OS_IOP_MMHG: 18
OD_IOP_MMHG: 14

## 2025-01-27 ASSESSMENT — VISUAL ACUITY
OD_SC+: +3
OD_SC: 20/30
OS_SC: 20/30
OS_PH_SC: 20/20
OS_SC+: +3
OD_PH_SC: 20/20
METHOD: SNELLEN - LINEAR

## 2025-01-27 ASSESSMENT — EXTERNAL EXAM - RIGHT EYE: OD_EXAM: NORMAL

## 2025-01-27 ASSESSMENT — SLIT LAMP EXAM - LIDS
COMMENTS: NORMAL
COMMENTS: NORMAL

## 2025-01-27 ASSESSMENT — EXTERNAL EXAM - LEFT EYE: OS_EXAM: NORMAL

## 2025-01-27 NOTE — PROGRESS NOTES
"CC: chronic CSCR vs pattern dystrophy    HPI: Mr Viveros,  is complaining of blurred vision in the left eye, the letters seem darker and more stretched than in the right eye. He also complained of a few episodes of double vision that resolves with blinking and re-focusing.    Interval: LV 24. Patient feels that vision is not \"correct\" but unsure what is going on. He is most bothered by reading vision, especially in the left eye. Does not notice as much issues with his right eye vision.     PMHx:   DM type II  Hx of lung adenoCA  Imaging:    OCT: 2025   Right eye: SRHM resolving, PEDs stable, no IRF, dense vitreous base ( probably due to high gain), PVD; 233 -> 228  Left eye:SRHM with worse SRF today compared to last visit; CST: 264 -> 364     FA/IC23:  OD: good transit time, no leakage along the arcades, there is increase AF at the macula with respect to time,   Retina Laser procedures:good transit time, no leakage along the arcades or at the macula.    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 Objectively the vision in LEFT eye is 20/30-stable.  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  FAF demonstrates progression since 2018 which is consistent with either     # Diabetes mellitus no nonproliferative diabetic retinopathy             Lab Results   Component Value Date    A1C 6.3 10/09/2024    A1C 6.3 06/08/2024    A1C 6.5 12/09/2023    A1C 6.4 06/10/2023    A1C 6.7 12/10/2022    A1C 5.8 06/07/2021    A1C 5.8 11/16/2020    A1C 6.0 05/27/2020    A1C 6.2 10/26/2019    A1C 6.4 06/12/2019     Blood glucose control  No evidence of diabetic retinopathy on exam today  Monitor yearly    # Pseudophakia OU   S/P CE IOL right eye 7/10/23 (complicated by rebound iritis)   S/P CE IOL left eye 7/24/23   -stable    # Intermitted XT , intermittent diplopia   Small angle on ACT  Patient was dilated during exam  Instructed patient that will hold of on any intervention until retina issue is fic\xed. the hope is that if we improve vision in the LEFT eye then fusion can be improved  If not, will attempt fresnel prisms    Follow up in 4 weeks: DFE + OCT both eyes, possible injection LEFT eye ( might switch to Eylea if not to great a response)    Jewell Callahan MD  PGY-3  Department of Ophthalmology  January 27, 2025 4:17 PM     Raina Morocho MD     Medical Retina  [facility]     Attending Physician Attestation: Complete documentation of historical and exam elements from today's encounter can be found in the full encounter summary report (not reduplicated in this progress note). I personally obtained the chief complaint(s) and history of present illness. I confirmed and edited as necessary the review of systems, past medical/surgical history, family history, social history, and examination findings as documented by others; and I examined the patient myself. I personally reviewed the relevant tests, images, and reports as documented above. I formulated and edited as necessary the assessment and plan  and discussed the findings and management plan with the patient and family.  I was present for the entire procedure(s).   Raina Goyal MD'

## 2025-01-27 NOTE — TELEPHONE ENCOUNTER
Called Pt and LVM that Cervical inj order has been faxed to [facility]. Faxed cervical inj order to [facility] to fax # [phone].  Daniel WELCH

## 2025-01-27 NOTE — NURSING NOTE
Chief Complaints and History of Present Illnesses   Patient presents with    Follow Up      Active CNVM left eye and Diabetes mellitus      Chief Complaint(s) and History of Present Illness(es)       Follow Up              Laterality: both eyes    Associated symptoms: Negative for eye pain, flashes and floaters    Treatments tried: artificial tears and glasses    Pain scale: 0/10    Comments:  Active CNVM left eye and Diabetes mellitus               Comments    Patient reports difficulty reading and using computer.    Ocular Meds: AT's PRN rare use  DM.  BS not checked. Lab Results       Component                Value               Date                       A1C                      6.3                 10/09/2024                 A1C                      6.3                 06/08/2024                 A1C                      6.5                 12/09/2023                 A1C                      6.4                 06/10/2023                 A1C                      6.7                 12/10/2022                 A1C                      5.8                 06/07/2021                 A1C                      5.8                 11/16/2020                 A1C                      6.0                 05/27/2020                 A1C                      6.2                 10/26/2019                 A1C                      6.4                 06/12/2019            Anna Marie Hidalgo OA 2:41 PM January 27, 2025

## 2025-02-01 ENCOUNTER — HEALTH MAINTENANCE LETTER (OUTPATIENT)
Age: 75
End: 2025-02-01

## 2025-02-19 DIAGNOSIS — H35.3230 BILATERAL EXUDATIVE AGE-RELATED MACULAR DEGENERATION, UNSPECIFIED STAGE (H): Primary | ICD-10-CM

## 2025-02-27 ENCOUNTER — OFFICE VISIT (OUTPATIENT)
Dept: OPHTHALMOLOGY | Facility: CLINIC | Age: 75
End: 2025-02-27
Payer: COMMERCIAL

## 2025-02-27 DIAGNOSIS — H35.3230 BILATERAL EXUDATIVE AGE-RELATED MACULAR DEGENERATION, UNSPECIFIED STAGE (H): ICD-10-CM

## 2025-02-27 PROCEDURE — 92134 CPTRZ OPH DX IMG PST SGM RTA: CPT

## 2025-02-27 PROCEDURE — 67028 INJECTION EYE DRUG: CPT | Mod: LT

## 2025-02-27 PROCEDURE — 250N000011 HC RX IP 250 OP 636

## 2025-02-27 PROCEDURE — 99214 OFFICE O/P EST MOD 30 MIN: CPT

## 2025-02-27 PROCEDURE — 92015 DETERMINE REFRACTIVE STATE: CPT

## 2025-02-27 RX ADMIN — Medication 1.25 MG: at 15:13

## 2025-02-27 ASSESSMENT — VISUAL ACUITY
OD_SC+: -2
OD_PH_SC: 20/25
OD_SC: 20/30
OS_SC+: -2
OS_SC: 20/30
METHOD: SNELLEN - LINEAR

## 2025-02-27 ASSESSMENT — REFRACTION_MANIFEST
OD_CYLINDER: +1.00
OD_AXIS: 170
OS_ADD: +2.50
OS_CYLINDER: +0.25
OD_ADD: +2.50
OS_SPHERE: +0.25
OD_SPHERE: PLANO
OS_AXIS: 065

## 2025-02-27 ASSESSMENT — CUP TO DISC RATIO
OS_RATIO: 0.5
OD_RATIO: 0.5

## 2025-02-27 ASSESSMENT — SLIT LAMP EXAM - LIDS
COMMENTS: NORMAL
COMMENTS: NORMAL

## 2025-02-27 ASSESSMENT — EXTERNAL EXAM - RIGHT EYE: OD_EXAM: NORMAL

## 2025-02-27 ASSESSMENT — EXTERNAL EXAM - LEFT EYE: OS_EXAM: NORMAL

## 2025-02-27 ASSESSMENT — TONOMETRY
IOP_METHOD: TONOPEN
OS_IOP_MMHG: 17
OD_IOP_MMHG: 17

## 2025-02-27 NOTE — NURSING NOTE
Chief Complaints and History of Present Illnesses   Patient presents with    CSCR ou      Chief Complaint(s) and History of Present Illness(es)       CSCR ou                Comments    Vision stable ou.   Os floaters improved.   Web more frequent.   Something covering eyes when reading when asked if has dark curtain or shade or shadow.   Denies flashes.   Tearing ou.   Denies pain 0/10, redness, itching.     Possible steroid injection 4 mths ago.     Ocular meds: AT ran out  a while ago    Sarah JACINTO, February 27, 2025 1:42 PM

## 2025-02-27 NOTE — PROGRESS NOTES
"CC: chronic CSCR vs pattern dystrophy    HPI: Mr Viveros,  is complaining of blurred vision in the left eye, the letters seem darker and more stretched than in the right eye. He also complained of a few episodes of double vision that resolves with blinking and re-focusing.    Interval: LV 24. Patient feels that vision is not \"correct\" but unsure what is going on. He is most bothered by reading vision, especially in the left eye. Does not notice as much issues with his right eye vision.     PMHx:   DM type II  Hx of lung adenoCA  Imaging:    OCT: 2025   Right eye: SRHM resolving, PEDs stable, no IRF, dense vitreous base ( probably due to high gain), PVD; 233 -> 228 -> 227  Left eye: SRHM with much improved SRF today compared to last visit; CST: 264 -> 364 -> 266    FA/IC23:  OD: good transit time, no leakage along the arcades, there is increase AF at the macula with respect to time,   Retina Laser procedures:good transit time, no leakage along the arcades or at the macula.    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 Objectively the vision in LEFT eye is 20/30-stable.  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  FAF demonstrates progression since 2018 which is consistent with either     # Diabetes mellitus no nonproliferative diabetic retinopathy             Lab Results   Component Value Date    A1C 6.3 10/09/2024    A1C 6.3 06/08/2024    A1C 6.5 12/09/2023    A1C 6.4 06/10/2023    A1C 6.7 12/10/2022    A1C 5.8 06/07/2021    A1C 5.8 11/16/2020    A1C 6.0 05/27/2020    A1C 6.2 10/26/2019    A1C 6.4 06/12/2019     Blood glucose control  No evidence of diabetic retinopathy on exam today  Monitor yearly    # Pseudophakia OU   S/P CE IOL right eye 7/10/23 (complicated by rebound iritis)   S/P CE IOL left eye 7/24/23   -stable    # Intermitted XT , intermittent diplopia   Small angle on ACT  Patient was dilated during exam  Instructed patient that will hold of on any intervention until retina issue is fic\xed. the hope is that if we improve vision in the LEFT eye then fusion can be improved  If not, will attempt fresnel prisms    Follow up in 4 weeks: DFE + OCT both eyes, possible injection LEFT eye     Freddy Dutta MD  Resident Physician, PGY-3  Department of Ophthalmology     Raina Morocho MD     Medical Retina  [facility]     Attending Physician Attestation: Complete documentation of historical and exam elements from today's encounter can be found in the full encounter summary report (not reduplicated in this progress note). I personally obtained the chief complaint(s) and history of present illness. I confirmed and edited as necessary the review of systems, past medical/surgical history, family history, social history, and examination findings as documented by others; and I examined the patient myself. I personally reviewed the relevant tests, images, and reports as documented above. I formulated and edited as necessary the assessment and plan and discussed the findings and management plan with the  patient and family.  I was present for the entire procedure(s).   Raina Morocho. MD'

## 2025-03-20 DIAGNOSIS — H35.3230 BILATERAL EXUDATIVE AGE-RELATED MACULAR DEGENERATION, UNSPECIFIED STAGE (H): Primary | ICD-10-CM

## 2025-03-26 ENCOUNTER — OFFICE VISIT (OUTPATIENT)
Dept: OPHTHALMOLOGY | Facility: CLINIC | Age: 75
End: 2025-03-26
Attending: OPHTHALMOLOGY
Payer: COMMERCIAL

## 2025-03-26 DIAGNOSIS — H35.3230 BILATERAL EXUDATIVE AGE-RELATED MACULAR DEGENERATION, UNSPECIFIED STAGE (H): ICD-10-CM

## 2025-03-26 PROCEDURE — 99213 OFFICE O/P EST LOW 20 MIN: CPT | Performed by: OPHTHALMOLOGY

## 2025-03-26 PROCEDURE — 67028 INJECTION EYE DRUG: CPT | Mod: LT | Performed by: OPHTHALMOLOGY

## 2025-03-26 PROCEDURE — 250N000011 HC RX IP 250 OP 636: Performed by: OPHTHALMOLOGY

## 2025-03-26 PROCEDURE — 92134 CPTRZ OPH DX IMG PST SGM RTA: CPT | Performed by: OPHTHALMOLOGY

## 2025-03-26 PROCEDURE — 250N000011 HC RX IP 250 OP 636

## 2025-03-26 RX ADMIN — Medication 1.25 MG: at 16:10

## 2025-03-26 ASSESSMENT — VISUAL ACUITY
OD_SC+: -2
OS_SC: 20/25
OS_SC+: +2
METHOD: SNELLEN - LINEAR
OD_SC: 20/25

## 2025-03-26 ASSESSMENT — EXTERNAL EXAM - RIGHT EYE: OD_EXAM: NORMAL

## 2025-03-26 ASSESSMENT — TONOMETRY
OS_IOP_MMHG: 7
IOP_METHOD: ICARE
OD_IOP_MMHG: 10

## 2025-03-26 ASSESSMENT — EXTERNAL EXAM - LEFT EYE: OS_EXAM: NORMAL

## 2025-03-26 ASSESSMENT — CUP TO DISC RATIO
OS_RATIO: 0.5
OD_RATIO: 0.5

## 2025-03-26 ASSESSMENT — SLIT LAMP EXAM - LIDS
COMMENTS: NORMAL
COMMENTS: NORMAL

## 2025-03-26 NOTE — NURSING NOTE
Chief Complaints and History of Present Illnesses   Patient presents with    Macular Degeneration Follow Up     Chief Complaint(s) and History of Present Illness(es)       Macular Degeneration Follow Up               Comments    Patient states he thinks vision has gotten a little better. No pain BE. No dryness and irritation. Some floaters, no flashes of light.    Ocular Meds: Artificial tears as needed    Alana ERICKSON 2:30 PM March 26, 2025

## 2025-03-26 NOTE — PROGRESS NOTES
CC: chronic CSCR vs pattern dystrophy    Interval: Patient states he thinks vision has gotten a little better. No pain BE. No dryness and irritation. Some floaters, no flashes of light. Ocular Meds: Artificial tears as needed     HPI: Mr Viveros is a 74 year old man with POH of CNVM OS who presents for follow up.    PMHx:   DM type II  Hx of lung adenoCA    Imaging:    OCT: 2025   Right eye: few drusen; tr serous PED; parafoveal outer retinal loss; grossly stable  Left eye: SRHM stable mild SRF; CST: 264 -> 364 -> 264 -> 234; mild improvement     FA/IC23:  OD: good transit time, no leakage along the arcades, there is increase AF at the macula with respect to time,   Retina Laser procedures:good transit time, no leakage along the arcades or at the macula.    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 Objectively the vision in LEFT eye is 20/30-stable.  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  FAF demonstrates progression since 2018 which is consistent with either   Possible History of steroid inhaler use    # Diabetes mellitus no nonproliferative diabetic retinopathy             Blood glucose control  No evidence of diabetic retinopathy on  exam today  Monitor yearly    # Pseudophakia OU   S/P CE IOL right eye 7/10/23 (complicated by rebound iritis)   S/P CE IOL left eye 7/24/23   -stable    # Intermitted XT , intermittent diplopia   Small angle on ACT  Patient was dilated during exam  - 03/26/25 infrequent diplopia  Instructed patient that will hold of on any intervention until retina issue is fic\xed. the hope is that if we improve vision in the LEFT eye then fusion can be improved  If not, will attempt fresnel prisms    # glaucoma suspect  Consider glaucoma evaluation in the future    Follow up in 4 weeks: VTD, MRX WITH PRISMS; optos; OCT mac/RNFL, SILVIA OS    Calvin Gaytan MD  Vitreoretinal Surgery Fellow     ~~~~~~~~~~~~~~~~~~~~~~~~~~~~~~~~~~   Complete documentation of historical and exam elements from today's encounter can be found in the full encounter summary report (not reduplicated in this progress note).  I personally obtained the chief complaint(s) and history of present illness.  I confirmed and edited as necessary the review of systems, past medical/surgical history, family history, social history, and examination findings as documented by others; and I examined the patient myself.  I personally reviewed the relevant tests, images, and reports as documented above.  I personally reviewed the ophthalmic test(s) associated with this encounter, agree with the interpretation(s) as documented by the resident/fellow, and have edited the corresponding report(s) as necessary.   I formulated and edited as necessary the assessment and plan and discussed the findings and management plan with the patient and family and No resident or fellow assisted with the procedures performed.  I performed the procedures myself.    Radha Connor MD  Professor of Ophthalmology  Vitreo-Retinal surgeon   Department of Ophthalmology and Visual Neurosciences   [facility]  Phone: [phone]   Fax: [phone]

## 2025-04-13 ENCOUNTER — HEALTH MAINTENANCE LETTER (OUTPATIENT)
Age: 75
End: 2025-04-13

## 2025-04-21 ENCOUNTER — TELEPHONE (OUTPATIENT)
Dept: FAMILY MEDICINE | Facility: CLINIC | Age: 75
End: 2025-04-21
Payer: COMMERCIAL

## 2025-04-21 DIAGNOSIS — N40.1 BENIGN LOCALIZED PROSTATIC HYPERPLASIA WITH LOWER URINARY TRACT SYMPTOMS (LUTS): ICD-10-CM

## 2025-04-24 DIAGNOSIS — H35.3230 BILATERAL EXUDATIVE AGE-RELATED MACULAR DEGENERATION, UNSPECIFIED STAGE (H): Primary | ICD-10-CM

## 2025-04-24 DIAGNOSIS — H35.052 CNVM (CHOROIDAL NEOVASCULAR MEMBRANE), LEFT: ICD-10-CM

## 2025-04-24 RX ORDER — FINASTERIDE 5 MG/1
5 TABLET, FILM COATED ORAL DAILY
Qty: 90 TABLET | OUTPATIENT
Start: 2025-04-24

## 2025-05-02 ENCOUNTER — TELEPHONE (OUTPATIENT)
Dept: UROLOGY | Facility: CLINIC | Age: 75
End: 2025-05-02
Payer: COMMERCIAL

## 2025-05-02 NOTE — TELEPHONE ENCOUNTER
[facility] Call Center    Phone Message    May a detailed message be left on voicemail: yes     Reason for Call: Other: Patient called again, upset that he hadn't got a call back about getting an appt with Dr Montaño. Writer told him that the care team tried calling back on 4/25. He said he doesn't want to see anyone but Dr Montaño, that he wasn't happy with whoever else he has seen. Please try reach out to patient. Thank you!     Action Taken: Message routed to:  [facility] ([facility]    Travel Screening: Not Applicable     Date of Service:

## 2025-05-05 ENCOUNTER — OFFICE VISIT (OUTPATIENT)
Dept: FAMILY MEDICINE | Facility: CLINIC | Age: 75
End: 2025-05-05
Payer: COMMERCIAL

## 2025-05-05 VITALS
TEMPERATURE: 97.6 F | OXYGEN SATURATION: 97 % | BODY MASS INDEX: 32.6 KG/M2 | HEART RATE: 72 BPM | HEIGHT: 63 IN | SYSTOLIC BLOOD PRESSURE: 134 MMHG | DIASTOLIC BLOOD PRESSURE: 78 MMHG | WEIGHT: 184 LBS

## 2025-05-05 DIAGNOSIS — J30.2 SEASONAL ALLERGIES: ICD-10-CM

## 2025-05-05 DIAGNOSIS — M47.12 CERVICAL SPONDYLOSIS WITH MYELOPATHY: ICD-10-CM

## 2025-05-05 DIAGNOSIS — M54.16 LUMBAR RADICULOPATHY: ICD-10-CM

## 2025-05-05 DIAGNOSIS — N40.1 BENIGN LOCALIZED PROSTATIC HYPERPLASIA WITH LOWER URINARY TRACT SYMPTOMS (LUTS): ICD-10-CM

## 2025-05-05 DIAGNOSIS — M48.062 SPINAL STENOSIS, LUMBAR REGION, WITH NEUROGENIC CLAUDICATION: ICD-10-CM

## 2025-05-05 DIAGNOSIS — Z00.00 ROUTINE GENERAL MEDICAL EXAMINATION AT A HEALTH CARE FACILITY: Primary | ICD-10-CM

## 2025-05-05 DIAGNOSIS — I10 BENIGN ESSENTIAL HYPERTENSION: ICD-10-CM

## 2025-05-05 DIAGNOSIS — E08.42 DIABETIC POLYNEUROPATHY ASSOCIATED WITH DIABETES MELLITUS DUE TO UNDERLYING CONDITION (H): ICD-10-CM

## 2025-05-05 DIAGNOSIS — H91.93 BILATERAL HEARING LOSS, UNSPECIFIED HEARING LOSS TYPE: ICD-10-CM

## 2025-05-05 DIAGNOSIS — C34.91 ADENOCARCINOMA OF LUNG, STAGE 1, RIGHT (H): ICD-10-CM

## 2025-05-05 DIAGNOSIS — H10.13 ALLERGIC CONJUNCTIVITIS OF BOTH EYES: ICD-10-CM

## 2025-05-05 DIAGNOSIS — E11.9 TYPE 2 DIABETES MELLITUS TREATED WITHOUT INSULIN (H): ICD-10-CM

## 2025-05-05 LAB
ANION GAP SERPL CALCULATED.3IONS-SCNC: 10 MMOL/L (ref 7–15)
BUN SERPL-MCNC: 20.7 MG/DL (ref 8–23)
CALCIUM SERPL-MCNC: 9.5 MG/DL (ref 8.8–10.4)
CHLORIDE SERPL-SCNC: 104 MMOL/L (ref 98–107)
CREAT SERPL-MCNC: 0.9 MG/DL (ref 0.51–1.17)
CREAT UR-MCNC: 55.3 MG/DL
EGFRCR SERPLBLD CKD-EPI 2021: 89 ML/MIN/1.73M2
EST. AVERAGE GLUCOSE BLD GHB EST-MCNC: 126 MG/DL
GLUCOSE SERPL-MCNC: 128 MG/DL (ref 70–99)
HBA1C MFR BLD: 6 % (ref 0–5.6)
HCO3 SERPL-SCNC: 26 MMOL/L (ref 22–29)
MICROALBUMIN UR-MCNC: 35 MG/L
MICROALBUMIN/CREAT UR: 63.29 MG/G CR (ref 0–25)
POTASSIUM SERPL-SCNC: 4.8 MMOL/L (ref 3.4–5.3)
SODIUM SERPL-SCNC: 140 MMOL/L (ref 135–145)

## 2025-05-05 PROCEDURE — G2211 COMPLEX E/M VISIT ADD ON: HCPCS | Performed by: STUDENT IN AN ORGANIZED HEALTH CARE EDUCATION/TRAINING PROGRAM

## 2025-05-05 PROCEDURE — 83036 HEMOGLOBIN GLYCOSYLATED A1C: CPT | Performed by: STUDENT IN AN ORGANIZED HEALTH CARE EDUCATION/TRAINING PROGRAM

## 2025-05-05 PROCEDURE — 3075F SYST BP GE 130 - 139MM HG: CPT | Performed by: STUDENT IN AN ORGANIZED HEALTH CARE EDUCATION/TRAINING PROGRAM

## 2025-05-05 PROCEDURE — 36415 COLL VENOUS BLD VENIPUNCTURE: CPT | Performed by: STUDENT IN AN ORGANIZED HEALTH CARE EDUCATION/TRAINING PROGRAM

## 2025-05-05 PROCEDURE — 99397 PER PM REEVAL EST PAT 65+ YR: CPT | Performed by: STUDENT IN AN ORGANIZED HEALTH CARE EDUCATION/TRAINING PROGRAM

## 2025-05-05 PROCEDURE — 99214 OFFICE O/P EST MOD 30 MIN: CPT | Mod: 25 | Performed by: STUDENT IN AN ORGANIZED HEALTH CARE EDUCATION/TRAINING PROGRAM

## 2025-05-05 PROCEDURE — 82570 ASSAY OF URINE CREATININE: CPT | Performed by: STUDENT IN AN ORGANIZED HEALTH CARE EDUCATION/TRAINING PROGRAM

## 2025-05-05 PROCEDURE — 99207 PR FOOT EXAM NO CHARGE: CPT | Performed by: STUDENT IN AN ORGANIZED HEALTH CARE EDUCATION/TRAINING PROGRAM

## 2025-05-05 PROCEDURE — 82043 UR ALBUMIN QUANTITATIVE: CPT | Performed by: STUDENT IN AN ORGANIZED HEALTH CARE EDUCATION/TRAINING PROGRAM

## 2025-05-05 PROCEDURE — 80048 BASIC METABOLIC PNL TOTAL CA: CPT | Performed by: STUDENT IN AN ORGANIZED HEALTH CARE EDUCATION/TRAINING PROGRAM

## 2025-05-05 PROCEDURE — 3078F DIAST BP <80 MM HG: CPT | Performed by: STUDENT IN AN ORGANIZED HEALTH CARE EDUCATION/TRAINING PROGRAM

## 2025-05-05 RX ORDER — OLOPATADINE HYDROCHLORIDE 2 MG/ML
1 SOLUTION/ DROPS OPHTHALMIC DAILY
Qty: 2.5 ML | Refills: 5 | Status: SHIPPED | OUTPATIENT
Start: 2025-05-05

## 2025-05-05 RX ORDER — CETIRIZINE HYDROCHLORIDE 10 MG/1
10 TABLET ORAL DAILY PRN
Qty: 90 TABLET | Refills: 3 | Status: SHIPPED | OUTPATIENT
Start: 2025-05-05

## 2025-05-05 RX ORDER — GABAPENTIN 300 MG/1
600 CAPSULE ORAL AT BEDTIME
Qty: 180 CAPSULE | Refills: 1 | Status: SHIPPED | OUTPATIENT
Start: 2025-05-05

## 2025-05-05 RX ORDER — LOSARTAN POTASSIUM 100 MG/1
100 TABLET ORAL EVERY EVENING
Qty: 90 TABLET | Refills: 1 | Status: SHIPPED | OUTPATIENT
Start: 2025-05-05

## 2025-05-05 RX ORDER — METFORMIN HYDROCHLORIDE 500 MG/1
1000 TABLET, EXTENDED RELEASE ORAL
Qty: 180 TABLET | Refills: 1 | Status: SHIPPED | OUTPATIENT
Start: 2025-05-05

## 2025-05-05 SDOH — HEALTH STABILITY: PHYSICAL HEALTH: ON AVERAGE, HOW MANY DAYS PER WEEK DO YOU ENGAGE IN MODERATE TO STRENUOUS EXERCISE (LIKE A BRISK WALK)?: 0 DAYS

## 2025-05-05 ASSESSMENT — SOCIAL DETERMINANTS OF HEALTH (SDOH): HOW OFTEN DO YOU GET TOGETHER WITH FRIENDS OR RELATIVES?: MORE THAN THREE TIMES A WEEK

## 2025-05-05 NOTE — PROGRESS NOTES
Preventive Care Visit  [facility]  Lena Boyer DO, Family Medicine  May 5, 2025      Assessment & Plan     Routine general medical examination at a health care facility  He would like to wait until fall for RSV vaccine     Diabetic polyneuropathy associated with diabetes mellitus due to underlying condition (H)  Type 2 diabetes mellitus treated without insulin (H)  Lab Results   Component Value Date    A1C 6.0 05/05/2025    A1C 6.3 10/09/2024    A1C 6.3 06/08/2024    A1C 6.5 12/09/2023    A1C 6.4 06/10/2023    A1C 5.8 06/07/2021    A1C 5.8 11/16/2020    A1C 6.0 05/27/2020    A1C 6.2 10/26/2019    A1C 6.4 06/12/2019     Well controlled diabetes with metformin. On gabapentin for neuropathy.   - metFORMIN (GLUCOPHAGE XR) 500 MG 24 hr tablet; Take 2 tablets (1,000 mg) by mouth daily (with breakfast).  - FOOT EXAM  - Hemoglobin A1c; Future  - Albumin Random Urine Quantitative with Creat Ratio; Future  - Hemoglobin A1c  - Albumin Random Urine Quantitative with Creat Ratio  - gabapentin (NEURONTIN) 300 MG capsule; Take 2 capsules (600 mg) by mouth at bedtime.    Benign essential hypertension  Chronic, stable, BP well controlled on current medications. Check BMP today   - Basic metabolic panel  (Ca, Cl, CO2, Creat, Gluc, K, Na, BUN); Future  - Basic metabolic panel  (Ca, Cl, CO2, Creat, Gluc, K, Na, BUN)  - losartan (COZAAR) 100 MG tablet; Take 1 tablet (100 mg) by mouth every evening.    Benign localized prostatic hyperplasia with lower urinary tract symptoms (LUTS)  Has upcoming appt with urology. On finasteride and flomax     Cervical spondylosis with myelopathy  Lumbar radiculopathy  Spinal stenosis, lumbar region, with neurogenic claudication  Follows with spine team. On gabapentin. Has upcoming appt with clinic for possible spine stem cell injection.   - gabapentin (NEURONTIN) 300 MG capsule; Take 2 capsules (600 mg) by mouth at bedtime.    Allergic conjunctivitis of both eyes  Seasonal  "allergies  On singulair and claritin with continued symptoms. Recommend switching to zyrtec from claritin and add pataday drops PRN  - olopatadine (PATADAY) 0.2 % ophthalmic solution; Place 0.05 mLs (1 drop) into both eyes daily.  - cetirizine (ZYRTEC) 10 MG tablet; Take 1 tablet (10 mg) by mouth daily as needed for allergies.    Bilateral hearing loss, unspecified hearing loss type  - Adult Audiology  Referral; Future    Adenocarcinoma of lung, stage 1, right (H)  Hx of lung resection at [place]     Patient has been advised of split billing requirements and indicates understanding: Yes        BMI  Estimated body mass index is 32.2 kg/m  as calculated from the following:    Height as of this encounter: 1.61 m (5' 3.39\").    Weight as of this encounter: 83.5 kg (184 lb).   Weight management plan: Discussed healthy diet and exercise guidelines    Counseling  Appropriate preventive services were addressed with this patient via screening, questionnaire, or discussion as appropriate for fall prevention, nutrition, physical activity, Tobacco-use cessation, social engagement, weight loss and cognition.  Checklist reviewing preventive services available has been given to the patient.  Reviewed patient's diet, addressing concerns and/or questions.   Patient reported safety concerns were addressed today.The patient was provided with written information regarding signs of hearing loss.     The longitudinal plan of care for the diagnosis(es)/condition(s) as documented were addressed during this visit. Due to the added complexity in care, I will continue to support Vishnu in the subsequent management and with ongoing continuity of care.        Subjective   Vishnu is a 74 year old, presenting for the following:  Physical        5/5/2025     2:34 PM   Additional Questions   Accompanied by wife         5/5/2025   Forms   Any forms needing to be completed Yes          History of Present Illness       Hypertension: Vishnu " presents for follow up of hypertension.  Vishnu does check blood pressure  regularly outside of the clinic. Outside blood pressures have been over 140/90. Vishnu follows a low salt diet.      Form-    Checks BP at home intermittently and in a good range.     Seeing urology in July. On finasteride and flomax. Sometimes these help. Endorses urinary frequency. Endorses nocturia.     3 bottles of water of water  1.5 cups coffee  EtOH - 0-1 drinks/week.   Former smoker - quit 50 years ago.     Cervical and lumbar radiculopathy - affects quality of life. Lot of pain. Also bilateral knee pain. Seeing Spine team, Dr Gray. Going to see [facility] soon for PRP and stem cell soon. Had ARYA in December 2024. Back has been bothering him for over 5 years. Walks flexed forward now.     Wife helps with diet. Tries to eat healthy     Physical activity limited d/t back, but does try to do some walking. Strength training at gym.     Very sensitive to cold. Sensitive to allergies. On singulair and loratadine but still struggling.     Takes KCL 20 mEq one tablet  every 1-2 weeks.     Using a non-habit forming sleep aid OTC which is helping more. Also takes one tylenol PM     Gabapentin not helping     With exercising his knees will jerk sometimes, this also happens at night sometimes.       Lab Results   Component Value Date    A1C 6.0 05/05/2025    A1C 6.3 10/09/2024    A1C 6.3 06/08/2024    A1C 6.5 12/09/2023    A1C 6.4 06/10/2023    A1C 5.8 06/07/2021    A1C 5.8 11/16/2020    A1C 6.0 05/27/2020    A1C 6.2 10/26/2019    A1C 6.4 06/12/2019   '      Advance Care Planning    Discussed advance care planning with patient; informed AVS has link to [employer].        5/5/2025   General Health   How would you rate your overall physical health? (!) FAIR   Feel stress (tense, anxious, or unable to sleep) Not at all         5/5/2025   Nutrition   Diet: Low fat/cholesterol         5/5/2025   Exercise   Days per week of  moderate/strenous exercise 0 days   (!) EXERCISE CONCERN      5/5/2025   Social Factors   Frequency of gathering with friends or relatives More than three times a week   Worry food won't last until get money to buy more No   Food not last or not have enough money for food? No   Do you have housing? (Housing is defined as stable permanent housing and does not include staying outside in a car, in a tent, in an abandoned building, in an overnight shelter, or couch-surfing.) No   Are you worried about losing your housing? No   Lack of transportation? No   Unable to get utilities (heat,electricity)? No   Want help with housing or utility concern? No   (!) HOUSING CONCERN PRESENT      5/5/2025   Fall Risk   Fallen 2 or more times in the past year? No   Trouble with walking or balance? Yes   Reason Gait Speed Test Not Completed Patient declines          5/5/2025   Activities of Daily Living- Home Safety   Needs help with the following daily activites None of the above   Safety concerns in the home No grab bars in the bathroom         5/5/2025   Dental   Dentist two times every year? Yes         5/5/2025   Hearing Screening   Hearing concerns? (!) I NEED TO ASK PEOPLE TO SPEAK UP OR REPEAT THEMSELVES.    (!) IT'S HARDER TO UNDERSTAND WOMEN'S VOICES THAN MEN'S VOICES.    (!) IT'S HARD TO FOLLOW A CONVERSATION IN A NOISY RESTAURANT OR CROWDED ROOM.    (!) TROUBLE UNDERSTANDING SOFT OR WHISPERED SPEECH.       Multiple values from one day are sorted in reverse-chronological order         5/5/2025   Driving Risk Screening   Patient/family members have concerns about driving No         5/5/2025   General Alertness/Fatigue Screening   Have you been more tired than usual lately? No         5/5/2025   Urinary Incontinence Screening   Bothered by leaking urine in past 6 months No         Today's PHQ-2 Score:       5/5/2025     2:37 PM   PHQ-2 ( 1999 [employer])   Q1: Little interest or pleasure in doing things 0   Q2: Feeling down,  depressed or hopeless 0   PHQ-2 Score 0    Q1: Little interest or pleasure in doing things Not at all   Q2: Feeling down, depressed or hopeless Not at all   PHQ-2 Score 0       Patient-reported           2025   Substance Use   Alcohol more than 3/day or more than 7/wk No   Do you have a current opioid prescription? No   How severe/bad is pain from 1 to 10? 7/10   Do you use any other substances recreationally? No     Social History     Tobacco Use    Smoking status: Former     Current packs/day: 0.00     Types: Cigarettes     Start date: 1976     Quit date: 1981     Years since quittin.5    Smokeless tobacco: Never   Substance Use Topics    Alcohol use: Yes     Alcohol/week: 2.5 standard drinks of alcohol     Types: 3 Standard drinks or equivalent per week    Drug use: No       ASCVD Risk   The 10-year ASCVD risk score (Wilfrido SUMMERS, et al., 2019) is: 36.1%    Values used to calculate the score:      Age: 74 years      Sex: Male      Is Non- : Yes      Diabetic: Yes      Tobacco smoker: No      Systolic Blood Pressure: 134 mmHg      Is BP treated: Yes      HDL Cholesterol: 60 mg/dL      Total Cholesterol: 147 mg/dL            Reviewed and updated as needed this visit by Provider                    Past Medical History:   Diagnosis Date    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     Chronic low back pain     Diabetes (H)     Ectropion due to laxity of eyelid, unspecified laterality     History of tobacco use     HLD (hyperlipidemia)     HTN (hypertension)     Obesity (BMI 30.0-34.9)     Pulmonary nodule      Past Surgical History:   Procedure Laterality Date    APPENDECTOMY OPEN CHILD      ARTHROSCOPY KNEE Left     BIOPSY PROSTATE TRANSRECTAL      CATARACT IOL, RT/LT      EXCISE PTERYGIUM Right     EXCISE PTERYGIUM Right 2015    INJECT EPIDURAL LUMBAR / SACRAL SINGLE N/A 2016    Procedure: INJECT EPIDURAL LUMBAR / SACRAL SINGLE;  Surgeon:  Judah Henriquez MD;  [facility]    INJECT PARAVERTEBRAL FACET JOINT LUMBAR / SACRAL FIRST Right 08/18/2016    Procedure: INJECT PARAVERTEBRAL FACET JOINT LUMBAR / SACRAL FIRST;  Surgeon: Judah Henriquez MD;  [facility]    INJECT PARAVERTEBRAL FACET JOINT LUMBAR / SACRAL FIRST Right 09/08/2016    Procedure: INJECT PARAVERTEBRAL FACET JOINT LUMBAR / SACRAL FIRST;  Surgeon: Judah Henriquez MD;  [facility]    MEDIASTINOSCOPY Right 04/08/2019     [place]  Dr Hemanth Covington 4/8/19 bronchoscopy, cervical mediastinoscopy for staging and minimally invasive right thoracoscopic wedge resection 1.8 cm invasive adenocarcinoma [id number] stage 1A2 adenocarcinoma completely resected via minimally invasive right upper lobectomy.    PHACOEMULSIFICATION WITH STANDARD INTRAOCULAR LENS IMPLANT Right 07/10/2023    Procedure: RIGHT EYE PHACOEMULSIFICATION, CATARACT, WITH STANDARD INTRAOCULAR LENS IMPLANT INSERTION;  Surgeon: Rick Campbell MD;  Location: [facility] OR    PHACOEMULSIFICATION WITH STANDARD INTRAOCULAR LENS IMPLANT Left 07/24/2023    Procedure: LEFT EYE PHACOEMULSIFICATION, CATARACT, WITH STANDARD INTRAOCULAR LENS IMPLANT INSERTION;  Surgeon: Rick Campbell MD;  Location: [facility] OR    PUNCTALPLASTY Bilateral 12/19/2018    Procedure: Bilateral Punctoplasty with Mini [place] Stent;  Surgeon: Levy Blue MD;  [facility]    REPAIR ECTROPION BILATERAL Bilateral 12/19/2018    Procedure: Bilateral Lower Lid Ectropion Repair with Right Lower Lid Lesion Excision;  Surgeon: Levy Blue MD;  Location:  OR     Lab work is in process  Labs reviewed in EPIC  BP Readings from Last 3 Encounters:   05/05/25 134/78   11/04/24 136/78   10/09/24 (!) 149/80    Wt Readings from Last 3 Encounters:   05/05/25 83.5 kg (184 lb)   11/04/24 84.1 kg (185 lb 4.8 oz)   10/09/24 86.2 kg (190 lb)                  Patient Active Problem List   Diagnosis    Erectile dysfunction    Seasonal allergies    Vitamin D  deficiency    Pulmonary nodule    Prostate nodule    DJD (degenerative joint disease) of knee    HL (hearing loss)    Vision impairment    Tear film insufficiency    Recurrent pterygium    Other age-related cataract of left eye    Type 2 diabetes mellitus treated without insulin (H)    Diabetic polyneuropathy associated with diabetes mellitus due to underlying condition (H)    Tubulovillous adenoma of colon    Lumbar radiculopathy    Adenocarcinoma of lung, stage 1, right (H)    Benign essential hypertension    History of colonic polyps    Spinal stenosis, lumbar region, with neurogenic claudication    Elevated prostate specific antigen (PSA)    Obesity with body mass index 30 or greater    Solitary pulmonary nodule    Evaluation of hearing impairment    Combined forms of age-related cataract of both eyes    Balance problems    Neck pain    Cervical spondylosis with myelopathy    Cervical stenosis of spinal canal    Central serous chorioretinopathy of both eyes    Intermittent exotropia, alternating    CNVM (choroidal neovascular membrane), left    Acute pain of right shoulder    Arthritis of right acromioclavicular joint    Stiffness of right shoulder joint    Weakness of right shoulder    Rotator cuff syndrome, right     Past Surgical History:   Procedure Laterality Date    APPENDECTOMY OPEN CHILD      ARTHROSCOPY KNEE Left     BIOPSY PROSTATE TRANSRECTAL      CATARACT IOL, RT/LT      EXCISE PTERYGIUM Right 1989    EXCISE PTERYGIUM Right 02/12/2015    INJECT EPIDURAL LUMBAR / SACRAL SINGLE N/A 07/21/2016    Procedure: INJECT EPIDURAL LUMBAR / SACRAL SINGLE;  Surgeon: Judah Henriquez MD;  [facility]    INJECT PARAVERTEBRAL FACET JOINT LUMBAR / SACRAL FIRST Right 08/18/2016    Procedure: INJECT PARAVERTEBRAL FACET JOINT LUMBAR / SACRAL FIRST;  Surgeon: Judah Henriquez MD;  [facility]    INJECT PARAVERTEBRAL FACET JOINT LUMBAR / SACRAL FIRST Right 09/08/2016    Procedure: INJECT PARAVERTEBRAL FACET JOINT  LUMBAR / SACRAL FIRST;  Surgeon: Judah Henriquez MD;  [facility]    MEDIASTINOSCOPY Right 2019     [place]  Dr Hemanth Covington 19 bronchoscopy, cervical mediastinoscopy for staging and minimally invasive right thoracoscopic wedge resection 1.8 cm invasive adenocarcinoma [id number] stage 1A2 adenocarcinoma completely resected via minimally invasive right upper lobectomy.    PHACOEMULSIFICATION WITH STANDARD INTRAOCULAR LENS IMPLANT Right 07/10/2023    Procedure: RIGHT EYE PHACOEMULSIFICATION, CATARACT, WITH STANDARD INTRAOCULAR LENS IMPLANT INSERTION;  Surgeon: Rick Campbell MD;  Location: [facility]    PHACOEMULSIFICATION WITH STANDARD INTRAOCULAR LENS IMPLANT Left 2023    Procedure: LEFT EYE PHACOEMULSIFICATION, CATARACT, WITH STANDARD INTRAOCULAR LENS IMPLANT INSERTION;  Surgeon: Rick Campbell MD;  Location: [facility]    PUNCTALPLASTY Bilateral 2018    Procedure: Bilateral Punctoplasty with Mini [place] Stent;  Surgeon: Levy Blue MD;  [facility]    REPAIR ECTROPION BILATERAL Bilateral 2018    Procedure: Bilateral Lower Lid Ectropion Repair with Right Lower Lid Lesion Excision;  Surgeon: Levy Blue MD;  [facility]       Social History     Tobacco Use    Smoking status: Former     Current packs/day: 0.00     Types: Cigarettes     Start date: 1976     Quit date: 1981     Years since quittin.5    Smokeless tobacco: Never   Substance Use Topics    Alcohol use: Yes     Alcohol/week: 2.5 standard drinks of alcohol     Types: 3 Standard drinks or equivalent per week     Family History   Problem Relation Age of Onset    Cerebrovascular Disease Brother          stroke     Breast Cancer Sister     Kidney Disease Mother         Kidney stones    Other - See Comments Father          in [place] parkinsons or post surgical eye surgery    Cerebral aneurysm Brother     Hypothyroidism Sister     Glaucoma No family hx of     Macular Degeneration  "No family hx of          Current Outpatient Medications   Medication Sig Dispense Refill    cetirizine (ZYRTEC) 10 MG tablet Take 1 tablet (10 mg) by mouth daily as needed for allergies. 90 tablet 3    gabapentin (NEURONTIN) 300 MG capsule Take 2 capsules (600 mg) by mouth at bedtime. 180 capsule 1    losartan (COZAAR) 100 MG tablet Take 1 tablet (100 mg) by mouth every evening. 90 tablet 1    metFORMIN (GLUCOPHAGE XR) 500 MG 24 hr tablet Take 2 tablets (1,000 mg) by mouth daily (with breakfast). 180 tablet 1    olopatadine (PATADAY) 0.2 % ophthalmic solution Place 0.05 mLs (1 drop) into both eyes daily. 2.5 mL 5    acetaminophen (TYLENOL) 500 MG tablet Take 1-2 tablets (500-1,000 mg) by mouth every 6 hours as needed for pain (arthritis) 120 tablet 3    Ascorbic Acid (VITAMIN C PO) Take 500 mg by mouth once      atorvastatin (LIPITOR) 20 MG tablet Take 1 tablet (20 mg) by mouth daily. 90 tablet 3    celecoxib (CELEBREX) 100 MG capsule Take 1 capsule (100 mg) by mouth daily. 30 capsule 1    Cholecalciferol (VITAMIN D) 1000 UNIT capsule Take 1 capsule by mouth daily.      clotrimazole (LOTRIMIN) 1 % external cream Apply topically 2 times daily as needed (rash) 60 g 1    Cyanocobalamin (VITAMIN B12 PO) Take 1 tablet by mouth Pt states he takes this 2 or 3 times a week.      finasteride (PROSCAR) 5 MG tablet Take 1 tablet (5 mg) by mouth daily. 90 tablet 0    montelukast (SINGULAIR) 10 MG tablet Take 1 tablet (10 mg) by mouth at bedtime. 90 tablet 3    Multiple Vitamin (MULTIVITAMINS PO) Take 1 tablet by mouth daily.      Omega-3 Fatty Acids (FISH OIL PO) Take 1 capsule by mouth as needed      order for DME Back brace 1 Device 0    tamsulosin (FLOMAX) 0.4 MG capsule Take 1 capsule (0.4 mg) by mouth daily. 90 capsule 0     Allergies   Allergen Reactions    Pollen Extract      Other reaction(s): Headache  Sinus congestion, sinus \"drainage\", sneezing    Seasonal Allergies      Sinus congestion, sinus \"drainage\", sneezing "    Nkda [No Known Drug Allergy]      Recent Labs   Lab Test 05/05/25  1413 10/09/24  0904 06/08/24  0946 12/09/23  1013 06/10/23  0955 12/10/22  1021 12/06/21  0947 06/07/21  1044 11/16/20  1026 05/27/20  0957 10/26/19  1040   A1C 6.0* 6.3* 6.3* 6.5* 6.4* 6.7*   < > 5.8* 5.8*   < > 6.2*   LDL  --  71  --  55  --  60   < >  --  39  --  58   HDL  --  60  --  65  --  71   < >  --  64  --  49   TRIG  --  82  --  53  --  70   < >  --  116  --  91   ALT  --  19  --  14 18 17   < >  --  27   < > 26   CR  --  0.91 0.95 0.91 0.98 1.06   < > 0.95 0.93   < > 0.96   GFRESTIMATED  --  88 84 88 81 74   < > 80 82   < > 79   GFRESTBLACK  --   --   --   --   --   --   --  >90 >90   < > >90   POTASSIUM  --  4.2 4.2 3.9 4.0 4.4   < > 3.7 3.5   < > 3.8   TSH  --  3.51  --   --   --   --   --   --   --   --  1.56    < > = values in this interval not displayed.      Current providers sharing in care for this patient include:  Patient Care Team:  Murali Logan MD as PCP - General (Family Medicine)  Rick Whaley MD as MD (Ophthalmology)  Murali Logan MD as Referring Physician (Family Practice)  Derrek Sinclair MD as MD (Internal Medicine)  Leandro Montaño MD as MD (Urology)  Bran Sibley MD as MD (Neurology)  Joan Mckeon MD as MD (Ophthalmology)  Charles Parr MD as MD (Ophthalmology)  Levy Blue MD as MD (Ophthalmology)  Chava Kelley DO as Assigned Cancer Care Provider  Campbell, Rick Harlen, MD as MD (Ophthalmology)  Rick Whaley MD as MD (Ophthalmology)  Awilda Villavicencio, RN as Specialty Care Coordinator (Hematology & Oncology)  Gera Rob MD as MD (Physical Medicine and Rehabilitation)  Raina Gan MD as Assigned Surgical Provider  Leandro Gray MD as Assigned Musculoskeletal Provider  Raina Gan MD as Physician (Ophthalmology)  Archie Lui MD as Assigned PCP    The following health  "maintenance items are reviewed in Epic and correct as of today:  Health Maintenance   Topic Date Due    RSV VACCINE (1 - 1-dose 75+ series) Never done    DIABETIC FOOT EXAM  06/12/2024    MICROALBUMIN  12/09/2024    ANNUAL REVIEW OF HM ORDERS  12/11/2024    BMP  10/09/2025    LIPID  10/09/2025    A1C  11/05/2025    EYE EXAM  03/26/2026    MEDICARE ANNUAL WELLNESS VISIT  05/05/2026    FALL RISK ASSESSMENT  05/05/2026    COLORECTAL CANCER SCREENING  05/24/2027    DTAP/TDAP/TD IMMUNIZATION (3 - Td or Tdap) 07/31/2027    DEXA  11/20/2028    ADVANCE CARE PLANNING  12/11/2028    HEPATITIS C SCREENING  Completed    PHQ-2 (once per calendar year)  Completed    INFLUENZA VACCINE  Completed    Pneumococcal Vaccine: 50+ Years  Completed    ZOSTER IMMUNIZATION  Completed    AORTIC ANEURYSM SCREENING (SYSTEM ASSIGNED)  Completed    HPV IMMUNIZATION  Aged Out    MENINGITIS IMMUNIZATION  Aged Out    URINE DRUG SCREEN  Discontinued    COVID-19 Vaccine  Discontinued         Review of Systems  Constitutional, HEENT, cardiovascular, pulmonary, gi and gu systems are negative, except as otherwise noted.     Objective    Exam  /78 (BP Location: Right arm, Patient Position: Sitting, Cuff Size: Adult Regular)   Pulse 72   Temp 97.6  F (36.4  C) (Oral)   Ht 1.61 m (5' 3.39\")   Wt 83.5 kg (184 lb)   SpO2 97%   BMI 32.20 kg/m     Estimated body mass index is 32.2 kg/m  as calculated from the following:    Height as of this encounter: 1.61 m (5' 3.39\").    Weight as of this encounter: 83.5 kg (184 lb).    Physical Exam  GENERAL: alert and no distress  EYES: Eyes grossly normal to inspection, PERRL and conjunctivae and sclerae normal  HENT: ear canals and TM's normal, nose and mouth without ulcers or lesions  NECK: no adenopathy, no asymmetry, masses, or scars  RESP: lungs clear to auscultation - no rales, rhonchi or wheezes  CV: regular rate and rhythm, normal S1 S2, no S3 or S4, no murmur, click or rub, no peripheral " edema  ABDOMEN: soft, nontender, no hepatosplenomegaly, no masses and bowel sounds normal  MS: no gross musculoskeletal defects noted, no edema  SKIN: no suspicious lesions or rashes  NEURO: Normal strength and tone, mentation intact and speech normal  PSYCH: mentation appears normal, affect normal/bright  Diabetic foot exam: normal DP and PT pulses, no trophic changes or ulcerative lesions, normal sensory exam, and normal monofilament exam, except for findings atypical findings on left distal foot.            5/5/2025   Mini Cog   Clock Draw Score 0 Abnormal    0 Abnormal   3 Item Recall 1 object recalled    1 object recalled   Mini Cog Total Score 1    1       Multiple values from one day are sorted in reverse-chronological order             Signed Electronically by: Lena Boyer DO

## 2025-05-05 NOTE — PATIENT INSTRUCTIONS
Preventive Care Advice   This is general advice given by our system to help you stay healthy. However, your care team may have specific advice just for you. Please talk to your care team about your preventive care needs.  Nutrition  Eat 5 or more servings of fruits and vegetables each day.  Try wheat bread, brown rice and whole grain pasta (instead of white bread, rice, and pasta).  Get enough calcium and vitamin D. Check the label on foods and aim for 100% of the RDA (recommended daily allowance).  Lifestyle  Exercise at least 150 minutes each week  (30 minutes a day, 5 days a week).  Do muscle strengthening activities 2 days a week. These help control your weight and prevent disease.  No smoking.  Wear sunscreen to prevent skin cancer.  Have a dental exam and cleaning every 6 months.  Yearly exams  See your health care team every year to talk about:  Any changes in your health.  Any medicines your care team has prescribed.  Preventive care, family planning, and ways to prevent chronic diseases.  Shots (vaccines)   HPV shots (up to age 26), if you've never had them before.  Hepatitis B shots (up to age 59), if you've never had them before.  COVID-19 shot: Get this shot when it's due.  Flu shot: Get a flu shot every year.  Tetanus shot: Get a tetanus shot every 10 years.  Pneumococcal, hepatitis A, and RSV shots: Ask your care team if you need these based on your risk.  Shingles shot (for age 50 and up)  General health tests  Diabetes screening:  Starting at age 35, Get screened for diabetes at least every 3 years.  If you are younger than age 35, ask your care team if you should be screened for diabetes.  Cholesterol test: At age 39, start having a cholesterol test every 5 years, or more often if advised.  Bone density scan (DEXA): At age 50, ask your care team if you should have this scan for osteoporosis (brittle bones).  Hepatitis C: Get tested at least once in your life.  STIs (sexually transmitted  infections)  Before age 24: Ask your care team if you should be screened for STIs.  After age 24: Get screened for STIs if you're at risk. You are at risk for STIs (including HIV) if:  You are sexually active with more than one person.  You don't use condoms every time.  You or a partner was diagnosed with a sexually transmitted infection.  If you are at risk for HIV, ask about PrEP medicine to prevent HIV.  Get tested for HIV at least once in your life, whether you are at risk for HIV or not.  Cancer screening tests  Cervical cancer screening: If you have a cervix, begin getting regular cervical cancer screening tests starting at age 21.  Breast cancer scan (mammogram): If you've ever had breasts, begin having regular mammograms starting at age 40. This is a scan to check for breast cancer.  Colon cancer screening: It is important to start screening for colon cancer at age 45.  Have a colonoscopy test every 10 years (or more often if you're at risk) Or, ask your provider about stool tests like a FIT test every year or Cologuard test every 3 years.  To learn more about your testing options, visit:   .  For help making a decision, visit:   https://bit.ly/ap45191.  Prostate cancer screening test: If you have a prostate, ask your care team if a prostate cancer screening test (PSA) at age 55 is right for you.  Lung cancer screening: If you are a current or former smoker ages 50 to 80, ask your care team if ongoing lung cancer screenings are right for you.  For informational purposes only. Not to replace the advice of your health care provider. Copyright   2023 [facility]. All rights reserved. Clinically reviewed by the [facility]. B[employer] Guidance 321002 - REV 01/24.  Learning About Activities of Daily Living  What are activities of daily living?     Activities of daily living (ADLs) are the basic self-care tasks you do every day. These include eating, bathing, dressing, and moving  around.  As you age, and if you have health problems, you may find that it's harder to do some of these tasks. If so, your doctor can suggest ideas that may help.  To measure what kind of help you may need, your doctor will ask how well you are able to do ADLs. Let your doctor know if there are any tasks that you are having trouble doing. This is an important first step to getting help. And when you have the help you need, you can stay as independent as possible.  How will a doctor assess your ADLs?  Asking about ADLs is part of a routine health checkup your doctor will likely do as you age. Your health check might be done in a doctor's office, in your home, or at a hospital. The goal is to find out if you are having any problems that could make it hard to care for yourself or that make it unsafe for you to be on your own.  To measure your ADLs, your doctor will ask how hard it is for you to do routine tasks. Your doctor may also want to know if you have changed the way you do a task because of a health problem. Your doctor may watch how you:  Walk back and forth.  Keep your balance while you stand or walk.  Move from sitting to standing or from a bed to a chair.  Button or unbutton a shirt or sweater.  Remove and put on your shoes.  It's common to feel a little worried or anxious if you find you can't do all the things you used to be able to do. Talking with your doctor about ADLs is a way to make sure you're as safe as possible and able to care for yourself as well as you can. You may want to bring a caregiver, friend, or family member to your checkup. They can help you talk to your doctor.  Follow-up care is a key part of your treatment and safety. Be sure to make and go to all appointments, and call your doctor if you are having problems. It's also a good idea to know your test results and keep a list of the medicines you take.  Current as of: October 24, 2024  Content Version: 14.4    6493-2917 Mabel  [email or web address].   Care instructions adapted under license by your healthcare professional. If you have questions about a medical condition or this instruction, always ask your healthcare professional. [facility] [email or web address] disclaims any warranty or liability for your use of this information.    Preventing Falls: Care Instructions  Injuries and health problems such as trouble walking or poor eyesight can increase your risk of falling. So can some medicines. But there are things you can do to help prevent falls. You can exercise to get stronger. You can also arrange your home to make it safer.    Talk to your doctor about the medicines you take. Ask if any of them increase the risk of falls and whether they can be changed or stopped.   Try to exercise regularly. It can help improve your strength and balance. This can help lower your risk of falling.         Practice fall safety and prevention.   Wear low-heeled shoes that fit well and give your feet good support. Talk to your doctor if you have foot problems that make this hard.  Carry a cellphone or wear a medical alert device that you can use to call for help.  Use stepladders instead of chairs to reach high objects. Don't climb if you're at risk for falls. Ask for help, if needed.  Wear the correct eyeglasses, if you need them.        Make your home safer.   Remove rugs, cords, clutter, and furniture from walkways.  Keep your house well lit. Use night-lights in hallways and bathrooms.  Install and use sturdy handrails on stairways.  Wear nonskid footwear, even inside. Don't walk barefoot or in socks without shoes.        Be safe outside.   Use handrails, curb cuts, and ramps whenever possible.  Keep your hands free by using a shoulder bag or backpack.  Try to walk in well-lit areas. Watch out for uneven ground, changes in pavement, and debris.  Be careful in the winter. Walk on the grass or gravel when sidewalks are slippery. Use de-icer on steps and walkways.  "Add non-slip devices to shoes.    Put grab bars and nonskid mats in your shower or tub and near the toilet. Try to use a shower chair or bath bench when bathing.   Get into a tub or shower by putting in your weaker leg first. Get out with your strong side first. Have a phone or medical alert device in the bathroom with you.   Where can you learn more?  Go to https://www.[employer].Viraloid/patiented  Enter G117 in the search box to learn more about \"Preventing Falls: Care Instructions.\"  Current as of: July 31, 2024  Content Version: 14.4    6087-6916 Contacts+.   Care instructions adapted under license by your healthcare professional. If you have questions about a medical condition or this instruction, always ask your healthcare professional. [employer] disclaims any warranty or liability for your use of this information.    Hearing Loss: Care Instructions  Overview     Hearing loss is a sudden or slow decrease in how well you hear. It can range from slight to profound. Permanent hearing loss can occur with aging. It also can happen when you are exposed long-term to loud noise. Examples include listening to loud music, riding motorcycles, or being around other loud machines.  Hearing loss can affect your work and home life. It can make you feel lonely or depressed. You may feel that you have lost your independence. But hearing aids and other devices can help you hear better and feel connected to others.  Follow-up care is a key part of your treatment and safety. Be sure to make and go to all appointments, and call your doctor if you are having problems. It's also a good idea to know your test results and keep a list of the medicines you take.  How can you care for yourself at home?  Avoid loud noises whenever possible. This helps keep your hearing from getting worse.  Always wear hearing protection around loud noises.  Wear a hearing aid as directed.  A professional can help you pick a hearing " "aid that will work best for you.  You can also get hearing aids over the counter for mild to moderate hearing loss.  Have hearing tests as your doctor suggests. They can show whether your hearing has changed. Your hearing aid may need to be adjusted.  Use other devices as needed. These may include:  Telephone amplifiers and hearing aids that can connect to a television, stereo, radio, or microphone.  Devices that use lights or vibrations. These alert you to the doorbell, a ringing telephone, or a baby monitor.  Television closed-captioning. This shows the words at the bottom of the screen. Most new TVs can do this.  TTY (text telephone). This lets you type messages back and forth on the telephone instead of talking or listening. These devices are also called TDD. When messages are typed on the keyboard, they are sent over the phone line to a receiving TTY. The message is shown on a monitor.  Use text messaging, social media, and email if it is hard for you to communicate by telephone.  Try to learn a listening technique called speechreading. It is not lipreading. You pay attention to people's gestures, expressions, posture, and tone of voice. These clues can help you understand what a person is saying. Face the person you are talking to, and have them face you. Make sure the lighting is good. You need to see the other person's face clearly.  Think about counseling if you need help to adjust to your hearing loss.  When should you call for help?  Watch closely for changes in your health, and be sure to contact your doctor if:    You think your hearing is getting worse.     You have new symptoms, such as dizziness or nausea.   Where can you learn more?  Go to https://www.[employer].net/patiented  Enter R798 in the search box to learn more about \"Hearing Loss: Care Instructions.\"  Current as of: October 27, 2024  Content Version: 14.4    2380-0263 [facility].   Care instructions adapted under license by your " healthcare professional. If you have questions about a medical condition or this instruction, always ask your healthcare professional. [employer] disclaims any warranty or liability for your use of this information.

## 2025-05-06 ENCOUNTER — PATIENT OUTREACH (OUTPATIENT)
Dept: CARE COORDINATION | Facility: CLINIC | Age: 75
End: 2025-05-06
Payer: COMMERCIAL

## 2025-05-12 ENCOUNTER — TRANSCRIBE ORDERS (OUTPATIENT)
Dept: OTHER | Age: 75
End: 2025-05-12

## 2025-05-12 DIAGNOSIS — M51.362 OTHER INTERVERTEBRAL DISC DEGENERATION, LUMBAR REGION WITH DISCOGENIC BACK PAIN AND LOWER EXTREMITY PAIN: Primary | ICD-10-CM

## 2025-05-20 ENCOUNTER — THERAPY VISIT (OUTPATIENT)
Dept: PHYSICAL THERAPY | Facility: CLINIC | Age: 75
End: 2025-05-20
Payer: COMMERCIAL

## 2025-05-20 DIAGNOSIS — M51.362 OTHER INTERVERTEBRAL DISC DEGENERATION, LUMBAR REGION WITH DISCOGENIC BACK PAIN AND LOWER EXTREMITY PAIN: Primary | ICD-10-CM

## 2025-05-20 PROCEDURE — 97140 MANUAL THERAPY 1/> REGIONS: CPT | Mod: GP | Performed by: PHYSICAL THERAPIST

## 2025-05-20 PROCEDURE — 97112 NEUROMUSCULAR REEDUCATION: CPT | Mod: GP | Performed by: PHYSICAL THERAPIST

## 2025-05-20 PROCEDURE — 97161 PT EVAL LOW COMPLEX 20 MIN: CPT | Mod: GP | Performed by: PHYSICAL THERAPIST

## 2025-05-20 NOTE — PROGRESS NOTES
PHYSICAL THERAPY EVALUATION  Type of Visit: Evaluation       Fall Risk Screen:  Have you fallen 2 or more times in the past year?: No  Have you fallen and had an injury in the past year?: No  Is patient receiving Physical Therapy Services?: Yes    Subjective     Vishnu presents with primary complaint of low back pain that radiates down both legs to his feet. His left leg is worse than his left. He had stem cell injection last Monday and does believe this is helping. His pain increases with bending down to  things, sleeping, getting up in the morning. He can only walk 1/2 mile. He used to go to [employer] and still has a membership but quit going due to pain. He would like to get back to walking, biking and strength training.         Presenting condition or subjective complaint:    Date of onset: 05/20/25    Relevant medical history:     Dates & types of surgery:      Prior diagnostic imaging/testing results:       Prior therapy history for the same diagnosis, illness or injury:        Prior Level of Function  Transfers: Independent  Ambulation: Independent  ADL: Independent  IADL: Driving, Finances, Housekeeping, Laundry, Meal preparation, Yard work    Living Environment  Social support:   Lives with spouse  Type of home:   House, split level  Stairs to enter the home:    2    Ramp:   no  Stairs inside the home:    6 to living room, 6 to basement     Help at home:  wife  Equipment owned:       Employment:    Retired  Hobbies/Interests:  Walking, exercises     Patient goals for therapy:      Pain assessment: Pain present     Objective   LUMBAR SPINE EVALUATION  PAIN: Pain is Exacerbated By: Standing, walking, bending forward, lifting, waking up in AM  INTEGUMENTARY (edema, incisions): WNL  POSTURE: Forward trunk lean, increased thoracic kyphosis  GAIT:   Gait Deviations: Compensated trendelenberg B, forward flexed trunk    ROM: AROM forward flexion limited by 50%  PELVIC/SI SCREEN: + [place] finger sign  B  SNEURAL TENSION: +SLR B  FLEXIBILITY: Decreased hip IR L, Decreased hip ER L, Decreased piriformis L, Decreased hip flexors L, Decreased quadriceps L, Decreased hamstrings L, Decreased hip IR R, Decreased hip ER R, Decreased piriformis R, Decreased hip flexors R, Decreased quadriceps R, Decreased hamstrings R    PALPATION: TTP B QL      Assessment & Plan   CLINICAL IMPRESSIONS  Medical Diagnosis: Other intervertebral disc degeneration, lumbar region with discogenic back pain and lower extremity pain    Treatment Diagnosis: Low back pain with radicular symptoms   Impression/Assessment: Patient is a 74 year old adult with Low back pain complaints with radiating symptoms into bilateral lower extremities. He has significant stiffness and muscle tightness of his low back, and walks with a forward trunk lean. Has difficulty with lumbopelvic control and evidence of SI dysfunction contributing to his sxs.  The following significant findings have been identified: Pain, Decreased ROM/flexibility, Decreased joint mobility, Decreased strength, Impaired balance, Decreased proprioception, Impaired sensation, Impaired gait, Impaired muscle performance, Decreased activity tolerance, and Impaired posture. These impairments interfere with their ability to perform self care tasks, recreational activities, household chores, driving , household mobility, and community mobility as compared to previous level of function.     Clinical Decision Making (Complexity):  Clinical Presentation: Stable/Uncomplicated  Clinical Presentation Rationale: based on medical and personal factors listed in PT evaluation  Clinical Decision Making (Complexity): Low complexity    PLAN OF CARE  Treatment Interventions:  Interventions: Gait Training, Manual Therapy, Neuromuscular Re-education, Therapeutic Activity, Therapeutic Exercise, Self-Care/Home Management    Long Term Goals     PT Goal 1  Goal Identifier: Walking  Goal Description: Patient will walk at  least 1 mile without low back pain  Rationale: to maximize safety and independence within the community  Target Date: 08/17/25  PT Goal 2  Goal Identifier: Sleep  Goal Description: Patient will sleep through the night without pain  Rationale: to maximize safety and independence with performance of ADLs and functional tasks  Target Date: 08/17/25      Frequency of Treatment: Weekly  Duration of Treatment: 90 days    Recommended Referrals to Other Professionals: None  Education Assessment:   Learner/Method: Patient;Listening;Reading;Demonstration;Pictures/Video;No Barriers to Learning    Risks and benefits of evaluation/treatment have been explained.   Patient/Family/caregiver agrees with Plan of Care.     Evaluation Time:     PT Eval, Low Complexity Minutes (99260): 15       Signing Clinician: Skyler Shah PT

## 2025-06-02 ENCOUNTER — THERAPY VISIT (OUTPATIENT)
Dept: PHYSICAL THERAPY | Facility: CLINIC | Age: 75
End: 2025-06-02
Payer: COMMERCIAL

## 2025-06-02 DIAGNOSIS — M51.362 OTHER INTERVERTEBRAL DISC DEGENERATION, LUMBAR REGION WITH DISCOGENIC BACK PAIN AND LOWER EXTREMITY PAIN: Primary | ICD-10-CM

## 2025-06-02 PROCEDURE — 97112 NEUROMUSCULAR REEDUCATION: CPT | Mod: GP | Performed by: PHYSICAL THERAPIST

## 2025-06-02 PROCEDURE — 97110 THERAPEUTIC EXERCISES: CPT | Mod: GP | Performed by: PHYSICAL THERAPIST

## 2025-06-09 ENCOUNTER — THERAPY VISIT (OUTPATIENT)
Dept: PHYSICAL THERAPY | Facility: CLINIC | Age: 75
End: 2025-06-09
Payer: COMMERCIAL

## 2025-06-09 DIAGNOSIS — M51.362 OTHER INTERVERTEBRAL DISC DEGENERATION, LUMBAR REGION WITH DISCOGENIC BACK PAIN AND LOWER EXTREMITY PAIN: Primary | ICD-10-CM

## 2025-06-09 PROCEDURE — 97112 NEUROMUSCULAR REEDUCATION: CPT | Mod: GP | Performed by: PHYSICAL THERAPIST

## 2025-06-09 PROCEDURE — 97110 THERAPEUTIC EXERCISES: CPT | Mod: GP | Performed by: PHYSICAL THERAPIST

## 2025-06-18 ENCOUNTER — THERAPY VISIT (OUTPATIENT)
Dept: PHYSICAL THERAPY | Facility: CLINIC | Age: 75
End: 2025-06-18
Payer: COMMERCIAL

## 2025-06-18 DIAGNOSIS — M51.362 OTHER INTERVERTEBRAL DISC DEGENERATION, LUMBAR REGION WITH DISCOGENIC BACK PAIN AND LOWER EXTREMITY PAIN: Primary | ICD-10-CM

## 2025-06-18 PROCEDURE — 97140 MANUAL THERAPY 1/> REGIONS: CPT | Mod: GP | Performed by: PHYSICAL THERAPIST

## 2025-06-18 PROCEDURE — 97110 THERAPEUTIC EXERCISES: CPT | Mod: GP | Performed by: PHYSICAL THERAPIST

## 2025-06-18 PROCEDURE — 97112 NEUROMUSCULAR REEDUCATION: CPT | Mod: GP | Performed by: PHYSICAL THERAPIST

## 2025-06-24 ENCOUNTER — THERAPY VISIT (OUTPATIENT)
Dept: PHYSICAL THERAPY | Facility: CLINIC | Age: 75
End: 2025-06-24
Payer: COMMERCIAL

## 2025-06-24 DIAGNOSIS — M51.362 OTHER INTERVERTEBRAL DISC DEGENERATION, LUMBAR REGION WITH DISCOGENIC BACK PAIN AND LOWER EXTREMITY PAIN: Primary | ICD-10-CM

## 2025-06-24 PROCEDURE — 97110 THERAPEUTIC EXERCISES: CPT | Mod: GP

## 2025-06-24 PROCEDURE — 97112 NEUROMUSCULAR REEDUCATION: CPT | Mod: GP

## 2025-06-24 NOTE — PROGRESS NOTES
Therapy services were provided by the student with JOVANNI Martinez guiding and directing the services and providing the skilled judgement and assessment throughout the session.

## 2025-07-02 ENCOUNTER — THERAPY VISIT (OUTPATIENT)
Dept: PHYSICAL THERAPY | Facility: CLINIC | Age: 75
End: 2025-07-02
Payer: COMMERCIAL

## 2025-07-02 DIAGNOSIS — M51.362 OTHER INTERVERTEBRAL DISC DEGENERATION, LUMBAR REGION WITH DISCOGENIC BACK PAIN AND LOWER EXTREMITY PAIN: Primary | ICD-10-CM

## 2025-07-02 PROCEDURE — 97110 THERAPEUTIC EXERCISES: CPT | Mod: GP | Performed by: PHYSICAL THERAPIST

## 2025-07-02 PROCEDURE — 97140 MANUAL THERAPY 1/> REGIONS: CPT | Mod: GP | Performed by: PHYSICAL THERAPIST

## 2025-07-02 PROCEDURE — 97112 NEUROMUSCULAR REEDUCATION: CPT | Mod: GP | Performed by: PHYSICAL THERAPIST

## 2025-07-02 NOTE — TELEPHONE ENCOUNTER
Reason for visit: Re-establish care: History of elevated PSA, BPH w/ LUTS, other male ED.     Relevant information: 5/3/2022 LOV w/ [place] w/ [facility]. Kaila Montes RN has discussed no show history with patient.     Records/imaging/labs/orders: IN [facility], CARE EVERYWHERE, AND PACS     Rooming:   If in person: Void in hat and PVR  AUA and ELIGIO questionnaires assigned during pre-visits    Adelia Li  7/2/2025  9:27 AM

## 2025-07-09 ENCOUNTER — THERAPY VISIT (OUTPATIENT)
Dept: PHYSICAL THERAPY | Facility: CLINIC | Age: 75
End: 2025-07-09
Payer: COMMERCIAL

## 2025-07-09 DIAGNOSIS — M51.362 OTHER INTERVERTEBRAL DISC DEGENERATION, LUMBAR REGION WITH DISCOGENIC BACK PAIN AND LOWER EXTREMITY PAIN: Primary | ICD-10-CM

## 2025-07-09 PROCEDURE — 97112 NEUROMUSCULAR REEDUCATION: CPT | Mod: GP | Performed by: PHYSICAL THERAPIST

## 2025-07-09 PROCEDURE — 97530 THERAPEUTIC ACTIVITIES: CPT | Mod: GP | Performed by: PHYSICAL THERAPIST

## 2025-07-09 PROCEDURE — 97110 THERAPEUTIC EXERCISES: CPT | Mod: GP | Performed by: PHYSICAL THERAPIST

## 2025-07-09 NOTE — TELEPHONE ENCOUNTER
APPT INFO    Date /Time: 12/22/17 2:40PM   Reason for Appt: L Foot Neuropathy   Ref Provider/Clinic: Dr. Logan   Are there internal records? If yes, list: YES - [facility]   Patient Contact (Y/N) & Call Details: No - internal referral   Action: --           
not applicable

## 2025-07-10 ENCOUNTER — OFFICE VISIT (OUTPATIENT)
Dept: OPHTHALMOLOGY | Facility: CLINIC | Age: 75
End: 2025-07-10
Payer: COMMERCIAL

## 2025-07-10 DIAGNOSIS — H35.052 CNVM (CHOROIDAL NEOVASCULAR MEMBRANE), LEFT: ICD-10-CM

## 2025-07-10 DIAGNOSIS — H35.3230 BILATERAL EXUDATIVE AGE-RELATED MACULAR DEGENERATION, UNSPECIFIED STAGE (H): ICD-10-CM

## 2025-07-10 PROCEDURE — 92133 CPTRZD OPH DX IMG PST SGM ON: CPT

## 2025-07-10 PROCEDURE — 92134 CPTRZ OPH DX IMG PST SGM RTA: CPT

## 2025-07-10 PROCEDURE — 250N000011 HC RX IP 250 OP 636: Mod: JZ

## 2025-07-10 PROCEDURE — 67028 INJECTION EYE DRUG: CPT | Mod: LT

## 2025-07-10 PROCEDURE — 99214 OFFICE O/P EST MOD 30 MIN: CPT

## 2025-07-10 RX ADMIN — AFLIBERCEPT 2 MG: 40 INJECTION, SOLUTION INTRAVITREAL at 10:22

## 2025-07-10 ASSESSMENT — SLIT LAMP EXAM - LIDS
COMMENTS: NORMAL
COMMENTS: NORMAL

## 2025-07-10 ASSESSMENT — VISUAL ACUITY
OD_SC+: -2
METHOD: SNELLEN - LINEAR
OS_SC: 20/60
OD_SC: 20/25

## 2025-07-10 ASSESSMENT — TONOMETRY
OD_IOP_MMHG: 16
IOP_METHOD: TONOPEN
OS_IOP_MMHG: 16

## 2025-07-10 ASSESSMENT — EXTERNAL EXAM - RIGHT EYE: OD_EXAM: NORMAL

## 2025-07-10 ASSESSMENT — CUP TO DISC RATIO
OS_RATIO: 0.5
OD_RATIO: 0.5

## 2025-07-10 ASSESSMENT — REFRACTION_WEARINGRX
OS_AXIS: 065
OD_CYLINDER: +0.75
OS_CYLINDER: +0.50
OD_ADD: +2.50
OD_AXIS: 170
OS_ADD: +2.50
SPECS_TYPE: PAL
OD_SPHERE: PLANO
OS_SPHERE: +0.25

## 2025-07-10 ASSESSMENT — EXTERNAL EXAM - LEFT EYE: OS_EXAM: NORMAL

## 2025-07-10 NOTE — PROGRESS NOTES
"CC: chronic CSCR vs pattern dystrophy    HPI: Mr Viveros,  is complaining of blurred vision in the left eye, the letters seem darker and more stretched than in the right eye. He also complained of a few episodes of double vision that resolves with blinking and re-focusing.    Interval: LV 24. Patient feels that vision is not \"correct\" but unsure what is going on. He is most bothered by reading vision, especially in the left eye. Does not notice as much issues with his right eye vision.     PMHx:   DM type II  Hx of lung adenoCA  Imaging:    OCT: 07/10/2025   Right eye: SRHM resolving, PEDs stable, no IRF, dense vitreous base ( probably due to high gain), PVD; 233 -> 228 -> 227  Left eye: SRHM with worsening SRF today compared to last visit; CST: 264 -> 364 -> 266    FA/IC23:  OD: good transit time, no leakage along the arcades, there is increase AF at the macula with respect to time,   Retina Laser procedures:good transit time, no leakage along the arcades or at the macula.    RNFL: 07/10/2025  OD: no thinning  OS: no thinning     Retinal laser procedures:  None     Intravitreal injections:  OD: none  OS: Avastin started: 2024    Assessment/ Plan: 07/10/2025       # Active CNVM left eye  Demonstrated on OCTA 4/10/23  New intra retinal fluid exudation noticed last visit 10/23, opted to observe  Today there is improvement in SRF on OCT   FA/ICG suggestive of occult CNVM OS with leakage. Objectively the vision in LEFT eye is 20/30-stable.  IRF has previously improved after two doses of Avastin; last injection 5/15/2024  01/27/25: worse SRF in the left eye compared to 24 so will plan on re-starting an injection series today   25: improved SRF following Avastin, so will repeat Avastin today left eye   3/25: improved IRF, injection given   07/10/2025: was not able to come last few months for an injection, last injection was 3 months ago, coming in today with worse ing vision and IRF/SRF on " OCT , recommend Avastin OS today   Follow-up in 4 weeks with OCT macula both eyes     # Presbyopia, both eyes  Patient notes worsening reading vision  When a +1.00 D lens is placed in front of the patient, the patient states that his vision improves  Mrx given today -improved    # 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  FAF demonstrates progression since 2018 which is consistent with either     # Diabetes mellitus no nonproliferative diabetic retinopathy             Lab Results   Component Value Date    A1C 6.3 10/09/2024    A1C 6.3 06/08/2024    A1C 6.5 12/09/2023    A1C 6.4 06/10/2023    A1C 6.7 12/10/2022    A1C 5.8 06/07/2021    A1C 5.8 11/16/2020    A1C 6.0 05/27/2020    A1C 6.2 10/26/2019    A1C 6.4 06/12/2019     Blood glucose control  No evidence of diabetic retinopathy on exam today  Monitor yearly    # Pseudophakia OU   S/P CE IOL right eye 7/10/23 (complicated by rebound iritis)   S/P CE IOL left eye 7/24/23   -stable    # Intermitted XT , intermittent diplopia   Small angle on ACT  Patient was dilated during exam  Instructed patient that will hold of on any intervention until retina issue is fic\xed. the hope is that if we improve vision in the LEFT eye then fusion can be improved  If not, will attempt fresnel prisms    Follow up in 4 weeks: DFE + OCT both eyes, possible injection LEFT eye     Raina Morocho MD     Medical [facility]  [facility]     Attending Physician Attestation: Complete documentation of historical and exam elements from today's encounter can be found in the full encounter summary report (not reduplicated in this progress note). I personally obtained the chief complaint(s) and history of present illness. I confirmed and edited as necessary the review of systems, past medical/surgical history, family history, social history, and examination findings as documented by others; and I examined the patient myself. I  personally reviewed the relevant tests, images, and reports as documented above. I formulated and edited as necessary the assessment and plan and discussed the findings and management plan with the patient and family.  I was present for the entire procedure(s).   Raina Morocho. MD'

## 2025-07-10 NOTE — NURSING NOTE
Chief Complaints and History of Present Illnesses   Patient presents with    Follow Up     Bilateral exudative age-related macular degeneration  DM type II     Chief Complaint(s) and History of Present Illness(es)       Follow Up              Comments: Bilateral exudative age-related macular degeneration  DM type II              Comments    Pt notices decreased vision in the left eye since last visit   Occasional floaters left eye , not new  No flashes , eye pain or redness  LBS:  does not check    Last A1C: 6.0  Lab Results       Component                Value               Date                       A1C                      6.0                 05/05/2025                 A1C                      6.3                 10/09/2024                 A1C                      6.3                 06/08/2024                 A1C                      6.5                 12/09/2023                 A1C                      6.4                 06/10/2023                 A1C                      5.8                 06/07/2021                 A1C                      5.8                 11/16/2020                 A1C                      6.0                 05/27/2020                 A1C                      6.2                 10/26/2019                 A1C                      6.4                 06/12/2019       Wendi Campbell COT 8:59 AM July 10, 2025

## 2025-07-14 ENCOUNTER — PRE VISIT (OUTPATIENT)
Dept: UROLOGY | Facility: CLINIC | Age: 75
End: 2025-07-14

## 2025-07-14 ENCOUNTER — OFFICE VISIT (OUTPATIENT)
Dept: UROLOGY | Facility: CLINIC | Age: 75
End: 2025-07-14
Payer: COMMERCIAL

## 2025-07-14 VITALS
SYSTOLIC BLOOD PRESSURE: 145 MMHG | OXYGEN SATURATION: 97 % | DIASTOLIC BLOOD PRESSURE: 76 MMHG | HEIGHT: 64 IN | BODY MASS INDEX: 31.58 KG/M2 | HEART RATE: 77 BPM | WEIGHT: 185 LBS

## 2025-07-14 DIAGNOSIS — R39.15 URINARY URGENCY: Primary | ICD-10-CM

## 2025-07-14 PROCEDURE — 3077F SYST BP >= 140 MM HG: CPT | Performed by: UROLOGY

## 2025-07-14 PROCEDURE — 3078F DIAST BP <80 MM HG: CPT | Performed by: UROLOGY

## 2025-07-14 PROCEDURE — 99203 OFFICE O/P NEW LOW 30 MIN: CPT | Mod: 25 | Performed by: UROLOGY

## 2025-07-14 PROCEDURE — 51798 US URINE CAPACITY MEASURE: CPT | Performed by: UROLOGY

## 2025-07-14 PROCEDURE — 1125F AMNT PAIN NOTED PAIN PRSNT: CPT | Performed by: UROLOGY

## 2025-07-14 RX ORDER — OXYBUTYNIN CHLORIDE 5 MG/1
5 TABLET ORAL 2 TIMES DAILY PRN
Qty: 60 TABLET | Refills: 11 | Status: SHIPPED | OUTPATIENT
Start: 2025-07-14

## 2025-07-14 RX ORDER — FINASTERIDE 5 MG/1
5 TABLET, FILM COATED ORAL DAILY
Qty: 90 TABLET | Refills: 3 | Status: SHIPPED | OUTPATIENT
Start: 2025-07-14

## 2025-07-14 RX ORDER — TAMSULOSIN HYDROCHLORIDE 0.4 MG/1
0.4 CAPSULE ORAL DAILY
Qty: 90 CAPSULE | Refills: 3 | Status: SHIPPED | OUTPATIENT
Start: 2025-07-14

## 2025-07-14 ASSESSMENT — PAIN SCALES - GENERAL: PAINLEVEL_OUTOF10: MODERATE PAIN (6)

## 2025-07-14 NOTE — PROGRESS NOTES
Reason for Visit: Follow up PSA, abnormal YESI, medication refill     HPI/Subjective:  Vishnu Odom is a 74 yo male that presents to clinic for follow up on an elevated PSA test. 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 His urologic history includes benign prostate biopsy in 2010 done secondary to a palpated prostate nodule. He has had a fluctuating PSA over the years leading to an MRI in 03/27/2017 (PIRADS 2 - Low probability) after a PSA of 6.91. His PSA subsequently trended down into the 4's before creeping back above 5 where it has been since then.  Last YESI on 1/27/20 showed no nodules.  He recently had another PSA checked.        He has had no significant trouble with urination. He feels that he can empty his bladder completely, has no hesitancy, is up at most once time per night, no blood in his urine, no burning with urination.  He does have some urgency however which he finds bothersome.  He continues on flomax and finasteride.     OBJECTIVE:  PSA from 12/9/23 is 1.77 (3.54 ng/mL adjusted for finasteride) ng/mL with 39% free  PSA 12 /10/22 is 2.36 ng/mL  PSA from 10/9/21 is 5.10 ng/mL  PSA from 11/16/20 is 5.35 ng/mL  11/25/19: 5.14 ng/mL  6/12/19: 4.63 ng/mL       ASSESSMENT AND PLAN:   35-minutes were spent today reviewing the chart, interpreting results and counseling the patient regarding his PSA, YESI and LUTS.  Overall doing well though some urgency.  Was given a script for oxybutynin, and refills on tamsulosin and finasteride.  We will see him back in 12-18 mos.

## 2025-07-14 NOTE — NURSING NOTE
"Chief Complaint   Patient presents with    Follow Up     1 year fu       Blood pressure (!) 145/76, pulse 77, height 1.626 m (5' 4\"), weight 83.9 kg (185 lb), SpO2 97%. Body mass index is 31.76 kg/m .    Patient Active Problem List   Diagnosis    Erectile dysfunction    Seasonal allergies    Vitamin D deficiency    Pulmonary nodule    Prostate nodule    DJD (degenerative joint disease) of knee    HL (hearing loss)    Vision impairment    Tear film insufficiency    Recurrent pterygium    Other age-related cataract of left eye    Type 2 diabetes mellitus treated without insulin (H)    Diabetic polyneuropathy associated with diabetes mellitus due to underlying condition (H)    Tubulovillous adenoma of colon    Lumbar radiculopathy    Adenocarcinoma of lung, stage 1, right (H)    Benign essential hypertension    History of colonic polyps    Spinal stenosis, lumbar region, with neurogenic claudication    Elevated prostate specific antigen (PSA)    Obesity with body mass index 30 or greater    Solitary pulmonary nodule    Evaluation of hearing impairment    Combined forms of age-related cataract of both eyes    Balance problems    Neck pain    Cervical spondylosis with myelopathy    Cervical stenosis of spinal canal    Central serous chorioretinopathy of both eyes    Intermittent exotropia, alternating    CNVM (choroidal neovascular membrane), left    Acute pain of right shoulder    Arthritis of right acromioclavicular joint    Stiffness of right shoulder joint    Weakness of right shoulder    Rotator cuff syndrome, right    Other intervertebral disc degeneration, lumbar region with discogenic back pain and lower extremity pain       Allergies   Allergen Reactions    Pollen Extract      Other reaction(s): Headache  Sinus congestion, sinus \"drainage\", sneezing    Seasonal Allergies      Sinus congestion, sinus \"drainage\", sneezing    Nkda [No Known Drug Allergy]        Current Outpatient Medications   Medication Sig " Dispense Refill    acetaminophen (TYLENOL) 500 MG tablet Take 1-2 tablets (500-1,000 mg) by mouth every 6 hours as needed for pain (arthritis) 120 tablet 3    Ascorbic Acid (VITAMIN C PO) Take 500 mg by mouth once      atorvastatin (LIPITOR) 20 MG tablet Take 1 tablet (20 mg) by mouth daily. 90 tablet 3    celecoxib (CELEBREX) 100 MG capsule Take 1 capsule (100 mg) by mouth daily. 30 capsule 1    cetirizine (ZYRTEC) 10 MG tablet Take 1 tablet (10 mg) by mouth daily as needed for allergies. 90 tablet 3    Cholecalciferol (VITAMIN D) 1000 UNIT capsule Take 1 capsule by mouth daily.      Cyanocobalamin (VITAMIN B12 PO) Take 1 tablet by mouth Pt states he takes this 2 or 3 times a week.      finasteride (PROSCAR) 5 MG tablet Take 1 tablet (5 mg) by mouth daily. 90 tablet 0    gabapentin (NEURONTIN) 300 MG capsule Take 2 capsules (600 mg) by mouth at bedtime. 180 capsule 1    losartan (COZAAR) 100 MG tablet Take 1 tablet (100 mg) by mouth every evening. 90 tablet 1    metFORMIN (GLUCOPHAGE XR) 500 MG 24 hr tablet Take 2 tablets (1,000 mg) by mouth daily (with breakfast). 180 tablet 1    montelukast (SINGULAIR) 10 MG tablet Take 1 tablet (10 mg) by mouth at bedtime. 90 tablet 3    Multiple Vitamin (MULTIVITAMINS PO) Take 1 tablet by mouth daily.      olopatadine (PATADAY) 0.2 % ophthalmic solution Place 0.05 mLs (1 drop) into both eyes daily. 2.5 mL 5    Omega-3 Fatty Acids (FISH OIL PO) Take 1 capsule by mouth as needed      order for DME Back brace 1 Device 0    tamsulosin (FLOMAX) 0.4 MG capsule Take 1 capsule (0.4 mg) by mouth daily. 90 capsule 0    clotrimazole (LOTRIMIN) 1 % external cream Apply topically 2 times daily as needed (rash) 60 g 1       Social History     Tobacco Use    Smoking status: Former     Current packs/day: 0.00     Types: Cigarettes     Start date: 1976     Quit date: 1981     Years since quittin.6    Smokeless tobacco: Never   Substance Use Topics    Alcohol use: Yes      Alcohol/week: 2.5 standard drinks of alcohol     Types: 3 Standard drinks or equivalent per week    Drug use: No       Jenifer Pearson MA  7/14/2025  10:24 AM

## 2025-07-14 NOTE — LETTER
7/14/2025       RE: Vishnu Viveros  [address]  [place] [address]     Dear Colleague,    Thank you for referring your patient, Vishnu Viveros, to the [facility] [place] at [facility]. Please see a copy of my visit note below.    Reason for Visit: Follow up PSA, abnormal YESI, medication refill     HPI/Subjective:  Vishnu Odom is a 74 yo male that presents to clinic for follow up on an elevated PSA test. 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 His urologic history includes benign prostate biopsy in 2010 done secondary to a palpated prostate nodule. He has had a fluctuating PSA over the years leading to an MRI in 03/27/2017 (PIRADS 2 - Low probability) after a PSA of 6.91. His PSA subsequently trended down into the 4's before creeping back above 5 where it has been since then.  Last YESI on 1/27/20 showed no nodules.  He recently had another PSA checked.        He has had no significant trouble with urination. He feels that he can empty his bladder completely, has no hesitancy, is up at most once time per night, no blood in his urine, no burning with urination.  He does have some urgency however which he finds bothersome.  He continues on flomax and finasteride.     OBJECTIVE:  PSA from 12/9/23 is 1.77 (3.54 ng/mL adjusted for finasteride) ng/mL with 39% free  PSA 12 /10/22 is 2.36 ng/mL  PSA from 10/9/21 is 5.10 ng/mL  PSA from 11/16/20 is 5.35 ng/mL  11/25/19: 5.14 ng/mL  6/12/19: 4.63 ng/mL       ASSESSMENT AND PLAN:   35-minutes were spent today reviewing the chart, interpreting results and counseling the patient regarding his PSA, YESI and LUTS.  Overall doing well though some urgency.  Was given a script for oxybutynin, and refills on tamsulosin and finasteride.  We will see him back in 12-18 mos.           Again, thank you for allowing  me to participate in the care of your patient.      Sincerely,    Leandro Montaño MD

## 2025-07-23 ENCOUNTER — ANCILLARY PROCEDURE (OUTPATIENT)
Dept: CT IMAGING | Facility: CLINIC | Age: 75
End: 2025-07-23
Attending: INTERNAL MEDICINE
Payer: COMMERCIAL

## 2025-07-23 ENCOUNTER — LAB (OUTPATIENT)
Dept: LAB | Facility: CLINIC | Age: 75
End: 2025-07-23
Attending: INTERNAL MEDICINE
Payer: COMMERCIAL

## 2025-07-23 ENCOUNTER — THERAPY VISIT (OUTPATIENT)
Dept: PHYSICAL THERAPY | Facility: CLINIC | Age: 75
End: 2025-07-23
Payer: COMMERCIAL

## 2025-07-23 DIAGNOSIS — C34.91 ADENOCARCINOMA OF LUNG, STAGE 1, RIGHT (H): ICD-10-CM

## 2025-07-23 DIAGNOSIS — M51.362 OTHER INTERVERTEBRAL DISC DEGENERATION, LUMBAR REGION WITH DISCOGENIC BACK PAIN AND LOWER EXTREMITY PAIN: Primary | ICD-10-CM

## 2025-07-23 DIAGNOSIS — C34.91 ADENOCARCINOMA OF LUNG, STAGE 1, RIGHT (H): Primary | ICD-10-CM

## 2025-07-23 LAB
ALBUMIN SERPL BCG-MCNC: 3.9 G/DL (ref 3.5–5.2)
ALP SERPL-CCNC: 57 U/L (ref 40–150)
ALT SERPL W P-5'-P-CCNC: 17 U/L (ref 0–70)
ANION GAP SERPL CALCULATED.3IONS-SCNC: 7 MMOL/L (ref 7–15)
AST SERPL W P-5'-P-CCNC: 18 U/L (ref 0–45)
BASOPHILS # BLD AUTO: 0 10E3/UL (ref 0–0.2)
BASOPHILS NFR BLD AUTO: 1 %
BILIRUB SERPL-MCNC: 0.2 MG/DL
BUN SERPL-MCNC: 17.8 MG/DL (ref 8–23)
CALCIUM SERPL-MCNC: 9.1 MG/DL (ref 8.8–10.4)
CHLORIDE SERPL-SCNC: 108 MMOL/L (ref 98–107)
CREAT SERPL-MCNC: 0.86 MG/DL (ref 0.51–1.17)
EGFRCR SERPLBLD CKD-EPI 2021: 90 ML/MIN/1.73M2
EOSINOPHIL # BLD AUTO: 0.2 10E3/UL (ref 0–0.7)
EOSINOPHIL NFR BLD AUTO: 6 %
ERYTHROCYTE [DISTWIDTH] IN BLOOD BY AUTOMATED COUNT: 13.2 % (ref 10–15)
GLUCOSE SERPL-MCNC: 183 MG/DL (ref 70–99)
HCO3 SERPL-SCNC: 27 MMOL/L (ref 22–29)
HCT VFR BLD AUTO: 34.6 % (ref 35–53)
HGB BLD-MCNC: 11.1 G/DL (ref 11.7–17.7)
IMM GRANULOCYTES # BLD: 0 10E3/UL
IMM GRANULOCYTES NFR BLD: 0 %
LYMPHOCYTES # BLD AUTO: 1.3 10E3/UL (ref 0.8–5.3)
LYMPHOCYTES NFR BLD AUTO: 47 %
MCH RBC QN AUTO: 29.3 PG (ref 26.5–33)
MCHC RBC AUTO-ENTMCNC: 32.1 G/DL (ref 31.5–36.5)
MCV RBC AUTO: 91 FL (ref 78–100)
MONOCYTES # BLD AUTO: 0.3 10E3/UL (ref 0–1.3)
MONOCYTES NFR BLD AUTO: 13 %
NEUTROPHILS # BLD AUTO: 0.9 10E3/UL (ref 1.6–8.3)
NEUTROPHILS NFR BLD AUTO: 33 %
NRBC # BLD AUTO: 0 10E3/UL
NRBC BLD AUTO-RTO: 0 /100
PLATELET # BLD AUTO: 187 10E3/UL (ref 150–450)
POTASSIUM SERPL-SCNC: 4.4 MMOL/L (ref 3.4–5.3)
PROT SERPL-MCNC: 6.4 G/DL (ref 6.4–8.3)
RBC # BLD AUTO: 3.79 10E6/UL (ref 3.8–5.9)
SODIUM SERPL-SCNC: 142 MMOL/L (ref 135–145)
WBC # BLD AUTO: 2.7 10E3/UL (ref 4–11)

## 2025-07-23 PROCEDURE — 97110 THERAPEUTIC EXERCISES: CPT | Mod: GP | Performed by: PHYSICAL THERAPIST

## 2025-07-23 PROCEDURE — 36415 COLL VENOUS BLD VENIPUNCTURE: CPT

## 2025-07-23 PROCEDURE — 82435 ASSAY OF BLOOD CHLORIDE: CPT

## 2025-07-23 PROCEDURE — 97530 THERAPEUTIC ACTIVITIES: CPT | Mod: GP | Performed by: PHYSICAL THERAPIST

## 2025-07-23 PROCEDURE — 85025 COMPLETE CBC W/AUTO DIFF WBC: CPT

## 2025-07-23 PROCEDURE — 71250 CT THORAX DX C-: CPT | Performed by: RADIOLOGY

## 2025-07-23 NOTE — NURSING NOTE
Chief Complaint   Patient presents with    Blood Draw     Vpt blood draw by lab RN     Venipuncture labs drawn by lab RN    Rocio Watson, RN

## 2025-07-24 ENCOUNTER — ONCOLOGY VISIT (OUTPATIENT)
Dept: ONCOLOGY | Facility: CLINIC | Age: 75
End: 2025-07-24
Attending: INTERNAL MEDICINE
Payer: COMMERCIAL

## 2025-07-24 ENCOUNTER — TELEPHONE (OUTPATIENT)
Dept: FAMILY MEDICINE | Facility: CLINIC | Age: 75
End: 2025-07-24
Payer: COMMERCIAL

## 2025-07-24 VITALS
BODY MASS INDEX: 32.91 KG/M2 | HEART RATE: 65 BPM | RESPIRATION RATE: 16 BRPM | OXYGEN SATURATION: 98 % | SYSTOLIC BLOOD PRESSURE: 138 MMHG | TEMPERATURE: 97.8 F | WEIGHT: 191.7 LBS | DIASTOLIC BLOOD PRESSURE: 75 MMHG

## 2025-07-24 DIAGNOSIS — C34.91 ADENOCARCINOMA OF LUNG, STAGE 1, RIGHT (H): Primary | ICD-10-CM

## 2025-07-24 PROCEDURE — 99213 OFFICE O/P EST LOW 20 MIN: CPT | Performed by: INTERNAL MEDICINE

## 2025-07-24 RX ORDER — FLUTICASONE PROPIONATE 50 MCG
2 SPRAY, SUSPENSION (ML) NASAL PRN
COMMUNITY
Start: 2025-07-22

## 2025-07-24 RX ORDER — AMLODIPINE BESYLATE 5 MG/1
5 TABLET ORAL DAILY
COMMUNITY

## 2025-07-24 ASSESSMENT — PAIN SCALES - GENERAL: PAINLEVEL_OUTOF10: MODERATE PAIN (6)

## 2025-07-24 NOTE — TELEPHONE ENCOUNTER
Patient Quality Outreach    Patient is due for the following:   Hypertension -  BP check    Action(s) Taken:   Schedule a office visit for Blood Pressure Check    Type of outreach:    Sent Bloominous message.    Questions for provider review:    None         Nancy Prasad CMA  Chart routed to None.

## 2025-07-24 NOTE — PROGRESS NOTES
Therapy services were provided by the student with Jaxon Holland DPT guiding and directing the services and providing the skilled judgement and assessment throughout the session.

## 2025-07-24 NOTE — PROGRESS NOTES
REASON FOR VISIT:    CANCER STAGE:  Cancer Staging   Adenocarcinoma of lung, stage 1, right (H)  Staging form: Lung, AJCC 8th Edition  - Clinical: No stage assigned - Unsigned        HISTORY OF PRESENT ILLNESS:  Mr. Viveros is a 71 yo man who was diagnosed with adenocarcinoma of the lung in 2019. He was having some difficulty breathing and some wheezing.  He had a CXR done that was negative for pneumonia but did show a RUL nodule.  He had a CT chest in 2012 that showed this nodule.  CT was done on 1/4/19 that showed a 13.4mmx10.2 RUL nodule that grew from 8.5x6.4 in 2012.  He saw Dr. LAURIE Lopez from pulmonary who ordered a PET/CT scan - 3/9/19 showed that the RUL nodule was hypermetabolic and now measured [id number].  There was no mediastinal LNs or other lesions on PET. Pt was set up for surgical resection, but ended up going to [facility].  He had a wedge resection and LN dissection by Dr. Covington.  Pathology showed a 1.8cm tumor margins were negative. 11 LNs were negative for involvement with tumor including station 7 and 4R. He tolerated the surgery well.  He has not had any other complications since then.        Review Of Systems  10-point review of systems were negative except as noted in HPI.        EXAM:  /75 (BP Location: Right arm, Patient Position: Sitting, Cuff Size: Adult Regular)   Pulse 65   Temp 97.8  F (36.6  C) (Oral)   Resp 16   Wt 87 kg (191 lb 11.2 oz)   SpO2 98%   BMI 32.91 kg/m      GEN: alert and oriented x 3, nad  HEENT: perrla, eomi, sclera anicteric, oral mucosa moist without thrush  NECK: supple, no palpable LAD  HT: reg rate and rhythm, no murmurs  LUNGS: clear to auscultation bilaterally  ABD: soft, nt, nd, +bs x 4  EXT: no clubbing, cyanosis, or edema  NEURO: CN 2-12 intact, MS 5/5 b/l    Current Outpatient Medications   Medication Sig Dispense Refill    acetaminophen (TYLENOL) 500 MG tablet Take 1-2 tablets (500-1,000 mg) by mouth every 6 hours as needed for pain (arthritis) 120  tablet 3    Ascorbic Acid (VITAMIN C PO) Take 500 mg by mouth once      atorvastatin (LIPITOR) 20 MG tablet Take 1 tablet (20 mg) by mouth daily. 90 tablet 3    celecoxib (CELEBREX) 100 MG capsule Take 1 capsule (100 mg) by mouth daily. 30 capsule 1    cetirizine (ZYRTEC) 10 MG tablet Take 1 tablet (10 mg) by mouth daily as needed for allergies. 90 tablet 3    Cholecalciferol (VITAMIN D) 1000 UNIT capsule Take 1 capsule by mouth daily.      clotrimazole (LOTRIMIN) 1 % external cream Apply topically 2 times daily as needed (rash) 60 g 1    Cyanocobalamin (VITAMIN B12 PO) Take 1 tablet by mouth Pt states he takes this 2 or 3 times a week.      finasteride (PROSCAR) 5 MG tablet Take 1 tablet (5 mg) by mouth daily. 90 tablet 3    finasteride (PROSCAR) 5 MG tablet Take 1 tablet (5 mg) by mouth daily. 90 tablet 0    gabapentin (NEURONTIN) 300 MG capsule Take 2 capsules (600 mg) by mouth at bedtime. 180 capsule 1    losartan (COZAAR) 100 MG tablet Take 1 tablet (100 mg) by mouth every evening. 90 tablet 1    metFORMIN (GLUCOPHAGE XR) 500 MG 24 hr tablet Take 2 tablets (1,000 mg) by mouth daily (with breakfast). 180 tablet 1    montelukast (SINGULAIR) 10 MG tablet Take 1 tablet (10 mg) by mouth at bedtime. 90 tablet 3    Multiple Vitamin (MULTIVITAMINS PO) Take 1 tablet by mouth daily.      olopatadine (PATADAY) 0.2 % ophthalmic solution Place 0.05 mLs (1 drop) into both eyes daily. 2.5 mL 5    Omega-3 Fatty Acids (FISH OIL PO) Take 1 capsule by mouth as needed      order for DME Back brace 1 Device 0    oxyBUTYnin (DITROPAN) 5 MG tablet Take 1 tablet (5 mg) by mouth 2 times daily as needed for bladder spasms. 60 tablet 11    tamsulosin (FLOMAX) 0.4 MG capsule Take 1 capsule (0.4 mg) by mouth daily. 90 capsule 3    tamsulosin (FLOMAX) 0.4 MG capsule Take 1 capsule (0.4 mg) by mouth daily. 90 capsule 0           Recent Labs   Lab Test 07/23/25  1336   WBC 2.7*   HGB 11.1*         @labrcent[na,potassium,chloride,co2,bun,cr@  Recent Labs   Lab Test 07/23/25  1336   PROTTOTAL 6.4   ALBUMIN 3.9   BILITOTAL 0.2   AST 18   ALT 17   ALKPHOS 57         Recent Results (from the past 744 hours)   CT Chest w/o Contrast    Narrative    CT chest without contrast 12 INDICATION: Stage I adenocarcinoma 1    COMPARISON: Chest CT 7/20/2024    No contrast. The included thyroid shows a oval-shaped hypodense left  lobe nodule grossly unchanged measuring approximately 11 mm in long  axis. No enlarged axillary, mediastinal or hilar lymph nodes. No  pleural or pericardial effusion. Heart size normal. Pulmonary artery  caliber normal. Thoracic aorta caliber normal. Exophytic posterior  right renal cyst grossly unchanged appearing homogeneous in its imaged  portion. Diverticulosis of the large bowel. Unchanged subcentimeter  hepatic hypodensity in the right lobe somewhat anteromedially (3/254)  typical cyst. No adrenal nodule.  Bone detail shows mild degenerative spurring in the lower cervical as  well as throughout the thoracic spine. No suspicious sclerotic or  lytic/destructive bone lesion.    Detail of the lungs shows right upper lobectomy again with otherwise  patent airway the major airways. No significant changes of the  appearance of the suture/staple line related to lobectomy. No new  pulmonary nodule. Scattered subsegmental atelectasis otherwise  bilaterally. No ectopic air in the chest. There is some anterior  mediastinal shift to the right related to right hemithorax volume loss  from upper lobectomy. There is some minimal elevation of the right  hemidiaphragm.      Impression    IMPRESSION: Stable appearance right upper lobectomy with no evidence  of recurrence or metastatic disease in the chest. Unchanged cysts in  the right kidney and liver. Diverticulosis.    JOE CORTÉS MD         SYSTEM ID:  [id number]           Assessment/Plan  Stage I NSCLC - He is now over 5 years since his surgery for  stage I NSCLC.  He is doing well.  He has no evidence of disease recurrence.   We discussed again that I am not really concerned about his prior cancer recurring. He still has a potential risk of developing a new lung cancer.  While he has quit smoking a long time ago, because he has had a prior lung cancer, there is a risk of developing another one.   Certainly, CT surveillance is not mandated, but is probably the only way that we will ever detect a cancer early for him.     We could consider doing a CT scan in 2 years vs yearly.  It is clear that there is no other imaging modality that performs as well as CT so we could try to do low dose CT or just observation.     He would like to consider surveillance CT and so we will do this in 1 year.      Pt is agreeable to the plan. I will see him back in 1 year.     I spent 20 minutes with the patient, reviewing imaging, and documenting this note on the date of the encounter.     Chava Kelley   of Medicine  Division of Hematology, Oncology, and Transplantation

## 2025-07-24 NOTE — LETTER
7/24/2025      Vishnu Viveros  [address]  [place] [address]      Dear Colleague,    Thank you for referring your patient, Vishnu Viveros, to the [facility]. Please see a copy of my visit note below.    REASON FOR VISIT:    CANCER STAGE:  Cancer Staging   Adenocarcinoma of lung, stage 1, right (H)  Staging form: Lung, AJCC 8th Edition  - Clinical: No stage assigned - Unsigned        HISTORY OF PRESENT ILLNESS:  Mr. Viveros is a 71 yo man who was diagnosed with adenocarcinoma of the lung in 2019. He was having some difficulty breathing and some wheezing.  He had a CXR done that was negative for pneumonia but did show a RUL nodule.  He had a CT chest in 2012 that showed this nodule.  CT was done on 1/4/19 that showed a 13.4mmx10.2 RUL nodule that grew from 8.5x6.4 in 2012.  He saw Dr. LAURIE Lopez from pulmonary who ordered a PET/CT scan - 3/9/19 showed that the RUL nodule was hypermetabolic and now measured [id number].  There was no mediastinal LNs or other lesions on PET. Pt was set up for surgical resection, but ended up going to [facility].  He had a wedge resection and LN dissection by Dr. Covington.  Pathology showed a 1.8cm tumor margins were negative. 11 LNs were negative for involvement with tumor including station 7 and 4R. He tolerated the surgery well.  He has not had any other complications since then.        Review Of Systems  10-point review of systems were negative except as noted in HPI.        EXAM:  /75 (BP Location: Right arm, Patient Position: Sitting, Cuff Size: Adult Regular)   Pulse 65   Temp 97.8  F (36.6  C) (Oral)   Resp 16   Wt 87 kg (191 lb 11.2 oz)   SpO2 98%   BMI 32.91 kg/m      GEN: alert and oriented x 3, nad  HEENT: perrla, eomi, sclera anicteric, oral mucosa moist without thrush  NECK: supple, no palpable LAD  HT: reg rate and rhythm, no murmurs  LUNGS: clear to auscultation bilaterally  ABD: soft, nt, nd, +bs x 4  EXT: no clubbing,  cyanosis, or edema  NEURO: CN 2-12 intact, MS 5/5 b/l    Current Outpatient Medications   Medication Sig Dispense Refill     acetaminophen (TYLENOL) 500 MG tablet Take 1-2 tablets (500-1,000 mg) by mouth every 6 hours as needed for pain (arthritis) 120 tablet 3     Ascorbic Acid (VITAMIN C PO) Take 500 mg by mouth once       atorvastatin (LIPITOR) 20 MG tablet Take 1 tablet (20 mg) by mouth daily. 90 tablet 3     celecoxib (CELEBREX) 100 MG capsule Take 1 capsule (100 mg) by mouth daily. 30 capsule 1     cetirizine (ZYRTEC) 10 MG tablet Take 1 tablet (10 mg) by mouth daily as needed for allergies. 90 tablet 3     Cholecalciferol (VITAMIN D) 1000 UNIT capsule Take 1 capsule by mouth daily.       clotrimazole (LOTRIMIN) 1 % external cream Apply topically 2 times daily as needed (rash) 60 g 1     Cyanocobalamin (VITAMIN B12 PO) Take 1 tablet by mouth Pt states he takes this 2 or 3 times a week.       finasteride (PROSCAR) 5 MG tablet Take 1 tablet (5 mg) by mouth daily. 90 tablet 3     finasteride (PROSCAR) 5 MG tablet Take 1 tablet (5 mg) by mouth daily. 90 tablet 0     gabapentin (NEURONTIN) 300 MG capsule Take 2 capsules (600 mg) by mouth at bedtime. 180 capsule 1     losartan (COZAAR) 100 MG tablet Take 1 tablet (100 mg) by mouth every evening. 90 tablet 1     metFORMIN (GLUCOPHAGE XR) 500 MG 24 hr tablet Take 2 tablets (1,000 mg) by mouth daily (with breakfast). 180 tablet 1     montelukast (SINGULAIR) 10 MG tablet Take 1 tablet (10 mg) by mouth at bedtime. 90 tablet 3     Multiple Vitamin (MULTIVITAMINS PO) Take 1 tablet by mouth daily.       olopatadine (PATADAY) 0.2 % ophthalmic solution Place 0.05 mLs (1 drop) into both eyes daily. 2.5 mL 5     Omega-3 Fatty Acids (FISH OIL PO) Take 1 capsule by mouth as needed       order for DME Back brace 1 Device 0     oxyBUTYnin (DITROPAN) 5 MG tablet Take 1 tablet (5 mg) by mouth 2 times daily as needed for bladder spasms. 60 tablet 11     tamsulosin (FLOMAX) 0.4 MG  capsule Take 1 capsule (0.4 mg) by mouth daily. 90 capsule 3     tamsulosin (FLOMAX) 0.4 MG capsule Take 1 capsule (0.4 mg) by mouth daily. 90 capsule 0           Recent Labs   Lab Test 07/23/25  1336   WBC 2.7*   HGB 11.1*        @labrcent[na,potassium,chloride,co2,bun,cr@  Recent Labs   Lab Test 07/23/25  1336   PROTTOTAL 6.4   ALBUMIN 3.9   BILITOTAL 0.2   AST 18   ALT 17   ALKPHOS 57         Recent Results (from the past 744 hours)   CT Chest w/o Contrast    Narrative    CT chest without contrast 12 INDICATION: Stage I adenocarcinoma 1    COMPARISON: Chest CT 7/20/2024    No contrast. The included thyroid shows a oval-shaped hypodense left  lobe nodule grossly unchanged measuring approximately 11 mm in long  axis. No enlarged axillary, mediastinal or hilar lymph nodes. No  pleural or pericardial effusion. Heart size normal. Pulmonary artery  caliber normal. Thoracic aorta caliber normal. Exophytic posterior  right renal cyst grossly unchanged appearing homogeneous in its imaged  portion. Diverticulosis of the large bowel. Unchanged subcentimeter  hepatic hypodensity in the right lobe somewhat anteromedially (3/254)  typical cyst. No adrenal nodule.  Bone detail shows mild degenerative spurring in the lower cervical as  well as throughout the thoracic spine. No suspicious sclerotic or  lytic/destructive bone lesion.    Detail of the lungs shows right upper lobectomy again with otherwise  patent airway the major airways. No significant changes of the  appearance of the suture/staple line related to lobectomy. No new  pulmonary nodule. Scattered subsegmental atelectasis otherwise  bilaterally. No ectopic air in the chest. There is some anterior  mediastinal shift to the right related to right hemithorax volume loss  from upper lobectomy. There is some minimal elevation of the right  hemidiaphragm.      Impression    IMPRESSION: Stable appearance right upper lobectomy with no evidence  of recurrence or  metastatic disease in the chest. Unchanged cysts in  the right kidney and liver. Diverticulosis.    JOE CORTÉS MD         SYSTEM ID:  [id number]           Assessment/Plan  Stage I NSCLC - He is now over 5 years since his surgery for stage I NSCLC.  He is doing well.  He has no evidence of disease recurrence.   We discussed again that I am not really concerned about his prior cancer recurring. He still has a potential risk of developing a new lung cancer.  While he has quit smoking a long time ago, because he has had a prior lung cancer, there is a risk of developing another one.   Certainly, CT surveillance is not mandated, but is probably the only way that we will ever detect a cancer early for him.     We could consider doing a CT scan in 2 years vs yearly.  It is clear that there is no other imaging modality that performs as well as CT so we could try to do low dose CT or just observation.     He would like to consider surveillance CT and so we will do this in 1 year.      Pt is agreeable to the plan. I will see him back in 1 year.     I spent 20 minutes with the patient, reviewing imaging, and documenting this note on the date of the encounter.     Chava Kelley   of Medicine  Division of Hematology, Oncology, and Transplantation    Again, thank you for allowing me to participate in the care of your patient.        Sincerely,        Chava Kelley, DO    Electronically signed

## 2025-07-24 NOTE — NURSING NOTE
"Oncology Rooming Note    July 24, 2025 2:41 PM   Vishnu Viveros is a 75 year old adult who presents for:    Chief Complaint   Patient presents with    Oncology Clinic Visit     Adenocarcinoma of lung, stage 1, right     Initial Vitals: /75 (BP Location: Right arm, Patient Position: Sitting, Cuff Size: Adult Regular)   Pulse 65   Temp 97.8  F (36.6  C) (Oral)   Resp 16   Wt 87 kg (191 lb 11.2 oz)   SpO2 98%   BMI 32.91 kg/m   Estimated body mass index is 32.91 kg/m  as calculated from the following:    Height as of 7/14/25: 1.626 m (5' 4\").    Weight as of this encounter: 87 kg (191 lb 11.2 oz). Body surface area is 1.98 meters squared.  Moderate Pain (6) Comment: Data Unavailable   No LMP recorded.  Allergies reviewed: Yes  Medications reviewed: Yes    Medications: Medication refills not needed today.  Pharmacy name entered into [facility]:    [id number] - [place], [place] - [address] [address] [facility] [id number] - [place] [facility], [place] [facility] [address] [address] [id number] - [place], [place] - [address]    PHQ9:  Did this patient require a PHQ9?: No      Clinical concerns: N/A - provider began visit following frailty screening      Vidal Ward, EMT              "

## 2025-08-05 DIAGNOSIS — H35.3230 BILATERAL EXUDATIVE AGE-RELATED MACULAR DEGENERATION, UNSPECIFIED STAGE (H): Primary | ICD-10-CM

## (undated) DEVICE — EYE PACK CUSTOM ANTERIOR 30DEG TIP CENTURION PPK6682-04

## (undated) DEVICE — NDL 30GA 0.5" 305106

## (undated) DEVICE — LINEN TOWEL PACK X5 5464

## (undated) DEVICE — SOL WATER IRRIG 500ML BOTTLE 2F7113

## (undated) DEVICE — SU ETHILON 10-0 CS160-6 12" 9000G

## (undated) DEVICE — EYE CANN IRR 25GA CYSTOTOME 581610

## (undated) DEVICE — EYE CANN IRR 27GA ANTERIOR CHAMBER 581280

## (undated) DEVICE — GLOVE PROTEXIS MICRO 7.5  2D73PM75

## (undated) DEVICE — EYE KNIFE SLIT XSTAR VISITEC 2.6MM 45DEG 373726

## (undated) DEVICE — SU VICRYL 6-0 P-1 18" UND J489G

## (undated) DEVICE — EYE SHIELD PLASTIC

## (undated) DEVICE — SU PDS II 5-0 RB-2DA 30" Z148H

## (undated) DEVICE — EYE PREP BETADINE 5% SOLUTION 30ML 0065-0411-30

## (undated) DEVICE — PACK CATARACT CUSTOM ASC SEY15CPUMC

## (undated) DEVICE — SYR 03ML LL W/O NDL 309657

## (undated) DEVICE — PEN MARKING SKIN TYCO DEVON DUAL TIP 31145868

## (undated) DEVICE — PACK MINOR EYE CUSTOM ASC

## (undated) DEVICE — EYE TIP IRRIGATION & ASPIRATION POLYMER CVD 0.3MM 8065751512

## (undated) DEVICE — GLOVE BIOGEL PI MICRO SZ 7.5 48575

## (undated) DEVICE — BLADE KNIFE SURG 15 371115

## (undated) DEVICE — EYE KNIFE STILETTO VISITEC 1.1MM ANG 45DEG SIDEPORT 376620

## (undated) DEVICE — SPONGE COTTONOID 1/2X3" 80-1407

## (undated) DEVICE — ESU EYE HIGH TEMP 65410-183

## (undated) RX ORDER — FENTANYL CITRATE 50 UG/ML
INJECTION, SOLUTION INTRAMUSCULAR; INTRAVENOUS
Status: DISPENSED
Start: 2023-07-10

## (undated) RX ORDER — ONDANSETRON 2 MG/ML
INJECTION INTRAMUSCULAR; INTRAVENOUS
Status: DISPENSED
Start: 2023-07-24

## (undated) RX ORDER — PROPOFOL 10 MG/ML
INJECTION, EMULSION INTRAVENOUS
Status: DISPENSED
Start: 2018-12-19

## (undated) RX ORDER — OXYCODONE HYDROCHLORIDE 5 MG/1
TABLET ORAL
Status: DISPENSED
Start: 2018-12-19

## (undated) RX ORDER — FENTANYL CITRATE 50 UG/ML
INJECTION, SOLUTION INTRAMUSCULAR; INTRAVENOUS
Status: DISPENSED
Start: 2023-07-24

## (undated) RX ORDER — ACETAZOLAMIDE 500 MG/1
CAPSULE, EXTENDED RELEASE ORAL
Status: DISPENSED
Start: 2023-07-24

## (undated) RX ORDER — LIDOCAINE HYDROCHLORIDE 20 MG/ML
INJECTION, SOLUTION EPIDURAL; INFILTRATION; INTRACAUDAL; PERINEURAL
Status: DISPENSED
Start: 2018-12-19